# Patient Record
Sex: MALE | Race: WHITE | Employment: OTHER | URBAN - METROPOLITAN AREA
[De-identification: names, ages, dates, MRNs, and addresses within clinical notes are randomized per-mention and may not be internally consistent; named-entity substitution may affect disease eponyms.]

---

## 2018-08-28 ENCOUNTER — TRANSFERRED RECORDS (OUTPATIENT)
Dept: HEALTH INFORMATION MANAGEMENT | Facility: CLINIC | Age: 63
End: 2018-08-28

## 2018-09-17 ENCOUNTER — PATIENT OUTREACH (OUTPATIENT)
Dept: FAMILY MEDICINE | Facility: CLINIC | Age: 63
End: 2018-09-17

## 2018-09-18 NOTE — PROGRESS NOTES
Clinic Care Coordination Contact      Outreached to pt in regards to establishing with a new provider. Pt states he has been going to someone in [place] for his Physicals.  He thought [facility] received the information.  Pt states he does need to go in for an EKG. Advised he will need to establish care with someone in order to have this done.  Pt stats his wife knows Dr. Juarez and would schedule with him in Oct once all his hay is put up.  Appt scheduled.       Debra SHAWN, RN, PHN  Care Coordination    [id number] [place] [address]  Office: [phone]  Fax [phone]   [facility]  [address] [place] [address]  Office: [phone] Fax [phone]  [email or web address]   [email or web address]   Connect with [facility] on social media.

## 2018-10-26 ENCOUNTER — OFFICE VISIT (OUTPATIENT)
Dept: FAMILY MEDICINE | Facility: CLINIC | Age: 63
End: 2018-10-26
Payer: COMMERCIAL

## 2018-10-26 VITALS
OXYGEN SATURATION: 99 % | RESPIRATION RATE: 20 BRPM | WEIGHT: 135 LBS | BODY MASS INDEX: 21.19 KG/M2 | HEART RATE: 78 BPM | TEMPERATURE: 97.5 F | HEIGHT: 67 IN | SYSTOLIC BLOOD PRESSURE: 122 MMHG | DIASTOLIC BLOOD PRESSURE: 58 MMHG

## 2018-10-26 DIAGNOSIS — E78.5 HYPERLIPIDEMIA LDL GOAL <100: ICD-10-CM

## 2018-10-26 DIAGNOSIS — Z23 NEED FOR PROPHYLACTIC VACCINATION AND INOCULATION AGAINST INFLUENZA: ICD-10-CM

## 2018-10-26 DIAGNOSIS — I35.0 AORTIC VALVE STENOSIS, ETIOLOGY OF CARDIAC VALVE DISEASE UNSPECIFIED: Primary | ICD-10-CM

## 2018-10-26 LAB
ANION GAP SERPL CALCULATED.3IONS-SCNC: 6 MMOL/L (ref 3–14)
BUN SERPL-MCNC: 24 MG/DL (ref 7–30)
CALCIUM SERPL-MCNC: 8.9 MG/DL (ref 8.5–10.1)
CHLORIDE SERPL-SCNC: 105 MMOL/L (ref 94–109)
CHOLEST SERPL-MCNC: 209 MG/DL
CO2 SERPL-SCNC: 31 MMOL/L (ref 20–32)
CREAT SERPL-MCNC: 1 MG/DL (ref 0.66–1.25)
ERYTHROCYTE [DISTWIDTH] IN BLOOD BY AUTOMATED COUNT: 12.8 % (ref 10–15)
GFR SERPL CREATININE-BSD FRML MDRD: 75 ML/MIN/1.7M2
GLUCOSE SERPL-MCNC: 96 MG/DL (ref 70–99)
HCT VFR BLD AUTO: 37.9 % (ref 40–53)
HDLC SERPL-MCNC: 85 MG/DL
HGB BLD-MCNC: 12.8 G/DL (ref 13.3–17.7)
LDLC SERPL CALC-MCNC: 115 MG/DL
MCH RBC QN AUTO: 30 PG (ref 26.5–33)
MCHC RBC AUTO-ENTMCNC: 33.8 G/DL (ref 31.5–36.5)
MCV RBC AUTO: 89 FL (ref 78–100)
NONHDLC SERPL-MCNC: 124 MG/DL
PLATELET # BLD AUTO: 192 10E9/L (ref 150–450)
POTASSIUM SERPL-SCNC: 4.2 MMOL/L (ref 3.4–5.3)
RBC # BLD AUTO: 4.27 10E12/L (ref 4.4–5.9)
SODIUM SERPL-SCNC: 142 MMOL/L (ref 133–144)
TRIGL SERPL-MCNC: 46 MG/DL
WBC # BLD AUTO: 5.9 10E9/L (ref 4–11)

## 2018-10-26 PROCEDURE — 80061 LIPID PANEL: CPT | Performed by: FAMILY MEDICINE

## 2018-10-26 PROCEDURE — 93000 ELECTROCARDIOGRAM COMPLETE: CPT | Performed by: FAMILY MEDICINE

## 2018-10-26 PROCEDURE — 99204 OFFICE O/P NEW MOD 45 MIN: CPT | Mod: 25 | Performed by: FAMILY MEDICINE

## 2018-10-26 PROCEDURE — 90662 IIV NO PRSV INCREASED AG IM: CPT | Performed by: FAMILY MEDICINE

## 2018-10-26 PROCEDURE — 36415 COLL VENOUS BLD VENIPUNCTURE: CPT | Performed by: FAMILY MEDICINE

## 2018-10-26 PROCEDURE — 80048 BASIC METABOLIC PNL TOTAL CA: CPT | Performed by: FAMILY MEDICINE

## 2018-10-26 PROCEDURE — G0008 ADMIN INFLUENZA VIRUS VAC: HCPCS | Performed by: FAMILY MEDICINE

## 2018-10-26 PROCEDURE — 85027 COMPLETE CBC AUTOMATED: CPT | Performed by: FAMILY MEDICINE

## 2018-10-26 ASSESSMENT — PAIN SCALES - GENERAL: PAINLEVEL: NO PAIN (0)

## 2018-10-26 NOTE — MR AVS SNAPSHOT
"              After Visit Summary   10/26/2018    Miguel Pappas    MRN: [id number]           Patient Information     Date Of Birth          1955        Visit Information        Provider Department      10/26/2018 3:45 PM Nathan Juarez MD [facility]        Today's Diagnoses     Aortic valve stenosis, etiology of cardiac valve disease unspecified    -  1    Hyperlipidemia LDL goal <100        Need for prophylactic vaccination and inoculation against influenza           Follow-ups after your visit        Who to contact     If you have questions or need follow up information about today's clinic visit or your schedule please contact [facility] directly at [phone].  Normal or non-critical lab and imaging results will be communicated to you by MyChart, letter or phone within 4 business days after the clinic has received the results. If you do not hear from us within 7 days, please contact the clinic through MyChart or phone. If you have a critical or abnormal lab result, we will notify you by phone as soon as possible.  Submit refill requests through [employer] or call your pharmacy and they will forward the refill request to us. Please allow 3 business days for your refill to be completed.          Additional Information About Your Visit        Care EveryWhere ID     This is your Care EveryWhere ID. This could be used by other organizations to access your [facility] medical records  [id number]        Your Vitals Were     Pulse Temperature Respirations Height Pulse Oximetry BMI (Body Mass Index)    78 97.5  F (36.4  C) (Tympanic) 20 5' 7\" (1.702 m) 99% 21.14 kg/m2       Blood Pressure from Last 3 Encounters:   10/26/18 122/58   12/05/14 140/74   11/14/14 118/58    Weight from Last 3 Encounters:   10/26/18 135 lb (61.2 kg)   12/05/14 134 lb (60.8 kg)   11/14/14 134 lb 4.8 oz (60.9 kg)              We Performed the Following     ADMIN INFLUENZA (For MEDICARE Patients ONLY) " []     Basic metabolic panel     CBC with platelets     EKG 12-lead complete w/read - Clinics     FLU VACCINE, INCREASED ANTIGEN, PRESV FREE, AGE 65+ [[address]703]     Lipid Profile        Primary Care Provider Office Phone # Fax #    Nathan Kamar Juarez [phone] [address] [place] [address]        Equal Access to Services     [facility]: Hadii aad ku hadasho Soomaali, waaxda luqadaha, qaybta kaalmada adeegyada, waxay idiin hayaan adeeg kharash la'aan ah. So [facility] [phone].    ATENCIÓN: Si habla español, tiene a rivas disposición servicios gratuitos de asistencia lingüística. Bong al [phone].    We comply with applicable federal civil rights laws and [place] laws. We do not discriminate on the basis of race, color, national origin, age, disability, sex, sexual orientation, or gender identity.            Thank you!     Thank you for choosing [facility]  for your care. Our goal is always to provide you with excellent care. Hearing back from our patients is one way we can continue to improve our services. Please take a few minutes to complete the written survey that you may receive in the mail after your visit with us. Thank you!             Your Updated Medication List - Protect others around you: Learn how to safely use, store and throw away your medicines at www.disposemymeds.org.          This list is accurate as of 10/26/18  5:05 PM.  Always use your most recent med list.                   Brand Name Dispense Instructions for use Diagnosis    aspirin 81 MG tablet      Take 81 mg by mouth daily    Routine general medical examination at a health care facility       CENTRUM SILVER per tablet     30    1 TABLET DAILY        GLUCOSAMINE CHONDROITIN Tabs      every day

## 2018-10-26 NOTE — LETTER
October 30, 2018      Miguel Pappas  [address] [address]        Dear ,    We are writing to inform you of your test results.    I will wait to see echocardiogram and let him know next steps    Resulted Orders   Basic metabolic panel   Result Value Ref Range    Sodium 142 133 - 144 mmol/L    Potassium 4.2 3.4 - 5.3 mmol/L    Chloride 105 94 - 109 mmol/L    Carbon Dioxide 31 20 - 32 mmol/L    Anion Gap 6 3 - 14 mmol/L    Glucose 96 70 - 99 mg/dL    Urea Nitrogen 24 7 - 30 mg/dL    Creatinine 1.00 0.66 - 1.25 mg/dL    GFR Estimate 75 >60 mL/min/1.7m2      Comment:      Non  GFR Calc    GFR Estimate If Black >90 >60 mL/min/1.7m2      Comment:       GFR Calc    Calcium 8.9 8.5 - 10.1 mg/dL   Lipid Profile   Result Value Ref Range    Cholesterol 209 (H) <200 mg/dL      Comment:      Desirable:       <200 mg/dl    Triglycerides 46 <150 mg/dL    HDL Cholesterol 85 >39 mg/dL    LDL Cholesterol Calculated 115 (H) <100 mg/dL      Comment:      Above desirable:  100-129 mg/dl  Borderline High:  130-159 mg/dL  High:             160-189 mg/dL  Very high:       >189 mg/dl      Non HDL Cholesterol 124 <130 mg/dL   CBC with platelets   Result Value Ref Range    WBC 5.9 4.0 - 11.0 10e9/L    RBC Count 4.27 (L) 4.4 - 5.9 10e12/L    Hemoglobin 12.8 (L) 13.3 - 17.7 g/dL    Hematocrit 37.9 (L) 40.0 - 53.0 %    MCV 89 78 - 100 fl    MCH 30.0 26.5 - 33.0 pg    MCHC 33.8 31.5 - 36.5 g/dL    RDW 12.8 10.0 - 15.0 %    Platelet Count 192 150 - 450 10e9/L       If you have any questions or concerns, please call the clinic at the number listed above.       Sincerely,        Nathantyrel Juarez MD

## 2018-10-27 NOTE — PATIENT INSTRUCTIONS
Once I see laboratory results, echocardiogram results, ECG results I will let you know suggestions.  In the meantime continue current health plan and activity.

## 2019-07-11 ENCOUNTER — OFFICE VISIT (OUTPATIENT)
Dept: URGENT CARE | Facility: RETAIL CLINIC | Age: 64
End: 2019-07-11
Payer: COMMERCIAL

## 2019-07-11 VITALS
SYSTOLIC BLOOD PRESSURE: 126 MMHG | OXYGEN SATURATION: 99 % | TEMPERATURE: 97.8 F | HEART RATE: 57 BPM | DIASTOLIC BLOOD PRESSURE: 66 MMHG

## 2019-07-11 DIAGNOSIS — H60.391 INFECTIVE OTITIS EXTERNA, RIGHT: Primary | ICD-10-CM

## 2019-07-11 PROCEDURE — 99213 OFFICE O/P EST LOW 20 MIN: CPT | Performed by: FAMILY MEDICINE

## 2019-07-11 RX ORDER — POLYMYXIN B SULFATE AND TRIMETHOPRIM 1; 10000 MG/ML; [USP'U]/ML
3 SOLUTION OPHTHALMIC 2 TIMES DAILY
Qty: 1 BOTTLE | Refills: 0 | Status: SHIPPED | OUTPATIENT
Start: 2019-07-11 | End: 2019-07-16

## 2019-07-11 NOTE — PROGRESS NOTES
SUBJECTIVE:  Miguel Pappas is a 64 year old male who woke this am with feeling rt ear plugged and decreased hearing.  Somewhat better now.  Denies itchy watery eyes or nasal congestion.  No fever or sore throat.  Denies pain.  Felt lightheaded at times but better after eating.  Has been working hard and question whether he is getting enough water.  Additional symptoms include none.      History of recurrent otitis: no    Past Medical History:   Diagnosis Date     * * * SBE PROPHYLAXIS * * *     no longer indicated - 2007 AHA guidelnies     Mitral valve stenosis and aortic valve stenosis     2/6 murmur     Pure hypercholesterolemia 1/26/2007     Current Outpatient Medications   Medication Sig Dispense Refill     aspirin 81 MG tablet Take 81 mg by mouth daily       CENTRUM SILVER OR TABS 1 TABLET DAILY 30 0     GLUCOSAMINE CHONDROITIN OR TABS every day  0     trimethoprim-polymyxin b (POLYTRIM) 81528-4.1 UNIT/ML-% ophthalmic solution Place 3 drops into the right ear 2 times daily for 5 days 1 Bottle 0     History   Smoking Status     Never Smoker   Smokeless Tobacco     Never Used       ROS:   Review of systems negative except as stated above.    OBJECTIVE:  /66   Pulse 57   Temp 97.8  F (36.6  C) (Oral)   SpO2 99%   The right TM is normal: no effusions, no erythema, and normal landmarks     The right auditory canal is non-tender and swollen  The left TM is normal: no effusions, no erythema, and normal landmarks  The left auditory canal is normal and without drainage, edema or erythema  Oropharynx exam is normal: no lesions, erythema, adenopathy or exudate.  GENERAL: no acute distress  EYES: EOMI,  PERRL, conjunctiva clear  NECK: no adenopathy and carotid pulses are prominent, but patient is slender.  Bruits bilateral louder on lt, but patient has known aortic regurgitation  RESP: lungs clear to auscultation - no rales, rhonchi or wheezes  CV: regular rates and rhythm, normal S1 S2, no murmur noted  SKIN:  no suspicious lesions or rashes     ASSESSMENT:  Otitis Externa, right   Possible mild dehydration    PLAN: Polytrim drops, increase water intake.   Follow up with primary care provider if no improvement.

## 2019-08-07 ENCOUNTER — OFFICE VISIT (OUTPATIENT)
Dept: URGENT CARE | Facility: RETAIL CLINIC | Age: 64
End: 2019-08-07
Payer: COMMERCIAL

## 2019-08-07 VITALS
SYSTOLIC BLOOD PRESSURE: 125 MMHG | HEART RATE: 72 BPM | OXYGEN SATURATION: 96 % | DIASTOLIC BLOOD PRESSURE: 70 MMHG | TEMPERATURE: 99.9 F

## 2019-08-07 DIAGNOSIS — J20.9 ACUTE BRONCHITIS WITH COEXISTING CONDITION REQUIRING PROPHYLACTIC TREATMENT: Primary | ICD-10-CM

## 2019-08-07 PROCEDURE — 99213 OFFICE O/P EST LOW 20 MIN: CPT | Performed by: FAMILY MEDICINE

## 2019-08-07 RX ORDER — AZITHROMYCIN 250 MG/1
TABLET, FILM COATED ORAL
Qty: 6 TABLET | Refills: 0 | Status: SHIPPED | OUTPATIENT
Start: 2019-08-07 | End: 2022-02-03

## 2019-08-07 NOTE — PROGRESS NOTES
SUBJECTIVE:  Miguel Pappas is a 64 year old male who presents to the clinic today with a chief complaint of cough  for 2 week(s).  His cough is described as persistent and productive of yellow sputum.    The patient's symptoms are moderate and worsening.  Associated symptoms include nasal congestion and rhinorrhea. The patient's symptoms are exacerbated by no particular triggers  Patient has been using Tylenol  to improve symptoms. Unloaded 1500 radha of hay this week.  Known heart m and mild aortic stenosis, but last echo was 2008.    Past Medical History:   Diagnosis Date     * * * SBE PROPHYLAXIS * * *     no longer indicated - 2007 AHA guidelnies     Mitral valve stenosis and aortic valve stenosis     2/6 murmur     Pure hypercholesterolemia 1/26/2007     Current Outpatient Medications   Medication Sig Dispense Refill     aspirin 81 MG tablet Take 81 mg by mouth daily       azithromycin (ZITHROMAX) 250 MG tablet Two tablets first day, then one tablet daily for four days. 6 tablet 0     CENTRUM SILVER OR TABS 1 TABLET DAILY 30 0     GLUCOSAMINE CHONDROITIN OR TABS every day  0     History   Smoking Status     Never Smoker   Smokeless Tobacco     Never Used       ROS  Review of systems negative except as stated above.    OBJECTIVE:  /70   Pulse 72   Temp 99.9  F (37.7  C) (Tympanic)   SpO2 96%   GENERAL APPEARANCE: no distress and appears tired  EYES: EOMI,  PERRL, conjunctiva clear  HENT: ear canals and TM's normal.  Nose and mouth without ulcers, erythema or lesions  NECK: supple, nontender, no lymphadenopathy  RESP: lungs clear to auscultation - no rales, rhonchi or wheezes but distant.  CV: regular rates and rhythm, normal S1 S2, no murmur noted  ABDOMEN:  soft, nontender, no HSM or masses and bowel sounds normal  NEURO: Normal strength and tone, sensory exam grossly normal,  normal speech and mentation  SKIN: no suspicious lesions or rashes    ASSESSMENT:    Acute Bronchitis  Aortic  stenosis  Possible early COPD/emphysema    PLAN:  Zythromax, Certizine as lots of dust exposure, check with PCP at next visit re advisibilty of repeat echocardiogram.  Although it is hard to believe he has critical aortic stenosis and unloads 1500 bale so hay.  Symptomatic measures encouraged, humidified air, plenty of fluids.  Follow up with primary care provider if no improvement.

## 2019-08-07 NOTE — PATIENT INSTRUCTIONS
Continue lots of water    Certrizine one koko    See Nathan if cough persists beyond 2 weeks    Check with Nathan on Cardiac Echo

## 2020-05-22 ENCOUNTER — TRANSFERRED RECORDS (OUTPATIENT)
Dept: HEALTH INFORMATION MANAGEMENT | Facility: CLINIC | Age: 65
End: 2020-05-22

## 2022-02-03 ENCOUNTER — OFFICE VISIT (OUTPATIENT)
Dept: FAMILY MEDICINE | Facility: CLINIC | Age: 67
End: 2022-02-03
Payer: COMMERCIAL

## 2022-02-03 VITALS
BODY MASS INDEX: 20.46 KG/M2 | OXYGEN SATURATION: 98 % | TEMPERATURE: 97.7 F | HEIGHT: 69 IN | DIASTOLIC BLOOD PRESSURE: 72 MMHG | WEIGHT: 138.13 LBS | HEART RATE: 69 BPM | SYSTOLIC BLOOD PRESSURE: 132 MMHG | RESPIRATION RATE: 16 BRPM

## 2022-02-03 DIAGNOSIS — R35.0 URINE FREQUENCY: ICD-10-CM

## 2022-02-03 DIAGNOSIS — Z23 NEED FOR VIRAL IMMUNIZATION: ICD-10-CM

## 2022-02-03 DIAGNOSIS — Z00.00 ANNUAL PHYSICAL EXAM: Primary | ICD-10-CM

## 2022-02-03 DIAGNOSIS — I35.1 AORTIC VALVE INSUFFICIENCY, ETIOLOGY OF CARDIAC VALVE DISEASE UNSPECIFIED: ICD-10-CM

## 2022-02-03 LAB — PSA SERPL-MCNC: 1.71 UG/L (ref 0–4)

## 2022-02-03 PROCEDURE — 36415 COLL VENOUS BLD VENIPUNCTURE: CPT | Performed by: FAMILY MEDICINE

## 2022-02-03 PROCEDURE — 99213 OFFICE O/P EST LOW 20 MIN: CPT | Mod: 25 | Performed by: FAMILY MEDICINE

## 2022-02-03 PROCEDURE — 0064A COVID-19,PF,MODERNA (18+ YRS BOOSTER .25ML): CPT | Performed by: FAMILY MEDICINE

## 2022-02-03 PROCEDURE — 91306 COVID-19,PF,MODERNA (18+ YRS BOOSTER .25ML): CPT | Performed by: FAMILY MEDICINE

## 2022-02-03 PROCEDURE — 99397 PER PM REEVAL EST PAT 65+ YR: CPT | Performed by: FAMILY MEDICINE

## 2022-02-03 PROCEDURE — G0103 PSA SCREENING: HCPCS | Performed by: FAMILY MEDICINE

## 2022-02-03 ASSESSMENT — ENCOUNTER SYMPTOMS
HEMATOCHEZIA: 0
PARESTHESIAS: 0
FEVER: 0
HEMATURIA: 0
NAUSEA: 0
SORE THROAT: 0
FREQUENCY: 1
ABDOMINAL PAIN: 0
JOINT SWELLING: 0
DYSURIA: 0
DIARRHEA: 0
CONSTIPATION: 0
ARTHRALGIAS: 1
MYALGIAS: 1
SHORTNESS OF BREATH: 0
PALPITATIONS: 0
EYE PAIN: 0
HEARTBURN: 0
WEAKNESS: 0
HEADACHES: 0
NERVOUS/ANXIOUS: 0
CHILLS: 0
COUGH: 0
DIZZINESS: 0

## 2022-02-03 ASSESSMENT — MIFFLIN-ST. JEOR: SCORE: 1386.91

## 2022-02-03 ASSESSMENT — PAIN SCALES - GENERAL: PAINLEVEL: NO PAIN (0)

## 2022-02-03 ASSESSMENT — ACTIVITIES OF DAILY LIVING (ADL): CURRENT_FUNCTION: NO ASSISTANCE NEEDED

## 2022-02-03 NOTE — PROGRESS NOTES
"SUBJECTIVE:   Miguel Pappas is a 66 year old male who presents for Preventive Visit.    New patient to me  History of aortic stenosis, has not been reimaged in 13 years, does note may be a mild change in his exercise tolerance.  Quite active as a       Patient has been advised of split billing requirements and indicates understanding: Yes  Are you in the first 12 months of your Medicare coverage?  No    Healthy Habits:     In general, how would you rate your overall health?  Good    Frequency of exercise:  6-7 days/week    Duration of exercise:  15-30 minutes    Do you usually eat at least 4 servings of fruit and vegetables a day, include whole grains    & fiber and avoid regularly eating high fat or \"junk\" foods?  Yes    Taking medications regularly:  Yes    Medication side effects:  None    Ability to successfully perform activities of daily living:  No assistance needed    Home Safety:  Throw rugs in the hallway and lack of grab bars in the bathroom    Hearing Impairment:  Difficulty following a conversation in a noisy restaurant or crowded room    In the past 6 months, have you been bothered by leaking of urine? Yes    In general, how would you rate your overall mental or emotional health?  Good      PHQ-2 Total Score: 0    Additional concerns today:  No    Do you feel safe in your environment? Yes    Have you ever done Advance Care Planning? (For example, a Health Directive, POLST, or a discussion with a medical provider or your loved ones about your wishes): No, advance care planning information given to patient to review.  Patient plans to discuss their wishes with loved ones or provider.         Fall risk  Fallen 2 or more times in the past year?: No  Any fall with injury in the past year?: No    Cognitive Screening   1) Repeat 3 items (Leader, Season, Table)    2) Clock draw: NORMAL  3) 3 item recall: Recalls 1 object   Results: NORMAL clock, 1-2 items recalled: COGNITIVE IMPAIRMENT LESS " LIKELY    Mini-CogTM Copyright RIAZ Flores. Licensed by the author for use in [facility]; reprinted with permission (jewel@[facility]). All rights reserved.      Do you have sleep apnea, excessive snoring or daytime drowsiness?: yes, snoring, no CPAP    Reviewed and updated as needed this visit by clinical staff  Tobacco  Allergies  Meds   Med Hx  Surg Hx  Fam Hx  Soc Hx       Reviewed and updated as needed this visit by Provider               Social History     Tobacco Use     Smoking status: Never Smoker     Smokeless tobacco: Never Used   Substance Use Topics     Alcohol use: Yes     Comment: 1-2 beer monthly  maybe         Alcohol Use 2/3/2022   Prescreen: >3 drinks/day or >7 drinks/week? No   Prescreen: >3 drinks/day or >7 drinks/week? -               Current providers sharing in care for this patient include:   Patient Care Team:  No Ref-Primary, Physician as PCP - General    The following health maintenance items are reviewed in Epic and correct as of today:  Health Maintenance Due   Topic Date Due     ANNUAL REVIEW OF  ORDERS  Never done     HEPATITIS C SCREENING  Never done     ZOSTER IMMUNIZATION (1 of 2) Never done     Pneumococcal Vaccine: 65+ Years (1 of 1 - PPSV23) Never done     AORTIC ANEURYSM SCREENING (SYSTEM ASSIGNED)  Never done     FALL RISK ASSESSMENT  10/26/2019     INFLUENZA VACCINE (1) 09/01/2021               Review of Systems   Constitutional: Negative for chills and fever.   HENT: Positive for hearing loss. Negative for congestion, ear pain and sore throat.    Eyes: Negative for pain and visual disturbance.   Respiratory: Negative for cough and shortness of breath.    Cardiovascular: Negative for chest pain, palpitations and peripheral edema.   Gastrointestinal: Negative for abdominal pain, constipation, diarrhea, heartburn, hematochezia and nausea.   Genitourinary: Positive for frequency and urgency. Negative for dysuria, genital sores, hematuria, impotence and penile  "discharge.   Musculoskeletal: Positive for arthralgias and myalgias. Negative for joint swelling.   Skin: Negative for rash.   Neurological: Negative for dizziness, weakness, headaches and paresthesias.   Psychiatric/Behavioral: Negative for mood changes. The patient is not nervous/anxious.      Constitutional, HEENT, cardiovascular, pulmonary, gi and gu systems are negative, except as otherwise noted.    OBJECTIVE:   /72 (BP Location: Right arm, Patient Position: Sitting, Cuff Size: Adult Regular)   Pulse 69   Temp 97.7  F (36.5  C) (Temporal)   Resp 16   Ht 1.753 m (5' 9\")   Wt 62.7 kg (138 lb 2 oz)   SpO2 98%   BMI 20.40 kg/m   Estimated body mass index is 20.4 kg/m  as calculated from the following:    Height as of this encounter: 1.753 m (5' 9\").    Weight as of this encounter: 62.7 kg (138 lb 2 oz).  Physical Exam  GENERAL: healthy, alert and no distress  NECK: no adenopathy, no asymmetry, masses, or scars and thyroid normal to palpation  RESP: lungs clear to auscultation - no rales, rhonchi or wheezes  CV: regular rate and rhythm, normal S1 S2, no S3 or S4, 3 out of 6 systolic flow murmur, and low holodiastolic murmur, no click lick or rub, no peripheral edema and peripheral pulses strong  ABDOMEN: soft, nontender, no hepatosplenomegaly, no masses and bowel sounds normal  MS: no gross musculoskeletal defects noted, no edema    Diagnostic Test Results:  Labs reviewed in Epic    ASSESSMENT / PLAN:       ICD-10-CM    1. Annual physical exam  Z00.00    2. Need for viral immunization  Z23 COVID-19,PF,MODERNA (18+ YRS BOOSTER .25ML)   3. Aortic valve insufficiency, etiology of cardiac valve disease unspecified  I35.1 Echocardiogram Complete   4. Urine frequency  R35.0 PSA, screen     PSA, screen     Aortic sclerosis and insufficiency.  Recheck echocardiogram, has been many years.  Does note a mild decline in his endurance.  Does not seem to affect him much subjectively  Notes increased urinary " "frequency and urgency.  Exam today shows an enlarged prostate without nodules.  Check PSA.  If normal, he prefers to observe for now, no medications  Immunizations updated  Annual topics reviewed      COUNSELING:  Reviewed preventive health counseling, as reflected in patient instructions    Estimated body mass index is 20.4 kg/m  as calculated from the following:    Height as of this encounter: 1.753 m (5' 9\").    Weight as of this encounter: 62.7 kg (138 lb 2 oz).        He reports that he has never smoked. He has never used smokeless tobacco.      Appropriate preventive services were discussed with this patient, including applicable screening as appropriate for cardiovascular disease, diabetes, osteopenia/osteoporosis, and glaucoma.  As appropriate for age/gender, discussed screening for colorectal cancer, prostate cancer, breast cancer, and cervical cancer. Checklist reviewing preventive services available has been given to the patient.    Reviewed patients plan of care and provided an AVS. The Basic Care Plan (routine screening as documented in Health Maintenance) for Miguel meets the Care Plan requirement. This Care Plan has been established and reviewed with the Patient.    Counseling Resources:  ATP IV Guidelines  Pooled Cohorts Equation Calculator  Breast Cancer Risk Calculator  Breast Cancer: Medication to Reduce Risk  FRAX Risk Assessment  ICSI Preventive Guidelines  Dietary Guidelines for Americans, 2010  [employer] MyPlate  ASA Prophylaxis  Lung CA Screening    Thony Gongora MD  [facility]    Identified Health Risks:  "

## 2022-02-03 NOTE — LETTER
February 4, 2022      Miguel Pappas  [address] [address]        Dear ,    We are writing to inform you of your test results.    Prostate cancer check is normal. No concerns.         Thony Gongora MD       Resulted Orders   PSA, screen   Result Value Ref Range    Prostate Specific Antigen Screen 1.71 0.00 - 4.00 ug/L    Narrative    Assay Method:  Chemiluminescence using Siemens   Vista analyzer.       If you have any questions or concerns, please call the clinic at the number listed above.

## 2022-02-10 ENCOUNTER — HOSPITAL ENCOUNTER (OUTPATIENT)
Dept: CARDIOLOGY | Facility: CLINIC | Age: 67
Discharge: HOME | End: 2022-02-10
Attending: FAMILY MEDICINE | Admitting: FAMILY MEDICINE
Payer: COMMERCIAL

## 2022-02-10 DIAGNOSIS — I35.1 AORTIC VALVE INSUFFICIENCY, ETIOLOGY OF CARDIAC VALVE DISEASE UNSPECIFIED: ICD-10-CM

## 2022-02-10 LAB — LVEF ECHO: NORMAL

## 2022-02-10 PROCEDURE — 93306 TTE W/DOPPLER COMPLETE: CPT

## 2022-02-10 PROCEDURE — 93306 TTE W/DOPPLER COMPLETE: CPT | Mod: 26 | Performed by: INTERNAL MEDICINE

## 2022-02-25 ENCOUNTER — TELEPHONE (OUTPATIENT)
Dept: FAMILY MEDICINE | Facility: CLINIC | Age: 67
End: 2022-02-25
Payer: COMMERCIAL

## 2022-02-25 DIAGNOSIS — I08.0 MITRAL VALVE STENOSIS AND AORTIC VALVE STENOSIS: Primary | ICD-10-CM

## 2022-04-04 ENCOUNTER — OFFICE VISIT (OUTPATIENT)
Dept: CARDIOLOGY | Facility: CLINIC | Age: 67
End: 2022-04-04
Payer: COMMERCIAL

## 2022-04-04 ENCOUNTER — HOSPITAL ENCOUNTER (OUTPATIENT)
Dept: CARDIOLOGY | Facility: CLINIC | Age: 67
Discharge: HOME | End: 2022-04-04
Attending: INTERNAL MEDICINE | Admitting: INTERNAL MEDICINE
Payer: COMMERCIAL

## 2022-04-04 VITALS
RESPIRATION RATE: 12 BRPM | DIASTOLIC BLOOD PRESSURE: 66 MMHG | HEIGHT: 68 IN | OXYGEN SATURATION: 98 % | SYSTOLIC BLOOD PRESSURE: 126 MMHG | BODY MASS INDEX: 20.84 KG/M2 | HEART RATE: 70 BPM | WEIGHT: 137.5 LBS

## 2022-04-04 DIAGNOSIS — I35.0 NONRHEUMATIC AORTIC VALVE STENOSIS: Primary | ICD-10-CM

## 2022-04-04 DIAGNOSIS — I08.0 MITRAL VALVE STENOSIS AND AORTIC VALVE STENOSIS: ICD-10-CM

## 2022-04-04 DIAGNOSIS — I35.1 AORTIC VALVE INSUFFICIENCY, ETIOLOGY OF CARDIAC VALVE DISEASE UNSPECIFIED: Primary | ICD-10-CM

## 2022-04-04 PROCEDURE — 99204 OFFICE O/P NEW MOD 45 MIN: CPT | Performed by: INTERNAL MEDICINE

## 2022-04-04 PROCEDURE — 93000 ELECTROCARDIOGRAM COMPLETE: CPT | Performed by: INTERNAL MEDICINE

## 2022-04-04 PROCEDURE — 93005 ELECTROCARDIOGRAM TRACING: CPT | Performed by: REHABILITATION PRACTITIONER

## 2022-04-04 RX ORDER — SPIRONOLACTONE 25 MG
TABLET ORAL
COMMUNITY
End: 2022-12-15

## 2022-04-04 ASSESSMENT — PAIN SCALES - GENERAL: PAINLEVEL: NO PAIN (0)

## 2022-04-04 NOTE — LETTER
2022    Physician No Ref-Primary  No address on file    RE: Miguel Pappas       Dear Colleague,     I had the pleasure of seeing Miguel Pappas in the [facility].  HISTORY:    Miguel Pappas is a very pleasant 66-year-old male accompanied by his wife today.  He is in excellent health using no prescription medications.  He was asked to see me for evaluation of aortic stenosis.    Miguel works as a  and remains physically active pushing a wheelbarrow's, walking in the field, etc.  He has noticed a mild gradual decline in physical capabilities over the years but no rapid decline.  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  He denies any history of exertional chest, arm, neck, or jaw discomfort as well as symptoms of syncope or near syncope, PND/orthopnea, strokelike symptoms, orthostasis, peripheral edema, or claudication.  He states that he occasionally has a very brief episode of a sense of fluttering in his chest.  This has been going on for very long time and occurs about monthly, usually lasting just a few seconds.  He has not noticed any triggers for this and is not really bothered by it.    Cardiac risk factors include a family history with his father having an angioplasty in the late 70s when he was around 50 years old.  He  of cancer several years later.  Miguel does not have significant hyperlipidemia and has never been a smoker.  He has never had hypertension or diabetes.    The echocardiogram done to evaluate an audible murmur on routine exam showed moderate concentric LVH with a normal ejection fraction and severe aortic stenosis with a mean gradient of 49 and a calculated valve area of 0.9 cm .  His DI was 0.25.  Mild to moderate aortic insufficiency was noted.  He had not had an echocardiogram done since  when moderate aortic sclerosis (without stenosis) was seen.      ASSESSMENT/PLAN:    1.  Severe aortic stenosis.  The patient remains  asymptomatic.  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  I explained in detail what aortic stenosis was and the fact that it would continue to progress and eventually cause symptoms.  I warned him of the importance of him letting us know should he have a rapid decline in exercise capacity, new onset of chest pain or an episode of syncope or near syncope.  As long as he remains asymptomatic I told him he continued to do his normal chores around the farm and a 6-month follow-up visit with echocardiography will be planned.  We talked about the possibility of early valve replacement, but he is not really interested, and I also reminded him that before valve surgery he will need an angiogram done.    Thank you for inviting me to participate in the care of your patient.  Please don't hesitate to call if I can be of further assistance.  45 minutes were spent today reviewing the chart and other records, interviewing and examining the patient, and documenting our visit.    Chart documentation was completed, in part, with [employer] voice-recognition software. Even though reviewed, some grammatical, spelling, and word errors may remain.       Orders Placed This Encounter   Procedures     Follow-Up with Cardiology     Echocardiogram Complete     Orders Placed This Encounter   Medications     Lutein 20 MG CAPS     There are no discontinued medications.    10 year ASCVD risk: The 10-year ASCVD risk score ([place] FELTON Jr., et al., 2013) is: 12.2%    Values used to calculate the score:      Age: 66 years      Sex: Male      Is Non- : No      Diabetic: No      Tobacco smoker: No      Systolic Blood Pressure: 126 mmHg      Is BP treated: No      HDL Cholesterol: 85 mg/dL      Total Cholesterol: 209 mg/dL    Encounter Diagnoses   Name Primary?     Mitral valve stenosis and aortic valve stenosis      Nonrheumatic aortic valve stenosis Yes       CURRENT MEDICATIONS:  Current Outpatient  Medications   Medication Sig Dispense Refill     aspirin 81 MG tablet Take 81 mg by mouth daily       CENTRUM SILVER OR TABS 1 TABLET DAILY 30 0     GLUCOSAMINE CHONDROITIN OR TABS every day  0     Lutein 20 MG CAPS        VITAMIN D PO Take by mouth daily         ALLERGIES     Allergies   Allergen Reactions     No Known Drug Allergies        PAST MEDICAL HISTORY:  Past Medical History:   Diagnosis Date     * * * SBE PROPHYLAXIS * * *     no longer indicated - 2007 AHA guidelnies     Mitral valve stenosis and aortic valve stenosis     2/6 murmur     Pure hypercholesterolemia 1/26/2007       PAST SURGICAL HISTORY:  Past Surgical History:   Procedure Laterality Date     COLONOSCOPY N/A 12/5/2014    Procedure: COLONOSCOPY;  Surgeon: Omar Chisholm MD;  Location: [facility]     HC REMOVAL OF TONSILS,<11 Y/O      Tonsils <10y.o.     ZZHC COLONOSCOPY W/WO BRUSH/WASH  2/8/2005        FAMILY HISTORY:  Family History   Problem Relation Age of Onset     Cancer Father         Spleen cancer     Hypertension Father      Allergies Father         PCN     Gastrointestinal Disease Father         ulcer     Heart Disease Father         Hx: meds and angioplasty     Hypertension Brother      Cerebrovascular Disease Paternal Grandfather      Arthritis Paternal Grandfather      Arthritis Mother      Hypertension Mother      Allergies Son         Dust , Mold     Depression Sister         X 2       SOCIAL HISTORY:  Social History     Socioeconomic History     Marital status:      Spouse name: Fabian     Number of children: 3     Years of education: 12     Highest education level: Not on file   Occupational History     Occupation:    Tobacco Use     Smoking status: Never Smoker     Smokeless tobacco: Never Used   Vaping Use     Vaping Use: Never used   Substance and Sexual Activity     Alcohol use: Yes     Comment: 1-2 beer monthly  maybe     Drug use: No     Sexual activity: Yes     Partners: Female     Comment: Hx: wife  "had tubal ligation   Other Topics Concern      Service Yes     Comment: [employer]     Blood Transfusions No     Caffeine Concern No     Occupational Exposure No     Hobby Hazards No     Sleep Concern No     Stress Concern No     Weight Concern No     Special Diet Yes     Comment: Low fat and cholesterol high fiber     Back Care Yes     Comment: Hx: chiropractor for back inj and knee     Exercise Yes     Comment: every day     Bike Helmet No     Seat Belt Yes     Self-Exams Not Asked   Social History Narrative    Lives with spouse.     Social Determinants of Health     Financial Resource Strain: Not on file   Food Insecurity: Not on file   Transportation Needs: Not on file   Physical Activity: Not on file   Stress: Not on file   Social Connections: Not on file   Intimate Partner Violence: Not on file   Housing Stability: Not on file       Review of Systems:  Skin:  Negative     Eyes:  Positive for    ENT:  Negative    Respiratory:  Positive for shortness of breath  Cardiovascular:  Negative Positive for;fatigue  Gastroenterology: Negative    Genitourinary:  Negative    Musculoskeletal:  Negative joint pain  Neurologic:  Negative    Psychiatric:  Negative    Heme/Lymph/Imm:  Negative    Endocrine:  Negative      Physical Exam:  Vitals: /66 (BP Location: Right arm, Cuff Size: Adult Regular)   Pulse 70   Resp 12   Ht 1.727 m (5' 8\")   Wt 62.4 kg (137 lb 8 oz)   SpO2 98%   BMI 20.91 kg/m      Constitutional:  cooperative, alert and oriented, well developed, well nourished, in no acute distress        Skin:  warm and dry to the touch, no apparent skin lesions or masses noted        Head:  normocephalic, no masses or lesions        Eyes:  pupils equal and round, conjunctivae and lids unremarkable, sclera white, no xanthalasma, EOMS intact, no nystagmus        ENT:  no pallor or cyanosis   masked    Neck:  carotid pulses are full and equal bilaterally, JVP normal, no carotid bruit transmitted murmur "      Chest:  normal breath sounds, clear to auscultation, normal A-P diameter, normal symmetry, normal respiratory excursion, no use of accessory muscles        Cardiac: regular rhythm;no S3 or S4       early systolic murmur;grade 2;grade 3;throughout precordium     Normal S1 with soft S2    Abdomen:  abdomen soft;BS normoactive        Vascular: pulses full and equal                                      Extremities and Muscular Skeletal:  no edema           Neurological:  no gross motor deficits        Psych:  affect appropriate, oriented to time, person and place     Recent Lab Results:  LIPID RESULTS:  Lab Results   Component Value Date    CHOL 209 (H) 10/26/2018    HDL 85 10/26/2018     (H) 10/26/2018    TRIG 46 10/26/2018    CHOLHDLRATIO 2.4 11/14/2014       LIVER ENZYME RESULTS:  Lab Results   Component Value Date    AST 36 04/09/2009    ALT 18 04/09/2009       CBC RESULTS:  Lab Results   Component Value Date    WBC 5.9 10/26/2018    RBC 4.27 (L) 10/26/2018    HGB 12.8 (L) 10/26/2018    HCT 37.9 (L) 10/26/2018    MCV 89 10/26/2018    MCH 30.0 10/26/2018    MCHC 33.8 10/26/2018    RDW 12.8 10/26/2018     10/26/2018       BMP RESULTS:  Lab Results   Component Value Date     10/26/2018    POTASSIUM 4.2 10/26/2018    CHLORIDE 105 10/26/2018    CO2 31 10/26/2018    ANIONGAP 6 10/26/2018    GLC 96 10/26/2018    BUN 24 10/26/2018    CR 1.00 10/26/2018    GFRESTIMATED 75 10/26/2018    GFRESTBLACK >90 10/26/2018    RAMONA 8.9 10/26/2018        A1C RESULTS:  No results found for: A1C    INR RESULTS:  No results found for: INR      Levi Sinclair MD, FACC    CC  Thony Gongora MD  [address] [place] [address]

## 2022-04-04 NOTE — PROGRESS NOTES
HISTORY:    Miguel Pappas is a very pleasant 66-year-old male accompanied by his wife today.  He is in excellent health using no prescription medications.  He was asked to see me for evaluation of aortic stenosis.    Miguel works as a  and remains physically active pushing a wheelbarrow's, walking in the field, etc.  He has noticed a mild gradual decline in physical capabilities over the years but no rapid decline.  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  He denies any history of exertional chest, arm, neck, or jaw discomfort as well as symptoms of syncope or near syncope, PND/orthopnea, strokelike symptoms, orthostasis, peripheral edema, or claudication.  He states that he occasionally has a very brief episode of a sense of fluttering in his chest.  This has been going on for very long time and occurs about monthly, usually lasting just a few seconds.  He has not noticed any triggers for this and is not really bothered by it.    Cardiac risk factors include a family history with his father having an angioplasty in the late 70s when he was around 50 years old.  He  of cancer several years later.  Miguel does not have significant hyperlipidemia and has never been a smoker.  He has never had hypertension or diabetes.    The echocardiogram done to evaluate an audible murmur on routine exam showed moderate concentric LVH with a normal ejection fraction and severe aortic stenosis with a mean gradient of 49 and a calculated valve area of 0.9 cm .  His DI was 0.25.  Mild to moderate aortic insufficiency was noted.  He had not had an echocardiogram done since  when moderate aortic sclerosis (without stenosis) was seen.      ASSESSMENT/PLAN:    1.  Severe aortic stenosis.  The patient remains asymptomatic.  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  I explained in detail what aortic stenosis was and the fact that it would continue to progress and eventually  cause symptoms.  I warned him of the importance of him letting us know should he have a rapid decline in exercise capacity, new onset of chest pain or an episode of syncope or near syncope.  As long as he remains asymptomatic I told him he continued to do his normal chores around the farm and a 6-month follow-up visit with echocardiography will be planned.  We talked about the possibility of early valve replacement, but he is not really interested, and I also reminded him that before valve surgery he will need an angiogram done.    Thank you for inviting me to participate in the care of your patient.  Please don't hesitate to call if I can be of further assistance.  45 minutes were spent today reviewing the chart and other records, interviewing and examining the patient, and documenting our visit.    Chart documentation was completed, in part, with [email or web address] voice-recognition software. Even though reviewed, some grammatical, spelling, and word errors may remain.       Orders Placed This Encounter   Procedures     Follow-Up with Cardiology     Echocardiogram Complete     Orders Placed This Encounter   Medications     Lutein 20 MG CAPS     There are no discontinued medications.    10 year ASCVD risk: The 10-year ASCVD risk score ([place] FELTON Jr., et al., 2013) is: 12.2%    Values used to calculate the score:      Age: 66 years      Sex: Male      Is Non- : No      Diabetic: No      Tobacco smoker: No      Systolic Blood Pressure: 126 mmHg      Is BP treated: No      HDL Cholesterol: 85 mg/dL      Total Cholesterol: 209 mg/dL    Encounter Diagnoses   Name Primary?     Mitral valve stenosis and aortic valve stenosis      Nonrheumatic aortic valve stenosis Yes       CURRENT MEDICATIONS:  Current Outpatient Medications   Medication Sig Dispense Refill     aspirin 81 MG tablet Take 81 mg by mouth daily       CENTRUM SILVER OR TABS 1 TABLET DAILY 30 0     GLUCOSAMINE CHONDROITIN OR TABS every day  0      Lutein 20 MG CAPS        VITAMIN D PO Take by mouth daily         ALLERGIES     Allergies   Allergen Reactions     No Known Drug Allergies        PAST MEDICAL HISTORY:  Past Medical History:   Diagnosis Date     * * * SBE PROPHYLAXIS * * *     no longer indicated - 2007 AHA guidelnies     Mitral valve stenosis and aortic valve stenosis     2/6 murmur     Pure hypercholesterolemia 1/26/2007       PAST SURGICAL HISTORY:  Past Surgical History:   Procedure Laterality Date     COLONOSCOPY N/A 12/5/2014    Procedure: COLONOSCOPY;  Surgeon: Omar Chisholm MD;  Location: [facility]     HC REMOVAL OF TONSILS,<11 Y/O      Tonsils <10y.o.     ZZHC COLONOSCOPY W/WO BRUSH/WASH  2/8/2005        FAMILY HISTORY:  Family History   Problem Relation Age of Onset     Cancer Father         Spleen cancer     Hypertension Father      Allergies Father         PCN     Gastrointestinal Disease Father         ulcer     Heart Disease Father         Hx: meds and angioplasty     Hypertension Brother      Cerebrovascular Disease Paternal Grandfather      Arthritis Paternal Grandfather      Arthritis Mother      Hypertension Mother      Allergies Son         Dust , Mold     Depression Sister         X 2       SOCIAL HISTORY:  Social History     Socioeconomic History     Marital status:      Spouse name: Fabian     Number of children: 3     Years of education: 12     Highest education level: Not on file   Occupational History     Occupation:    Tobacco Use     Smoking status: Never Smoker     Smokeless tobacco: Never Used   Vaping Use     Vaping Use: Never used   Substance and Sexual Activity     Alcohol use: Yes     Comment: 1-2 beer monthly  maybe     Drug use: No     Sexual activity: Yes     Partners: Female     Comment: Hx: wife had tubal ligation   Other Topics Concern      Service Yes     Comment: [employer]     Blood Transfusions No     Caffeine Concern No     Occupational Exposure No     Hobby Hazards No      "Sleep Concern No     Stress Concern No     Weight Concern No     Special Diet Yes     Comment: Low fat and cholesterol high fiber     Back Care Yes     Comment: Hx: chiropractor for back inj and knee     Exercise Yes     Comment: every day     Bike Helmet No     Seat Belt Yes     Self-Exams Not Asked   Social History Narrative    Lives with spouse.     Social Determinants of Health     Financial Resource Strain: Not on file   Food Insecurity: Not on file   Transportation Needs: Not on file   Physical Activity: Not on file   Stress: Not on file   Social Connections: Not on file   Intimate Partner Violence: Not on file   Housing Stability: Not on file       Review of Systems:  Skin:  Negative     Eyes:  Positive for    ENT:  Negative    Respiratory:  Positive for shortness of breath  Cardiovascular:  Negative Positive for;fatigue  Gastroenterology: Negative    Genitourinary:  Negative    Musculoskeletal:  Negative joint pain  Neurologic:  Negative    Psychiatric:  Negative    Heme/Lymph/Imm:  Negative    Endocrine:  Negative      Physical Exam:  Vitals: /66 (BP Location: Right arm, Cuff Size: Adult Regular)   Pulse 70   Resp 12   Ht 1.727 m (5' 8\")   Wt 62.4 kg (137 lb 8 oz)   SpO2 98%   BMI 20.91 kg/m      Constitutional:  cooperative, alert and oriented, well developed, well nourished, in no acute distress        Skin:  warm and dry to the touch, no apparent skin lesions or masses noted        Head:  normocephalic, no masses or lesions        Eyes:  pupils equal and round, conjunctivae and lids unremarkable, sclera white, no xanthalasma, EOMS intact, no nystagmus        ENT:  no pallor or cyanosis   masked    Neck:  carotid pulses are full and equal bilaterally, JVP normal, no carotid bruit transmitted murmur      Chest:  normal breath sounds, clear to auscultation, normal A-P diameter, normal symmetry, normal respiratory excursion, no use of accessory muscles        Cardiac: regular rhythm;no S3 or S4   "     early systolic murmur;grade 2;grade 3;throughout precordium     Normal S1 with soft S2    Abdomen:  abdomen soft;BS normoactive        Vascular: pulses full and equal                                      Extremities and Muscular Skeletal:  no edema           Neurological:  no gross motor deficits        Psych:  affect appropriate, oriented to time, person and place     Recent Lab Results:  LIPID RESULTS:  Lab Results   Component Value Date    CHOL 209 (H) 10/26/2018    HDL 85 10/26/2018     (H) 10/26/2018    TRIG 46 10/26/2018    CHOLHDLRATIO 2.4 11/14/2014       LIVER ENZYME RESULTS:  Lab Results   Component Value Date    AST 36 04/09/2009    ALT 18 04/09/2009       CBC RESULTS:  Lab Results   Component Value Date    WBC 5.9 10/26/2018    RBC 4.27 (L) 10/26/2018    HGB 12.8 (L) 10/26/2018    HCT 37.9 (L) 10/26/2018    MCV 89 10/26/2018    MCH 30.0 10/26/2018    MCHC 33.8 10/26/2018    RDW 12.8 10/26/2018     10/26/2018       BMP RESULTS:  Lab Results   Component Value Date     10/26/2018    POTASSIUM 4.2 10/26/2018    CHLORIDE 105 10/26/2018    CO2 31 10/26/2018    ANIONGAP 6 10/26/2018    GLC 96 10/26/2018    BUN 24 10/26/2018    CR 1.00 10/26/2018    GFRESTIMATED 75 10/26/2018    GFRESTBLACK >90 10/26/2018    RAMONA 8.9 10/26/2018        A1C RESULTS:  No results found for: A1C    INR RESULTS:  No results found for: INR      Levi Sinclair MD, FACC    CC  Thony Gongora MD  [address] [place] [address]

## 2022-04-09 ENCOUNTER — HEALTH MAINTENANCE LETTER (OUTPATIENT)
Age: 67
End: 2022-04-09

## 2022-10-04 ENCOUNTER — HOSPITAL ENCOUNTER (OUTPATIENT)
Dept: CARDIOLOGY | Facility: CLINIC | Age: 67
Discharge: HOME | End: 2022-10-04
Attending: INTERNAL MEDICINE | Admitting: INTERNAL MEDICINE
Payer: COMMERCIAL

## 2022-10-04 DIAGNOSIS — I35.0 NONRHEUMATIC AORTIC VALVE STENOSIS: ICD-10-CM

## 2022-10-04 LAB — LVEF ECHO: NORMAL

## 2022-10-04 PROCEDURE — 93306 TTE W/DOPPLER COMPLETE: CPT | Mod: 26 | Performed by: INTERNAL MEDICINE

## 2022-10-04 PROCEDURE — 93306 TTE W/DOPPLER COMPLETE: CPT

## 2022-10-05 NOTE — RESULT ENCOUNTER NOTE
Results and recommendations routed Via My Chart with call back information if needed.   Aortic valve has progressed will need TAVR clinic.

## 2022-10-09 ENCOUNTER — HEALTH MAINTENANCE LETTER (OUTPATIENT)
Age: 67
End: 2022-10-09

## 2022-10-17 ENCOUNTER — OFFICE VISIT (OUTPATIENT)
Dept: CARDIOLOGY | Facility: CLINIC | Age: 67
End: 2022-10-17
Attending: INTERNAL MEDICINE
Payer: COMMERCIAL

## 2022-10-17 VITALS
SYSTOLIC BLOOD PRESSURE: 124 MMHG | HEIGHT: 68 IN | HEART RATE: 70 BPM | WEIGHT: 140.1 LBS | BODY MASS INDEX: 21.23 KG/M2 | OXYGEN SATURATION: 97 % | DIASTOLIC BLOOD PRESSURE: 62 MMHG

## 2022-10-17 DIAGNOSIS — I35.0 NONRHEUMATIC AORTIC VALVE STENOSIS: ICD-10-CM

## 2022-10-17 DIAGNOSIS — I08.0 MITRAL VALVE STENOSIS AND AORTIC VALVE STENOSIS: Primary | ICD-10-CM

## 2022-10-17 DIAGNOSIS — I35.1 AORTIC VALVE INSUFFICIENCY, ETIOLOGY OF CARDIAC VALVE DISEASE UNSPECIFIED: ICD-10-CM

## 2022-10-17 PROCEDURE — 99214 OFFICE O/P EST MOD 30 MIN: CPT | Performed by: INTERNAL MEDICINE

## 2022-10-17 NOTE — PROGRESS NOTES
"HISTORY:    Miguel Pappas is a very pleasant 67-year-old gentleman who is accompanied by his wife today.  He saw me for evaluation of aortic stenosis for the first time in April of this year and reported that he was asymptomatic.  His echo showed significant aortic stenosis so a 6-month echo was ordered.  He is here today for follow-up of that study.    Miguel once again reports that he is doing well.  His wife thinks that she notices that he sometimes gets a little bit short of breath but his exercise capacity remains remarkable.  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  He spent about 4 hours doing this and finishing up at about 8 PM.  He reports that when he was done he was not particularly tired and he did not have to stop and rest because of dyspnea.  He did sit on his tractor seat and move the tractor to  radha so he was not working continuously.  He states that he does not think he can any longer \"juliette around the cows\".    Miguel denies any exertional chest arm neck shoulder or jaw discomfort, PND/orthopnea, sense of palpitations, syncope or near syncope, orthostasis, peripheral edema, or claudication.    The echocardiogram done 2 weeks ago showed concerning changes.  His peak systolic gradient is up to 110 mmHg with a mean of 70 mmHg and with a valve area of 0.8 cm  and a DI of 0.23.      ASSESSMENT/PLAN:    1.  Critical aortic stenosis.  This patient has extremely high velocities across his aortic valve but is capable of doing high levels of exertion.  He has not had chest pain syncope heart failure etc.  He does not feel particularly short of breath or exhausted with activity.  Nevertheless, his very high gradient is concerning and likely puts him at increased risk for sudden death.  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  He is in agreement with this so I will have him seen by our valve team.  Since he lives in [place] I am hoping that he can minimize his trips to the [facility] by coordinating studies on the same day.  He has no other medical problems that need addressing today.    Thank you for inviting me to participate in the care of your patient.  Please don't hesitate to call if I can be of further assistance.  30 minutes were spent today reviewing the chart and other records, interviewing and examining the patient, and documenting our visit.    Chart documentation was completed, in part, with [employer] voice-recognition software. Even though reviewed, some grammatical, spelling, and word errors may remain.       No orders of the defined types were placed in this encounter.    No orders of the defined types were placed in this encounter.    There are no discontinued medications.    10 year ASCVD risk: The 10-year ASCVD risk score (Bella DK, et al., 2019) is: 12.9%    Values used to calculate the score:      Age: 67 years      Sex: Male      Is Non- : No      Diabetic: No      Tobacco smoker: No      Systolic Blood Pressure: 124 mmHg      Is BP treated: No      HDL Cholesterol: 85 mg/dL      Total Cholesterol: 209 mg/dL    Encounter Diagnosis   Name Primary?     Nonrheumatic aortic valve stenosis        CURRENT MEDICATIONS:  Current Outpatient Medications   Medication Sig Dispense Refill     aspirin 81 MG tablet Take 81 mg by mouth daily       CENTRUM SILVER OR TABS 1 TABLET DAILY 30 0     GLUCOSAMINE CHONDROITIN OR TABS every day  0     Lutein 20 MG CAPS        VITAMIN D PO Take by mouth daily         ALLERGIES     Allergies   Allergen Reactions     No Known Drug Allergies        PAST MEDICAL HISTORY:  Past Medical History:    Diagnosis Date     * * * SBE PROPHYLAXIS * * *     no longer indicated - 2007 AHA guidelnies     Mitral valve stenosis and aortic valve stenosis     2/6 murmur     Pure hypercholesterolemia 1/26/2007       PAST SURGICAL HISTORY:  Past Surgical History:   Procedure Laterality Date     COLONOSCOPY N/A 12/5/2014    Procedure: COLONOSCOPY;  Surgeon: Omar Chisholm MD;  Location:  GI     HC REMOVAL OF TONSILS,<11 Y/O      Tonsils <10y.o.     ZZHC COLONOSCOPY W/WO BRUSH/WASH  2/8/2005        FAMILY HISTORY:  Family History   Problem Relation Age of Onset     Cancer Father         Spleen cancer     Hypertension Father      Allergies Father         PCN     Gastrointestinal Disease Father         ulcer     Heart Disease Father         Hx: meds and angioplasty     Hypertension Brother      Cerebrovascular Disease Paternal Grandfather      Arthritis Paternal Grandfather      Arthritis Mother      Hypertension Mother      Allergies Son         Dust , Mold     Depression Sister         X 2       SOCIAL HISTORY:  Social History     Socioeconomic History     Marital status:      Spouse name: Fabian     Number of children: 3     Years of education: 12     Highest education level: None   Occupational History     Occupation:    Tobacco Use     Smoking status: Never     Smokeless tobacco: Never   Vaping Use     Vaping Use: Never used   Substance and Sexual Activity     Alcohol use: Yes     Comment: 1-2 beer monthly  maybe     Drug use: No     Sexual activity: Yes     Partners: Female     Comment: Hx: wife had tubal ligation   Other Topics Concern      Service Yes     Comment: [employer]     Blood Transfusions No     Caffeine Concern No     Occupational Exposure No     Hobby Hazards No     Sleep Concern No     Stress Concern No     Weight Concern No     Special Diet Yes     Comment: Low fat and cholesterol high fiber     Back Care Yes     Comment: Hx: chiropractor for back inj and knee      "Exercise Yes     Comment: every day     Bike Helmet No     Seat Belt Yes   Social History Narrative    Lives with spouse.       Review of Systems:  Skin:        Eyes:       ENT:       Respiratory:  Negative shortness of breath;cough;wheezing  Cardiovascular:  Negative;palpitations;chest pain;edema;lightheadedness;dizziness    Gastroenterology:      Genitourinary:       Musculoskeletal:       Neurologic:  Positive for numbness or tingling of hands  Psychiatric:       Heme/Lymph/Imm:       Endocrine:         Physical Exam:  Vitals: /62 (BP Location: Left arm, Patient Position: Sitting, Cuff Size: Adult Regular)   Pulse 70   Ht 1.727 m (5' 8\")   Wt 63.5 kg (140 lb 1.6 oz)   SpO2 97%   BMI 21.30 kg/m      Constitutional:  cooperative, alert and oriented, well developed, well nourished, in no acute distress        Skin:  warm and dry to the touch, no apparent skin lesions or masses noted        Head:  normocephalic, no masses or lesions        Eyes:  pupils equal and round, conjunctivae and lids unremarkable, sclera white, no xanthalasma, EOMS intact, no nystagmus        ENT:  no pallor or cyanosis   masked    Neck:  carotid pulses are full and equal bilaterally, JVP normal, no carotid bruit transmitted murmur      Chest:  normal breath sounds, clear to auscultation, normal A-P diameter, normal symmetry, normal respiratory excursion, no use of accessory muscles        Cardiac: regular rhythm;no S3 or S4       systolic ejection murmur;grade 2;grade 3;throughout precordium     Normal S1 with soft S2    Abdomen:  abdomen soft;BS normoactive        Vascular: pulses full and equal                                      Extremities and Muscular Skeletal:  no edema           Neurological:  no gross motor deficits        Psych:  affect appropriate, oriented to time, person and place     Recent Lab Results:  LIPID RESULTS:  Lab Results   Component Value Date    CHOL 209 (H) 10/26/2018    HDL 85 10/26/2018     (H) " 10/26/2018    TRIG 46 10/26/2018    CHOLHDLRATIO 2.4 11/14/2014       LIVER ENZYME RESULTS:  Lab Results   Component Value Date    AST 36 04/09/2009    ALT 18 04/09/2009       CBC RESULTS:  Lab Results   Component Value Date    WBC 5.9 10/26/2018    RBC 4.27 (L) 10/26/2018    HGB 12.8 (L) 10/26/2018    HCT 37.9 (L) 10/26/2018    MCV 89 10/26/2018    MCH 30.0 10/26/2018    MCHC 33.8 10/26/2018    RDW 12.8 10/26/2018     10/26/2018       BMP RESULTS:  Lab Results   Component Value Date     10/26/2018    POTASSIUM 4.2 10/26/2018    CHLORIDE 105 10/26/2018    CO2 31 10/26/2018    ANIONGAP 6 10/26/2018    GLC 96 10/26/2018    BUN 24 10/26/2018    CR 1.00 10/26/2018    GFRESTIMATED 75 10/26/2018    GFRESTBLACK >90 10/26/2018    RAMONA 8.9 10/26/2018        A1C RESULTS:  No results found for: A1C    INR RESULTS:  No results found for: INR      Levi Sinclair MD, FACC    CC  Levi Sinclair MD  [address] [place] [id number]

## 2022-10-17 NOTE — LETTER
"10/17/2022    Physician No Ref-Primary  No address on file    RE: Miguel Pappas       Dear Colleague,     I had the pleasure of seeing Miguel Pappas in the [facility].  HISTORY:    Miguel Pappas is a very pleasant 67-year-old gentleman who is accompanied by his wife today.  He saw me for evaluation of aortic stenosis for the first time in April of this year and reported that he was asymptomatic.  His echo showed significant aortic stenosis so a 6-month echo was ordered.  He is here today for follow-up of that study.    Miguel once again reports that he is doing well.  His wife thinks that she notices that he sometimes gets a little bit short of breath but his exercise capacity remains remarkable.  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  He spent about 4 hours doing this and finishing up at about 8 PM.  He reports that when he was done he was not particularly tired and he did not have to stop and rest because of dyspnea.  He did sit on his tractor seat and move the tractor to  radha so he was not working continuously.  He states that he does not think he can any longer \"juliette around the cows\".    Miguel denies any exertional chest arm neck shoulder or jaw discomfort, PND/orthopnea, sense of palpitations, syncope or near syncope, orthostasis, peripheral edema, or claudication.    The echocardiogram done 2 weeks ago showed concerning changes.  His peak systolic gradient is up to 110 mmHg with a mean of 70 mmHg and with a valve area of 0.8 cm  and a DI of 0.23.      ASSESSMENT/PLAN:    1.  Critical aortic stenosis.  This patient has extremely high velocities across his aortic valve but is capable of doing high levels of exertion.  He has not had chest pain syncope heart failure etc.  He does not feel particularly short of breath or exhausted with activity.  Nevertheless, his very high gradient is concerning and " likely puts him at increased risk for sudden death.  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  He is in agreement with this so I will have him seen by our valve team.  Since he lives in [place] I am hoping that he can minimize his trips to the [facility] by coordinating studies on the same day.  He has no other medical problems that need addressing today.    Thank you for inviting me to participate in the care of your patient.  Please don't hesitate to call if I can be of further assistance.  30 minutes were spent today reviewing the chart and other records, interviewing and examining the patient, and documenting our visit.    Chart documentation was completed, in part, with [employer] voice-recognition software. Even though reviewed, some grammatical, spelling, and word errors may remain.       No orders of the defined types were placed in this encounter.    No orders of the defined types were placed in this encounter.    There are no discontinued medications.    10 year ASCVD risk: The 10-year ASCVD risk score (Bella DK, et al., 2019) is: 12.9%    Values used to calculate the score:      Age: 67 years      Sex: Male      Is Non- : No      Diabetic: No      Tobacco smoker: No      Systolic Blood Pressure: 124 mmHg      Is BP treated: No      HDL Cholesterol: 85 mg/dL      Total Cholesterol: 209 mg/dL    Encounter Diagnosis   Name Primary?     Nonrheumatic aortic valve stenosis        CURRENT MEDICATIONS:  Current Outpatient Medications   Medication Sig Dispense Refill     aspirin 81 MG tablet Take 81 mg by mouth daily       CENTRUM SILVER OR TABS 1 TABLET DAILY 30 0     GLUCOSAMINE CHONDROITIN OR TABS every day  0     Lutein  20 MG CAPS        VITAMIN D PO Take by mouth daily         ALLERGIES     Allergies   Allergen Reactions     No Known Drug Allergies        PAST MEDICAL HISTORY:  Past Medical History:   Diagnosis Date     * * * SBE PROPHYLAXIS * * *     no longer indicated - 2007 AHA guidelnies     Mitral valve stenosis and aortic valve stenosis     2/6 murmur     Pure hypercholesterolemia 1/26/2007       PAST SURGICAL HISTORY:  Past Surgical History:   Procedure Laterality Date     COLONOSCOPY N/A 12/5/2014    Procedure: COLONOSCOPY;  Surgeon: Omar Chisholm MD;  Location: [facility]     HC REMOVAL OF TONSILS,<11 Y/O      Tonsils <10y.o.     ZZHC COLONOSCOPY W/WO BRUSH/WASH  2/8/2005        FAMILY HISTORY:  Family History   Problem Relation Age of Onset     Cancer Father         Spleen cancer     Hypertension Father      Allergies Father         PCN     Gastrointestinal Disease Father         ulcer     Heart Disease Father         Hx: meds and angioplasty     Hypertension Brother      Cerebrovascular Disease Paternal Grandfather      Arthritis Paternal Grandfather      Arthritis Mother      Hypertension Mother      Allergies Son         Dust , Mold     Depression Sister         X 2       SOCIAL HISTORY:  Social History     Socioeconomic History     Marital status:      Spouse name: Fabian     Number of children: 3     Years of education: 12     Highest education level: None   Occupational History     Occupation:    Tobacco Use     Smoking status: Never     Smokeless tobacco: Never   Vaping Use     Vaping Use: Never used   Substance and Sexual Activity     Alcohol use: Yes     Comment: 1-2 beer monthly  maybe     Drug use: No     Sexual activity: Yes     Partners: Female     Comment: Hx: wife had tubal ligation   Other Topics Concern      Service Yes     Comment: [employer]     Blood Transfusions No     Caffeine Concern No     Occupational Exposure No     Hobby Hazards No     Sleep Concern No     Stress  "Concern No     Weight Concern No     Special Diet Yes     Comment: Low fat and cholesterol high fiber     Back Care Yes     Comment: Hx: chiropractor for back inj and knee     Exercise Yes     Comment: every day     Bike Helmet No     Seat Belt Yes   Social History Narrative    Lives with spouse.       Review of Systems:  Skin:        Eyes:       ENT:       Respiratory:  Negative shortness of breath;cough;wheezing  Cardiovascular:  Negative;palpitations;chest pain;edema;lightheadedness;dizziness    Gastroenterology:      Genitourinary:       Musculoskeletal:       Neurologic:  Positive for numbness or tingling of hands  Psychiatric:       Heme/Lymph/Imm:       Endocrine:         Physical Exam:  Vitals: /62 (BP Location: Left arm, Patient Position: Sitting, Cuff Size: Adult Regular)   Pulse 70   Ht 1.727 m (5' 8\")   Wt 63.5 kg (140 lb 1.6 oz)   SpO2 97%   BMI 21.30 kg/m      Constitutional:  cooperative, alert and oriented, well developed, well nourished, in no acute distress        Skin:  warm and dry to the touch, no apparent skin lesions or masses noted        Head:  normocephalic, no masses or lesions        Eyes:  pupils equal and round, conjunctivae and lids unremarkable, sclera white, no xanthalasma, EOMS intact, no nystagmus        ENT:  no pallor or cyanosis   masked    Neck:  carotid pulses are full and equal bilaterally, JVP normal, no carotid bruit transmitted murmur      Chest:  normal breath sounds, clear to auscultation, normal A-P diameter, normal symmetry, normal respiratory excursion, no use of accessory muscles        Cardiac: regular rhythm;no S3 or S4       systolic ejection murmur;grade 2;grade 3;throughout precordium     Normal S1 with soft S2    Abdomen:  abdomen soft;BS normoactive        Vascular: pulses full and equal                                      Extremities and Muscular Skeletal:  no edema           Neurological:  no gross motor deficits        Psych:  affect " appropriate, oriented to time, person and place     Recent Lab Results:  LIPID RESULTS:  Lab Results   Component Value Date    CHOL 209 (H) 10/26/2018    HDL 85 10/26/2018     (H) 10/26/2018    TRIG 46 10/26/2018    CHOLHDLRATIO 2.4 11/14/2014       LIVER ENZYME RESULTS:  Lab Results   Component Value Date    AST 36 04/09/2009    ALT 18 04/09/2009       CBC RESULTS:  Lab Results   Component Value Date    WBC 5.9 10/26/2018    RBC 4.27 (L) 10/26/2018    HGB 12.8 (L) 10/26/2018    HCT 37.9 (L) 10/26/2018    MCV 89 10/26/2018    MCH 30.0 10/26/2018    MCHC 33.8 10/26/2018    RDW 12.8 10/26/2018     10/26/2018       BMP RESULTS:  Lab Results   Component Value Date     10/26/2018    POTASSIUM 4.2 10/26/2018    CHLORIDE 105 10/26/2018    CO2 31 10/26/2018    ANIONGAP 6 10/26/2018    GLC 96 10/26/2018    BUN 24 10/26/2018    CR 1.00 10/26/2018    GFRESTIMATED 75 10/26/2018    GFRESTBLACK >90 10/26/2018    RAMONA 8.9 10/26/2018        A1C RESULTS:  No results found for: A1C    INR RESULTS:  No results found for: INR      Levi Sinclair MD, FACC    CC  Levi Sinclair MD  [address] [place] [address]                      Thank you for allowing me to participate in the care of your patient.      Sincerely,     Levi Sinclair MD     [facility]  cc:   Levi Sinclair MD  [address] [place] [id number]

## 2022-10-21 ENCOUNTER — CARE COORDINATION (OUTPATIENT)
Dept: CARDIOLOGY | Facility: CLINIC | Age: 67
End: 2022-10-21

## 2022-10-21 ENCOUNTER — LAB (OUTPATIENT)
Dept: LAB | Facility: CLINIC | Age: 67
End: 2022-10-21
Payer: COMMERCIAL

## 2022-10-21 DIAGNOSIS — I35.0 CRITICAL AORTIC VALVE STENOSIS: ICD-10-CM

## 2022-10-21 DIAGNOSIS — I35.0 CRITICAL AORTIC VALVE STENOSIS: Primary | ICD-10-CM

## 2022-10-21 LAB
ALBUMIN SERPL-MCNC: 3.5 G/DL (ref 3.4–5)
ALP SERPL-CCNC: 95 U/L (ref 40–150)
ALT SERPL W P-5'-P-CCNC: 37 U/L (ref 0–70)
ANION GAP SERPL CALCULATED.3IONS-SCNC: 1 MMOL/L (ref 3–14)
AST SERPL W P-5'-P-CCNC: 26 U/L (ref 0–45)
BILIRUB SERPL-MCNC: 0.2 MG/DL (ref 0.2–1.3)
BUN SERPL-MCNC: 36 MG/DL (ref 7–30)
CALCIUM SERPL-MCNC: 8.8 MG/DL (ref 8.5–10.1)
CHLORIDE BLD-SCNC: 107 MMOL/L (ref 94–109)
CO2 SERPL-SCNC: 33 MMOL/L (ref 20–32)
CREAT SERPL-MCNC: 1.26 MG/DL (ref 0.66–1.25)
ERYTHROCYTE [DISTWIDTH] IN BLOOD BY AUTOMATED COUNT: 12.5 % (ref 10–15)
GFR SERPL CREATININE-BSD FRML MDRD: 62 ML/MIN/1.73M2
GLUCOSE BLD-MCNC: 84 MG/DL (ref 70–99)
HCT VFR BLD AUTO: 37.3 % (ref 40–53)
HGB BLD-MCNC: 13 G/DL (ref 13.3–17.7)
MCH RBC QN AUTO: 31 PG (ref 26.5–33)
MCHC RBC AUTO-ENTMCNC: 34.9 G/DL (ref 31.5–36.5)
MCV RBC AUTO: 89 FL (ref 78–100)
PLATELET # BLD AUTO: 216 10E3/UL (ref 150–450)
POTASSIUM BLD-SCNC: 4.5 MMOL/L (ref 3.4–5.3)
PROT SERPL-MCNC: 6.9 G/DL (ref 6.8–8.8)
RBC # BLD AUTO: 4.2 10E6/UL (ref 4.4–5.9)
SODIUM SERPL-SCNC: 141 MMOL/L (ref 133–144)
WBC # BLD AUTO: 7.4 10E3/UL (ref 4–11)

## 2022-10-21 PROCEDURE — 80053 COMPREHEN METABOLIC PANEL: CPT | Performed by: INTERNAL MEDICINE

## 2022-10-21 PROCEDURE — 85027 COMPLETE CBC AUTOMATED: CPT | Performed by: INTERNAL MEDICINE

## 2022-10-21 PROCEDURE — 36415 COLL VENOUS BLD VENIPUNCTURE: CPT

## 2022-10-21 NOTE — PROGRESS NOTES
"Spoke with Sandra and Miguel. Sandra feels he becomes winded quite easily, and she's been asking him to slow down. 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     Miguel states he's mostly asymptomatic. He did endorse lightheadedness, most recent episode approx 1 month ago. Advised that we do not recommend any strenuous activity or lifting at this point, given critical aortic stenosis. States \"I won't lift the hay off the truck today\".     Will try to expedite workup as much as possible, starting with TAVR CT at next available opening (10/28). He'll also have labs in [place] today to ensure kidneys can tolerate contrast dye. Will then see Dr. Nava and CV surgeon at next opening on 11/10/22.    Will also need coronary angiogram pending today's lab results.    He will be seeing dentist on 10/26/22. His last visit was approx 1 year ago.     Advised Sandra that if his symptoms worsen at all or do not resolve with rest, recommend calling 911. She verbalized understanding. Also provided my direct number and the number for the on-call cardiologist after-hours for questions/concerns.     TAVR packet sent via mail.     Keri Breen, VALERIAN, RN, PHN, CHFN, HNB-BC   10/21/2022 at 2:09 PM          "

## 2022-10-21 NOTE — PROGRESS NOTES
TAVR referral received from Dr. Sinclair  Telephoned patient to introduce self, provide education on aortic stenosis. No answer. LVM and sent Fingerprint message.     Keri Breen RN  Structural Heart Coordinator  [facility]

## 2022-10-28 ENCOUNTER — HOSPITAL ENCOUNTER (OUTPATIENT)
Dept: CARDIOLOGY | Facility: CLINIC | Age: 67
Discharge: HOME | End: 2022-10-28
Attending: INTERNAL MEDICINE | Admitting: INTERNAL MEDICINE
Payer: COMMERCIAL

## 2022-10-28 DIAGNOSIS — I35.0 CRITICAL AORTIC VALVE STENOSIS: ICD-10-CM

## 2022-10-28 PROCEDURE — 71275 CT ANGIOGRAPHY CHEST: CPT

## 2022-10-28 PROCEDURE — 74174 CTA ABD&PLVS W/CONTRAST: CPT

## 2022-10-28 PROCEDURE — 71275 CT ANGIOGRAPHY CHEST: CPT | Mod: 26 | Performed by: INTERNAL MEDICINE

## 2022-10-28 PROCEDURE — 74174 CTA ABD&PLVS W/CONTRAST: CPT | Mod: 26 | Performed by: INTERNAL MEDICINE

## 2022-10-28 PROCEDURE — 250N000011 HC RX IP 250 OP 636: Performed by: INTERNAL MEDICINE

## 2022-10-28 RX ORDER — DIPHENHYDRAMINE HYDROCHLORIDE 50 MG/ML
25-50 INJECTION INTRAMUSCULAR; INTRAVENOUS
Status: DISCONTINUED | OUTPATIENT
Start: 2022-10-28 | End: 2022-10-29 | Stop reason: HOSPADM

## 2022-10-28 RX ORDER — ONDANSETRON 2 MG/ML
4 INJECTION INTRAMUSCULAR; INTRAVENOUS
Status: DISCONTINUED | OUTPATIENT
Start: 2022-10-28 | End: 2022-10-29 | Stop reason: HOSPADM

## 2022-10-28 RX ORDER — METHYLPREDNISOLONE SODIUM SUCCINATE 125 MG/2ML
125 INJECTION, POWDER, LYOPHILIZED, FOR SOLUTION INTRAMUSCULAR; INTRAVENOUS
Status: DISCONTINUED | OUTPATIENT
Start: 2022-10-28 | End: 2022-10-29 | Stop reason: HOSPADM

## 2022-10-28 RX ORDER — DIPHENHYDRAMINE HCL 25 MG
25 CAPSULE ORAL
Status: DISCONTINUED | OUTPATIENT
Start: 2022-10-28 | End: 2022-10-29 | Stop reason: HOSPADM

## 2022-10-28 RX ORDER — ACYCLOVIR 200 MG/1
0-1 CAPSULE ORAL
Status: DISCONTINUED | OUTPATIENT
Start: 2022-10-28 | End: 2022-10-29 | Stop reason: HOSPADM

## 2022-10-28 RX ORDER — IOPAMIDOL 755 MG/ML
500 INJECTION, SOLUTION INTRAVASCULAR ONCE
Status: COMPLETED | OUTPATIENT
Start: 2022-10-28 | End: 2022-10-28

## 2022-10-28 RX ADMIN — IOPAMIDOL 115 ML: 755 INJECTION, SOLUTION INTRAVENOUS at 10:30

## 2022-10-31 ENCOUNTER — TELEPHONE (OUTPATIENT)
Dept: CARDIOLOGY | Facility: CLINIC | Age: 67
End: 2022-10-31

## 2022-10-31 DIAGNOSIS — R93.1 ABNORMAL FINDINGS DIAGNOSTIC IMAGING OF HEART AND CORONARY CIRCULATION: ICD-10-CM

## 2022-10-31 DIAGNOSIS — I35.0 CRITICAL AORTIC VALVE STENOSIS: Primary | ICD-10-CM

## 2022-10-31 NOTE — TELEPHONE ENCOUNTER
Received call from patient's wife. She was tearful and states that she is worried about Miguel and this process of getting his aortic valve fixed. Patient had TAVR CT done 10/28/22. He is scheduled in TAVR clinic on 11/10/22. He has not yet had an angiogram done.   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 She states he is not complaining of any new or worsening symptoms but is also not one to complain.   We discussed again the importance of resting if he has shortness of breath of fatigue. I asked that they notify us of any new or worsening symptoms.   Otherwise will plan for patient to meet with TAVR team and CV surgeon on 11/10/22.

## 2022-11-01 NOTE — TELEPHONE ENCOUNTER
Discussed patient's workup plan with Coni Balderas NP. Per Coni, will order and schedule patient's coronary angiogram to be done shortly after his meeting with Dr. Nava on 11/10/22.   Spoke with patient's wife and informed her that someone would reach out to schedule this. Dr. Nava will review the risks/benefits of this at their appointment on 11/10/22.   Sent message to scheduling to arrange.

## 2022-11-03 DIAGNOSIS — I35.0 CRITICAL AORTIC VALVE STENOSIS: Primary | ICD-10-CM

## 2022-11-10 ENCOUNTER — OFFICE VISIT (OUTPATIENT)
Dept: CARDIOLOGY | Facility: CLINIC | Age: 67
End: 2022-11-10
Payer: COMMERCIAL

## 2022-11-10 VITALS
WEIGHT: 141 LBS | HEART RATE: 66 BPM | HEIGHT: 68 IN | BODY MASS INDEX: 21.37 KG/M2 | SYSTOLIC BLOOD PRESSURE: 136 MMHG | DIASTOLIC BLOOD PRESSURE: 70 MMHG

## 2022-11-10 DIAGNOSIS — I35.0 CRITICAL AORTIC VALVE STENOSIS: Primary | ICD-10-CM

## 2022-11-10 DIAGNOSIS — I08.0 MITRAL VALVE STENOSIS AND AORTIC VALVE STENOSIS: Primary | ICD-10-CM

## 2022-11-10 PROCEDURE — 99204 OFFICE O/P NEW MOD 45 MIN: CPT | Performed by: STUDENT IN AN ORGANIZED HEALTH CARE EDUCATION/TRAINING PROGRAM

## 2022-11-10 PROCEDURE — 93000 ELECTROCARDIOGRAM COMPLETE: CPT | Performed by: INTERNAL MEDICINE

## 2022-11-10 PROCEDURE — 99214 OFFICE O/P EST MOD 30 MIN: CPT | Mod: 25 | Performed by: INTERNAL MEDICINE

## 2022-11-10 NOTE — LETTER
11/10/2022    Thony Gongora MD  [address] [place] [address]    RE: Miguel Pappas       Dear Colleague,     I had the pleasure of seeing Miguel PERCY Pappas in the [facility].      Structural Heart (TAVR) Cardiology Consultation       Assessment & Plan     1.  Severe critical aortic stenosis  2.  Hyperlipidemia      Recommendations    1.  Severe symptomatic aortic stenosis.  We have discussed the pathophysiology of her stenosis potential therapeutic measures to address this.  He does have a low STS score and he would certainly be a candidate for both SAVR versus TAVR.  He has coronary angiography arranged for next week with me.  Risk benefits and potential complications of TAVR and coronary angiography were discussed with patient and he wants to proceed with work-up.    2.  We will discuss his case in our TAVR conference after the angiogram is obtained.  We will get a consensus opinion from our team regarding which approach would be favorable.    Thank you kindly for consult          Edson Nava MD, MD        History of present illness    Patient is a 67-year-old gentleman who has been relatively healthy all his life.  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  He was able accomplish this in several hours and has not noticed any significant difficulty.  Upon further questioning his daughter who accompanies him states that he has been a little bit more out of breath compared to last year.  He does not have any chest pain PND orthopnea.  He has had echocardiograms performed x2 in 2022.  Compared to his February 2022 study he has now developed severe critical aortic stenosis.  Peak velocity is 5.3 m/s with a mean gradient of 70 mmHg.  In preparation for today's visit he had a TAVR directed CT that suggest a transfemoral approach may be feasible.   here for an evaluation      Echo October 2022  Critical aortic valve stenosis with mild regurgitation.  Peak  transaortic velocity 5.3 m/s, mean systolic gradient 70 mmHg, valve area  0.82 cmÂ , indexed valve area 0.47, velocity ratio 0.23.  Severe concentric left ventricular hypertrophy with hyperdynamic systolic  function.  The visual ejection fraction is greater than 70%.  Normal right ventricular size and systolic function.     Compared to the previous study dated 2/10/2022, aortic valve stenosis has  progressed from severe to critical.        TAVR CT    1.  Severely calcified trileaflet aortic valve. Aortic valve calcium  score is 3417. The LVOT is not calcified. The ascending aorta is  mildly calcified, aortic arch is  mildly calcified, the descending  thoracic aorta is mildly calcified.   2.  The arch vessel branching pattern is normal.   3.  The suprarenal abdominal aorta is milldy calcified; infrarenal  abdominal aorta is mildly calcified  4.  Widely patent origins of celiac trunk, superior mesenteric artery  and inferior mesenteric artery.  Single bilateral renal arteries with  widely patent origins.   5.  There is no acute aortic pathology, such as dissection, intramural  hematoma, or contained rupture.      MEASUREMENTS:   Representative dimensions of the thoracoabdominal aorta are as  follows:     1. AORTIC ANNULUS MEASUREMENTS:     1.  The average aortic annulus diameter is 26.5 mm, (long diameter is  30.5 mm and short diameter is 23.6 mm)  2.  Aortic annulus area is 5.52 cm2  3.  Aortic annulus perimeter is 85.9 mm  4.  Suggested 3-cusp coaxial angle for aortic valve is ([place]ankan 0 , CAU 0)  and alternate A-P coaxial angle is (LAO6 , CRA 5 ), these  angles will  vary depending upon the patient's body position  5.  Aortic root angle is 39.3  6.  Left main - Annulus distance: 10.2 mm, RCA - Annulus distance:  15.4 mm     2. LVOT MEASUREMENTS:     1.  The average LVOT diameter (measured 4 mm below annular plane) is  25.0 mm  2.  LVOT area is 4.91 cm2  3.  LVOT perimeter is 81.3 mm     3. AORTA MEASUREMENTS     1.   The aortic root at the sinuses of Valsalva (left cusp, right cusp,  non cusp): 35.2 mm x  32.9 mm x 33.4 mm  2.  The sinotubular junction:  29.6 mm x 29.5 mm  3.  Sinotubular junction height: 19.7 mm x 19.2 mm  4.  Proximal ascending aorta: 33.9 mm x 33.4 mm  5.  Distal abdominal aorta proximal to the bifurcation: 19.0 mm x 16.2  mm  6.  Innominate artery 3 cm from ostium: 15.5 mm x 12.4 mm  7.  Left common carotid artery 3 cm from ostium: 5.19 mm x 4.97 mm     4. ILIOFEMORAL MEASUREMENTS:     RIGHT:  1.  Right common iliac artery: 11.5 mm x 10.5 mm   2.  Right external iliac artery: 8.8 mm x 8.5 mm   3.  Right common femoral artery: 9.8 mm x 9.5 mm   4.  Tortuosity: mild   5.  Calcification: mild      LEFT:  1.  Left common iliac artery: 11.3 mm x  10.3 mm  2.  Left external iliac artery: 9.0 mm x 8.8 mm  3.  Left common femoral artery: 8.6 mm x 8.2 mm  4.  Tortuosity: mild   5.  Calcification: mild       Patient Active Problem List   Diagnosis     Mitral valve stenosis and aortic valve stenosis     Pure hypercholesterolemia     HYPERLIPIDEMIA LDL GOAL <100       Past Medical History   I have reviewed this patient's medical history and updated it with pertinent information if needed.   Past Medical History:   Diagnosis Date     * * * SBE PROPHYLAXIS * * *     no longer indicated - 2007 AHA guidelnies     Mitral valve stenosis and aortic valve stenosis     2/6 murmur     Pure hypercholesterolemia 1/26/2007       Past Surgical History   I have reviewed this patient's surgical history and updated it with pertinent information if needed.  Past Surgical History:   Procedure Laterality Date     COLONOSCOPY N/A 12/5/2014    Procedure: COLONOSCOPY;  Surgeon: Omar Chisholm MD;  Location:  GI     HC REMOVAL OF TONSILS,<11 Y/O      Tonsils <10y.o.     [facility] COLONOSCOPY W/WO BRUSH/WASH  2/8/2005        Prior to Admission Medications   Cannot display prior to admission medications because the patient has not been admitted  "in this contact.     [unfilled]  [unfilled]  Allergies   Allergies   Allergen Reactions     No Known Drug Allergies        Social History    reports that he has never smoked. He has never used smokeless tobacco. He reports current alcohol use. He reports that he does not use drugs.    Family History   Family History   Problem Relation Age of Onset     Cancer Father         Spleen cancer     Hypertension Father      Allergies Father         PCN     Gastrointestinal Disease Father         ulcer     Heart Disease Father         Hx: meds and angioplasty     Hypertension Brother      Cerebrovascular Disease Paternal Grandfather      Arthritis Paternal Grandfather      Arthritis Mother      Hypertension Mother      Allergies Son         Dust , Mold     Depression Sister         X 2       Review of Systems   The comprehensive 10 point Review of Systems is negative other than noted in the HPI or here.     Physical Exam   Vital Signs with Ranges     Wt Readings from Last 4 Encounters:   10/17/22 63.5 kg (140 lb 1.6 oz)   04/04/22 62.4 kg (137 lb 8 oz)   02/03/22 62.7 kg (138 lb 2 oz)   10/26/18 61.2 kg (135 lb)     [unfilled]      Vitals: There were no vitals taken for this visit.    /70   Pulse 66   Ht 1.727 m (5' 8\")   Wt 64 kg (141 lb)   BMI 21.44 kg/m        Constitutional:  cooperative, alert and oriented, well developed, well nourished, in no acute distress         Skin:  warm and dry to the touch, no apparent skin lesions or masses noted         Head:  normocephalic, no masses or lesions         Eyes:  pupils equal and round, conjunctivae and lids unremarkable, sclera white, no xanthalasma, EOMS intact, no nystagmus         ENT:  no pallor or cyanosis   masked     Neck:  carotid pulses are full and equal bilaterally, JVP normal, no carotid bruit transmitted murmur       Chest:  normal breath sounds, clear to auscultation, normal A-P diameter, normal symmetry, normal respiratory excursion, no use of " accessory muscles         Cardiac: regular rhythm;no S3 or S4       systolic ejection murmur;grade 2;grade 3;throughout precordium     Normal S1 with soft S2     Abdomen:  abdomen soft;BS normoactive         Vascular: pulses full and equal                                       Extremities and Muscular Skeletal:  no edema        Neurological:  no gross motor deficits         Psych:  affect appropriate, oriented to time, person and plac          Thank you for allowing me to participate in the care of your patient.      Sincerely,     Edson Nava MD     [facility]  cc:   No referring provider defined for this encounter.

## 2022-11-10 NOTE — NURSING NOTE
TAVR Coordinator visit:  Provided additional education regarding TAVR procedure, after being present for discussion with physician. Explained the work-up process and next steps for patient. Patient provided our direct contact number and instructed to call with any questions.     Completed frailty testing and KCCQ.   5 meter walk: 3 seconds  Frailty score: 1/5 (eyeball)    KCCQ Results:   1a. 5  1b. 5  1c. 3  2. 5  3. 6  4. 6  5. 5  6. 4  7. 4  8a. 5  8b. 5  8c. 5    Preliminary STS Risk Score: 1.355%    Plan for coronary angiogram Tuesday 11/15. Went over prep with patient. Will then discuss at TAVR conference.     Keri Breen RN  Structural Heart Coordinator  [facility]

## 2022-11-10 NOTE — PROGRESS NOTES
Cardiothoracic Surgery Consult    Date of Service: 11/10/2022      REASON FOR CONSULTATION: severe critical aortic valve stenosis       HISTORY OF PRESENT ILLNESS: Mr. Miguel Pappas is a 67 year old who presents with progression of his aortic stenosis and mild respiratory symptoms. He is otherwise healthy at baseline. Reportedly a very physically active farmer and is overall not limited in his ability to work. He denies any chest pain, orthopnea or PND. His aortic stenosis has progressed to a critical range compared to his last echocardiogram performed earlier this year    LVEF 70%  TIFFANY 0.82 cm^2  MG 70 mmHg  Peak Velocity: 5.3 m/s      IMPRESSION AND PLAN: Mr. Miguel Pappas is a 67 year old with severe symptomatic aortic stenosis.     Echocardiography demonstrates LVEF of 70%, and coronary angiography is pending (scheduled for 11/15/22).      I discussed the technical details of the open surgical AVR with the patient, as well as the expected postoperative course and recovery. The risks include but are not limited to bleeding, infection, stroke, heart or graft failure, dysrhythmia, respiratory failure, kidney or liver injury, bowel or limb ischemia, and death.     Due to his relatively young age and overall health, he is a good candidate for either SAVR or TAVR and we discussed the risks and benefits of both interventions.    Dr. Nava was present for this visit and he discussed, in detail, the technical details of TAVR. Patient understands that there is a risk of bleeding, infection, stroke, heart block requiring permanent pacemaker, cardiac perforation, aortic root rupture and aortic dissection, in addition to risk of death.     We also discussed the rare but life threatening complications that can occur during TAVR such as aortic rupture or dissection. These require emergency conversion to surgery, which carried a high risk of mortality (up to 50%) and morbidity, prolonged hospital stay, as well as  potential loss of independence.     1) Recommend SAVR vs. TAVR. Patient to decide  2) CPB standby: Yes. Full miguel Abbott MD  Cardiothoracic Surgery  Cell: [phone]; Pager [phone]      PAST MEDICAL HISTORY:   Past Medical History:   Diagnosis Date     * * * SBE PROPHYLAXIS * * *     no longer indicated - 2007 AHA guidelnies     Mitral valve stenosis and aortic valve stenosis     2/6 murmur     Pure hypercholesterolemia 1/26/2007         PAST SURGICAL HISTORY:   Past Surgical History:   Procedure Laterality Date     COLONOSCOPY N/A 12/5/2014    Procedure: COLONOSCOPY;  Surgeon: Omar Chisholm MD;  Location: [facility]     HC REMOVAL OF TONSILS,<13 Y/O      Tonsils <10y.o.     [facility] COLONOSCOPY W/WO BRUSH/WASH  2/8/2005          ALLERGIES:   Allergies   Allergen Reactions     No Known Drug Allergies          CURRENT MEDICATIONS:  Current Outpatient Medications   Medication     aspirin 81 MG tablet     CENTRUM SILVER OR TABS     GLUCOSAMINE CHONDROITIN OR TABS     Lutein 20 MG CAPS     VITAMIN D PO     No current facility-administered medications for this visit.         FAMILY HISTORY:   Family History   Problem Relation Age of Onset     Cancer Father         Spleen cancer     Hypertension Father      Allergies Father         PCN     Gastrointestinal Disease Father         ulcer     Heart Disease Father         Hx: meds and angioplasty     Hypertension Brother      Cerebrovascular Disease Paternal Grandfather      Arthritis Paternal Grandfather      Arthritis Mother      Hypertension Mother      Allergies Son         Dust , Mold     Depression Sister         X 2     The family history was reviewed and is not pertinent to the patient's disease/illness.      SOCIAL HISTORY:   Social History     Socioeconomic History     Marital status:      Spouse name: Fabian     Number of children: 3     Years of education: 12     Highest education level: Not on file   Occupational History     Occupation:     Tobacco Use     Smoking status: Never     Smokeless tobacco: Never   Vaping Use     Vaping Use: Never used   Substance and Sexual Activity     Alcohol use: Yes     Comment: 1-2 beer monthly  maybe     Drug use: No     Sexual activity: Yes     Partners: Female     Comment: Hx: wife had tubal ligation   Other Topics Concern      Service Yes     Comment: [employer]     Blood Transfusions No     Caffeine Concern No     Occupational Exposure No     Hobby Hazards No     Sleep Concern No     Stress Concern No     Weight Concern No     Special Diet Yes     Comment: Low fat and cholesterol high fiber     Back Care Yes     Comment: Hx: chiropractor for back inj and knee     Exercise Yes     Comment: every day     Bike Helmet No     Seat Belt Yes     Self-Exams Not Asked   Social History Narrative    Lives with spouse.     Social Determinants of Health     Financial Resource Strain: Not on file   Food Insecurity: Not on file   Transportation Needs: Not on file   Physical Activity: Not on file   Stress: Not on file   Social Connections: Not on file   Intimate Partner Violence: Not on file   Housing Stability: Not on file         REVIEW OF SYSTEMS:  A complete 10 point review of systems was obtained and is negative other than the above stated complaints      PHYSICAL EXAMINATION:   GENERAL: well developed and well nourished   HEENT: Conjunctiva anicteric and sclera clear   CARDIOVASCULAR: regular rate and rhythm  RESPIRATIONS: breathing comfortably, no audible wheezing  ABDOMEN: non distended  EXTREMITIES: no deformity or swelling   NEUROLOGIC: intact and symmetric with no focal deficits.   PSYCHIATRIC: alert and oriented, pleasant       LABORATORY STUDIES:   Lab Results   Component Value Date    WBC 7.4 10/21/2022    HGB 13.0 (L) 10/21/2022    HCT 37.3 (L) 10/21/2022    MCV 89 10/21/2022     10/21/2022      Lab Results   Component Value Date    BUN 36 (H) 10/21/2022     10/21/2022    CO2  33 (H) 10/21/2022       CARDIAC CATHETERIZATION: pending    TRANSTHORACIC ECHOCARDIOGRAM: This study was personally reviewed by me.

## 2022-11-10 NOTE — PROGRESS NOTES
Structural Heart (TAVR) Cardiology Consultation       Assessment & Plan     1.  Severe critical aortic stenosis  2.  Hyperlipidemia      Recommendations    1.  Severe symptomatic aortic stenosis.  We have discussed the pathophysiology of her stenosis potential therapeutic measures to address this.  He does have a low STS score and he would certainly be a candidate for both SAVR versus TAVR.  He has coronary angiography arranged for next week with me.  Risk benefits and potential complications of TAVR and coronary angiography were discussed with patient and he wants to proceed with work-up.    2.  We will discuss his case in our TAVR conference after the angiogram is obtained.  We will get a consensus opinion from our team regarding which approach would be favorable.    Thank you kindly for consult          Edson Nava MD, MD        History of present illness    Patient is a 67-year-old gentleman who has been relatively healthy all his life.  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  He was able accomplish this in several hours and has not noticed any significant difficulty.  Upon further questioning his daughter who accompanies him states that he has been a little bit more out of breath compared to last year.  He does not have any chest pain PND orthopnea.  He has had echocardiograms performed x2 in 2022.  Compared to his February 2022 study he has now developed severe critical aortic stenosis.  Peak velocity is 5.3 m/s with a mean gradient of 70 mmHg.  In preparation for today's visit he had a TAVR directed CT that suggest a transfemoral approach may be feasible.   here for an evaluation      Echo October 2022  Critical aortic valve stenosis with mild regurgitation.  Peak transaortic velocity 5.3 m/s, mean systolic gradient 70 mmHg, valve area  0.82 cmÂ , indexed valve area 0.47, velocity ratio 0.23.  Severe concentric left ventricular hypertrophy with hyperdynamic  systolic  function.  The visual ejection fraction is greater than 70%.  Normal right ventricular size and systolic function.     Compared to the previous study dated 2/10/2022, aortic valve stenosis has  progressed from severe to critical.        TAVR CT    1.  Severely calcified trileaflet aortic valve. Aortic valve calcium  score is 3417. The LVOT is not calcified. The ascending aorta is  mildly calcified, aortic arch is  mildly calcified, the descending  thoracic aorta is mildly calcified.   2.  The arch vessel branching pattern is normal.   3.  The suprarenal abdominal aorta is milldy calcified; infrarenal  abdominal aorta is mildly calcified  4.  Widely patent origins of celiac trunk, superior mesenteric artery  and inferior mesenteric artery.  Single bilateral renal arteries with  widely patent origins.   5.  There is no acute aortic pathology, such as dissection, intramural  hematoma, or contained rupture.      MEASUREMENTS:   Representative dimensions of the thoracoabdominal aorta are as  follows:     1. AORTIC ANNULUS MEASUREMENTS:     1.  The average aortic annulus diameter is 26.5 mm, (long diameter is  30.5 mm and short diameter is 23.6 mm)  2.  Aortic annulus area is 5.52 cm2  3.  Aortic annulus perimeter is 85.9 mm  4.  Suggested 3-cusp coaxial angle for aortic valve is ([place]rainian 0 , CAU 0)  and alternate A-P coaxial angle is (LAO6 , CRA 5 ), these  angles will  vary depending upon the patient's body position  5.  Aortic root angle is 39.3  6.  Left main - Annulus distance: 10.2 mm, RCA - Annulus distance:  15.4 mm     2. LVOT MEASUREMENTS:     1.  The average LVOT diameter (measured 4 mm below annular plane) is  25.0 mm  2.  LVOT area is 4.91 cm2  3.  LVOT perimeter is 81.3 mm     3. AORTA MEASUREMENTS     1.  The aortic root at the sinuses of Valsalva (left cusp, right cusp,  non cusp): 35.2 mm x  32.9 mm x 33.4 mm  2.  The sinotubular junction:  29.6 mm x 29.5 mm  3.  Sinotubular junction height: 19.7 mm  x 19.2 mm  4.  Proximal ascending aorta: 33.9 mm x 33.4 mm  5.  Distal abdominal aorta proximal to the bifurcation: 19.0 mm x 16.2  mm  6.  Innominate artery 3 cm from ostium: 15.5 mm x 12.4 mm  7.  Left common carotid artery 3 cm from ostium: 5.19 mm x 4.97 mm     4. ILIOFEMORAL MEASUREMENTS:     RIGHT:  1.  Right common iliac artery: 11.5 mm x 10.5 mm   2.  Right external iliac artery: 8.8 mm x 8.5 mm   3.  Right common femoral artery: 9.8 mm x 9.5 mm   4.  Tortuosity: mild   5.  Calcification: mild      LEFT:  1.  Left common iliac artery: 11.3 mm x  10.3 mm  2.  Left external iliac artery: 9.0 mm x 8.8 mm  3.  Left common femoral artery: 8.6 mm x 8.2 mm  4.  Tortuosity: mild   5.  Calcification: mild       Patient Active Problem List   Diagnosis     Mitral valve stenosis and aortic valve stenosis     Pure hypercholesterolemia     HYPERLIPIDEMIA LDL GOAL <100       Past Medical History   I have reviewed this patient's medical history and updated it with pertinent information if needed.   Past Medical History:   Diagnosis Date     * * * SBE PROPHYLAXIS * * *     no longer indicated - 2007 AHA guidelnies     Mitral valve stenosis and aortic valve stenosis     2/6 murmur     Pure hypercholesterolemia 1/26/2007       Past Surgical History   I have reviewed this patient's surgical history and updated it with pertinent information if needed.  Past Surgical History:   Procedure Laterality Date     COLONOSCOPY N/A 12/5/2014    Procedure: COLONOSCOPY;  Surgeon: Omar Chisholm MD;  Location:  GI     HC REMOVAL OF TONSILS,<9 Y/O      Tonsils <10y.o.     [facility]  [facility]        Prior to Admission Medications   Cannot display prior to admission medications because the patient has not been admitted in this contact.     [unfilled]  [unfilled]  Allergies   Allergies   Allergen Reactions     No Known Drug Allergies        Social History    reports that he has never smoked. He has never used  "smokeless tobacco. He reports current alcohol use. He reports that he does not use drugs.    Family History   Family History   Problem Relation Age of Onset     Cancer Father         Spleen cancer     Hypertension Father      Allergies Father         PCN     Gastrointestinal Disease Father         ulcer     Heart Disease Father         Hx: meds and angioplasty     Hypertension Brother      Cerebrovascular Disease Paternal Grandfather      Arthritis Paternal Grandfather      Arthritis Mother      Hypertension Mother      Allergies Son         Dust , Mold     Depression Sister         X 2       Review of Systems   The comprehensive 10 point Review of Systems is negative other than noted in the HPI or here.     Physical Exam   Vital Signs with Ranges     Wt Readings from Last 4 Encounters:   10/17/22 63.5 kg (140 lb 1.6 oz)   04/04/22 62.4 kg (137 lb 8 oz)   02/03/22 62.7 kg (138 lb 2 oz)   10/26/18 61.2 kg (135 lb)     [unfilled]      Vitals: There were no vitals taken for this visit.    /70   Pulse 66   Ht 1.727 m (5' 8\")   Wt 64 kg (141 lb)   BMI 21.44 kg/m        Constitutional:  cooperative, alert and oriented, well developed, well nourished, in no acute distress         Skin:  warm and dry to the touch, no apparent skin lesions or masses noted         Head:  normocephalic, no masses or lesions         Eyes:  pupils equal and round, conjunctivae and lids unremarkable, sclera white, no xanthalasma, EOMS intact, no nystagmus         ENT:  no pallor or cyanosis   masked     Neck:  carotid pulses are full and equal bilaterally, JVP normal, no carotid bruit transmitted murmur       Chest:  normal breath sounds, clear to auscultation, normal A-P diameter, normal symmetry, normal respiratory excursion, no use of accessory muscles         Cardiac: regular rhythm;no S3 or S4       systolic ejection murmur;grade 2;grade 3;throughout precordium     Normal S1 with soft S2     Abdomen:  abdomen soft;BS " normoactive         Vascular: pulses full and equal                                       Extremities and Muscular Skeletal:  no edema        Neurological:  no gross motor deficits         Psych:  affect appropriate, oriented to time, person and plac

## 2022-11-10 NOTE — PATIENT INSTRUCTIONS
"Future Appointments   Date Time Provider Department Center   11/10/2022 12:45 PM Denise Longoria MD [facility]   11/10/2022 12:45 PM Edson Nava MD [facility] PSA CLIN   11[facility]12[facility]2022  1:00 [facility]     Next steps:  - Coronary angiogram on Tuesday, 11/15/22. Please plan for a 7:30am check-in.  In preparation for your procedure, we require that you do the following:  Nothing to eat or drink after midnight.  Take your usual morning medications with a small sip of water immediately upon arising on the morning of your procedure unless outlined below:  Aspirin:  Take 4 baby aspirin pills (324 mg) on Tuesday morning.   You will be unable to drive after your procedure; please arrange to have someone drive you home. Make sure that there is a responsible adult with you for 24 hours after your procedure. Your procedure will be cancelled if you do not have transportation home or someone staying with you for 24 hrs.   3.  Your procedure will be done at [facility]. You may use [employer] parking OR park in the  Sky[address]amp  on the west side of [facility] on [address]. Take the skyway over [facility] to the hospital. Please check in at the first floor \"Welcome Desk\", which is one floor down from the skyway level.  4. Please take a home COVID-19 test on Sunday or Monday and bring a picture of your results to show the staff on Tuesday.     We will call you with the result(s) of the test(s) and to discuss the plan going forward.    It was wonderful to see you! Please call with any questions or concerns: [phone]    Keri Breen RN  Structural Heart Coordinator  [facility]    "

## 2022-11-10 NOTE — LETTER
Date:November 11, 2022      Provider requested that no letter be sent. Do not send.       [facility]

## 2022-11-12 ENCOUNTER — LAB (OUTPATIENT)
Dept: LAB | Facility: CLINIC | Age: 67
End: 2022-11-12
Payer: COMMERCIAL

## 2022-11-12 DIAGNOSIS — I35.0 CRITICAL AORTIC VALVE STENOSIS: ICD-10-CM

## 2022-11-12 PROCEDURE — U0005 INFEC AGEN DETEC AMPLI PROBE: HCPCS | Performed by: INTERNAL MEDICINE

## 2022-11-12 PROCEDURE — U0003 INFECTIOUS AGENT DETECTION BY NUCLEIC ACID (DNA OR RNA); SEVERE ACUTE RESPIRATORY SYNDROME CORONAVIRUS 2 (SARS-COV-2) (CORONAVIRUS DISEASE [COVID-19]), AMPLIFIED PROBE TECHNIQUE, MAKING USE OF HIGH THROUGHPUT TECHNOLOGIES AS DESCRIBED BY CMS-2020-01-R: HCPCS | Performed by: INTERNAL MEDICINE

## 2022-11-13 LAB — SARS-COV-2 RNA RESP QL NAA+PROBE: NEGATIVE

## 2022-11-14 DIAGNOSIS — I08.0 MITRAL VALVE STENOSIS AND AORTIC VALVE STENOSIS: Primary | ICD-10-CM

## 2022-11-14 RX ORDER — LIDOCAINE 40 MG/G
CREAM TOPICAL
Status: CANCELLED | OUTPATIENT
Start: 2022-11-14

## 2022-11-14 RX ORDER — SODIUM CHLORIDE 9 MG/ML
INJECTION, SOLUTION INTRAVENOUS CONTINUOUS
Status: CANCELLED | OUTPATIENT
Start: 2022-11-14

## 2022-11-14 RX ORDER — POTASSIUM CHLORIDE 1500 MG/1
20 TABLET, EXTENDED RELEASE ORAL
Status: CANCELLED | OUTPATIENT
Start: 2022-11-14

## 2022-11-14 RX ORDER — ASPIRIN 81 MG/1
243 TABLET, CHEWABLE ORAL ONCE
Status: CANCELLED | OUTPATIENT
Start: 2022-11-14

## 2022-11-14 RX ORDER — ASPIRIN 325 MG
325 TABLET ORAL ONCE
Status: CANCELLED | OUTPATIENT
Start: 2022-11-14 | End: 2022-11-14

## 2022-11-15 ENCOUNTER — TRANSFERRED RECORDS (OUTPATIENT)
Dept: HEALTH INFORMATION MANAGEMENT | Facility: CLINIC | Age: 67
End: 2022-11-15

## 2022-11-15 ENCOUNTER — HOSPITAL ENCOUNTER (OUTPATIENT)
Facility: CLINIC | Age: 67
Discharge: HOME | End: 2022-11-15
Admitting: INTERNAL MEDICINE
Payer: COMMERCIAL

## 2022-11-15 VITALS
HEART RATE: 54 BPM | OXYGEN SATURATION: 99 % | RESPIRATION RATE: 16 BRPM | TEMPERATURE: 98 F | BODY MASS INDEX: 21.26 KG/M2 | HEIGHT: 68 IN | DIASTOLIC BLOOD PRESSURE: 84 MMHG | WEIGHT: 140.3 LBS | SYSTOLIC BLOOD PRESSURE: 143 MMHG

## 2022-11-15 DIAGNOSIS — I08.0 MITRAL VALVE STENOSIS AND AORTIC VALVE STENOSIS: ICD-10-CM

## 2022-11-15 DIAGNOSIS — I35.0 CRITICAL AORTIC VALVE STENOSIS: ICD-10-CM

## 2022-11-15 DIAGNOSIS — R93.1 ABNORMAL FINDINGS DIAGNOSTIC IMAGING OF HEART AND CORONARY CIRCULATION: ICD-10-CM

## 2022-11-15 PROBLEM — Z98.890 STATUS POST CORONARY ANGIOGRAM: Status: ACTIVE | Noted: 2022-11-15

## 2022-11-15 LAB
ANION GAP SERPL CALCULATED.3IONS-SCNC: 5 MMOL/L (ref 3–14)
APTT PPP: 33 SECONDS (ref 22–38)
BUN SERPL-MCNC: 27 MG/DL (ref 7–30)
CALCIUM SERPL-MCNC: 9.4 MG/DL (ref 8.5–10.1)
CHLORIDE BLD-SCNC: 102 MMOL/L (ref 94–109)
CO2 SERPL-SCNC: 30 MMOL/L (ref 20–32)
CREAT SERPL-MCNC: 0.93 MG/DL (ref 0.66–1.25)
ERYTHROCYTE [DISTWIDTH] IN BLOOD BY AUTOMATED COUNT: 12.4 % (ref 10–15)
GFR SERPL CREATININE-BSD FRML MDRD: 89 ML/MIN/1.73M2
GLUCOSE BLD-MCNC: 94 MG/DL (ref 70–99)
HCT VFR BLD AUTO: 43.1 % (ref 40–53)
HGB BLD-MCNC: 14.1 G/DL (ref 13.3–17.7)
INR PPP: 0.89 (ref 0.85–1.15)
MCH RBC QN AUTO: 29.6 PG (ref 26.5–33)
MCHC RBC AUTO-ENTMCNC: 32.7 G/DL (ref 31.5–36.5)
MCV RBC AUTO: 90 FL (ref 78–100)
PLATELET # BLD AUTO: 234 10E3/UL (ref 150–450)
POTASSIUM BLD-SCNC: 4.1 MMOL/L (ref 3.4–5.3)
RBC # BLD AUTO: 4.77 10E6/UL (ref 4.4–5.9)
SODIUM SERPL-SCNC: 137 MMOL/L (ref 133–144)
WBC # BLD AUTO: 9.2 10E3/UL (ref 4–11)

## 2022-11-15 PROCEDURE — 85610 PROTHROMBIN TIME: CPT | Performed by: INTERNAL MEDICINE

## 2022-11-15 PROCEDURE — 999N000071 HC STATISTIC HEART CATH LAB OR EP LAB

## 2022-11-15 PROCEDURE — 93010 ELECTROCARDIOGRAM REPORT: CPT | Performed by: INTERNAL MEDICINE

## 2022-11-15 PROCEDURE — 93454 CORONARY ARTERY ANGIO S&I: CPT | Performed by: INTERNAL MEDICINE

## 2022-11-15 PROCEDURE — C1760 CLOSURE DEV, VASC: HCPCS | Performed by: INTERNAL MEDICINE

## 2022-11-15 PROCEDURE — 93454 CORONARY ARTERY ANGIO S&I: CPT | Mod: 26 | Performed by: INTERNAL MEDICINE

## 2022-11-15 PROCEDURE — 85027 COMPLETE CBC AUTOMATED: CPT | Performed by: INTERNAL MEDICINE

## 2022-11-15 PROCEDURE — 36415 COLL VENOUS BLD VENIPUNCTURE: CPT | Performed by: INTERNAL MEDICINE

## 2022-11-15 PROCEDURE — 99152 MOD SED SAME PHYS/QHP 5/>YRS: CPT | Performed by: INTERNAL MEDICINE

## 2022-11-15 PROCEDURE — 250N000009 HC RX 250: Performed by: INTERNAL MEDICINE

## 2022-11-15 PROCEDURE — 85730 THROMBOPLASTIN TIME PARTIAL: CPT | Performed by: INTERNAL MEDICINE

## 2022-11-15 PROCEDURE — 36591 DRAW BLOOD OFF VENOUS DEVICE: CPT

## 2022-11-15 PROCEDURE — C1894 INTRO/SHEATH, NON-LASER: HCPCS | Performed by: INTERNAL MEDICINE

## 2022-11-15 PROCEDURE — 999N000054 HC STATISTIC EKG NON-CHARGEABLE

## 2022-11-15 PROCEDURE — 82310 ASSAY OF CALCIUM: CPT | Performed by: INTERNAL MEDICINE

## 2022-11-15 PROCEDURE — 272N000001 HC OR GENERAL SUPPLY STERILE: Performed by: INTERNAL MEDICINE

## 2022-11-15 PROCEDURE — 250N000011 HC RX IP 250 OP 636: Performed by: INTERNAL MEDICINE

## 2022-11-15 PROCEDURE — 93005 ELECTROCARDIOGRAM TRACING: CPT

## 2022-11-15 PROCEDURE — 258N000003 HC RX IP 258 OP 636: Performed by: INTERNAL MEDICINE

## 2022-11-15 PROCEDURE — 999N000184 HC STATISTIC TELEMETRY

## 2022-11-15 DEVICE — CLOSURE ANGIOSEAL 6FR 610130: Type: IMPLANTABLE DEVICE | Status: FUNCTIONAL

## 2022-11-15 RX ORDER — LIDOCAINE 40 MG/G
CREAM TOPICAL
Status: DISCONTINUED | OUTPATIENT
Start: 2022-11-15 | End: 2022-11-15 | Stop reason: HOSPADM

## 2022-11-15 RX ORDER — FENTANYL CITRATE 50 UG/ML
INJECTION, SOLUTION INTRAMUSCULAR; INTRAVENOUS
Status: DISCONTINUED | OUTPATIENT
Start: 2022-11-15 | End: 2022-11-15 | Stop reason: HOSPADM

## 2022-11-15 RX ORDER — SODIUM CHLORIDE 9 MG/ML
INJECTION, SOLUTION INTRAVENOUS CONTINUOUS
Status: DISCONTINUED | OUTPATIENT
Start: 2022-11-15 | End: 2022-11-15 | Stop reason: HOSPADM

## 2022-11-15 RX ORDER — NALOXONE HYDROCHLORIDE 0.4 MG/ML
0.4 INJECTION, SOLUTION INTRAMUSCULAR; INTRAVENOUS; SUBCUTANEOUS
Status: CANCELLED | OUTPATIENT
Start: 2022-11-15 | End: 2022-11-15

## 2022-11-15 RX ORDER — HEPARIN SODIUM 10000 [USP'U]/100ML
100-1000 INJECTION, SOLUTION INTRAVENOUS CONTINUOUS PRN
Status: DISCONTINUED | OUTPATIENT
Start: 2022-11-15 | End: 2022-11-15 | Stop reason: HOSPADM

## 2022-11-15 RX ORDER — FLUMAZENIL 0.1 MG/ML
0.2 INJECTION, SOLUTION INTRAVENOUS
Status: CANCELLED | OUTPATIENT
Start: 2022-11-15 | End: 2022-11-15

## 2022-11-15 RX ORDER — OXYCODONE HYDROCHLORIDE 5 MG/1
5 TABLET ORAL EVERY 4 HOURS PRN
Status: DISCONTINUED | OUTPATIENT
Start: 2022-11-15 | End: 2022-11-15 | Stop reason: HOSPADM

## 2022-11-15 RX ORDER — ACETAMINOPHEN 325 MG/1
650 TABLET ORAL EVERY 4 HOURS PRN
Status: DISCONTINUED | OUTPATIENT
Start: 2022-11-15 | End: 2022-11-15 | Stop reason: HOSPADM

## 2022-11-15 RX ORDER — NALOXONE HYDROCHLORIDE 0.4 MG/ML
0.2 INJECTION, SOLUTION INTRAMUSCULAR; INTRAVENOUS; SUBCUTANEOUS
Status: CANCELLED | OUTPATIENT
Start: 2022-11-15 | End: 2022-11-15

## 2022-11-15 RX ORDER — POTASSIUM CHLORIDE 1500 MG/1
20 TABLET, EXTENDED RELEASE ORAL
Status: DISCONTINUED | OUTPATIENT
Start: 2022-11-15 | End: 2022-11-15 | Stop reason: HOSPADM

## 2022-11-15 RX ORDER — ATROPINE SULFATE 0.1 MG/ML
0.5 INJECTION INTRAVENOUS
Status: CANCELLED | OUTPATIENT
Start: 2022-11-15 | End: 2022-11-15

## 2022-11-15 RX ORDER — ASPIRIN 81 MG/1
243 TABLET, CHEWABLE ORAL ONCE
Status: DISCONTINUED | OUTPATIENT
Start: 2022-11-15 | End: 2022-11-15 | Stop reason: HOSPADM

## 2022-11-15 RX ORDER — FENTANYL CITRATE 50 UG/ML
25 INJECTION, SOLUTION INTRAMUSCULAR; INTRAVENOUS
Status: CANCELLED | OUTPATIENT
Start: 2022-11-15

## 2022-11-15 RX ORDER — ASPIRIN 325 MG
325 TABLET ORAL ONCE
Status: DISCONTINUED | OUTPATIENT
Start: 2022-11-15 | End: 2022-11-15 | Stop reason: HOSPADM

## 2022-11-15 RX ORDER — IOPAMIDOL 755 MG/ML
INJECTION, SOLUTION INTRAVASCULAR
Status: DISCONTINUED | OUTPATIENT
Start: 2022-11-15 | End: 2022-11-15 | Stop reason: HOSPADM

## 2022-11-15 RX ORDER — OXYCODONE HYDROCHLORIDE 5 MG/1
10 TABLET ORAL EVERY 4 HOURS PRN
Status: DISCONTINUED | OUTPATIENT
Start: 2022-11-15 | End: 2022-11-15 | Stop reason: HOSPADM

## 2022-11-15 RX ADMIN — SODIUM CHLORIDE: 9 INJECTION, SOLUTION INTRAVENOUS at 08:18

## 2022-11-15 ASSESSMENT — ACTIVITIES OF DAILY LIVING (ADL)
ADLS_ACUITY_SCORE: 35

## 2022-11-15 NOTE — PROGRESS NOTES
Care Suites Discharge Nursing Note    Patient Information  Name: Miguel Pappas  Age: 67 year old    Discharge Education:  Discharge instructions reviewed: Yes  Additional education/resources provided: Per Cardiology  Patient/patient representative verbalizes understanding: Yes  Patient discharging on new medications: N/A  Medication education completed: Yes    Discharge Plans:   Discharge location: home  Discharge ride contacted: Yes  Approximate discharge time: 1245    Discharge Criteria:  Discharge criteria met and vital signs stable: Yes.  VSS, site CDI, AxO    Patient Belongs:  Patient belongings returned to patient: Yes    Rd Mantilla RN

## 2022-11-15 NOTE — PROGRESS NOTES
1108 Report received from Rd Mantilla RN.  1115 Pt A/O. Tegaderm drsg CDI to right groin puncture site. No oozing or hematoma noted. Area soft & flat. Pt denies pain. Activity restrictions reinforced with pt with verbal understanding received. Pt's family at bedside. Pt taking diet & flds well. No complaints.  1150 OOB - steady on feet. Ambulated in halls to bathroom with good constantine. No change in puncture site assessment with activity.  1224 Report given to Rd Mantilla RN.

## 2022-11-15 NOTE — PROGRESS NOTES
Care Suites Admission Nursing Note    Patient Information  Name: Miguel Pappas  Age: 67 year old  Reason for admission: Angiogram  Care Suites arrival time: 0730    Visitor Information  Name: Toño  Informed of visitor restrictions: Yes  1 visitor allowed per patient   Visitor must screen negative for COVID symptoms   Visitor must wear a mask  Waiting rooms closed to visitors    Patient Admission/Assessment   Pre-procedure assessment complete: Yes  If abnormal assessment/labs, provider notified: N/A  NPO: Yes  Medications held per instructions/orders: Yes  Consent: deferred  If applicable, pregnancy test status: deferred  Patient oriented to room: Yes  Education/questions answered: Yes  Plan/other: Prep for procedure    Discharge Planning  Discharge name/phone number: Toño [phone]  Overnight post sedation caregiver: Toño  Discharge location: home    Rd Mantilla RN

## 2022-11-15 NOTE — PRE-PROCEDURE
GENERAL PRE-PROCEDURE:   Procedure:  Coronary angiogram  Date/Time:  11/15/2022 9:15 AM    Verbal consent obtained?: Yes    Written consent obtained?: Yes    Risks and benefits: Risks, benefits and alternatives were discussed    Consent given by:  Patient  Patient states understanding of procedure being performed: Yes    Patient's understanding of procedure matches consent: Yes    Procedure consent matches procedure scheduled: Yes    Expected level of sedation:  Moderate  Appropriately NPO:  Yes  ASA Class:  3  Mallampati  :  Grade 3- soft palate visible, posterior pharyngeal wall not visible  Lungs:  Lungs clear with good breath sounds bilaterally  Heart:  Normal heart sounds and rate  History & Physical reviewed:  History and physical reviewed and no updates needed  Statement of review:  I have reviewed the lab findings, diagnostic data, medications, and the plan for sedation

## 2022-11-15 NOTE — PROGRESS NOTES
Care Suites Post Procedure Note    Patient Information  Name: Miguel Pappas  Age: 67 year old    Post Procedure  Time patient returned to Care Suites: 3673  Concerns/abnormal assessment: None at this time  If abnormal assessment, provider notified: N/A  Plan/Other: Monitor site, vitals and sedation    Rd Mantilla RN

## 2022-11-15 NOTE — DISCHARGE INSTRUCTIONS

## 2022-11-21 LAB
ATRIAL RATE - MUSE: 60 BPM
DIASTOLIC BLOOD PRESSURE - MUSE: NORMAL MMHG
INTERPRETATION ECG - MUSE: NORMAL
P AXIS - MUSE: 77 DEGREES
PR INTERVAL - MUSE: 162 MS
QRS DURATION - MUSE: 84 MS
QT - MUSE: 442 MS
QTC - MUSE: 442 MS
R AXIS - MUSE: 80 DEGREES
SYSTOLIC BLOOD PRESSURE - MUSE: NORMAL MMHG
T AXIS - MUSE: 109 DEGREES
VENTRICULAR RATE- MUSE: 60 BPM

## 2022-11-29 ENCOUNTER — CARE COORDINATION (OUTPATIENT)
Dept: CARDIOLOGY | Facility: CLINIC | Age: 67
End: 2022-11-29

## 2022-11-29 NOTE — PROGRESS NOTES
S-(situation): Wife hCayito called requesting update on TAVR workup/procedure planning.    B-(background): Pt saw Dr. Nava and Dr. Longoria 11/10/22 for TAVR vs SAVR workup. Subsequently underwent CT TAVR and coronary angiogram.     A-(assessment): Chayito states Miguel is getting increasingly weak with activity.     R-(recommendations): Discussed plan to discuss at TAVR conference on Thursday. Procedure slot held on 12/13/22 if approved at conference. Will call with update on Thursday. In the meantime, advised to call this RN or cardiologist on-call if symptoms occur at rest and would likely be directed to the ED. Verbalized understanding and agreed to plan.     Keri Breen, VALERIAN, RN, PHN, CHFN, HNB-BC   11/29/2022 at 8:36 AM

## 2022-12-01 ENCOUNTER — VIRTUAL VISIT (OUTPATIENT)
Dept: CARDIOLOGY | Facility: CLINIC | Age: 67
End: 2022-12-01
Payer: COMMERCIAL

## 2022-12-01 ENCOUNTER — CARE COORDINATION (OUTPATIENT)
Dept: CARDIOLOGY | Facility: CLINIC | Age: 67
End: 2022-12-01

## 2022-12-01 DIAGNOSIS — I35.0 CRITICAL AORTIC VALVE STENOSIS: Primary | ICD-10-CM

## 2022-12-01 PROCEDURE — 99214 OFFICE O/P EST MOD 30 MIN: CPT | Mod: 95 | Performed by: INTERNAL MEDICINE

## 2022-12-01 NOTE — PROGRESS NOTES
Spoke with Dr. Nava. Patient agrees to SAVR, Dr. Nava requesting surgery in the next 1-2 weeks.    Will route to CV surgery RNs to contact patient with next steps.     VALERIA AvilesN, RN, PHN, CHFN, HNB-BC   12/1/2022 at 1:26 PM

## 2022-12-01 NOTE — PROGRESS NOTES
Blood Pressure: Not available   Pulse: 60 bpm  Weight: 140 lbs  Height: 5 ft 8 in    Review Of Systems  Skin: negative  Eyes: negative  Ears/Nose/Throat: negative  Respiratory: SOB with exertion  Cardiovascular: Pt claims palpitations once in the past month  Gastrointestinal: negative  Genitourinary: negative  Musculoskeletal: negative  Neurologic: negative  Psychiatric: negative  Hematologic/Lymphatic/Immunologic: negative  Endocrine: negative    Miguel is a 67 year old who is being evaluated via a billable telephone visit.      What phone number would you like to be contacted at? [phone]  How would you like to obtain your AVS? Mail a copy  Phone call duration: 12 minutes    JORJE Fernández          Structural Heart (TAVR) Cardiology Consultation       Assessment & Plan     1.  Severe critical aortic stenosis  2.  Hyperlipidemia      Recommendations    1.  Patient's case was discussed in the combined TAVR conference this morning.  The concern was the asymmetric distribution of the calcium and LVOT calcification.  Given the fact that patient has a very low STS score the group's consensus was to consider surgical aortic valve replacement as the primary mode of therapy for his severe symptomatic disease.    2.  Discussed with patient our concerns regarding the TAVR procedure and the fact that we may get a not ideal result.  He understood our concerns and is now wanting to proceed with surgical aortic valve replacement.    3.  We will have our surgical team reach out to patient to schedule surgical aortic valve replacement.  Would like to move this up as soon as possible given his severe gradient and aortic valve area.    Thank you kindly for consult          Edson Nava MD, MD        History of present illness    Patient is a 67-year-old gentleman who has been relatively healthy all his life.  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  He was able accomplish this in several hours and  has not noticed any significant difficulty.  Upon further questioning his daughter who accompanies him states that he has been a little bit more out of breath compared to last year.  He does not have any chest pain PND orthopnea.  He has had echocardiograms performed x2 in 2022.  Compared to his February 2022 study he has now developed severe critical aortic stenosis.  Peak velocity is 5.3 m/s with a mean gradient of 70 mmHg.  In preparation for today's visit he had a TAVR directed CT that suggest a transfemoral approach may be feasible.   here for an evaluation      Echo October 2022  Critical aortic valve stenosis with mild regurgitation.  Peak transaortic velocity 5.3 m/s, mean systolic gradient 70 mmHg, valve area  0.82 cmÂ , indexed valve area 0.47, velocity ratio 0.23.  Severe concentric left ventricular hypertrophy with hyperdynamic systolic  function.  The visual ejection fraction is greater than 70%.  Normal right ventricular size and systolic function.     Compared to the previous study dated 2/10/2022, aortic valve stenosis has  progressed from severe to critical.        TAVR CT    1.  Severely calcified trileaflet aortic valve. Aortic valve calcium  score is 3417. The LVOT is not calcified. The ascending aorta is  mildly calcified, aortic arch is  mildly calcified, the descending  thoracic aorta is mildly calcified.   2.  The arch vessel branching pattern is normal.   3.  The suprarenal abdominal aorta is milldy calcified; infrarenal  abdominal aorta is mildly calcified  4.  Widely patent origins of celiac trunk, superior mesenteric artery  and inferior mesenteric artery.  Single bilateral renal arteries with  widely patent origins.   5.  There is no acute aortic pathology, such as dissection, intramural  hematoma, or contained rupture.      MEASUREMENTS:   Representative dimensions of the thoracoabdominal aorta are as  follows:     1. AORTIC ANNULUS MEASUREMENTS:     1.  The average aortic annulus  diameter is 26.5 mm, (long diameter is  30.5 mm and short diameter is 23.6 mm)  2.  Aortic annulus area is 5.52 cm2  3.  Aortic annulus perimeter is 85.9 mm  4.  Suggested 3-cusp coaxial angle for aortic valve is ([place] 0 , CAU 0)  and alternate A-P coaxial angle is (LAO6 , CRA 5 ), these  angles will  vary depending upon the patient's body position  5.  Aortic root angle is 39.3  6.  Left main - Annulus distance: 10.2 mm, RCA - Annulus distance:  15.4 mm     2. LVOT MEASUREMENTS:     1.  The average LVOT diameter (measured 4 mm below annular plane) is  25.0 mm  2.  LVOT area is 4.91 cm2  3.  LVOT perimeter is 81.3 mm     3. AORTA MEASUREMENTS     1.  The aortic root at the sinuses of Valsalva (left cusp, right cusp,  non cusp): 35.2 mm x  32.9 mm x 33.4 mm  2.  The sinotubular junction:  29.6 mm x 29.5 mm  3.  Sinotubular junction height: 19.7 mm x 19.2 mm  4.  Proximal ascending aorta: 33.9 mm x 33.4 mm  5.  Distal abdominal aorta proximal to the bifurcation: 19.0 mm x 16.2  mm  6.  Innominate artery 3 cm from ostium: 15.5 mm x 12.4 mm  7.  Left common carotid artery 3 cm from ostium: 5.19 mm x 4.97 mm     4. ILIOFEMORAL MEASUREMENTS:     RIGHT:  1.  Right common iliac artery: 11.5 mm x 10.5 mm   2.  Right external iliac artery: 8.8 mm x 8.5 mm   3.  Right common femoral artery: 9.8 mm x 9.5 mm   4.  Tortuosity: mild   5.  Calcification: mild      LEFT:  1.  Left common iliac artery: 11.3 mm x  10.3 mm  2.  Left external iliac artery: 9.0 mm x 8.8 mm  3.  Left common femoral artery: 8.6 mm x 8.2 mm  4.  Tortuosity: mild   5.  Calcification: mild       Patient Active Problem List   Diagnosis     Mitral valve stenosis and aortic valve stenosis     Pure hypercholesterolemia     HYPERLIPIDEMIA LDL GOAL <100       Past Medical History   I have reviewed this patient's medical history and updated it with pertinent information if needed.   Past Medical History:   Diagnosis Date     * * * SBE PROPHYLAXIS * * *     no longer  indicated - 2007 AHA guidelnies     Mitral valve stenosis and aortic valve stenosis     2/6 murmur     Pure hypercholesterolemia 1/26/2007       Past Surgical History   I have reviewed this patient's surgical history and updated it with pertinent information if needed.  Past Surgical History:   Procedure Laterality Date     COLONOSCOPY N/A 12/5/2014    Procedure: COLONOSCOPY;  Surgeon: Omar Chisholm MD;  Location:  GI     CV CORONARY ANGIOGRAM N/A 11/15/2022    Procedure: Coronary Angiogram;  Surgeon: Edson Nava MD;  Location:  HEART CARDIAC CATH LAB     HC REMOVAL OF TONSILS,<11 Y/O      Tonsils <12y.o.     ZZHC COLONOSCOPY W/WO BRUSH/WASH  2/8/2005        Prior to Admission Medications   Cannot display prior to admission medications because the patient has not been admitted in this contact.     [unfilled]  [unfilled]  Allergies   Allergies   Allergen Reactions     No Known Drug Allergies        Social History    reports that he has never smoked. He has never used smokeless tobacco. He reports current alcohol use. He reports that he does not use drugs.    Family History   Family History   Problem Relation Age of Onset     Cancer Father         Spleen cancer     Hypertension Father      Allergies Father         PCN     Gastrointestinal Disease Father         ulcer     Heart Disease Father         Hx: meds and angioplasty     Hypertension Brother      Cerebrovascular Disease Paternal Grandfather      Arthritis Paternal Grandfather      Arthritis Mother      Hypertension Mother      Allergies Son         Dust , Mold     Depression Sister         X 2       Review of Systems   The comprehensive 10 point Review of Systems is negative other than noted in the HPI or here.     Physical Exam   Vital Signs with Ranges     Wt Readings from Last 4 Encounters:   11/15/22 63.6 kg (140 lb 4.8 oz)   11/10/22 64 kg (141 lb)   10/17/22 63.5 kg (140 lb 1.6 oz)   04/04/22 62.4 kg (137 lb 8 oz)      [unfilled]

## 2022-12-01 NOTE — PROGRESS NOTES
Patient was presented this morning at the multidisciplinary valve conference.     Patient is a candidate for TAVR and SAVR, though the team recommends SAVR. Dr. Nava would like to talk to patient about the pros and cons of each approach prior to proceeding.     Called wife Chayito who agreed to telephone visit between patient and Dr. Nava later this AM.     Keri Breen, VALERIAN, RN, PHN, CHFN, HNB-BC   12/1/2022 at 9:10 AM

## 2022-12-01 NOTE — LETTER
12/1/2022    Thony Gongora MD  [address] [place] [address]    RE: Miguel Pappas       Dear Colleague,     I had the pleasure of seeing Miguel Pappas in the [facility].  Blood Pressure: Not available   Pulse: 60 bpm  Weight: 140 lbs  Height: 5 ft 8 in    Review Of Systems  Skin: negative  Eyes: negative  Ears/Nose/Throat: negative  Respiratory: SOB with exertion  Cardiovascular: Pt claims palpitations once in the past month  Gastrointestinal: negative  Genitourinary: negative  Musculoskeletal: negative  Neurologic: negative  Psychiatric: negative  Hematologic/Lymphatic/Immunologic: negative  Endocrine: negative    Miguel is a 67 year old who is being evaluated via a billable telephone visit.      What phone number would you like to be contacted at? [phone]  How would you like to obtain your AVS? Mail a copy  Phone call duration: 12 minutes    JORJE Fernández          Structural Heart (TAVR) Cardiology Consultation       Assessment & Plan     1.  Severe critical aortic stenosis  2.  Hyperlipidemia      Recommendations    1.  Patient's case was discussed in the combined TAVR conference this morning.  The concern was the asymmetric distribution of the calcium and LVOT calcification.  Given the fact that patient has a very low STS score the group's consensus was to consider surgical aortic valve replacement as the primary mode of therapy for his severe symptomatic disease.    2.  Discussed with patient our concerns regarding the TAVR procedure and the fact that we may get a not ideal result.  He understood our concerns and is now wanting to proceed with surgical aortic valve replacement.    3.  We will have our surgical team reach out to patient to schedule surgical aortic valve replacement.  Would like to move this up as soon as possible given his severe gradient and aortic valve area.    Thank you kindly for consult          Edson Nava MD, MD        History of  present illness    Patient is a 67-year-old gentleman who has been relatively healthy all his life.  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  He was able accomplish this in several hours and has not noticed any significant difficulty.  Upon further questioning his daughter who accompanies him states that he has been a little bit more out of breath compared to last year.  He does not have any chest pain PND orthopnea.  He has had echocardiograms performed x2 in 2022.  Compared to his February 2022 study he has now developed severe critical aortic stenosis.  Peak velocity is 5.3 m/s with a mean gradient of 70 mmHg.  In preparation for today's visit he had a TAVR directed CT that suggest a transfemoral approach may be feasible.   here for an evaluation      Echo October 2022  Critical aortic valve stenosis with mild regurgitation.  Peak transaortic velocity 5.3 m/s, mean systolic gradient 70 mmHg, valve area  0.82 cmÂ , indexed valve area 0.47, velocity ratio 0.23.  Severe concentric left ventricular hypertrophy with hyperdynamic systolic  function.  The visual ejection fraction is greater than 70%.  Normal right ventricular size and systolic function.     Compared to the previous study dated 2/10/2022, aortic valve stenosis has  progressed from severe to critical.        TAVR CT    1.  Severely calcified trileaflet aortic valve. Aortic valve calcium  score is 3417. The LVOT is not calcified. The ascending aorta is  mildly calcified, aortic arch is  mildly calcified, the descending  thoracic aorta is mildly calcified.   2.  The arch vessel branching pattern is normal.   3.  The suprarenal abdominal aorta is milldy calcified; infrarenal  abdominal aorta is mildly calcified  4.  Widely patent origins of celiac trunk, superior mesenteric artery  and inferior mesenteric artery.  Single bilateral renal arteries with  widely patent origins.   5.  There is no acute aortic pathology, such as dissection,  intramural  hematoma, or contained rupture.      MEASUREMENTS:   Representative dimensions of the thoracoabdominal aorta are as  follows:     1. AORTIC ANNULUS MEASUREMENTS:     1.  The average aortic annulus diameter is 26.5 mm, (long diameter is  30.5 mm and short diameter is 23.6 mm)  2.  Aortic annulus area is 5.52 cm2  3.  Aortic annulus perimeter is 85.9 mm  4.  Suggested 3-cusp coaxial angle for aortic valve is ([place] 0 , CAU 0)  and alternate A-P coaxial angle is (LAO6 , CRA 5 ), these  angles will  vary depending upon the patient's body position  5.  Aortic root angle is 39.3  6.  Left main - Annulus distance: 10.2 mm, RCA - Annulus distance:  15.4 mm     2. LVOT MEASUREMENTS:     1.  The average LVOT diameter (measured 4 mm below annular plane) is  25.0 mm  2.  LVOT area is 4.91 cm2  3.  LVOT perimeter is 81.3 mm     3. AORTA MEASUREMENTS     1.  The aortic root at the sinuses of Valsalva (left cusp, right cusp,  non cusp): 35.2 mm x  32.9 mm x 33.4 mm  2.  The sinotubular junction:  29.6 mm x 29.5 mm  3.  Sinotubular junction height: 19.7 mm x 19.2 mm  4.  Proximal ascending aorta: 33.9 mm x 33.4 mm  5.  Distal abdominal aorta proximal to the bifurcation: 19.0 mm x 16.2  mm  6.  Innominate artery 3 cm from ostium: 15.5 mm x 12.4 mm  7.  Left common carotid artery 3 cm from ostium: 5.19 mm x 4.97 mm     4. ILIOFEMORAL MEASUREMENTS:     RIGHT:  1.  Right common iliac artery: 11.5 mm x 10.5 mm   2.  Right external iliac artery: 8.8 mm x 8.5 mm   3.  Right common femoral artery: 9.8 mm x 9.5 mm   4.  Tortuosity: mild   5.  Calcification: mild      LEFT:  1.  Left common iliac artery: 11.3 mm x  10.3 mm  2.  Left external iliac artery: 9.0 mm x 8.8 mm  3.  Left common femoral artery: 8.6 mm x 8.2 mm  4.  Tortuosity: mild   5.  Calcification: mild       Patient Active Problem List   Diagnosis     Mitral valve stenosis and aortic valve stenosis     Pure hypercholesterolemia     HYPERLIPIDEMIA LDL GOAL <100        Past Medical History   I have reviewed this patient's medical history and updated it with pertinent information if needed.   Past Medical History:   Diagnosis Date     * * * SBE PROPHYLAXIS * * *     no longer indicated - 2007 AHA guidelnies     Mitral valve stenosis and aortic valve stenosis     2/6 murmur     Pure hypercholesterolemia 1/26/2007       Past Surgical History   I have reviewed this patient's surgical history and updated it with pertinent information if needed.  Past Surgical History:   Procedure Laterality Date     COLONOSCOPY N/A 12/5/2014    Procedure: COLONOSCOPY;  Surgeon: Omar Chisholm MD;  Location:  GI     CV CORONARY ANGIOGRAM N/A 11/15/2022    Procedure: Coronary Angiogram;  Surgeon: Edson Nava MD;  Location:  HEART CARDIAC CATH LAB     HC REMOVAL OF TONSILS,<11 Y/O      Tonsils <10y.o.     ZZHC COLONOSCOPY W/WO BRUSH/WASH  2/8/2005        Prior to Admission Medications   Cannot display prior to admission medications because the patient has not been admitted in this contact.     [unfilled]  [unfilled]  Allergies   Allergies   Allergen Reactions     No Known Drug Allergies        Social History    reports that he has never smoked. He has never used smokeless tobacco. He reports current alcohol use. He reports that he does not use drugs.    Family History   Family History   Problem Relation Age of Onset     Cancer Father         Spleen cancer     Hypertension Father      Allergies Father         PCN     Gastrointestinal Disease Father         ulcer     Heart Disease Father         Hx: meds and angioplasty     Hypertension Brother      Cerebrovascular Disease Paternal Grandfather      Arthritis Paternal Grandfather      Arthritis Mother      Hypertension Mother      Allergies Son         Dust , Mold     Depression Sister         X 2       Review of Systems   The comprehensive 10 point Review of Systems is negative other than noted in the HPI or here.     Physical  Exam   Vital Signs with Ranges     Wt Readings from Last 4 Encounters:   11/15/22 63.6 kg (140 lb 4.8 oz)   11/10/22 64 kg (141 lb)   10/17/22 63.5 kg (140 lb 1.6 oz)   04/04/22 62.4 kg (137 lb 8 oz)     [unfilled]      Thank you for allowing me to participate in the care of your patient.      Sincerely,     Edson Nava MD     [facility]  cc:   No referring provider defined for this encounter.

## 2022-12-02 ENCOUNTER — TELEPHONE (OUTPATIENT)
Dept: OTHER | Facility: CLINIC | Age: 67
End: 2022-12-02

## 2022-12-02 ENCOUNTER — PREP FOR PROCEDURE (OUTPATIENT)
Dept: CARDIOLOGY | Facility: CLINIC | Age: 67
End: 2022-12-02

## 2022-12-02 DIAGNOSIS — I35.0 AORTIC STENOSIS: Primary | ICD-10-CM

## 2022-12-02 NOTE — TELEPHONE ENCOUNTER
Dental clearance letter e-mailed to patient. phone consult ,review with Dr Abbott 12/12. Information sent to Ann, OR  to call patient and pick surgical date. Will mail literature and arrange pre op labs when OR date secured. Update wife on plan, all questions addressed.

## 2022-12-07 ENCOUNTER — TELEPHONE (OUTPATIENT)
Dept: CARDIOLOGY | Facility: CLINIC | Age: 67
End: 2022-12-07

## 2022-12-07 DIAGNOSIS — I08.0 MITRAL VALVE STENOSIS AND AORTIC VALVE STENOSIS: Primary | ICD-10-CM

## 2022-12-07 DIAGNOSIS — R79.9 ABNORMAL FINDING OF BLOOD CHEMISTRY, UNSPECIFIED: ICD-10-CM

## 2022-12-07 RX ORDER — FAMOTIDINE 20 MG/1
20 TABLET, FILM COATED ORAL
Status: CANCELLED | OUTPATIENT
Start: 2022-12-07

## 2022-12-07 RX ORDER — CHLORHEXIDINE GLUCONATE ORAL RINSE 1.2 MG/ML
10 SOLUTION DENTAL ONCE
Status: CANCELLED | OUTPATIENT
Start: 2022-12-07 | End: 2022-12-07

## 2022-12-07 RX ORDER — CEFAZOLIN SODIUM 2 G/100ML
2 INJECTION, SOLUTION INTRAVENOUS SEE ADMIN INSTRUCTIONS
Status: CANCELLED | OUTPATIENT
Start: 2022-12-07

## 2022-12-07 RX ORDER — ASPIRIN 81 MG/1
162 TABLET, CHEWABLE ORAL
Status: CANCELLED | OUTPATIENT
Start: 2022-12-07

## 2022-12-07 RX ORDER — CEFAZOLIN SODIUM 2 G/100ML
2 INJECTION, SOLUTION INTRAVENOUS
Status: CANCELLED | OUTPATIENT
Start: 2022-12-07

## 2022-12-07 RX ORDER — LIDOCAINE 40 MG/G
CREAM TOPICAL
Status: CANCELLED | OUTPATIENT
Start: 2022-12-07

## 2022-12-07 RX ORDER — ASPIRIN 81 MG/1
81 TABLET, CHEWABLE ORAL
Status: CANCELLED | OUTPATIENT
Start: 2022-12-07

## 2022-12-07 RX ORDER — VANCOMYCIN HYDROCHLORIDE 1 G/200ML
1000 INJECTION, SOLUTION INTRAVENOUS
Status: CANCELLED | OUTPATIENT
Start: 2022-12-07

## 2022-12-07 NOTE — TELEPHONE ENCOUNTER
Per task, pt needs to schedule surgery with  and Dr. Longoria. Talked to pt and scheduled surgery for 12/16. Will call if anything changes

## 2022-12-08 LAB
ABO/RH(D): NORMAL
ANTIBODY SCREEN: NEGATIVE
SPECIMEN EXPIRATION DATE: NORMAL

## 2022-12-09 ENCOUNTER — OFFICE VISIT (OUTPATIENT)
Dept: CARDIOLOGY | Facility: CLINIC | Age: 67
End: 2022-12-09
Payer: COMMERCIAL

## 2022-12-09 ENCOUNTER — LAB (OUTPATIENT)
Dept: LAB | Facility: CLINIC | Age: 67
End: 2022-12-09
Payer: COMMERCIAL

## 2022-12-09 DIAGNOSIS — I08.0 MITRAL VALVE STENOSIS AND AORTIC VALVE STENOSIS: ICD-10-CM

## 2022-12-09 DIAGNOSIS — I35.0 AORTIC VALVE STENOSIS, ETIOLOGY OF CARDIAC VALVE DISEASE UNSPECIFIED: Primary | ICD-10-CM

## 2022-12-09 DIAGNOSIS — R79.9 ABNORMAL FINDING OF BLOOD CHEMISTRY, UNSPECIFIED: ICD-10-CM

## 2022-12-09 LAB
ALBUMIN SERPL-MCNC: 3.7 G/DL (ref 3.4–5)
ALBUMIN UR-MCNC: NEGATIVE MG/DL
ALP SERPL-CCNC: 74 U/L (ref 40–150)
ALT SERPL W P-5'-P-CCNC: 30 U/L (ref 0–70)
ANION GAP SERPL CALCULATED.3IONS-SCNC: 3 MMOL/L (ref 3–14)
APPEARANCE UR: CLEAR
AST SERPL W P-5'-P-CCNC: 25 U/L (ref 0–45)
BILIRUB SERPL-MCNC: 0.8 MG/DL (ref 0.2–1.3)
BILIRUB UR QL STRIP: NEGATIVE
BLOOD BANK CHART COMMENT: NORMAL
BUN SERPL-MCNC: 24 MG/DL (ref 7–30)
CALCIUM SERPL-MCNC: 9.3 MG/DL (ref 8.5–10.1)
CHLORIDE BLD-SCNC: 101 MMOL/L (ref 94–109)
CO2 SERPL-SCNC: 32 MMOL/L (ref 20–32)
COLOR UR AUTO: NORMAL
CREAT SERPL-MCNC: 0.91 MG/DL (ref 0.66–1.25)
ERYTHROCYTE [DISTWIDTH] IN BLOOD BY AUTOMATED COUNT: 12.4 % (ref 10–15)
GFR SERPL CREATININE-BSD FRML MDRD: >90 ML/MIN/1.73M2
GLUCOSE BLD-MCNC: 90 MG/DL (ref 70–99)
GLUCOSE UR STRIP-MCNC: NEGATIVE MG/DL
HBA1C MFR BLD: 5.5 % (ref 0–5.6)
HCT VFR BLD AUTO: 39 % (ref 40–53)
HGB BLD-MCNC: 13.3 G/DL (ref 13.3–17.7)
HGB UR QL STRIP: NEGATIVE
KETONES UR STRIP-MCNC: NEGATIVE MG/DL
LEUKOCYTE ESTERASE UR QL STRIP: NEGATIVE
MCH RBC QN AUTO: 29.6 PG (ref 26.5–33)
MCHC RBC AUTO-ENTMCNC: 34.1 G/DL (ref 31.5–36.5)
MCV RBC AUTO: 87 FL (ref 78–100)
NITRATE UR QL: NEGATIVE
PH UR STRIP: 6 [PH] (ref 5–7)
PLATELET # BLD AUTO: 227 10E3/UL (ref 150–450)
POTASSIUM BLD-SCNC: 4.2 MMOL/L (ref 3.4–5.3)
PROT SERPL-MCNC: 7.4 G/DL (ref 6.8–8.8)
RBC # BLD AUTO: 4.5 10E6/UL (ref 4.4–5.9)
SODIUM SERPL-SCNC: 136 MMOL/L (ref 133–144)
SP GR UR STRIP: 1.01 (ref 1–1.03)
SPECIMEN EXPIRATION DATE: NORMAL
UROBILINOGEN UR STRIP-MCNC: NORMAL MG/DL
WBC # BLD AUTO: 8.5 10E3/UL (ref 4–11)

## 2022-12-09 PROCEDURE — 85027 COMPLETE CBC AUTOMATED: CPT

## 2022-12-09 PROCEDURE — 86901 BLOOD TYPING SEROLOGIC RH(D): CPT

## 2022-12-09 PROCEDURE — 99207 PR NO CHARGE NURSE ONLY: CPT

## 2022-12-09 PROCEDURE — 36415 COLL VENOUS BLD VENIPUNCTURE: CPT

## 2022-12-09 PROCEDURE — 80053 COMPREHEN METABOLIC PANEL: CPT

## 2022-12-09 PROCEDURE — 83036 HEMOGLOBIN GLYCOSYLATED A1C: CPT

## 2022-12-09 PROCEDURE — 86850 RBC ANTIBODY SCREEN: CPT

## 2022-12-09 PROCEDURE — 81003 URINALYSIS AUTO W/O SCOPE: CPT

## 2022-12-09 NOTE — PROGRESS NOTES
I met with patient and his daughter. Pre op labs done and reviewed. Pre op instructions, literature and contact information given. Plan phone visit with Dr. Abbott 12/12. Plan AVR 12/16. All questions addressed.

## 2022-12-12 ENCOUNTER — TELEPHONE (OUTPATIENT)
Dept: CARDIOLOGY | Facility: CLINIC | Age: 67
End: 2022-12-12

## 2022-12-13 ENCOUNTER — DOCUMENTATION ONLY (OUTPATIENT)
Dept: CARDIOLOGY | Facility: CLINIC | Age: 67
End: 2022-12-13

## 2022-12-15 ENCOUNTER — ANESTHESIA EVENT (OUTPATIENT)
Dept: SURGERY | Facility: CLINIC | Age: 67
DRG: 220 | End: 2022-12-15
Payer: COMMERCIAL

## 2022-12-15 NOTE — PROGRESS NOTES
PTA medications updated by Medication Scribe prior to surgery via phone call with patient (last doses completed by Nurse)     Medication history sources: Patient's family/friend (Sandra (spouse)) and H&P  In the past week, patient estimated taking medication this percent of the time: Greater than 90%  Adherence assessment: N/A Not Observed    Significant changes made to the medication list:  None      Additional medication history information:   None    Medication reconciliation completed by provider prior to medication history? No    Time spent in this activity: 25 minutes    The information provided in this note is only as accurate as the sources available at the time of update(s)    Prior to Admission medications    Medication Sig Last Dose Taking? Auth Provider Long Term End Date   aspirin 81 MG tablet Take 81 mg by mouth daily 12/15/2022 at am Yes Reported, Patient     GLUCOSAMINE CHONDROITIN OR TABS Take 1 tablet by mouth daily 12/1/2022 at am Yes Vivian Rudd PA-C     Multiple Vitamins-Minerals (CENTRUM SILVER PO) Take 1 tablet by mouth daily 12/1/2022 at am Yes Vivian Rudd PA-C     VITAMIN D PO Take 1 tablet by mouth daily 12/1/2022 at am Yes Reported, Patient       Medication history completed by:    Lacho Hernandez CPhT  Medication Scribe  [facility]

## 2022-12-16 ENCOUNTER — HOSPITAL ENCOUNTER (INPATIENT)
Facility: CLINIC | Age: 67
LOS: 5 days | Discharge: HOME | DRG: 220 | End: 2022-12-21
Attending: STUDENT IN AN ORGANIZED HEALTH CARE EDUCATION/TRAINING PROGRAM | Admitting: STUDENT IN AN ORGANIZED HEALTH CARE EDUCATION/TRAINING PROGRAM
Payer: COMMERCIAL

## 2022-12-16 ENCOUNTER — ANESTHESIA (OUTPATIENT)
Dept: SURGERY | Facility: CLINIC | Age: 67
DRG: 220 | End: 2022-12-16
Payer: COMMERCIAL

## 2022-12-16 ENCOUNTER — APPOINTMENT (OUTPATIENT)
Dept: GENERAL RADIOLOGY | Facility: CLINIC | Age: 67
DRG: 220 | End: 2022-12-16
Attending: PHYSICIAN ASSISTANT
Payer: COMMERCIAL

## 2022-12-16 DIAGNOSIS — Z95.2 S/P AVR (AORTIC VALVE REPLACEMENT): Primary | ICD-10-CM

## 2022-12-16 DIAGNOSIS — Z95.2 S/P AVR: Primary | ICD-10-CM

## 2022-12-16 LAB
ABO/RH(D): NORMAL
ALBUMIN SERPL-MCNC: 3.1 G/DL (ref 3.4–5)
ALLEN'S TEST: ABNORMAL
ALLEN'S TEST: ABNORMAL
ALP SERPL-CCNC: 49 U/L (ref 40–150)
ALT SERPL W P-5'-P-CCNC: 21 U/L (ref 0–70)
ANION GAP SERPL CALCULATED.3IONS-SCNC: 5 MMOL/L (ref 3–14)
ANION GAP SERPL CALCULATED.3IONS-SCNC: 7 MMOL/L (ref 3–14)
APTT PPP: 29 SECONDS (ref 22–38)
APTT PPP: 34 SECONDS (ref 22–38)
AST SERPL W P-5'-P-CCNC: 38 U/L (ref 0–45)
BASE EXCESS BLDA CALC-SCNC: -0.2 MMOL/L (ref -9.6–2)
BASE EXCESS BLDA CALC-SCNC: -1.2 MMOL/L (ref -9.6–2)
BASE EXCESS BLDA CALC-SCNC: -3.8 MMOL/L (ref -9–1.8)
BASE EXCESS BLDA CALC-SCNC: -4.3 MMOL/L (ref -9–1.8)
BASE EXCESS BLDA CALC-SCNC: 0.2 MMOL/L (ref -9.6–2)
BASE EXCESS BLDA CALC-SCNC: 0.4 MMOL/L (ref -9.6–2)
BASE EXCESS BLDA CALC-SCNC: 1.2 MMOL/L (ref -9.6–2)
BASE EXCESS BLDA CALC-SCNC: 2.2 MMOL/L (ref -9.6–2)
BASE EXCESS BLDV CALC-SCNC: -4.3 MMOL/L (ref -7.7–1.9)
BASE EXCESS BLDV CALC-SCNC: 1.9 MMOL/L (ref -8.1–1.9)
BILIRUB SERPL-MCNC: 0.4 MG/DL (ref 0.2–1.3)
BLD PROD TYP BPU: NORMAL
BLD PROD TYP BPU: NORMAL
BLOOD COMPONENT TYPE: NORMAL
BLOOD COMPONENT TYPE: NORMAL
BUN SERPL-MCNC: 27 MG/DL (ref 7–30)
BUN SERPL-MCNC: 27 MG/DL (ref 7–30)
CA-I BLD-MCNC: 4.1 MG/DL (ref 4.4–5.2)
CA-I BLD-MCNC: 4.2 MG/DL (ref 4.4–5.2)
CA-I BLD-MCNC: 4.3 MG/DL (ref 4.4–5.2)
CA-I BLD-MCNC: 4.4 MG/DL (ref 4.4–5.2)
CA-I BLD-MCNC: 4.6 MG/DL (ref 4.4–5.2)
CA-I BLD-MCNC: 4.7 MG/DL (ref 4.4–5.2)
CA-I BLD-MCNC: 4.8 MG/DL (ref 4.4–5.2)
CA-I BLD-MCNC: 5.2 MG/DL (ref 4.4–5.2)
CALCIUM SERPL-MCNC: 8.3 MG/DL (ref 8.5–10.1)
CALCIUM SERPL-MCNC: 8.6 MG/DL (ref 8.5–10.1)
CHLORIDE BLD-SCNC: 112 MMOL/L (ref 94–109)
CHLORIDE BLD-SCNC: 112 MMOL/L (ref 94–109)
CO2 SERPL-SCNC: 22 MMOL/L (ref 20–32)
CO2 SERPL-SCNC: 22 MMOL/L (ref 20–32)
CODING SYSTEM: NORMAL
CODING SYSTEM: NORMAL
CREAT SERPL-MCNC: 0.78 MG/DL (ref 0.66–1.25)
CREAT SERPL-MCNC: 0.81 MG/DL (ref 0.66–1.25)
CROSSMATCH: NORMAL
CROSSMATCH: NORMAL
ERYTHROCYTE [DISTWIDTH] IN BLOOD BY AUTOMATED COUNT: 12.4 % (ref 10–15)
ERYTHROCYTE [DISTWIDTH] IN BLOOD BY AUTOMATED COUNT: 12.5 % (ref 10–15)
FIBRINOGEN PPP-MCNC: 193 MG/DL (ref 170–490)
GFR SERPL CREATININE-BSD FRML MDRD: >90 ML/MIN/1.73M2
GFR SERPL CREATININE-BSD FRML MDRD: >90 ML/MIN/1.73M2
GLUCOSE BLD-MCNC: 103 MG/DL (ref 70–99)
GLUCOSE BLD-MCNC: 110 MG/DL (ref 70–99)
GLUCOSE BLD-MCNC: 118 MG/DL (ref 70–99)
GLUCOSE BLD-MCNC: 132 MG/DL (ref 70–99)
GLUCOSE BLD-MCNC: 147 MG/DL (ref 70–99)
GLUCOSE BLD-MCNC: 152 MG/DL (ref 70–99)
GLUCOSE BLD-MCNC: 158 MG/DL (ref 70–99)
GLUCOSE BLD-MCNC: 161 MG/DL (ref 70–99)
GLUCOSE BLD-MCNC: 179 MG/DL (ref 70–99)
GLUCOSE BLDC GLUCOMTR-MCNC: 112 MG/DL (ref 70–99)
GLUCOSE BLDC GLUCOMTR-MCNC: 120 MG/DL (ref 70–99)
GLUCOSE BLDC GLUCOMTR-MCNC: 153 MG/DL (ref 70–99)
GLUCOSE BLDC GLUCOMTR-MCNC: 172 MG/DL (ref 70–99)
GLUCOSE BLDC GLUCOMTR-MCNC: 172 MG/DL (ref 70–99)
GLUCOSE BLDC GLUCOMTR-MCNC: 173 MG/DL (ref 70–99)
GLUCOSE BLDC GLUCOMTR-MCNC: 178 MG/DL (ref 70–99)
GLUCOSE BLDC GLUCOMTR-MCNC: 181 MG/DL (ref 70–99)
GLUCOSE BLDC GLUCOMTR-MCNC: 182 MG/DL (ref 70–99)
GLUCOSE BLDC GLUCOMTR-MCNC: 98 MG/DL (ref 70–99)
HCO3 BLD-SCNC: 22 MMOL/L (ref 21–28)
HCO3 BLD-SCNC: 22 MMOL/L (ref 21–28)
HCO3 BLDA-SCNC: 24 MMOL/L (ref 21–28)
HCO3 BLDA-SCNC: 25 MMOL/L (ref 21–28)
HCO3 BLDA-SCNC: 25 MMOL/L (ref 21–28)
HCO3 BLDA-SCNC: 26 MMOL/L (ref 21–28)
HCO3 BLDA-SCNC: 27 MMOL/L (ref 21–28)
HCO3 BLDA-SCNC: 27 MMOL/L (ref 21–28)
HCO3 BLDV-SCNC: 23 MMOL/L (ref 21–28)
HCO3 BLDV-SCNC: 28 MMOL/L (ref 21–28)
HCT VFR BLD AUTO: 28.4 % (ref 40–53)
HCT VFR BLD AUTO: 33.5 % (ref 40–53)
HGB BLD-MCNC: 10.2 G/DL (ref 13.3–17.7)
HGB BLD-MCNC: 10.2 G/DL (ref 13.3–17.7)
HGB BLD-MCNC: 11.2 G/DL (ref 13.3–17.7)
HGB BLD-MCNC: 11.6 G/DL (ref 13.3–17.7)
HGB BLD-MCNC: 12.1 G/DL (ref 13.3–17.7)
HGB BLD-MCNC: 9.5 G/DL (ref 13.3–17.7)
HGB BLD-MCNC: 9.7 G/DL (ref 13.3–17.7)
HGB BLD-MCNC: 9.7 G/DL (ref 13.3–17.7)
HGB BLD-MCNC: 9.8 G/DL (ref 13.3–17.7)
INR PPP: 1.13 (ref 0.85–1.15)
INR PPP: 1.43 (ref 0.85–1.15)
ISSUE DATE AND TIME: NORMAL
ISSUE DATE AND TIME: NORMAL
LACTATE BLD-SCNC: 0.6 MMOL/L
LACTATE BLD-SCNC: 0.7 MMOL/L
LACTATE BLD-SCNC: 0.8 MMOL/L
LACTATE BLD-SCNC: 1 MMOL/L
LACTATE BLD-SCNC: 1.3 MMOL/L
LACTATE SERPL-SCNC: 2 MMOL/L (ref 0.7–2)
LACTATE SERPL-SCNC: 3.3 MMOL/L (ref 0.7–2)
MAGNESIUM SERPL-MCNC: 2.9 MG/DL (ref 1.6–2.3)
MCH RBC QN AUTO: 30 PG (ref 26.5–33)
MCH RBC QN AUTO: 30.6 PG (ref 26.5–33)
MCHC RBC AUTO-ENTMCNC: 33.4 G/DL (ref 31.5–36.5)
MCHC RBC AUTO-ENTMCNC: 34.2 G/DL (ref 31.5–36.5)
MCV RBC AUTO: 90 FL (ref 78–100)
MCV RBC AUTO: 90 FL (ref 78–100)
MRSA DNA SPEC QL NAA+PROBE: NEGATIVE
O2/TOTAL GAS SETTING VFR VENT: 100 %
O2/TOTAL GAS SETTING VFR VENT: 23 %
O2/TOTAL GAS SETTING VFR VENT: 40 %
O2/TOTAL GAS SETTING VFR VENT: 40 %
O2/TOTAL GAS SETTING VFR VENT: 80 %
OXYHGB MFR BLDV: 81 % (ref 70–75)
PCO2 BLD: 41 MM HG (ref 35–45)
PCO2 BLD: 45 MM HG (ref 35–45)
PCO2 BLDA: 36 MM HG (ref 35–45)
PCO2 BLDA: 39 MM HG (ref 35–45)
PCO2 BLDA: 41 MM HG (ref 35–45)
PCO2 BLDA: 42 MM HG (ref 35–45)
PCO2 BLDA: 48 MM HG (ref 35–45)
PCO2 BLDA: 57 MM HG (ref 35–45)
PCO2 BLDV: 50 MM HG (ref 40–50)
PCO2 BLDV: 52 MM HG (ref 40–50)
PH BLD: 7.3 [PH] (ref 7.35–7.45)
PH BLD: 7.33 [PH] (ref 7.35–7.45)
PH BLDA: 7.29 [PH] (ref 7.35–7.45)
PH BLDA: 7.36 [PH] (ref 7.35–7.45)
PH BLDA: 7.39 [PH] (ref 7.35–7.45)
PH BLDA: 7.39 [PH] (ref 7.35–7.45)
PH BLDA: 7.4 [PH] (ref 7.35–7.45)
PH BLDA: 7.47 [PH] (ref 7.35–7.45)
PH BLDV: 7.27 [PH] (ref 7.32–7.43)
PH BLDV: 7.34 [PH] (ref 7.32–7.43)
PHOSPHATE SERPL-MCNC: 2.3 MG/DL (ref 2.5–4.5)
PLATELET # BLD AUTO: 142 10E3/UL (ref 150–450)
PLATELET # BLD AUTO: 159 10E3/UL (ref 150–450)
PO2 BLD: 122 MM HG (ref 80–105)
PO2 BLD: 188 MM HG (ref 80–105)
PO2 BLDA: 379 MM HG (ref 80–105)
PO2 BLDA: 386 MM HG (ref 80–105)
PO2 BLDA: 402 MM HG (ref 80–105)
PO2 BLDA: 416 MM HG (ref 80–105)
PO2 BLDA: 555 MM HG (ref 80–105)
PO2 BLDA: 561 MM HG (ref 80–105)
PO2 BLDV: 46 MM HG (ref 25–47)
PO2 BLDV: 52 MM HG (ref 25–47)
POTASSIUM BLD-SCNC: 4 MMOL/L (ref 3.4–5.3)
POTASSIUM BLD-SCNC: 4.1 MMOL/L (ref 3.5–5)
POTASSIUM BLD-SCNC: 4.3 MMOL/L (ref 3.5–5)
POTASSIUM BLD-SCNC: 4.6 MMOL/L (ref 3.4–5.3)
POTASSIUM BLD-SCNC: 4.6 MMOL/L (ref 3.5–5)
POTASSIUM BLD-SCNC: 5.3 MMOL/L (ref 3.5–5)
POTASSIUM BLD-SCNC: 5.4 MMOL/L (ref 3.5–5)
POTASSIUM BLD-SCNC: 5.4 MMOL/L (ref 3.5–5)
POTASSIUM BLD-SCNC: 5.5 MMOL/L (ref 3.5–5)
PROT SERPL-MCNC: 5.2 G/DL (ref 6.8–8.8)
RBC # BLD AUTO: 3.17 10E6/UL (ref 4.4–5.9)
RBC # BLD AUTO: 3.73 10E6/UL (ref 4.4–5.9)
SA TARGET DNA: NEGATIVE
SODIUM BLD-SCNC: 137 MMOL/L (ref 133–144)
SODIUM BLD-SCNC: 139 MMOL/L (ref 133–144)
SODIUM SERPL-SCNC: 139 MMOL/L (ref 133–144)
SODIUM SERPL-SCNC: 141 MMOL/L (ref 133–144)
SPECIMEN EXPIRATION DATE: NORMAL
UNIT ABO/RH: NORMAL
UNIT ABO/RH: NORMAL
UNIT NUMBER: NORMAL
UNIT NUMBER: NORMAL
UNIT STATUS: NORMAL
UNIT STATUS: NORMAL
UNIT TYPE ISBT: 5100
UNIT TYPE ISBT: 5100
WBC # BLD AUTO: 18.1 10E3/UL (ref 4–11)
WBC # BLD AUTO: 25.7 10E3/UL (ref 4–11)

## 2022-12-16 PROCEDURE — 360N000079 HC SURGERY LEVEL 6, PER MIN: Performed by: STUDENT IN AN ORGANIZED HEALTH CARE EDUCATION/TRAINING PROGRAM

## 2022-12-16 PROCEDURE — 83605 ASSAY OF LACTIC ACID: CPT | Performed by: PHYSICIAN ASSISTANT

## 2022-12-16 PROCEDURE — 278N000051 HC OR IMPLANT GENERAL: Performed by: STUDENT IN AN ORGANIZED HEALTH CARE EDUCATION/TRAINING PROGRAM

## 2022-12-16 PROCEDURE — 250N000024 HC ISOFLURANE, PER MIN: Performed by: STUDENT IN AN ORGANIZED HEALTH CARE EDUCATION/TRAINING PROGRAM

## 2022-12-16 PROCEDURE — 36415 COLL VENOUS BLD VENIPUNCTURE: CPT | Performed by: ANESTHESIOLOGY

## 2022-12-16 PROCEDURE — 250N000011 HC RX IP 250 OP 636: Performed by: STUDENT IN AN ORGANIZED HEALTH CARE EDUCATION/TRAINING PROGRAM

## 2022-12-16 PROCEDURE — P9045 ALBUMIN (HUMAN), 5%, 250 ML: HCPCS

## 2022-12-16 PROCEDURE — C1713 ANCHOR/SCREW BN/BN,TIS/BN: HCPCS | Performed by: STUDENT IN AN ORGANIZED HEALTH CARE EDUCATION/TRAINING PROGRAM

## 2022-12-16 PROCEDURE — 258N000003 HC RX IP 258 OP 636: Performed by: STUDENT IN AN ORGANIZED HEALTH CARE EDUCATION/TRAINING PROGRAM

## 2022-12-16 PROCEDURE — 82803 BLOOD GASES ANY COMBINATION: CPT | Performed by: STUDENT IN AN ORGANIZED HEALTH CARE EDUCATION/TRAINING PROGRAM

## 2022-12-16 PROCEDURE — 88305 TISSUE EXAM BY PATHOLOGIST: CPT | Mod: TC | Performed by: STUDENT IN AN ORGANIZED HEALTH CARE EDUCATION/TRAINING PROGRAM

## 2022-12-16 PROCEDURE — 250N000009 HC RX 250: Performed by: STUDENT IN AN ORGANIZED HEALTH CARE EDUCATION/TRAINING PROGRAM

## 2022-12-16 PROCEDURE — 85610 PROTHROMBIN TIME: CPT | Performed by: PHYSICIAN ASSISTANT

## 2022-12-16 PROCEDURE — 272N000001 HC OR GENERAL SUPPLY STERILE: Performed by: STUDENT IN AN ORGANIZED HEALTH CARE EDUCATION/TRAINING PROGRAM

## 2022-12-16 PROCEDURE — 82374 ASSAY BLOOD CARBON DIOXIDE: CPT | Performed by: STUDENT IN AN ORGANIZED HEALTH CARE EDUCATION/TRAINING PROGRAM

## 2022-12-16 PROCEDURE — 258N000003 HC RX IP 258 OP 636

## 2022-12-16 PROCEDURE — 370N000017 HC ANESTHESIA TECHNICAL FEE, PER MIN: Performed by: STUDENT IN AN ORGANIZED HEALTH CARE EDUCATION/TRAINING PROGRAM

## 2022-12-16 PROCEDURE — 250N000011 HC RX IP 250 OP 636

## 2022-12-16 PROCEDURE — 250N000011 HC RX IP 250 OP 636: Performed by: NURSE ANESTHETIST, CERTIFIED REGISTERED

## 2022-12-16 PROCEDURE — 258N000003 HC RX IP 258 OP 636: Performed by: PHYSICIAN ASSISTANT

## 2022-12-16 PROCEDURE — 999N000259 HC STATISTIC EXTUBATION

## 2022-12-16 PROCEDURE — 999N000063 XR CHEST PORT 1 VIEW

## 2022-12-16 PROCEDURE — 82330 ASSAY OF CALCIUM: CPT | Performed by: STUDENT IN AN ORGANIZED HEALTH CARE EDUCATION/TRAINING PROGRAM

## 2022-12-16 PROCEDURE — 250N000012 HC RX MED GY IP 250 OP 636 PS 637: Performed by: PHYSICIAN ASSISTANT

## 2022-12-16 PROCEDURE — 84132 ASSAY OF SERUM POTASSIUM: CPT | Performed by: STUDENT IN AN ORGANIZED HEALTH CARE EDUCATION/TRAINING PROGRAM

## 2022-12-16 PROCEDURE — 200N000001 HC R&B ICU

## 2022-12-16 PROCEDURE — 02HP32Z INSERTION OF MONITORING DEVICE INTO PULMONARY TRUNK, PERCUTANEOUS APPROACH: ICD-10-PCS | Performed by: STUDENT IN AN ORGANIZED HEALTH CARE EDUCATION/TRAINING PROGRAM

## 2022-12-16 PROCEDURE — 258N000003 HC RX IP 258 OP 636: Performed by: ANESTHESIOLOGY

## 2022-12-16 PROCEDURE — 99291 CRITICAL CARE FIRST HOUR: CPT | Mod: 24 | Performed by: INTERNAL MEDICINE

## 2022-12-16 PROCEDURE — 84100 ASSAY OF PHOSPHORUS: CPT | Performed by: PHYSICIAN ASSISTANT

## 2022-12-16 PROCEDURE — 250N000013 HC RX MED GY IP 250 OP 250 PS 637: Performed by: INTERNAL MEDICINE

## 2022-12-16 PROCEDURE — 85014 HEMATOCRIT: CPT | Performed by: PHYSICIAN ASSISTANT

## 2022-12-16 PROCEDURE — 33405 REPLACEMENT AORTIC VALVE OPN: CPT | Performed by: STUDENT IN AN ORGANIZED HEALTH CARE EDUCATION/TRAINING PROGRAM

## 2022-12-16 PROCEDURE — 250N000013 HC RX MED GY IP 250 OP 250 PS 637: Performed by: PHYSICIAN ASSISTANT

## 2022-12-16 PROCEDURE — 410N000006: Performed by: STUDENT IN AN ORGANIZED HEALTH CARE EDUCATION/TRAINING PROGRAM

## 2022-12-16 PROCEDURE — 85014 HEMATOCRIT: CPT | Performed by: STUDENT IN AN ORGANIZED HEALTH CARE EDUCATION/TRAINING PROGRAM

## 2022-12-16 PROCEDURE — 86923 COMPATIBILITY TEST ELECTRIC: CPT

## 2022-12-16 PROCEDURE — 02RF08Z REPLACEMENT OF AORTIC VALVE WITH ZOOPLASTIC TISSUE, OPEN APPROACH: ICD-10-PCS | Performed by: STUDENT IN AN ORGANIZED HEALTH CARE EDUCATION/TRAINING PROGRAM

## 2022-12-16 PROCEDURE — 85610 PROTHROMBIN TIME: CPT | Performed by: STUDENT IN AN ORGANIZED HEALTH CARE EDUCATION/TRAINING PROGRAM

## 2022-12-16 PROCEDURE — 4A1239Z MONITORING OF CARDIAC OUTPUT, PERCUTANEOUS APPROACH: ICD-10-PCS | Performed by: STUDENT IN AN ORGANIZED HEALTH CARE EDUCATION/TRAINING PROGRAM

## 2022-12-16 PROCEDURE — 94002 VENT MGMT INPAT INIT DAY: CPT

## 2022-12-16 PROCEDURE — 85730 THROMBOPLASTIN TIME PARTIAL: CPT | Performed by: STUDENT IN AN ORGANIZED HEALTH CARE EDUCATION/TRAINING PROGRAM

## 2022-12-16 PROCEDURE — 86901 BLOOD TYPING SEROLOGIC RH(D): CPT | Performed by: ANESTHESIOLOGY

## 2022-12-16 PROCEDURE — 87641 MR-STAPH DNA AMP PROBE: CPT | Performed by: STUDENT IN AN ORGANIZED HEALTH CARE EDUCATION/TRAINING PROGRAM

## 2022-12-16 PROCEDURE — 250N000011 HC RX IP 250 OP 636: Performed by: PHYSICIAN ASSISTANT

## 2022-12-16 PROCEDURE — 250N000013 HC RX MED GY IP 250 OP 250 PS 637: Performed by: STUDENT IN AN ORGANIZED HEALTH CARE EDUCATION/TRAINING PROGRAM

## 2022-12-16 PROCEDURE — 410N000005 HC PER-PERFUSION, SH ONLY, 1ST 30 MIN: Performed by: STUDENT IN AN ORGANIZED HEALTH CARE EDUCATION/TRAINING PROGRAM

## 2022-12-16 PROCEDURE — 82330 ASSAY OF CALCIUM: CPT | Performed by: PHYSICIAN ASSISTANT

## 2022-12-16 PROCEDURE — 999N000141 HC STATISTIC PRE-PROCEDURE NURSING ASSESSMENT: Performed by: STUDENT IN AN ORGANIZED HEALTH CARE EDUCATION/TRAINING PROGRAM

## 2022-12-16 PROCEDURE — 83735 ASSAY OF MAGNESIUM: CPT | Performed by: PHYSICIAN ASSISTANT

## 2022-12-16 PROCEDURE — 93010 ELECTROCARDIOGRAM REPORT: CPT | Performed by: INTERNAL MEDICINE

## 2022-12-16 PROCEDURE — 258N000003 HC RX IP 258 OP 636: Performed by: NURSE ANESTHETIST, CERTIFIED REGISTERED

## 2022-12-16 PROCEDURE — 85730 THROMBOPLASTIN TIME PARTIAL: CPT | Performed by: PHYSICIAN ASSISTANT

## 2022-12-16 PROCEDURE — 83605 ASSAY OF LACTIC ACID: CPT | Performed by: STUDENT IN AN ORGANIZED HEALTH CARE EDUCATION/TRAINING PROGRAM

## 2022-12-16 PROCEDURE — 93005 ELECTROCARDIOGRAM TRACING: CPT

## 2022-12-16 PROCEDURE — 250N000009 HC RX 250: Performed by: NURSE ANESTHETIST, CERTIFIED REGISTERED

## 2022-12-16 PROCEDURE — 4A133B3 MONITORING OF ARTERIAL PRESSURE, PULMONARY, PERCUTANEOUS APPROACH: ICD-10-PCS | Performed by: STUDENT IN AN ORGANIZED HEALTH CARE EDUCATION/TRAINING PROGRAM

## 2022-12-16 PROCEDURE — P9016 RBC LEUKOCYTES REDUCED: HCPCS

## 2022-12-16 PROCEDURE — 999N000157 HC STATISTIC RCP TIME EA 10 MIN

## 2022-12-16 PROCEDURE — 999N000009 HC STATISTIC AIRWAY CARE

## 2022-12-16 PROCEDURE — 82805 BLOOD GASES W/O2 SATURATION: CPT | Performed by: STUDENT IN AN ORGANIZED HEALTH CARE EDUCATION/TRAINING PROGRAM

## 2022-12-16 PROCEDURE — P9045 ALBUMIN (HUMAN), 5%, 250 ML: HCPCS | Performed by: NURSE ANESTHETIST, CERTIFIED REGISTERED

## 2022-12-16 PROCEDURE — 85384 FIBRINOGEN ACTIVITY: CPT | Performed by: STUDENT IN AN ORGANIZED HEALTH CARE EDUCATION/TRAINING PROGRAM

## 2022-12-16 PROCEDURE — 5A1221Z PERFORMANCE OF CARDIAC OUTPUT, CONTINUOUS: ICD-10-PCS | Performed by: STUDENT IN AN ORGANIZED HEALTH CARE EDUCATION/TRAINING PROGRAM

## 2022-12-16 PROCEDURE — 250N000011 HC RX IP 250 OP 636: Performed by: ANESTHESIOLOGY

## 2022-12-16 PROCEDURE — 258N000003 HC RX IP 258 OP 636: Performed by: INTERNAL MEDICINE

## 2022-12-16 PROCEDURE — 250N000009 HC RX 250

## 2022-12-16 DEVICE — SU DEVICE COR-KNOT MINI 4X14MM 031350: Type: IMPLANTABLE DEVICE | Site: HEART | Status: FUNCTIONAL

## 2022-12-16 DEVICE — INSPIRIS RESILIA AORTIC VALVE
Type: IMPLANTABLE DEVICE | Site: HEART | Status: FUNCTIONAL
Brand: INSPIRIS RESILIA AORTIC VALVE

## 2022-12-16 DEVICE — SU DEVICE ENDO COR KNOT QUICK LOAD RELOAD 030874: Type: IMPLANTABLE DEVICE | Site: HEART | Status: FUNCTIONAL

## 2022-12-16 RX ORDER — CALCIUM GLUCONATE 20 MG/ML
2 INJECTION, SOLUTION INTRAVENOUS
Status: DISCONTINUED | OUTPATIENT
Start: 2022-12-16 | End: 2022-12-21 | Stop reason: HOSPADM

## 2022-12-16 RX ORDER — NITROGLYCERIN 20 MG/100ML
10-200 INJECTION INTRAVENOUS CONTINUOUS PRN
Status: DISCONTINUED | OUTPATIENT
Start: 2022-12-16 | End: 2022-12-17

## 2022-12-16 RX ORDER — NALOXONE HYDROCHLORIDE 0.4 MG/ML
0.2 INJECTION, SOLUTION INTRAMUSCULAR; INTRAVENOUS; SUBCUTANEOUS
Status: DISCONTINUED | OUTPATIENT
Start: 2022-12-16 | End: 2022-12-21 | Stop reason: HOSPADM

## 2022-12-16 RX ORDER — LIDOCAINE 40 MG/G
CREAM TOPICAL
Status: DISCONTINUED | OUTPATIENT
Start: 2022-12-16 | End: 2022-12-21 | Stop reason: HOSPADM

## 2022-12-16 RX ORDER — GABAPENTIN 100 MG/1
100 CAPSULE ORAL AT BEDTIME
Status: DISCONTINUED | OUTPATIENT
Start: 2022-12-16 | End: 2022-12-21 | Stop reason: HOSPADM

## 2022-12-16 RX ORDER — AMOXICILLIN 250 MG
1 CAPSULE ORAL 2 TIMES DAILY
Status: DISCONTINUED | OUTPATIENT
Start: 2022-12-16 | End: 2022-12-21 | Stop reason: HOSPADM

## 2022-12-16 RX ORDER — FENTANYL CITRATE 50 UG/ML
INJECTION, SOLUTION INTRAMUSCULAR; INTRAVENOUS PRN
Status: DISCONTINUED | OUTPATIENT
Start: 2022-12-16 | End: 2022-12-16

## 2022-12-16 RX ORDER — HYDROMORPHONE HYDROCHLORIDE 2 MG/1
4 TABLET ORAL EVERY 4 HOURS PRN
Status: DISCONTINUED | OUTPATIENT
Start: 2022-12-16 | End: 2022-12-21 | Stop reason: HOSPADM

## 2022-12-16 RX ORDER — POLYETHYLENE GLYCOL 3350 17 G/17G
17 POWDER, FOR SOLUTION ORAL DAILY
Status: DISCONTINUED | OUTPATIENT
Start: 2022-12-17 | End: 2022-12-21 | Stop reason: HOSPADM

## 2022-12-16 RX ORDER — VANCOMYCIN HYDROCHLORIDE 1 G/200ML
1000 INJECTION, SOLUTION INTRAVENOUS EVERY 12 HOURS
Status: COMPLETED | OUTPATIENT
Start: 2022-12-16 | End: 2022-12-17

## 2022-12-16 RX ORDER — ASPIRIN 81 MG/1
81 TABLET, CHEWABLE ORAL
Status: DISCONTINUED | OUTPATIENT
Start: 2022-12-16 | End: 2022-12-16 | Stop reason: HOSPADM

## 2022-12-16 RX ORDER — SODIUM CHLORIDE 9 MG/ML
INJECTION, SOLUTION INTRAVENOUS CONTINUOUS
Status: DISCONTINUED | OUTPATIENT
Start: 2022-12-16 | End: 2022-12-17

## 2022-12-16 RX ORDER — NITROGLYCERIN 10 MG/100ML
INJECTION INTRAVENOUS PRN
Status: DISCONTINUED | OUTPATIENT
Start: 2022-12-16 | End: 2022-12-16

## 2022-12-16 RX ORDER — CEFAZOLIN SODIUM/WATER 2 G/20 ML
2 SYRINGE (ML) INTRAVENOUS
Status: COMPLETED | OUTPATIENT
Start: 2022-12-16 | End: 2022-12-16

## 2022-12-16 RX ORDER — ONDANSETRON 2 MG/ML
4 INJECTION INTRAMUSCULAR; INTRAVENOUS EVERY 6 HOURS PRN
Status: DISCONTINUED | OUTPATIENT
Start: 2022-12-16 | End: 2022-12-21 | Stop reason: HOSPADM

## 2022-12-16 RX ORDER — FAMOTIDINE 20 MG/1
20 TABLET, FILM COATED ORAL
Status: COMPLETED | OUTPATIENT
Start: 2022-12-16 | End: 2022-12-16

## 2022-12-16 RX ORDER — SODIUM CHLORIDE 9 MG/ML
INJECTION, SOLUTION INTRAVENOUS CONTINUOUS PRN
Status: DISCONTINUED | OUTPATIENT
Start: 2022-12-16 | End: 2022-12-16

## 2022-12-16 RX ORDER — PROPOFOL 10 MG/ML
INJECTION, EMULSION INTRAVENOUS PRN
Status: DISCONTINUED | OUTPATIENT
Start: 2022-12-16 | End: 2022-12-16

## 2022-12-16 RX ORDER — PROPOFOL 10 MG/ML
5-75 INJECTION, EMULSION INTRAVENOUS CONTINUOUS
Status: DISCONTINUED | OUTPATIENT
Start: 2022-12-16 | End: 2022-12-17

## 2022-12-16 RX ORDER — PHENYLEPHRINE HCL IN 0.9% NACL 50MG/250ML
.1-4 PLASTIC BAG, INJECTION (ML) INTRAVENOUS CONTINUOUS PRN
Status: DISCONTINUED | OUTPATIENT
Start: 2022-12-16 | End: 2022-12-17

## 2022-12-16 RX ORDER — PROCHLORPERAZINE MALEATE 5 MG
5 TABLET ORAL EVERY 6 HOURS PRN
Status: DISCONTINUED | OUTPATIENT
Start: 2022-12-16 | End: 2022-12-21 | Stop reason: HOSPADM

## 2022-12-16 RX ORDER — NALOXONE HYDROCHLORIDE 0.4 MG/ML
0.4 INJECTION, SOLUTION INTRAMUSCULAR; INTRAVENOUS; SUBCUTANEOUS
Status: DISCONTINUED | OUTPATIENT
Start: 2022-12-16 | End: 2022-12-21 | Stop reason: HOSPADM

## 2022-12-16 RX ORDER — NICOTINE POLACRILEX 4 MG
15-30 LOZENGE BUCCAL
Status: DISCONTINUED | OUTPATIENT
Start: 2022-12-16 | End: 2022-12-21 | Stop reason: HOSPADM

## 2022-12-16 RX ORDER — LIDOCAINE 40 MG/G
CREAM TOPICAL
Status: DISCONTINUED | OUTPATIENT
Start: 2022-12-16 | End: 2022-12-16 | Stop reason: HOSPADM

## 2022-12-16 RX ORDER — PROPOFOL 10 MG/ML
INJECTION, EMULSION INTRAVENOUS CONTINUOUS PRN
Status: DISCONTINUED | OUTPATIENT
Start: 2022-12-16 | End: 2022-12-16

## 2022-12-16 RX ORDER — CEFAZOLIN SODIUM 1 G/3ML
1 INJECTION, POWDER, FOR SOLUTION INTRAMUSCULAR; INTRAVENOUS EVERY 8 HOURS
Status: DISCONTINUED | OUTPATIENT
Start: 2022-12-16 | End: 2022-12-16

## 2022-12-16 RX ORDER — METHOCARBAMOL 500 MG/1
500 TABLET, FILM COATED ORAL EVERY 6 HOURS PRN
Status: DISCONTINUED | OUTPATIENT
Start: 2022-12-16 | End: 2022-12-18

## 2022-12-16 RX ORDER — PANTOPRAZOLE SODIUM 40 MG/1
40 TABLET, DELAYED RELEASE ORAL DAILY
Status: DISCONTINUED | OUTPATIENT
Start: 2022-12-16 | End: 2022-12-21 | Stop reason: HOSPADM

## 2022-12-16 RX ORDER — CHLORHEXIDINE GLUCONATE ORAL RINSE 1.2 MG/ML
10 SOLUTION DENTAL ONCE
Status: COMPLETED | OUTPATIENT
Start: 2022-12-16 | End: 2022-12-16

## 2022-12-16 RX ORDER — BISACODYL 10 MG
10 SUPPOSITORY, RECTAL RECTAL DAILY PRN
Status: DISCONTINUED | OUTPATIENT
Start: 2022-12-16 | End: 2022-12-21 | Stop reason: HOSPADM

## 2022-12-16 RX ORDER — NEOSTIGMINE METHYLSULFATE 1 MG/ML
VIAL (ML) INJECTION PRN
Status: DISCONTINUED | OUTPATIENT
Start: 2022-12-16 | End: 2022-12-16

## 2022-12-16 RX ORDER — ONDANSETRON 2 MG/ML
INJECTION INTRAMUSCULAR; INTRAVENOUS PRN
Status: DISCONTINUED | OUTPATIENT
Start: 2022-12-16 | End: 2022-12-16

## 2022-12-16 RX ORDER — DEXTROSE MONOHYDRATE 25 G/50ML
25-50 INJECTION, SOLUTION INTRAVENOUS
Status: DISCONTINUED | OUTPATIENT
Start: 2022-12-16 | End: 2022-12-21 | Stop reason: HOSPADM

## 2022-12-16 RX ORDER — ACETAMINOPHEN 325 MG/1
975 TABLET ORAL EVERY 8 HOURS
Status: COMPLETED | OUTPATIENT
Start: 2022-12-16 | End: 2022-12-19

## 2022-12-16 RX ORDER — HYDROMORPHONE HCL IN WATER/PF 6 MG/30 ML
0.2 PATIENT CONTROLLED ANALGESIA SYRINGE INTRAVENOUS
Status: DISCONTINUED | OUTPATIENT
Start: 2022-12-16 | End: 2022-12-21 | Stop reason: HOSPADM

## 2022-12-16 RX ORDER — LIDOCAINE HYDROCHLORIDE 20 MG/ML
INJECTION, SOLUTION INFILTRATION; PERINEURAL PRN
Status: DISCONTINUED | OUTPATIENT
Start: 2022-12-16 | End: 2022-12-16

## 2022-12-16 RX ORDER — ASPIRIN 81 MG/1
81 TABLET, CHEWABLE ORAL DAILY
Status: DISCONTINUED | OUTPATIENT
Start: 2022-12-17 | End: 2022-12-21 | Stop reason: HOSPADM

## 2022-12-16 RX ORDER — MUPIROCIN 20 MG/G
0.5 OINTMENT TOPICAL 2 TIMES DAILY
Status: DISCONTINUED | OUTPATIENT
Start: 2022-12-16 | End: 2022-12-16

## 2022-12-16 RX ORDER — PROTAMINE SULFATE 10 MG/ML
INJECTION, SOLUTION INTRAVENOUS PRN
Status: DISCONTINUED | OUTPATIENT
Start: 2022-12-16 | End: 2022-12-16

## 2022-12-16 RX ORDER — GLYCOPYRROLATE 0.2 MG/ML
INJECTION, SOLUTION INTRAMUSCULAR; INTRAVENOUS PRN
Status: DISCONTINUED | OUTPATIENT
Start: 2022-12-16 | End: 2022-12-16

## 2022-12-16 RX ORDER — CEFAZOLIN SODIUM/WATER 2 G/20 ML
2 SYRINGE (ML) INTRAVENOUS SEE ADMIN INSTRUCTIONS
Status: DISCONTINUED | OUTPATIENT
Start: 2022-12-16 | End: 2022-12-16 | Stop reason: HOSPADM

## 2022-12-16 RX ORDER — EPINEPHRINE IN 0.9 % SOD CHLOR 5 MG/250ML
.01-.1 PLASTIC BAG, INJECTION (ML) INTRAVENOUS CONTINUOUS PRN
Status: DISCONTINUED | OUTPATIENT
Start: 2022-12-16 | End: 2022-12-17

## 2022-12-16 RX ORDER — ACETAMINOPHEN 325 MG/1
975 TABLET ORAL EVERY 8 HOURS
Status: DISCONTINUED | OUTPATIENT
Start: 2022-12-16 | End: 2022-12-16

## 2022-12-16 RX ORDER — ACETAMINOPHEN 650 MG/1
650 SUPPOSITORY RECTAL EVERY 4 HOURS PRN
Status: DISCONTINUED | OUTPATIENT
Start: 2022-12-16 | End: 2022-12-16

## 2022-12-16 RX ORDER — HEPARIN SODIUM 5000 [USP'U]/.5ML
5000 INJECTION, SOLUTION INTRAVENOUS; SUBCUTANEOUS EVERY 8 HOURS
Status: DISCONTINUED | OUTPATIENT
Start: 2022-12-17 | End: 2022-12-18 | Stop reason: ALTCHOICE

## 2022-12-16 RX ORDER — ONDANSETRON 4 MG/1
4 TABLET, ORALLY DISINTEGRATING ORAL EVERY 6 HOURS PRN
Status: DISCONTINUED | OUTPATIENT
Start: 2022-12-16 | End: 2022-12-21 | Stop reason: HOSPADM

## 2022-12-16 RX ORDER — FUROSEMIDE 10 MG/ML
20 INJECTION INTRAMUSCULAR; INTRAVENOUS
Status: DISCONTINUED | OUTPATIENT
Start: 2022-12-16 | End: 2022-12-21 | Stop reason: HOSPADM

## 2022-12-16 RX ORDER — VANCOMYCIN HYDROCHLORIDE 1 G/200ML
1000 INJECTION, SOLUTION INTRAVENOUS
Status: COMPLETED | OUTPATIENT
Start: 2022-12-16 | End: 2022-12-16

## 2022-12-16 RX ORDER — DEXMEDETOMIDINE HYDROCHLORIDE 4 UG/ML
.2-.7 INJECTION, SOLUTION INTRAVENOUS CONTINUOUS
Status: DISCONTINUED | OUTPATIENT
Start: 2022-12-16 | End: 2022-12-17

## 2022-12-16 RX ORDER — VECURONIUM BROMIDE 1 MG/ML
INJECTION, POWDER, LYOPHILIZED, FOR SOLUTION INTRAVENOUS PRN
Status: DISCONTINUED | OUTPATIENT
Start: 2022-12-16 | End: 2022-12-16

## 2022-12-16 RX ORDER — CALCIUM GLUCONATE 20 MG/ML
1 INJECTION, SOLUTION INTRAVENOUS
Status: DISCONTINUED | OUTPATIENT
Start: 2022-12-16 | End: 2022-12-21 | Stop reason: HOSPADM

## 2022-12-16 RX ORDER — HEPARIN SODIUM 1000 [USP'U]/ML
INJECTION, SOLUTION INTRAVENOUS; SUBCUTANEOUS PRN
Status: DISCONTINUED | OUTPATIENT
Start: 2022-12-16 | End: 2022-12-16

## 2022-12-16 RX ORDER — HYDROMORPHONE HYDROCHLORIDE 2 MG/1
2 TABLET ORAL EVERY 4 HOURS PRN
Status: DISCONTINUED | OUTPATIENT
Start: 2022-12-16 | End: 2022-12-21 | Stop reason: HOSPADM

## 2022-12-16 RX ORDER — HYDROMORPHONE HCL IN WATER/PF 6 MG/30 ML
0.4 PATIENT CONTROLLED ANALGESIA SYRINGE INTRAVENOUS
Status: DISCONTINUED | OUTPATIENT
Start: 2022-12-16 | End: 2022-12-21 | Stop reason: HOSPADM

## 2022-12-16 RX ORDER — HYDRALAZINE HYDROCHLORIDE 20 MG/ML
10 INJECTION INTRAMUSCULAR; INTRAVENOUS EVERY 30 MIN PRN
Status: DISCONTINUED | OUTPATIENT
Start: 2022-12-16 | End: 2022-12-21 | Stop reason: HOSPADM

## 2022-12-16 RX ORDER — ACETAMINOPHEN 325 MG/1
650 TABLET ORAL EVERY 4 HOURS PRN
Status: DISCONTINUED | OUTPATIENT
Start: 2022-12-16 | End: 2022-12-16

## 2022-12-16 RX ORDER — ACETAMINOPHEN 325 MG/1
650 TABLET ORAL EVERY 4 HOURS PRN
Status: DISCONTINUED | OUTPATIENT
Start: 2022-12-19 | End: 2022-12-21 | Stop reason: HOSPADM

## 2022-12-16 RX ORDER — SODIUM CHLORIDE, SODIUM LACTATE, POTASSIUM CHLORIDE, CALCIUM CHLORIDE 600; 310; 30; 20 MG/100ML; MG/100ML; MG/100ML; MG/100ML
INJECTION, SOLUTION INTRAVENOUS CONTINUOUS PRN
Status: DISCONTINUED | OUTPATIENT
Start: 2022-12-16 | End: 2022-12-16

## 2022-12-16 RX ORDER — SODIUM CHLORIDE, SODIUM LACTATE, POTASSIUM CHLORIDE, CALCIUM CHLORIDE 600; 310; 30; 20 MG/100ML; MG/100ML; MG/100ML; MG/100ML
INJECTION, SOLUTION INTRAVENOUS CONTINUOUS
Status: DISCONTINUED | OUTPATIENT
Start: 2022-12-16 | End: 2022-12-16 | Stop reason: HOSPADM

## 2022-12-16 RX ORDER — CEFAZOLIN SODIUM 1 G/3ML
1 INJECTION, POWDER, FOR SOLUTION INTRAMUSCULAR; INTRAVENOUS EVERY 8 HOURS
Status: COMPLETED | OUTPATIENT
Start: 2022-12-16 | End: 2022-12-17

## 2022-12-16 RX ORDER — ASPIRIN 81 MG/1
162 TABLET, CHEWABLE ORAL
Status: DISCONTINUED | OUTPATIENT
Start: 2022-12-16 | End: 2022-12-16 | Stop reason: HOSPADM

## 2022-12-16 RX ADMIN — PHENYLEPHRINE HYDROCHLORIDE 100 MCG: 10 INJECTION INTRAVENOUS at 08:32

## 2022-12-16 RX ADMIN — GLYCOPYRROLATE 0.5 MG: 0.2 INJECTION, SOLUTION INTRAMUSCULAR; INTRAVENOUS at 11:54

## 2022-12-16 RX ADMIN — PHENYLEPHRINE HYDROCHLORIDE 50 MCG: 10 INJECTION INTRAVENOUS at 08:50

## 2022-12-16 RX ADMIN — PROPOFOL 50 MCG/KG/MIN: 10 INJECTION, EMULSION INTRAVENOUS at 11:28

## 2022-12-16 RX ADMIN — GABAPENTIN 100 MG: 100 CAPSULE ORAL at 21:48

## 2022-12-16 RX ADMIN — PHENYLEPHRINE HYDROCHLORIDE 50 MCG: 10 INJECTION INTRAVENOUS at 08:45

## 2022-12-16 RX ADMIN — PHENYLEPHRINE HYDROCHLORIDE 50 MCG: 10 INJECTION INTRAVENOUS at 08:49

## 2022-12-16 RX ADMIN — ALBUMIN HUMAN: 0.05 INJECTION, SOLUTION INTRAVENOUS at 11:05

## 2022-12-16 RX ADMIN — EPINEPHRINE 0.02 MCG/KG/MIN: 1 INJECTION INTRAMUSCULAR; INTRAVENOUS; SUBCUTANEOUS at 10:28

## 2022-12-16 RX ADMIN — SODIUM CHLORIDE: 9 INJECTION, SOLUTION INTRAVENOUS at 19:28

## 2022-12-16 RX ADMIN — METOPROLOL TARTRATE 12.5 MG: 25 TABLET, FILM COATED ORAL at 06:14

## 2022-12-16 RX ADMIN — PHENYLEPHRINE HYDROCHLORIDE 200 MCG: 10 INJECTION INTRAVENOUS at 07:41

## 2022-12-16 RX ADMIN — HYDROMORPHONE HYDROCHLORIDE 0.2 MG: 0.2 INJECTION, SOLUTION INTRAMUSCULAR; INTRAVENOUS; SUBCUTANEOUS at 17:23

## 2022-12-16 RX ADMIN — PHENYLEPHRINE HYDROCHLORIDE 0.5 MCG/KG/MIN: 10 INJECTION INTRAVENOUS at 07:41

## 2022-12-16 RX ADMIN — AMINOCAPROIC ACID 5 G/HR: 250 INJECTION, SOLUTION INTRAVENOUS at 07:59

## 2022-12-16 RX ADMIN — VECURONIUM BROMIDE 1 MG: 1 INJECTION, POWDER, LYOPHILIZED, FOR SOLUTION INTRAVENOUS at 11:03

## 2022-12-16 RX ADMIN — ONDANSETRON 4 MG: 2 INJECTION INTRAMUSCULAR; INTRAVENOUS at 11:34

## 2022-12-16 RX ADMIN — HEPARIN SODIUM 29000 UNITS: 1000 INJECTION INTRAVENOUS; SUBCUTANEOUS at 08:30

## 2022-12-16 RX ADMIN — SODIUM CHLORIDE: 9 INJECTION, SOLUTION INTRAVENOUS at 07:51

## 2022-12-16 RX ADMIN — VECURONIUM BROMIDE 3 MG: 1 INJECTION, POWDER, LYOPHILIZED, FOR SOLUTION INTRAVENOUS at 09:35

## 2022-12-16 RX ADMIN — PHENYLEPHRINE HYDROCHLORIDE 100 MCG: 10 INJECTION INTRAVENOUS at 10:46

## 2022-12-16 RX ADMIN — LIDOCAINE HYDROCHLORIDE 100 MG: 20 INJECTION, SOLUTION INFILTRATION; PERINEURAL at 07:36

## 2022-12-16 RX ADMIN — PHENYLEPHRINE HYDROCHLORIDE 100 MCG: 10 INJECTION INTRAVENOUS at 08:11

## 2022-12-16 RX ADMIN — PHENYLEPHRINE HYDROCHLORIDE 100 MCG: 10 INJECTION INTRAVENOUS at 07:49

## 2022-12-16 RX ADMIN — HYDROMORPHONE HYDROCHLORIDE 0.2 MG: 0.2 INJECTION, SOLUTION INTRAMUSCULAR; INTRAVENOUS; SUBCUTANEOUS at 19:04

## 2022-12-16 RX ADMIN — VANCOMYCIN HYDROCHLORIDE 1000 MG: 1 INJECTION, SOLUTION INTRAVENOUS at 07:05

## 2022-12-16 RX ADMIN — NITROGLYCERIN 10 MCG: 10 INJECTION INTRAVENOUS at 10:51

## 2022-12-16 RX ADMIN — ROCURONIUM BROMIDE 50 MG: 50 INJECTION, SOLUTION INTRAVENOUS at 07:36

## 2022-12-16 RX ADMIN — SODIUM CHLORIDE, POTASSIUM CHLORIDE, SODIUM LACTATE AND CALCIUM CHLORIDE 250 ML: 600; 310; 30; 20 INJECTION, SOLUTION INTRAVENOUS at 15:41

## 2022-12-16 RX ADMIN — PHENYLEPHRINE HYDROCHLORIDE 200 MCG: 10 INJECTION INTRAVENOUS at 07:37

## 2022-12-16 RX ADMIN — VECURONIUM BROMIDE 2 MG: 1 INJECTION, POWDER, LYOPHILIZED, FOR SOLUTION INTRAVENOUS at 09:01

## 2022-12-16 RX ADMIN — VECURONIUM BROMIDE 1 MG: 1 INJECTION, POWDER, LYOPHILIZED, FOR SOLUTION INTRAVENOUS at 10:44

## 2022-12-16 RX ADMIN — FAMOTIDINE 20 MG: 20 TABLET ORAL at 06:14

## 2022-12-16 RX ADMIN — CEFAZOLIN 1 G: 1 INJECTION, POWDER, FOR SOLUTION INTRAMUSCULAR; INTRAVENOUS at 17:24

## 2022-12-16 RX ADMIN — PROPOFOL 20 MG: 10 INJECTION, EMULSION INTRAVENOUS at 08:24

## 2022-12-16 RX ADMIN — SODIUM CHLORIDE 1.5 UNITS/HR: 9 INJECTION, SOLUTION INTRAVENOUS at 13:22

## 2022-12-16 RX ADMIN — VECURONIUM BROMIDE 3 MG: 1 INJECTION, POWDER, LYOPHILIZED, FOR SOLUTION INTRAVENOUS at 08:13

## 2022-12-16 RX ADMIN — HYDROMORPHONE HYDROCHLORIDE 2 MG: 2 TABLET ORAL at 19:11

## 2022-12-16 RX ADMIN — PHENYLEPHRINE HYDROCHLORIDE 100 MCG: 10 INJECTION INTRAVENOUS at 10:43

## 2022-12-16 RX ADMIN — FENTANYL CITRATE 50 MCG: 50 INJECTION, SOLUTION INTRAMUSCULAR; INTRAVENOUS at 10:50

## 2022-12-16 RX ADMIN — HYDROMORPHONE HYDROCHLORIDE 2 MG: 2 TABLET ORAL at 17:23

## 2022-12-16 RX ADMIN — VECURONIUM BROMIDE 2 MG: 1 INJECTION, POWDER, LYOPHILIZED, FOR SOLUTION INTRAVENOUS at 10:14

## 2022-12-16 RX ADMIN — SODIUM CHLORIDE, POTASSIUM CHLORIDE, SODIUM LACTATE AND CALCIUM CHLORIDE 500 ML: 600; 310; 30; 20 INJECTION, SOLUTION INTRAVENOUS at 13:49

## 2022-12-16 RX ADMIN — SODIUM CHLORIDE, POTASSIUM CHLORIDE, SODIUM LACTATE AND CALCIUM CHLORIDE: 600; 310; 30; 20 INJECTION, SOLUTION INTRAVENOUS at 06:27

## 2022-12-16 RX ADMIN — PHENYLEPHRINE HYDROCHLORIDE 50 MCG: 10 INJECTION INTRAVENOUS at 08:42

## 2022-12-16 RX ADMIN — MIDAZOLAM HYDROCHLORIDE 4 MG: 1 INJECTION, SOLUTION INTRAMUSCULAR; INTRAVENOUS at 07:36

## 2022-12-16 RX ADMIN — PHENYLEPHRINE HYDROCHLORIDE 100 MCG: 10 INJECTION INTRAVENOUS at 11:04

## 2022-12-16 RX ADMIN — LIDOCAINE HYDROCHLORIDE 100 MG: 20 INJECTION, SOLUTION INFILTRATION; PERINEURAL at 10:42

## 2022-12-16 RX ADMIN — SODIUM CHLORIDE: 9 INJECTION, SOLUTION INTRAVENOUS at 10:25

## 2022-12-16 RX ADMIN — MIDAZOLAM HYDROCHLORIDE 2 MG: 1 INJECTION, SOLUTION INTRAMUSCULAR; INTRAVENOUS at 10:31

## 2022-12-16 RX ADMIN — SODIUM PHOSPHATE, MONOBASIC, MONOHYDRATE 9 MMOL: 276; 142 INJECTION, SOLUTION INTRAVENOUS at 13:48

## 2022-12-16 RX ADMIN — HYDROMORPHONE HYDROCHLORIDE 0.2 MG: 0.2 INJECTION, SOLUTION INTRAMUSCULAR; INTRAVENOUS; SUBCUTANEOUS at 14:26

## 2022-12-16 RX ADMIN — HYDROMORPHONE HYDROCHLORIDE 0.2 MG: 0.2 INJECTION, SOLUTION INTRAMUSCULAR; INTRAVENOUS; SUBCUTANEOUS at 13:55

## 2022-12-16 RX ADMIN — MIDAZOLAM 2 MG: 1 INJECTION INTRAMUSCULAR; INTRAVENOUS at 06:47

## 2022-12-16 RX ADMIN — PROPOFOL 20 MG: 10 INJECTION, EMULSION INTRAVENOUS at 10:50

## 2022-12-16 RX ADMIN — VANCOMYCIN HYDROCHLORIDE 1000 MG: 1 INJECTION, SOLUTION INTRAVENOUS at 18:01

## 2022-12-16 RX ADMIN — SODIUM CHLORIDE, POTASSIUM CHLORIDE, SODIUM LACTATE AND CALCIUM CHLORIDE 500 ML: 600; 310; 30; 20 INJECTION, SOLUTION INTRAVENOUS at 18:12

## 2022-12-16 RX ADMIN — PROPOFOL 50 MG: 10 INJECTION, EMULSION INTRAVENOUS at 07:36

## 2022-12-16 RX ADMIN — MIDAZOLAM HYDROCHLORIDE 1 MG: 1 INJECTION, SOLUTION INTRAMUSCULAR; INTRAVENOUS at 08:40

## 2022-12-16 RX ADMIN — FENTANYL CITRATE 100 MCG: 50 INJECTION, SOLUTION INTRAMUSCULAR; INTRAVENOUS at 08:24

## 2022-12-16 RX ADMIN — PROPOFOL 20 MG: 10 INJECTION, EMULSION INTRAVENOUS at 10:51

## 2022-12-16 RX ADMIN — PHENYLEPHRINE HYDROCHLORIDE 100 MCG: 10 INJECTION INTRAVENOUS at 08:07

## 2022-12-16 RX ADMIN — METHOCARBAMOL 500 MG: 500 TABLET ORAL at 21:49

## 2022-12-16 RX ADMIN — FENTANYL CITRATE 50 MCG: 50 INJECTION, SOLUTION INTRAMUSCULAR; INTRAVENOUS at 08:36

## 2022-12-16 RX ADMIN — FENTANYL CITRATE 250 MCG: 50 INJECTION, SOLUTION INTRAMUSCULAR; INTRAVENOUS at 07:36

## 2022-12-16 RX ADMIN — HYDROMORPHONE HYDROCHLORIDE 2 MG: 2 TABLET ORAL at 23:59

## 2022-12-16 RX ADMIN — PHENYLEPHRINE HYDROCHLORIDE 100 MCG: 10 INJECTION INTRAVENOUS at 08:18

## 2022-12-16 RX ADMIN — CHLORHEXIDINE GLUCONATE 0.12% ORAL RINSE 10 ML: 1.2 LIQUID ORAL at 06:15

## 2022-12-16 RX ADMIN — SODIUM CHLORIDE, POTASSIUM CHLORIDE, SODIUM LACTATE AND CALCIUM CHLORIDE: 600; 310; 30; 20 INJECTION, SOLUTION INTRAVENOUS at 07:51

## 2022-12-16 RX ADMIN — PROTAMINE SULFATE 150 MG: 10 INJECTION, SOLUTION INTRAVENOUS at 10:48

## 2022-12-16 RX ADMIN — SENNOSIDES AND DOCUSATE SODIUM 1 TABLET: 50; 8.6 TABLET ORAL at 21:48

## 2022-12-16 RX ADMIN — VECURONIUM BROMIDE 2 MG: 1 INJECTION, POWDER, LYOPHILIZED, FOR SOLUTION INTRAVENOUS at 08:36

## 2022-12-16 RX ADMIN — PHENYLEPHRINE HYDROCHLORIDE 50 MCG: 10 INJECTION INTRAVENOUS at 08:46

## 2022-12-16 RX ADMIN — NEOSTIGMINE METHYLSULFATE 3 MG: 1 INJECTION, SOLUTION INTRAVENOUS at 11:54

## 2022-12-16 RX ADMIN — FENTANYL CITRATE 150 MCG: 50 INJECTION, SOLUTION INTRAMUSCULAR; INTRAVENOUS at 08:23

## 2022-12-16 RX ADMIN — METHOCARBAMOL 500 MG: 500 TABLET ORAL at 15:50

## 2022-12-16 RX ADMIN — Medication 2 G: at 07:57

## 2022-12-16 RX ADMIN — PHENYLEPHRINE HYDROCHLORIDE 100 MCG: 10 INJECTION INTRAVENOUS at 10:55

## 2022-12-16 RX ADMIN — ACETAMINOPHEN 975 MG: 325 TABLET, FILM COATED ORAL at 18:00

## 2022-12-16 RX ADMIN — PHENYLEPHRINE HYDROCHLORIDE 50 MCG: 10 INJECTION INTRAVENOUS at 08:47

## 2022-12-16 RX ADMIN — PHENYLEPHRINE HYDROCHLORIDE 100 MCG: 10 INJECTION INTRAVENOUS at 10:48

## 2022-12-16 RX ADMIN — AMINOCAPROIC ACID 1 G/HR: 250 INJECTION, SOLUTION INTRAVENOUS at 08:53

## 2022-12-16 ASSESSMENT — ACTIVITIES OF DAILY LIVING (ADL)
ADLS_ACUITY_SCORE: 20

## 2022-12-16 NOTE — ANESTHESIA POSTPROCEDURE EVALUATION
Patient: Miguel Pappas    Procedure: Procedure(s):  AORTIC VALVE REPLACEMENT WITH 21MM INSPIRIS RESILIA VALVE, PLACEMENT OF TEMPORARY VENTRICULAR AND ATRIAL PACING WIRES AND TRANSESPHOGEAL ECHOCARDIOGRAM       Anesthesia Type:  General    Note:  Disposition: ICU            ICU Sign Out: Anesthesiologist/ICU physician sign out WAS performed   Postop Pain Control: Uneventful            Sign Out: Well controlled pain   PONV: No   Neuro/Psych: Uneventful            Sign Out: Acceptable/Baseline neuro status   Airway/Respiratory: Uneventful            Sign Out: AIRWAY IN SITU/Resp. Support   CV/Hemodynamics: Uneventful            Sign Out: Acceptable CV status; No obvious hypovolemia; No obvious fluid overload   Other NRE:    DID A NON-ROUTINE EVENT OCCUR? No           Last vitals:  Vitals:    12/16/22 1300 12/16/22 1315 12/16/22 1330   BP: 119/67 130/73    Pulse: 70 71 68   Resp: 16 16 16   Temp: 36.1  C (97  F) 36.2  C (97.2  F) 36.4  C (97.5  F)   SpO2:          Electronically Signed By: Uyen Sky MD, MD  December 16, 2022  2:05 PM

## 2022-12-16 NOTE — PROGRESS NOTES
Patient was successfully extubated to 4LPM nasal cannula at 1459. Breath sounds are clear, SpO2: 97%, no stridor present. Respiratory will follow up with [facility] 12/17/2022.     Homer Melo, RRT on 12/16/2022 at 3:20 PM

## 2022-12-16 NOTE — ANESTHESIA PROCEDURE NOTES
Central Line/PA Catheter Placement    Pre-Procedure   Staff -        Anesthesiologist:  Uyen Sky MD       Performed By: anesthesiologist       Location: pre-op       Pre-Anesthestic Checklist: patient identified, IV checked, risks and benefits discussed, informed consent, monitors and equipment checked and pre-op evaluation  Timeout:       Correct Patient: Yes        Correct Procedure: Yes        Correct Site: Yes        Correct Position: Yes        Correct Laterality: N/A   Line Placement:   This line was placed Post Induction    Procedure   Procedure: central line       Insertion Site: right, internal jugular.       Patient Position: Trendelenburg  Sterile Prep        Patient Prep/Sterile Barriers: sterile gel and probe cover       Skin prep: Chloraprep  Insertion/Injection        Local skin infiltrated with mL of 1% lidocaine.        Technique: ultrasound guided        1. Ultrasound was used to evaluate the access site.       2. Vein evaluated via ultrasound for patency/adequacy.       3. Using real-time ultrasound the needle/catheter was observed entering the artery/vein.       4. Permanent image was captured and entered into the patient's record.       Type: PA/CVC with Introducer  Narrative         Secured by: suture       Tegaderm and Biopatch dressing used.       blood aspirated from all lumens,        All lumens flushed: Yes       Verification method: Ultrasound and Placement to be verified post-op   Comments:  .usint

## 2022-12-16 NOTE — ANESTHESIA PROCEDURE NOTES
Airway       Patient location during procedure: OR       Procedure Start/Stop Times: 12/16/2022 7:39 AM  Staff -        Anesthesiologist:  Uyen Sky MD       CRNA: Ailyn Payan APRN CRNA       Performed By: CRNA  Consent for Airway        Urgency: elective  Indications and Patient Condition       Indications for airway management: kyle-procedural       Induction type:intravenous       Mask difficulty assessment: 2 - vent by mask + OA or adjuvant +/- NMBA    Final Airway Details       Final airway type: endotracheal airway       Successful airway: ETT - single  Endotracheal Airway Details        ETT size (mm): 8.0       Cuffed: yes       Successful intubation technique: direct laryngoscopy       DL Blade Type: Taveras 2       Grade View of Cords: 1       Adjucts: stylet       Position: Center       Measured from: gums/teeth       Secured at (cm): 23       Bite block used: None    Post intubation assessment        Placement verified by: capnometry, equal breath sounds and chest rise        Number of attempts at approach: 1       Number of other approaches attempted: 0       Secured with: pink tape       Ease of procedure: easy       Dentition: Intact and Unchanged    Medication(s) Administered   Medication Administration Time: 12/16/2022 7:39 AM

## 2022-12-16 NOTE — BRIEF OP NOTE
[facility]    Brief Operative Note    Pre-operative diagnosis: Aortic stenosis [I35.0]  Post-operative diagnosis Same as pre-operative diagnosis    Procedure: Procedure(s):  AORTIC VALVE REPLACEMENT WITH 21MM INSPIRIS RESILIA VALVE, PLACEMENT OF TEMPORARY VENTRICULAR AND ATRIAL PACING WIRES AND TRANSESPHOGEAL ECHOCARDIOGRAM  Surgeon: Surgeon(s) and Role:     * Kaila Abbott MD - Primary     * Liliane De Jesus PA-C - Assisting     * Denise Longoria MD - Assisting  Anesthesia: General   Estimated Blood Loss: 500 ml    Drains: 2 x 32 mediastinal & R pleural  Specimens:   ID Type Source Tests Collected by Time Destination   1 : Aortic Valve Tissue Heart Valve, Aortic SURGICAL PATHOLOGY EXAM Kaila Abbott MD 12/16/2022  9:13 AM    A :  Blood Line, arterial CBC WITH PLATELETS, BASIC METABOLIC PANEL, FIBRINOGEN ACTIVITY, PARTIAL THROMBOPLASTIN TIME, INR Ailyn Payan APRN CRNA 12/16/2022 10:57 AM      Findings:   dictated.  Complications: None.  Implants:   Implant Name Type Inv. Item Serial No.  Lot No. LRB No. Used Action   IMP KIT SUTURE COR-KNOT MINI 4X14MM [id number] - [id number] Metallic Hardware/[place] IMP KIT SUTURE COR-KNOT MINI 4X14MM [id number]  [employer]  N/A 1 Used as a Supply   VALVE AORTIC INSPIRIS RESILLA [id number] SZ 21MM - [id number] Valve VALVE AORTIC INSPIRIS RESILLA [id number] SZ 21MM [id number] Casetext N/A N/A 1 Implanted   DEVICE CHAIDEZ ENDO COR KNOT QUICK LOAD RELOAD [id number] - [id number] Wire DEVICE CHAIDEZ ENDO COR KNOT QUICK LOAD RELOAD [id number]  [employer] [id number] N/A 1 Used as a Supply

## 2022-12-16 NOTE — ANESTHESIA CARE TRANSFER NOTE
Patient: Miguel Pappas    Procedure: Procedure(s):  AORTIC VALVE REPLACEMENT WITH 21MM INSPIRIS RESILIA VALVE, PLACEMENT OF TEMPORARY VENTRICULAR AND ATRIAL PACING WIRES AND TRANSESPHOGEAL ECHOCARDIOGRAM       Diagnosis: Aortic stenosis [I35.0]  Diagnosis Additional Information: No value filed.    Anesthesia Type:   General     Note:    Oropharynx: endotracheal tube in place  Level of Consciousness: iatrogenic sedation  Patient oxygen source: manually ventilated with ambu bag.  Level of Supplemental Oxygen (L/min / FiO2): 15  Independent Airway: airway patency not satisfactory and stable  Dentition: dentition unchanged  Vital Signs Stable: post-procedure vital signs reviewed and stable  Report to RN Given: handoff report given  Patient transferred to: ICU    ICU Handoff: Call for PAUSE to initiate/utilize ICU HANDOFF, Identified Patient, Identified Responsible Provider, Reviewed the Pertinent Medical History, Discussed Surgical Course, Reviewed Intra-OP Anesthesia Management and Issues during Anesthesia, Set Expectations for Post Procedure Period and Allowed Opportunity for Questions and Acknowledgement of Understanding      Vitals:  Vitals Value Taken Time   BP     Temp 36.1  C (96.98  F) 12/16/22 1213   Pulse 69 12/16/22 1213   Resp 12 12/16/22 1213   SpO2 81 % 12/16/22 1213   Vitals shown include unvalidated device data.    Electronically Signed By: BROOKE Thakur CRNA  December 16, 2022  12:14 PM

## 2022-12-16 NOTE — ANESTHESIA PREPROCEDURE EVALUATION
Anesthesia Pre-Procedure Evaluation    Patient: Miguel Pappas   MRN: [id number] : 1955        Procedure : Procedure(s):  AORTIC VALVE REPLACEMENT          Past Medical History:   Diagnosis Date     * * * SBE PROPHYLAXIS * * *     no longer indicated -  AHA guidelnies     Mitral valve stenosis and aortic valve stenosis     2/6 murmur     Pure hypercholesterolemia 2007      Past Surgical History:   Procedure Laterality Date     COLONOSCOPY N/A 2014    Procedure: COLONOSCOPY;  Surgeon: Omar Chisholm MD;  Location:  GI     CV CORONARY ANGIOGRAM N/A 11/15/2022    Procedure: Coronary Angiogram;  Surgeon: Edson Nava MD;  Location:  HEART CARDIAC CATH LAB     HC REMOVAL OF TONSILS,<13 Y/O      Tonsils <10y.o.     ZZHC COLONOSCOPY W/WO BRUSH/WASH  2005       Allergies   Allergen Reactions     No Known Drug Allergies       Social History     Tobacco Use     Smoking status: Never     Passive exposure: Never     Smokeless tobacco: Never   Substance Use Topics     Alcohol use: Yes     Comment: 1-2 beer monthly  maybe      Wt Readings from Last 1 Encounters:   22 63 kg (139 lb)        Anesthesia Evaluation   Pt has had prior anesthetic.     No history of anesthetic complications       ROS/MED HX  ENT/Pulmonary:    (-) sleep apnea   Neurologic:    (-) no CVA   Cardiovascular:     (+) Dyslipidemia -----valvular problems/murmurs type: AS  (-) CAD   METS/Exercise Tolerance:     Hematologic:       Musculoskeletal:       GI/Hepatic:    (-) GERD   Renal/Genitourinary:       Endo:    (-) Type II DM   Psychiatric/Substance Use:       Infectious Disease:       Malignancy:       Other:            Physical Exam    Airway        Mallampati: II   TM distance: > 3 FB   Neck ROM: full   Mouth opening: > 3 cm    Respiratory Devices and Support         Dental  no notable dental history         Cardiovascular   cardiovascular exam normal          Pulmonary   pulmonary exam normal                 OUTSIDE LABS:  CBC:   Lab Results   Component Value Date    WBC 8.5 12/09/2022    WBC 9.2 11/15/2022    HGB 13.3 12/09/2022    HGB 14.1 11/15/2022    HCT 39.0 (L) 12/09/2022    HCT 43.1 11/15/2022     12/09/2022     11/15/2022     BMP:   Lab Results   Component Value Date     12/09/2022     11/15/2022    POTASSIUM 4.2 12/09/2022    POTASSIUM 4.1 11/15/2022    CHLORIDE 101 12/09/2022    CHLORIDE 102 11/15/2022    CO2 32 12/09/2022    CO2 30 11/15/2022    BUN 24 12/09/2022    BUN 27 11/15/2022    CR 0.91 12/09/2022    CR 0.93 11/15/2022    GLC 90 12/09/2022    GLC 94 11/15/2022     COAGS:   Lab Results   Component Value Date    PTT 33 11/15/2022    INR 0.89 11/15/2022     POC: No results found for: BGM, HCG, HCGS  HEPATIC:   Lab Results   Component Value Date    ALBUMIN 3.7 12/09/2022    PROTTOTAL 7.4 12/09/2022    ALT 30 12/09/2022    AST 25 12/09/2022    ALKPHOS 74 12/09/2022    BILITOTAL 0.8 12/09/2022    BILIDIRECT 0 01/08/2003     OTHER:   Lab Results   Component Value Date    PH 5.0 01/08/2003    A1C 5.5 12/09/2022    RAMONA 9.3 12/09/2022       Anesthesia Plan    ASA Status:  4   NPO Status:  NPO Appropriate    Anesthesia Type: General.     - Airway: ETT   Induction: Propofol, Intravenous.   Maintenance: Balanced.   Techniques and Equipment:     - Lines/Monitors: Arterial Line, AC            AC Absolute Contra-indication: NONE            AC Relative Contra-indication: NONE     Consents    Anesthesia Plan(s) and associated risks, benefits, and realistic alternatives discussed. Questions answered and patient/representative(s) expressed understanding.    - Discussed:     - Discussed with:  Patient         Postoperative Care    Pain management: Multi-modal analgesia.   PONV prophylaxis: Ondansetron (or other 5HT-3), Dexamethasone or Solumedrol     Comments:                Uyen Sky MD, MD

## 2022-12-16 NOTE — CONSULTS
CARDIAC SURGERY NUTRITION CONSULT    Received standing order to assess and educate patient.    Will follow and complete assessment once patient is extubated and/or is transferred to medical unit.    Patient will receive nutrition education during the Outpatient Cardiac Rehab Program (nutrition classes/dietitian counseling).    Sun Bach, CULLEN, LD, [facility]

## 2022-12-16 NOTE — OP NOTE
DATE OF SERVICE: Miguel Pappas     PREOPERATIVE DIAGNOSES:  1.  Symptomatic critical aortic stenosis  2.  Hyperlipidemia     POSTOPERATIVE DIAGNOSES:  1.  Severe symptomatic aortic stenosis  2.  Hyperlipidemia     PROCEDURE PERFORMED:  1.  Aortic Valve Replacement with a 21mm Inspiris Resilia Bioprosthetic Valve  2.  Transesophageal echocardiography     SURGEON:  JEAN MARIE Abbott MD     FIRST ASSISTANT:  Liliane De Jesus PA-C.    SECOND ASSISTANT: Denise Longoria MD     ANESTHESIA:  General endotracheal anesthesia.     ANESTHESIOLOGIST:  Uyen Sky MD     ESTIMATED BLOOD LOSS:  500 mL     SPECIMEN:  None.    CPB/XC: 114/90 minutes     INDICATIONS FOR PROCEDURE:  Miguel Pappas is a 67-year-old male who presented with symptomatic critical aortic stenosis, confirmed on echocardiography with a peak velocity of 5 m/s. Left ventricular function was normal with a LVEF of 70%. Coronary angiography demonstrated no evidence or coronary artery disease. Dedicated TAVR protocol CT demonstrated asymmetric distribution of calcium and LVOT calcification, and there was concern about the feasibility of a TAVR procedure in him. Because of this, a surgical aortic valve replacement was recommended. The patient understood the risks and benefits of the procedure and wished to undergo the operation.     OPERATIVE FINDINGS:  The aorta was normal in size, diameter, and thickness. The aortic valve was trileaflet and heavily calcified. The aortic annulus was sized to a 21mm valve sizer. Both coronary arteries were visualized following valve implantation and flow was demonstrated from both ostia with a dose of retrograde cardioplegia. After reperfusion and defibrillation, sinus rhythm was restored.  Intra-operative transesophageal echocardiography demonstrated a well seated valve with no evidence of paravalvular regurgitation after weaning from cardiopulmonary bypass. Left ventricular function was normal preoperatively and unchanged  after bypass on low-dose inotropic support.     OPERATIVE DESCRIPTION IN DETAIL:  After obtaining informed consent, the patient was brought to the operating room and placed in the supine position on the operating room table. Appropriate lines and devices for monitoring were placed by the anesthesia team. The patient underwent smooth induction of general anesthesia, and the trachea was intubated orally. A right internal jugular Cordis introducer was placed, and a Greenwood-Alejandra catheter was placed through this. The patient was prepped and draped, and a timeout was performed to confirm the correct patient identity, as well as the procedure to be performed. A median sternotomy was performed and bone hemostasis was achieved.     The patient was given full dose heparin to achieve an activated clotting time over 480 seconds. Following this, the distal ascending aorta and the right atrium was cannulated. Next, both retrograde and antegrade cardioplegia cannulae were placed. Cardiopulmonary bypass was commenced. Cardioplegia was created. A left ventricular vent was placed via the right superior pulmonary vein. The heart was arrested with 1 liter of cold blood cardioplegia retrograde, followed by 500 ml via direct ostials as the aortic root did not distend with antegrade cardioplegia alone. Subsequent maintenance doses of 300 mL of cold retrograde blood cardioplegia were given approximately every 20 minutes. Electrochemical silence was observed. Topical cold saline slush was applied for additional protection.    The aortic valve was inspected and noted to be heavily calcified. The leaflets were excised and the calcification along the aortic annulus thoroughly debrided using a combination of rongeur forceps and sharp dissection with metzenbaum scissors. Following satisfactory annular decalcification, the aortic annulus was sized to a 21mm valve sizer. We then extensively irrigated the aortic root and LVOT with cold saline to  remove all debris. Pledgeted valve sutures were then placed circumferentially along the aortic annulus and the sutures were passed through the sewing ring of the bioprosthetic aortic valve.     The valve was then seated and held in place using snares while the sutures were individually secured with the Cor-Knot device. On final inspection, the valve was noted to be well seated along the annulus. The aortotomy was next closed in two layers with the first layer closed using a running horizontal mattress technique and the second layer with a simple running technique. Once the aortotomy was closed, we extensively de-aired the heart and the crossclamp was released after a retrograde dose of terminal warm blood cardioplegia was delivered.    The aortotomy closure was inspected and determined to be hemostatic. A bipolar ventricular pacing lead and unipolar atrial pacing lead was placed and brought out through a separate stab incisions. Ventilation was resumed. The patient weaned from cardiopulmonary bypass, was given protamine and decannulated.  Two 32-British straight chest tubes were placed in the mediastinum and brought out through separate stab incisions. The sternal edges were reapposed with 7 interrupted #6 stainless steel sternal wires.     All sponge, instrument, and needle counts were correct at the end of the case.      JEAN MARIE Abbott MD  Cardiothoracic Surgery  Pager [phone]

## 2022-12-16 NOTE — PLAN OF CARE
Problem: Cardiovascular Surgery  Goal: Improved Activity Tolerance  Outcome: Met  Extubated 1459, following commands, dangle at edge of bed 1715, not up to chair as hypotensive when dangling, has some weakness  Goal: Absence of Bleeding  Outcome: Met  No signs of bleeding noted, low chest tube output, dark red  Goal: Anesthesia/Sedation Recovery  Outcome: Met  Sedation weaned off and extubated this shift, pressors weaned off, now alert and oriented  Intervention: Optimize Anesthesia Recovery  Recent Flowsheet Documentation  Taken 12/16/2022 1600 by Mirna Bowen RN  Safety Promotion/Fall Prevention:   bed alarm on   clutter free environment maintained   fall prevention program maintained   increase visualization of patient   room door open   room near nurse's station   room organization consistent   safety round/check completed  Taken 12/16/2022 1300 by Mirna Bowen RN  Safety Promotion/Fall Prevention:   bed alarm on   clutter free environment maintained   fall prevention program maintained   increase visualization of patient   room door open   room near nurse's station   room organization consistent   safety round/check completed  Goal: Effective Oxygenation and Ventilation  Outcome: Met  Once extubated weaned to room air, marinating oxygen saturation above 90%, able to cough and deep breathe  Intervention: Promote Airway Secretion Clearance  Recent Flowsheet Documentation  Taken 12/16/2022 1600 by Mirna Bowen RN  Cough And Deep Breathing: done with encouragement  Taken 12/16/2022 1300 by Mirna Bowen RN  Cough And Deep Breathing: unable to perform     Problem: Risk for Delirium  Goal: Improved Attention and Thought Clarity  Outcome: Met   Goal Outcome Evaluation:  Alert and oriented post extubation                  Arrives from surgery intubated and sedation 1207, able to be weaned from sedation and extubated 1459, alert and oriented, neuro intact, pressors weaned off, maintaining MAP >65,  ART line dampened. Adequate urine output, low chest tube output. Visiting with family and resting between cares, able to dangle at edge of bed. Incision dressing clean, dry , intact, no drainage noted. Not paced this shift.

## 2022-12-16 NOTE — ANESTHESIA PROCEDURE NOTES
Arterial Line Procedure Note    Pre-Procedure   Staff -        Anesthesiologist:  Uyen Sky MD       Performed By: anesthesiologist       Location: pre-op       Pre-Anesthestic Checklist: patient identified, IV checked, risks and benefits discussed, informed consent, monitors and equipment checked and pre-op evaluation  Timeout:       Correct Patient: Yes        Correct Procedure: Yes        Correct Site: Yes        Correct Position: Yes   Line Placement:   This line was placed Pre Induction starting at 12/16/2022 6:45 AM and ending at 12/16/2022 6:50 AM  Procedure   Procedure: arterial line  Sterile Prep        All elements of maximal sterile barrier technique followed       Patient Prep/Sterile Barriers: hand hygiene, sterile gloves, hat, mask, draped, sterile gown, draped       Skin prep: Chloraprep  Insertion/Injection        Technique: Seldinger Technique        Catheter Type/Size: 20 gauge, 1.75 in/4.5 cm quick cath (integral wire)  Narrative        Arterial waveform: Yes        IBP within 10% of NIBP: Yes

## 2022-12-17 ENCOUNTER — APPOINTMENT (OUTPATIENT)
Dept: PHYSICAL THERAPY | Facility: CLINIC | Age: 67
DRG: 220 | End: 2022-12-17
Attending: PHYSICIAN ASSISTANT
Payer: COMMERCIAL

## 2022-12-17 ENCOUNTER — APPOINTMENT (OUTPATIENT)
Dept: GENERAL RADIOLOGY | Facility: CLINIC | Age: 67
DRG: 220 | End: 2022-12-17
Attending: PHYSICIAN ASSISTANT
Payer: COMMERCIAL

## 2022-12-17 LAB
ALBUMIN SERPL-MCNC: 3 G/DL (ref 3.4–5)
ALLEN'S TEST: NORMAL
ALP SERPL-CCNC: 40 U/L (ref 40–150)
ALT SERPL W P-5'-P-CCNC: 23 U/L (ref 0–70)
ANION GAP SERPL CALCULATED.3IONS-SCNC: 7 MMOL/L (ref 3–14)
AST SERPL W P-5'-P-CCNC: 50 U/L (ref 0–45)
BASE EXCESS BLDA CALC-SCNC: -2 MMOL/L (ref -9–1.8)
BILIRUB SERPL-MCNC: 0.6 MG/DL (ref 0.2–1.3)
BUN SERPL-MCNC: 27 MG/DL (ref 7–30)
CA-I BLD-MCNC: 4.5 MG/DL (ref 4.4–5.2)
CALCIUM SERPL-MCNC: 7.9 MG/DL (ref 8.5–10.1)
CHLORIDE BLD-SCNC: 104 MMOL/L (ref 94–109)
CO2 SERPL-SCNC: 22 MMOL/L (ref 20–32)
CREAT SERPL-MCNC: 0.87 MG/DL (ref 0.66–1.25)
ERYTHROCYTE [DISTWIDTH] IN BLOOD BY AUTOMATED COUNT: 12.7 % (ref 10–15)
GFR SERPL CREATININE-BSD FRML MDRD: >90 ML/MIN/1.73M2
GLUCOSE BLD-MCNC: 141 MG/DL (ref 70–99)
GLUCOSE BLDC GLUCOMTR-MCNC: 105 MG/DL (ref 70–99)
GLUCOSE BLDC GLUCOMTR-MCNC: 112 MG/DL (ref 70–99)
GLUCOSE BLDC GLUCOMTR-MCNC: 117 MG/DL (ref 70–99)
GLUCOSE BLDC GLUCOMTR-MCNC: 121 MG/DL (ref 70–99)
GLUCOSE BLDC GLUCOMTR-MCNC: 127 MG/DL (ref 70–99)
GLUCOSE BLDC GLUCOMTR-MCNC: 130 MG/DL (ref 70–99)
GLUCOSE BLDC GLUCOMTR-MCNC: 140 MG/DL (ref 70–99)
GLUCOSE BLDC GLUCOMTR-MCNC: 149 MG/DL (ref 70–99)
GLUCOSE BLDC GLUCOMTR-MCNC: 149 MG/DL (ref 70–99)
HCO3 BLD-SCNC: 23 MMOL/L (ref 21–28)
HCT VFR BLD AUTO: 32.4 % (ref 40–53)
HGB BLD-MCNC: 10.9 G/DL (ref 13.3–17.7)
LACTATE SERPL-SCNC: 1.1 MMOL/L (ref 0.7–2)
MAGNESIUM SERPL-MCNC: 2.1 MG/DL (ref 1.6–2.3)
MCH RBC QN AUTO: 30 PG (ref 26.5–33)
MCHC RBC AUTO-ENTMCNC: 33.6 G/DL (ref 31.5–36.5)
MCV RBC AUTO: 89 FL (ref 78–100)
O2/TOTAL GAS SETTING VFR VENT: 0 %
PCO2 BLD: 37 MM HG (ref 35–45)
PH BLD: 7.39 [PH] (ref 7.35–7.45)
PHOSPHATE SERPL-MCNC: 3 MG/DL (ref 2.5–4.5)
PLATELET # BLD AUTO: 134 10E3/UL (ref 150–450)
PO2 BLD: 92 MM HG (ref 80–105)
POTASSIUM BLD-SCNC: 4 MMOL/L (ref 3.4–5.3)
PROT SERPL-MCNC: 5.4 G/DL (ref 6.8–8.8)
RBC # BLD AUTO: 3.63 10E6/UL (ref 4.4–5.9)
SODIUM SERPL-SCNC: 133 MMOL/L (ref 133–144)
WBC # BLD AUTO: 19.6 10E3/UL (ref 4–11)

## 2022-12-17 PROCEDURE — 97116 GAIT TRAINING THERAPY: CPT | Mod: GP | Performed by: PHYSICAL THERAPIST

## 2022-12-17 PROCEDURE — 82803 BLOOD GASES ANY COMBINATION: CPT | Performed by: INTERNAL MEDICINE

## 2022-12-17 PROCEDURE — 250N000013 HC RX MED GY IP 250 OP 250 PS 637: Performed by: PHYSICIAN ASSISTANT

## 2022-12-17 PROCEDURE — 80053 COMPREHEN METABOLIC PANEL: CPT | Performed by: PHYSICIAN ASSISTANT

## 2022-12-17 PROCEDURE — 85027 COMPLETE CBC AUTOMATED: CPT | Performed by: PHYSICIAN ASSISTANT

## 2022-12-17 PROCEDURE — 999N000065 XR CHEST PORT 1 VIEW

## 2022-12-17 PROCEDURE — 250N000013 HC RX MED GY IP 250 OP 250 PS 637: Performed by: INTERNAL MEDICINE

## 2022-12-17 PROCEDURE — 120N000001 HC R&B MED SURG/OB

## 2022-12-17 PROCEDURE — 999N000157 HC STATISTIC RCP TIME EA 10 MIN

## 2022-12-17 PROCEDURE — G0463 HOSPITAL OUTPT CLINIC VISIT: HCPCS

## 2022-12-17 PROCEDURE — 97530 THERAPEUTIC ACTIVITIES: CPT | Mod: GP | Performed by: PHYSICAL THERAPIST

## 2022-12-17 PROCEDURE — 93010 ELECTROCARDIOGRAM REPORT: CPT | Mod: 59 | Performed by: INTERNAL MEDICINE

## 2022-12-17 PROCEDURE — 97161 PT EVAL LOW COMPLEX 20 MIN: CPT | Mod: GP | Performed by: PHYSICAL THERAPIST

## 2022-12-17 PROCEDURE — 83735 ASSAY OF MAGNESIUM: CPT | Performed by: PHYSICIAN ASSISTANT

## 2022-12-17 PROCEDURE — 83605 ASSAY OF LACTIC ACID: CPT | Performed by: INTERNAL MEDICINE

## 2022-12-17 PROCEDURE — 250N000011 HC RX IP 250 OP 636: Performed by: PHYSICIAN ASSISTANT

## 2022-12-17 PROCEDURE — 258N000003 HC RX IP 258 OP 636: Performed by: PHYSICIAN ASSISTANT

## 2022-12-17 PROCEDURE — 84100 ASSAY OF PHOSPHORUS: CPT | Performed by: PHYSICIAN ASSISTANT

## 2022-12-17 PROCEDURE — 250N000012 HC RX MED GY IP 250 OP 636 PS 637: Performed by: PHYSICIAN ASSISTANT

## 2022-12-17 PROCEDURE — 93005 ELECTROCARDIOGRAM TRACING: CPT

## 2022-12-17 PROCEDURE — 250N000011 HC RX IP 250 OP 636: Performed by: STUDENT IN AN ORGANIZED HEALTH CARE EDUCATION/TRAINING PROGRAM

## 2022-12-17 PROCEDURE — 82330 ASSAY OF CALCIUM: CPT | Performed by: PHYSICIAN ASSISTANT

## 2022-12-17 RX ORDER — METOPROLOL TARTRATE 25 MG/1
25 TABLET, FILM COATED ORAL 2 TIMES DAILY
Status: DISCONTINUED | OUTPATIENT
Start: 2022-12-17 | End: 2022-12-19

## 2022-12-17 RX ORDER — FUROSEMIDE 10 MG/ML
20 INJECTION INTRAMUSCULAR; INTRAVENOUS ONCE
Status: COMPLETED | OUTPATIENT
Start: 2022-12-17 | End: 2022-12-17

## 2022-12-17 RX ADMIN — METOPROLOL TARTRATE 25 MG: 25 TABLET, FILM COATED ORAL at 11:18

## 2022-12-17 RX ADMIN — SODIUM CHLORIDE 2 UNITS/HR: 9 INJECTION, SOLUTION INTRAVENOUS at 03:43

## 2022-12-17 RX ADMIN — HYDRALAZINE HYDROCHLORIDE 10 MG: 20 INJECTION, SOLUTION INTRAMUSCULAR; INTRAVENOUS at 02:07

## 2022-12-17 RX ADMIN — HYDROMORPHONE HYDROCHLORIDE 4 MG: 2 TABLET ORAL at 03:34

## 2022-12-17 RX ADMIN — FUROSEMIDE 20 MG: 10 INJECTION, SOLUTION INTRAMUSCULAR; INTRAVENOUS at 11:18

## 2022-12-17 RX ADMIN — ACETAMINOPHEN 975 MG: 325 TABLET, FILM COATED ORAL at 17:27

## 2022-12-17 RX ADMIN — HEPARIN SODIUM 5000 UNITS: 5000 INJECTION, SOLUTION INTRAVENOUS; SUBCUTANEOUS at 13:43

## 2022-12-17 RX ADMIN — ACETAMINOPHEN 975 MG: 325 TABLET, FILM COATED ORAL at 02:06

## 2022-12-17 RX ADMIN — CEFAZOLIN 1 G: 1 INJECTION, POWDER, FOR SOLUTION INTRAMUSCULAR; INTRAVENOUS at 02:06

## 2022-12-17 RX ADMIN — HYDROMORPHONE HYDROCHLORIDE 2 MG: 2 TABLET ORAL at 20:41

## 2022-12-17 RX ADMIN — ASPIRIN 81 MG CHEWABLE TABLET 81 MG: 81 TABLET CHEWABLE at 08:56

## 2022-12-17 RX ADMIN — SENNOSIDES AND DOCUSATE SODIUM 1 TABLET: 50; 8.6 TABLET ORAL at 08:56

## 2022-12-17 RX ADMIN — HEPARIN SODIUM 5000 UNITS: 5000 INJECTION, SOLUTION INTRAVENOUS; SUBCUTANEOUS at 20:56

## 2022-12-17 RX ADMIN — PANTOPRAZOLE SODIUM 40 MG: 40 TABLET, DELAYED RELEASE ORAL at 08:56

## 2022-12-17 RX ADMIN — METOPROLOL TARTRATE 25 MG: 25 TABLET, FILM COATED ORAL at 20:42

## 2022-12-17 RX ADMIN — HYDRALAZINE HYDROCHLORIDE 10 MG: 20 INJECTION, SOLUTION INTRAMUSCULAR; INTRAVENOUS at 13:01

## 2022-12-17 RX ADMIN — ACETAMINOPHEN 975 MG: 325 TABLET, FILM COATED ORAL at 10:19

## 2022-12-17 RX ADMIN — SENNOSIDES AND DOCUSATE SODIUM 1 TABLET: 50; 8.6 TABLET ORAL at 20:42

## 2022-12-17 RX ADMIN — GABAPENTIN 100 MG: 100 CAPSULE ORAL at 20:56

## 2022-12-17 RX ADMIN — VANCOMYCIN HYDROCHLORIDE 1000 MG: 1 INJECTION, SOLUTION INTRAVENOUS at 06:53

## 2022-12-17 RX ADMIN — HYDROMORPHONE HYDROCHLORIDE 2 MG: 2 TABLET ORAL at 13:43

## 2022-12-17 RX ADMIN — CEFAZOLIN 1 G: 1 INJECTION, POWDER, FOR SOLUTION INTRAMUSCULAR; INTRAVENOUS at 10:21

## 2022-12-17 ASSESSMENT — ACTIVITIES OF DAILY LIVING (ADL)
ADLS_ACUITY_SCORE: 24
ADLS_ACUITY_SCORE: 20
ADLS_ACUITY_SCORE: 24
ADLS_ACUITY_SCORE: 24
ADLS_ACUITY_SCORE: 20
ADLS_ACUITY_SCORE: 24
ADLS_ACUITY_SCORE: 20
ADLS_ACUITY_SCORE: 24
ADLS_ACUITY_SCORE: 24

## 2022-12-17 NOTE — PLAN OF CARE
Goal Outcome Evaluation:    Patient is pathway, all lines out. Uop adeq, CT mod serosanguinous. Pt. Slept fairly well between cares. Does have occ. Episodes of anxiety with repositioning & IS. Lungs clear, improving pulmonary toilet. Pain is controlled with ordered analgesics

## 2022-12-17 NOTE — PROGRESS NOTES
[facility]  Cardiovascular and Thoracic Surgery Daily Note      Assessment and Plan  POD #1 s/p Aortic Valve Replacement with a 21mm Inspiris Resilia Bioprosthetic Valve, Transesophageal echocardiography on with Dr. Nicho Abbott.     - CVS: Pre-op TTE with EF 70%, severe left ventricular hypertrophy. HR: 60s-80s. SBP: 110s-150s. Weaned off pressors overnight. ASA, Will start BB today with hold parameters. No statin as no cad. Hypervolemia-- will diurese with 20mg IV lasix. Chest tubes with 440cc/24 hours with no air leak. TPW capped.     - Resp: Extubated on POD#0. IS, pulmonary toilet. Saturating well room air.     - Neuro: Neuro intact, pain controlled on current regimen    - Renal: good UOP. up about 8kg from preoperative weight. Diuresis above  Recent Labs   Lab 12/17/22  0354 12/16/22  1220 12/16/22  1057   CR 0.87 0.81 0.78       - GI: Plan to advance diet slowly, no straws, big gas bubble on CXR this am, belching, no flatus, no BM, continue bowel regimen    - : Continue hernández d/t limited mobility and need for accurate I&Os.     - Endo: Minimal insulin infusion requirements. Will transition to sliding scale insulin.   Hemoglobin A1C   Date Value Ref Range Status   12/09/2022 5.5 0.0 - 5.6 % Final     Comment:     Normal <5.7%   Prediabetes 5.7-6.4%    Diabetes 6.5% or higher     Note: Adopted from ADA consensus guidelines.        - FEN: Replace electrolytes as needed. Regular diet    - ID: Afebrile.  Leukocytosis likely related to stress from surgery. Periop abx completing. Trend CBC.   Recent Labs   Lab 12/17/22  0354 12/16/22  1220 12/16/22  1057   WBC 19.6* 25.7* 18.1*       - Heme: Acute blood loss anemia and thrombocytopenia due to surgery. Hgb and PLT stable. Trend CBC, transfuse PRN.   Recent Labs   Lab 12/17/22  0354 12/16/22  1220 12/16/22  1103 12/16/22  1057   HGB 10.9* 11.2* 9.8* 9.7*   * 159  --  142*       - Proph: SCD, subcutaneous heparin, PPI    - Dispo: Transfer to  [facility]. Continue hernández and continue chest tubes. Initiate therapies.       Interval History  Extubated and wean pressors overnight. Saturating well on room air. Pain controlled with current regimen. Has not tried eating yet, has only had water, big gas bubble on CXR. +belching, no flatus or BM.       Medications    acetaminophen  975 mg Oral or NG Tube Q8H     aspirin  81 mg Oral or NG Tube Daily     ceFAZolin  1 g Intravenous Q8H     gabapentin  100 mg Oral At Bedtime     heparin ANTICOAGULANT  5,000 Units Subcutaneous Q8H     pantoprazole  40 mg Oral or NG Tube Daily    Or     pantoprazole  40 mg Oral Daily     polyethylene glycol  17 g Oral Daily     senna-docusate  1 tablet Oral BID     sodium chloride (PF)  3 mL Intracatheter Q8H     [START ON 12/19/2022] acetaminophen, bisacodyl, calcium gluconate, calcium gluconate, calcium gluconate, glucose **OR** dextrose **OR** glucagon, EPINEPHrine, furosemide, hydrALAZINE, HYDROmorphone **OR** HYDROmorphone, HYDROmorphone **OR** HYDROmorphone, lactated ringers, lidocaine 4%, lidocaine (buffered or not buffered), magnesium hydroxide, methocarbamol, naloxone **OR** naloxone **OR** naloxone **OR** naloxone, nitroGLYcerin, ondansetron **OR** ondansetron, phenylephrine, prochlorperazine **OR** prochlorperazine, BETA BLOCKER NOT PRESCRIBED, sodium chloride (PF)      Physical Exam  Vitals were reviewed  Blood pressure (!) 150/94, pulse 70, temperature 99.9  F (37.7  C), resp. rate 20, height 1.727 m (5' 8\"), weight 71.2 kg (156 lb 15.5 oz), SpO2 97 %.  Rhythm: NSR    Lungs: diminished bases    Cardiovascular: rrr, no m/r/g    Abdomen: soft, NT, ND, +BS    Extremeties: warm, no LE edema    Incision: CDI    CT: serosang output 440 mL, no air leak    Weight:   Vitals:    12/16/22 0524 12/17/22 0530   Weight: 63 kg (139 lb) 71.2 kg (156 lb 15.5 oz)         Data  Recent Labs   Lab 12/17/22  1026 12/17/22  0851 12/17/22  0601 12/17/22  0357 12/17/22  0354 12/16/22  1408 " 12/16/22  1220 12/16/22  1103 12/16/22  1057   WBC  --   --   --   --  19.6*  --  25.7*  --  18.1*   HGB  --   --   --   --  10.9*  --  11.2* 9.8* 9.7*   MCV  --   --   --   --  89  --  90  --  90   PLT  --   --   --   --  134*  --  159  --  142*   INR  --   --   --   --   --   --  1.13  --  1.43*   NA  --   --   --   --  133  --  141 139 139   POTASSIUM  --   --   --   --  4.0  --  4.0 4.6 4.6   CHLORIDE  --   --   --   --  104  --  112*  --  112*   CO2  --   --   --   --  22  --  22  --  22   BUN  --   --   --   --  27  --  27  --  27   CR  --   --   --   --  0.87  --  0.81  --  0.78   ANIONGAP  --   --   --   --  7  --  7  --  5   RAMONA  --   --   --   --  7.9*  --  8.3*  --  8.6   * 121* 112*   < > 141*   < > 179* 152* 161*   ALBUMIN  --   --   --   --  3.0*  --  3.1*  --   --    PROTTOTAL  --   --   --   --  5.4*  --  5.2*  --   --    BILITOTAL  --   --   --   --  0.6  --  0.4  --   --    ALKPHOS  --   --   --   --  40  --  49  --   --    ALT  --   --   --   --  23  --  21  --   --    AST  --   --   --   --  50*  --  38  --   --     < > = values in this interval not displayed.       Imaging:  Recent Results (from the past 24 hour(s))   XR Chest Port 1 View    Narrative    CHEST PORTABLE ONE VIEW  12/16/2022 1:05 PM       INDICATION: Postop CVTS surgery.  COMPARISON: None.       Impression    IMPRESSION: Sternotomy wires, AVR, endotracheal tube above the margo,  [place]-Alejandra catheter in the main PA, NG tube in the stomach, mediastinal  and right chest tubes. No pneumothorax. The lungs are clear.       MIKAELA LEYVA MD         SYSTEM ID:  [id number]   XR Chest Port 1 View    Narrative    EXAM: CHEST SINGLE VIEW PORTABLE  LOCATION: [facility]  DATE/TIME: 12/17/2022, 6:08 AM    INDICATION: Postoperative thoracic surgery.  COMPARISON: 12/16/2022 at 1302 hours.      Impression    IMPRESSION:   1. Interval removal of an endotracheal tube, enteric drainage tube, and right internal jugular  pulmonary arterial catheter.  2. No other significant change since the recent comparison study.  3. A right pleural drain and mediastinal drain are again noted.  4. A few linear opacities in the mid left lung are again noted and likely represent scarring.            Patient seen and discussed with Dr. Scar Bush PA-C  Cardiothoracic Surgery  Pager [phone]

## 2022-12-17 NOTE — PROGRESS NOTES
12/17/22 1200   Appointment Info   Signing Clinician's Name / Credentials (PT) Tramaine Garcia, SPT   Student Supervision Direct supervision provided;Direct Patient Contact Provided   Living Environment   People in Home spouse   Current Living Arrangements house   Home Accessibility stairs within home;stairs to enter home   Number of Stairs, Main Entrance 2   Stair Railings, Main Entrance railings safe and in good condition   Transportation Anticipated family or friend will provide   Living Environment Comments Pt has stairs to enter but can use ramp, wife is at home full time with pt and able to assist as needed, 2 story home but can live on main floor   Self-Care   Usual Activity Tolerance good   Current Activity Tolerance moderate   Regular Exercise Yes   Activity/Exercise Type walking   Exercise Amount/Frequency 30 mins;3-5 times/wk   Equipment Currently Used at Home none   Fall history within last six months yes  (tripped on a haybail while working)   Number of times patient has fallen within last six months 1   General Information   Onset of Illness/Injury or Date of Surgery 12/16/22   Referring Physician Liliane De Jesus PA-C   Patient/Family Therapy Goals Statement (PT) return home   Pertinent History of Current Problem (include personal factors and/or comorbidities that impact the POC) Pt is pod#1 AVR secondary to severe aortic stenosis   Existing Precautions/Restrictions sternal;fall   Cognition   Affect/Mental Status (Cognition) WFL   Orientation Status (Cognition) oriented x 4   Follows Commands (Cognition) WFL;follows three-step commands;over 90% accuracy;repetition of directions required   Pain Assessment   Patient Currently in Pain Yes, see Vital Sign flowsheet  (pain in stomach at chest tube insertion)   Posture    Posture Forward head position   Range of Motion (ROM)   Range of Motion ROM is WFL   Strength (Manual Muscle Testing)   Strength (Manual Muscle Testing) strength is WFL   Strength  Comments sufficient antigravity strength for standing   Bed Mobility   Bed Mobility supine-sit   Supine-Sit [place] (Bed Mobility) minimum assist (75% patient effort)   Bed Mobility Limitations decreased ability to use arms for pushing/pulling   Impairments Contributing to Impaired Bed Mobility pain;impaired balance   Transfers   Transfers sit-stand transfer   Transfer Safety Concerns Noted decreased sequencing ability   Impairments Contributing to Impaired Transfers impaired balance   Sit-Stand Transfer   Sit-Stand [place] (Transfers) contact guard   Assistive Device (Sit-Stand Transfers) walker, front-wheeled   Gait/Stairs (Locomotion)   [place] Level (Gait) minimum assist (75% patient effort)   Assistive Device (Gait) walker, front-wheeled   Distance in Feet 10   Pattern (Gait) step-through   Deviations/Abnormal Patterns (Gait) gait speed decreased   Balance   Balance Comments Increased sway with static seated balance, tips laterally and posteriorly while moving legs   Sensory Examination   Sensory Perception Comments light touch BLE intact, reports numbness/tingling in R hand which is not new   Clinical Impression   Criteria for Skilled Therapeutic Intervention Yes, treatment indicated   PT Diagnosis (PT) Difficulty with gait   Influenced by the following impairments Decreased CV functioning, activity tolerance and balance, increased pain and post op sternal prec   Functional limitations due to impairments Unable to safely return home, unable to perform work around the home   Clinical Presentation (PT Evaluation Complexity) Stable/Uncomplicated   Clinical Presentation Rationale see MR   Clinical Decision Making (Complexity) low complexity   Planned Therapy Interventions (PT) balance training;bed mobility training;gait training;home exercise program;manual therapy techniques;motor coordination training;neuromuscular re-education;patient/family education;postural re-education;ROM (range of  motion);stair training;strengthening;stretching;transfer training;progressive activity/exercise;risk factor education;home program guidelines   Risk & Benefits of therapy have been explained evaluation/treatment results reviewed;care plan/treatment goals reviewed;patient   PT Total Evaluation Time   PT [place], Low Complexity Minutes (37631) 10   Physical Therapy Goals   PT Frequency 2x/day   PT Predicted Duration/Target Date for Goal Attainment 12/24/22   PT Goals Bed Mobility;Transfers;Gait;Stairs;Cardiac Phase 1   PT: Bed Mobility Supervision/stand-by assist;Supine to/from sit   PT: Transfers Sit to/from stand;Supervision/stand-by assist   PT: Understanding of cardiac education to maximize quality of life, condition management, and health outcomes Patient;Verbalize   PT: Perform aerobic activity with stable cardiovascular response continuous;15 minutes   PT: Functional/aerobic ambulation tolerance with stable cardiovascular response in order to return to home and community environment Supervision/SBA;200 feet   PT: Navigation of stairs simulating home set up with stable cardiovascular response in order to return to home and community environment 2 stairs;Supervision/SBA   Interventions   Interventions Quick Adds Gait Training;Therapeutic Activity   Therapeutic Activity   Therapeutic Activities: dynamic activities to improve functional performance Minutes (64373) 15   Treatment Detail/Skilled Intervention Pt supine in bed upon arrival and RN in room, states would like to get up since it felt very good yesterday. Increased time needed for line management, monitoring of vitals throughout session with elevated BP after ambulation and standing getting into the 160s/100s and returning to normal once seated. Pt sat for an extended period on EOB, shows posterior lean while moving legs, initially reports lightheadedness and increased work of breathing which improves with time in this position. 3x sit to stand completed  "throughout session with CGA and cues to avoid use of arms. Educated pt on sternal precautions and he was able to recite them at end of session. Discussed discharge planning and what rehab will look like. Pt left in chair with all needs met and chair alarm on.   Gait Training   Gait Training Minutes (15828) 15   Treatment Detail/Skilled Intervention See ambulation section   Ambulation   Workload flat surface with FWW   Effort Scale 3  (pt reports \"not too bad\")   Symptoms Dyspnea  (pain at chest tube insertion)   Cardiovascular Response Hypertensive response to exercise   Exercise Details Pt ambulated forwards and backwards ~5ft in room with FWW providing CGA-Andres due to some unsteadiness with backwards steps. Completed 6 reps. Pt takes short steps but demo good speed and ability to quickly stop/start.   PT Discharge Planning   PT Plan progress ambulation distance, check on precautions understanding   PT Discharge Recommendation (DC Rec) home with outpatient cardiac rehab   PT Rationale for DC Rec Pt is currently below his functional baseline and is requiring Ax1 for safety and precautions adherence with functional mobility. Pt has deficits in activity tolerance and balance. With continued IP rehab, anticipate pt will be safe to return home with assist from wife who is available to help 24/7.   PT Brief overview of current status Sit to stand CGA, in room gait Andres, bed mobility Andres. Hypertensive response to exercise, but does recover with rest.   Total Session Time   Timed Code Treatment Minutes 30   Total Session Time (sum of timed and untimed services) 40     "

## 2022-12-17 NOTE — PROGRESS NOTES
[facility] RCAT Note    Date:12/17/22  Admission Diagnosis:s/p AVR  Pulmonary History:None  Home Nebulizer/ MDI Use:None  Home Oxygen Use:None  Acuity Level (from RT Assessment flow sheet):4    Aerosol Therapy Initiated:None      Pulmonary Hygiene Initiated:BID per RCAT protocol. Pt has a strong non-productive cough.      Volume Expansion Therapy Initiated:IS BID per RCAT protocol. Pt is achieving 750 ml on IS with fair pt effort.      Current Oxygen Requirement:Room air  Current SpO2:98%    Re-evaluation date:12/20/22    See 'RT Assessments' flow sheet for patient assessment scoring and Acuity Level Details.    12/17/2022  Stephanie Ritter, RT

## 2022-12-17 NOTE — PLAN OF CARE
Orientations: A&Ox4 (tired)  Vitals/Pain: HTN (142/104) - controlled well w/ PRN Hydralazine   Lines/Drains: CT x2 to 1 atrium. PIV L SL   Skin/Wounds: Chest incision, CT site   GI/: Hernández catheter in place, No BM   Labs: Abnormal/Trends: N/A  Electrolyte Replacement- BG (149)  Ambulation/Assist: Bedfast (POD #1)  Sleep Quality: Wanting to nap   Plan: Monitor CT output and hernández output. Dilaudid and scheduled tylenol for pain. Increase IS use.

## 2022-12-17 NOTE — PROVIDER NOTIFICATION
Provider Notification    Notified Person Name:  Dr. Perez     Notification Date/Time:  12/16/22, 1810    Notification Interaction:  Face to face    Purpose of Notification: lactic 3.3    Orders Received: yes, 500 ml LR bolus over 1 hour.

## 2022-12-17 NOTE — CONSULTS
[facility]    ICU History and Physical    Primary Team: CT Surgery  Reason for Critical Care Admission: Post-op CABG care, intubated  Admitting Physician: Kaila Abbott MD  Date of Admission:  12/16/2022    Assessment: Critical Care   Miguel Pappas is a 67 year old male admitted on 12/16/2022.   PMH included symptomatic sever aortic stenosis who presents on POD 0 AVR with temp ventricular and atrial pacing wires.        Plan: Critical Care   Neuro/ pain/ sedation:  - Monitor neurological status. Notify provider for any acute changes in exam  - Wean off sedation (propofol) to allow for extubation  - Acute multi modal pain management per CT surgery protocol       Pulmonary:  - Goal O2 saturation about 92%  - Wean as able to extubate     Cardiovascular:  - Monitor hemodynamic status via [place] elizabeth indices, A line  - A &V Temp wires in place   - Post-op hypotension being managed with vasopressors, lactate monitor and provide therapy based on cardiac indices   - ASA (when post-op bleeding concerns subside), statin therapy  - chest tube management per CT surgery  - BB therapy on POD1    GI/Nutrition:  - Diet: Advance Diet as Tolerated: Clear Liquid Diet    - Nutrition consulted. Appreciate recommendations.   - Bowel regimen ordered     Renal/ Fluid Balance:   - Will monitor intake and output  -  IV fluid hydration as needd   - ICU electrolyte replacement protocol      Endocrine:  -Maintain glucose per ICU protocol (<180), will add sliding scale insulin if needed     ID:  #periop ppx antibitotics  - continue per cardiac protocol    Hematology:  Minimal OR blood loss  -HB goal >8, plt>50K    MSK:   - PT and OT consulted. Appreciate recommendations.     Lines/ tubes/ drains:  Brown Catheter: PRESENT, indication: Strict 1-2 Hour I&O;Anesthesia  Central Lines: PRESENT  CVC Internal jugular-Site Assessment: WDL    Prophylaxis:  - DVT Prophylaxis: Heparin SQ  - PUD Prophylaxis: PPI    Code  Status: Full Code      Disposition:  - ICU    The patient's care was discussed with the Bedside Nurse and Primary team.  Critical care time exclusive of procedures: 35 minutes      Pete Perez MD  [facility]  Securely message with the Vocera Web Console (learn more here)  Text page via [employer] Paging/Directory         ______________________________________________________________________    Chief Complaint   Severe aortic stenosis     Unable to obtain a history from the patient due to intubation    History of Present Illness   Miguel Pappas is a 67 year old male admitted to the ICU on 12/16/2022.   PMH included symptomatic sever aortic stenosis. Due to the aortic stenosis, he underwent aortic repair. He is now POD 0 AVR with temp ventricular and atrial pacing wires.       Review of Systems    Review of systems not obtained due to patient factors - intubation and sedation    Past Medical History    I have reviewed this patient's medical history and updated it with pertinent information if needed.   Past Medical History:   Diagnosis Date     * * * SBE PROPHYLAXIS * * *     no longer indicated - 2007 AHA guidelnies     Mitral valve stenosis and aortic valve stenosis     2/6 murmur     Pure hypercholesterolemia 1/26/2007       Past Surgical History   I have reviewed this patient's surgical history and updated it with pertinent information if needed.  Past Surgical History:   Procedure Laterality Date     COLONOSCOPY N/A 12/5/2014    Procedure: COLONOSCOPY;  Surgeon: Omar Chisholm MD;  Location:  GI     CV CORONARY ANGIOGRAM N/A 11/15/2022    Procedure: Coronary Angiogram;  Surgeon: Edson Nava MD;  Location:  HEART CARDIAC CATH LAB     HC REMOVAL OF TONSILS,<11 Y/O      Tonsils <10y.o.     ZZHC COLONOSCOPY W/WO BRUSH/WASH  2/8/2005        Social History   I have reviewed this patient's social history and updated it with pertinent information if needed.  Social History      Tobacco Use     Smoking status: Never     Passive exposure: Never     Smokeless tobacco: Never   Vaping Use     Vaping Use: Never used   Substance Use Topics     Alcohol use: Yes     Comment: 1-2 beer monthly  maybe     Drug use: No       Family History   I have reviewed this patient's family history and updated it with pertinent information if needed.  Family History   Problem Relation Age of Onset     Cancer Father         Spleen cancer     Hypertension Father      Allergies Father         PCN     Gastrointestinal Disease Father         ulcer     Heart Disease Father         Hx: meds and angioplasty     Hypertension Brother      Cerebrovascular Disease Paternal Grandfather      Arthritis Paternal Grandfather      Arthritis Mother      Hypertension Mother      Allergies Son         Dust , Mold     Depression Sister         X 2       Prior to Admission Medications   Prior to Admission Medications   Prescriptions Last Dose Informant Patient Reported? Taking?   GLUCOSAMINE CHONDROITIN OR TABS 12/1/2022 at 0400 Spouse/Significant Other Yes Yes   Sig: Take 1 tablet by mouth daily   Multiple Vitamins-Minerals (CENTRUM SILVER PO) 12/1/2022 at 0400 Spouse/Significant Other Yes Yes   Sig: Take 1 tablet by mouth daily   VITAMIN D PO 12/1/2022 at 0400 Spouse/Significant Other Yes Yes   Sig: Take 1 tablet by mouth daily   aspirin 81 MG tablet 12/15/2022 at 0400 Spouse/Significant Other Yes Yes   Sig: Take 81 mg by mouth daily      Facility-Administered Medications: None     Allergies   Allergies   Allergen Reactions     No Known Drug Allergies        Physical Exam   Vital Signs: Temp: 98.8  F (37.1  C) Temp src: Bladder BP: 128/86 Pulse: 80   Resp: 21 SpO2: 96 % O2 Device: Mechanical Ventilator Oxygen Delivery: 4 LPM  Weight: 139 lbs 0 oz  Exam done post-op    General: Sedated, NAD  HEENT: ETT in place, S-G in place  Neuro: RASS -1, moving all extremities spontaneously when aroused   CV: RR  Chest: +BS b/l, chest tube in  place  Abd: soft, +BS  : Brown in place  Extremities: Warm, no edema      Data   I reviewed all medications, new labs and imaging results over the last 24 hours.  Arterial Blood Gases   Recent Labs   Lab 12/16/22  1800 12/16/22  1228 12/16/22  1103 12/16/22  1032   PH 7.33* 7.30* 7.39 7.39   PCO2 41 45 39 42   PO2 122* 188* 555* 379*   HCO3 22 22 24 25       Complete Blood Count   Recent Labs   Lab 12/16/22  1220 12/16/22  1103 12/16/22  1057 12/16/22  1032   WBC 25.7*  --  18.1*  --    HGB 11.2* 9.8* 9.7* 9.7*     --  142*  --        Basic Metabolic Panel  Recent Labs   Lab 12/16/22  1900 12/16/22  1756 12/16/22  1704 12/16/22  1602 12/16/22  1408 12/16/22  1220 12/16/22  1103 12/16/22  1057 12/16/22  1032 12/16/22  1032   NA  --   --   --   --   --  141 139 139  --  137   POTASSIUM  --   --   --   --   --  4.0 4.6 4.6  --  5.4*   CHLORIDE  --   --   --   --   --  112*  --  112*  --   --    CO2  --   --   --   --   --  22  --  22  --   --    BUN  --   --   --   --   --  27  --  27  --   --    CR  --   --   --   --   --  0.81  --  0.78  --   --    * 172* 173* 172*   < > 179* 152* 161*   < > 158*    < > = values in this interval not displayed.       Liver Function Tests  Recent Labs   Lab 12/16/22  1220 12/16/22  1057   AST 38  --    ALT 21  --    ALKPHOS 49  --    BILITOTAL 0.4  --    ALBUMIN 3.1*  --    INR 1.13 1.43*       Pancreatic Enzymes  No lab results found in last 7 days.    Coagulation Profile  Recent Labs   Lab 12/16/22  1220 12/16/22  1057   INR 1.13 1.43*   PTT 34 29       IMAGING:  Recent Results (from the past 24 hour(s))   XR Chest Port 1 View    Narrative    CHEST PORTABLE ONE VIEW  12/16/2022 1:05 PM       INDICATION: Postop CVTS surgery.  COMPARISON: None.       Impression    IMPRESSION: Sternotomy wires, AVR, endotracheal tube above the margo,  [place]-Alejandra catheter in the main PA, NG tube in the stomach, mediastinal  and right chest tubes. No pneumothorax. The lungs are clear.        MIKAELA LEYVA MD         SYSTEM ID:  [id number]

## 2022-12-18 ENCOUNTER — APPOINTMENT (OUTPATIENT)
Dept: PHYSICAL THERAPY | Facility: CLINIC | Age: 67
DRG: 220 | End: 2022-12-18
Attending: STUDENT IN AN ORGANIZED HEALTH CARE EDUCATION/TRAINING PROGRAM
Payer: COMMERCIAL

## 2022-12-18 ENCOUNTER — APPOINTMENT (OUTPATIENT)
Dept: GENERAL RADIOLOGY | Facility: CLINIC | Age: 67
DRG: 220 | End: 2022-12-18
Attending: PHYSICIAN ASSISTANT
Payer: COMMERCIAL

## 2022-12-18 LAB
ANION GAP SERPL CALCULATED.3IONS-SCNC: 8 MMOL/L (ref 3–14)
BUN SERPL-MCNC: 28 MG/DL (ref 7–30)
CA-I BLD-MCNC: 4.4 MG/DL (ref 4.4–5.2)
CALCIUM SERPL-MCNC: 8.4 MG/DL (ref 8.5–10.1)
CHLORIDE BLD-SCNC: 95 MMOL/L (ref 94–109)
CO2 SERPL-SCNC: 25 MMOL/L (ref 20–32)
CREAT SERPL-MCNC: 0.84 MG/DL (ref 0.66–1.25)
ERYTHROCYTE [DISTWIDTH] IN BLOOD BY AUTOMATED COUNT: 12.7 % (ref 10–15)
GFR SERPL CREATININE-BSD FRML MDRD: >90 ML/MIN/1.73M2
GLUCOSE BLD-MCNC: 153 MG/DL (ref 70–99)
GLUCOSE BLDC GLUCOMTR-MCNC: 120 MG/DL (ref 70–99)
GLUCOSE BLDC GLUCOMTR-MCNC: 128 MG/DL (ref 70–99)
GLUCOSE BLDC GLUCOMTR-MCNC: 135 MG/DL (ref 70–99)
GLUCOSE BLDC GLUCOMTR-MCNC: 146 MG/DL (ref 70–99)
GLUCOSE BLDC GLUCOMTR-MCNC: 192 MG/DL (ref 70–99)
HCT VFR BLD AUTO: 32.9 % (ref 40–53)
HGB BLD-MCNC: 11.1 G/DL (ref 13.3–17.7)
INR PPP: 1.09 (ref 0.85–1.15)
MAGNESIUM SERPL-MCNC: 2.1 MG/DL (ref 1.6–2.3)
MAGNESIUM SERPL-MCNC: 2.2 MG/DL (ref 1.6–2.3)
MCH RBC QN AUTO: 30.1 PG (ref 26.5–33)
MCHC RBC AUTO-ENTMCNC: 33.7 G/DL (ref 31.5–36.5)
MCV RBC AUTO: 89 FL (ref 78–100)
PHOSPHATE SERPL-MCNC: 2.5 MG/DL (ref 2.5–4.5)
PLATELET # BLD AUTO: 121 10E3/UL (ref 150–450)
POTASSIUM BLD-SCNC: 4.4 MMOL/L (ref 3.4–5.3)
RBC # BLD AUTO: 3.69 10E6/UL (ref 4.4–5.9)
SODIUM SERPL-SCNC: 128 MMOL/L (ref 133–144)
WBC # BLD AUTO: 17.8 10E3/UL (ref 4–11)

## 2022-12-18 PROCEDURE — 250N000013 HC RX MED GY IP 250 OP 250 PS 637: Performed by: PHYSICIAN ASSISTANT

## 2022-12-18 PROCEDURE — 74018 RADEX ABDOMEN 1 VIEW: CPT

## 2022-12-18 PROCEDURE — 80048 BASIC METABOLIC PNL TOTAL CA: CPT | Performed by: PHYSICIAN ASSISTANT

## 2022-12-18 PROCEDURE — 82330 ASSAY OF CALCIUM: CPT | Performed by: PHYSICIAN ASSISTANT

## 2022-12-18 PROCEDURE — 120N000001 HC R&B MED SURG/OB

## 2022-12-18 PROCEDURE — 83735 ASSAY OF MAGNESIUM: CPT | Performed by: STUDENT IN AN ORGANIZED HEALTH CARE EDUCATION/TRAINING PROGRAM

## 2022-12-18 PROCEDURE — 250N000011 HC RX IP 250 OP 636: Performed by: PHYSICIAN ASSISTANT

## 2022-12-18 PROCEDURE — 84100 ASSAY OF PHOSPHORUS: CPT | Performed by: PHYSICIAN ASSISTANT

## 2022-12-18 PROCEDURE — 93010 ELECTROCARDIOGRAM REPORT: CPT | Performed by: INTERNAL MEDICINE

## 2022-12-18 PROCEDURE — 250N000011 HC RX IP 250 OP 636: Performed by: STUDENT IN AN ORGANIZED HEALTH CARE EDUCATION/TRAINING PROGRAM

## 2022-12-18 PROCEDURE — 82330 ASSAY OF CALCIUM: CPT | Performed by: STUDENT IN AN ORGANIZED HEALTH CARE EDUCATION/TRAINING PROGRAM

## 2022-12-18 PROCEDURE — 97110 THERAPEUTIC EXERCISES: CPT | Mod: GP

## 2022-12-18 PROCEDURE — 93005 ELECTROCARDIOGRAM TRACING: CPT

## 2022-12-18 PROCEDURE — 36415 COLL VENOUS BLD VENIPUNCTURE: CPT | Performed by: PHYSICIAN ASSISTANT

## 2022-12-18 PROCEDURE — 85027 COMPLETE CBC AUTOMATED: CPT | Performed by: PHYSICIAN ASSISTANT

## 2022-12-18 PROCEDURE — 250N000012 HC RX MED GY IP 250 OP 636 PS 637: Performed by: PHYSICIAN ASSISTANT

## 2022-12-18 PROCEDURE — 36415 COLL VENOUS BLD VENIPUNCTURE: CPT | Performed by: STUDENT IN AN ORGANIZED HEALTH CARE EDUCATION/TRAINING PROGRAM

## 2022-12-18 PROCEDURE — 250N000013 HC RX MED GY IP 250 OP 250 PS 637

## 2022-12-18 PROCEDURE — 97530 THERAPEUTIC ACTIVITIES: CPT | Mod: GP

## 2022-12-18 PROCEDURE — 250N000013 HC RX MED GY IP 250 OP 250 PS 637: Performed by: INTERNAL MEDICINE

## 2022-12-18 PROCEDURE — 258N000003 HC RX IP 258 OP 636: Performed by: STUDENT IN AN ORGANIZED HEALTH CARE EDUCATION/TRAINING PROGRAM

## 2022-12-18 PROCEDURE — 85610 PROTHROMBIN TIME: CPT | Performed by: STUDENT IN AN ORGANIZED HEALTH CARE EDUCATION/TRAINING PROGRAM

## 2022-12-18 PROCEDURE — 83735 ASSAY OF MAGNESIUM: CPT | Performed by: PHYSICIAN ASSISTANT

## 2022-12-18 RX ORDER — AMIODARONE HYDROCHLORIDE 200 MG/1
400 TABLET ORAL 2 TIMES DAILY
Status: DISCONTINUED | OUTPATIENT
Start: 2022-12-18 | End: 2022-12-21 | Stop reason: HOSPADM

## 2022-12-18 RX ORDER — FUROSEMIDE 10 MG/ML
40 INJECTION INTRAMUSCULAR; INTRAVENOUS ONCE
Status: COMPLETED | OUTPATIENT
Start: 2022-12-18 | End: 2022-12-18

## 2022-12-18 RX ORDER — WARFARIN SODIUM 7.5 MG/1
7.5 TABLET ORAL
Status: COMPLETED | OUTPATIENT
Start: 2022-12-18 | End: 2022-12-18

## 2022-12-18 RX ORDER — METOCLOPRAMIDE HYDROCHLORIDE 5 MG/ML
5 INJECTION INTRAMUSCULAR; INTRAVENOUS ONCE
Status: COMPLETED | OUTPATIENT
Start: 2022-12-18 | End: 2022-12-18

## 2022-12-18 RX ADMIN — HYDRALAZINE HYDROCHLORIDE 10 MG: 20 INJECTION, SOLUTION INTRAMUSCULAR; INTRAVENOUS at 12:31

## 2022-12-18 RX ADMIN — ASPIRIN 81 MG CHEWABLE TABLET 81 MG: 81 TABLET CHEWABLE at 08:04

## 2022-12-18 RX ADMIN — POLYETHYLENE GLYCOL 3350 17 G: 17 POWDER, FOR SOLUTION ORAL at 08:06

## 2022-12-18 RX ADMIN — HEPARIN SODIUM 5000 UNITS: 5000 INJECTION, SOLUTION INTRAVENOUS; SUBCUTANEOUS at 13:51

## 2022-12-18 RX ADMIN — HEPARIN SODIUM 5000 UNITS: 5000 INJECTION, SOLUTION INTRAVENOUS; SUBCUTANEOUS at 06:09

## 2022-12-18 RX ADMIN — AMIODARONE HYDROCHLORIDE 400 MG: 200 TABLET ORAL at 20:06

## 2022-12-18 RX ADMIN — METOPROLOL TARTRATE 25 MG: 25 TABLET, FILM COATED ORAL at 08:04

## 2022-12-18 RX ADMIN — METOPROLOL TARTRATE 25 MG: 25 TABLET, FILM COATED ORAL at 20:05

## 2022-12-18 RX ADMIN — GABAPENTIN 100 MG: 100 CAPSULE ORAL at 20:05

## 2022-12-18 RX ADMIN — SODIUM CHLORIDE 0.5 MG/MIN: 9 INJECTION, SOLUTION INTRAVENOUS at 22:13

## 2022-12-18 RX ADMIN — CALCIUM GLUCONATE 1 G: 20 INJECTION, SOLUTION INTRAVENOUS at 16:49

## 2022-12-18 RX ADMIN — SENNOSIDES AND DOCUSATE SODIUM 1 TABLET: 50; 8.6 TABLET ORAL at 08:04

## 2022-12-18 RX ADMIN — HYDRALAZINE HYDROCHLORIDE 10 MG: 20 INJECTION, SOLUTION INTRAMUSCULAR; INTRAVENOUS at 06:26

## 2022-12-18 RX ADMIN — ACETAMINOPHEN 975 MG: 325 TABLET, FILM COATED ORAL at 02:03

## 2022-12-18 RX ADMIN — POTASSIUM & SODIUM PHOSPHATES POWDER PACK 280-160-250 MG 1 PACKET: 280-160-250 PACK at 08:03

## 2022-12-18 RX ADMIN — WARFARIN SODIUM 7.5 MG: 7.5 TABLET ORAL at 17:08

## 2022-12-18 RX ADMIN — SENNOSIDES AND DOCUSATE SODIUM 1 TABLET: 50; 8.6 TABLET ORAL at 20:06

## 2022-12-18 RX ADMIN — POTASSIUM & SODIUM PHOSPHATES POWDER PACK 280-160-250 MG 1 PACKET: 280-160-250 PACK at 12:31

## 2022-12-18 RX ADMIN — FUROSEMIDE 40 MG: 10 INJECTION, SOLUTION INTRAMUSCULAR; INTRAVENOUS at 15:26

## 2022-12-18 RX ADMIN — METOCLOPRAMIDE 5 MG: 5 INJECTION, SOLUTION INTRAMUSCULAR; INTRAVENOUS at 17:02

## 2022-12-18 RX ADMIN — PANTOPRAZOLE SODIUM 40 MG: 40 TABLET, DELAYED RELEASE ORAL at 08:04

## 2022-12-18 RX ADMIN — CALCIUM GLUCONATE 1 G: 20 INJECTION, SOLUTION INTRAVENOUS at 08:32

## 2022-12-18 RX ADMIN — ONDANSETRON 4 MG: 2 INJECTION INTRAMUSCULAR; INTRAVENOUS at 09:10

## 2022-12-18 RX ADMIN — AMIODARONE HYDROCHLORIDE 150 MG: 1.5 INJECTION, SOLUTION INTRAVENOUS at 00:12

## 2022-12-18 RX ADMIN — INSULIN ASPART 1 UNITS: 100 INJECTION, SOLUTION INTRAVENOUS; SUBCUTANEOUS at 08:03

## 2022-12-18 RX ADMIN — SODIUM CHLORIDE 1 MG/MIN: 9 INJECTION, SOLUTION INTRAVENOUS at 00:24

## 2022-12-18 ASSESSMENT — ACTIVITIES OF DAILY LIVING (ADL)
ADLS_ACUITY_SCORE: 27
ADLS_ACUITY_SCORE: 24
ADLS_ACUITY_SCORE: 27
ADLS_ACUITY_SCORE: 27
ADLS_ACUITY_SCORE: 24
ADLS_ACUITY_SCORE: 24
ADLS_ACUITY_SCORE: 25
ADLS_ACUITY_SCORE: 27
ADLS_ACUITY_SCORE: 27
ADLS_ACUITY_SCORE: 24
ADLS_ACUITY_SCORE: 27
ADLS_ACUITY_SCORE: 27

## 2022-12-18 NOTE — PLAN OF CARE
Neuro: AO x4  Tele/cardiac: Afib CVR, elevated BP, prn hydralazine given  Respiration: On RA  Activity: A1 with walker  Pain: controlled with oral dilaudid and scheduled tylenol  Drips: amiodarone @ 0.5mg/min  Drains/tubes: 2 CT to 1 atrium to wall suction, small air leak with cough , serosanguinous drainage  Skin: sternal incision MATTY, chest tube site dressing CDI  GI/: hernández in place with good output, regular diet, bowel sounds hypoactive, passing flatus and is able to belch  Aggression color: green  Isolation:none

## 2022-12-18 NOTE — PHARMACY-ANTICOAGULATION SERVICE
Clinical Pharmacy - Warfarin Dosing Consult     Pharmacy has been consulted to manage this patient s warfarin therapy.  Indication: Bioprosthetic Heart Valve  Therapy Goal: INR 2-3  Warfarin Prior to Admission: No  Significant drug interactions: Aspirin, Amiodarone    INR   Date Value Ref Range Status   12/18/2022 1.09 0.85 - 1.15 Final   12/16/2022 1.13 0.85 - 1.15 Final       Recommend warfarin 7.5 mg today.  Pharmacy will monitor Miguel Pappas daily and order warfarin doses to achieve specified goal.      Please contact pharmacy as soon as possible if the warfarin needs to be held for a procedure or if the warfarin goals change.        Sebastien WangD

## 2022-12-18 NOTE — PROGRESS NOTES
Notified provider Sun CEDENO - about indwelling hernández catheter discussed removal or continued need.    Did provider choose to remove indwelling hernández catheter? YES

## 2022-12-18 NOTE — PROGRESS NOTES
[facility]  Cardiovascular and Thoracic Surgery Daily Note      Assessment and Plan  POD #2 s/p Aortic Valve Replacement with a 21mm Inspiris Resilia Bioprosthetic Valve, Transesophageal echocardiography on with Dr. Nicho Abbott.     - CVS: Pre-op TTE with EF 70%, severe left ventricular hypertrophy. HR: 60s-80s. SBP: 110s-150s. ASA, BB with hold parameters. No statin as no cad. A fib with RVR overnight treated with amiodarone. Converted to NSR this am. Will transition to oral amiodarone this evening. Hypervolemia-- will diurese with 20mg IV lasix. Chest tubes with 790cc/24 hours with no air leak. TPW capped. EKG today with ST elevation, asymptomatic, discussed with Dr. Abbott- possible pericarditis, will monitor and repeat EKG tomorrow am. Will anticoagulate for bioprosthetic valve for 3 months. INR 2-3. Pharmacy consulted today for warfarin initiation.    - Resp: Extubated on POD#0. IS, pulmonary toilet. Saturating well room air.     - Neuro: Neuro intact, pain controlled on current regimen    - Renal: good UOP. up about 5kg from preoperative weight. Diuresis above  Recent Labs   Lab 12/18/22  0607 12/17/22  0354 12/16/22  1220   CR 0.84 0.87 0.81       - GI: Plan to advance diet slowly, vomiting this am with therapies. +flatus. Will obtain KUB.     - : OK to remove hernández.    - Endo:  Completed insulin infusion per protocol. Transitioned sliding scale insulin 12/17.  Hemoglobin A1C   Date Value Ref Range Status   12/09/2022 5.5 0.0 - 5.6 % Final     Comment:     Normal <5.7%   Prediabetes 5.7-6.4%    Diabetes 6.5% or higher     Note: Adopted from ADA consensus guidelines.        - FEN: Replace electrolytes as needed. Regular diet    - ID: Afebrile.  Leukocytosis likely related to stress from surgery- trending down. Periop abx completed. Trend CBC.   Recent Labs   Lab 12/18/22  0607 12/17/22  0354 12/16/22  1220   WBC 17.8* 19.6* 25.7*       - Heme: Acute blood loss anemia and thrombocytopenia due  "to surgery. Hgb and PLT stable. Trend CBC, transfuse PRN.   Recent Labs   Lab 12/18/22  0607 12/17/22  0354 12/16/22  1220   HGB 11.1* 10.9* 11.2*   * 134* 159       - Proph: SCD, subcutaneous heparin, PPI    - Dispo: [facility]. Ok to remove hernández. Continue chest tubes and TPW. Remove hernández. Transition to oral amiodarone. Continue therapies.      Interval History  Went into a fib with RVR overnight, treated with amiodarone and converted to NSR this am. Saturating well on room air. Pain controlled. Vomiting today with therapies. Tolerating diet but taking it slow per our recommendations. +flatus, no BM.       Medications    acetaminophen  975 mg Oral or NG Tube Q8H     aspirin  81 mg Oral or NG Tube Daily     gabapentin  100 mg Oral At Bedtime     heparin ANTICOAGULANT  5,000 Units Subcutaneous Q8H     insulin aspart  1-7 Units Subcutaneous TID AC     insulin aspart  1-5 Units Subcutaneous At Bedtime     metoprolol tartrate  25 mg Oral BID     pantoprazole  40 mg Oral or NG Tube Daily    Or     pantoprazole  40 mg Oral Daily     polyethylene glycol  17 g Oral Daily     senna-docusate  1 tablet Oral BID     sodium chloride (PF)  3 mL Intracatheter Q8H     [START ON 12/19/2022] acetaminophen, bisacodyl, calcium gluconate, calcium gluconate, calcium gluconate, glucose **OR** dextrose **OR** glucagon, furosemide, hydrALAZINE, HYDROmorphone **OR** HYDROmorphone, HYDROmorphone **OR** HYDROmorphone, lactated ringers, lidocaine 4%, lidocaine (buffered or not buffered), magnesium hydroxide, methocarbamol, naloxone **OR** naloxone **OR** naloxone **OR** naloxone, Reason anticoagulant not prescribed for atrial fibrillation, ondansetron **OR** ondansetron, prochlorperazine **OR** prochlorperazine, BETA BLOCKER NOT PRESCRIBED, sodium chloride (PF)      Physical Exam  Vitals were reviewed  Blood pressure (!) 154/91, pulse 72, temperature 97.6  F (36.4  C), temperature source Oral, resp. rate (!) 34, height 1.727 m (5' 8\"), " weight 68.4 kg (150 lb 14.4 oz), SpO2 96 %.  Rhythm: NSR    Lungs: diminished bases    Cardiovascular: rrr, no m/r/g    Abdomen: soft, NT, ND, +BS    Extremeties: warm, no LE edema    Incision: CDI    CT: serosang output 790 mL, no air leak    Weight:   Vitals:    12/16/22 0524 12/17/22 0530 12/17/22 1353 12/18/22 0557   Weight: 63 kg (139 lb) 71.2 kg (156 lb 15.5 oz) 68.8 kg (151 lb 9.6 oz) 68.4 kg (150 lb 14.4 oz)         Data  Recent Labs   Lab 12/18/22  1154 12/18/22  0726 12/18/22  0607 12/17/22  0357 12/17/22  0354 12/16/22  1408 12/16/22  1220 12/16/22  1103 12/16/22  1057   WBC  --   --  17.8*  --  19.6*  --  25.7*  --  18.1*   HGB  --   --  11.1*  --  10.9*  --  11.2*   < > 9.7*   MCV  --   --  89  --  89  --  90  --  90   PLT  --   --  121*  --  134*  --  159  --  142*   INR  --   --   --   --   --   --  1.13  --  1.43*   NA  --   --  128*  --  133  --  141   < > 139   POTASSIUM  --   --  4.4  --  4.0  --  4.0   < > 4.6   CHLORIDE  --   --  95  --  104  --  112*  --  112*   CO2  --   --  25  --  22  --  22  --  22   BUN  --   --  28  --  27  --  27  --  27   CR  --   --  0.84  --  0.87  --  0.81  --  0.78   ANIONGAP  --   --  8  --  7  --  7  --  5   RAMONA  --   --  8.4*  --  7.9*  --  8.3*  --  8.6   * 146* 153*   < > 141*   < > 179*   < > 161*   ALBUMIN  --   --   --   --  3.0*  --  3.1*  --   --    PROTTOTAL  --   --   --   --  5.4*  --  5.2*  --   --    BILITOTAL  --   --   --   --  0.6  --  0.4  --   --    ALKPHOS  --   --   --   --  40  --  49  --   --    ALT  --   --   --   --  23  --  21  --   --    AST  --   --   --   --  50*  --  38  --   --     < > = values in this interval not displayed.       Imaging:  No results found for this or any previous visit (from the past 24 hour(s)).      Patient seen and discussed with Dr. Scar Bush PAJOSE JUAN  Cardiothoracic Surgery  Pager [phone]

## 2022-12-18 NOTE — PROVIDER NOTIFICATION
Converted to NSR at 0942. CV surgery team aware. Will continue with IV amiodarone drip and may change to oral this evening.

## 2022-12-18 NOTE — PROVIDER NOTIFICATION
Noted ST elevation when switching to portable Tele BOX, 12 lead done. CV surgery notified. Pt denied any discomfort, was assisted back in bed as he was falling asleep in chair. Awaiting for any recommendations.

## 2022-12-19 ENCOUNTER — APPOINTMENT (OUTPATIENT)
Dept: PHYSICAL THERAPY | Facility: CLINIC | Age: 67
DRG: 220 | End: 2022-12-19
Attending: STUDENT IN AN ORGANIZED HEALTH CARE EDUCATION/TRAINING PROGRAM
Payer: COMMERCIAL

## 2022-12-19 LAB
ANION GAP SERPL CALCULATED.3IONS-SCNC: 7 MMOL/L (ref 3–14)
BUN SERPL-MCNC: 24 MG/DL (ref 7–30)
CA-I BLD-MCNC: 4.2 MG/DL (ref 4.4–5.2)
CALCIUM SERPL-MCNC: 8.8 MG/DL (ref 8.5–10.1)
CHLORIDE BLD-SCNC: 98 MMOL/L (ref 94–109)
CO2 SERPL-SCNC: 28 MMOL/L (ref 20–32)
CREAT SERPL-MCNC: 0.74 MG/DL (ref 0.66–1.25)
ERYTHROCYTE [DISTWIDTH] IN BLOOD BY AUTOMATED COUNT: 13.1 % (ref 10–15)
GFR SERPL CREATININE-BSD FRML MDRD: >90 ML/MIN/1.73M2
GLUCOSE BLD-MCNC: 118 MG/DL (ref 70–99)
GLUCOSE BLDC GLUCOMTR-MCNC: 114 MG/DL (ref 70–99)
GLUCOSE BLDC GLUCOMTR-MCNC: 114 MG/DL (ref 70–99)
HCT VFR BLD AUTO: 32.4 % (ref 40–53)
HGB BLD-MCNC: 11.2 G/DL (ref 13.3–17.7)
INR PPP: 1.19 (ref 0.85–1.15)
MAGNESIUM SERPL-MCNC: 2.2 MG/DL (ref 1.6–2.3)
MCH RBC QN AUTO: 30.1 PG (ref 26.5–33)
MCHC RBC AUTO-ENTMCNC: 34.6 G/DL (ref 31.5–36.5)
MCV RBC AUTO: 87 FL (ref 78–100)
PHOSPHATE SERPL-MCNC: 2.9 MG/DL (ref 2.5–4.5)
PLATELET # BLD AUTO: 118 10E3/UL (ref 150–450)
POTASSIUM BLD-SCNC: 4.1 MMOL/L (ref 3.4–5.3)
RBC # BLD AUTO: 3.72 10E6/UL (ref 4.4–5.9)
SODIUM SERPL-SCNC: 133 MMOL/L (ref 133–144)
WBC # BLD AUTO: 15.6 10E3/UL (ref 4–11)

## 2022-12-19 PROCEDURE — 36415 COLL VENOUS BLD VENIPUNCTURE: CPT | Performed by: PHYSICIAN ASSISTANT

## 2022-12-19 PROCEDURE — 83735 ASSAY OF MAGNESIUM: CPT | Performed by: INTERNAL MEDICINE

## 2022-12-19 PROCEDURE — 250N000013 HC RX MED GY IP 250 OP 250 PS 637: Performed by: PHYSICIAN ASSISTANT

## 2022-12-19 PROCEDURE — 120N000001 HC R&B MED SURG/OB

## 2022-12-19 PROCEDURE — 97110 THERAPEUTIC EXERCISES: CPT | Mod: GP

## 2022-12-19 PROCEDURE — 82330 ASSAY OF CALCIUM: CPT | Performed by: PHYSICIAN ASSISTANT

## 2022-12-19 PROCEDURE — 93005 ELECTROCARDIOGRAM TRACING: CPT

## 2022-12-19 PROCEDURE — 85027 COMPLETE CBC AUTOMATED: CPT | Performed by: PHYSICIAN ASSISTANT

## 2022-12-19 PROCEDURE — 97530 THERAPEUTIC ACTIVITIES: CPT | Mod: GP

## 2022-12-19 PROCEDURE — 250N000011 HC RX IP 250 OP 636: Performed by: PHYSICIAN ASSISTANT

## 2022-12-19 PROCEDURE — 93010 ELECTROCARDIOGRAM REPORT: CPT | Mod: 59 | Performed by: INTERNAL MEDICINE

## 2022-12-19 PROCEDURE — 80048 BASIC METABOLIC PNL TOTAL CA: CPT | Performed by: PHYSICIAN ASSISTANT

## 2022-12-19 PROCEDURE — 250N000013 HC RX MED GY IP 250 OP 250 PS 637

## 2022-12-19 PROCEDURE — 84100 ASSAY OF PHOSPHORUS: CPT | Performed by: INTERNAL MEDICINE

## 2022-12-19 PROCEDURE — 85610 PROTHROMBIN TIME: CPT | Performed by: PHYSICIAN ASSISTANT

## 2022-12-19 RX ORDER — WARFARIN SODIUM 5 MG/1
5 TABLET ORAL
Status: COMPLETED | OUTPATIENT
Start: 2022-12-19 | End: 2022-12-19

## 2022-12-19 RX ORDER — BISACODYL 10 MG
10 SUPPOSITORY, RECTAL RECTAL ONCE
Status: COMPLETED | OUTPATIENT
Start: 2022-12-19 | End: 2022-12-19

## 2022-12-19 RX ORDER — METOCLOPRAMIDE HYDROCHLORIDE 5 MG/ML
5 INJECTION INTRAMUSCULAR; INTRAVENOUS 3 TIMES DAILY
Status: DISCONTINUED | OUTPATIENT
Start: 2022-12-19 | End: 2022-12-21 | Stop reason: HOSPADM

## 2022-12-19 RX ORDER — METOPROLOL SUCCINATE 50 MG/1
50 TABLET, EXTENDED RELEASE ORAL DAILY
Status: DISCONTINUED | OUTPATIENT
Start: 2022-12-19 | End: 2022-12-19

## 2022-12-19 RX ORDER — METOPROLOL SUCCINATE 50 MG/1
50 TABLET, EXTENDED RELEASE ORAL DAILY
Status: DISCONTINUED | OUTPATIENT
Start: 2022-12-20 | End: 2022-12-19

## 2022-12-19 RX ORDER — FUROSEMIDE 10 MG/ML
40 INJECTION INTRAMUSCULAR; INTRAVENOUS ONCE
Status: COMPLETED | OUTPATIENT
Start: 2022-12-19 | End: 2022-12-19

## 2022-12-19 RX ORDER — METOPROLOL TARTRATE 25 MG/1
25 TABLET, FILM COATED ORAL 2 TIMES DAILY
Status: DISCONTINUED | OUTPATIENT
Start: 2022-12-20 | End: 2022-12-21 | Stop reason: HOSPADM

## 2022-12-19 RX ORDER — LISINOPRIL 5 MG/1
5 TABLET ORAL DAILY
Status: DISCONTINUED | OUTPATIENT
Start: 2022-12-19 | End: 2022-12-21 | Stop reason: HOSPADM

## 2022-12-19 RX ADMIN — SENNOSIDES AND DOCUSATE SODIUM 1 TABLET: 50; 8.6 TABLET ORAL at 08:23

## 2022-12-19 RX ADMIN — MAGNESIUM HYDROXIDE 30 ML: 400 SUSPENSION ORAL at 15:59

## 2022-12-19 RX ADMIN — BISACODYL 10 MG: 10 SUPPOSITORY RECTAL at 10:25

## 2022-12-19 RX ADMIN — WARFARIN SODIUM 5 MG: 5 TABLET ORAL at 17:36

## 2022-12-19 RX ADMIN — AMIODARONE HYDROCHLORIDE 400 MG: 200 TABLET ORAL at 20:56

## 2022-12-19 RX ADMIN — FUROSEMIDE 40 MG: 10 INJECTION, SOLUTION INTRAMUSCULAR; INTRAVENOUS at 10:25

## 2022-12-19 RX ADMIN — PANTOPRAZOLE SODIUM 40 MG: 40 TABLET, DELAYED RELEASE ORAL at 08:24

## 2022-12-19 RX ADMIN — LISINOPRIL 5 MG: 5 TABLET ORAL at 10:32

## 2022-12-19 RX ADMIN — POLYETHYLENE GLYCOL 3350 17 G: 17 POWDER, FOR SOLUTION ORAL at 08:21

## 2022-12-19 RX ADMIN — ASPIRIN 81 MG CHEWABLE TABLET 81 MG: 81 TABLET CHEWABLE at 08:21

## 2022-12-19 RX ADMIN — METOPROLOL SUCCINATE 50 MG: 50 TABLET, EXTENDED RELEASE ORAL at 10:32

## 2022-12-19 RX ADMIN — AMIODARONE HYDROCHLORIDE 400 MG: 200 TABLET ORAL at 08:23

## 2022-12-19 RX ADMIN — METOCLOPRAMIDE 5 MG: 5 INJECTION, SOLUTION INTRAMUSCULAR; INTRAVENOUS at 21:07

## 2022-12-19 RX ADMIN — GABAPENTIN 100 MG: 100 CAPSULE ORAL at 20:56

## 2022-12-19 ASSESSMENT — ACTIVITIES OF DAILY LIVING (ADL)
ADLS_ACUITY_SCORE: 27
ADLS_ACUITY_SCORE: 23
ADLS_ACUITY_SCORE: 27
ADLS_ACUITY_SCORE: 23
ADLS_ACUITY_SCORE: 23
ADLS_ACUITY_SCORE: 27
ADLS_ACUITY_SCORE: 27

## 2022-12-19 NOTE — PLAN OF CARE
Goal Outcome Evaluation:  Orientations: Pt is A/Ox4.   Vitals/Pain: Pt has pain in the back when laying in bed. Enjoys getting up to chair. BP spikes with activity. PRN hydralazine available givenx1.   Tele: NSR  Lines/Drains: 2 PIV, Amio @ 16.7. CT 2:1 to suction.   Skin/Wounds: Sternal incision, dressing changed @1530. Site CDI  GI/: Voiding adequately, No BM. Abdomen distended and firm this afternoon, CV surgery notified, abdominal X-ray done confirmed ileus.   Labs: Ionized Ca 4.4, WBC 17.1. Calcium Gluconate replacedx2 recheck at 10pm per protocol. Replaced phos recheck in AM.   Ambulation/Assist: Assistx1  Plan: NPO except for ice chips and oral meds, IV reglanx1 per MD

## 2022-12-19 NOTE — PLAN OF CARE
A+Ox4 VSS on room air. Lung sounds clear. Tele NSR. CTx2 to suction, airleak present, dressing CDI. Abdomen distended and firm but passing gas. Voiding adequately. Denies pain. Up w/ 1 and walker. NPO. Amio gtt infusing.

## 2022-12-19 NOTE — PROGRESS NOTES
12/19/2022  [id number]    A/Ox4. Up SBA. VSS on RA. Chest tube x2 to water seal, 30 mL out since 1200. LS diminished. Tolerating clear liquid diet. No BM this shift, prn milk of mag given - passing flatus. Tele NSR. No acute events.

## 2022-12-19 NOTE — PROGRESS NOTES
[facility]  Cardiovascular and Thoracic Surgery Daily Note      Assessment and Plan  POD #3 s/p Aortic Valve Replacement with a 21mm Inspiris Resilia Bioprosthetic Valve, Transesophageal echocardiography on with Dr. Nicho Abbott.     - CVS: Pre-op TTE with EF 70%, severe left ventricular hypertrophy. HR: 60s-80s. SBP: 110s-150s. Transition Lopressor to Toprol 50 mg daily with hold parameters, start Lisinopril 5 mg daily. Aspirin 81 mg daily. Statin not indicated. A fib with RVR POD1, converted to NSR with amiodarone bolus and infusion, now transitioned to PO with taper. Hypervolemic, Lasix 40 mg IV x 1 and reassess. EKG with non-specific ST changes, no chest pain, no CAD on pre-op angiogram, likely pericarditis, continue to monitor. Plan to anticoagulate for bioprosthetic aortic valve for 3 months per Dr. Abbott, INR goal 2-3. No heparin bridging. Chest tubes with 260cc/24 hours, +air leak with cough, to water seal today, get AM chest XR. TPW, capped    - Resp: Extubated on POD#0. IS, pulmonary toilet. Saturating well room air.     - Neuro: Neuro intact, pain controlled on current regimen.    - Renal: No history of significant renal disease. Cr stable WNL. Trend BMP.  Diuretic as above.   Recent Labs   Lab 12/19/22  0625 12/18/22  0607 12/17/22  0354   CR 0.74 0.84 0.87       - GI: Abdominal distention, with N/V POD2. No BM, +flatus. AXR 12/18 suggestive ileus, made NPO. Improved distention 12/19, advance diet to clear. Scheduled Reglan. Suppository today. Repeat AXR 12/20.    - : Brown removed, voiding without issue    - Endo:  Completed insulin infusion per protocol. Transitioned sliding scale insulin 12/17, now discontinued  Hemoglobin A1C   Date Value Ref Range Status   12/09/2022 5.5 0.0 - 5.6 % Final     Comment:     Normal <5.7%   Prediabetes 5.7-6.4%    Diabetes 6.5% or higher     Note: Adopted from ADA consensus guidelines.        - FEN: Replace electrolytes as needed. Advance to clear  liquid 12/19    - ID: Postop leukocytosis, improving. Afebrile. Periop abx completed. Trend CBC.   Recent Labs   Lab 12/19/22  0625 12/18/22  0607 12/17/22  0354   WBC 15.6* 17.8* 19.6*       - Heme: Acute blood loss anemia and thrombocytopenia due to surgery. Hgb and PLT stable. Trend CBC, transfuse PRN.   Recent Labs   Lab 12/19/22  0625 12/18/22  0607 12/17/22  0354   HGB 11.2* 11.1* 10.9*   * 121* 134*       - Proph: SCD, subcutaneous heparin, PPI    - Dispo: [facility]. Continue chest tubes and TPW. Therapies recommending likely progress to home with assist at discharge. Dispo ~2 days pending BM, stable HR/rhythm, chest tube removal.       Interval History  Abdominal distention improved. No BM. Pain controlled    Medications    acetaminophen  975 mg Oral or NG Tube Q8H     amiodarone  400 mg Oral BID     aspirin  81 mg Oral or NG Tube Daily     bisacodyl  10 mg Rectal Once     furosemide  40 mg Intravenous Once     gabapentin  100 mg Oral At Bedtime     insulin aspart  1-7 Units Subcutaneous TID AC     insulin aspart  1-5 Units Subcutaneous At Bedtime     metoclopramide  5 mg Intravenous TID     metoprolol succinate ER  75 mg Oral Daily     pantoprazole  40 mg Oral or NG Tube Daily    Or     pantoprazole  40 mg Oral Daily     polyethylene glycol  17 g Oral Daily     senna-docusate  1 tablet Oral BID     sodium chloride (PF)  3 mL Intracatheter Q8H     Warfarin Therapy Reminder  1 each Oral See Admin Instructions     acetaminophen, bisacodyl, calcium gluconate, calcium gluconate, calcium gluconate, glucose **OR** dextrose **OR** glucagon, furosemide, hydrALAZINE, HYDROmorphone **OR** HYDROmorphone, HYDROmorphone **OR** HYDROmorphone, lactated ringers, lidocaine 4%, lidocaine (buffered or not buffered), magnesium hydroxide, naloxone **OR** naloxone **OR** naloxone **OR** naloxone, Reason anticoagulant not prescribed for atrial fibrillation, ondansetron **OR** ondansetron, prochlorperazine **OR**  "prochlorperazine, BETA BLOCKER NOT PRESCRIBED, sodium chloride (PF)      Physical Exam  Vitals were reviewed  Blood pressure 139/89, pulse 64, temperature 97.7  F (36.5  C), temperature source Axillary, resp. rate 28, height 1.727 m (5' 8\"), weight 65.5 kg (144 lb 6.4 oz), SpO2 94 %.  Rhythm: NSR    Lungs: diminished bases    Cardiovascular: rrr, no m/r/g    Abdomen: soft, NT, ND, +BS    Extremeties: warm, no LE edema    Incision: CDI    CT: serosang output 260 mL, + air leak with cough    Weight:   Vitals:    12/16/22 0524 12/17/22 0530 12/17/22 1353 12/18/22 0557   Weight: 63 kg (139 lb) 71.2 kg (156 lb 15.5 oz) 68.8 kg (151 lb 9.6 oz) 68.4 kg (150 lb 14.4 oz)    12/19/22 0650   Weight: 65.5 kg (144 lb 6.4 oz)         Data  Recent Labs   Lab 12/19/22  0755 12/19/22  0625 12/18/22  2208 12/18/22  1704 12/18/22  1534 12/18/22  0726 12/18/22  0607 12/17/22  0357 12/17/22  0354 12/16/22  1408 12/16/22  1220   WBC  --  15.6*  --   --   --   --  17.8*  --  19.6*  --  25.7*   HGB  --  11.2*  --   --   --   --  11.1*  --  10.9*  --  11.2*   MCV  --  87  --   --   --   --  89  --  89  --  90   PLT  --  118*  --   --   --   --  121*  --  134*  --  159   INR  --  1.19*  --   --  1.09  --   --   --   --   --  1.13   NA  --  133  --   --   --   --  128*  --  133  --  141   POTASSIUM  --  4.1  --   --   --   --  4.4  --  4.0  --  4.0   CHLORIDE  --  98  --   --   --   --  95  --  104  --  112*   CO2  --  28  --   --   --   --  25  --  22  --  22   BUN  --  24  --   --   --   --  28  --  27  --  27   CR  --  0.74  --   --   --   --  0.84  --  0.87  --  0.81   ANIONGAP  --  7  --   --   --   --  8  --  7  --  7   RAMONA  --  8.8  --   --   --   --  8.4*  --  7.9*  --  8.3*   * 118* 128*   < >  --    < > 153*   < > 141*   < > 179*   ALBUMIN  --   --   --   --   --   --   --   --  3.0*  --  3.1*   PROTTOTAL  --   --   --   --   --   --   --   --  5.4*  --  5.2*   BILITOTAL  --   --   --   --   --   --   --   --  0.6  --  0.4 "   ALKPHOS  --   --   --   --   --   --   --   --  40  --  49   ALT  --   --   --   --   --   --   --   --  23  --  21   AST  --   --   --   --   --   --   --   --  50*  --  38    < > = values in this interval not displayed.       Imaging:  Recent Results (from the past 24 hour(s))   XR Abdomen Port 1 View    Narrative    EXAM: XR ABDOMEN PORT 1 VIEW  LOCATION: [facility]  DATE/TIME: 12/18/2022 4:59 PM    INDICATION: Distention. Evaluate for ileus.  COMPARISON: None.      Impression    IMPRESSION: Gas is present diffusely throughout prominent but nondilated small and large bowel loops throughout the abdomen, likely reflecting an adynamic ileus. No pneumatosis. Supine view limits evaluation for pneumoperitoneum. Epicardial pacing wires   are curled over the lower abdomen and extending towards the chest. Partially visualized chest drains.         Patient seen and discussed with Dr. Scar Zayas PA-C  Cardiothoracic Surgery  Pager [phone]

## 2022-12-19 NOTE — PLAN OF CARE
Pt A&O x4. VSS. HR resting low 60s. Amiodarone drip discontinued and modified to oral. Pt experienced an episode of P wave inversion early this afternoon, healthcare provider notified and aware, pt flipping b/w junctional and sinus rhythm. Pt has 2 chest tubes to water seal. Lung sounds diminished with pericardial rub and some slight wheezing. Pt needs IS reinforcement and encouragement. Pt walking with assist of 1 and gait belt. Pt up and participating in therapies. Able to tolerate clear liquid diet. BS hypoactive, given suppository with no success but an improvement in passing gas. Pt has denied any pain this shift. Mag, Phos, and Potassium WNL, recheck in AM.

## 2022-12-20 ENCOUNTER — APPOINTMENT (OUTPATIENT)
Dept: GENERAL RADIOLOGY | Facility: CLINIC | Age: 67
DRG: 220 | End: 2022-12-20
Attending: PHYSICIAN ASSISTANT
Payer: COMMERCIAL

## 2022-12-20 ENCOUNTER — APPOINTMENT (OUTPATIENT)
Dept: PHYSICAL THERAPY | Facility: CLINIC | Age: 67
DRG: 220 | End: 2022-12-20
Attending: STUDENT IN AN ORGANIZED HEALTH CARE EDUCATION/TRAINING PROGRAM
Payer: COMMERCIAL

## 2022-12-20 LAB
ANION GAP SERPL CALCULATED.3IONS-SCNC: 7 MMOL/L (ref 3–14)
BUN SERPL-MCNC: 28 MG/DL (ref 7–30)
CA-I BLD-MCNC: 4.2 MG/DL (ref 4.4–5.2)
CALCIUM SERPL-MCNC: 8.6 MG/DL (ref 8.5–10.1)
CHLORIDE BLD-SCNC: 97 MMOL/L (ref 94–109)
CO2 SERPL-SCNC: 29 MMOL/L (ref 20–32)
CREAT SERPL-MCNC: 0.89 MG/DL (ref 0.66–1.25)
ERYTHROCYTE [DISTWIDTH] IN BLOOD BY AUTOMATED COUNT: 12.8 % (ref 10–15)
GFR SERPL CREATININE-BSD FRML MDRD: >90 ML/MIN/1.73M2
GLUCOSE BLD-MCNC: 110 MG/DL (ref 70–99)
HCT VFR BLD AUTO: 33.8 % (ref 40–53)
HGB BLD-MCNC: 11.3 G/DL (ref 13.3–17.7)
INR PPP: 2.39 (ref 0.85–1.15)
MAGNESIUM SERPL-MCNC: 2.3 MG/DL (ref 1.6–2.3)
MCH RBC QN AUTO: 30.1 PG (ref 26.5–33)
MCHC RBC AUTO-ENTMCNC: 33.4 G/DL (ref 31.5–36.5)
MCV RBC AUTO: 90 FL (ref 78–100)
PATH REPORT.COMMENTS IMP SPEC: NORMAL
PATH REPORT.COMMENTS IMP SPEC: NORMAL
PATH REPORT.FINAL DX SPEC: NORMAL
PATH REPORT.GROSS SPEC: NORMAL
PATH REPORT.MICROSCOPIC SPEC OTHER STN: NORMAL
PATH REPORT.RELEVANT HX SPEC: NORMAL
PHOSPHATE SERPL-MCNC: 2.7 MG/DL (ref 2.5–4.5)
PHOTO IMAGE: NORMAL
PLATELET # BLD AUTO: 151 10E3/UL (ref 150–450)
POTASSIUM BLD-SCNC: 3.7 MMOL/L (ref 3.4–5.3)
RBC # BLD AUTO: 3.75 10E6/UL (ref 4.4–5.9)
SODIUM SERPL-SCNC: 133 MMOL/L (ref 133–144)
WBC # BLD AUTO: 12.8 10E3/UL (ref 4–11)

## 2022-12-20 PROCEDURE — 250N000013 HC RX MED GY IP 250 OP 250 PS 637: Performed by: PHYSICIAN ASSISTANT

## 2022-12-20 PROCEDURE — 71046 X-RAY EXAM CHEST 2 VIEWS: CPT

## 2022-12-20 PROCEDURE — 250N000011 HC RX IP 250 OP 636: Performed by: PHYSICIAN ASSISTANT

## 2022-12-20 PROCEDURE — 97110 THERAPEUTIC EXERCISES: CPT | Mod: GP

## 2022-12-20 PROCEDURE — 85610 PROTHROMBIN TIME: CPT | Performed by: PHYSICIAN ASSISTANT

## 2022-12-20 PROCEDURE — 88305 TISSUE EXAM BY PATHOLOGIST: CPT | Mod: 26 | Performed by: PATHOLOGY

## 2022-12-20 PROCEDURE — 97530 THERAPEUTIC ACTIVITIES: CPT | Mod: GP

## 2022-12-20 PROCEDURE — 36415 COLL VENOUS BLD VENIPUNCTURE: CPT | Performed by: PHYSICIAN ASSISTANT

## 2022-12-20 PROCEDURE — 82330 ASSAY OF CALCIUM: CPT | Performed by: PHYSICIAN ASSISTANT

## 2022-12-20 PROCEDURE — 80048 BASIC METABOLIC PNL TOTAL CA: CPT | Performed by: PHYSICIAN ASSISTANT

## 2022-12-20 PROCEDURE — 120N000001 HC R&B MED SURG/OB

## 2022-12-20 PROCEDURE — 83735 ASSAY OF MAGNESIUM: CPT | Performed by: STUDENT IN AN ORGANIZED HEALTH CARE EDUCATION/TRAINING PROGRAM

## 2022-12-20 PROCEDURE — 84100 ASSAY OF PHOSPHORUS: CPT | Performed by: STUDENT IN AN ORGANIZED HEALTH CARE EDUCATION/TRAINING PROGRAM

## 2022-12-20 PROCEDURE — 250N000013 HC RX MED GY IP 250 OP 250 PS 637: Performed by: STUDENT IN AN ORGANIZED HEALTH CARE EDUCATION/TRAINING PROGRAM

## 2022-12-20 PROCEDURE — 85027 COMPLETE CBC AUTOMATED: CPT | Performed by: PHYSICIAN ASSISTANT

## 2022-12-20 PROCEDURE — 74019 RADEX ABDOMEN 2 VIEWS: CPT

## 2022-12-20 PROCEDURE — 71046 X-RAY EXAM CHEST 2 VIEWS: CPT | Mod: 77

## 2022-12-20 RX ORDER — METOCLOPRAMIDE HYDROCHLORIDE 5 MG/ML
5 INJECTION INTRAMUSCULAR; INTRAVENOUS EVERY 4 HOURS
Status: COMPLETED | OUTPATIENT
Start: 2022-12-20 | End: 2022-12-20

## 2022-12-20 RX ORDER — CALCIUM GLUCONATE 20 MG/ML
1 INJECTION, SOLUTION INTRAVENOUS ONCE
Status: COMPLETED | OUTPATIENT
Start: 2022-12-20 | End: 2022-12-20

## 2022-12-20 RX ORDER — POTASSIUM CHLORIDE 1500 MG/1
20 TABLET, EXTENDED RELEASE ORAL ONCE
Status: COMPLETED | OUTPATIENT
Start: 2022-12-20 | End: 2022-12-20

## 2022-12-20 RX ADMIN — ASPIRIN 81 MG CHEWABLE TABLET 81 MG: 81 TABLET CHEWABLE at 08:24

## 2022-12-20 RX ADMIN — LISINOPRIL 5 MG: 5 TABLET ORAL at 08:24

## 2022-12-20 RX ADMIN — POTASSIUM & SODIUM PHOSPHATES POWDER PACK 280-160-250 MG 1 PACKET: 280-160-250 PACK at 12:47

## 2022-12-20 RX ADMIN — METOCLOPRAMIDE 5 MG: 5 INJECTION, SOLUTION INTRAMUSCULAR; INTRAVENOUS at 14:41

## 2022-12-20 RX ADMIN — CALCIUM GLUCONATE 1 G: 20 INJECTION, SOLUTION INTRAVENOUS at 11:09

## 2022-12-20 RX ADMIN — AMIODARONE HYDROCHLORIDE 400 MG: 200 TABLET ORAL at 20:38

## 2022-12-20 RX ADMIN — METOPROLOL TARTRATE 25 MG: 25 TABLET, FILM COATED ORAL at 20:40

## 2022-12-20 RX ADMIN — METOPROLOL TARTRATE 25 MG: 25 TABLET, FILM COATED ORAL at 08:24

## 2022-12-20 RX ADMIN — PANTOPRAZOLE SODIUM 40 MG: 40 TABLET, DELAYED RELEASE ORAL at 08:24

## 2022-12-20 RX ADMIN — AMIODARONE HYDROCHLORIDE 400 MG: 200 TABLET ORAL at 08:25

## 2022-12-20 RX ADMIN — GABAPENTIN 100 MG: 100 CAPSULE ORAL at 20:39

## 2022-12-20 RX ADMIN — METOCLOPRAMIDE 5 MG: 5 INJECTION, SOLUTION INTRAMUSCULAR; INTRAVENOUS at 08:29

## 2022-12-20 RX ADMIN — POTASSIUM & SODIUM PHOSPHATES POWDER PACK 280-160-250 MG 1 PACKET: 280-160-250 PACK at 08:25

## 2022-12-20 RX ADMIN — POTASSIUM CHLORIDE 20 MEQ: 1500 TABLET, EXTENDED RELEASE ORAL at 08:24

## 2022-12-20 ASSESSMENT — ACTIVITIES OF DAILY LIVING (ADL)
ADLS_ACUITY_SCORE: 27
ADLS_ACUITY_SCORE: 23
ADLS_ACUITY_SCORE: 27
ADLS_ACUITY_SCORE: 27
ADLS_ACUITY_SCORE: 23
ADLS_ACUITY_SCORE: 27
ADLS_ACUITY_SCORE: 23
ADLS_ACUITY_SCORE: 23

## 2022-12-20 NOTE — PLAN OF CARE
Orientations: A&Ox4  Vitals/Pain: VSS on RA. Denies pain.   Tele: Accelerated junctional/ NSR w/ ST elevation   Lines/Drains: PIVx1, CT and pacer wires  Skin/Wounds: Sternal incision, CT site. Slight air leak noted with cough at shift change.   GI/: Full liq diet. Continent of B/B. Bmx1 this shift.   Labs: Abnormal/Trends, Electrolyte Replacement- Phos, K, and calcium all replaced today.   Ambulation/Assist: [id number]  Sleep Quality: Fair  Plan: Plan to discharge home tomorrow. Updated wife via phone. Multiple walks today and up in chair for meals.

## 2022-12-20 NOTE — PROGRESS NOTES
[facility]  Cardiovascular and Thoracic Surgery Daily Note      Assessment and Plan  POD #4 s/p Aortic Valve Replacement with a 21mm Inspiris Resilia Bioprosthetic Valve, Transesophageal echocardiography on with Dr. Nicho Abbott.     - CVS: Pre-op TTE with EF 70%, severe left ventricular hypertrophy. HR: 60s-80s. SBP: 110s-150s. Transition Lopressor to Toprol 50 mg daily with hold parameters, start Lisinopril 5 mg daily. Aspirin 81 mg daily. Statin not indicated. A fib with RVR POD1, converted to NSR with amiodarone bolus and infusion, now transitioned to PO with taper. Hypervolemic, Lasix 40 mg IV x 1 and reassess. EKG with non-specific ST changes, no chest pain, no CAD on pre-op angiogram, likely pericarditis, continue to monitor. Plan to anticoagulate for bioprosthetic aortic valve for 3 months per Dr. Abbott, INR goal 2-3. No heparin bridging. Chest tubes with 100 cc/24 hours, -air leak with cough, to water seal yesterday    - Resp: Extubated on POD#0. IS, pulmonary toilet. Saturating well room air.     - Neuro: Neuro intact, pain controlled on current regimen.    - Renal: No history of significant renal disease. Cr stable WNL. Trend BMP.  Diuretic as above.   Recent Labs   Lab 12/20/22  0611 12/19/22  0625 12/18/22  0607   CR 0.89 0.74 0.84       - GI: Abdominal distention, with N/V POD2. No BM, +flatus. AXR 12/18 suggestive ileus, made NPO. Improved distention 12/19, advance diet to clear. Scheduled Reglan. Suppository today. Repeat AXR 12/20.    - : Brown removed, voiding without issue    - Endo:  Completed insulin infusion per protocol. Transitioned sliding scale insulin 12/17, now discontinued  Hemoglobin A1C   Date Value Ref Range Status   12/09/2022 5.5 0.0 - 5.6 % Final     Comment:     Normal <5.7%   Prediabetes 5.7-6.4%    Diabetes 6.5% or higher     Note: Adopted from ADA consensus guidelines.        - FEN: Replace electrolytes as needed. Advance to full liquid then to regular diet  this afternoon.      - ID: Postop leukocytosis, improving. Afebrile. Periop abx completed. Trend CBC.   Recent Labs   Lab 12/20/22  0611 12/19/22  0625 12/18/22  0607   WBC 12.8* 15.6* 17.8*       - Heme: Acute blood loss anemia and thrombocytopenia due to surgery. Hgb and PLT stable. Trend CBC, transfuse PRN.   Recent Labs   Lab 12/20/22  0611 12/19/22  0625 12/18/22  0607   HGB 11.3* 11.2* 11.1*    118* 121*       - Proph: SCD, subcutaneous heparin, PPI    - Dispo: [facility]. Therapies recommending likely progress to home with assist at discharge. Dispo tomorrow, stable HR/rhythm, chest tube removal.       Interval History  Sitting up in chair, breathing stable, tolerating diet, working with rehab, feels less distended today    Medications    amiodarone  400 mg Oral BID     aspirin  81 mg Oral or NG Tube Daily     calcium gluconate  1 g Intravenous Once     gabapentin  100 mg Oral At Bedtime     lisinopril  5 mg Oral Daily     metoclopramide  5 mg Intravenous TID     metoprolol tartrate  25 mg Oral BID     pantoprazole  40 mg Oral or NG Tube Daily    Or     pantoprazole  40 mg Oral Daily     polyethylene glycol  17 g Oral Daily     potassium & sodium phosphates  1 packet Oral or Feeding Tube Q4H     senna-docusate  1 tablet Oral BID     sodium chloride (PF)  3 mL Intracatheter Q8H     Warfarin Therapy Reminder  1 each Oral See Admin Instructions     warfarin-No DOSE today  1 each Does not apply no dose today (warfarin)     acetaminophen, bisacodyl, calcium gluconate, calcium gluconate, calcium gluconate, glucose **OR** dextrose **OR** glucagon, furosemide, hydrALAZINE, HYDROmorphone **OR** HYDROmorphone, HYDROmorphone **OR** HYDROmorphone, lactated ringers, lidocaine 4%, lidocaine (buffered or not buffered), magnesium hydroxide, naloxone **OR** naloxone **OR** naloxone **OR** naloxone, Reason anticoagulant not prescribed for atrial fibrillation, ondansetron **OR** ondansetron, prochlorperazine **OR**  "prochlorperazine, BETA BLOCKER NOT PRESCRIBED, sodium chloride (PF)      Physical Exam  Vitals were reviewed  Blood pressure 106/71, pulse 81, temperature 97.9  F (36.6  C), temperature source Oral, resp. rate 16, height 1.727 m (5' 8\"), weight 63.5 kg (140 lb), SpO2 95 %.  Rhythm: NSR    Lungs: diminished bases    Cardiovascular: rrr, no m/r/g    Abdomen: soft, NT, ND, +BS    Extremeties: warm, no LE edema    Incision: CDI    CT: serosang output 260 mL, + air leak with cough    Weight:   Vitals:    12/17/22 0530 12/17/22 1353 12/18/22 0557 12/19/22 0650   Weight: 71.2 kg (156 lb 15.5 oz) 68.8 kg (151 lb 9.6 oz) 68.4 kg (150 lb 14.4 oz) 65.5 kg (144 lb 6.4 oz)    12/20/22 0537   Weight: 63.5 kg (140 lb)         Data  Recent Labs   Lab 12/20/22  0611 12/19/22  1409 12/19/22  0755 12/19/22  0625 12/18/22  1704 12/18/22  1534 12/18/22  0726 12/18/22  0607 12/17/22  0357 12/17/22  0354 12/16/22  1408 12/16/22  1220   WBC 12.8*  --   --  15.6*  --   --   --  17.8*  --  19.6*  --  25.7*   HGB 11.3*  --   --  11.2*  --   --   --  11.1*  --  10.9*  --  11.2*   MCV 90  --   --  87  --   --   --  89  --  89  --  90     --   --  118*  --   --   --  121*  --  134*  --  159   INR 2.39*  --   --  1.19*  --  1.09  --   --   --   --   --  1.13     --   --  133  --   --   --  128*  --  133  --  141   POTASSIUM 3.7  --   --  4.1  --   --   --  4.4  --  4.0  --  4.0   CHLORIDE 97  --   --  98  --   --   --  95  --  104  --  112*   CO2 29  --   --  28  --   --   --  25  --  22  --  22   BUN 28  --   --  24  --   --   --  28  --  27  --  27   CR 0.89  --   --  0.74  --   --   --  0.84  --  0.87  --  0.81   ANIONGAP 7  --   --  7  --   --   --  8  --  7  --  7   RAMONA 8.6  --   --  8.8  --   --   --  8.4*  --  7.9*  --  8.3*   * 114* 114* 118*   < >  --    < > 153*   < > 141*   < > 179*   ALBUMIN  --   --   --   --   --   --   --   --   --  3.0*  --  3.1*   PROTTOTAL  --   --   --   --   --   --   --   --   --  5.4*  -- "  5.2*   BILITOTAL  --   --   --   --   --   --   --   --   --  0.6  --  0.4   ALKPHOS  --   --   --   --   --   --   --   --   --  40  --  49   ALT  --   --   --   --   --   --   --   --   --  23  --  21   AST  --   --   --   --   --   --   --   --   --  50*  --  38    < > = values in this interval not displayed.       Imaging:  Recent Results (from the past 24 hour(s))   XR Chest 2 Views    Narrative    CHEST TWO VIEWS  12/20/2022 7:06 AM     HISTORY: Recent chest surgery, chest tube.    COMPARISON: December 17, 2022       Impression    IMPRESSION: No pneumothorax. Minimal pleural effusion and associated  probable compressive atelectasis vs. less likely infiltrate in the  left. Right-sided chest tube in place, no pneumothorax. Midline chest  tube stable. There are no acute infiltrates. The cardiac silhouette is  not enlarged. Pulmonary vasculature is unremarkable.     JACKELYN ALMONTE MD         SYSTEM ID:  [id number]   XR Abdomen 2 Views    Narrative    ABDOMEN TWO TO THREE VIEWS  12/20/2022 7:06 AM     HISTORY: Follow up ileus.    COMPARISON: December 18, 2022    FINDINGS: Small amount of stool. No free air. There are no air filled  distended loops of small bowel. The colon is not distended. The lung  bases are unremarkable. Question left base infiltrate and/or effusion.      Impression    IMPRESSION: Nonobstructed bowel gas pattern.    JACKELYN ALMONTE MD         SYSTEM ID:  [id number]         Patient seen and discussed with Dr. Abbott/Swati De Jesus PA-C  Cardiothoracic Surgery  Pager [phone]

## 2022-12-20 NOTE — PLAN OF CARE
Goal Outcome Evaluation:    COGNITION/MENTATION: A/O x 4.   NEURO/CMS: Cool BLE, Weak pulses  CARDIAC/TELE: Accelerated junctional rhythm, asymptomatic   RESPIRATORY: On RA, LS diminished, encouraged IS  GI: BS+, passing flatus, 3 BM since 1830 last night   : AUO, voiding in urinal  PAIN: Denies  SKIN: Sternal incision  DRAINS/LINES: CT to water seal, no air leak, minimal output 30cc overnight   ACTIVITY: Up SBA  DIET: Clear liquid diet

## 2022-12-21 ENCOUNTER — APPOINTMENT (OUTPATIENT)
Dept: PHYSICAL THERAPY | Facility: CLINIC | Age: 67
DRG: 220 | End: 2022-12-21
Attending: STUDENT IN AN ORGANIZED HEALTH CARE EDUCATION/TRAINING PROGRAM
Payer: COMMERCIAL

## 2022-12-21 VITALS
BODY MASS INDEX: 21.04 KG/M2 | OXYGEN SATURATION: 97 % | DIASTOLIC BLOOD PRESSURE: 90 MMHG | WEIGHT: 138.8 LBS | RESPIRATION RATE: 15 BRPM | HEIGHT: 68 IN | TEMPERATURE: 98.4 F | SYSTOLIC BLOOD PRESSURE: 152 MMHG | HEART RATE: 70 BPM

## 2022-12-21 PROBLEM — E87.70 FLUID OVERLOAD: Status: ACTIVE | Noted: 2022-12-21

## 2022-12-21 PROBLEM — R73.9 TRANSIENT HYPERGLYCEMIA POST PROCEDURE: Status: ACTIVE | Noted: 2022-12-21

## 2022-12-21 PROBLEM — K56.0 PARALYTIC ILEUS (H): Status: ACTIVE | Noted: 2022-12-21

## 2022-12-21 LAB
ANION GAP SERPL CALCULATED.3IONS-SCNC: 5 MMOL/L (ref 3–14)
BUN SERPL-MCNC: 25 MG/DL (ref 7–30)
CA-I BLD-MCNC: 4.5 MG/DL (ref 4.4–5.2)
CALCIUM SERPL-MCNC: 8.4 MG/DL (ref 8.5–10.1)
CHLORIDE BLD-SCNC: 99 MMOL/L (ref 94–109)
CO2 SERPL-SCNC: 29 MMOL/L (ref 20–32)
CREAT SERPL-MCNC: 0.92 MG/DL (ref 0.66–1.25)
ERYTHROCYTE [DISTWIDTH] IN BLOOD BY AUTOMATED COUNT: 12.5 % (ref 10–15)
GFR SERPL CREATININE-BSD FRML MDRD: 90 ML/MIN/1.73M2
GLUCOSE BLD-MCNC: 113 MG/DL (ref 70–99)
HCT VFR BLD AUTO: 32.2 % (ref 40–53)
HGB BLD-MCNC: 10.8 G/DL (ref 13.3–17.7)
INR PPP: 2.04 (ref 0.85–1.15)
MAGNESIUM SERPL-MCNC: 2.2 MG/DL (ref 1.6–2.3)
MCH RBC QN AUTO: 30 PG (ref 26.5–33)
MCHC RBC AUTO-ENTMCNC: 33.5 G/DL (ref 31.5–36.5)
MCV RBC AUTO: 89 FL (ref 78–100)
PHOSPHATE SERPL-MCNC: 3.2 MG/DL (ref 2.5–4.5)
PLATELET # BLD AUTO: 181 10E3/UL (ref 150–450)
POTASSIUM BLD-SCNC: 3.9 MMOL/L (ref 3.4–5.3)
RBC # BLD AUTO: 3.6 10E6/UL (ref 4.4–5.9)
SODIUM SERPL-SCNC: 133 MMOL/L (ref 133–144)
WBC # BLD AUTO: 12.1 10E3/UL (ref 4–11)

## 2022-12-21 PROCEDURE — 97530 THERAPEUTIC ACTIVITIES: CPT | Mod: GP

## 2022-12-21 PROCEDURE — 36415 COLL VENOUS BLD VENIPUNCTURE: CPT | Performed by: PHYSICIAN ASSISTANT

## 2022-12-21 PROCEDURE — 85014 HEMATOCRIT: CPT | Performed by: PHYSICIAN ASSISTANT

## 2022-12-21 PROCEDURE — 84100 ASSAY OF PHOSPHORUS: CPT | Performed by: STUDENT IN AN ORGANIZED HEALTH CARE EDUCATION/TRAINING PROGRAM

## 2022-12-21 PROCEDURE — 250N000013 HC RX MED GY IP 250 OP 250 PS 637: Performed by: PHYSICIAN ASSISTANT

## 2022-12-21 PROCEDURE — 82330 ASSAY OF CALCIUM: CPT | Performed by: PHYSICIAN ASSISTANT

## 2022-12-21 PROCEDURE — 83735 ASSAY OF MAGNESIUM: CPT | Performed by: STUDENT IN AN ORGANIZED HEALTH CARE EDUCATION/TRAINING PROGRAM

## 2022-12-21 PROCEDURE — 85610 PROTHROMBIN TIME: CPT | Performed by: PHYSICIAN ASSISTANT

## 2022-12-21 PROCEDURE — 250N000011 HC RX IP 250 OP 636: Performed by: PHYSICIAN ASSISTANT

## 2022-12-21 PROCEDURE — 80048 BASIC METABOLIC PNL TOTAL CA: CPT | Performed by: PHYSICIAN ASSISTANT

## 2022-12-21 PROCEDURE — 97110 THERAPEUTIC EXERCISES: CPT | Mod: GP

## 2022-12-21 RX ORDER — POLYETHYLENE GLYCOL 3350 17 G/17G
17 POWDER, FOR SOLUTION ORAL DAILY
Qty: 510 G | Refills: 0 | Status: SHIPPED | OUTPATIENT
Start: 2022-12-21 | End: 2023-04-05

## 2022-12-21 RX ORDER — METOCLOPRAMIDE 5 MG/1
5 TABLET ORAL 3 TIMES DAILY
Qty: 21 TABLET | Refills: 0 | Status: SHIPPED | OUTPATIENT
Start: 2022-12-21 | End: 2022-12-28

## 2022-12-21 RX ORDER — AMIODARONE HYDROCHLORIDE 200 MG/1
200 TABLET ORAL DAILY
Qty: 23 TABLET | Refills: 0 | Status: SHIPPED | OUTPATIENT
Start: 2022-12-26 | End: 2023-04-05

## 2022-12-21 RX ORDER — FAMOTIDINE 20 MG/1
20 TABLET, FILM COATED ORAL 2 TIMES DAILY
Qty: 28 TABLET | Refills: 0 | Status: SHIPPED | OUTPATIENT
Start: 2022-12-21 | End: 2023-01-04

## 2022-12-21 RX ORDER — AMIODARONE HYDROCHLORIDE 400 MG/1
400 TABLET ORAL 2 TIMES DAILY
Qty: 8 TABLET | Refills: 0 | Status: SHIPPED | OUTPATIENT
Start: 2022-12-21 | End: 2023-01-09

## 2022-12-21 RX ORDER — LISINOPRIL 5 MG/1
5 TABLET ORAL DAILY
Qty: 30 TABLET | Refills: 3 | Status: SHIPPED | OUTPATIENT
Start: 2022-12-22 | End: 2023-04-10

## 2022-12-21 RX ORDER — WARFARIN SODIUM 5 MG/1
5 TABLET ORAL DAILY
Qty: 30 TABLET | Refills: 2 | Status: SHIPPED | OUTPATIENT
Start: 2022-12-21 | End: 2023-02-03

## 2022-12-21 RX ORDER — METOPROLOL TARTRATE 25 MG/1
25 TABLET, FILM COATED ORAL 2 TIMES DAILY
Qty: 60 TABLET | Refills: 3 | Status: SHIPPED | OUTPATIENT
Start: 2022-12-21 | End: 2023-04-10

## 2022-12-21 RX ORDER — ACETAMINOPHEN 325 MG/1
650 TABLET ORAL EVERY 4 HOURS PRN
Qty: 40 TABLET | Refills: 0 | Status: SHIPPED | OUTPATIENT
Start: 2022-12-21 | End: 2023-04-05

## 2022-12-21 RX ORDER — AMOXICILLIN 250 MG
1 CAPSULE ORAL 2 TIMES DAILY PRN
Qty: 20 TABLET | Refills: 0 | Status: SHIPPED | OUTPATIENT
Start: 2022-12-21 | End: 2023-04-05

## 2022-12-21 RX ADMIN — PANTOPRAZOLE SODIUM 40 MG: 40 TABLET, DELAYED RELEASE ORAL at 09:42

## 2022-12-21 RX ADMIN — LISINOPRIL 5 MG: 5 TABLET ORAL at 09:41

## 2022-12-21 RX ADMIN — AMIODARONE HYDROCHLORIDE 400 MG: 200 TABLET ORAL at 09:41

## 2022-12-21 RX ADMIN — METOCLOPRAMIDE 5 MG: 5 INJECTION, SOLUTION INTRAMUSCULAR; INTRAVENOUS at 09:41

## 2022-12-21 RX ADMIN — METOPROLOL TARTRATE 25 MG: 25 TABLET, FILM COATED ORAL at 09:42

## 2022-12-21 RX ADMIN — ASPIRIN 81 MG CHEWABLE TABLET 81 MG: 81 TABLET CHEWABLE at 09:42

## 2022-12-21 ASSESSMENT — ACTIVITIES OF DAILY LIVING (ADL)
ADLS_ACUITY_SCORE: 23

## 2022-12-21 NOTE — CARE PLAN
Physical Therapy Discharge Summary    Reason for therapy discharge:    Discharged to home with outpt CR phase II.     Progress towards therapy goal(s). See goals on Care Plan in [facility] electronic health record for goal details.  Goals partially met.  Barriers to achieving goals:   discharge from facility.    Therapy recommendation(s):    Continued therapy is recommended.  Rationale/Recommendations:  outpt CR phase II recommended to progress endurance and optimize heart health. .  Continue home exercise program.

## 2022-12-21 NOTE — PROGRESS NOTES
I met with patient and his family. Post op instructions reviewed. All questions addressed. Plan discharge to home with put patient cardiac rehabilitation. Will follow along.

## 2022-12-21 NOTE — DISCHARGE SUMMARY
"Discharge Summary    Miguel Pappas MRN# [id number]   YOB: 1955 Age: 67 year old     Date of Admission:  12/16/2022  Date of Discharge:  12/21/2022  Admitting Physician:  Kaila Abbott MD  Discharge Physician:  Liliane De Jesus PA-C,  Kaila Abbott MD  Discharging Service:  Cardiovascular and Thoracic Surgery     Home clinic: [facility]   Primary Provider: Thony Gongora          Admission Diagnoses:   Aortic stenosis [I35.0]  S/P AVR [Z95.2]          Discharge Diagnosis:   Patient Active Problem List   Diagnosis     Mitral valve stenosis and aortic valve stenosis     Pure hypercholesterolemia     HYPERLIPIDEMIA LDL GOAL <100     Status post coronary angiogram     S/P AVR     Paralytic ileus (H)     Transient hyperglycemia post procedure     Fluid overload                Discharge Disposition:   Discharged to home           Condition on Discharge:   Discharge condition: Stable   Discharge vitals: Blood pressure (!) 152/90, pulse 70, temperature 98.4  F (36.9  C), temperature source Oral, resp. rate 15, height 1.727 m (5' 8\"), weight 63 kg (138 lb 12.8 oz), SpO2 97 %.     Code status on discharge: Full Code           Procedures:   On 12/16/22 Mr Pappas successfully underwent Aortic Valve Replacement with a 21mm Inspiris Resilia Bioprosthetic Valve, Transesophageal echocardiography by Dr. Adla Abbott.           Medications Prior to Admission:     Medications Prior to Admission   Medication Sig Dispense Refill Last Dose     aspirin 81 MG tablet Take 81 mg by mouth daily   12/15/2022 at 0400     GLUCOSAMINE CHONDROITIN OR TABS Take 1 tablet by mouth daily  0 12/1/2022 at 0400     Multiple Vitamins-Minerals (CENTRUM SILVER PO) Take 1 tablet by mouth daily 30 0 12/1/2022 at 0400     VITAMIN D PO Take 1 tablet by mouth daily   12/1/2022 at 0400             Discharge Medications:     Current Discharge Medication List      START taking these medications    Details "   acetaminophen (TYLENOL) 325 MG tablet Take 2 tablets (650 mg) by mouth every 4 hours as needed for mild pain  Qty: 40 tablet, Refills: 0    Associated Diagnoses: S/P AVR      !! amiodarone (PACERONE) 200 MG tablet Take 1 tablet (200 mg) by mouth daily for 23 days  Qty: 23 tablet, Refills: 0    Associated Diagnoses: S/P AVR      !! amiodarone (PACERONE) 400 MG tablet Take 1 tablet (400 mg) by mouth 2 times daily for 4 days  Qty: 8 tablet, Refills: 0    Associated Diagnoses: S/P AVR      famotidine (PEPCID) 20 MG tablet Take 1 tablet (20 mg) by mouth 2 times daily for 14 days  Qty: 28 tablet, Refills: 0    Associated Diagnoses: S/P AVR      lisinopril (ZESTRIL) 5 MG tablet Take 1 tablet (5 mg) by mouth daily  Qty: 30 tablet, Refills: 3    Associated Diagnoses: S/P AVR      metoclopramide (REGLAN) 5 MG tablet Take 1 tablet (5 mg) by mouth 3 times daily for 7 days  Qty: 21 tablet, Refills: 0    Associated Diagnoses: S/P AVR      metoprolol tartrate (LOPRESSOR) 25 MG tablet Take 1 tablet (25 mg) by mouth 2 times daily  Qty: 60 tablet, Refills: 3    Associated Diagnoses: S/P AVR      polyethylene glycol (MIRALAX) 17 GM/Dose powder Take 17 g by mouth daily  Qty: 510 g, Refills: 0    Associated Diagnoses: S/P AVR      senna-docusate (SENOKOT-S/PERICOLACE) 8.6-50 MG tablet Take 1 tablet by mouth 2 times daily as needed for constipation  Qty: 20 tablet, Refills: 0    Associated Diagnoses: S/P AVR      warfarin ANTICOAGULANT (COUMADIN) 5 MG tablet Take 1 tablet (5 mg) by mouth daily  Qty: 30 tablet, Refills: 2    Comments: Take as directed by INR clinic  Associated Diagnoses: S/P AVR       !! - Potential duplicate medications found. Please discuss with provider.      CONTINUE these medications which have NOT CHANGED    Details   aspirin 81 MG tablet Take 81 mg by mouth daily    Associated Diagnoses: Routine general medical examination at a health care facility      GLUCOSAMINE CHONDROITIN OR TABS Take 1 tablet by mouth  daily  Refills: 0      Multiple Vitamins-Minerals (CENTRUM SILVER PO) Take 1 tablet by mouth daily  Qty: 30, Refills: 0      VITAMIN D PO Take 1 tablet by mouth daily                   Consultations:   Nutrition, Intensivist             Brief History of Illness:   Miguel Pappas is a 67-year-old male who presented with symptomatic critical aortic stenosis, confirmed on echocardiography with a peak velocity of 5 m/s. Left ventricular function was normal with a LVEF of 70%. Coronary angiography demonstrated no evidence or coronary artery disease. Dedicated TAVR protocol CT demonstrated asymmetric distribution of calcium and LVOT calcification, and there was concern about the feasibility of a TAVR procedure in him. Because of this, a surgical aortic valve replacement was recommended. The patient understood the risks and benefits of the procedure and wished to undergo the operation.          Hospital Course:   On 12/16/22 Mr Pappas successfully underwent Aortic Valve Replacement with a 21mm Inspiris Resilia Bioprosthetic Valve, Transesophageal echocardiography by Dr. Adal Abbott.   Was extubated within 24 hours of surgery. Once he was extubated within 24 hrs, weaned off hemodynamic stabilizing gtt's, transferred to the surgical floor.   Had some transient hyperglycemia treated with an insulin gtt, transitioned to sliding scale insulin and has no need at discharge.  Had some fluid overload treated with diuretic medication. At discharge he is at his pre-op weight and is not sent with diuretics.   His diet was cautiously advanced as had signs of ileus. He had multiple BMs and belly was soft at discharge along with tolerating a regular diet and is sent with reglan.   Heart rhythm remained stable. He progressed well with cardiac rehab. He is discharged to home on pod# 5 with the help of their family.   He was started on coumadin for his tissue AV. His INR goal is 2-3 for 3 months as his valve seats in the aorta.               Significant Results:   Lab Results   Component Value Date    WBC 12.1 12/21/2022    WBC 5.9 10/26/2018     Lab Results   Component Value Date    RBC 3.60 12/21/2022    RBC 4.27 10/26/2018     Lab Results   Component Value Date    HGB 10.8 12/21/2022    HGB 12.8 10/26/2018     Lab Results   Component Value Date    HCT 32.2 12/21/2022    HCT 37.9 10/26/2018     No components found for: MCT  Lab Results   Component Value Date    MCV 89 12/21/2022    MCV 89 10/26/2018     Lab Results   Component Value Date    MCH 30.0 12/21/2022    MCH 30.0 10/26/2018     Lab Results   Component Value Date    MCHC 33.5 12/21/2022    MCHC 33.8 10/26/2018     Lab Results   Component Value Date    RDW 12.5 12/21/2022    RDW 12.8 10/26/2018     Lab Results   Component Value Date     12/21/2022     10/26/2018       Last Basic Metabolic Panel:  Lab Results   Component Value Date     12/21/2022     10/26/2018      Lab Results   Component Value Date    POTASSIUM 3.9 12/21/2022    POTASSIUM 4.2 10/26/2018     Lab Results   Component Value Date    CHLORIDE 99 12/21/2022    CHLORIDE 105 10/26/2018     Lab Results   Component Value Date    RAMONA 8.4 12/21/2022    RAMONA 8.9 10/26/2018     Lab Results   Component Value Date    CO2 29 12/21/2022    CO2 31 10/26/2018     Lab Results   Component Value Date    BUN 25 12/21/2022    BUN 24 10/26/2018     Lab Results   Component Value Date    CR 0.92 12/21/2022    CR 1.00 10/26/2018     Lab Results   Component Value Date     12/21/2022    GLC 96 10/26/2018                  Pending Results:   None           Discharge Instructions and Follow-Up:   Discharge diet: Regular   Discharge activity: Daily weights: Call if weight gain 2-3 lbs over 24 hours or if greater than 5 lbs in 1 week.  No lifting more than 10 lbs for 8-12 weeks.  No driving for 1 month.  Call for pain medication refill.  Call for temperature greater than 101.0  Daily shower with antibacterial soap.   Discharge  follow-up: Follow-up and recommended labs and tests     *Take 5 mg coumadin tonight 12/21/22, 2.5 mg coumadin tomorrow 12/22/22 and have INR drawn on Friday in [place]. INR goal 2-3 for you tissue aortic valve.   *Follow up primary care provider in 7-10 days after discharge in order to review your medication, vital signs, obtain any necessary lab work your doctor may want, and to update them on your hospitalization and medical issues.   *Follow up with Jose Parkinson with Dr Abbott, heart surgeon, at [facility] at [facility] [address] on 1/9/22 at 1330. If any questions or concerns call [phone].  You will see us once at this visit and then if everything is going well you will not need to see us again.  You will follow long term with your cardiologist.   *Follow up with Dr Sinclair, cardiologist, on 2/20/23 at 11:25 am in [place]. This is who you will follow with long term about your heart issues. [phone].          Outpatient therapy: Cardiac rehab   Home Care agency: None    Supplies and equipment: None   Lines and drains: None    Wound care: Wash incision daily with antibacterial soap   Other instructions: None

## 2022-12-21 NOTE — PLAN OF CARE
Neuro: AO x4  Tele/cardiac: SR  Respiration: on RA, denies SOB  Activity: SBA  Pain: denies  Drips: PIV SL  Drains/tubes: NONE  Skin: sternal incision MATTY, old CT dressing CDI  GI/: continent, regular diet  Aggression color: green  Isolation: none  Plan: chest xray completed yesterday, possible discharge home today

## 2022-12-21 NOTE — PROGRESS NOTES
"NUTRITION ASSESSMENT      REASON FOR ASSESSMENT:  Cardiac Surgery Nutrition Consult    CURRENT DIET / INTAKE:  Regular    Patient seen at bedside this morning. He states his bowels are moving again and appetite is good/at baseline, denies nutrition concerns. Planning to go home.     ANTHROPOMETRICS:   Ht: 5' 8\"  Wt: 63 kg  BMI: 21.1  IBW: 70 kg  Weight Status: Normal BMI  %IBW: 90%    MALNUTRITION:  Patient does not meet two of the following criteria necessary for diagnosing malnutrition:  significant weight loss, reduced intake, subcutaneous fat loss, muscle loss or fluid retention. Nutrition Focused Physical Assessment (NFPA) not appropriate at this time.    NUTRITION DIAGNOSIS:   No nutrition diagnosis identified at this time     INTERVENTIONS:    Nutrition Prescription:  Regular Diet  Encourage meals TID with emphasis on protein     Implementation:  Nutrition Education (Content):  Discussed the importance of adequate nutrition post-op for healing and energy, emphasizing protein foods, and encouraged patient to order a protein food at each meal.    Goals:  Patient to consume ~75% at meals in the next 3 - 5 days    Follow Up/Monitoring (InPatient):  Food and Fluid intake -  Monitor for adequacy    Follow Up/Monitoring (OutPatient):  Patient will participate in out-patient cardiac rehab and attend nutrition classes during the program    Yesika Fine RD, LD          "

## 2022-12-21 NOTE — PLAN OF CARE
[id number]    AO x 4, VSS on RA. Denies pain, SOB, nausea. Tolerating reg diet. Up ad martin in room. CT dressing removal and aftercare provided, AVS discharge instructions given. Pt discharged to home.

## 2022-12-21 NOTE — PROGRESS NOTES
CV Surgery    S: Sitting up in chair, breathing stable, tolerating diet, working with rehab, belly soft, ready for home    O: B/P: 152/90, T: 98.4, P: 70, R: 15  General: NAD  Heart: s1/s2, no m/r/g  Lungs: course  Abd: s/nt/nd  Sternum: cdi  Extremities: no LE edema    Lab Results   Component Value Date    WBC 12.1 12/21/2022    WBC 5.9 10/26/2018     Lab Results   Component Value Date    RBC 3.60 12/21/2022    RBC 4.27 10/26/2018     Lab Results   Component Value Date    HGB 10.8 12/21/2022    HGB 12.8 10/26/2018     Lab Results   Component Value Date    HCT 32.2 12/21/2022    HCT 37.9 10/26/2018     No components found for: MCT  Lab Results   Component Value Date    MCV 89 12/21/2022    MCV 89 10/26/2018     Lab Results   Component Value Date    MCH 30.0 12/21/2022    MCH 30.0 10/26/2018     Lab Results   Component Value Date    MCHC 33.5 12/21/2022    MCHC 33.8 10/26/2018     Lab Results   Component Value Date    RDW 12.5 12/21/2022    RDW 12.8 10/26/2018     Lab Results   Component Value Date     12/21/2022     10/26/2018       Last Basic Metabolic Panel:  Lab Results   Component Value Date     12/21/2022     10/26/2018      Lab Results   Component Value Date    POTASSIUM 3.9 12/21/2022    POTASSIUM 4.2 10/26/2018     Lab Results   Component Value Date    CHLORIDE 99 12/21/2022    CHLORIDE 105 10/26/2018     Lab Results   Component Value Date    RAMONA 8.4 12/21/2022    RAMONA 8.9 10/26/2018     Lab Results   Component Value Date    CO2 29 12/21/2022    CO2 31 10/26/2018     Lab Results   Component Value Date    BUN 25 12/21/2022    BUN 24 10/26/2018     Lab Results   Component Value Date    CR 0.92 12/21/2022    CR 1.00 10/26/2018     Lab Results   Component Value Date     12/21/2022    GLC 96 10/26/2018       A/P: POD #5 s/p Aortic Valve Replacement with a 21mm Inspiris Resilia Bioprosthetic Valve, Transesophageal echocardiography on with Dr. Nicho Abbott.    Seen and discussed with  Dr. Scar De Jesus PA-C  Pager [phone]

## 2022-12-22 ENCOUNTER — PATIENT OUTREACH (OUTPATIENT)
Dept: CARE COORDINATION | Facility: CLINIC | Age: 67
End: 2022-12-22

## 2022-12-22 ENCOUNTER — ANTICOAGULATION THERAPY VISIT (OUTPATIENT)
Dept: ANTICOAGULATION | Facility: CLINIC | Age: 67
End: 2022-12-22

## 2022-12-22 DIAGNOSIS — Z95.2 S/P AVR: Primary | ICD-10-CM

## 2022-12-22 NOTE — PROGRESS NOTES
"Clinic Care Coordination Contact  [facility]: Post-Discharge Note  SITUATION                                                      Admission:    Admission Date: 12/16/22   Reason for Admission: Aortic stenosis,  S/P AVR  Discharge:   Discharge Date: 12/21/22  Discharge Diagnosis: Mitral valve stenosis and aortic valve stenosis, Pure hypercholesterolemia, HYPERLIPIDEMIA LDL GOAL <100, Status post coronary angiogram, S/P AVR, Paralytic ileus (H), Transient hyperglycemia post procedure, Fluid overload    BACKGROUND                                                      Per hospital discharge summary and inpatient provider notes:    Miguel Pappas is a 67-year-old male who presented with symptomatic critical aortic stenosis, confirmed on echocardiography with a peak velocity of 5 m/s. Left ventricular function was normal with a LVEF of 70%. Coronary angiography demonstrated no evidence or coronary artery disease. Dedicated TAVR protocol CT demonstrated asymmetric distribution of calcium and LVOT calcification, and there was concern about the feasibility of a TAVR procedure in him. Because of this, a surgical aortic valve replacement was recommended. The patient understood the risks and benefits of the procedure and wished to undergo the operation.    ASSESSMENT      Pt declined CCC.    Discharge Assessment  How are you doing now that you are home?: \"Seem to be good, did a lot more moving than I did in the last couple days.\"  How are your symptoms? (Red Flag symptoms escalate to triage hotline per guidelines): Improved  Do you feel your condition is stable enough to be safe at home until your provider visit?: Yes  Does the patient have their discharge instructions? : Yes  Does the patient have questions regarding their discharge instructions? : No  Were you started on any new medications or were there changes to any of your previous medications? : Yes  Does the patient have all of their medications?: Yes  Do you " have questions regarding any of your medications? : No  Do you have all of your needed medical supplies or equipment (DME)?  (i.e. oxygen tank, CPAP, cane, etc.): Yes  Discharge follow-up appointment scheduled within 14 calendar days? : No  Discharge Follow Up Appointment Date: 01/11/23  Discharge Follow Up Appointment Scheduled with?: Primary Care Provider  Is patient agreeable to assistance with scheduling? : Yes ([facility] assisted in [facility] follow up appointment)    Post-op ([facility] CTA Only)  If the patient had a surgery or procedure, do they have any questions for a nurse?: No    PLAN                                                      Outpatient Plan:      Take 5 mg coumadin tonight 12/21/22, 2.5 mg coumadin tomorrow 12/22/22 and have INR drawn on Friday in [place]. INR goal 2-3 for you tissue aortic valve.   *Follow up primary care provider in 7-10 days after discharge in order to review your medication, vital signs, obtain any necessary lab work your doctor may want, and to update them on your hospitalization and medical issues.   *Follow up with Lilaine/Sun Parkinson with Dr Abbott, heart surgeon, at [facility] at [facility] [address] on 1/9/22 at 1330. If any questions or concerns call [phone].  You will see us once at this visit and then if everything is going well you will not need to see us again.  You will follow long term with your cardiologist.   *Follow up with Dr Sinclair, cardiologist, on 2/20/23 at 11:25 am in [place]. This is who you will follow with long term about your heart issues. [phone].     Future Appointments   Date Time Provider Department Center   12/23/2022 11:10 AM PH INR LAB [facility]   12/26/2022 11:30 AM PH CR 1 [facility]   1/9/2023  1:30 PM [facility] CARDIOTHORACIC SURGERY, WILIAN TURPIN   1/11/2023 10:30 AM Miguel Keys MD [facility]   2/20/2023 11:30 AM Levi Sinclair MD [facility]          For any urgent concerns, please contact our 24 hour nurse triage line: [phone] ([phone])       Helena Green  Community Health Worker  [facility]  Ph: [phone]

## 2022-12-22 NOTE — PROGRESS NOTES
ANTICOAGULATION MANAGEMENT     Miguel Pappas 67 year old male is on warfarin with therapeutic INR result. (Goal INR 2.0-3.0)    Recent labs: (last 7 days)     12/21/22  0537   INR 2.04*       ASSESSMENT     Source(s): Chart Review and Patient/Caregiver Call     Warfarin doses taken: Warfarin taken as instructed  Diet: No new diet changes identified  New illness, injury, or hospitalization: Yes: 12/16-12/21 - Aortic Stenosis, S/P AVR   Medication/supplement changes: on titrating dose of Amiodarone.   Signs or symptoms of bleeding or clotting: No  Previous INR: NA  Additional findings: New start on day 5 of warfarin and Recent heart valve replacement on 12/16       PLAN     Recommended plan for no diet, medication or health factor changes affecting INR     Dosing Instructions: take 5 mg 12/21 (per discharge note). He took 5 mg of coumadin this AM (as opposed to the 2.5 mg per discharge note) - I did advise him to start taking Coumadin at night.  with next INR in 1 days       Summary  As of 12/22/2022    Full warfarin instructions:  12/22: 2.5 mg; Otherwise 5 mg every day   Next INR check:  12/23/2022             Telephone call with  pt and wife  who verbalizes understanding and agrees to plan    Lab visit scheduled    Education provided:   pt states he got the Coumadine Education booklet while hospiitalized, Dr Abbott went over it with him, and TREVOR is also an RN so he is confortable with coumadin and education. I did still briefly go over Coumadin Education.     Plan made per [facility] anticoagulation protocol    Nila Lemsu, RN  Anticoagulation Clinic  12/22/2022    _______________________________________________________________________     Anticoagulation Episode Summary     Current INR goal:  2.0-3.0   TTR:  --   Target end date:  3/16/2023   Send INR reminders to:  HELLEN DECKER    Indications    S/P AVR [Z95.2]           Comments:           Anticoagulation Care Providers     Provider Role Specialty Phone  number    Liliane De Jesus PA-C Referring Cardiovascular & Thoracic Surgery [phone]

## 2022-12-23 ENCOUNTER — TELEPHONE (OUTPATIENT)
Dept: CARDIOLOGY | Facility: CLINIC | Age: 67
End: 2022-12-23

## 2022-12-23 ENCOUNTER — LAB (OUTPATIENT)
Dept: LAB | Facility: CLINIC | Age: 67
End: 2022-12-23
Payer: COMMERCIAL

## 2022-12-23 ENCOUNTER — ANTICOAGULATION THERAPY VISIT (OUTPATIENT)
Dept: ANTICOAGULATION | Facility: CLINIC | Age: 67
End: 2022-12-23

## 2022-12-23 DIAGNOSIS — Z95.2 S/P AVR: ICD-10-CM

## 2022-12-23 DIAGNOSIS — Z95.2 S/P AVR: Primary | ICD-10-CM

## 2022-12-23 LAB
ATRIAL RATE - MUSE: 153 BPM
ATRIAL RATE - MUSE: 64 BPM
ATRIAL RATE - MUSE: 65 BPM
ATRIAL RATE - MUSE: 70 BPM
ATRIAL RATE - MUSE: 71 BPM
ATRIAL RATE - MUSE: 76 BPM
DIASTOLIC BLOOD PRESSURE - MUSE: NORMAL MMHG
INR BLD: 1.9 (ref 0.9–1.1)
INTERPRETATION ECG - MUSE: NORMAL
P AXIS - MUSE: 103 DEGREES
P AXIS - MUSE: 29 DEGREES
P AXIS - MUSE: 58 DEGREES
P AXIS - MUSE: 70 DEGREES
P AXIS - MUSE: 86 DEGREES
P AXIS - MUSE: NORMAL DEGREES
PR INTERVAL - MUSE: 124 MS
PR INTERVAL - MUSE: 142 MS
PR INTERVAL - MUSE: 142 MS
PR INTERVAL - MUSE: 154 MS
PR INTERVAL - MUSE: 182 MS
PR INTERVAL - MUSE: NORMAL MS
QRS DURATION - MUSE: 104 MS
QRS DURATION - MUSE: 106 MS
QRS DURATION - MUSE: 88 MS
QRS DURATION - MUSE: 92 MS
QRS DURATION - MUSE: 92 MS
QRS DURATION - MUSE: 94 MS
QT - MUSE: 366 MS
QT - MUSE: 394 MS
QT - MUSE: 420 MS
QT - MUSE: 454 MS
QT - MUSE: 456 MS
QT - MUSE: 464 MS
QTC - MUSE: 443 MS
QTC - MUSE: 452 MS
QTC - MUSE: 453 MS
QTC - MUSE: 468 MS
QTC - MUSE: 474 MS
QTC - MUSE: 504 MS
R AXIS - MUSE: 38 DEGREES
R AXIS - MUSE: 38 DEGREES
R AXIS - MUSE: 48 DEGREES
R AXIS - MUSE: 61 DEGREES
R AXIS - MUSE: 72 DEGREES
R AXIS - MUSE: 86 DEGREES
SYSTOLIC BLOOD PRESSURE - MUSE: NORMAL MMHG
T AXIS - MUSE: -41 DEGREES
T AXIS - MUSE: -75 DEGREES
T AXIS - MUSE: 258 DEGREES
T AXIS - MUSE: 73 DEGREES
T AXIS - MUSE: 80 DEGREES
T AXIS - MUSE: 86 DEGREES
VENTRICULAR RATE- MUSE: 64 BPM
VENTRICULAR RATE- MUSE: 65 BPM
VENTRICULAR RATE- MUSE: 70 BPM
VENTRICULAR RATE- MUSE: 71 BPM
VENTRICULAR RATE- MUSE: 76 BPM
VENTRICULAR RATE- MUSE: 92 BPM

## 2022-12-23 PROCEDURE — 36416 COLLJ CAPILLARY BLOOD SPEC: CPT

## 2022-12-23 PROCEDURE — 85610 PROTHROMBIN TIME: CPT

## 2022-12-23 NOTE — PROGRESS NOTES
ANTICOAGULATION MANAGEMENT     Miguel Pappas 67 year old male is on warfarin with subtherapeutic INR result. (Goal INR 2.0-3.0)    Recent labs: (last 7 days)     12/23/22  1024   INR 1.9*       ASSESSMENT     Source(s): Chart Review and Patient/Caregiver Call     Warfarin doses taken: Warfarin taken as instructed  Diet: No new diet changes identified  New illness, injury, or hospitalization: No  Medication/supplement changes: Took Tylenol last night for some rib pain. Does not take it very often.  Signs or symptoms of bleeding or clotting: No  Previous INR: Therapeutic last 2(+) visits  Additional findings: New start on day 6 of warfarin       PLAN     Recommended plan for ongoing change(s) affecting INR     Dosing Instructions: Increase your warfarin dose (7% change) with next INR in 3 days       Summary  As of 12/23/2022    Full warfarin instructions:  7.5 mg every Fri; 5 mg all other days   Next INR check:  12/26/2022             Telephone call with  Miguel and wife who verbalizes understanding and agrees to plan and who agrees to plan and repeated back plan correctly    Check at provider office visit    Education provided:   Taking warfarin: take warfarin at same time each day; preferably in the evening and prescribed tablet strength and color  Goal range and lab monitoring: goal range and significance of current result, Importance of therapeutic range and Importance of following up at instructed interval  Contact [phone]  with any changes, questions or concerns.     Plan made with [facility] Pharmacist Berta Pringle, MCKENZIE  Anticoagulation Clinic  12/23/2022    _______________________________________________________________________     Anticoagulation Episode Summary     Current INR goal:  2.0-3.0   TTR:  --   Target end date:  3/16/2023   Send INR reminders to:  HELLEN DECKER    Indications    S/P AVR [Z95.2]           Comments:           Anticoagulation Care Providers     Provider Role  Specialty Phone number    Liliane De Jesus PA-C Referring Cardiovascular & Thoracic Surgery [phone]

## 2022-12-23 NOTE — TELEPHONE ENCOUNTER
Cardiovascular Surgery  [facility]    DISCHARGE FOLLOW UP PHONE CALL      POST OP MONITORING  How is your pain on a 0-10 scale, how are you managing your pain? Taking tylenol intermittently.    ACTIVITY  How is your activity tolerance? 10 minutes 3x daily, went out to appointment today.  Are you using your incentive spirometer? Around 1000ml  Are you still doing sternal precautions? Yes  Do you hear any clicking when you are moving or taking a deep breath? No    Are you weighing yourself daily? Weight is up to 141 from 139 2 days ago. If weight continues to climb please call weekend number [phone]  Are you monitoring your blood pressure and heart rate? Wasn't able to  pressure cuff today. Advised to get a monitor and record weight/HR daily/    SIGNS AND SYMPTOMS OF INFECTION  No S/S/I    ASSISTANCE  Do you have someone at home to assist you with your daily activities? Wife    MEDICATIONS  Is someone helping you to set up your medications? Wife    Do you have any questions about your medications? No questions    Are you on a blood thinner? yes  Who is managing your INRs? INR check on Monday, discontinue INR was 1.9 so they increased dose.    FOLLOW UP  You are scheduled to see your primary care physician on 1/11.  You are scheduled to see our surgery advanced practive provider for post operative follow up on 1/9.  You are scheduled for cardiac rehab on 12/26.  You are scheduled to see your cardiologist on 2/20.    CONTACT INFORMATION  Please feel free to call us with any questions or symptoms that are concerning for you at [phone]. If it is after 4:00 in the afternoon, or a weekend please call [phone] and ask for the on call specialist. We want to do everything we can to help prevent you needing to return to the hospital, so please do not hesitate to call us.      RAQUEL STOCKTON RN  Care Coordinator - P CV Surgery   [facility]  [phone]

## 2022-12-26 ENCOUNTER — LAB (OUTPATIENT)
Dept: LAB | Facility: CLINIC | Age: 67
End: 2022-12-26
Payer: COMMERCIAL

## 2022-12-26 ENCOUNTER — ANTICOAGULATION THERAPY VISIT (OUTPATIENT)
Dept: ANTICOAGULATION | Facility: CLINIC | Age: 67
End: 2022-12-26

## 2022-12-26 ENCOUNTER — HOSPITAL ENCOUNTER (OUTPATIENT)
Dept: CARDIAC REHAB | Facility: CLINIC | Age: 67
Discharge: HOME | End: 2022-12-26
Attending: PHYSICIAN ASSISTANT
Payer: COMMERCIAL

## 2022-12-26 DIAGNOSIS — Z95.2 S/P AVR: Primary | ICD-10-CM

## 2022-12-26 DIAGNOSIS — Z95.2 S/P AVR (AORTIC VALVE REPLACEMENT): ICD-10-CM

## 2022-12-26 DIAGNOSIS — Z95.2 S/P AVR: ICD-10-CM

## 2022-12-26 LAB — INR BLD: 3.6 (ref 0.9–1.1)

## 2022-12-26 PROCEDURE — 85610 PROTHROMBIN TIME: CPT

## 2022-12-26 PROCEDURE — 93798 PHYS/QHP OP CAR RHAB W/ECG: CPT

## 2022-12-26 PROCEDURE — 36415 COLL VENOUS BLD VENIPUNCTURE: CPT

## 2022-12-26 NOTE — PROGRESS NOTES
ANTICOAGULATION MANAGEMENT     Miguel Pappas 67 year old male is on warfarin with supratherapeutic INR result. (Goal INR 2.0-3.0)    Recent labs: (last 7 days)     12/26/22  1246   INR 3.6*       ASSESSMENT     Source(s): Chart Review and Patient/Caregiver Call     Warfarin doses taken: Warfarin taken as instructed  Diet: No new diet changes identified  New illness, injury, or hospitalization: No  Medication/supplement changes: None noted  Signs or symptoms of bleeding or clotting: No  Previous INR: Subtherapeutic  Additional findings: New start on day 9 of warfarin       PLAN     Recommended plan for no diet, medication or health factor changes affecting INR     Dosing Instructions: decrease your warfarin dose (13.3% change) with next INR in 4 days       Summary  As of 12/26/2022    Full warfarin instructions:  2.5 mg every Mon; 5 mg all other days   Next INR check:  12/30/2022             Telephone call with Miguel who verbalizes understanding and agrees to plan and who agrees to plan and repeated back plan correctly    Lab visit scheduled    Education provided:   Please call back if any changes to your diet, medications or how you've been taking warfarin  Goal range and lab monitoring: goal range and significance of current result, Importance of therapeutic range and Importance of following up at instructed interval  Symptom monitoring: monitoring for bleeding signs and symptoms, monitoring for clotting signs and symptoms and when to seek medical attention/emergency care  Contact [phone]  with any changes, questions or concerns.     Plan made with [facility] Pharmacist Berta Fuchs, RN  Anticoagulation Clinic  12/26/2022    _______________________________________________________________________     Anticoagulation Episode Summary     Current INR goal:  2.0-3.0   TTR:  --   Target end date:  3/16/2023   Send INR reminders to:  HELLEN DECKER    Indications    S/P AVR [Z95.2]            Comments:           Anticoagulation Care Providers     Provider Role Specialty Phone number    Liliane De Jesus PA-C Referring Cardiovascular & Thoracic Surgery [phone]

## 2022-12-30 ENCOUNTER — LAB (OUTPATIENT)
Dept: LAB | Facility: CLINIC | Age: 67
End: 2022-12-30
Payer: COMMERCIAL

## 2022-12-30 ENCOUNTER — TELEPHONE (OUTPATIENT)
Dept: FAMILY MEDICINE | Facility: CLINIC | Age: 67
End: 2022-12-30

## 2022-12-30 ENCOUNTER — ANTICOAGULATION THERAPY VISIT (OUTPATIENT)
Dept: ANTICOAGULATION | Facility: CLINIC | Age: 67
End: 2022-12-30

## 2022-12-30 ENCOUNTER — HOSPITAL ENCOUNTER (OUTPATIENT)
Dept: CARDIAC REHAB | Facility: CLINIC | Age: 67
Discharge: HOME | End: 2022-12-30
Attending: FAMILY MEDICINE
Payer: COMMERCIAL

## 2022-12-30 DIAGNOSIS — Z95.2 S/P AVR: Primary | ICD-10-CM

## 2022-12-30 DIAGNOSIS — Z95.2 S/P AVR: ICD-10-CM

## 2022-12-30 LAB — INR BLD: 3.7 (ref 0.9–1.1)

## 2022-12-30 PROCEDURE — 93798 PHYS/QHP OP CAR RHAB W/ECG: CPT

## 2022-12-30 PROCEDURE — 36416 COLLJ CAPILLARY BLOOD SPEC: CPT

## 2022-12-30 PROCEDURE — 85610 PROTHROMBIN TIME: CPT

## 2022-12-30 NOTE — PROGRESS NOTES
ANTICOAGULATION MANAGEMENT     Miguel Pappas 67 year old male is on warfarin with supratherapeutic INR result. (Goal INR 2.0-3.0)    Recent labs: (last 7 days)     12/30/22  1334   INR 3.7*       ASSESSMENT     Source(s): Chart Review  Previous INR was Therapeutic last 2(+) visits  Medication, diet, health changes since last INR - discussed dosing with [facility]a [facility] - see calendar          PLAN     Unable to reach pt today.    Left message to hold dose tonight, take 5 mg Sat and Sun this weekend. Request call back for assessment. Will need to call pt again on Monday if he doesn't call tonight.     Follow up required to confirm warfarin dose taken and assess for changes    Nila Lemus, RN  Anticoagulation Clinic  12/30/2022

## 2022-12-30 NOTE — TELEPHONE ENCOUNTER
Patient has a current appointment with Dr Keys 01/11/2023 for Cardiac follow up.  Dr Keys is requesting that she is be switched and to be seen by Internal med and not OB.  Requesting that either Dr LATANYA Herzog or Dr Mejias around the same date.    Please call patient when decision is made so that he can schedule his appointment .    Dr Keys's appointment on 01/11/23 will then need to be cancelled.  Didn't want to cancel this appointment until another was made.       [facility]

## 2022-12-30 NOTE — PROGRESS NOTES
ANTICOAGULATION MANAGEMENT     Miguel Pappas 67 year old male is on warfarin with supratherapeutic INR result. (Goal INR 2.0-3.0)    Recent labs: (last 7 days)     12/30/22  1334   INR 3.7*       ASSESSMENT     Source(s): Chart Review and Patient/Caregiver Call - Chayito wife     Warfarin doses taken: Warfarin taken as instructed  Diet: No new diet changes identified  New illness, injury, or hospitalization: No  Medication/supplement changes: None noted  Signs or symptoms of bleeding or clotting: No  Previous INR: Supratherapeutic  Additional findings: None       PLAN     Recommended plan for no diet, medication or health factor changes affecting INR     Dosing Instructions: hold dose then decrease your warfarin dose (15.4% change) with next INR in 5 days       Summary  As of 12/30/2022    Full warfarin instructions:  12/30: Hold; Otherwise 2.5 mg every Mon, Wed, Fri; 5 mg all other days   Next INR check:  1/4/2023             Telephone call with  wife who verbalizes understanding and agrees to plan    Lab visit scheduled    Education provided:   Please call back if any changes to your diet, medications or how you've been taking warfarin  Goal range and lab monitoring: goal range and significance of current result, Importance of therapeutic range and Importance of following up at instructed interval  Symptom monitoring: monitoring for bleeding signs and symptoms    Plan made with [facility] Pharmacist Berta Lemus, RN  Anticoagulation Clinic  12/30/2022    _______________________________________________________________________     Anticoagulation Episode Summary     Current INR goal:  2.0-3.0   TTR:  --   Target end date:  3/16/2023   Send INR reminders to:  HELLEN DECKER    Indications    S/P AVR [Z95.2]           Comments:           Anticoagulation Care Providers     Provider Role Specialty Phone number    Liliane De Jesus PA-C Referring Cardiovascular & Thoracic Surgery [phone]

## 2023-01-03 ENCOUNTER — TELEPHONE (OUTPATIENT)
Dept: OTHER | Facility: CLINIC | Age: 68
End: 2023-01-03

## 2023-01-03 NOTE — TELEPHONE ENCOUNTER
Patient called stating he had a pop in his chest last night with a coughing episode. Only occurred once. Encouraged splinting of chest with coughing and sternal precautions. States he has had 2 episodes of fluttering in his chest lasting < 1 min. Patient had A-fib in the post op hospital setting, on Amiodarone and Coumadin. C=Instructred to call if fluttering lasts > 30 mins or chest pain, SOB or lightheaded. All questions addressed.

## 2023-01-04 ENCOUNTER — ANTICOAGULATION THERAPY VISIT (OUTPATIENT)
Dept: ANTICOAGULATION | Facility: CLINIC | Age: 68
End: 2023-01-04

## 2023-01-04 ENCOUNTER — LAB (OUTPATIENT)
Dept: LAB | Facility: CLINIC | Age: 68
End: 2023-01-04
Payer: COMMERCIAL

## 2023-01-04 DIAGNOSIS — Z95.2 S/P AVR: Primary | ICD-10-CM

## 2023-01-04 DIAGNOSIS — Z95.2 S/P AVR: ICD-10-CM

## 2023-01-04 LAB — INR BLD: 2.8 (ref 0.9–1.1)

## 2023-01-04 PROCEDURE — 85610 PROTHROMBIN TIME: CPT

## 2023-01-04 PROCEDURE — 36416 COLLJ CAPILLARY BLOOD SPEC: CPT

## 2023-01-04 NOTE — PROGRESS NOTES
ANTICOAGULATION MANAGEMENT     Miguel Pappas 67 year old male is on warfarin with therapeutic INR result. (Goal INR 2.0-3.0)    Recent labs: (last 7 days)     01/04/23  1118   INR 2.8*       ASSESSMENT     Source(s): Chart Review  Previous INR was Supratherapeutic  Medication, diet, health changes since last INR chart reviewed; none identified    I left a detailed voicemail with the orders reflected in flowsheet. I have also requested a call back if there have been any missed doses, concerns, illness, fever, or if there have been any changes in medications, activity level, or diet         PLAN     Recommended plan for no diet, medication or health factor changes affecting INR     Dosing Instructions: Continue your current warfarin dose with next INR in 5 days       Summary  As of 1/4/2023    Full warfarin instructions:  2.5 mg every Mon, Wed, Fri; 5 mg all other days   Next INR check:  1/9/2023             Detailed voice message left for Miguel with dosing instructions and follow up date.     Contact [phone]  to schedule and with any changes, questions or concerns.     Education provided:   Contact [phone]  with any changes, questions or concerns.     Plan made with [facility] Pharmacist Raisa Lemus, RN  Anticoagulation Clinic  1/4/2023    _______________________________________________________________________     Anticoagulation Episode Summary     Current INR goal:  2.0-3.0   TTR:  24.4 % (4 d)   Target end date:  3/16/2023   Send INR reminders to:  HELLEN DECKER    Indications    S/P AVR [Z95.2]           Comments:           Anticoagulation Care Providers     Provider Role Specialty Phone number    Liliane De Jesus PA-C Referring Cardiovascular & Thoracic Surgery [phone]

## 2023-01-05 NOTE — TELEPHONE ENCOUNTER
Patient has follow up appointment with Cardiology, prior to appointment scheduled with primary care, patient will follow up with primary care as directed by cardiology.    Appointment declined at this time.    Latisha Rivera XRO/

## 2023-01-06 ENCOUNTER — HOSPITAL ENCOUNTER (OUTPATIENT)
Dept: CARDIAC REHAB | Facility: CLINIC | Age: 68
Discharge: HOME | End: 2023-01-06
Attending: FAMILY MEDICINE
Payer: COMMERCIAL

## 2023-01-06 PROCEDURE — 93798 PHYS/QHP OP CAR RHAB W/ECG: CPT

## 2023-01-09 ENCOUNTER — OFFICE VISIT (OUTPATIENT)
Dept: CARDIOLOGY | Facility: CLINIC | Age: 68
End: 2023-01-09
Payer: COMMERCIAL

## 2023-01-09 ENCOUNTER — LAB (OUTPATIENT)
Dept: LAB | Facility: CLINIC | Age: 68
End: 2023-01-09
Payer: COMMERCIAL

## 2023-01-09 ENCOUNTER — ANTICOAGULATION THERAPY VISIT (OUTPATIENT)
Dept: ANTICOAGULATION | Facility: CLINIC | Age: 68
End: 2023-01-09

## 2023-01-09 VITALS
SYSTOLIC BLOOD PRESSURE: 150 MMHG | WEIGHT: 139.2 LBS | HEIGHT: 68 IN | BODY MASS INDEX: 21.1 KG/M2 | HEART RATE: 52 BPM | DIASTOLIC BLOOD PRESSURE: 82 MMHG | OXYGEN SATURATION: 94 %

## 2023-01-09 DIAGNOSIS — Z95.2 S/P AVR: Primary | ICD-10-CM

## 2023-01-09 DIAGNOSIS — Z95.2 S/P AVR: ICD-10-CM

## 2023-01-09 LAB — INR BLD: 2.2 (ref 0.9–1.1)

## 2023-01-09 PROCEDURE — 36416 COLLJ CAPILLARY BLOOD SPEC: CPT

## 2023-01-09 PROCEDURE — 85610 PROTHROMBIN TIME: CPT

## 2023-01-09 PROCEDURE — 99024 POSTOP FOLLOW-UP VISIT: CPT | Performed by: PHYSICIAN ASSISTANT

## 2023-01-09 NOTE — PATIENT INSTRUCTIONS
Date of Surgery: 22    Start taking 81 mg ASA    Drivin23    10 lbs weight restriction ends: 23    Changes:    Take 12.5 mg metoprolol twice a day

## 2023-01-09 NOTE — PROGRESS NOTES
ANTICOAGULATION MANAGEMENT     Miguel Pappas 67 year old male is on warfarin with therapeutic INR result. (Goal INR 2.0-3.0)    Recent labs: (last 7 days)     01/09/23  1040   INR 2.2*       ASSESSMENT     Source(s): Chart Review and Patient/Caregiver Call     Warfarin doses taken: Warfarin taken as instructed  Diet: No new diet changes identified  New illness, injury, or hospitalization: No  Medication/supplement changes: None noted  Signs or symptoms of bleeding or clotting: No  Previous INR: Therapeutic last visit; previously outside of goal range  Additional findings: None and Recent heart valve replacement on 12/16/22       PLAN     Recommended plan for no diet, medication or health factor changes affecting INR     Dosing Instructions: Increase your warfarin dose (9.1% change) with next INR in 1 week       Summary  As of 1/9/2023    Full warfarin instructions:  2.5 mg every Tue, Fri; 5 mg all other days   Next INR check:  1/16/2023             Telephone call with Miguel who verbalizes understanding and agrees to plan and who agrees to plan and repeated back plan correctly    Lab visit scheduled    Education provided:   Please call back if any changes to your diet, medications or how you've been taking warfarin    Plan made with [facility] Pharmacist Berta Fuchs, RN  Anticoagulation Clinic  1/9/2023    _______________________________________________________________________     Anticoagulation Episode Summary     Current INR goal:  2.0-3.0   TTR:  64.2 % (1.3 wk)   Target end date:  3/16/2023   Send INR reminders to:  HELLEN DECKER    Indications    S/P AVR [Z95.2]           Comments:           Anticoagulation Care Providers     Provider Role Specialty Phone number    Liliane De Jesus PA-C Referring Cardiovascular & Thoracic Surgery [phone]

## 2023-01-09 NOTE — PROGRESS NOTES
CV Surgery Post Op Follow Up Note:     Assessment/Plan:     Mr. Pappas is doing well in their post operative period. He is walking at least 30 mins daily and attending cardiac rehab.   SBP/HR at home: 130s/60s, HR: 50-60s  Resp: breathing, using IS a couple times/day, 1000+  Incisions: healing, washing with antibacterial soap, sternal incision healing  Extremities/weight: does not feel to be carrying around additional fluid  Rehab plans: walking daily and is attending Cardiac rehab    Follow up apts: 2/20/23 at 11:30 am Dr. Sinclair    S:  From discharge summary: On 12/16/22 Mr Pappas successfully underwent Aortic Valve Replacement with a 21mm Inspiris Resilia Bioprosthetic Valve, Transesophageal echocardiography by Dr. Adal Abbott.   Was extubated within 24 hours of surgery. Once he was extubated within 24 hrs, weaned off hemodynamic stabilizing gtt's, transferred to the surgical floor.   Had some transient hyperglycemia treated with an insulin gtt, transitioned to sliding scale insulin and has no need at discharge.  Had some fluid overload treated with diuretic medication. At discharge he is at his pre-op weight and is not sent with diuretics.   His diet was cautiously advanced as had signs of ileus. He had multiple BMs and belly was soft at discharge along with tolerating a regular diet and is sent with reglan.   Heart rhythm remained stable. He progressed well with cardiac rehab. He is discharged to home on pod# 5 with the help of their family.   He was started on coumadin for his tissue AV. His INR goal is 2-3 for 3 months as his valve seats in the aorta.     Since being discharged from hospital, patient is recovering at home.     Allergies:   Allergies   Allergen Reactions     No Known Drug Allergies        Medications:   Current Outpatient Medications:      acetaminophen (TYLENOL) 325 MG tablet, Take 2 tablets (650 mg) by mouth every 4 hours as needed for mild pain, Disp: 40 tablet, Rfl: 0     amiodarone  (PACERONE) 200 MG tablet, Take 1 tablet (200 mg) by mouth daily for 23 days, Disp: 23 tablet, Rfl: 0     lisinopril (ZESTRIL) 5 MG tablet, Take 1 tablet (5 mg) by mouth daily, Disp: 30 tablet, Rfl: 3     metoprolol tartrate (LOPRESSOR) 25 MG tablet, Take 1 tablet (25 mg) by mouth 2 times daily, Disp: 60 tablet, Rfl: 3     warfarin ANTICOAGULANT (COUMADIN) 5 MG tablet, Take 1 tablet (5 mg) by mouth daily, Disp: 30 tablet, Rfl: 2     aspirin 81 MG tablet, Take 81 mg by mouth daily (Patient not taking: Reported on 1/9/2023), Disp: , Rfl:      GLUCOSAMINE CHONDROITIN OR TABS, Take 1 tablet by mouth daily (Patient not taking: Reported on 1/9/2023), Disp: , Rfl: 0     Multiple Vitamins-Minerals (CENTRUM SILVER PO), Take 1 tablet by mouth daily (Patient not taking: Reported on 1/9/2023), Disp: 30, Rfl: 0     polyethylene glycol (MIRALAX) 17 GM/Dose powder, Take 17 g by mouth daily (Patient not taking: Reported on 1/9/2023), Disp: 510 g, Rfl: 0     senna-docusate (SENOKOT-S/PERICOLACE) 8.6-50 MG tablet, Take 1 tablet by mouth 2 times daily as needed for constipation (Patient not taking: Reported on 1/9/2023), Disp: 20 tablet, Rfl: 0     VITAMIN D PO, Take 1 tablet by mouth daily (Patient not taking: Reported on 1/9/2023), Disp: , Rfl:     Physical Exam:   Gen: NAD  CV: s1/s2, no m/r/g  Lungs: course  Sternum: cdi  Extremities: no LE edema       All of the patient's questions were answered. Our contact information was given to him if they should have any further questions/concerns. No further follow-up is needed with us.      Liliane De Jesus PA-C  CV Surgery  [facility]  Pager: [phone]

## 2023-01-11 ENCOUNTER — HOSPITAL ENCOUNTER (OUTPATIENT)
Dept: CARDIAC REHAB | Facility: CLINIC | Age: 68
Discharge: HOME | End: 2023-01-11
Attending: PHYSICIAN ASSISTANT
Payer: COMMERCIAL

## 2023-01-11 PROCEDURE — 93798 PHYS/QHP OP CAR RHAB W/ECG: CPT

## 2023-01-13 ENCOUNTER — HOSPITAL ENCOUNTER (OUTPATIENT)
Dept: CARDIAC REHAB | Facility: CLINIC | Age: 68
Discharge: HOME | End: 2023-01-13
Attending: FAMILY MEDICINE
Payer: COMMERCIAL

## 2023-01-13 PROCEDURE — 93798 PHYS/QHP OP CAR RHAB W/ECG: CPT

## 2023-01-16 ENCOUNTER — ANTICOAGULATION THERAPY VISIT (OUTPATIENT)
Dept: ANTICOAGULATION | Facility: CLINIC | Age: 68
End: 2023-01-16

## 2023-01-16 ENCOUNTER — LAB (OUTPATIENT)
Dept: LAB | Facility: CLINIC | Age: 68
End: 2023-01-16
Payer: COMMERCIAL

## 2023-01-16 ENCOUNTER — HOSPITAL ENCOUNTER (OUTPATIENT)
Dept: CARDIAC REHAB | Facility: CLINIC | Age: 68
Discharge: HOME | End: 2023-01-16
Attending: FAMILY MEDICINE
Payer: COMMERCIAL

## 2023-01-16 DIAGNOSIS — Z95.2 S/P AVR: ICD-10-CM

## 2023-01-16 DIAGNOSIS — Z95.2 S/P AVR: Primary | ICD-10-CM

## 2023-01-16 LAB — INR BLD: 3.1 (ref 0.9–1.1)

## 2023-01-16 PROCEDURE — 36416 COLLJ CAPILLARY BLOOD SPEC: CPT

## 2023-01-16 PROCEDURE — 85610 PROTHROMBIN TIME: CPT

## 2023-01-16 PROCEDURE — 93798 PHYS/QHP OP CAR RHAB W/ECG: CPT

## 2023-01-16 NOTE — PROGRESS NOTES
ANTICOAGULATION MANAGEMENT     Miguel Pappas 67 year old male is on warfarin with supratherapeutic INR result. (Goal INR 2.0-3.0)    Recent labs: (last 7 days)     01/16/23  1252   INR 3.1*       ASSESSMENT     Source(s): Chart Review and Patient/Caregiver Call     Warfarin doses taken: Warfarin taken as instructed  Diet: No new diet changes identified  New illness, injury, or hospitalization: No  Medication/supplement changes: restarted his multivit 5 days ago which does contain vit k, restarted asa and glucosamine/chondrotin Will be stopping amiodarone Wed 1/18  Signs or symptoms of bleeding or clotting: No  Previous INR: Therapeutic last 2(+) visits  Additional findings: Recent heart valve replacement on 12/16/22       PLAN     Recommended plan for ongoing change(s) affecting INR     Dosing Instructions: Continue your current warfarin dose with next INR in 1 week       Summary  As of 1/16/2023    Full warfarin instructions:  2.5 mg every Tue, Fri; 5 mg all other days   Next INR check:  1/23/2023             Telephone call with Miguel who verbalizes understanding and agrees to plan and who agrees to plan and repeated back plan correctly    Lab visit scheduled    Education provided:   Interaction IS anticipated between warfarin and multivit, Gluc/shy, stopping amiodarone  Symptom monitoring: monitoring for bleeding signs and symptoms, monitoring for clotting signs and symptoms, monitoring for stroke signs and symptoms and when to seek medical attention/emergency care    Plan made with [facility] Pharmacist Berta Merchant, MCKENZIE  Anticoagulation Clinic  1/16/2023    _______________________________________________________________________     Anticoagulation Episode Summary     Current INR goal:  2.0-3.0   TTR:  74.8 % (2.3 wk)   Target end date:  3/16/2023   Send INR reminders to:  HELLEN DECKER    Indications    S/P AVR [Z95.2]           Comments:           Anticoagulation Care Providers      Provider Role Specialty Phone number    Liliane De Jesus PA-C Referring Cardiovascular & Thoracic Surgery [phone]

## 2023-01-20 ENCOUNTER — HOSPITAL ENCOUNTER (OUTPATIENT)
Dept: CARDIAC REHAB | Facility: CLINIC | Age: 68
Discharge: HOME | End: 2023-01-20
Attending: FAMILY MEDICINE
Payer: COMMERCIAL

## 2023-01-20 PROCEDURE — 93798 PHYS/QHP OP CAR RHAB W/ECG: CPT

## 2023-01-23 ENCOUNTER — LAB (OUTPATIENT)
Dept: LAB | Facility: CLINIC | Age: 68
End: 2023-01-23
Payer: COMMERCIAL

## 2023-01-23 ENCOUNTER — ANTICOAGULATION THERAPY VISIT (OUTPATIENT)
Dept: ANTICOAGULATION | Facility: CLINIC | Age: 68
End: 2023-01-23

## 2023-01-23 ENCOUNTER — HOSPITAL ENCOUNTER (OUTPATIENT)
Dept: CARDIAC REHAB | Facility: CLINIC | Age: 68
Discharge: HOME | End: 2023-01-23
Attending: FAMILY MEDICINE
Payer: COMMERCIAL

## 2023-01-23 DIAGNOSIS — Z95.2 S/P AVR: ICD-10-CM

## 2023-01-23 DIAGNOSIS — Z95.2 S/P AVR: Primary | ICD-10-CM

## 2023-01-23 LAB — INR BLD: 2.3 (ref 0.9–1.1)

## 2023-01-23 PROCEDURE — 93798 PHYS/QHP OP CAR RHAB W/ECG: CPT

## 2023-01-23 PROCEDURE — 36416 COLLJ CAPILLARY BLOOD SPEC: CPT

## 2023-01-23 PROCEDURE — 85610 PROTHROMBIN TIME: CPT

## 2023-01-23 NOTE — PROGRESS NOTES
ANTICOAGULATION MANAGEMENT     Miguel Pappas 67 year old male is on warfarin with therapeutic INR result. (Goal INR 2.0-3.0)    Recent labs: (last 7 days)     01/23/23  1307   INR 2.3*       ASSESSMENT     Source(s): Chart Review and Patient/Caregiver Call     Warfarin doses taken: Warfarin taken as instructed  Diet: No new diet changes identified  New illness, injury, or hospitalization: No  Medication/supplement changes: Amiodarone was finished on 1/18/23  Signs or symptoms of bleeding or clotting: No  Previous INR: Supratherapeutic  Additional findings: None and Recent heart valve replacement on 12/16/22       PLAN     Recommended plan for ongoing change(s) affecting INR     Dosing Instructions: Increase your warfarin dose (8.3% change) with next INR in 1 week       Summary  As of 1/23/2023    Full warfarin instructions:  2.5 mg every Tue; 5 mg all other days   Next INR check:  1/30/2023             Telephone call with Miguel who verbalizes understanding and agrees to plan and who agrees to plan and repeated back plan correctly    Lab visit scheduled    Education provided:   Please call back if any changes to your diet, medications or how you've been taking warfarin    Plan made with [facility] Pharmacist Berta Fuchs, RN  Anticoagulation Clinic  1/23/2023    _______________________________________________________________________     Anticoagulation Episode Summary     Current INR goal:  2.0-3.0   TTR:  78.6 % (3.3 wk)   Target end date:  3/16/2023   Send INR reminders to:  HELLEN DECKER    Indications    S/P AVR [Z95.2]           Comments:           Anticoagulation Care Providers     Provider Role Specialty Phone number    Liliane De Jesus PA-C Referring Cardiovascular & Thoracic Surgery [phone]

## 2023-01-27 ENCOUNTER — HOSPITAL ENCOUNTER (OUTPATIENT)
Dept: CARDIAC REHAB | Facility: CLINIC | Age: 68
Discharge: HOME | End: 2023-01-27
Attending: FAMILY MEDICINE
Payer: COMMERCIAL

## 2023-01-27 PROCEDURE — 93798 PHYS/QHP OP CAR RHAB W/ECG: CPT

## 2023-01-30 ENCOUNTER — HOSPITAL ENCOUNTER (OUTPATIENT)
Dept: CARDIAC REHAB | Facility: CLINIC | Age: 68
Discharge: HOME | End: 2023-01-30
Attending: PHYSICIAN ASSISTANT
Payer: COMMERCIAL

## 2023-01-30 ENCOUNTER — ANTICOAGULATION THERAPY VISIT (OUTPATIENT)
Dept: ANTICOAGULATION | Facility: CLINIC | Age: 68
End: 2023-01-30

## 2023-01-30 ENCOUNTER — LAB (OUTPATIENT)
Dept: LAB | Facility: CLINIC | Age: 68
End: 2023-01-30
Payer: COMMERCIAL

## 2023-01-30 DIAGNOSIS — Z95.2 S/P AVR: ICD-10-CM

## 2023-01-30 DIAGNOSIS — Z95.2 S/P AVR: Primary | ICD-10-CM

## 2023-01-30 LAB — INR BLD: 2.6 (ref 0.9–1.1)

## 2023-01-30 PROCEDURE — 36416 COLLJ CAPILLARY BLOOD SPEC: CPT

## 2023-01-30 PROCEDURE — 85610 PROTHROMBIN TIME: CPT

## 2023-01-30 PROCEDURE — 93798 PHYS/QHP OP CAR RHAB W/ECG: CPT

## 2023-01-30 NOTE — PROGRESS NOTES
ANTICOAGULATION MANAGEMENT     Miguel Pappas 67 year old male is on warfarin with therapeutic INR result. (Goal INR 2.0-3.0)    Recent labs: (last 7 days)     01/30/23  1320   INR 2.6*       ASSESSMENT     Source(s): Chart Review  Previous INR was Therapeutic last visit; previously outside of goal range  Medication, diet, health changes since last INR chart reviewed; none identified           PLAN     Recommended plan for no diet, medication or health factor changes affecting INR     Dosing Instructions: Continue your current warfarin dose with next INR in 10 days       Summary  As of 1/30/2023    Full warfarin instructions:  2.5 mg every Tue; 5 mg all other days   Next INR check:  2/9/2023             Detailed voice message left for Miguel with dosing instructions and follow up date.     Contact [phone]  to schedule and with any changes, questions or concerns.     Education provided:   Please call back if any changes to your diet, medications or how you've been taking warfarin    Plan made with [facility] Pharmacist Berta Fuchs, RN  Anticoagulation Clinic  1/30/2023    _______________________________________________________________________     Anticoagulation Episode Summary     Current INR goal:  2.0-3.0   TTR:  83.5 % (1 mo)   Target end date:  3/16/2023   Send INR reminders to:  HELLEN DECKER    Indications    S/P AVR [Z95.2]           Comments:           Anticoagulation Care Providers     Provider Role Specialty Phone number    Liliane De Jesus PA-C Referring Cardiovascular & Thoracic Surgery [phone]

## 2023-02-03 ENCOUNTER — OFFICE VISIT (OUTPATIENT)
Dept: FAMILY MEDICINE | Facility: CLINIC | Age: 68
End: 2023-02-03
Payer: COMMERCIAL

## 2023-02-03 ENCOUNTER — HOSPITAL ENCOUNTER (OUTPATIENT)
Dept: CARDIAC REHAB | Facility: CLINIC | Age: 68
Discharge: HOME | End: 2023-02-03
Attending: PHYSICIAN ASSISTANT
Payer: COMMERCIAL

## 2023-02-03 VITALS
BODY MASS INDEX: 21.17 KG/M2 | SYSTOLIC BLOOD PRESSURE: 120 MMHG | HEART RATE: 56 BPM | DIASTOLIC BLOOD PRESSURE: 72 MMHG | WEIGHT: 139.25 LBS | TEMPERATURE: 97.9 F | OXYGEN SATURATION: 100 %

## 2023-02-03 DIAGNOSIS — Z95.2 S/P AVR: ICD-10-CM

## 2023-02-03 DIAGNOSIS — Z11.59 NEED FOR HEPATITIS C SCREENING TEST: Primary | ICD-10-CM

## 2023-02-03 PROCEDURE — 99213 OFFICE O/P EST LOW 20 MIN: CPT | Performed by: FAMILY MEDICINE

## 2023-02-03 PROCEDURE — 93798 PHYS/QHP OP CAR RHAB W/ECG: CPT

## 2023-02-03 RX ORDER — WARFARIN SODIUM 5 MG/1
TABLET ORAL
Qty: 90 TABLET | Refills: 1 | Status: SHIPPED | OUTPATIENT
Start: 2023-02-03 | End: 2023-04-05

## 2023-02-03 NOTE — PROGRESS NOTES
Assessment & Plan       ICD-10-CM    1. Need for hepatitis C screening test  Z11.59       2. S/P AVR  Z95.2 warfarin ANTICOAGULANT (COUMADIN) 5 MG tablet         Returns to reestablish care.  Doing well after his open heart surgery for aortic valve replacement.  Doing well.  No complaints today.  Follow-up with cardiology as scheduled.  Taking anticoagulation.    Return in about 2 months (around 4/3/2023) for recheck.    Thony Gongora MD  [facility]      Leann Rivera is a 67 year old, presenting for the following health issues:  Surgical Followup      HPI   Follow up from open heart surgery on 12/16 things are going good now.  Pain controlled  Breathing stable, no shortness of breath or dyspnea, no cough  No fever or chills    Review of Systems         Objective    /72   Pulse 56   Temp 97.9  F (36.6  C) (Temporal)   Wt 63.2 kg (139 lb 4 oz)   SpO2 100%   BMI 21.17 kg/m    Body mass index is 21.17 kg/m .  Physical Exam

## 2023-02-09 ENCOUNTER — ANTICOAGULATION THERAPY VISIT (OUTPATIENT)
Dept: ANTICOAGULATION | Facility: CLINIC | Age: 68
End: 2023-02-09

## 2023-02-09 ENCOUNTER — LAB (OUTPATIENT)
Dept: LAB | Facility: CLINIC | Age: 68
End: 2023-02-09
Payer: COMMERCIAL

## 2023-02-09 DIAGNOSIS — Z95.2 S/P AVR: ICD-10-CM

## 2023-02-09 DIAGNOSIS — Z95.2 S/P AVR: Primary | ICD-10-CM

## 2023-02-09 LAB — INR BLD: 1.7 (ref 0.9–1.1)

## 2023-02-09 PROCEDURE — 85610 PROTHROMBIN TIME: CPT

## 2023-02-09 PROCEDURE — 36416 COLLJ CAPILLARY BLOOD SPEC: CPT

## 2023-02-09 NOTE — PROGRESS NOTES
ANTICOAGULATION MANAGEMENT     Miguel Pappas 67 year old male is on warfarin with subtherapeutic INR result. (Goal INR 2.0-3.0)    Recent labs: (last 7 days)     02/09/23  0915   INR 1.7*       ASSESSMENT     Source(s): Chart Review  Previous INR was Therapeutic last 2(+) visits  Medication, diet, health changes since last INR chart reviewed; none identified     See Notes from [facility] for additional info           PLAN     Recommended plan for ongoing change(s) affecting INR     Dosing Instructions: Increase your warfarin dose (15.4% change) with next INR in 5 days       Summary  As of 2/9/2023    Full warfarin instructions:  7.5 mg every Thu; 5 mg all other days   Next INR check:  2/14/2023             Detailed voice message left for Miguel with dosing instructions and follow up date.     Contact [phone]  to schedule and with any changes, questions or concerns.     Education provided:   Please call back if any changes to your diet, medications or how you've been taking warfarin    Plan made with [facility] Pharmacist Berta Fuchs, RN  Anticoagulation Clinic  2/9/2023    _______________________________________________________________________     Anticoagulation Episode Summary     Current INR goal:  2.0-3.0   TTR:  79.4 % (1.3 mo)   Target end date:  3/16/2023   Send INR reminders to:  HELLEN DECKER    Indications    S/P AVR [Z95.2]           Comments:           Anticoagulation Care Providers     Provider Role Specialty Phone number    Liliane De Jesus PA-C Referring Cardiovascular & Thoracic Surgery [phone]

## 2023-02-09 NOTE — PROGRESS NOTES
Chart reviewed with [facility] RN at time of encounter.    Advise 15% dose increase due to discontinuation of amiodarone and ~60 days post valve placement, should have INR drawn early next week, with further dose adjustment based on trend.  With discontinuation of amiodarone reasonable to expect larger dose increases needed above protocol.      Berta Barton, PharmD BCACP  Anticoagulation Clinical Pharmacist

## 2023-02-14 ENCOUNTER — ANTICOAGULATION THERAPY VISIT (OUTPATIENT)
Dept: ANTICOAGULATION | Facility: CLINIC | Age: 68
End: 2023-02-14

## 2023-02-14 ENCOUNTER — LAB (OUTPATIENT)
Dept: LAB | Facility: CLINIC | Age: 68
End: 2023-02-14
Payer: COMMERCIAL

## 2023-02-14 DIAGNOSIS — Z95.2 S/P AVR: Primary | ICD-10-CM

## 2023-02-14 DIAGNOSIS — Z95.2 S/P AVR: ICD-10-CM

## 2023-02-14 LAB — INR BLD: 2 (ref 0.9–1.1)

## 2023-02-14 PROCEDURE — 36416 COLLJ CAPILLARY BLOOD SPEC: CPT

## 2023-02-14 PROCEDURE — 85610 PROTHROMBIN TIME: CPT

## 2023-02-14 NOTE — PROGRESS NOTES
ANTICOAGULATION MANAGEMENT     Miguel Pappas 67 year old male is on warfarin with therapeutic INR result. (Goal INR 2.0-3.0)    Recent labs: (last 7 days)     02/14/23  1333   INR 2.0*       ASSESSMENT     Source(s): Chart Review and Patient/Caregiver Call     Warfarin doses taken: Warfarin taken as instructed  Diet: No new diet changes identified  New illness, injury, or hospitalization: No  Medication/supplement changes: None noted  Signs or symptoms of bleeding or clotting: No  Previous INR: Subtherapeutic  Additional findings: None       PLAN     Recommended plan for no diet, medication or health factor changes affecting INR     Dosing Instructions: Increase your warfarin dose (13% change) with next INR in 1 week       Summary  As of 2/14/2023    Full warfarin instructions:  7.5 mg every Tue, Thu, Sat; 5 mg all other days   Next INR check:  2/21/2023             Telephone call with Miguel who verbalizes understanding and agrees to plan    Lab visit scheduled    Education provided:   Contact [phone]  with any changes, questions or concerns.     Plan made with [facility] Pharmacist Berta Urrutia RN  Anticoagulation Clinic  2/14/2023    _______________________________________________________________________     Anticoagulation Episode Summary     Current INR goal:  2.0-3.0   TTR:  70.4 % (1.5 mo)   Target end date:  3/16/2023   Send INR reminders to:  HELLEN [place]ER    Indications    S/P AVR [Z95.2]           Comments:           Anticoagulation Care Providers     Provider Role Specialty Phone number    Liliane De Jesus PA-C Referring Cardiovascular & Thoracic Surgery [phone]

## 2023-02-20 ENCOUNTER — ANTICOAGULATION THERAPY VISIT (OUTPATIENT)
Dept: ANTICOAGULATION | Facility: CLINIC | Age: 68
End: 2023-02-20

## 2023-02-20 ENCOUNTER — OFFICE VISIT (OUTPATIENT)
Dept: CARDIOLOGY | Facility: CLINIC | Age: 68
End: 2023-02-20
Payer: COMMERCIAL

## 2023-02-20 ENCOUNTER — LAB (OUTPATIENT)
Dept: LAB | Facility: CLINIC | Age: 68
End: 2023-02-20
Payer: COMMERCIAL

## 2023-02-20 VITALS
HEART RATE: 60 BPM | HEIGHT: 68 IN | BODY MASS INDEX: 21.57 KG/M2 | OXYGEN SATURATION: 98 % | SYSTOLIC BLOOD PRESSURE: 120 MMHG | DIASTOLIC BLOOD PRESSURE: 84 MMHG | WEIGHT: 142.3 LBS

## 2023-02-20 DIAGNOSIS — Z95.2 S/P AVR: ICD-10-CM

## 2023-02-20 DIAGNOSIS — Z95.2 S/P AVR: Primary | ICD-10-CM

## 2023-02-20 LAB — INR BLD: 2.5 (ref 0.9–1.1)

## 2023-02-20 PROCEDURE — 36416 COLLJ CAPILLARY BLOOD SPEC: CPT

## 2023-02-20 PROCEDURE — 99214 OFFICE O/P EST MOD 30 MIN: CPT | Performed by: INTERNAL MEDICINE

## 2023-02-20 PROCEDURE — 85610 PROTHROMBIN TIME: CPT

## 2023-02-20 NOTE — PROGRESS NOTES
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  He has a history of aortic stenosis with recent surgical AVR using a bioprosthesis.    Miguel had minimal symptoms prior to his AVR but his echocardiogram showed critical aortic stenosis.  He has not noticed any real change in his symptoms.  He was given a lot of instructions on minimizing his activity but he essentially paid little attention to them and went back to fairly normal activities right away.  He has not had any problems with this.  He previously had been lifting a lot of radha of hay and has avoided doing that because of their heavy weight.  I suggested that he wait at least 4 months before he left that degree of weight.  The most he is lifting currently is a half  full 5 gallon pail of liquid.    Miguel denies ongoing chest discomfort although he states if he moves certain ways he still has mild discomfort.  He is not having any chest pain, racing heart, syncope or near syncope, or peripheral edema.  On exam today he has a 2/6 systolic murmur from the aortic valve.  His wound is healing well.  He is continuing to use warfarin and has been given instructions to continue this for 3 months postop.  I reminded him of the importance of continuing aspirin indefinitely.  His blood pressure is well controlled.  His preoperative angiogram showed no visible disease, so he is not on a statin.      ASSESSMENT/PLAN:    1.  Bioprosthetic AVR in mid December.  The patient is doing well.  I will arrange an echocardiogram to document function of the valve although by exam it sounds like it is functioning normally.  I reminded him the importance of using long-term aspirin and using antibiotic  prophylaxis for any future dental procedures.  1 year follow-up will be planned but I emphasized that I would like to see him sooner should he have any other concerning symptoms develop.    Thank you for inviting me to  participate in the care of your patient.  Please don't hesitate to call if I can be of further assistance.  30 minutes were spent today reviewing the chart and other records, interviewing and examining the patient, and documenting our visit.    Chart documentation was completed, in part, with [employer] voice-recognition software. Even though reviewed, some grammatical, spelling, and word errors may remain.       Orders Placed This Encounter   Procedures     Follow-Up with Cardiology CESAR     Echocardiogram Complete     No orders of the defined types were placed in this encounter.    There are no discontinued medications.    10 year ASCVD risk: The 10-year ASCVD risk score (Bella FAIRCHILD, et al., 2019) is: 12.2%    Values used to calculate the score:      Age: 67 years      Sex: Male      Is Non- : No      Diabetic: No      Tobacco smoker: No      Systolic Blood Pressure: 120 mmHg      Is BP treated: No      HDL Cholesterol: 85 mg/dL      Total Cholesterol: 209 mg/dL    Encounter Diagnosis   Name Primary?     S/P AVR Yes       CURRENT MEDICATIONS:  Current Outpatient Medications   Medication Sig Dispense Refill     lisinopril (ZESTRIL) 5 MG tablet Take 1 tablet (5 mg) by mouth daily 30 tablet 3     metoprolol tartrate (LOPRESSOR) 25 MG tablet Take 1 tablet (25 mg) by mouth 2 times daily (Patient taking differently: Take 12.5 mg by mouth 2 times daily) 60 tablet 3     Multiple Vitamins-Minerals (CENTRUM SILVER PO) Take 1 tablet by mouth daily 30 0     warfarin ANTICOAGULANT (COUMADIN) 5 MG tablet Per INR staff currently 5mg nightly, except tues 2.5mg 90 tablet 1     acetaminophen (TYLENOL) 325 MG tablet Take 2 tablets (650 mg) by mouth every 4 hours as needed for mild pain (Patient not taking: Reported on 2/20/2023) 40 tablet 0     amiodarone (PACERONE) 200 MG tablet Take 1 tablet (200 mg) by mouth daily for 23 days 23 tablet 0     aspirin 81 MG tablet Take 81 mg by mouth daily (Patient not taking:  Reported on 2/20/2023)       GLUCOSAMINE CHONDROITIN OR TABS Take 1 tablet by mouth daily (Patient not taking: Reported on 2/20/2023)  0     polyethylene glycol (MIRALAX) 17 GM/Dose powder Take 17 g by mouth daily (Patient not taking: Reported on 2/3/2023) 510 g 0     senna-docusate (SENOKOT-S/PERICOLACE) 8.6-50 MG tablet Take 1 tablet by mouth 2 times daily as needed for constipation (Patient not taking: Reported on 2/3/2023) 20 tablet 0     VITAMIN D PO Take 1 tablet by mouth daily (Patient not taking: Reported on 1/9/2023)         ALLERGIES     Allergies   Allergen Reactions     No Known Drug Allergies        PAST MEDICAL HISTORY:  Past Medical History:   Diagnosis Date     * * * SBE PROPHYLAXIS * * *     no longer indicated - 2007 AHA guidelnies     Mitral valve stenosis and aortic valve stenosis     2/6 murmur     Pure hypercholesterolemia 1/26/2007       PAST SURGICAL HISTORY:  Past Surgical History:   Procedure Laterality Date     COLONOSCOPY N/A 12/5/2014    Procedure: COLONOSCOPY;  Surgeon: Omar Chisholm MD;  Location:  GI     CV CORONARY ANGIOGRAM N/A 11/15/2022    Procedure: Coronary Angiogram;  Surgeon: Edson Nava MD;  Location:  HEART CARDIAC CATH LAB     HC REMOVAL OF TONSILS,<11 Y/O      Tonsils <10y.o.     REPLACE VALVE AORTIC N/A 12/16/2022    Procedure: AORTIC VALVE REPLACEMENT WITH 21MM INSPIRIS RESILIA VALVE, PLACEMENT OF TEMPORARY VENTRICULAR AND ATRIAL PACING WIRES AND TRANSESPHOGEAL ECHOCARDIOGRAM;  Surgeon: Kaila Abbott MD;  Location:  OR     [facility] COLONOSCOPY W/WO BRUSH/WASH  2/8/2005        FAMILY HISTORY:  Family History   Problem Relation Age of Onset     Cancer Father         Spleen cancer     Hypertension Father      Allergies Father         PCN     Gastrointestinal Disease Father         ulcer     Heart Disease Father         Hx: meds and angioplasty     Hypertension Brother      Cerebrovascular Disease Paternal Grandfather      Arthritis Paternal  "Grandfather      Arthritis Mother      Hypertension Mother      Allergies Son         Dust , Mold     Depression Sister         X 2       SOCIAL HISTORY:  Social History     Socioeconomic History     Marital status:      Spouse name: Fabian     Number of children: 3     Years of education: 12     Highest education level: None   Occupational History     Occupation:    Tobacco Use     Smoking status: Never     Passive exposure: Never     Smokeless tobacco: Never   Vaping Use     Vaping Use: Never used   Substance and Sexual Activity     Alcohol use: Yes     Comment: 1-2 beer monthly  maybe     Drug use: No     Sexual activity: Yes     Partners: Female     Comment: Hx: wife had tubal ligation   Other Topics Concern      Service Yes     Comment: [employer]     Blood Transfusions No     Caffeine Concern No     Occupational Exposure No     Hobby Hazards No     Sleep Concern No     Stress Concern No     Weight Concern No     Special Diet Yes     Comment: Low fat and cholesterol high fiber     Back Care Yes     Comment: Hx: chiropractor for back inj and knee     Exercise Yes     Comment: every day     Bike Helmet No     Seat Belt Yes   Social History Narrative    Lives with spouse.       Review of Systems:  Skin:        Eyes:       ENT:       Respiratory:  Negative shortness of breath;cough;wheezing  Cardiovascular:  Negative;palpitations;chest pain;lightheadedness;edema;syncope or near-syncope Positive for;dizziness  Gastroenterology:      Genitourinary:       Musculoskeletal:       Neurologic:  Negative numbness or tingling of hands;numbness or tingling of feet  Psychiatric:       Heme/Lymph/Imm:       Endocrine:         Physical Exam:  Vitals: /84 (BP Location: Right arm, Patient Position: Sitting, Cuff Size: Adult Regular)   Pulse 60   Ht 1.727 m (5' 8\")   Wt 64.5 kg (142 lb 4.8 oz)   SpO2 98%   BMI 21.64 kg/m      Constitutional:  cooperative, alert and oriented, well " developed, well nourished, in no acute distress        Skin:  warm and dry to the touch, no apparent skin lesions or masses noted surgical scars well-healed      Head:  normocephalic, no masses or lesions        Eyes:  pupils equal and round, conjunctivae and lids unremarkable, sclera white, no xanthalasma, EOMS intact, no nystagmus        ENT:  no pallor or cyanosis   masked    Neck:  carotid pulses are full and equal bilaterally, JVP normal, no carotid bruit        Chest:  normal breath sounds, clear to auscultation, normal A-P diameter, normal symmetry, normal respiratory excursion, no use of accessory muscles        Cardiac: regular rhythm;normal S1 and S2;no S3 or S4;apical impulse not displaced       systolic ejection murmur;grade 2;RUSB          Abdomen:  abdomen soft;BS normoactive        Vascular: pulses full and equal                                      Extremities and Muscular Skeletal:  no edema           Neurological:  no gross motor deficits        Psych:  affect appropriate, oriented to time, person and place     Recent Lab Results:  LIPID RESULTS:  Lab Results   Component Value Date    CHOL 209 (H) 10/26/2018    HDL 85 10/26/2018     (H) 10/26/2018    TRIG 46 10/26/2018    CHOLHDLRATIO 2.4 11/14/2014       LIVER ENZYME RESULTS:  Lab Results   Component Value Date    AST 50 (H) 12/17/2022    AST 36 04/09/2009    ALT 23 12/17/2022    ALT 18 04/09/2009       CBC RESULTS:  Lab Results   Component Value Date    WBC 12.1 (H) 12/21/2022    WBC 5.9 10/26/2018    RBC 3.60 (L) 12/21/2022    RBC 4.27 (L) 10/26/2018    HGB 10.8 (L) 12/21/2022    HGB 12.8 (L) 10/26/2018    HCT 32.2 (L) 12/21/2022    HCT 37.9 (L) 10/26/2018    MCV 89 12/21/2022    MCV 89 10/26/2018    MCH 30.0 12/21/2022    MCH 30.0 10/26/2018    MCHC 33.5 12/21/2022    MCHC 33.8 10/26/2018    RDW 12.5 12/21/2022    RDW 12.8 10/26/2018     12/21/2022     10/26/2018       BMP RESULTS:  Lab Results   Component Value Date    NA  133 12/21/2022     10/26/2018    POTASSIUM 3.9 12/21/2022    POTASSIUM 4.2 10/26/2018    CHLORIDE 99 12/21/2022    CHLORIDE 105 10/26/2018    CO2 29 12/21/2022    CO2 31 10/26/2018    ANIONGAP 5 12/21/2022    ANIONGAP 6 10/26/2018     (H) 12/21/2022    GLC 96 10/26/2018    BUN 25 12/21/2022    BUN 24 10/26/2018    CR 0.92 12/21/2022    CR 1.00 10/26/2018    GFRESTIMATED 90 12/21/2022    GFRESTIMATED 75 10/26/2018    GFRESTBLACK >90 10/26/2018    RAMONA 8.4 (L) 12/21/2022    RAMONA 8.9 10/26/2018        A1C RESULTS:  Lab Results   Component Value Date    A1C 5.5 12/09/2022       INR RESULTS:  Lab Results   Component Value Date    INR 2.5 (H) 02/20/2023    INR 2.0 (H) 02/14/2023    INR 2.04 (H) 12/21/2022    INR 2.39 (H) 12/20/2022         Levi Sinclair MD, FACC    CC  Thony Gongora MD  [address] [place] [address] [place] [address]

## 2023-02-20 NOTE — LETTER
2/20/2023    Thony Gongora MD  [address] [place] [address]    RE: Miguel Pappas       Dear Colleague,     I had the pleasure of seeing Miguel Pappas in the [facility].  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  He has a history of aortic stenosis with recent surgical AVR using a bioprosthesis.    Miguel had minimal symptoms prior to his AVR but his echocardiogram showed critical aortic stenosis.  He has not noticed any real change in his symptoms.  He was given a lot of instructions on minimizing his activity but he essentially paid little attention to them and went back to fairly normal activities right away.  He has not had any problems with this.  He previously had been lifting a lot of radha of hay and has avoided doing that because of their heavy weight.  I suggested that he wait at least 4 months before he left that degree of weight.  The most he is lifting currently is a half  full 5 gallon pail of liquid.    Miguel denies ongoing chest discomfort although he states if he moves certain ways he still has mild discomfort.  He is not having any chest pain, racing heart, syncope or near syncope, or peripheral edema.  On exam today he has a 2/6 systolic murmur from the aortic valve.  His wound is healing well.  He is continuing to use warfarin and has been given instructions to continue this for 3 months postop.  I reminded him of the importance of continuing aspirin indefinitely.  His blood pressure is well controlled.  His preoperative angiogram showed no visible disease, so he is not on a statin.      ASSESSMENT/PLAN:    1.  Bioprosthetic AVR in mid December.  The patient is doing well.  I will arrange an echocardiogram to document function of the valve although by exam it sounds like it is functioning normally.  I reminded him the importance of using long-term aspirin and using antibiotic  prophylaxis for  any future dental procedures.  1 year follow-up will be planned but I emphasized that I would like to see him sooner should he have any other concerning symptoms develop.    Thank you for inviting me to participate in the care of your patient.  Please don't hesitate to call if I can be of further assistance.  30 minutes were spent today reviewing the chart and other records, interviewing and examining the patient, and documenting our visit.    Chart documentation was completed, in part, with [employer] voice-recognition software. Even though reviewed, some grammatical, spelling, and word errors may remain.       Orders Placed This Encounter   Procedures     Follow-Up with Cardiology CESAR     Echocardiogram Complete     No orders of the defined types were placed in this encounter.    There are no discontinued medications.    10 year ASCVD risk: The 10-year ASCVD risk score (Bella FAIRCHILD, et al., 2019) is: 12.2%    Values used to calculate the score:      Age: 67 years      Sex: Male      Is Non- : No      Diabetic: No      Tobacco smoker: No      Systolic Blood Pressure: 120 mmHg      Is BP treated: No      HDL Cholesterol: 85 mg/dL      Total Cholesterol: 209 mg/dL    Encounter Diagnosis   Name Primary?     S/P AVR Yes       CURRENT MEDICATIONS:  Current Outpatient Medications   Medication Sig Dispense Refill     lisinopril (ZESTRIL) 5 MG tablet Take 1 tablet (5 mg) by mouth daily 30 tablet 3     metoprolol tartrate (LOPRESSOR) 25 MG tablet Take 1 tablet (25 mg) by mouth 2 times daily (Patient taking differently: Take 12.5 mg by mouth 2 times daily) 60 tablet 3     Multiple Vitamins-Minerals (CENTRUM SILVER PO) Take 1 tablet by mouth daily 30 0     warfarin ANTICOAGULANT (COUMADIN) 5 MG tablet Per INR staff currently 5mg nightly, except tues 2.5mg 90 tablet 1     acetaminophen (TYLENOL) 325 MG tablet Take 2 tablets (650 mg) by mouth every 4 hours as needed for mild pain (Patient not taking: Reported  on 2/20/2023) 40 tablet 0     amiodarone (PACERONE) 200 MG tablet Take 1 tablet (200 mg) by mouth daily for 23 days 23 tablet 0     aspirin 81 MG tablet Take 81 mg by mouth daily (Patient not taking: Reported on 2/20/2023)       GLUCOSAMINE CHONDROITIN OR TABS Take 1 tablet by mouth daily (Patient not taking: Reported on 2/20/2023)  0     polyethylene glycol (MIRALAX) 17 GM/Dose powder Take 17 g by mouth daily (Patient not taking: Reported on 2/3/2023) 510 g 0     senna-docusate (SENOKOT-S/PERICOLACE) 8.6-50 MG tablet Take 1 tablet by mouth 2 times daily as needed for constipation (Patient not taking: Reported on 2/3/2023) 20 tablet 0     VITAMIN D PO Take 1 tablet by mouth daily (Patient not taking: Reported on 1/9/2023)         ALLERGIES     Allergies   Allergen Reactions     No Known Drug Allergies        PAST MEDICAL HISTORY:  Past Medical History:   Diagnosis Date     * * * SBE PROPHYLAXIS * * *     no longer indicated - 2007 AHA guidelnies     Mitral valve stenosis and aortic valve stenosis     2/6 murmur     Pure hypercholesterolemia 1/26/2007       PAST SURGICAL HISTORY:  Past Surgical History:   Procedure Laterality Date     COLONOSCOPY N/A 12/5/2014    Procedure: COLONOSCOPY;  Surgeon: Omar Chisholm MD;  Location:  GI     CV CORONARY ANGIOGRAM N/A 11/15/2022    Procedure: Coronary Angiogram;  Surgeon: Edson Nava MD;  Location:  HEART CARDIAC CATH LAB     HC REMOVAL OF TONSILS,<9 Y/O      Tonsils <10y.o.     REPLACE VALVE AORTIC N/A 12/16/2022    Procedure: AORTIC VALVE REPLACEMENT WITH 21MM INSPIRIS RESILIA VALVE, PLACEMENT OF TEMPORARY VENTRICULAR AND ATRIAL PACING WIRES AND TRANSESPHOGEAL ECHOCARDIOGRAM;  Surgeon: Kaila Abbott MD;  Location:  OR     [facility] COLONOSCOPY W/WO BRUSH/WASH  2/8/2005        FAMILY HISTORY:  Family History   Problem Relation Age of Onset     Cancer Father         Spleen cancer     Hypertension Father      Allergies Father         PCN      Gastrointestinal Disease Father         ulcer     Heart Disease Father         Hx: meds and angioplasty     Hypertension Brother      Cerebrovascular Disease Paternal Grandfather      Arthritis Paternal Grandfather      Arthritis Mother      Hypertension Mother      Allergies Son         Dust , Mold     Depression Sister         X 2       SOCIAL HISTORY:  Social History     Socioeconomic History     Marital status:      Spouse name: Fabian     Number of children: 3     Years of education: 12     Highest education level: None   Occupational History     Occupation:    Tobacco Use     Smoking status: Never     Passive exposure: Never     Smokeless tobacco: Never   Vaping Use     Vaping Use: Never used   Substance and Sexual Activity     Alcohol use: Yes     Comment: 1-2 beer monthly  maybe     Drug use: No     Sexual activity: Yes     Partners: Female     Comment: Hx: wife had tubal ligation   Other Topics Concern      Service Yes     Comment: [employer]     Blood Transfusions No     Caffeine Concern No     Occupational Exposure No     Hobby Hazards No     Sleep Concern No     Stress Concern No     Weight Concern No     Special Diet Yes     Comment: Low fat and cholesterol high fiber     Back Care Yes     Comment: Hx: chiropractor for back inj and knee     Exercise Yes     Comment: every day     Bike Helmet No     Seat Belt Yes   Social History Narrative    Lives with spouse.       Review of Systems:  Skin:        Eyes:       ENT:       Respiratory:  Negative shortness of breath;cough;wheezing  Cardiovascular:  Negative;palpitations;chest pain;lightheadedness;edema;syncope or near-syncope Positive for;dizziness  Gastroenterology:      Genitourinary:       Musculoskeletal:       Neurologic:  Negative numbness or tingling of hands;numbness or tingling of feet  Psychiatric:       Heme/Lymph/Imm:       Endocrine:         Physical Exam:  Vitals: /84 (BP Location: Right arm, Patient  "Position: Sitting, Cuff Size: Adult Regular)   Pulse 60   Ht 1.727 m (5' 8\")   Wt 64.5 kg (142 lb 4.8 oz)   SpO2 98%   BMI 21.64 kg/m      Constitutional:  cooperative, alert and oriented, well developed, well nourished, in no acute distress        Skin:  warm and dry to the touch, no apparent skin lesions or masses noted surgical scars well-healed      Head:  normocephalic, no masses or lesions        Eyes:  pupils equal and round, conjunctivae and lids unremarkable, sclera white, no xanthalasma, EOMS intact, no nystagmus        ENT:  no pallor or cyanosis   masked    Neck:  carotid pulses are full and equal bilaterally, JVP normal, no carotid bruit        Chest:  normal breath sounds, clear to auscultation, normal A-P diameter, normal symmetry, normal respiratory excursion, no use of accessory muscles        Cardiac: regular rhythm;normal S1 and S2;no S3 or S4;apical impulse not displaced       systolic ejection murmur;grade 2;RUSB          Abdomen:  abdomen soft;BS normoactive        Vascular: pulses full and equal                                      Extremities and Muscular Skeletal:  no edema           Neurological:  no gross motor deficits        Psych:  affect appropriate, oriented to time, person and place     Recent Lab Results:  LIPID RESULTS:  Lab Results   Component Value Date    CHOL 209 (H) 10/26/2018    HDL 85 10/26/2018     (H) 10/26/2018    TRIG 46 10/26/2018    CHOLHDLRATIO 2.4 11/14/2014       LIVER ENZYME RESULTS:  Lab Results   Component Value Date    AST 50 (H) 12/17/2022    AST 36 04/09/2009    ALT 23 12/17/2022    ALT 18 04/09/2009       CBC RESULTS:  Lab Results   Component Value Date    WBC 12.1 (H) 12/21/2022    WBC 5.9 10/26/2018    RBC 3.60 (L) 12/21/2022    RBC 4.27 (L) 10/26/2018    HGB 10.8 (L) 12/21/2022    HGB 12.8 (L) 10/26/2018    HCT 32.2 (L) 12/21/2022    HCT 37.9 (L) 10/26/2018    MCV 89 12/21/2022    MCV 89 10/26/2018    MCH 30.0 12/21/2022    MCH 30.0 10/26/2018 "    MCHC 33.5 12/21/2022    MCHC 33.8 10/26/2018    RDW 12.5 12/21/2022    RDW 12.8 10/26/2018     12/21/2022     10/26/2018       BMP RESULTS:  Lab Results   Component Value Date     12/21/2022     10/26/2018    POTASSIUM 3.9 12/21/2022    POTASSIUM 4.2 10/26/2018    CHLORIDE 99 12/21/2022    CHLORIDE 105 10/26/2018    CO2 29 12/21/2022    CO2 31 10/26/2018    ANIONGAP 5 12/21/2022    ANIONGAP 6 10/26/2018     (H) 12/21/2022    GLC 96 10/26/2018    BUN 25 12/21/2022    BUN 24 10/26/2018    CR 0.92 12/21/2022    CR 1.00 10/26/2018    GFRESTIMATED 90 12/21/2022    GFRESTIMATED 75 10/26/2018    GFRESTBLACK >90 10/26/2018    RAMONA 8.4 (L) 12/21/2022    RAMONA 8.9 10/26/2018        A1C RESULTS:  Lab Results   Component Value Date    A1C 5.5 12/09/2022       INR RESULTS:  Lab Results   Component Value Date    INR 2.5 (H) 02/20/2023    INR 2.0 (H) 02/14/2023    INR 2.04 (H) 12/21/2022    INR 2.39 (H) 12/20/2022         Levi Sinclair MD, [facility]    CC  Thony Gongora MD  [address] [place] [address] [place] [address]    Thank you for allowing me to participate in the care of your patient.      Sincerely,     Levi Sinclair MD     [facility]

## 2023-02-20 NOTE — PROGRESS NOTES
ANTICOAGULATION MANAGEMENT     Miguel Pappas 67 year old male is on warfarin with therapeutic INR result. (Goal INR 2.0-3.0)    Recent labs: (last 7 days)     02/20/23  1110   INR 2.5*       ASSESSMENT     Source(s): Chart Review  Previous INR was Therapeutic last visit; previously outside of goal range  Medication, diet, health changes since last INR chart reviewed; none identified  S/P AVR placed 12/16 - [facility] consult x90 days Amiodarone thru 1/18/23           PLAN     Recommended plan for no diet, medication or health factor changes affecting INR     Dosing Instructions: Continue your current warfarin dose with next INR in 10 days       Summary  As of 2/20/2023    Full warfarin instructions:  7.5 mg every Tue, Thu, Sat; 5 mg all other days   Next INR check:  3/2/2023             Detailed voice message left for Miguel with dosing instructions and follow up date.     Contact [phone]  to schedule and with any changes, questions or concerns.     Education provided:   Please call back if any changes to your diet, medications or how you've been taking warfarin    Plan made with [facility] Pharmacist Berta Fuchs, RN  Anticoagulation Clinic  2/20/2023    _______________________________________________________________________     Anticoagulation Episode Summary     Current INR goal:  2.0-3.0   TTR:  73.8 % (1.7 mo)   Target end date:  3/16/2023   Send INR reminders to:  HELLEN DECKER    Indications    S/P AVR [Z95.2]           Comments:           Anticoagulation Care Providers     Provider Role Specialty Phone number    Liliane De Jesus PA-C Referring Cardiovascular & Thoracic Surgery [phone]

## 2023-03-07 ENCOUNTER — LAB (OUTPATIENT)
Dept: LAB | Facility: CLINIC | Age: 68
End: 2023-03-07
Payer: COMMERCIAL

## 2023-03-07 ENCOUNTER — ANTICOAGULATION THERAPY VISIT (OUTPATIENT)
Dept: ANTICOAGULATION | Facility: CLINIC | Age: 68
End: 2023-03-07

## 2023-03-07 DIAGNOSIS — Z95.2 S/P AVR: Primary | ICD-10-CM

## 2023-03-07 DIAGNOSIS — Z95.2 S/P AVR: ICD-10-CM

## 2023-03-07 LAB — INR BLD: 1.5 (ref 0.9–1.1)

## 2023-03-07 PROCEDURE — 85610 PROTHROMBIN TIME: CPT

## 2023-03-07 PROCEDURE — 36416 COLLJ CAPILLARY BLOOD SPEC: CPT

## 2023-03-07 NOTE — PROGRESS NOTES
ANTICOAGULATION MANAGEMENT     Miguel Pappas 67 year old male is on warfarin with subtherapeutic INR result. (Goal INR 2.0-3.0)    Recent labs: (last 7 days)     03/07/23  1343   INR 1.5*       ASSESSMENT     Source(s): Chart Review and Patient/Caregiver Call     Warfarin doses taken: Warfarin taken as instructed  Diet: No new diet changes identified  New illness, injury, or hospitalization: No  Medication/supplement changes: None noted  Signs or symptoms of bleeding or clotting: No  Previous INR: Therapeutic last 2(+) visits  Additional findings: Amiodarone was stopped in January. Patient will hit target end date on 3/16/23.         PLAN     Recommended plan for temporary change(s) affecting INR     Dosing Instructions: booster dose then Increase your warfarin dose (17% change) with next INR in 1 week. Patient requested to have the INR rechecked on 3/14 after his Echo instead of Monday. Patient will be stopping Warfarin on 3/16/23 as it is the target end date.       Summary  As of 3/7/2023    Full warfarin instructions:  3/7: 15 mg; Otherwise 5 mg every Mon; 7.5 mg all other days   Next INR check:  3/16/2023             Telephone call with Miguel who verbalizes understanding and agrees to plan    Lab visit scheduled    Education provided:   Please call back if any changes to your diet, medications or how you've been taking warfarin  Symptom monitoring: monitoring for bleeding signs and symptoms, monitoring for clotting signs and symptoms, monitoring for stroke signs and symptoms and when to seek medical attention/emergency care  Contact [phone]  with any changes, questions or concerns.     Plan made with [facility] Pharmacist Berta VIDES, RN  Anticoagulation Clinic  3/7/2023    _______________________________________________________________________     Anticoagulation Episode Summary     Current INR goal:  2.0-3.0   TTR:  68.4 % (2.2 mo)   Target end date:  3/16/2023   Send INR reminders to:   [facility]    Indications    S/P AVR [Z95.2]           Comments:           Anticoagulation Care Providers     Provider Role Specialty Phone number    Liliane De Jesus PA-C Referring Cardiovascular & Thoracic Surgery [phone]

## 2023-03-14 ENCOUNTER — TELEPHONE (OUTPATIENT)
Dept: ANTICOAGULATION | Facility: CLINIC | Age: 68
End: 2023-03-14

## 2023-03-14 ENCOUNTER — HOSPITAL ENCOUNTER (OUTPATIENT)
Dept: CARDIOLOGY | Facility: CLINIC | Age: 68
Discharge: HOME | End: 2023-03-14
Attending: INTERNAL MEDICINE | Admitting: INTERNAL MEDICINE
Payer: COMMERCIAL

## 2023-03-14 ENCOUNTER — LAB (OUTPATIENT)
Dept: LAB | Facility: CLINIC | Age: 68
End: 2023-03-14
Payer: COMMERCIAL

## 2023-03-14 ENCOUNTER — ANTICOAGULATION THERAPY VISIT (OUTPATIENT)
Dept: ANTICOAGULATION | Facility: CLINIC | Age: 68
End: 2023-03-14

## 2023-03-14 DIAGNOSIS — Z95.2 S/P AVR: ICD-10-CM

## 2023-03-14 DIAGNOSIS — Z95.2 S/P AVR: Primary | ICD-10-CM

## 2023-03-14 LAB
INR BLD: 2.4 (ref 0.9–1.1)
LVEF ECHO: NORMAL

## 2023-03-14 PROCEDURE — 93306 TTE W/DOPPLER COMPLETE: CPT

## 2023-03-14 PROCEDURE — 85610 PROTHROMBIN TIME: CPT

## 2023-03-14 PROCEDURE — 93306 TTE W/DOPPLER COMPLETE: CPT | Mod: 26 | Performed by: INTERNAL MEDICINE

## 2023-03-14 PROCEDURE — 36416 COLLJ CAPILLARY BLOOD SPEC: CPT

## 2023-03-14 NOTE — TELEPHONE ENCOUNTER
2:34 PM    Dr Gongora and Dr. Sinclair,     Patient is coming to the target end date on 3/16/23 for his Warfarin. Is there any objection to him completing the Warfarin on 3/16/23? If there is no objection, or reasoning for him to remain on Warfarin, [facility] will resolve the episode on 3/16/23.    Carly Wyatt, RN, BSN, PHN  Anticoagulation Clinic   [phone]

## 2023-03-14 NOTE — PROGRESS NOTES
ANTICOAGULATION MANAGEMENT     Miguel Pappas 67 year old male is on warfarin with therapeutic INR result. (Goal INR 2.0-3.0)    Recent labs: (last 7 days)     03/14/23  1334   INR 2.4*       ASSESSMENT     Warfarin Lab Questionnaire    Dose in Tablet or Mg 3/13/2023   TAB or MG? milligram (mg)     Pt Rptd Dose MAURICIO MONDAY TUESDAY WEDNESDAY THURSDAY FRIDAY SATURDAY   3/13/2023 7.5 5 7.5 7.5 15 7.5 7.5     Warfarin Lab Questionnaire 3/13/2023   Missed doses? No   Medication changes? No   Abnormal bleeding? No   Shortness of breath? No   Injuries or illness since last INR? No   Changes in diet or alcohol? No   Upcoming surgery, procedure? No   Best number to call with results? [phone]       Additional findings: Target end date 3/16/23.  TE sent to Cards and PCP to verify there is no further need to remain on warfarin. May resolve episode at that time.     PLAN     Recommended plan for no diet, medication or health factor changes affecting INR     Dosing Instructions: Take two more doses of Warfarin then discontinue.        Summary  As of 3/14/2023    Full warfarin instructions:  5 mg every Mon; 7.5 mg all other days   Next INR check:  3/16/2023             Detailed voice message left for Miguel with dosing instructions. Patient may discontinue the Warfarin on 3/16/23, unless instructed otherwise. [facility] to contact the patient if his PCP or cardiologist wishes for him to remain on the Warfarin.        Education provided:   Contact [phone]  with any changes, questions or concerns.     Plan made with [facility] Pharmacist Berta VIDES, RN  Anticoagulation Clinic  3/14/2023    _______________________________________________________________________     Anticoagulation Episode Summary     Current INR goal:  2.0-3.0   TTR:  66.1 % (2.4 mo)   Target end date:  3/16/2023   Send INR reminders to:  HELLEN DECKER    Indications    S/P AVR [Z95.2]           Comments:           Anticoagulation Care  Providers     Provider Role Specialty Phone number    Liliane De Jesus PA-C Referring Cardiovascular & Thoracic Surgery [phone]

## 2023-03-15 ENCOUNTER — ANTICOAGULATION THERAPY VISIT (OUTPATIENT)
Dept: ANTICOAGULATION | Facility: CLINIC | Age: 68
End: 2023-03-15
Payer: COMMERCIAL

## 2023-03-15 DIAGNOSIS — Z95.2 S/P AVR: Primary | ICD-10-CM

## 2023-03-15 NOTE — PROGRESS NOTES
ANTICOAGULATION  MANAGEMENT    Miguel Pappas is being discharged from the [facility]ticoagulation [facility] ([facility]).    Reason for discharge: warfarin therapy completed    Anticoagulation episode resolved, [facility] referral closed and Standing order discontinued    If patient needs warfarin management in the future, please send a new referral    Carly Wyatt RN

## 2023-03-15 NOTE — TELEPHONE ENCOUNTER
Looks like he just needs 3 mos of warfarin per CV surgery. But I would confirm with them. Jaxon Driver    He was started on coumadin for his tissue AV. His INR goal is 2-3 for 3 months as his valve seats in the aorta.

## 2023-03-25 ENCOUNTER — HEALTH MAINTENANCE LETTER (OUTPATIENT)
Age: 68
End: 2023-03-25

## 2023-04-05 ENCOUNTER — OFFICE VISIT (OUTPATIENT)
Dept: FAMILY MEDICINE | Facility: CLINIC | Age: 68
End: 2023-04-05
Payer: COMMERCIAL

## 2023-04-05 VITALS
OXYGEN SATURATION: 98 % | DIASTOLIC BLOOD PRESSURE: 72 MMHG | HEIGHT: 68 IN | WEIGHT: 144 LBS | TEMPERATURE: 97.4 F | RESPIRATION RATE: 18 BRPM | HEART RATE: 60 BPM | BODY MASS INDEX: 21.82 KG/M2 | SYSTOLIC BLOOD PRESSURE: 120 MMHG

## 2023-04-05 DIAGNOSIS — Z11.59 NEED FOR HEPATITIS C SCREENING TEST: ICD-10-CM

## 2023-04-05 DIAGNOSIS — Z79.2 PROPHYLACTIC ANTIBIOTIC: ICD-10-CM

## 2023-04-05 DIAGNOSIS — Z95.2 S/P AVR: Primary | ICD-10-CM

## 2023-04-05 PROBLEM — R73.9 TRANSIENT HYPERGLYCEMIA POST PROCEDURE: Status: RESOLVED | Noted: 2022-12-21 | Resolved: 2023-04-05

## 2023-04-05 PROBLEM — Z98.890 STATUS POST CORONARY ANGIOGRAM: Status: RESOLVED | Noted: 2022-11-15 | Resolved: 2023-04-05

## 2023-04-05 PROBLEM — K56.0 PARALYTIC ILEUS (H): Status: RESOLVED | Noted: 2022-12-21 | Resolved: 2023-04-05

## 2023-04-05 PROBLEM — E87.70 FLUID OVERLOAD: Status: RESOLVED | Noted: 2022-12-21 | Resolved: 2023-04-05

## 2023-04-05 PROCEDURE — 99213 OFFICE O/P EST LOW 20 MIN: CPT | Performed by: FAMILY MEDICINE

## 2023-04-05 ASSESSMENT — PAIN SCALES - GENERAL: PAINLEVEL: NO PAIN (0)

## 2023-04-05 NOTE — PROGRESS NOTES
"  Assessment & Plan       ICD-10-CM    1. S/P AVR- bioprosthetic  Z95.2       2. Need for hepatitis C screening test  Z11.59       3. Prophylactic antibiotic  Z79.2            Doing great.  Follow-up with cardiothoracic team as scheduled.  Will need prophylactic antibiotics with dental care    No follow-ups on file.    Thony Gongora MD  [facility] BRIANNE Rivera is a 67 year old, presenting for the following health issues:  Heart Problem (Follow up after open heart surgery)        4/5/2023    11:15 AM   Additional Questions   Roomed by Marge AGUILAR     Heart Problem    History of Present Illness       Vascular Disease:  He presents for follow up of vascular disease.  He never takes nitroglycerin. He takes daily aspirin.    He eats 2-3 servings of fruits and vegetables daily.He consumes 1 sweetened beverage(s) daily.He exercises with enough effort to increase his heart rate 30 to 60 minutes per day.  He exercises with enough effort to increase his heart rate 5 days per week. He is missing 1 dose(s) of medications per week.  He is not taking prescribed medications regularly due to other.         Routine follow-up  Doing very well  Activity slowly increasing  Active   No chest pain or pressure or lower extremity swelling      Review of Systems   Constitutional, HEENT, cardiovascular, pulmonary, gi and gu systems are negative, except as otherwise noted.      Objective    /72   Pulse 60   Temp 97.4  F (36.3  C) (Temporal)   Resp 18   Ht 1.727 m (5' 8\")   Wt 65.3 kg (144 lb)   SpO2 98%   BMI 21.90 kg/m    Body mass index is 21.9 kg/m .  Physical Exam   GENERAL: healthy, alert and no distress  NECK: no adenopathy, no asymmetry, masses, or scars and thyroid normal to palpation  RESP: lungs clear to auscultation - no rales, rhonchi or wheezes  CV: regular rate and rhythm, normal S1 S2, no S3 or S4, no murmur, click or rub, no peripheral edema and peripheral pulses " strong  ABDOMEN: soft, nontender, no hepatosplenomegaly, no masses and bowel sounds normal  MS: no gross musculoskeletal defects noted, no edema

## 2023-04-10 ENCOUNTER — TELEPHONE (OUTPATIENT)
Dept: CARDIOLOGY | Facility: CLINIC | Age: 68
End: 2023-04-10
Payer: COMMERCIAL

## 2023-04-10 DIAGNOSIS — Z95.2 S/P AVR: ICD-10-CM

## 2023-04-10 NOTE — TELEPHONE ENCOUNTER
Dr. Gongora and Dr. Sinclair reviewed patients chart does not have to be on lisinopril or metoprolol now post AVR.  Contacted patient and Dr. Gongora did discontinue both medications and will continue to monitor his BP   Updated medication list

## 2023-04-21 ENCOUNTER — ALLIED HEALTH/NURSE VISIT (OUTPATIENT)
Dept: FAMILY MEDICINE | Facility: CLINIC | Age: 68
End: 2023-04-21
Payer: COMMERCIAL

## 2023-04-21 VITALS — HEART RATE: 76 BPM | SYSTOLIC BLOOD PRESSURE: 128 MMHG | DIASTOLIC BLOOD PRESSURE: 84 MMHG

## 2023-04-21 DIAGNOSIS — I35.1 AORTIC VALVE INSUFFICIENCY, ETIOLOGY OF CARDIAC VALVE DISEASE UNSPECIFIED: Primary | ICD-10-CM

## 2023-04-21 PROCEDURE — 99207 PR NO CHARGE NURSE ONLY: CPT

## 2023-04-21 NOTE — PROGRESS NOTES
Miguel GREER Portiajeffreytyrel is a 67 year old patient who comes in today for a Blood Pressure check.  Initial BP:  /84   Pulse 76      76  Disposition: follow-up as previously indicated by provider

## 2023-11-30 ENCOUNTER — OFFICE VISIT (OUTPATIENT)
Dept: FAMILY MEDICINE | Facility: CLINIC | Age: 68
End: 2023-11-30
Payer: COMMERCIAL

## 2023-11-30 VITALS
RESPIRATION RATE: 12 BRPM | SYSTOLIC BLOOD PRESSURE: 136 MMHG | OXYGEN SATURATION: 100 % | HEART RATE: 68 BPM | BODY MASS INDEX: 20.76 KG/M2 | WEIGHT: 137 LBS | DIASTOLIC BLOOD PRESSURE: 80 MMHG | HEIGHT: 68 IN | TEMPERATURE: 97.6 F

## 2023-11-30 DIAGNOSIS — Z29.11 NEED FOR VACCINATION AGAINST RESPIRATORY SYNCYTIAL VIRUS: ICD-10-CM

## 2023-11-30 DIAGNOSIS — Z79.2 PROPHYLACTIC ANTIBIOTIC: ICD-10-CM

## 2023-11-30 DIAGNOSIS — H90.3 BILATERAL SENSORINEURAL HEARING LOSS: ICD-10-CM

## 2023-11-30 DIAGNOSIS — Z11.59 NEED FOR HEPATITIS C SCREENING TEST: ICD-10-CM

## 2023-11-30 DIAGNOSIS — Z95.2 S/P AVR: ICD-10-CM

## 2023-11-30 DIAGNOSIS — Z00.00 ENCOUNTER FOR MEDICARE ANNUAL WELLNESS EXAM: Primary | ICD-10-CM

## 2023-11-30 DIAGNOSIS — Z29.89 SBE (SUBACUTE BACTERIAL ENDOCARDITIS) PROPHYLAXIS CANDIDATE: ICD-10-CM

## 2023-11-30 DIAGNOSIS — Z23 NEED FOR SHINGLES VACCINE: ICD-10-CM

## 2023-11-30 DIAGNOSIS — D62 ANEMIA DUE TO BLOOD LOSS, ACUTE: ICD-10-CM

## 2023-11-30 DIAGNOSIS — I35.1 AORTIC VALVE INSUFFICIENCY, ETIOLOGY OF CARDIAC VALVE DISEASE UNSPECIFIED: ICD-10-CM

## 2023-11-30 DIAGNOSIS — Z13.6 CARDIOVASCULAR SCREENING; LDL GOAL LESS THAN 100: ICD-10-CM

## 2023-11-30 LAB
ALBUMIN SERPL BCG-MCNC: 4.3 G/DL (ref 3.5–5.2)
ALP SERPL-CCNC: 101 U/L (ref 40–150)
ALT SERPL W P-5'-P-CCNC: 39 U/L (ref 0–70)
ANION GAP SERPL CALCULATED.3IONS-SCNC: 10 MMOL/L (ref 7–15)
AST SERPL W P-5'-P-CCNC: 42 U/L (ref 0–45)
BILIRUB SERPL-MCNC: 0.2 MG/DL
BUN SERPL-MCNC: 25.2 MG/DL (ref 8–23)
CALCIUM SERPL-MCNC: 9 MG/DL (ref 8.8–10.2)
CHLORIDE SERPL-SCNC: 101 MMOL/L (ref 98–107)
CHOLEST SERPL-MCNC: 203 MG/DL
CREAT SERPL-MCNC: 0.92 MG/DL (ref 0.67–1.17)
DEPRECATED HCO3 PLAS-SCNC: 28 MMOL/L (ref 22–29)
EGFRCR SERPLBLD CKD-EPI 2021: 89 ML/MIN/1.73M2
ERYTHROCYTE [DISTWIDTH] IN BLOOD BY AUTOMATED COUNT: 12.8 % (ref 10–15)
GLUCOSE SERPL-MCNC: 91 MG/DL (ref 70–99)
HCT VFR BLD AUTO: 39.6 % (ref 40–53)
HDLC SERPL-MCNC: 84 MG/DL
HGB BLD-MCNC: 13.3 G/DL (ref 13.3–17.7)
LDLC SERPL CALC-MCNC: 108 MG/DL
MCH RBC QN AUTO: 29.6 PG (ref 26.5–33)
MCHC RBC AUTO-ENTMCNC: 33.6 G/DL (ref 31.5–36.5)
MCV RBC AUTO: 88 FL (ref 78–100)
NONHDLC SERPL-MCNC: 119 MG/DL
PLATELET # BLD AUTO: 162 10E3/UL (ref 150–450)
POTASSIUM SERPL-SCNC: 3.9 MMOL/L (ref 3.4–5.3)
PROT SERPL-MCNC: 6.8 G/DL (ref 6.4–8.3)
RBC # BLD AUTO: 4.49 10E6/UL (ref 4.4–5.9)
SODIUM SERPL-SCNC: 139 MMOL/L (ref 135–145)
TRIGL SERPL-MCNC: 57 MG/DL
WBC # BLD AUTO: 6.9 10E3/UL (ref 4–11)

## 2023-11-30 PROCEDURE — 86803 HEPATITIS C AB TEST: CPT | Performed by: FAMILY MEDICINE

## 2023-11-30 PROCEDURE — G0008 ADMIN INFLUENZA VIRUS VAC: HCPCS | Performed by: FAMILY MEDICINE

## 2023-11-30 PROCEDURE — 36415 COLL VENOUS BLD VENIPUNCTURE: CPT | Performed by: FAMILY MEDICINE

## 2023-11-30 PROCEDURE — 99214 OFFICE O/P EST MOD 30 MIN: CPT | Mod: 25 | Performed by: FAMILY MEDICINE

## 2023-11-30 PROCEDURE — G0439 PPPS, SUBSEQ VISIT: HCPCS | Performed by: FAMILY MEDICINE

## 2023-11-30 PROCEDURE — 85027 COMPLETE CBC AUTOMATED: CPT | Performed by: FAMILY MEDICINE

## 2023-11-30 PROCEDURE — 80053 COMPREHEN METABOLIC PANEL: CPT | Performed by: FAMILY MEDICINE

## 2023-11-30 PROCEDURE — 90662 IIV NO PRSV INCREASED AG IM: CPT | Performed by: FAMILY MEDICINE

## 2023-11-30 PROCEDURE — 80061 LIPID PANEL: CPT | Performed by: FAMILY MEDICINE

## 2023-11-30 RX ORDER — AMOXICILLIN 500 MG/1
2000 CAPSULE ORAL ONCE
Qty: 4 CAPSULE | Refills: 11 | Status: SHIPPED | OUTPATIENT
Start: 2023-11-30 | End: 2023-11-30

## 2023-11-30 RX ORDER — RESPIRATORY SYNCYTIAL VIRUS VACCINE 120MCG/0.5
0.5 KIT INTRAMUSCULAR ONCE
Qty: 1 EACH | Refills: 0 | Status: SHIPPED | OUTPATIENT
Start: 2023-11-30 | End: 2023-11-30

## 2023-11-30 ASSESSMENT — ENCOUNTER SYMPTOMS
DYSURIA: 0
PARESTHESIAS: 0
FEVER: 0
ARTHRALGIAS: 1
CHILLS: 0
HEADACHES: 0
JOINT SWELLING: 0
SORE THROAT: 0
COUGH: 0
FREQUENCY: 0
NERVOUS/ANXIOUS: 0
PALPITATIONS: 0
HEMATURIA: 0
SHORTNESS OF BREATH: 0
MYALGIAS: 1
DIZZINESS: 0
DIARRHEA: 0
CONSTIPATION: 0
NAUSEA: 0
WEAKNESS: 0
HEARTBURN: 0
ABDOMINAL PAIN: 0
HEMATOCHEZIA: 0
EYE PAIN: 0

## 2023-11-30 ASSESSMENT — ACTIVITIES OF DAILY LIVING (ADL): CURRENT_FUNCTION: NO ASSISTANCE NEEDED

## 2023-11-30 ASSESSMENT — PAIN SCALES - GENERAL: PAINLEVEL: NO PAIN (0)

## 2023-11-30 NOTE — PATIENT INSTRUCTIONS
Take the amoxicillin (4 tablets) about 30-60 minutes before your dental work.      Let's ensure that your echo looks OK since your murmur seems a bit louder than in February.      Get your hearing testing done.      Update your immunizations.      We will let you know your results as soon as we can.  If you have MyChart, you will be able to see your lab and imaging results shortly after they become available.  I will review these and let you know my interpretation, but this usually takes additional time.  If you have multiple labs, I usually wait until they are all back before sending a note about them unless something is worrisome.  If you do not have MyChart, we will let you know your lab results as soon as we can.  If they are normal, this can take up to a week.     Contact us or return if questions or concerns.    Have a nice day!    Dr. Shah    Patient Education   Personalized Prevention Plan  You are due for the preventive services outlined below.  Your care team is available to assist you in scheduling these services.  If you have already completed any of these items, please share that information with your care team to update in your medical record.  Health Maintenance Due   Topic Date Due     Pneumococcal Vaccine (1 - PCV) Never done     Hepatitis C Screening  Never done     Zoster (Shingles) Vaccine (1 of 2) Never done     RSV VACCINE (Pregnancy & 60+) (1 - 1-dose 60+ series) Never done     Annual Wellness Visit  02/03/2023     Flu Vaccine (1) 09/01/2023     COVID-19 Vaccine (4 - 2023-24 season) 09/01/2023     Cholesterol Lab  10/26/2023     Hearing Loss: Care Instructions  Overview     Hearing loss is a sudden or slow decrease in how well you hear. It can range from slight to profound. Permanent hearing loss can occur with aging. It also can happen when you are exposed long-term to loud noise. Examples include listening to loud music, riding motorcycles, or being around other loud machines.  Hearing loss  can affect your work and home life. It can make you feel lonely or depressed. You may feel that you have lost your independence. But hearing aids and other devices can help you hear better and feel connected to others.  Follow-up care is a key part of your treatment and safety. Be sure to make and go to all appointments, and call your doctor if you are having problems. It's also a good idea to know your test results and keep a list of the medicines you take.  How can you care for yourself at home?  Avoid loud noises whenever possible. This helps keep your hearing from getting worse.  Always wear hearing protection around loud noises.  Wear a hearing aid as directed.  A professional can help you pick a hearing aid that will work best for you.  You can also get hearing aids over the counter for mild to moderate hearing loss.  Have hearing tests as your doctor suggests. They can show whether your hearing has changed. Your hearing aid may need to be adjusted.  Use other devices as needed. These may include:  Telephone amplifiers and hearing aids that can connect to a television, stereo, radio, or microphone.  Devices that use lights or vibrations. These alert you to the doorbell, a ringing telephone, or a baby monitor.  Television closed-captioning. This shows the words at the bottom of the screen. Most new TVs can do this.  TTY (text telephone). This lets you type messages back and forth on the telephone instead of talking or listening. These devices are also called TDD. When messages are typed on the keyboard, they are sent over the phone line to a receiving TTY. The message is shown on a monitor.  Use text messaging, social media, and email if it is hard for you to communicate by telephone.  Try to learn a listening technique called speechreading. It is not lipreading. You pay attention to people's gestures, expressions, posture, and tone of voice. These clues can help you understand what a person is saying. Face the  "person you are talking to, and have them face you. Make sure the lighting is good. You need to see the other person's face clearly.  Think about counseling if you need help to adjust to your hearing loss.  When should you call for help?  Watch closely for changes in your health, and be sure to contact your doctor if:    You think your hearing is getting worse.     You have new symptoms, such as dizziness or nausea.   Where can you learn more?  Go to https://www.[employer].net/patiented  Enter R798 in the search box to learn more about \"Hearing Loss: Care Instructions.\"  Current as of: February 28, 2023               Content Version: 13.8    3393-0169 [employer].   Care instructions adapted under license by your healthcare professional. If you have questions about a medical condition or this instruction, always ask your healthcare professional. [employer] disclaims any warranty or liability for your use of this information.         "

## 2023-11-30 NOTE — PROGRESS NOTES
"SUBJECTIVE:   Miguel is a 68 year old, presenting for the following:  Wellness Visit        11/30/2023    12:54 PM   Additional Questions   Roomed by Austen Valdez CMA       Are you in the first 12 months of your Medicare coverage?  No    Healthy Habits:     In general, how would you rate your overall health?  Good    Frequency of exercise:  6-7 days/week    Duration of exercise:  45-60 minutes    Do you usually eat at least 4 servings of fruit and vegetables a day, include whole grains    & fiber and avoid regularly eating high fat or \"junk\" foods?  Yes    Taking medications regularly:  Yes    Barriers to taking medications:  None    Medication side effects:  Not applicable    Ability to successfully perform activities of daily living:  No assistance needed    Home Safety:  No safety concerns identified    Hearing Impairment:  Difficulty following a conversation in a noisy restaurant or crowded room, difficult to understand a speaker at a public meeting or [employer] service, need to ask people to speak up or repeat themselves, difficulty understanding soft or whispered speech and difficulty understanding speech on the telephone    In the past 6 months, have you been bothered by leaking of urine?  No    In general, how would you rate your overall mental or emotional health?  Good    Additional concerns today:  No      Pt also needs SBE prophylaxis as he had his aortic valve replaced last December.      Concerned about his hearing.           Today's PHQ-2 Score:       11/30/2023    12:50 PM   PHQ-2 ( 1999 [employer])   Q1: Little interest or pleasure in doing things 0   Q2: Feeling down, depressed or hopeless 0   PHQ-2 Score 0   Q1: Little interest or pleasure in doing things Not at all   Q2: Feeling down, depressed or hopeless Not at all   PHQ-2 Score 0           Have you ever done Advance Care Planning? (For example, a Health Directive, POLST, or a discussion with a medical provider or your loved ones about your " wishes): Yes, patient states has an Advance Care Planning document and will bring a copy to the clinic.       Fall risk  Fallen 2 or more times in the past year?: No  Any fall with injury in the past year?: No    Cognitive Screening   1) Repeat 3 items (Leader, Season, Table)    2) Clock draw: NORMAL  3) 3 item recall: Recalls 2 objects   Results: NORMAL clock, 1-2 items recalled: COGNITIVE IMPAIRMENT LESS LIKELY    Mini-CogTM Copyright RIAZ Flores. Licensed by the author for use in [facility]; reprinted with permission (jewel@[facility]). All rights reserved.      Do you have sleep apnea, excessive snoring or daytime drowsiness? : yes    Reviewed and updated as needed this visit by clinical staff   Tobacco  Allergies  Meds              Reviewed and updated as needed this visit by Provider                 Social History     Tobacco Use    Smoking status: Never     Passive exposure: Never    Smokeless tobacco: Never   Substance Use Topics    Alcohol use: Yes     Comment: 1-2 beer monthly  maybe             11/30/2023    12:49 PM   Alcohol Use   Prescreen: >3 drinks/day or >7 drinks/week? No     Do you have a current opioid prescription? No  Do you use any other controlled substances or medications that are not prescribed by a provider? None              Current providers sharing in care for this patient include:   Patient Care Team:  Thony Gongora MD as PCP - General (Family Medicine)  Thony Gongora MD as Assigned PCP  Levi Sinclair MD as Assigned Heart and Vascular Provider    The following health maintenance items are reviewed in Epic and correct as of today:  Health Maintenance   Topic Date Due    ANNUAL REVIEW OF  ORDERS  Never done    Pneumococcal Vaccine: 65+ Years (1 - PCV) Never done    HEPATITIS C SCREENING  Never done    ZOSTER IMMUNIZATION (1 of 2) Never done    RSV VACCINE (Pregnancy & 60+) (1 - 1-dose 60+ series) Never done    MEDICARE ANNUAL WELLNESS VISIT   02/03/2023    INFLUENZA VACCINE (1) 09/01/2023    COVID-19 Vaccine (4 - 2023-24 season) 09/01/2023    LIPID  10/26/2023    FALL RISK ASSESSMENT  11/30/2024    COLORECTAL CANCER SCREENING  12/05/2024    ADVANCE CARE PLANNING  02/03/2027    DTAP/TDAP/TD IMMUNIZATION (2 - Td or Tdap) 01/09/2028    PHQ-2 (once per calendar year)  Completed    AORTIC ANEURYSM SCREENING (SYSTEM ASSIGNED)  Completed    IPV IMMUNIZATION  Aged Out    HPV IMMUNIZATION  Aged Out    MENINGITIS IMMUNIZATION  Aged Out    RSV MONOCLONAL ANTIBODY  Aged Out     BP Readings from Last 3 Encounters:   11/30/23 136/80   04/21/23 128/84   04/05/23 120/72    Wt Readings from Last 3 Encounters:   11/30/23 62.1 kg (137 lb)   04/05/23 65.3 kg (144 lb)   02/20/23 64.5 kg (142 lb 4.8 oz)                  Patient Active Problem List   Diagnosis    S/P AVR- bioprosthetic    Prophylactic antibiotic     Past Surgical History:   Procedure Laterality Date    COLONOSCOPY N/A 12/5/2014    Procedure: COLONOSCOPY;  Surgeon: Omar Chisholm MD;  Location:  GI    CV CORONARY ANGIOGRAM N/A 11/15/2022    Procedure: Coronary Angiogram;  Surgeon: Edson Nava MD;  Location:  HEART CARDIAC CATH LAB    HC REMOVAL OF TONSILS,<11 Y/O      Tonsils <12y.o.    REPLACE VALVE AORTIC N/A 12/16/2022    Procedure: AORTIC VALVE REPLACEMENT WITH 21MM INSPIRIS RESILIA VALVE, PLACEMENT OF TEMPORARY VENTRICULAR AND ATRIAL PACING WIRES AND TRANSESPHOGEAL ECHOCARDIOGRAM;  Surgeon: Kaila Abbott MD;  Location:  OR    [facility] COLONOSCOPY W/WO BRUSH/WASH  2/8/2005        Social History     Tobacco Use    Smoking status: Never     Passive exposure: Never    Smokeless tobacco: Never   Substance Use Topics    Alcohol use: Yes     Comment: 1-2 beer monthly  maybe     Family History   Problem Relation Age of Onset    Cancer Father         Spleen cancer    Hypertension Father     Allergies Father         PCN    Gastrointestinal Disease Father         ulcer    Heart Disease Father          Hx: meds and angioplasty    Hypertension Brother     Cerebrovascular Disease Paternal Grandfather     Arthritis Paternal Grandfather     Arthritis Mother     Hypertension Mother     Allergies Son         Dust , Mold    Depression Sister         X 2         Current Outpatient Medications   Medication Sig Dispense Refill    aspirin 81 MG tablet Take 81 mg by mouth daily      GLUCOSAMINE CHONDROITIN OR TABS Take 1 tablet by mouth daily  0    Multiple Vitamins-Minerals (CENTRUM SILVER PO) Take 1 tablet by mouth daily 30 0     Allergies   Allergen Reactions    No Known Drug Allergy      Recent Labs   Lab Test 12/21/22  0537 12/20/22  0611 12/18/22  0607 12/17/22  0354 12/16/22  1220 12/16/22  0803 12/09/22  1212 10/21/22  1555 10/26/18  1705   A1C  --   --   --   --   --   --  5.5  --   --    LDL  --   --   --   --   --   --   --   --  115*   HDL  --   --   --   --   --   --   --   --  85   TRIG  --   --   --   --   --   --   --   --  46   ALT  --   --   --  23 21  --  30   < >  --    CR 0.92 0.89   < > 0.87 0.81   < > 0.91   < > 1.00   GFRESTIMATED 90 >90   < > >90 >90   < > >90   < > 75   GFRESTBLACK  --   --   --   --   --   --   --   --  >90   POTASSIUM 3.9 3.7   < > 4.0 4.0   < > 4.2   < > 4.2    < > = values in this interval not displayed.              Review of Systems   Constitutional:  Negative for chills and fever.   HENT:  Positive for hearing loss. Negative for congestion, ear pain and sore throat.    Eyes:  Negative for pain and visual disturbance.   Respiratory:  Negative for cough and shortness of breath.    Cardiovascular:  Negative for chest pain, palpitations and peripheral edema.   Gastrointestinal:  Negative for abdominal pain, constipation, diarrhea, heartburn, hematochezia and nausea.   Genitourinary:  Negative for dysuria, frequency, genital sores, hematuria, impotence, penile discharge and urgency.   Musculoskeletal:  Positive for arthralgias and myalgias. Negative for joint swelling.  "  Skin:  Negative for rash.   Neurological:  Negative for dizziness, weakness, headaches and paresthesias.   Psychiatric/Behavioral:  Negative for mood changes. The patient is not nervous/anxious.          OBJECTIVE:   /80 (BP Location: Left arm, Patient Position: Chair, Cuff Size: Adult Regular)   Pulse 68   Temp 97.6  F (36.4  C) (Temporal)   Resp 12   Ht 1.727 m (5' 8\")   Wt 62.1 kg (137 lb)   SpO2 100%   BMI 20.83 kg/m   Estimated body mass index is 20.83 kg/m  as calculated from the following:    Height as of this encounter: 1.727 m (5' 8\").    Weight as of this encounter: 62.1 kg (137 lb).  Physical Exam  GENERAL: healthy, alert and no distress  EYES: Eyes grossly normal to inspection, PERRL and conjunctivae and sclerae normal  HENT: ear canals and TM's normal, nose and mouth without ulcers or lesions  NECK: no adenopathy, no asymmetry, masses, or scars and thyroid normal to palpation  RESP: lungs clear to auscultation - no rales, rhonchi or wheezes  CV: regular rates and rhythm, normal S1 S2, no S3 or S4, grade 3/6 systolic murmur heard best over the RUSB, radiates to carotids, peripheral pulses strong, and no peripheral edema  ABDOMEN: soft, nontender, no hepatosplenomegaly, no masses and bowel sounds normal  MS: no gross musculoskeletal defects noted, no edema    Diagnostic Test Results:  Labs reviewed in Epic  No results found for this or any previous visit (from the past 24 hour(s)).  No results found for any visits on 11/30/23.    ASSESSMENT / PLAN:       ICD-10-CM    1. Encounter for Medicare annual wellness exam  Z00.00       2. S/P AVR- bioprosthetic  Z95.2 Comprehensive metabolic panel (BMP + Alb, Alk Phos, ALT, AST, Total. Bili, TP)     Echocardiogram Complete     Comprehensive metabolic panel (BMP + Alb, Alk Phos, ALT, AST, Total. Bili, TP)      3. Aortic valve insufficiency, etiology of cardiac valve disease unspecified  I35.1 amoxicillin (AMOXIL) 500 MG capsule     Comprehensive " metabolic panel (BMP + Alb, Alk Phos, ALT, AST, Total. Bili, TP)     Echocardiogram Complete     Comprehensive metabolic panel (BMP + Alb, Alk Phos, ALT, AST, Total. Bili, TP)      4. Prophylactic antibiotic  Z79.2 amoxicillin (AMOXIL) 500 MG capsule      5. SBE (subacute bacterial endocarditis) prophylaxis candidate  Z29.89 amoxicillin (AMOXIL) 500 MG capsule      6. Bilateral sensorineural hearing loss  H90.3 Adult Audiology  Referral      7. Anemia due to blood loss, acute  D62 CBC with platelets     CBC with platelets      8. Need for shingles vaccine  Z23       9. Need for vaccination against respiratory syncytial virus  Z29.11 respiratory syncytial virus vaccine, bivalent (ABRYSVO) injection      10. CARDIOVASCULAR SCREENING; LDL GOAL LESS THAN 100  Z13.6 Lipid panel reflex to direct LDL Non-fasting     Lipid panel reflex to direct LDL Non-fasting      11. Need for hepatitis C screening test  Z11.59 Hepatitis C Screen Reflex to HCV RNA Quant and Genotype     Hepatitis C Screen Reflex to HCV RNA Quant and Genotype              Reviewed recommended screenings and ordered appropriate testing for pt's risks and per pt's request(s).   2, 3, 4, 5.  Patient is clinically doing well after his valve replacement.  Briefly reviewed the importance of antibiotic prophylaxis after his valve replacement.  Prescription was given.  6.  Referred for audiologic evaluation.  7.  Noted previously after his surgery.  We will recheck labs today to ensure that this has resolved.  8, 9.  Discussed vaccination.  He can get this done at the pharmacy since he is on Medicare.  10, 11.  Labs ordered.      Portions of this note were completed using [employer] AI and/or Dragon dictation software.  If [employer] was used, patient gave verbal consent prior to the start of the visit.  Although reviewed, there may be typographical and other inadvertent errors that remain.           COUNSELING:  Reviewed preventive health counseling,  as reflected in patient instructions       Regular exercise       Healthy diet/nutrition       Fall risk prevention       Aspirin prophylaxis        The ASCVD Risk score (Bella FAIRCHILD, et al., 2019) failed to calculate for the following reasons:    Cannot find a previous HDL lab    Cannot find a previous total cholesterol lab        He reports that he has never smoked. He has never been exposed to tobacco smoke. He has never used smokeless tobacco.      Appropriate preventive services were discussed with this patient, including applicable screening as appropriate for fall prevention, nutrition, physical activity, Tobacco-use cessation, weight loss and cognition.  Checklist reviewing preventive services available has been given to the patient.    Reviewed patients plan of care and provided an AVS. The Basic Care Plan (routine screening as documented in Health Maintenance) for Miguel meets the Care Plan requirement. This Care Plan has been established and reviewed with the Patient and spouse.          Rafael Shah MD, MD  [facility]    Identified Health Risks:  I have reviewed Opioid Use Disorder and Substance Use Disorder risk factors and made any needed referrals. The patient was provided with written information regarding signs of hearing loss.

## 2023-12-02 LAB — HCV AB SERPL QL IA: NONREACTIVE

## 2023-12-05 NOTE — RESULT ENCOUNTER NOTE
Miguel,    All of your labs were normal for you.  I would recommend taking a statin medication to reduce your cardiovascular risk.  Blood counts are nearly back to normal.    Have a nice day!    Dr. Shah     The 10-year ASCVD risk score (Bella FAIRCHILD, et al., 2019) is: 16%    Values used to calculate the score:      Age: 68 years      Sex: Male      Is Non- : No      Diabetic: No      Tobacco smoker: No      Systolic Blood Pressure: 136 mmHg      Is BP treated: No      HDL Cholesterol: 84 mg/dL      Total Cholesterol: 203 mg/dL

## 2023-12-18 ENCOUNTER — DOCUMENTATION ONLY (OUTPATIENT)
Dept: OTHER | Facility: CLINIC | Age: 68
End: 2023-12-18
Payer: COMMERCIAL

## 2023-12-21 ENCOUNTER — OFFICE VISIT (OUTPATIENT)
Dept: AUDIOLOGY | Facility: CLINIC | Age: 68
End: 2023-12-21
Attending: FAMILY MEDICINE
Payer: COMMERCIAL

## 2023-12-21 DIAGNOSIS — H90.3 BILATERAL SENSORINEURAL HEARING LOSS: ICD-10-CM

## 2023-12-21 DIAGNOSIS — H90.3 SENSORINEURAL HEARING LOSS, ASYMMETRICAL: Primary | ICD-10-CM

## 2023-12-21 PROCEDURE — 92557 COMPREHENSIVE HEARING TEST: CPT | Performed by: AUDIOLOGIST

## 2023-12-21 PROCEDURE — 92550 TYMPANOMETRY & REFLEX THRESH: CPT | Performed by: AUDIOLOGIST

## 2023-12-21 NOTE — PROGRESS NOTES
AUDIOLOGY REPORT    Background:  Referred by Dr. ARGELIA Shah for concern regarding decreased hearing, more in the left than in the right. 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 He notes constant bilateral tinnitus, louder left than right. He notes some dizziness especially when getting up after lying under equipment. He reported two falls, both with injuries about 3 months ago (refer balance screening). No history of ear problems or ear surgeries. Denied safety concerns.    Results:  Otoscopy showed clear canals and visible eardrums bilaterally, after minimal wax removed right ear using a lighted curette.  Tympanograms show normal eardrum movement and middle ear pressure right ear and left ear. Ipsi reflexes were present bilaterally. Pure tones show moderate rising to low normal sloping to moderate sensorineural hearing loss right ear and moderate sloping to profound sensorineural hearing loss left ear. Good SRT / PTA agreement. Word understanding in quiet, 100% right (excellent) and 0% left (poor), 25- word lists.    Recommendations:  ENT consult regarding asymmetric hearing loss, tinnitus, and dizziness/balance/falls.    Blanca Montoya Licensed Audiologist #[id number]

## 2024-01-03 ENCOUNTER — MYC MEDICAL ADVICE (OUTPATIENT)
Dept: FAMILY MEDICINE | Facility: CLINIC | Age: 69
End: 2024-01-03
Payer: COMMERCIAL

## 2024-01-04 NOTE — TELEPHONE ENCOUNTER
Thony Gongora MD  [facility]  Phone Number: [phone]     I would proceed with dental work no problem

## 2024-01-11 ENCOUNTER — HOSPITAL ENCOUNTER (OUTPATIENT)
Dept: CARDIOLOGY | Facility: CLINIC | Age: 69
Discharge: HOME | End: 2024-01-11
Attending: FAMILY MEDICINE | Admitting: FAMILY MEDICINE
Payer: COMMERCIAL

## 2024-01-11 DIAGNOSIS — I35.1 AORTIC VALVE INSUFFICIENCY, ETIOLOGY OF CARDIAC VALVE DISEASE UNSPECIFIED: ICD-10-CM

## 2024-01-11 DIAGNOSIS — Z95.2 S/P AVR: ICD-10-CM

## 2024-01-11 LAB — LVEF ECHO: NORMAL

## 2024-01-11 PROCEDURE — 93306 TTE W/DOPPLER COMPLETE: CPT | Mod: 26 | Performed by: INTERNAL MEDICINE

## 2024-01-11 PROCEDURE — 93306 TTE W/DOPPLER COMPLETE: CPT

## 2024-01-11 NOTE — RESULT ENCOUNTER NOTE
Miguel,    Echo shows increased leaking near your heart valve.  This is not in the dangerous range yet, but will need to be monitored every 1-2 years with imaging.  If you have increased swelling or shortness of breath, seek more urgent follow up.    Thanks,    Dr. Shah

## 2024-02-20 ENCOUNTER — OFFICE VISIT (OUTPATIENT)
Dept: CARDIOLOGY | Facility: CLINIC | Age: 69
End: 2024-02-20
Attending: INTERNAL MEDICINE
Payer: COMMERCIAL

## 2024-02-20 VITALS
HEIGHT: 68 IN | DIASTOLIC BLOOD PRESSURE: 84 MMHG | HEART RATE: 92 BPM | SYSTOLIC BLOOD PRESSURE: 142 MMHG | RESPIRATION RATE: 18 BRPM | WEIGHT: 144 LBS | BODY MASS INDEX: 21.82 KG/M2 | OXYGEN SATURATION: 98 %

## 2024-02-20 DIAGNOSIS — Z95.2 S/P AVR: ICD-10-CM

## 2024-02-20 DIAGNOSIS — I35.0 NONRHEUMATIC AORTIC VALVE STENOSIS: Primary | ICD-10-CM

## 2024-02-20 PROCEDURE — 99214 OFFICE O/P EST MOD 30 MIN: CPT | Performed by: NURSE PRACTITIONER

## 2024-02-20 ASSESSMENT — PAIN SCALES - GENERAL: PAINLEVEL: NO PAIN (0)

## 2024-02-20 NOTE — PATIENT INSTRUCTIONS
Thank you for your visit with the [facility] today.    Today's Summary     Repeat echocardiogram in 1-year.  Follow up in 1-year with Dr. Sinclair.  Please call 6-months in advance to schedule your appointment.    If you have questions or concerns, please do not hesitate to call my nursing support team at [phone].    Scheduling phone number: [phone]    It was a pleasure seeing you today.     BROOKE Vizcarra, CNP  Nurse Practitioner  [facility]  February 20, 2024  ________________________________________________________

## 2024-02-20 NOTE — PROGRESS NOTES
~Cardiology Clinic Visit~    Primary Cardiologist: Dr. Sinclair  Reason for visit: Annual follow up    History of Present Illness    Miguel Pappas is a very pleasant 68 year old male with a past medical history notable for aortic stenosis with surgical AVR using a bioprosthesis (12/16/22).     Miguel had minimal symptoms prior to his AVR but his echocardiogram showed critical aortic stenosis.  He has not noticed any real change in his symptoms.   He was evaluated through the TAVR clinic.  He underwent successful aortic valve replacement with a 21 mm Inspiris Resilia bioprosthetic valve on 12/16/2022.  He did well from a postoperative standpoint.  His pre-TAVR catheterization in 2022 showed normal coronary angiography without noted disease.     At his last follow-up in February 2023, Miguel denies ongoing chest discomfort.  He denied chest pain, racing heart, syncope or near syncope, or peripheral edema.  On exam today he has a 2/6 systolic murmur from the aortic valve.  His wound was healing well.  He is continuing to use warfarin with instructions to continue this for 3 months postop.  He is to remain on aspirin indefinitely.    Diagnostics:    Echocardiogram 1/11/24: Study shows a 21 mm Resilia TAVR bioprosthetic aortic valve.  Doppler reveals periprosthetic leak of the prosthetic aortic valve in the mild range with a mild increase in MSG (previously 17, now 20 mmHg).  EF stable at 60-65% with a normal LV size and mild concentric LVH.  The left atrium was mildly dilated.    Today in follow-up, patient is doing well.  He continues to remain active without any limiting symptoms.  His blood pressure slightly elevated today, but he does know when he checks it at home his systolic averages 120-130s.  He has no brett chest pain.  He is tolerating his medication regimen without difficulty.  We discussed the results of his  echocardiogram.  __________________________________________________________________         Assessment and Impression:     1.  Bioprosthetic AVR, 12/2022  -Clinically asymptomatic.  -TTE shows mild periprosthetic aortic valve leak (previously in the trace range).  -Continue long-term aspirin.  -Counseled regarding antibiotic prophylaxis for any future dental procedures.    -Counseled on s/s worsening AoV function.         Recommendations and Plan:     No changes to current plan of care as of today.  Discussed signs and symptoms of worsening valvular function, and when to call clinic or seek emergent care.  Recommend repeat echocardiogram in 1-year for serial AoV monitoring.  Follow up with Dr. Sinclair in 1-year.  __________________________________________________________________    Thank you for the opportunity to participate in this pleasant patient's care.    We would be happy to see this patient sooner for any concerns in the meantime.    BROOKE Vizcarra, CNP   Nurse Practitioner  [facility]    Today's clinic visit entailed:  Review of the result(s) of each unique test - cardiac testing, cardiac imaging, other imaging, labs  The following tests were independently interpreted by me as noted in my documentation: Echocardiogram, labs  Ordering of each unique test  Prescription drug management    The level of medical decision making during this visit was of moderate complexity.    Orders this Visit:  Orders Placed This Encounter   Procedures    Follow-Up with Cardiology    Echocardiogram Complete     No orders of the defined types were placed in this encounter.    There are no discontinued medications.  Encounter Diagnoses   Name Primary?    S/P AVR     Nonrheumatic aortic valve stenosis Yes     CURRENT MEDICATIONS:  Current Outpatient Medications   Medication Sig Dispense Refill    aspirin 81 MG tablet Take 81 mg by mouth daily      GLUCOSAMINE CHONDROITIN OR TABS Take 1 tablet by mouth  "daily  0    Multiple Vitamins-Minerals (CENTRUM SILVER PO) Take 1 tablet by mouth daily 30 0     ALLERGIES     Allergies   Allergen Reactions    No Known Drug Allergy      PAST MEDICAL, SURGICAL, FAMILY, SOCIAL HISTORY:  History was reviewed and updated as needed, see medical record.    Review of Systems:  A 10-point Review Of Systems is otherwise normal except for that which is noted in the HPI and interval summary.    Physical Exam:    Vitals: BP (!) 142/84 (BP Location: Right arm, Patient Position: Sitting, Cuff Size: Adult Regular)   Pulse 92   Resp 18   Ht 1.727 m (5' 8\")   Wt 65.3 kg (144 lb)   SpO2 98%   BMI 21.90 kg/m    Constitutional: Appears stated age, well nourished, NAD.  Neck: Supple. Carotid pulses full and equal.   Respiratory: Non-labored. Lungs CTAB.  Cardiovascular: RRR, normal S1 and S2. No M/G/R.  No edema.  GI: Soft, non-distended, non-tender.  Skin: Warm and dry.   Musculoskeletal/Extremities: Symmetrical movement.  Neurologic: No gross focal deficits. Alert, awake.  Psychiatric: Affect appropriate. Mentation normal.    Recent Lab Results:  LIPID RESULTS:  Lab Results   Component Value Date    CHOL 203 (H) 11/30/2023    CHOL 209 (H) 10/26/2018    HDL 84 11/30/2023    HDL 85 10/26/2018     (H) 11/30/2023     (H) 10/26/2018    TRIG 57 11/30/2023    TRIG 46 10/26/2018    CHOLHDLRATIO 2.4 11/14/2014     LIVER ENZYME RESULTS:  Lab Results   Component Value Date    AST 42 11/30/2023    AST 36 04/09/2009    ALT 39 11/30/2023    ALT 18 04/09/2009     CBC RESULTS:  Lab Results   Component Value Date    WBC 6.9 11/30/2023    WBC 5.9 10/26/2018    RBC 4.49 11/30/2023    RBC 4.27 (L) 10/26/2018    HGB 13.3 11/30/2023    HGB 12.8 (L) 10/26/2018    HCT 39.6 (L) 11/30/2023    HCT 37.9 (L) 10/26/2018    MCV 88 11/30/2023    MCV 89 10/26/2018    MCH 29.6 11/30/2023    MCH 30.0 10/26/2018    MCHC 33.6 11/30/2023    MCHC 33.8 10/26/2018    RDW 12.8 11/30/2023    RDW 12.8 10/26/2018    PLT " 162 11/30/2023     10/26/2018     BMP RESULTS:  Lab Results   Component Value Date     11/30/2023     10/26/2018    POTASSIUM 3.9 11/30/2023    POTASSIUM 3.9 12/21/2022    POTASSIUM 4.2 10/26/2018    CHLORIDE 101 11/30/2023    CHLORIDE 99 12/21/2022    CHLORIDE 105 10/26/2018    CO2 28 11/30/2023    CO2 29 12/21/2022    CO2 31 10/26/2018    ANIONGAP 10 11/30/2023    ANIONGAP 5 12/21/2022    ANIONGAP 6 10/26/2018    GLC 91 11/30/2023     (H) 12/21/2022    GLC 96 10/26/2018    BUN 25.2 (H) 11/30/2023    BUN 25 12/21/2022    BUN 24 10/26/2018    CR 0.92 11/30/2023    CR 1.00 10/26/2018    GFRESTIMATED 89 11/30/2023    GFRESTIMATED 75 10/26/2018    GFRESTBLACK >90 10/26/2018    RAMONA 9.0 11/30/2023    RAMONA 8.9 10/26/2018      A1C RESULTS:  Lab Results   Component Value Date    A1C 5.5 12/09/2022     INR RESULTS:  Lab Results   Component Value Date    INR 2.4 (H) 03/14/2023    INR 1.5 (H) 03/07/2023    INR 2.04 (H) 12/21/2022    INR 2.39 (H) 12/20/2022

## 2024-02-20 NOTE — LETTER
2/20/2024    Thony Gongora MD  [address] [place] [address]    RE: Miguel Pappas       Dear Colleague,     I had the pleasure of seeing Miguel Pappas in the [facility].              ~Cardiology Clinic Visit~    Primary Cardiologist: Dr. Sinclair  Reason for visit: Annual follow up    History of Present Illness    Miguel Pappas is a very pleasant 68 year old male with a past medical history notable for aortic stenosis with surgical AVR using a bioprosthesis (12/16/22).     Miguel had minimal symptoms prior to his AVR but his echocardiogram showed critical aortic stenosis.  He has not noticed any real change in his symptoms.   He was evaluated through the TAVR clinic.  He underwent successful aortic valve replacement with a 21 mm Inspiris Resilia bioprosthetic valve on 12/16/2022.  He did well from a postoperative standpoint.  His pre-TAVR catheterization in 2022 showed normal coronary angiography without noted disease.     At his last follow-up in February 2023, Miguel denies ongoing chest discomfort.  He denied chest pain, racing heart, syncope or near syncope, or peripheral edema.  On exam today he has a 2/6 systolic murmur from the aortic valve.  His wound was healing well.  He is continuing to use warfarin with instructions to continue this for 3 months postop.  He is to remain on aspirin indefinitely.    Diagnostics:    Echocardiogram 1/11/24: Study shows a 21 mm Resilia TAVR bioprosthetic aortic valve.  Doppler reveals periprosthetic leak of the prosthetic aortic valve in the mild range with a mild increase in MSG (previously 17, now 20 mmHg).  EF stable at 60-65% with a normal LV size and mild concentric LVH.  The left atrium was mildly dilated.    Today in follow-up, patient is doing well.  He continues to remain active without any limiting symptoms.  His blood pressure slightly elevated today, but he does know when he checks it at home his systolic averages 120-130s.   He has no brett chest pain.  He is tolerating his medication regimen without difficulty.  We discussed the results of his echocardiogram.  __________________________________________________________________         Assessment and Impression:     1.  Bioprosthetic AVR, 12/2022  -Clinically asymptomatic.  -TTE shows mild periprosthetic aortic valve leak (previously in the trace range).  -Continue long-term aspirin.  -Counseled regarding antibiotic prophylaxis for any future dental procedures.    -Counseled on s/s worsening AoV function.         Recommendations and Plan:     No changes to current plan of care as of today.  Discussed signs and symptoms of worsening valvular function, and when to call clinic or seek emergent care.  Recommend repeat echocardiogram in 1-year for serial AoV monitoring.  Follow up with Dr. Sinclair in 1-year.  __________________________________________________________________    Thank you for the opportunity to participate in this pleasant patient's care.    We would be happy to see this patient sooner for any concerns in the meantime.    BROOKE Vizcarra, CNP   Nurse Practitioner  [facility]    Today's clinic visit entailed:  Review of the result(s) of each unique test - cardiac testing, cardiac imaging, other imaging, labs  The following tests were independently interpreted by me as noted in my documentation: Echocardiogram, labs  Ordering of each unique test  Prescription drug management    The level of medical decision making during this visit was of moderate complexity.    Orders this Visit:  Orders Placed This Encounter   Procedures    Follow-Up with Cardiology    Echocardiogram Complete     No orders of the defined types were placed in this encounter.    There are no discontinued medications.  Encounter Diagnoses   Name Primary?    S/P AVR     Nonrheumatic aortic valve stenosis Yes     CURRENT MEDICATIONS:  Current Outpatient Medications   Medication Sig  "Dispense Refill    aspirin 81 MG tablet Take 81 mg by mouth daily      GLUCOSAMINE CHONDROITIN OR TABS Take 1 tablet by mouth daily  0    Multiple Vitamins-Minerals (CENTRUM SILVER PO) Take 1 tablet by mouth daily 30 0     ALLERGIES     Allergies   Allergen Reactions    No Known Drug Allergy      PAST MEDICAL, SURGICAL, FAMILY, SOCIAL HISTORY:  History was reviewed and updated as needed, see medical record.    Review of Systems:  A 10-point Review Of Systems is otherwise normal except for that which is noted in the HPI and interval summary.    Physical Exam:    Vitals: BP (!) 142/84 (BP Location: Right arm, Patient Position: Sitting, Cuff Size: Adult Regular)   Pulse 92   Resp 18   Ht 1.727 m (5' 8\")   Wt 65.3 kg (144 lb)   SpO2 98%   BMI 21.90 kg/m    Constitutional: Appears stated age, well nourished, NAD.  Neck: Supple. Carotid pulses full and equal.   Respiratory: Non-labored. Lungs CTAB.  Cardiovascular: RRR, normal S1 and S2. No M/G/R.  No edema.  GI: Soft, non-distended, non-tender.  Skin: Warm and dry.   Musculoskeletal/Extremities: Symmetrical movement.  Neurologic: No gross focal deficits. Alert, awake.  Psychiatric: Affect appropriate. Mentation normal.    Recent Lab Results:  LIPID RESULTS:  Lab Results   Component Value Date    CHOL 203 (H) 11/30/2023    CHOL 209 (H) 10/26/2018    HDL 84 11/30/2023    HDL 85 10/26/2018     (H) 11/30/2023     (H) 10/26/2018    TRIG 57 11/30/2023    TRIG 46 10/26/2018    CHOLHDLRATIO 2.4 11/14/2014     LIVER ENZYME RESULTS:  Lab Results   Component Value Date    AST 42 11/30/2023    AST 36 04/09/2009    ALT 39 11/30/2023    ALT 18 04/09/2009     CBC RESULTS:  Lab Results   Component Value Date    WBC 6.9 11/30/2023    WBC 5.9 10/26/2018    RBC 4.49 11/30/2023    RBC 4.27 (L) 10/26/2018    HGB 13.3 11/30/2023    HGB 12.8 (L) 10/26/2018    HCT 39.6 (L) 11/30/2023    HCT 37.9 (L) 10/26/2018    MCV 88 11/30/2023    MCV 89 10/26/2018    MCH 29.6 11/30/2023 "    MCH 30.0 10/26/2018    MCHC 33.6 11/30/2023    MCHC 33.8 10/26/2018    RDW 12.8 11/30/2023    RDW 12.8 10/26/2018     11/30/2023     10/26/2018     BMP RESULTS:  Lab Results   Component Value Date     11/30/2023     10/26/2018    POTASSIUM 3.9 11/30/2023    POTASSIUM 3.9 12/21/2022    POTASSIUM 4.2 10/26/2018    CHLORIDE 101 11/30/2023    CHLORIDE 99 12/21/2022    CHLORIDE 105 10/26/2018    CO2 28 11/30/2023    CO2 29 12/21/2022    CO2 31 10/26/2018    ANIONGAP 10 11/30/2023    ANIONGAP 5 12/21/2022    ANIONGAP 6 10/26/2018    GLC 91 11/30/2023     (H) 12/21/2022    GLC 96 10/26/2018    BUN 25.2 (H) 11/30/2023    BUN 25 12/21/2022    BUN 24 10/26/2018    CR 0.92 11/30/2023    CR 1.00 10/26/2018    GFRESTIMATED 89 11/30/2023    GFRESTIMATED 75 10/26/2018    GFRESTBLACK >90 10/26/2018    RAMONA 9.0 11/30/2023    RAMONA 8.9 10/26/2018      A1C RESULTS:  Lab Results   Component Value Date    A1C 5.5 12/09/2022     INR RESULTS:  Lab Results   Component Value Date    INR 2.4 (H) 03/14/2023    INR 1.5 (H) 03/07/2023    INR 2.04 (H) 12/21/2022    INR 2.39 (H) 12/20/2022                     Thank you for allowing me to participate in the care of your patient.      Sincerely,     BROOKE Vizcarra [facility]  cc:   Levi Sinclair MD  [address] [place] [id number]

## 2024-03-01 ENCOUNTER — NURSE TRIAGE (OUTPATIENT)
Dept: FAMILY MEDICINE | Facility: CLINIC | Age: 69
End: 2024-03-01
Payer: COMMERCIAL

## 2024-03-01 NOTE — TELEPHONE ENCOUNTER
Patient has had a headache and body aches for 2 plus weeks.  He has also had nausea a couple of times.  No vomiting.  He had a fever yesterday 102. No fever today.    He states his head hurts a little bit.  He does have some sinus drainage and some coughing.  No difficulty breathing.  He has been having chills on and off.  He does not seem to be getting better.  He had some dizziness yesterday, none today.    Per protocol patient advised to be seen today. Patient would prefer a visit on Monday.    Paola Baugh RN on 3/1/2024 at 10:18 AM      Reason for Disposition   Patient wants to be seen    Additional Information   Negative: Difficult to awaken or acting confused (e.g., disoriented, slurred speech)   Negative: Pale cold skin and very weak (can't stand)   Negative: Difficulty breathing and bluish (or gray) lips or face   Negative: New-onset rash with purple (or blood-colored) spots or dots   Negative: Sounds like a life-threatening emergency to the triager   Negative: Fever onset within 24 hours of receiving vaccine   Negative: Fever within 14 days of COVID-19 Exposure   Negative: Pregnant   Negative: Postpartum (from 0 to 6 weeks after delivery)   Negative: Headache and stiff neck (can't touch chin to chest)   Negative: Difficulty breathing   Negative: IV Drug Use (IVDU)   Negative: Fever > 103 F (39.4 C)   Negative: Fever > 100.0 F (37.8 C) and indwelling urinary catheter (e.g., Brown, coude)   Negative: Fever > 100.4 F (38.0 C) and has port (portacath), central line, or PICC line   Negative: Drinking very little and dehydration suspected (e.g., no urine > 12 hours, very dry mouth, very lightheaded)   Negative: Patient sounds very sick or weak to the triager   Negative: Fever > 101 F (38.3 C) and over 60 years of age   Negative: Fever > 100.0 F (37.8 C) and diabetes mellitus or a weak immune system (e.g., HIV positive, chemotherapy, splenectomy)   Negative: Fever > 100.0 F (37.8 C) and bedridden (e.g., CVA,  chronic illness, recovering from surgery)   Negative: Fever > 100.4 F (38.0 C) and surgery in the past month   Negative: Transplant patient (e.g., liver, heart, lung, kidney)   Negative: Widespread rash and cause unknown   Negative: Severe chills (i.e., feeling extremely cold WITH shaking chills)   Negative: Patient wants to be seen   Negative: Fever present > 3 days (72 hours)   Negative: Fever comes and goes (intermittent) and lasts > 3 weeks   Negative: Fever > 100.0 F (37.8 C) and foreign travel to a developing country in the past month   Negative: Difficult to awaken or acting confused (e.g., disoriented, slurred speech)   Negative: Weakness of the face, arm or leg on one side of the body and new-onset   Negative: Numbness of the face, arm or leg on one side of the body and new-onset   Negative: Loss of speech or garbled speech and new-onset   Negative: Passed out (i.e., fainted, collapsed and was not responding)   Negative: Sounds like a life-threatening emergency to the triager   Negative: Followed a head injury within last 3 days   Negative: Traumatic Brain Injury (TBI) is suspected   Negative: Sinus pain of forehead and yellow or green nasal discharge   Negative: Pregnant   Negative: Unable to walk without falling   Negative: Stiff neck (can't touch chin to chest)   Negative: Possibility of carbon monoxide exposure   Negative: SEVERE headache, states 'worst headache' of life   Negative: SEVERE headache, sudden-onset (i.e., reaching maximum intensity within seconds to 1 hour)   Negative: Severe pain in one eye   Negative: Loss of vision or double vision  (Exception: Same as prior migraines.)   Negative: Patient sounds very sick or weak to the triager   Negative: Fever > 103 F (39.4 C)   Negative: Fever > 100.0 F (37.8 C) and has diabetes mellitus or a weak immune system (e.g., HIV positive, cancer chemotherapy, organ transplant, splenectomy, chronic steroids)   Negative: SEVERE headache (e.g., excruciating)  and has had severe headaches before   Negative: SEVERE headache and not relieved by pain meds   Negative: SEVERE headache and vomiting   Negative: SEVERE headache and fever   Negative: New headache and weak immune system (e.g., HIV positive, cancer chemo, splenectomy, organ transplant, chronic steroids)   Negative: Fever present > 3 days (72 hours)    Protocols used: Fever-A-OH, Headache-A-OH

## 2024-03-04 ENCOUNTER — TELEPHONE (OUTPATIENT)
Dept: FAMILY MEDICINE | Facility: CLINIC | Age: 69
End: 2024-03-04

## 2024-03-04 ENCOUNTER — OFFICE VISIT (OUTPATIENT)
Dept: FAMILY MEDICINE | Facility: CLINIC | Age: 69
End: 2024-03-04
Payer: COMMERCIAL

## 2024-03-04 ENCOUNTER — HOSPITAL ENCOUNTER (OUTPATIENT)
Dept: GENERAL RADIOLOGY | Facility: CLINIC | Age: 69
Discharge: HOME | End: 2024-03-04
Payer: COMMERCIAL

## 2024-03-04 VITALS
SYSTOLIC BLOOD PRESSURE: 128 MMHG | WEIGHT: 140 LBS | HEART RATE: 87 BPM | OXYGEN SATURATION: 97 % | TEMPERATURE: 98.7 F | HEIGHT: 68 IN | DIASTOLIC BLOOD PRESSURE: 82 MMHG | RESPIRATION RATE: 14 BRPM | BODY MASS INDEX: 21.22 KG/M2

## 2024-03-04 DIAGNOSIS — R50.9 FEVER, UNSPECIFIED FEVER CAUSE: ICD-10-CM

## 2024-03-04 DIAGNOSIS — D64.9 LOW HEMOGLOBIN: ICD-10-CM

## 2024-03-04 DIAGNOSIS — R06.02 SOB (SHORTNESS OF BREATH): ICD-10-CM

## 2024-03-04 DIAGNOSIS — N18.9 CHRONIC KIDNEY DISEASE, UNSPECIFIED CKD STAGE: ICD-10-CM

## 2024-03-04 DIAGNOSIS — E87.8 HYPOCHLOREMIA: ICD-10-CM

## 2024-03-04 DIAGNOSIS — R52 BODY ACHES: ICD-10-CM

## 2024-03-04 DIAGNOSIS — I35.1 NONRHEUMATIC AORTIC VALVE INSUFFICIENCY: ICD-10-CM

## 2024-03-04 DIAGNOSIS — Z95.2 S/P AVR: ICD-10-CM

## 2024-03-04 DIAGNOSIS — G44.89 OTHER HEADACHE SYNDROME: Primary | ICD-10-CM

## 2024-03-04 DIAGNOSIS — R53.83 OTHER FATIGUE: ICD-10-CM

## 2024-03-04 DIAGNOSIS — R11.0 NAUSEA: ICD-10-CM

## 2024-03-04 DIAGNOSIS — E87.1 HYPONATREMIA: ICD-10-CM

## 2024-03-04 LAB
ANION GAP SERPL CALCULATED.3IONS-SCNC: 11 MMOL/L (ref 7–15)
BUN SERPL-MCNC: 19.3 MG/DL (ref 8–23)
CALCIUM SERPL-MCNC: 9.1 MG/DL (ref 8.8–10.2)
CHLORIDE SERPL-SCNC: 96 MMOL/L (ref 98–107)
CREAT SERPL-MCNC: 0.95 MG/DL (ref 0.67–1.17)
DEPRECATED HCO3 PLAS-SCNC: 26 MMOL/L (ref 22–29)
EGFRCR SERPLBLD CKD-EPI 2021: 86 ML/MIN/1.73M2
ERYTHROCYTE [DISTWIDTH] IN BLOOD BY AUTOMATED COUNT: 13.2 % (ref 10–15)
FERRITIN SERPL-MCNC: 206 NG/ML (ref 31–409)
FLUAV AG SPEC QL IA: NEGATIVE
FLUBV AG SPEC QL IA: NEGATIVE
GLUCOSE SERPL-MCNC: 110 MG/DL (ref 70–99)
HCT VFR BLD AUTO: 37.2 % (ref 40–53)
HGB BLD-MCNC: 12.6 G/DL (ref 13.3–17.7)
MCH RBC QN AUTO: 29.2 PG (ref 26.5–33)
MCHC RBC AUTO-ENTMCNC: 33.9 G/DL (ref 31.5–36.5)
MCV RBC AUTO: 86 FL (ref 78–100)
PLATELET # BLD AUTO: 251 10E3/UL (ref 150–450)
POTASSIUM SERPL-SCNC: 4.9 MMOL/L (ref 3.4–5.3)
RBC # BLD AUTO: 4.32 10E6/UL (ref 4.4–5.9)
SODIUM SERPL-SCNC: 133 MMOL/L (ref 135–145)
TSH SERPL DL<=0.005 MIU/L-ACNC: 1.28 UIU/ML (ref 0.3–4.2)
WBC # BLD AUTO: 10.2 10E3/UL (ref 4–11)

## 2024-03-04 PROCEDURE — 87804 INFLUENZA ASSAY W/OPTIC: CPT

## 2024-03-04 PROCEDURE — 85027 COMPLETE CBC AUTOMATED: CPT

## 2024-03-04 PROCEDURE — 82746 ASSAY OF FOLIC ACID SERUM: CPT

## 2024-03-04 PROCEDURE — 84443 ASSAY THYROID STIM HORMONE: CPT

## 2024-03-04 PROCEDURE — 82728 ASSAY OF FERRITIN: CPT

## 2024-03-04 PROCEDURE — 99215 OFFICE O/P EST HI 40 MIN: CPT

## 2024-03-04 PROCEDURE — 36415 COLL VENOUS BLD VENIPUNCTURE: CPT

## 2024-03-04 PROCEDURE — 71046 X-RAY EXAM CHEST 2 VIEWS: CPT

## 2024-03-04 PROCEDURE — 80048 BASIC METABOLIC PNL TOTAL CA: CPT

## 2024-03-04 PROCEDURE — 82607 VITAMIN B-12: CPT

## 2024-03-04 RX ORDER — RESPIRATORY SYNCYTIAL VIRUS VACCINE 120MCG/0.5
0.5 KIT INTRAMUSCULAR ONCE
Qty: 1 EACH | Refills: 0 | Status: CANCELLED | OUTPATIENT
Start: 2024-03-04 | End: 2024-03-04

## 2024-03-04 RX ORDER — SODIUM CHLORIDE 1 G/1
1 TABLET ORAL 3 TIMES DAILY
Qty: 15 TABLET | Refills: 0 | Status: SHIPPED | OUTPATIENT
Start: 2024-03-04 | End: 2024-03-09

## 2024-03-04 ASSESSMENT — ENCOUNTER SYMPTOMS: HEADACHES: 1

## 2024-03-04 ASSESSMENT — PAIN SCALES - GENERAL: PAINLEVEL: NO PAIN (0)

## 2024-03-04 NOTE — RESULT ENCOUNTER NOTE
"Hello -    Here are my comments about the recent results.  Normal labs for TSH, ferritin (iron stores), kidney function, potassium, creatinine, and carbon dioxide.    Hyponatremia/hypochloremia:  Your sodium and chloride are low.  I recommend treatment with sodium chloride tablets, take 1 g 3 times a day for 5 days.  This was sent to your pharmacy.  I also recommend increasing salt salt intake through food and electrolyte beverages.  Schedule a follow-up appointment in 2 to 3 days to reevaluate and do repeat labs.  If symptoms worsen or fail to improve, follow-up sooner.    What is hyponatremia?  Hyponatremia is the medical term for having too little sodium in the blood.  Sodium is a substance called an \"electrolyte.\" Normally, your body has a specific balance of electrolytes. This is important to keep your cells working normally.  When a person has hyponatremia, their body holds on to too much water. This dilutes the amount of sodium in the blood, causing the sodium level to be low.  What causes hyponatremia?  Hyponatremia happens when the body holds on to too much water. This can happen because of:  ?Certain medical conditions that cause your body to hold on to too much water. These include:   Heart failure, a type of heart disease in which the heart cannot pump as well as it should   Cirrhosis, a severe form of liver disease   SIADH, a condition that happens when your body makes too much of a hormone that helps balance your water level   Kidney disease  ?Some medicines, including a diuretic called hydrochlorothiazide (\"HCTZ\"), which makes you urinate a lot  ?Drinking too much water. This can happen to:   Athletes who do intense exercise (such as run marathons) and drink too much water   People who use the drug ecstasy (ecstasy can make you feel thirsty and drink too much)   People who are mentally ill and feel as though they cannot get enough to drink  ?Losing a lot of blood (for example, after an " "injury)  ?Long-lasting, severe vomiting or diarrhea (this causes your body to lose both water and sodium)  ?Poor diet  What are the symptoms of hyponatremia?  Symptoms can include:  ?Nausea and vomiting  ?Headache  ?Confusion or trouble thinking clearly  ?Feeling weak or tired  ?Feeling restless or irritable  ?Muscle weakness, spasms, or cramps  ?Seizures or passing out  Hyponatremia can also cause more serious problems, such as brain swelling and nerve damage.  Are there tests for hyponatremia?  Yes. If your doctor or nurse suspects that you have hyponatremia, they will do:  ?Blood tests - These check how much sodium is in your blood.  ?Urine tests - These can show much sodium you are losing in your urine.  You might also need other tests depending on your age, other symptoms, and individual situation.  How is hyponatremia treated?  That depends on what is causing your hyponatremia. If your hyponatremia is caused by another medical problem, such as heart failure, your doctor will want to treat that, too.  Severe hyponatremia is treated in the hospital.  Treatments might include:  ?Limiting the amount of fluid you drink  ?Eating salt tablets or getting a saltwater solution into a vein (by \"IV\")  When should I call the doctor?  If you have been treated for hyponatremia, your doctor or nurse should order a blood test to see how the treatment is working.  Call your doctor or nurse if:  ?Your symptoms come back or get worse.  ?You have any new symptoms.  ?You have questions about any of your medicines or how to take care of yourself.          Please let us know if you have any questions or concerns.    Regards,  Ivon Brock PA-C "

## 2024-03-04 NOTE — PATIENT INSTRUCTIONS
"Testing for flu today    Blood work and Chest XR    Follow up with primary care doctor in 1 week if symptoms are not improving.  Sooner if symptoms worsen.    Worrisome symptoms please go to the emergency department.    Test results:  Anemia:  Here are my comments about the recent results.  Your influenza test is negative.  Your hemoglobin is low which could explain your fatigue.  I recommend taking over-the-counter iron supplementation daily at bedtime.  I recommend taking vitamin C with iron at bedtime to help with better absorption.  Monitor for signs of blood loss such as blood in the urine and blood in the stool.  If you have black tarry stools, seek medical attention immediately.  Your other labs are pending to further investigate underlying cause of anemia.  I recommend scheduling a follow-up with your primary care provider.  Follow-up sooner if needed.    Hyponatremia/hypochloremia:  Your sodium and chloride are low.  I recommend treatment with sodium chloride tablets, take 1 g 3 times a day for 5 days.  This was sent to your pharmacy.  I also recommend increasing salt salt intake through food and electrolyte beverages.  Schedule a follow-up appointment in 2 to 3 days to reevaluate and do repeat labs.  If symptoms worsen or fail to improve, follow-up sooner.    What is hyponatremia?  Hyponatremia is the medical term for having too little sodium in the blood.  Sodium is a substance called an \"electrolyte.\" Normally, your body has a specific balance of electrolytes. This is important to keep your cells working normally.  When a person has hyponatremia, their body holds on to too much water. This dilutes the amount of sodium in the blood, causing the sodium level to be low.  What causes hyponatremia?  Hyponatremia happens when the body holds on to too much water. This can happen because of:  ?Certain medical conditions that cause your body to hold on to too much water. These include:  Heart failure, a type of " "heart disease in which the heart cannot pump as well as it should  Cirrhosis, a severe form of liver disease  SIADH, a condition that happens when your body makes too much of a hormone that helps balance your water level  Kidney disease  ?Some medicines, including a diuretic called hydrochlorothiazide (\"HCTZ\"), which makes you urinate a lot  ?Drinking too much water. This can happen to:  Athletes who do intense exercise (such as run marathons) and drink too much water  People who use the drug ecstasy (ecstasy can make you feel thirsty and drink too much)  People who are mentally ill and feel as though they cannot get enough to drink  ?Losing a lot of blood (for example, after an injury)  ?Long-lasting, severe vomiting or diarrhea (this causes your body to lose both water and sodium)  ?Poor diet  What are the symptoms of hyponatremia?  Symptoms can include:  ?Nausea and vomiting  ?Headache  ?Confusion or trouble thinking clearly  ?Feeling weak or tired  ?Feeling restless or irritable  ?Muscle weakness, spasms, or cramps  ?Seizures or passing out  Hyponatremia can also cause more serious problems, such as brain swelling and nerve damage.  Are there tests for hyponatremia?  Yes. If your doctor or nurse suspects that you have hyponatremia, they will do:  ?Blood tests - These check how much sodium is in your blood.  ?Urine tests - These can show much sodium you are losing in your urine.  You might also need other tests depending on your age, other symptoms, and individual situation.  How is hyponatremia treated?  That depends on what is causing your hyponatremia. If your hyponatremia is caused by another medical problem, such as heart failure, your doctor will want to treat that, too.  Severe hyponatremia is treated in the hospital.  Treatments might include:  ?Limiting the amount of fluid you drink  ?Eating salt tablets or getting a saltwater solution into a vein (by \"IV\")  When should I call the doctor?  If you have " been treated for hyponatremia, your doctor or nurse should order a blood test to see how the treatment is working.  Call your doctor or nurse if:  ?Your symptoms come back or get worse.  ?You have any new symptoms.  ?You have questions about any of your medicines or how to take care of yourself.              GENERAL   [] Drink extra water and other fluids (water being the best)   [] For sore throats in adults and children over 4 years old, use sore throat spray or lozenges   [] Menthol rub (such as Teja's, very helpful in kids)   [] LOTS of hand washing (or hand ) and covering coughs      SPECIFIC MEDICATIONS (Over the Counter)      [] Fever, aches, ear pain, throat pain (adults):?   [] Tylenol every 6-8 hours as needed while awake AND/OR  ibuprofen every 6-8 hours while awake (take with food and large glass of water). Read bottle for dosing instructions.    **Avoid Ibuprofen/Aleve/Motrin if you have kidney disease or gastric ulcer history. Please ask your doctor if you have questions/concerns about this.**      [] Congestion:?   [] Nasal Saline (Simply Saline), mist vaporizer, steam, humidifier, and Neti Pot   [] Atrovent (ipratropium) spray (can be used 4 times a day and use for 3 weeks), or Afrin (oxymetazoline) 2 sprays each nostril, 1-2 times a day for 3 days (understanding the potential of rebound congestion).?   [] Considering bulb suctioning and NoseFrida for kids.   [] Phenylephrine (avoid in kids under 5, spray examples are Little Remedies and? Jorge-Synephrine, oral      [] Cough   [] Use honey in coffee or tea to relieve cough (do not give honey to an infant under 1 year old)   [] Throat lozenges and cough drops   [] Steam, humidifier, etc...     [] Mucous production:?   [] Salt water gargles   [] Mucinex, Robitussin (for both do not use if under the age of 5, minimal help in kids)      [] Ear Pain/Pressure   [] Antihistamine (Zyrtec/Cetirizine, Allegra/fexofenadine, Claritin/loratadine)? also for  itch, sneeze, runny nose, watery eyes, itchy throat, or postnasal drip).  For best results use in combination with nasal sprays (Flonase or Nasocort):   [] Flonase (fluticasone) 2 sprays in each nostril daily for at least 10-14 days, then 1 spray in each nostril per day afterwards for best results (dont use in kids younger than 5).??     Patient understood and verbally consented to the treatment plan. Discussed symptoms that would warrant an urgent or emergent visit. All of the patients' questions were answered. Patient was instructed to contact the clinic if questions or concerns arise. Recommend follow up appointments if symptoms worsen or fail to improve. Recommend follow up as needed. Recommend ER in the case of an emergency.    Ivon Brock PA-C    Please note: Voice recognition software may have been used in preparing this note, unintended word substitutions may be present.

## 2024-03-04 NOTE — TELEPHONE ENCOUNTER
Reason for Call:  Appointment Request    Patient requesting this type of appt:  Office visit for illness, was seen today 03/04/2024 by Vinod but suggested he see his PCP    Requested provider: Thony Gongora    Reason patient unable to be scheduled: Not within requested timeframe    When does patient want to be seen/preferred time:  ASAP    Comments: Pt was seen today in clinic by Ivon Brock & she suggested he be seen by PCP as soon as possible for ongoing headache, low hemoglobin, flu like symptoms but tested negative for flu today, chills, etc. Pt wife states symptoms have been ongoing for close to three weeks. Awaiting results from chest xray that was completed today in clinic as well.   Nothing open to [facility] for Dr. Gongora until June. Needs to be seen sooner.     Could we send this information to you in [facility] or would you prefer to receive a phone call?:   Patient would prefer a phone call   Okay to leave a detailed message?: Yes at Home number on file [phone] (home)    Call taken on 3/4/2024 at 2:35 PM by Eleni Kay

## 2024-03-04 NOTE — RESULT ENCOUNTER NOTE
Hello -    Here are my comments about the recent results.  Your influenza test is negative.  Your hemoglobin is low which could explain your fatigue.  I recommend taking over-the-counter iron supplementation daily at bedtime.  I recommend taking vitamin C with iron at bedtime to help with better absorption.  Monitor for signs of blood loss such as blood in the urine and blood in the stool.  If you have black tarry stools, seek medical attention immediately.  Your other labs are pending to further investigate underlying cause of anemia.  I recommend scheduling a follow-up with your primary care provider.  Follow-up sooner if needed.    Please let us know if you have any questions or concerns.    Regards,  Ivon Brock PA-C  [facility].

## 2024-03-04 NOTE — PROGRESS NOTES
Assessment & Plan     Other headache syndrome  - Influenza A & B Antigen - Clinic Collect    Body aches  - Influenza A & B Antigen - Clinic Collect    Other fatigue  - Influenza A & B Antigen - Clinic Collect  - Folate; Future  - CBC with platelets; Future  - TSH; Future  - Basic metabolic panel; Future  - Vitamin B12; Future  - Ferritin; Future  - Folate  - CBC with platelets  - TSH  - Basic metabolic panel  - Vitamin B12  - Ferritin    Fever, unspecified fever cause  - Influenza A & B Antigen - Clinic Collect  - XR Chest 2 Views; Future    Nausea    Chronic kidney disease, unspecified CKD stage  - Ferritin; Future  - Ferritin    SOB (shortness of breath)  - BNP-N terminal pro; Future    Low hemoglobin    Nonrheumatic aortic valve insufficiency    S/P AVR- bioprosthetic    Hyponatremia  - sodium chloride 1 GM tablet; Take 1 tablet (1 g) by mouth 3 times daily for 5 days    Hypochloremia  - sodium chloride 1 GM tablet; Take 1 tablet (1 g) by mouth 3 times daily for 5 days    Ordering of each unique test  Prescription drug management  I spent a total of 41 minutes on the day of the visit.   Time spent by me doing chart review, history and exam, documentation and further activities per the note        See Patient Instructions  Patient Instructions   Testing for flu today    Blood work and Chest XR    Follow up with primary care doctor in 1 week if symptoms are not improving.  Sooner if symptoms worsen.    Worrisome symptoms please go to the emergency department.    Test results:  Anemia:  Here are my comments about the recent results.  Your influenza test is negative.  Your hemoglobin is low which could explain your fatigue.  I recommend taking over-the-counter iron supplementation daily at bedtime.  I recommend taking vitamin C with iron at bedtime to help with better absorption.  Monitor for signs of blood loss such as blood in the urine and blood in the stool.  If you have black tarry stools, seek medical  "attention immediately.  Your other labs are pending to further investigate underlying cause of anemia.  I recommend scheduling a follow-up with your primary care provider.  Follow-up sooner if needed.    Hyponatremia/hypochloremia:  Your sodium and chloride are low.  I recommend treatment with sodium chloride tablets, take 1 g 3 times a day for 5 days.  This was sent to your pharmacy.  I also recommend increasing salt salt intake through food and electrolyte beverages.  Schedule a follow-up appointment in 2 to 3 days to reevaluate and do repeat labs.  If symptoms worsen or fail to improve, follow-up sooner.    What is hyponatremia?  Hyponatremia is the medical term for having too little sodium in the blood.  Sodium is a substance called an \"electrolyte.\" Normally, your body has a specific balance of electrolytes. This is important to keep your cells working normally.  When a person has hyponatremia, their body holds on to too much water. This dilutes the amount of sodium in the blood, causing the sodium level to be low.  What causes hyponatremia?  Hyponatremia happens when the body holds on to too much water. This can happen because of:  ?Certain medical conditions that cause your body to hold on to too much water. These include:  Heart failure, a type of heart disease in which the heart cannot pump as well as it should  Cirrhosis, a severe form of liver disease  SIADH, a condition that happens when your body makes too much of a hormone that helps balance your water level  Kidney disease  ?Some medicines, including a diuretic called hydrochlorothiazide (\"HCTZ\"), which makes you urinate a lot  ?Drinking too much water. This can happen to:  Athletes who do intense exercise (such as run marathons) and drink too much water  People who use the drug ecstasy (ecstasy can make you feel thirsty and drink too much)  People who are mentally ill and feel as though they cannot get enough to drink  ?Losing a lot of blood (for " "example, after an injury)  ?Long-lasting, severe vomiting or diarrhea (this causes your body to lose both water and sodium)  ?Poor diet  What are the symptoms of hyponatremia?  Symptoms can include:  ?Nausea and vomiting  ?Headache  ?Confusion or trouble thinking clearly  ?Feeling weak or tired  ?Feeling restless or irritable  ?Muscle weakness, spasms, or cramps  ?Seizures or passing out  Hyponatremia can also cause more serious problems, such as brain swelling and nerve damage.  Are there tests for hyponatremia?  Yes. If your doctor or nurse suspects that you have hyponatremia, they will do:  ?Blood tests - These check how much sodium is in your blood.  ?Urine tests - These can show much sodium you are losing in your urine.  You might also need other tests depending on your age, other symptoms, and individual situation.  How is hyponatremia treated?  That depends on what is causing your hyponatremia. If your hyponatremia is caused by another medical problem, such as heart failure, your doctor will want to treat that, too.  Severe hyponatremia is treated in the hospital.  Treatments might include:  ?Limiting the amount of fluid you drink  ?Eating salt tablets or getting a saltwater solution into a vein (by \"IV\")  When should I call the doctor?  If you have been treated for hyponatremia, your doctor or nurse should order a blood test to see how the treatment is working.  Call your doctor or nurse if:  ?Your symptoms come back or get worse.  ?You have any new symptoms.  ?You have questions about any of your medicines or how to take care of yourself.              GENERAL   [] Drink extra water and other fluids (water being the best)   [] For sore throats in adults and children over 4 years old, use sore throat spray or lozenges   [] Menthol rub (such as Teja's, very helpful in kids)   [] LOTS of hand washing (or hand ) and covering coughs      SPECIFIC MEDICATIONS (Over the Counter)      [] Fever, aches, ear " pain, throat pain (adults):?   [] Tylenol every 6-8 hours as needed while awake AND/OR  ibuprofen every 6-8 hours while awake (take with food and large glass of water). Read bottle for dosing instructions.    **Avoid Ibuprofen/Aleve/Motrin if you have kidney disease or gastric ulcer history. Please ask your doctor if you have questions/concerns about this.**      [] Congestion:?   [] Nasal Saline (Simply Saline), mist vaporizer, steam, humidifier, and Neti Pot   [] Atrovent (ipratropium) spray (can be used 4 times a day and use for 3 weeks), or Afrin (oxymetazoline) 2 sprays each nostril, 1-2 times a day for 3 days (understanding the potential of rebound congestion).?   [] Considering bulb suctioning and NoseFrida for kids.   [] Phenylephrine (avoid in kids under 5, spray examples are Little Remedies and? Jorge-Synephrine, oral      [] Cough   [] Use honey in coffee or tea to relieve cough (do not give honey to an infant under 1 year old)   [] Throat lozenges and cough drops   [] Steam, humidifier, etc...     [] Mucous production:?   [] Salt water gargles   [] Mucinex, Robitussin (for both do not use if under the age of 5, minimal help in kids)      [] Ear Pain/Pressure   [] Antihistamine (Zyrtec/Cetirizine, Allegra/fexofenadine, Claritin/loratadine)? also for itch, sneeze, runny nose, watery eyes, itchy throat, or postnasal drip).  For best results use in combination with nasal sprays (Flonase or Nasocort):   [] Flonase (fluticasone) 2 sprays in each nostril daily for at least 10-14 days, then 1 spray in each nostril per day afterwards for best results (dont use in kids younger than 5).??     Patient understood and verbally consented to the treatment plan. Discussed symptoms that would warrant an urgent or emergent visit. All of the patients' questions were answered. Patient was instructed to contact the clinic if questions or concerns arise. Recommend follow up appointments if symptoms worsen or fail to improve.  "Recommend follow up as needed. Recommend ER in the case of an emergency.    Ivon Brock PA-C    Please note: Voice recognition software may have been used in preparing this note, unintended word substitutions may be present.      Leann Rivera is a 68 year old, presenting for the following health issues:  Headache        3/4/2024    12:52 PM   Additional Questions   Roomed by Susan DANG   Accompanied by Wife-chris     History of Present Illness       Headaches:   Since the patient's last clinic visit, headaches are: no change  The patient is getting headaches:  Daily  He is able to do normal daily activities when he has a migraine.  The patient is taking the following rescue/relief medications:  Ibuprofen (Advil, Motrin) and Tylenol   Patient states \"I get some relief\" from the rescue/relief medications.   The patient is taking the following medications to prevent migraines:  No medications to prevent migraines  In the past 4 weeks, the patient has gone to an Urgent Care or Emergency Room 0 times times due to headaches.    Reason for visit:  Possibly flu for an extended time  Symptom onset:  3-4 weeks ago  Symptoms include:  Head aches, chills, fatigue some nausea  Symptom intensity:  Moderate  Symptom progression:  Improving  Had these symptoms before:  No  What makes it better:  Rest helps some    He eats 2-3 servings of fruits and vegetables daily.He consumes 1 sweetened beverage(s) daily.He exercises with enough effort to increase his heart rate 20 to 29 minutes per day.  He exercises with enough effort to increase his heart rate 6 days per week.   He is taking medications regularly.         Acute Illness  Acute illness concerns: headaches, chills, fatigue and nausea  Onset/Duration: 3-4 weeks ago  Symptoms:  Fever: YES  Chills/Sweats: YES  Headache (location?): YES  Sinus Pressure: No  Conjunctivitis:  No  Ear Pain: YES: bilateral  Rhinorrhea: YES  Congestion: No  Sore Throat: No  Cough: " YES-productive of clear sputum, productive of yellow sputum  Wheeze: No  Decreased Appetite: YES  Nausea: No  Vomiting: No  Diarrhea: No  Dysuria/Freq.: YES  Dysuria or Hematuria: No  Fatigue/Achiness: YES  Sick/Strep Exposure: No  Therapies tried and outcome: ibuprofen and tylenol      Nurse triage 3/1/2024:  Patient has had a headache and body aches for 2 plus weeks.  He has also had nausea a couple of times.  No vomiting.  He had a fever yesterday 102. No fever today.     He states his head hurts a little bit.  He does have some sinus drainage and some coughing.  No difficulty breathing.  He has been having chills on and off.  He does not seem to be getting better.  He had some dizziness yesterday, none today.     Per protocol patient advised to be seen today. Patient would prefer a visit on Monday.     Paola Baugh RN on 3/1/2024 at 10:18 AM        Reason for Disposition   Patient wants to be seen    Additional Information   Negative: Difficult to awaken or acting confused (e.g., disoriented, slurred speech)   Negative: Pale cold skin and very weak (can't stand)   Negative: Difficulty breathing and bluish (or gray) lips or face   Negative: New-onset rash with purple (or blood-colored) spots or dots   Negative: Sounds like a life-threatening emergency to the triager   Negative: Fever onset within 24 hours of receiving vaccine   Negative: Fever within 14 days of COVID-19 Exposure   Negative: Pregnant   Negative: Postpartum (from 0 to 6 weeks after delivery)   Negative: Headache and stiff neck (can't touch chin to chest)   Negative: Difficulty breathing   Negative: IV Drug Use (IVDU)   Negative: Fever > 103 F (39.4 C)   Negative: Fever > 100.0 F (37.8 C) and indwelling urinary catheter (e.g., Brown, coude)   Negative: Fever > 100.4 F (38.0 C) and has port (portacath), central line, or PICC line   Negative: Drinking very little and dehydration suspected (e.g., no urine > 12 hours, very dry mouth, very  lightheaded)   Negative: Patient sounds very sick or weak to the triager   Negative: Fever > 101 F (38.3 C) and over 60 years of age   Negative: Fever > 100.0 F (37.8 C) and diabetes mellitus or a weak immune system (e.g., HIV positive, chemotherapy, splenectomy)   Negative: Fever > 100.0 F (37.8 C) and bedridden (e.g., CVA, chronic illness, recovering from surgery)   Negative: Fever > 100.4 F (38.0 C) and surgery in the past month   Negative: Transplant patient (e.g., liver, heart, lung, kidney)   Negative: Widespread rash and cause unknown   Negative: Severe chills (i.e., feeling extremely cold WITH shaking chills)   Negative: Patient wants to be seen   Negative: Fever present > 3 days (72 hours)   Negative: Fever comes and goes (intermittent) and lasts > 3 weeks   Negative: Fever > 100.0 F (37.8 C) and foreign travel to a developing country in the past month   Negative: Difficult to awaken or acting confused (e.g., disoriented, slurred speech)   Negative: Weakness of the face, arm or leg on one side of the body and new-onset   Negative: Numbness of the face, arm or leg on one side of the body and new-onset   Negative: Loss of speech or garbled speech and new-onset   Negative: Passed out (i.e., fainted, collapsed and was not responding)   Negative: Sounds like a life-threatening emergency to the triager   Negative: Followed a head injury within last 3 days   Negative: Traumatic Brain Injury (TBI) is suspected   Negative: Sinus pain of forehead and yellow or green nasal discharge   Negative: Pregnant   Negative: Unable to walk without falling   Negative: Stiff neck (can't touch chin to chest)   Negative: Possibility of carbon monoxide exposure   Negative: SEVERE headache, states 'worst headache' of life   Negative: SEVERE headache, sudden-onset (i.e., reaching maximum intensity within seconds to 1 hour)   Negative: Severe pain in one eye   Negative: Loss of vision or double vision  (Exception: Same as prior  "migraines.)   Negative: Patient sounds very sick or weak to the triager   Negative: Fever > 103 F (39.4 C)   Negative: Fever > 100.0 F (37.8 C) and has diabetes mellitus or a weak immune system (e.g., HIV positive, cancer chemotherapy, organ transplant, splenectomy, chronic steroids)   Negative: SEVERE headache (e.g., excruciating) and has had severe headaches before   Negative: SEVERE headache and not relieved by pain meds   Negative: SEVERE headache and vomiting   Negative: SEVERE headache and fever   Negative: New headache and weak immune system (e.g., HIV positive, cancer chemo, splenectomy, organ transplant, chronic steroids)   Negative: Fever present > 3 days (72 hours)    Protocols used: Fever-A-OH, Headache-A-OH          Patient symptoms started around 2/20/2024.  Did a home COVID test that was negative.  He has been having symptoms of ear ringing, shortness of breath, becoming more fatigued, shortness of breath on exertion, fever, chills, and bodyaches.  He has had a productive cough with yellow to clear sputum.  He has never smoked.  No prior diagnosis of asthma or COPD.  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  Negative for chest pain.  He has been alternating them ibuprofen and Tylenol which helps control the fevers.  Negative for history of anemia or thyroid problems.  Negative for blood in the stool or blood in the urine.  He reports in general he feels very tired and rundown, decreased energy to do things.          Review of Systems  Constitutional, HEENT, cardiovascular, pulmonary, GI, , musculoskeletal, neuro, skin, endocrine and psych systems are negative, except as otherwise noted.      Objective    /82   Pulse 87   Temp 98.7  F (37.1  C) (Temporal)   Resp 14   Ht 1.727 m (5' 8\")   Wt 63.5 kg (140 lb)   SpO2 97%   BMI 21.29 kg/m    Body mass index is 21.29 kg/m .  Physical Exam   GENERAL: alert and no distress  EYES: " Eyes grossly normal to inspection, PERRL and conjunctivae and sclerae normal  HENT: ear canals normal, cerumen impaction bilaterally, nose and mouth without ulcers or lesions  NECK: no adenopathy, no asymmetry, masses, or scars  RESP: lungs clear to auscultation - no rales, rhonchi or wheezes  CV: regular rate and rhythm, normal S1 S2, no S3 or S4, no murmur, click or rub, no peripheral edema  MS: no gross musculoskeletal defects noted, no edema  SKIN: no suspicious lesions or rashes  NEURO: Normal strength and tone, mentation intact and speech normal  PSYCH: mentation appears normal, affect normal/[facility] Outpatient Visit on 01/11/2024   Component Date Value Ref Range Status    LVEF  01/11/2024 60-65%   Final     Results for orders placed or performed during the hospital encounter of 03/04/24   XR Chest 2 Views     Status: None (Preliminary result)    Narrative    CHEST TWO VIEWS 3/4/2024 1:48 PM     HISTORY: Fever, unspecified fever cause.    COMPARISON: December 20, 2022       Impression    IMPRESSION: There are no acute infiltrates. The cardiac silhouette is  not enlarged. Pulmonary vasculature is unremarkable.   Results for orders placed or performed in visit on 03/04/24   CBC with platelets     Status: Abnormal   Result Value Ref Range    WBC Count 10.2 4.0 - 11.0 10e3/uL    RBC Count 4.32 (L) 4.40 - 5.90 10e6/uL    Hemoglobin 12.6 (L) 13.3 - 17.7 g/dL    Hematocrit 37.2 (L) 40.0 - 53.0 %    MCV 86 78 - 100 fL    MCH 29.2 26.5 - 33.0 pg    MCHC 33.9 31.5 - 36.5 g/dL    RDW 13.2 10.0 - 15.0 %    Platelet Count 251 150 - 450 10e3/uL   TSH     Status: Normal   Result Value Ref Range    TSH 1.28 0.30 - 4.20 uIU/mL   Basic metabolic panel     Status: Abnormal   Result Value Ref Range    Sodium 133 (L) 135 - 145 mmol/L    Potassium 4.9 3.4 - 5.3 mmol/L    Chloride 96 (L) 98 - 107 mmol/L    Carbon Dioxide (CO2) 26 22 - 29 mmol/L    Anion Gap 11 7 - 15 mmol/L    Urea Nitrogen 19.3 8.0 - 23.0 mg/dL     Creatinine 0.95 0.67 - 1.17 mg/dL    GFR Estimate 86 >60 mL/min/1.73m2    Calcium 9.1 8.8 - 10.2 mg/dL    Glucose 110 (H) 70 - 99 mg/dL   Ferritin     Status: Normal   Result Value Ref Range    Ferritin 206 31 - 409 ng/mL   Influenza A & B Antigen - Clinic Collect     Status: Normal    Specimen: Nasopharyngeal; Swab   Result Value Ref Range    Influenza A antigen Negative Negative    Influenza B antigen Negative Negative    Narrative    Test results must be correlated with clinical data. If necessary, results should be confirmed by a molecular assay or viral culture.     No results found.        Signed Electronically by: Ivon Brock PA-C

## 2024-03-05 LAB
FOLATE SERPL-MCNC: 25.7 NG/ML (ref 4.6–34.8)
VIT B12 SERPL-MCNC: 2108 PG/ML (ref 232–1245)

## 2024-03-05 NOTE — RESULT ENCOUNTER NOTE
Hello -    Here are my comments about the recent results.  Your folate level is normal.  Your vitamin B12 is elevated.    Please let us know if you have any questions or concerns.    Regards,  Ivon Brock PA-C

## 2024-03-07 ENCOUNTER — HOSPITAL ENCOUNTER (OUTPATIENT)
Dept: GENERAL RADIOLOGY | Facility: CLINIC | Age: 69
Discharge: HOME | End: 2024-03-07
Payer: COMMERCIAL

## 2024-03-07 ENCOUNTER — OFFICE VISIT (OUTPATIENT)
Dept: FAMILY MEDICINE | Facility: CLINIC | Age: 69
End: 2024-03-07
Payer: COMMERCIAL

## 2024-03-07 ENCOUNTER — TELEPHONE (OUTPATIENT)
Dept: FAMILY MEDICINE | Facility: CLINIC | Age: 69
End: 2024-03-07

## 2024-03-07 VITALS
DIASTOLIC BLOOD PRESSURE: 78 MMHG | WEIGHT: 139 LBS | BODY MASS INDEX: 21.13 KG/M2 | HEART RATE: 76 BPM | TEMPERATURE: 98.2 F | OXYGEN SATURATION: 95 % | RESPIRATION RATE: 20 BRPM | SYSTOLIC BLOOD PRESSURE: 130 MMHG

## 2024-03-07 DIAGNOSIS — E87.1 HYPONATREMIA: ICD-10-CM

## 2024-03-07 DIAGNOSIS — M25.552 HIP PAIN, LEFT: ICD-10-CM

## 2024-03-07 DIAGNOSIS — M85.80 OSTEOPENIA, UNSPECIFIED LOCATION: ICD-10-CM

## 2024-03-07 DIAGNOSIS — R79.89 ELEVATED BRAIN NATRIURETIC PEPTIDE (BNP) LEVEL: ICD-10-CM

## 2024-03-07 DIAGNOSIS — M25.552 HIP PAIN, LEFT: Primary | ICD-10-CM

## 2024-03-07 DIAGNOSIS — E87.8 HYPOCHLOREMIA: ICD-10-CM

## 2024-03-07 DIAGNOSIS — R06.02 SOB (SHORTNESS OF BREATH): ICD-10-CM

## 2024-03-07 DIAGNOSIS — Z95.2 S/P AVR: ICD-10-CM

## 2024-03-07 DIAGNOSIS — I35.1 NONRHEUMATIC AORTIC VALVE INSUFFICIENCY: ICD-10-CM

## 2024-03-07 DIAGNOSIS — M16.12 PRIMARY OSTEOARTHRITIS OF LEFT HIP: ICD-10-CM

## 2024-03-07 LAB
ANION GAP SERPL CALCULATED.3IONS-SCNC: 9 MMOL/L (ref 7–15)
BUN SERPL-MCNC: 16.9 MG/DL (ref 8–23)
CALCIUM SERPL-MCNC: 9.1 MG/DL (ref 8.8–10.2)
CHLORIDE SERPL-SCNC: 99 MMOL/L (ref 98–107)
CREAT SERPL-MCNC: 0.85 MG/DL (ref 0.67–1.17)
DEPRECATED HCO3 PLAS-SCNC: 27 MMOL/L (ref 22–29)
EGFRCR SERPLBLD CKD-EPI 2021: >90 ML/MIN/1.73M2
GLUCOSE SERPL-MCNC: 105 MG/DL (ref 70–99)
NT-PROBNP SERPL-MCNC: 996 PG/ML (ref 0–900)
POTASSIUM SERPL-SCNC: 4.4 MMOL/L (ref 3.4–5.3)
SODIUM SERPL-SCNC: 135 MMOL/L (ref 135–145)

## 2024-03-07 PROCEDURE — 83880 ASSAY OF NATRIURETIC PEPTIDE: CPT

## 2024-03-07 PROCEDURE — 99215 OFFICE O/P EST HI 40 MIN: CPT

## 2024-03-07 PROCEDURE — 36415 COLL VENOUS BLD VENIPUNCTURE: CPT

## 2024-03-07 PROCEDURE — 80048 BASIC METABOLIC PNL TOTAL CA: CPT

## 2024-03-07 PROCEDURE — 73502 X-RAY EXAM HIP UNI 2-3 VIEWS: CPT

## 2024-03-07 ASSESSMENT — PAIN SCALES - GENERAL: PAINLEVEL: EXTREME PAIN (9)

## 2024-03-07 NOTE — RESULT ENCOUNTER NOTE
Hello -    Here are my comments about the recent results.  No hip fracture.  There is severe left hip degenerative changes with bone-on-bone articulation (severe arthritis).  Mild right hip degenerative changes.  Moderate degenerative changes in the lumbar spine and SI joints.  Osteopenia (thinning bones) noted.  I recommend follow-up with orthopedics and physical therapy, these referrals were placed.  See additional information below regarding osteopenia.    You have osteopenia (thinning of the bones) but not yet osteoporosis.  Recommend supplementing with calcium (1.2 grams/day) and vitamin D (800 units/day).  You are encouraged to try and get regular weight bearing (i.e. Walking, jogging) exercise as well.      Please let us know if you have any questions or concerns.    Regards,  Ivon Brock PA-C

## 2024-03-07 NOTE — PROGRESS NOTES
Assessment & Plan     Hip pain, left  - Physical Therapy  Referral; Future  - Orthopedic  Referral; Future    Hyponatremia  - Basic metabolic panel  (Ca, Cl, CO2, Creat, Gluc, K, Na, BUN); Future  - Basic metabolic panel  (Ca, Cl, CO2, Creat, Gluc, K, Na, BUN)    Hypochloremia  - Basic metabolic panel  (Ca, Cl, CO2, Creat, Gluc, K, Na, BUN); Future  - Basic metabolic panel  (Ca, Cl, CO2, Creat, Gluc, K, Na, BUN)    SOB (shortness of breath)  - BNP-N terminal pro    Primary osteoarthritis of left hip  - Orthopedic  Referral; Future    Elevated brain natriuretic peptide (BNP) level  - Adult Cardiology Eval  Referral; Future    S/P AVR- bioprosthetic  - Adult Cardiology Eval  Referral; Future    Nonrheumatic aortic valve insufficiency  - Adult Cardiology Eval  Referral; Future    Osteopenia, unspecified location    Ordering of each unique test  I spent a total of 40 minutes on the day of the visit.   Time spent by me doing chart review, history and exam, documentation and further activities per the note        See Patient Instructions  Patient Instructions   Results:  Here are my comments about the recent results.  No hip fracture.  There is severe left hip degenerative changes with bone-on-bone articulation (severe arthritis).  Mild right hip degenerative changes.  Moderate degenerative changes in the lumbar spine and SI joints.  Osteopenia (thinning bones) noted.  I recommend follow-up with orthopedics and physical therapy, these referrals were placed.  See additional information below regarding osteopenia.    Osteopenia:  You have osteopenia (thinning of the bones) but not yet osteoporosis.  Recommend supplementing with calcium (1.2 grams/day) and vitamin D (800 units/day).  You are encouraged to try and get regular weight bearing (i.e. Walking, jogging) exercise as well.    Here are my comments about the recent results.  Your sodium has normalized.  Your  electrolytes and kidney function are normal.  Your BNP lab is slightly elevated, I recommend scheduling a follow-up with your primary care provider and follow-up with cardiology.  I placed a referral for cardiology.    Hip arthritis.  I recommend follow-up with orthopedics, a referral was placed.  I recommend follow-up physical therapy    Elevated BNP:  Recommend follow-up with cardiology, referral was placed.  Recommend schedule follow-up with PCP    Low sodium/hyponatremia:  Recommend rechecking labs today  Your sodium has normalized  Follow-up with any new, worsening, or persistent symptoms    Left hip pain:  I recommend left hip x-ray today  If symptoms worsen or fail to improve, I recommend ER evaluation if you are unable to walk or have worsening pain  Ice as needed  Follow up with physical therapy  Lidocaine patches for 12 hours on, 12 hours off  Tylenol as needed  Avoid excessive use of ibuprofen and Aleve  Diclofenac gel 4 times a day scheduled  Heat pack for 10 minutes followed by exercises 4-5 times a day  Follow up with any new, worsening, or persistent symptoms  Go to the ER in the case of an emergency    Hip Bursitis: Care Instructions  Overview     Bursitis is inflammation of the bursa. A bursa is a small sac of fluid that cushions a joint and helps it move easily. A bursa sits between a bone in the hip and the muscles and tendons in the thigh and buttock. Injury or overuse of the hip can cause bursitis. Activities that can lead to bursitis include twisting and rapid joint movement. Bursitis can cause hip pain.  Bursitis usually gets better if you avoid the activity that caused it. If pain lasts or gets worse despite home treatment, your doctor may draw fluid from the bursa through a needle. This may relieve your pain and help your doctor know if you have an infection. If so, your doctor will prescribe antibiotics. If you have inflammation only, you may get a corticosteroid shot to reduce swelling and  pain. Sometimes surgery is needed to drain or remove the bursa.  Follow-up care is a key part of your treatment and safety. Be sure to make and go to all appointments, and call your doctor if you are having problems. It's also a good idea to know your test results and keep a list of the medicines you take.  How can you care for yourself at home?  Put ice or a cold pack on your hip for 10 to 20 minutes at a time. Put a thin cloth between the ice and your skin.  After 3 days of using ice, you may use heat on your hip. You can use a hot water bottle, a heating pad set on low, or a warm, moist towel.  Rest your hip. Stop any activities that cause pain. Switch to activities that do not stress your hip.  Take your medicines exactly as prescribed. Call your doctor if you think you are having a problem with your medicine.  Ask your doctor if you can take an over-the-counter pain medicine, such as acetaminophen (Tylenol), ibuprofen (Advil, Motrin), or naproxen (Aleve). Be safe with medicines. Read and follow all instructions on the label.  To prevent stiffness, gently move the hip joint as much as you can without pain every day. As the pain gets better, keep doing range-of-motion exercises. Ask your doctor for exercises that will make the muscles around the hip joint stronger. Do these as directed.  You can slowly return to the activity that caused the pain, but do it with less effort until you can do it without pain or swelling. Be sure to warm up before and stretch after you do the activity.  When should you call for help?   Call your doctor now or seek immediate medical care if:    You have a fever.     You have increased swelling or redness in your hip.     You cannot use your hip, or the pain in your hip gets worse.   Watch closely for changes in your health, and be sure to contact your doctor if:    You have pain for 2 weeks or longer despite home treatment.       Trochanteric Bursitis: Exercises  Introduction  Here  are some examples of exercises for you to try. The exercises may be suggested for a condition or for rehabilitation. Start each exercise slowly. Ease off the exercises if you start to have pain.  You will be told when to start these exercises and which ones will work best for you.  How to do the exercises  Hamstring wall stretch    Lie on your back in a doorway, with your good leg through the open door.  Slide your affected leg up the wall to straighten your knee. You should feel a gentle stretch down the back of your leg.  Hold the stretch for at least 1 minute to begin. Then try to lengthen the time you hold the stretch to as long as 6 minutes.  Repeat 2 to 4 times.  If you do not have a place to do this exercise in a doorway, there is another way to do it:  Lie on your back, and bend the knee of your affected leg.  Loop a towel under the ball and toes of that foot, and hold the ends of the towel in your hands.  Straighten your knee, and slowly pull back on the towel. You should feel a gentle stretch down the back of your leg.  Hold the stretch for 15 to 30 seconds. Or even better, hold the stretch for 1 minute if you can.  Repeat 2 to 4 times.  Do not arch your back.  Do not bend either knee.  Keep one heel touching the floor and the other heel touching the wall. Do not point your toes.  Straight-leg raises to the outside    Lie on your side, with your affected leg on top.  Tighten the front thigh muscles of your top leg to keep your knee straight.  Keep your hip and your leg straight in line with the rest of your body, and keep your knee pointing forward. Do not drop your hip back.  Lift your top leg straight up toward the ceiling, about 12 inches off the floor. Hold for about 6 seconds, then slowly lower your leg.  Repeat 8 to 12 times.  Clamshell for hip problems    Lie on your side, with your affected leg on top and your head propped on a pillow. Keep your feet and knees together and your knees bent.  Raise  your top knee, but keep your feet together. Do not let your hips roll back. Your legs should open up like a clamshell.  Hold for about 6 seconds.  Slowly lower your knee back down.  Repeat 8 to 12 times.  Standing quadriceps stretch    If you are not steady on your feet, hold on to a chair, counter, or wall. You can also lie on your stomach or your side to do this exercise.  Bend the knee of the leg you want to stretch, and reach behind you to grab the front of your foot or ankle with the hand on the same side. For example, if you are stretching your right leg, use your right hand.  Keeping your knees next to each other, pull your foot toward your buttock until you feel a gentle stretch across the front of your hip and down the front of your thigh. Your knee should be pointed directly to the ground, and not out to the side.  Hold the stretch for 15 to 30 seconds.  Repeat 2 to 4 times.  Piriformis stretch    Lie on your back with your legs straight.  Lift your affected leg and bend your knee. With your opposite hand, reach across your body, and then gently pull your knee toward your opposite shoulder.  Hold the stretch for 15 to 30 seconds.  Repeat 2 to 4 times.  Double knee-to-chest    Lie on your back with your knees bent and your feet flat on the floor. You can put a small pillow under your head and neck if it is more comfortable.  Bring both knees to your chest.  Keep your lower back pressed to the floor. Hold for 15 to 30 seconds.  Relax, and lower your knees to the starting position.  Repeat 2 to 4 times.    Hip Bursitis: Exercises  Introduction  Here are some examples of exercises for you to try. The exercises may be suggested for a condition or for rehabilitation. Start each exercise slowly. Ease off the exercises if you start to have pain.  You will be told when to start these exercises and which ones will work best for you.  How to do the exercises  Hip external rotator stretch    Lie on your back with both  knees bent and your feet flat on the floor.  Put the ankle of your affected leg on your opposite thigh near your knee.  Use your opposite hand to gently pull your knee across your body toward your shoulder. For example, to stretch your left hip, use your right hand to pull your left knee toward your right shoulder.  Hold the stretch for 15 to 30 seconds.  Repeat 2 to 4 times.  It's a good idea to repeat these steps with your other leg.  Iliotibial band stretch    Lean sideways against a wall. If you are not steady on your feet, hold on to a chair or counter.  Stand on the leg with the affected hip, with that leg close to the wall. Then cross your other leg in front of it.  Let your affected hip drop out to the side of your body and against wall. Then lean away from your affected hip until you feel a stretch.  Hold the stretch for 15 to 30 seconds.  Repeat 2 to 4 times.  Straight-leg raises to the outside    Lie on your side, with your affected hip on top.  Tighten the front thigh muscles of your top leg to keep your knee straight.  Keep your hip and your leg straight in line with the rest of your body, and keep your knee pointing forward. Do not drop your hip back.  Lift your top leg straight up toward the ceiling, about 12 inches off the floor. Hold for about 6 seconds, then slowly lower your leg.  Repeat 8 to 12 times.  Clamshell    Lie on your side, with your affected hip on top and your head propped on a pillow. Keep your feet and knees together and your knees bent.  Raise your top knee, but keep your feet together. Do not let your hips roll back. Your legs should open up like a clamshell.  Hold for 6 seconds.  Slowly lower your knee back down. Rest for 10 seconds.  Repeat 8 to 12 times.        Patient understood and verbally consented to the treatment plan. Discussed symptoms that would warrant an urgent or emergent visit. All of the patients' questions were answered. Patient was instructed to contact the  clinic if questions or concerns arise. Recommend follow up appointments if symptoms worsen or fail to improve. Recommend follow up as needed. Recommend ER in the case of an emergency.    Ivon Brock PA-C    Please note: Voice recognition software may have been used in preparing this note, unintended word substitutions may be present.      Leann Rivera is a 68 year old, presenting for the following health issues:  Musculoskeletal Problem (Ongoing hip pain)        3/7/2024     9:59 AM   Additional Questions   Roomed by Diandra WALLACE   Accompanied by wife     Musculoskeletal Problem     Left Hip pain x 1 day, unable to walk or stand up straight having pain 9/10. Hard to transfer from chair to standing or standing to sitting. Hurting as well during laying down all last night. Now states he can't even get in a laying position either has to stand or sit sort of hunched.     Call 3/7/2024:  Wife calling in that patient is still not improving and now his left hip started hurting yesterday and patient can barely walk.     Office visit 3/4/2024:  Other headache syndrome  - Influenza A & B Antigen - Clinic Collect     Body aches  - Influenza A & B Antigen - Clinic Collect     Other fatigue  - Influenza A & B Antigen - Clinic Collect  - Folate; Future  - CBC with platelets; Future  - TSH; Future  - Basic metabolic panel; Future  - Vitamin B12; Future  - Ferritin; Future  - Folate  - CBC with platelets  - TSH  - Basic metabolic panel  - Vitamin B12  - Ferritin     Fever, unspecified fever cause  - Influenza A & B Antigen - Clinic Collect  - XR Chest 2 Views; Future     Nausea     Chronic kidney disease, unspecified CKD stage  - Ferritin; Future  - Ferritin     SOB (shortness of breath)  - BNP-N terminal pro; Future     Low hemoglobin     Nonrheumatic aortic valve insufficiency     S/P AVR- bioprosthetic     Hyponatremia  - sodium chloride 1 GM tablet; Take 1 tablet (1 g) by mouth 3 times daily for 5 days    "  Hypochloremia  - sodium chloride 1 GM tablet; Take 1 tablet (1 g) by mouth 3 times daily for 5 days     See Patient Instructions  Patient Instructions   Testing for flu today     Blood work and Chest XR     Follow up with primary care doctor in 1 week if symptoms are not improving.  Sooner if symptoms worsen.     Worrisome symptoms please go to the emergency department.     Test results:  Anemia:  Here are my comments about the recent results.  Your influenza test is negative.  Your hemoglobin is low which could explain your fatigue.  I recommend taking over-the-counter iron supplementation daily at bedtime.  I recommend taking vitamin C with iron at bedtime to help with better absorption.  Monitor for signs of blood loss such as blood in the urine and blood in the stool.  If you have black tarry stools, seek medical attention immediately.  Your other labs are pending to further investigate underlying cause of anemia.  I recommend scheduling a follow-up with your primary care provider.  Follow-up sooner if needed.     Hyponatremia/hypochloremia:  Your sodium and chloride are low.  I recommend treatment with sodium chloride tablets, take 1 g 3 times a day for 5 days.  This was sent to your pharmacy.  I also recommend increasing salt salt intake through food and electrolyte beverages.  Schedule a follow-up appointment in 2 to 3 days to reevaluate and do repeat labs.  If symptoms worsen or fail to improve, follow-up sooner.     What is hyponatremia?  Hyponatremia is the medical term for having too little sodium in the blood.  Sodium is a substance called an \"electrolyte.\" Normally, your body has a specific balance of electrolytes. This is important to keep your cells working normally.  When a person has hyponatremia, their body holds on to too much water. This dilutes the amount of sodium in the blood, causing the sodium level to be low.  What causes hyponatremia?  Hyponatremia happens when the body holds on to too " "much water. This can happen because of:  ?Certain medical conditions that cause your body to hold on to too much water. These include:  Heart failure, a type of heart disease in which the heart cannot pump as well as it should  Cirrhosis, a severe form of liver disease  SIADH, a condition that happens when your body makes too much of a hormone that helps balance your water level  Kidney disease  ?Some medicines, including a diuretic called hydrochlorothiazide (\"HCTZ\"), which makes you urinate a lot  ?Drinking too much water. This can happen to:  Athletes who do intense exercise (such as run marathons) and drink too much water  People who use the drug ecstasy (ecstasy can make you feel thirsty and drink too much)  People who are mentally ill and feel as though they cannot get enough to drink  ?Losing a lot of blood (for example, after an injury)  ?Long-lasting, severe vomiting or diarrhea (this causes your body to lose both water and sodium)  ?Poor diet  What are the symptoms of hyponatremia?  Symptoms can include:  ?Nausea and vomiting  ?Headache  ?Confusion or trouble thinking clearly  ?Feeling weak or tired  ?Feeling restless or irritable  ?Muscle weakness, spasms, or cramps  ?Seizures or passing out  Hyponatremia can also cause more serious problems, such as brain swelling and nerve damage.  Are there tests for hyponatremia?  Yes. If your doctor or nurse suspects that you have hyponatremia, they will do:  ?Blood tests - These check how much sodium is in your blood.  ?Urine tests - These can show much sodium you are losing in your urine.  You might also need other tests depending on your age, other symptoms, and individual situation.  How is hyponatremia treated?  That depends on what is causing your hyponatremia. If your hyponatremia is caused by another medical problem, such as heart failure, your doctor will want to treat that, too.  Severe hyponatremia is treated in the hospital.  Treatments might " "include:  ?Limiting the amount of fluid you drink  ?Eating salt tablets or getting a saltwater solution into a vein (by \"IV\")  When should I call the doctor?  If you have been treated for hyponatremia, your doctor or nurse should order a blood test to see how the treatment is working.  Call your doctor or nurse if:  ?Your symptoms come back or get worse.  ?You have any new symptoms.  ?You have questions about any of your medicines or how to take care of yourself.                  GENERAL   [] Drink extra water and other fluids (water being the best)   [] For sore throats in adults and children over 4 years old, use sore throat spray or lozenges   [] Menthol rub (such as Teja's, very helpful in kids)   [] LOTS of hand washing (or hand ) and covering coughs      SPECIFIC MEDICATIONS (Over the Counter)      [] Fever, aches, ear pain, throat pain (adults):?   [] Tylenol every 6-8 hours as needed while awake AND/OR  ibuprofen every 6-8 hours while awake (take with food and large glass of water). Read bottle for dosing instructions.     **Avoid Ibuprofen/Aleve/Motrin if you have kidney disease or gastric ulcer history. Please ask your doctor if you have questions/concerns about this.**      [] Congestion:?   [] Nasal Saline (Simply Saline), mist vaporizer, steam, humidifier, and Neti Pot   [] Atrovent (ipratropium) spray (can be used 4 times a day and use for 3 weeks), or Afrin (oxymetazoline) 2 sprays each nostril, 1-2 times a day for 3 days (understanding the potential of rebound congestion).?   [] Considering bulb suctioning and NoseFrida for kids.   [] Phenylephrine (avoid in kids under 5, spray examples are Little Remedies and? Jorge-Synephrine, oral      [] Cough   [] Use honey in coffee or tea to relieve cough (do not give honey to an infant under 1 year old)   [] Throat lozenges and cough drops   [] Steam, humidifier, etc...     [] Mucous production:?   [] Salt water gargles   [] Mucinex, Robitussin (for " "both do not use if under the age of 5, minimal help in kids)      [] Ear Pain/Pressure   [] Antihistamine (Zyrtec/Cetirizine, Allegra/fexofenadine, Claritin/loratadine)? also for itch, sneeze, runny nose, watery eyes, itchy throat, or postnasal drip).  For best results use in combination with nasal sprays (Flonase or Nasocort):   [] Flonase (fluticasone) 2 sprays in each nostril daily for at least 10-14 days, then 1 spray in each nostril per day afterwards for best results (dont use in kids younger than 5).??      Patient understood and verbally consented to the treatment plan. Discussed symptoms that would warrant an urgent or emergent visit. All of the patients' questions were answered. Patient was instructed to contact the clinic if questions or concerns arise. Recommend follow up appointments if symptoms worsen or fail to improve. Recommend follow up as needed. Recommend ER in the case of an emergency.     Ivon Brock PA-C     Please note: Voice recognition software may have been used in preparing this note, unintended word substitutions may be present.        Office visit 3/7/2024:  Patient reports that he started to have left hip pain yesterday.  He took 3 Tylenol with mild relief.  He has been alternating Tylenol and ibuprofen.  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  He reports difficulty putting pressure on his left leg due to left hip pain.  No pain to the groin.  He has pain over the lateral left hip, left buttock, and down the left thigh.  Negative for numbness, tingling, weakness.  He is able to move his toes bilaterally.  Reports he is walking to the pain.  He has a cane at home that he is going to try and use.  He has not had any pain problems before.  He reports in the past he feels an occasional \"catch\" in his left hip.  He has not had any x-rays of his hips.  Negative for bruising, swelling, or redness.  Reports pain is not red.  His last visit he " had hyponatremia.  Your symptoms are postsurgical.  He reports feeling better overall since his last visit, still having occasional chills.  He had an occasional headache thought with this morning.  He still feels fatigued.  Breathing has been good, he has been doing deep breathing.  He has increased his fluid intake with drinking 3 to 4  12 ounce bottles of water/Gatorade daily.  Negative for therapy.  No history of DVT.              Review of Systems  Constitutional, HEENT, cardiovascular, pulmonary, GI, , musculoskeletal, neuro, skin, endocrine and psych systems are negative, except as otherwise noted.      Objective    /78   Pulse 76   Temp 98.2  F (36.8  C) (Temporal)   Resp 20   Wt 63 kg (139 lb)   SpO2 95%   BMI 21.13 kg/m    Body mass index is 21.13 kg/m .  Physical Exam   GENERAL: alert and no distress  EYES: Eyes grossly normal to inspection, PERRL and conjunctivae and sclerae normal  MS: no gross musculoskeletal defects noted, no edema.  Negative for tenderness palpation over the left buttock, left hip, left trochanteric area, left thigh and left lower leg.  Patient walks with a limp.  SKIN: no suspicious lesions or rashes  NEURO: Normal strength and tone, mentation intact and speech normal  PSYCH: mentation appears normal, affect normal/bright    Reviewed echocardiogram from 1/11/2024:  Interpretation Summary     There is a bioprosthetic aortic valve.( 21mm Resilia TAVR 12/2022) EOA  1.78cm2/MSG 20mmHg/DI 0.45  Doppler suggests a kyle-prosthetic leak of the prosthetic aortic valve.  There is mild (1+) aortic regurgitation.  Left ventricular systolic function is normal.  The visual ejection fraction is 60-65%.  The left ventricle is normal in size.  There is mild concentric left ventricular hypertrophy.  The left atrium is mildly dilated.     As compared with the last study 3/14/2023 there has been a slight increase in  the severity of kyle prosthetic AI (was trace now mild) and a mild  increase in  MSG ( was 17 now 20 mmHg) There has otherwise been no signficant change.    Office Visit on 03/04/2024   Component Date Value Ref Range Status    Influenza A antigen 03/04/2024 Negative  Negative Final    Influenza B antigen 03/04/2024 Negative  Negative Final    Folic Acid 03/04/2024 25.7  4.6 - 34.8 ng/mL Final    WBC Count 03/04/2024 10.2  4.0 - 11.0 10e3/uL Final    RBC Count 03/04/2024 4.32 (L)  4.40 - 5.90 10e6/uL Final    Hemoglobin 03/04/2024 12.6 (L)  13.3 - 17.7 g/dL Final    Hematocrit 03/04/2024 37.2 (L)  40.0 - 53.0 % Final    MCV 03/04/2024 86  78 - 100 fL Final    MCH 03/04/2024 29.2  26.5 - 33.0 pg Final    MCHC 03/04/2024 33.9  31.5 - 36.5 g/dL Final    RDW 03/04/2024 13.2  10.0 - 15.0 % Final    Platelet Count 03/04/2024 251  150 - 450 10e3/uL Final    TSH 03/04/2024 1.28  0.30 - 4.20 uIU/mL Final    Sodium 03/04/2024 133 (L)  135 - 145 mmol/L Final    Reference intervals for this test were updated on 09/26/2023 to more accurately reflect our healthy population. There may be differences in the flagging of prior results with similar values performed with this method. Interpretation of those prior results can be made in the context of the updated reference intervals.     Potassium 03/04/2024 4.9  3.4 - 5.3 mmol/L Final    Chloride 03/04/2024 96 (L)  98 - 107 mmol/L Final    Carbon Dioxide (CO2) 03/04/2024 26  22 - 29 mmol/L Final    Anion Gap 03/04/2024 11  7 - 15 mmol/L Final    Urea Nitrogen 03/04/2024 19.3  8.0 - 23.0 mg/dL Final    Creatinine 03/04/2024 0.95  0.67 - 1.17 mg/dL Final    GFR Estimate 03/04/2024 86  >60 mL/min/1.73m2 Final    Calcium 03/04/2024 9.1  8.8 - 10.2 mg/dL Final    Glucose 03/04/2024 110 (H)  70 - 99 mg/dL Final    Vitamin B12 03/04/2024 2,108 (H)  232 - 1,245 pg/mL Final    Ferritin 03/04/2024 206  31 - 409 ng/mL Final     Results for orders placed or performed during the hospital encounter of 03/07/24   XR Pelvis w Hip Left 1 View     Status: None     Narrative    XR PELVIS AND HIP LEFT 1 VIEW  3/7/2024 10:53 AM     HISTORY: Hip pain, left  COMPARISON: None      Impression    IMPRESSION: No acute fracture or malalignment. Severe left hip joint  degenerative changes with bone-on-bone articulation. Small marginal  erosion at the left femoral head-neck junction. Mild right hip joint  degenerative changes. Moderate degenerative changes of the sacroiliac  joints and lower lumbar spine. Osteopenia.        SHELBY LLANES MD         SYSTEM ID:  [id number]   Results for orders placed or performed in visit on 03/07/24   BNP-N terminal pro     Status: Abnormal   Result Value Ref Range    N Terminal Pro BNP Outpatient 996 (H) 0 - 900 pg/mL   Basic metabolic panel  (Ca, Cl, CO2, Creat, Gluc, K, Na, BUN)     Status: Abnormal   Result Value Ref Range    Sodium 135 135 - 145 mmol/L    Potassium 4.4 3.4 - 5.3 mmol/L    Chloride 99 98 - 107 mmol/L    Carbon Dioxide (CO2) 27 22 - 29 mmol/L    Anion Gap 9 7 - 15 mmol/L    Urea Nitrogen 16.9 8.0 - 23.0 mg/dL    Creatinine 0.85 0.67 - 1.17 mg/dL    GFR Estimate >90 >60 mL/min/1.73m2    Calcium 9.1 8.8 - 10.2 mg/dL    Glucose 105 (H) 70 - 99 mg/dL     XR Pelvis w Hip Left 1 View    Result Date: 3/7/2024  XR PELVIS AND HIP LEFT 1 VIEW  3/7/2024 10:53 AM HISTORY: Hip pain, left COMPARISON: None     IMPRESSION: No acute fracture or malalignment. Severe left hip joint degenerative changes with bone-on-bone articulation. Small marginal erosion at the left femoral head-neck junction. Mild right hip joint degenerative changes. Moderate degenerative changes of the sacroiliac joints and lower lumbar spine. Osteopenia.  SHELBY LLANES MD   SYSTEM ID:  [id number]    XR Chest 2 Views    Result Date: 3/4/2024  CHEST TWO VIEWS 3/4/2024 1:48 PM HISTORY: Fever, unspecified fever cause. COMPARISON: December 20, 2022     IMPRESSION: There are no acute infiltrates. The cardiac silhouette is not enlarged. Pulmonary vasculature is unremarkableRosita HAYDEN  MD GAGE   SYSTEM ID:  [id number]         Signed Electronically by: Ivon Brock PA-C

## 2024-03-07 NOTE — RESULT ENCOUNTER NOTE
Hello -    Here are my comments about the recent results.  Your sodium has normalized.  Your electrolytes and kidney function are normal.  Your BNP lab is slightly elevated, I recommend scheduling a follow-up with your primary care provider and follow-up with cardiology.  I placed a referral for cardiology.    Please let us know if you have any questions or concerns.    Regards,  Ivon Brock PA-C

## 2024-03-07 NOTE — TELEPHONE ENCOUNTER
General Call    Contacts         Type Contact Phone/Fax    03/04/2024 02:35 PM CST Phone (Incoming) Sandra Rodrigueztyrel (Emergency Contact) [phone]          Reason for Call:  Below Concerns    What are your questions or concerns:  Pts wife called to say that she think the pt needs an appt TODAY, he is not doing well. They do not want to wait a few weeks to see Rolan. Wondering if Ivon Brock has anything available today to see the patient. Please contact.     Date of last appointment with provider: 3/4/2023    Could we send this information to you in [facility] or would you prefer to receive a phone call?:   Patient would prefer a phone call   Okay to leave a detailed message?: Yes at Home number on file [phone] (home)

## 2024-03-07 NOTE — TELEPHONE ENCOUNTER
From clinic appointment on 3/4/24:    Hyponatremia/hypochloremia:  Your sodium and chloride are low.  I recommend treatment with sodium chloride tablets, take 1 g 3 times a day for 5 days.  This was sent to your pharmacy.  I also recommend increasing salt salt intake through food and electrolyte beverages.  Schedule a follow-up appointment in 2 to 3 days to reevaluate and do repeat labs.  If symptoms worsen or fail to improve, follow-up sooner.      Wife calling in that patient is still not improving and now his left hip started hurting yesterday and patient can barely walk. Spoke with WILIAN pascal who saw them on Monday and got an appointment for them today.     Charmaine Rivera RN on 3/7/2024 at 7:37 AM

## 2024-03-07 NOTE — TELEPHONE ENCOUNTER
Patient scheduled for appointment today with WILIAN Navarro who saw patient on 3/4/24.    Charmaine Rivera RN on 3/7/2024 at 7:38 AM

## 2024-03-07 NOTE — PATIENT INSTRUCTIONS
Results:  Here are my comments about the recent results.  No hip fracture.  There is severe left hip degenerative changes with bone-on-bone articulation (severe arthritis).  Mild right hip degenerative changes.  Moderate degenerative changes in the lumbar spine and SI joints.  Osteopenia (thinning bones) noted.  I recommend follow-up with orthopedics and physical therapy, these referrals were placed.  See additional information below regarding osteopenia.    Osteopenia:  You have osteopenia (thinning of the bones) but not yet osteoporosis.  Recommend supplementing with calcium (1.2 grams/day) and vitamin D (800 units/day).  You are encouraged to try and get regular weight bearing (i.e. Walking, jogging) exercise as well.    Here are my comments about the recent results.  Your sodium has normalized.  Your electrolytes and kidney function are normal.  Your BNP lab is slightly elevated, I recommend scheduling a follow-up with your primary care provider and follow-up with cardiology.  I placed a referral for cardiology.    Hip arthritis.  I recommend follow-up with orthopedics, a referral was placed.  I recommend follow-up physical therapy    Elevated BNP:  Recommend follow-up with cardiology, referral was placed.  Recommend schedule follow-up with PCP    Low sodium/hyponatremia:  Recommend rechecking labs today  Your sodium has normalized  Follow-up with any new, worsening, or persistent symptoms    Left hip pain:  I recommend left hip x-ray today  If symptoms worsen or fail to improve, I recommend ER evaluation if you are unable to walk or have worsening pain  Ice as needed  Follow up with physical therapy  Lidocaine patches for 12 hours on, 12 hours off  Tylenol as needed  Avoid excessive use of ibuprofen and Aleve  Diclofenac gel 4 times a day scheduled  Heat pack for 10 minutes followed by exercises 4-5 times a day  Follow up with any new, worsening, or persistent symptoms  Go to the ER in the case of an  emergency    Hip Bursitis: Care Instructions  Overview     Bursitis is inflammation of the bursa. A bursa is a small sac of fluid that cushions a joint and helps it move easily. A bursa sits between a bone in the hip and the muscles and tendons in the thigh and buttock. Injury or overuse of the hip can cause bursitis. Activities that can lead to bursitis include twisting and rapid joint movement. Bursitis can cause hip pain.  Bursitis usually gets better if you avoid the activity that caused it. If pain lasts or gets worse despite home treatment, your doctor may draw fluid from the bursa through a needle. This may relieve your pain and help your doctor know if you have an infection. If so, your doctor will prescribe antibiotics. If you have inflammation only, you may get a corticosteroid shot to reduce swelling and pain. Sometimes surgery is needed to drain or remove the bursa.  Follow-up care is a key part of your treatment and safety. Be sure to make and go to all appointments, and call your doctor if you are having problems. It's also a good idea to know your test results and keep a list of the medicines you take.  How can you care for yourself at home?  Put ice or a cold pack on your hip for 10 to 20 minutes at a time. Put a thin cloth between the ice and your skin.  After 3 days of using ice, you may use heat on your hip. You can use a hot water bottle, a heating pad set on low, or a warm, moist towel.  Rest your hip. Stop any activities that cause pain. Switch to activities that do not stress your hip.  Take your medicines exactly as prescribed. Call your doctor if you think you are having a problem with your medicine.  Ask your doctor if you can take an over-the-counter pain medicine, such as acetaminophen (Tylenol), ibuprofen (Advil, Motrin), or naproxen (Aleve). Be safe with medicines. Read and follow all instructions on the label.  To prevent stiffness, gently move the hip joint as much as you can without  pain every day. As the pain gets better, keep doing range-of-motion exercises. Ask your doctor for exercises that will make the muscles around the hip joint stronger. Do these as directed.  You can slowly return to the activity that caused the pain, but do it with less effort until you can do it without pain or swelling. Be sure to warm up before and stretch after you do the activity.  When should you call for help?   Call your doctor now or seek immediate medical care if:    You have a fever.     You have increased swelling or redness in your hip.     You cannot use your hip, or the pain in your hip gets worse.   Watch closely for changes in your health, and be sure to contact your doctor if:    You have pain for 2 weeks or longer despite home treatment.       Trochanteric Bursitis: Exercises  Introduction  Here are some examples of exercises for you to try. The exercises may be suggested for a condition or for rehabilitation. Start each exercise slowly. Ease off the exercises if you start to have pain.  You will be told when to start these exercises and which ones will work best for you.  How to do the exercises  Hamstring wall stretch    Lie on your back in a doorway, with your good leg through the open door.  Slide your affected leg up the wall to straighten your knee. You should feel a gentle stretch down the back of your leg.  Hold the stretch for at least 1 minute to begin. Then try to lengthen the time you hold the stretch to as long as 6 minutes.  Repeat 2 to 4 times.  If you do not have a place to do this exercise in a doorway, there is another way to do it:  Lie on your back, and bend the knee of your affected leg.  Loop a towel under the ball and toes of that foot, and hold the ends of the towel in your hands.  Straighten your knee, and slowly pull back on the towel. You should feel a gentle stretch down the back of your leg.  Hold the stretch for 15 to 30 seconds. Or even better, hold the stretch for 1  minute if you can.  Repeat 2 to 4 times.  Do not arch your back.  Do not bend either knee.  Keep one heel touching the floor and the other heel touching the wall. Do not point your toes.  Straight-leg raises to the outside    Lie on your side, with your affected leg on top.  Tighten the front thigh muscles of your top leg to keep your knee straight.  Keep your hip and your leg straight in line with the rest of your body, and keep your knee pointing forward. Do not drop your hip back.  Lift your top leg straight up toward the ceiling, about 12 inches off the floor. Hold for about 6 seconds, then slowly lower your leg.  Repeat 8 to 12 times.  Clamshell for hip problems    Lie on your side, with your affected leg on top and your head propped on a pillow. Keep your feet and knees together and your knees bent.  Raise your top knee, but keep your feet together. Do not let your hips roll back. Your legs should open up like a clamshell.  Hold for about 6 seconds.  Slowly lower your knee back down.  Repeat 8 to 12 times.  Standing quadriceps stretch    If you are not steady on your feet, hold on to a chair, counter, or wall. You can also lie on your stomach or your side to do this exercise.  Bend the knee of the leg you want to stretch, and reach behind you to grab the front of your foot or ankle with the hand on the same side. For example, if you are stretching your right leg, use your right hand.  Keeping your knees next to each other, pull your foot toward your buttock until you feel a gentle stretch across the front of your hip and down the front of your thigh. Your knee should be pointed directly to the ground, and not out to the side.  Hold the stretch for 15 to 30 seconds.  Repeat 2 to 4 times.  Piriformis stretch    Lie on your back with your legs straight.  Lift your affected leg and bend your knee. With your opposite hand, reach across your body, and then gently pull your knee toward your opposite shoulder.  Hold  the stretch for 15 to 30 seconds.  Repeat 2 to 4 times.  Double knee-to-chest    Lie on your back with your knees bent and your feet flat on the floor. You can put a small pillow under your head and neck if it is more comfortable.  Bring both knees to your chest.  Keep your lower back pressed to the floor. Hold for 15 to 30 seconds.  Relax, and lower your knees to the starting position.  Repeat 2 to 4 times.    Hip Bursitis: Exercises  Introduction  Here are some examples of exercises for you to try. The exercises may be suggested for a condition or for rehabilitation. Start each exercise slowly. Ease off the exercises if you start to have pain.  You will be told when to start these exercises and which ones will work best for you.  How to do the exercises  Hip external rotator stretch    Lie on your back with both knees bent and your feet flat on the floor.  Put the ankle of your affected leg on your opposite thigh near your knee.  Use your opposite hand to gently pull your knee across your body toward your shoulder. For example, to stretch your left hip, use your right hand to pull your left knee toward your right shoulder.  Hold the stretch for 15 to 30 seconds.  Repeat 2 to 4 times.  It's a good idea to repeat these steps with your other leg.  Iliotibial band stretch    Lean sideways against a wall. If you are not steady on your feet, hold on to a chair or counter.  Stand on the leg with the affected hip, with that leg close to the wall. Then cross your other leg in front of it.  Let your affected hip drop out to the side of your body and against wall. Then lean away from your affected hip until you feel a stretch.  Hold the stretch for 15 to 30 seconds.  Repeat 2 to 4 times.  Straight-leg raises to the outside    Lie on your side, with your affected hip on top.  Tighten the front thigh muscles of your top leg to keep your knee straight.  Keep your hip and your leg straight in line with the rest of your body, and  keep your knee pointing forward. Do not drop your hip back.  Lift your top leg straight up toward the ceiling, about 12 inches off the floor. Hold for about 6 seconds, then slowly lower your leg.  Repeat 8 to 12 times.  Clamshell    Lie on your side, with your affected hip on top and your head propped on a pillow. Keep your feet and knees together and your knees bent.  Raise your top knee, but keep your feet together. Do not let your hips roll back. Your legs should open up like a clamshell.  Hold for 6 seconds.  Slowly lower your knee back down. Rest for 10 seconds.  Repeat 8 to 12 times.        Patient understood and verbally consented to the treatment plan. Discussed symptoms that would warrant an urgent or emergent visit. All of the patients' questions were answered. Patient was instructed to contact the clinic if questions or concerns arise. Recommend follow up appointments if symptoms worsen or fail to improve. Recommend follow up as needed. Recommend ER in the case of an emergency.    Ivon Brock PA-C    Please note: Voice recognition software may have been used in preparing this note, unintended word substitutions may be present.

## 2024-03-11 ENCOUNTER — OFFICE VISIT (OUTPATIENT)
Dept: CARDIOLOGY | Facility: CLINIC | Age: 69
End: 2024-03-11
Payer: COMMERCIAL

## 2024-03-11 VITALS
OXYGEN SATURATION: 98 % | DIASTOLIC BLOOD PRESSURE: 84 MMHG | SYSTOLIC BLOOD PRESSURE: 138 MMHG | HEART RATE: 90 BPM | BODY MASS INDEX: 20.99 KG/M2 | HEIGHT: 68 IN | WEIGHT: 138.5 LBS

## 2024-03-11 DIAGNOSIS — R79.89 ELEVATED BRAIN NATRIURETIC PEPTIDE (BNP) LEVEL: ICD-10-CM

## 2024-03-11 DIAGNOSIS — Z95.2 S/P AVR: ICD-10-CM

## 2024-03-11 DIAGNOSIS — I35.1 NONRHEUMATIC AORTIC VALVE INSUFFICIENCY: ICD-10-CM

## 2024-03-11 PROCEDURE — 99214 OFFICE O/P EST MOD 30 MIN: CPT | Performed by: INTERNAL MEDICINE

## 2024-03-11 RX ORDER — ASCORBIC ACID 250 MG
250 TABLET,CHEWABLE ORAL DAILY
COMMUNITY

## 2024-03-11 RX ORDER — FERROUS SULFATE 325(65) MG
325 TABLET ORAL
COMMUNITY

## 2024-03-11 ASSESSMENT — PAIN SCALES - GENERAL: PAINLEVEL: EXTREME PAIN (8)

## 2024-03-11 NOTE — LETTER
3/11/2024    Thony Gongora MD  [address] [place] [address]    RE: Miguel Pappas       Dear Colleague,     I had the pleasure of seeing Miguel Pappas in the [facility].  HISTORY:    Miguel Pappas is a pleasant 68-year-old gentleman accompanied by his wife today.  He has a history of aortic stenosis with surgical bioprosthetic (bovine) valve replacement in December 2022.  He was recently seen for his annual follow-up visit and was doing well.    About a month ago Miguel developed flulike symptoms with coughing headache chills and feeling very tired.  He states that he was not really nauseated but he was kind of nauseated.  Drink lots of extra water but had very slow recovery.  A proBNP was checked and was mildly elevated at 996 with normal being up to 900.  Was asked to see me for this reason.    An echocardiogram done in mid January (2 months ago) showed normal LV function with mild periprosthetic aortic insufficiency.    Today Miguel reports that he feels that he is basically back to normal except that he has severe hip pain.  This was rather sudden in onset as he was climbing off of his front .  He is seeing orthopedics about this in the near future.  He denies exertional chest pain, dyspnea on exertion, PND/orthopnea, syncope or near syncope, or palpitations.  He states that he feels that his energy and stamina are similar to what they were 3 to 4 months ago now.      ASSESSMENT/PLAN:    1.  History of bioprosthetic AVR 15 months ago.  Cardiac function and valve function are within normal limits.  2.  Recent likely viral infection with flulike symptoms.  His sodium dropped likely because he took a lot of extra water so it was probably delusional.  His proBNP was mildly elevated.  This is pretty meaningless in the setting and does not require further evaluation.  His exam today is normal.  3.  Severe hip pain.  Seeing orthopedics in the near future.  If hip  replacement is necessary can safely undergo without cardiac concerns.    Thank you for inviting me to participate in the care of your patient.  Please don't hesitate to call if I can be of further assistance.  33 minutes were spent today reviewing the chart and other records, interviewing and examining the patient, and documenting our visit.    Chart documentation was completed, in part, with [employer] voice-recognition software. Even though reviewed, some grammatical, spelling, and word errors may remain.       No orders of the defined types were placed in this encounter.    Orders Placed This Encounter   Medications    ferrous sulfate (FEROSUL) 325 (65 Fe) MG tablet     Sig: Take 325 mg by mouth daily (with breakfast)    ascorbic acid (VITAMIN C) 250 MG CHEW chewable tablet     Sig: Take 250 mg by mouth daily     There are no discontinued medications.    10 year ASCVD risk: The 10-year ASCVD risk score (Bella FAIRCHILD, et al., 2019) is: 16.4%    Values used to calculate the score:      Age: 68 years      Sex: Male      Is Non- : No      Diabetic: No      Tobacco smoker: No      Systolic Blood Pressure: 138 mmHg      Is BP treated: No      HDL Cholesterol: 84 mg/dL      Total Cholesterol: 203 mg/dL    Encounter Diagnoses   Name Primary?    Elevated brain natriuretic peptide (BNP) level     S/P AVR- bioprosthetic     Nonrheumatic aortic valve insufficiency        CURRENT MEDICATIONS:  Current Outpatient Medications   Medication Sig Dispense Refill    ascorbic acid (VITAMIN C) 250 MG CHEW chewable tablet Take 250 mg by mouth daily      aspirin 81 MG tablet Take 81 mg by mouth daily      ferrous sulfate (FEROSUL) 325 (65 Fe) MG tablet Take 325 mg by mouth daily (with breakfast)      GLUCOSAMINE CHONDROITIN OR TABS Take 1 tablet by mouth daily  0    Multiple Vitamins-Minerals (CENTRUM SILVER PO) Take 1 tablet by mouth daily 30 0       ALLERGIES     Allergies   Allergen Reactions    No Known Drug Allergy         PAST MEDICAL HISTORY:  Past Medical History:   Diagnosis Date    * * * SBE PROPHYLAXIS * * *     no longer indicated - 2007 AHA guidelnies    Mitral valve stenosis and aortic valve stenosis     2/6 murmur    Pure hypercholesterolemia 1/26/2007       PAST SURGICAL HISTORY:  Past Surgical History:   Procedure Laterality Date    COLONOSCOPY N/A 12/5/2014    Procedure: COLONOSCOPY;  Surgeon: Omar Cihsholm MD;  Location:  GI    CV CORONARY ANGIOGRAM N/A 11/15/2022    Procedure: Coronary Angiogram;  Surgeon: Edson Nava MD;  Location:  HEART CARDIAC CATH LAB    HC REMOVAL OF TONSILS,<11 Y/O      Tonsils <10y.o.    REPLACE VALVE AORTIC N/A 12/16/2022    Procedure: AORTIC VALVE REPLACEMENT WITH 21MM INSPIRIS RESILIA VALVE, PLACEMENT OF TEMPORARY VENTRICULAR AND ATRIAL PACING WIRES AND TRANSESPHOGEAL ECHOCARDIOGRAM;  Surgeon: Kaila Abbott MD;  Location: [facility] COLONOSCOPY W/WO BRUSH/WASH  2/8/2005        FAMILY HISTORY:  Family History   Problem Relation Age of Onset    Cancer Father         Spleen cancer    Hypertension Father     Allergies Father         PCN    Gastrointestinal Disease Father         ulcer    Heart Disease Father         Hx: meds and angioplasty    Hypertension Brother     Cerebrovascular Disease Paternal Grandfather     Arthritis Paternal Grandfather     Arthritis Mother     Hypertension Mother     Allergies Son         Dust , Mold    Depression Sister         X 2       SOCIAL HISTORY:  Social History     Socioeconomic History    Marital status:      Spouse name: Fabian    Number of children: 3    Years of education: 12   Occupational History    Occupation:    Tobacco Use    Smoking status: Never     Passive exposure: Never    Smokeless tobacco: Never   Vaping Use    Vaping Use: Never used   Substance and Sexual Activity    Alcohol use: Yes     Comment: 1-2 beer monthly  maybe    Drug use: No    Sexual activity: Yes     Partners: Female      Comment: Hx: wife had tubal ligation   Other Topics Concern     Service Yes     Comment: [employer]    Blood Transfusions No    Caffeine Concern No    Occupational Exposure No    Hobby Hazards No    Sleep Concern No    Stress Concern No    Weight Concern No    Special Diet Yes     Comment: Low fat and cholesterol high fiber    Back Care Yes     Comment: Hx: chiropractor for back inj and knee    Exercise Yes     Comment: every day    Bike Helmet No    Seat Belt Yes   Social History Narrative    Lives with spouse.     Social Determinants of Health     Financial Resource Strain: Low Risk  (11/30/2023)    Financial Resource Strain     Within the past 12 months, have you or your family members you live with been unable to get utilities (heat, electricity) when it was really needed?: No   Food Insecurity: Low Risk  (11/30/2023)    Food Insecurity     Within the past 12 months, did you worry that your food would run out before you got money to buy more?: No     Within the past 12 months, did the food you bought just not last and you didn t have money to get more?: No   Transportation Needs: Low Risk  (11/30/2023)    Transportation Needs     Within the past 12 months, has lack of transportation kept you from medical appointments, getting your medicines, non-medical meetings or appointments, work, or from getting things that you need?: No   Housing Stability: Low Risk  (11/30/2023)    Housing Stability     Do you have housing? : Yes     Are you worried about losing your housing?: No       Review of Systems:  Skin:  Negative     Eyes:  Negative    ENT:  Negative    Respiratory:  Negative    Cardiovascular:  Negative for;palpitations;chest pain;edema;lightheadedness;dizziness    Gastroenterology: Negative    Genitourinary:  Negative    Musculoskeletal:  Positive for joint pain  Neurologic:  Negative    Psychiatric:  Negative    Heme/Lymph/Imm:  Negative    Endocrine:  Negative      Physical Exam:  Vitals: /84  "(BP Location: Right arm, Patient Position: Sitting, Cuff Size: Adult Regular)   Pulse 90   Ht 1.727 m (5' 7.99\")   Wt 62.8 kg (138 lb 8 oz)   SpO2 98%   BMI 21.06 kg/m      Constitutional:  cooperative, alert and oriented, well developed, well nourished, in no acute distress        Skin:  warm and dry to the touch, no apparent skin lesions or masses noted surgical scars well-healed      Head:  normocephalic, no masses or lesions        Eyes:  pupils equal and round, conjunctivae and lids unremarkable, sclera white, no xanthalasma, EOMS intact, no nystagmus        ENT:  no pallor or cyanosis, dentition good        Neck:  carotid pulses are full and equal bilaterally, JVP normal, no carotid bruit        Chest:  normal breath sounds, clear to auscultation, normal A-P diameter, normal symmetry, normal respiratory excursion, no use of accessory muscles        Cardiac: regular rhythm;normal S1 and S2;no S3 or S4;apical impulse not displaced       systolic ejection murmur;grade 2;RUSB          Abdomen:  abdomen soft;BS normoactive        Vascular: pulses full and equal                                      Extremities and Muscular Skeletal:  no edema           Neurological:  no gross motor deficits        Psych:  affect appropriate, oriented to time, person and place     Recent Lab Results:  LIPID RESULTS:  Lab Results   Component Value Date    CHOL 203 (H) 11/30/2023    CHOL 209 (H) 10/26/2018    HDL 84 11/30/2023    HDL 85 10/26/2018     (H) 11/30/2023     (H) 10/26/2018    TRIG 57 11/30/2023    TRIG 46 10/26/2018    CHOLHDLRATIO 2.4 11/14/2014       LIVER ENZYME RESULTS:  Lab Results   Component Value Date    AST 42 11/30/2023    AST 36 04/09/2009    ALT 39 11/30/2023    ALT 18 04/09/2009       CBC RESULTS:  Lab Results   Component Value Date    WBC 10.2 03/04/2024    WBC 5.9 10/26/2018    RBC 4.32 (L) 03/04/2024    RBC 4.27 (L) 10/26/2018    HGB 12.6 (L) 03/04/2024    HGB 12.8 (L) 10/26/2018    HCT " 37.2 (L) 03/04/2024    HCT 37.9 (L) 10/26/2018    MCV 86 03/04/2024    MCV 89 10/26/2018    MCH 29.2 03/04/2024    MCH 30.0 10/26/2018    MCHC 33.9 03/04/2024    MCHC 33.8 10/26/2018    RDW 13.2 03/04/2024    RDW 12.8 10/26/2018     03/04/2024     10/26/2018       BMP RESULTS:  Lab Results   Component Value Date     03/07/2024     10/26/2018    POTASSIUM 4.4 03/07/2024    POTASSIUM 3.9 12/21/2022    POTASSIUM 4.2 10/26/2018    CHLORIDE 99 03/07/2024    CHLORIDE 99 12/21/2022    CHLORIDE 105 10/26/2018    CO2 27 03/07/2024    CO2 29 12/21/2022    CO2 31 10/26/2018    ANIONGAP 9 03/07/2024    ANIONGAP 5 12/21/2022    ANIONGAP 6 10/26/2018     (H) 03/07/2024     (H) 12/21/2022    GLC 96 10/26/2018    BUN 16.9 03/07/2024    BUN 25 12/21/2022    BUN 24 10/26/2018    CR 0.85 03/07/2024    CR 1.00 10/26/2018    GFRESTIMATED >90 03/07/2024    GFRESTIMATED 75 10/26/2018    GFRESTBLACK >90 10/26/2018    RAMONA 9.1 03/07/2024    RAMONA 8.9 10/26/2018        A1C RESULTS:  Lab Results   Component Value Date    A1C 5.5 12/09/2022       INR RESULTS:  Lab Results   Component Value Date    INR 2.4 (H) 03/14/2023    INR 1.5 (H) 03/07/2023    INR 2.04 (H) 12/21/2022    INR 2.39 (H) 12/20/2022         Levi Sinclair MD, [facility]    CC  Ivon Brock PA-C  [address] [place] [address]

## 2024-03-11 NOTE — PROGRESS NOTES
HISTORY:    Miguel Pappas is a pleasant 68-year-old gentleman accompanied by his wife today.  He has a history of aortic stenosis with surgical bioprosthetic (bovine) valve replacement in December 2022.  He was recently seen for his annual follow-up visit and was doing well.    About a month ago Miguel developed flulike symptoms with coughing headache chills and feeling very tired.  He states that he was not really nauseated but he was kind of nauseated.  Drink lots of extra water but had very slow recovery.  A proBNP was checked and was mildly elevated at 996 with normal being up to 900.  Was asked to see me for this reason.    An echocardiogram done in mid January (2 months ago) showed normal LV function with mild periprosthetic aortic insufficiency.    Today Miguel reports that he feels that he is basically back to normal except that he has severe hip pain.  This was rather sudden in onset as he was climbing off of his front .  He is seeing orthopedics about this in the near future.  He denies exertional chest pain, dyspnea on exertion, PND/orthopnea, syncope or near syncope, or palpitations.  He states that he feels that his energy and stamina are similar to what they were 3 to 4 months ago now.      ASSESSMENT/PLAN:    1.  History of bioprosthetic AVR 15 months ago.  Cardiac function and valve function are within normal limits.  2.  Recent likely viral infection with flulike symptoms.  His sodium dropped likely because he took a lot of extra water so it was probably delusional.  His proBNP was mildly elevated.  This is pretty meaningless in the setting and does not require further evaluation.  His exam today is normal.  3.  Severe hip pain.  Seeing orthopedics in the near future.  If hip replacement is necessary can safely undergo without cardiac concerns.    Thank you for inviting me to participate in the care of your patient.  Please don't hesitate to call if I can be of further assistance.  33  minutes were spent today reviewing the chart and other records, interviewing and examining the patient, and documenting our visit.    Chart documentation was completed, in part, with "[employer] voice-recognition software. Even though reviewed, some grammatical, spelling, and word errors may remain.       No orders of the defined types were placed in this encounter.    Orders Placed This Encounter   Medications    ferrous sulfate (FEROSUL) 325 (65 Fe) MG tablet     Sig: Take 325 mg by mouth daily (with breakfast)    ascorbic acid (VITAMIN C) 250 MG CHEW chewable tablet     Sig: Take 250 mg by mouth daily     There are no discontinued medications.    10 year ASCVD risk: The 10-year ASCVD risk score (Bella FAIRCHILD, et al., 2019) is: 16.4%    Values used to calculate the score:      Age: 68 years      Sex: Male      Is Non- : No      Diabetic: No      Tobacco smoker: No      Systolic Blood Pressure: 138 mmHg      Is BP treated: No      HDL Cholesterol: 84 mg/dL      Total Cholesterol: 203 mg/dL    Encounter Diagnoses   Name Primary?    Elevated brain natriuretic peptide (BNP) level     S/P AVR- bioprosthetic     Nonrheumatic aortic valve insufficiency        CURRENT MEDICATIONS:  Current Outpatient Medications   Medication Sig Dispense Refill    ascorbic acid (VITAMIN C) 250 MG CHEW chewable tablet Take 250 mg by mouth daily      aspirin 81 MG tablet Take 81 mg by mouth daily      ferrous sulfate (FEROSUL) 325 (65 Fe) MG tablet Take 325 mg by mouth daily (with breakfast)      GLUCOSAMINE CHONDROITIN OR TABS Take 1 tablet by mouth daily  0    Multiple Vitamins-Minerals (CENTRUM SILVER PO) Take 1 tablet by mouth daily 30 0       ALLERGIES     Allergies   Allergen Reactions    No Known Drug Allergy        PAST MEDICAL HISTORY:  Past Medical History:   Diagnosis Date    * * * SBE PROPHYLAXIS * * *     no longer indicated - 2007 AHA guidelnies    Mitral valve stenosis and aortic valve stenosis     2/6 murmur     Pure hypercholesterolemia 1/26/2007       PAST SURGICAL HISTORY:  Past Surgical History:   Procedure Laterality Date    COLONOSCOPY N/A 12/5/2014    Procedure: COLONOSCOPY;  Surgeon: Omar Chisholm MD;  Location:  GI    CV CORONARY ANGIOGRAM N/A 11/15/2022    Procedure: Coronary Angiogram;  Surgeon: Edson Nava MD;  Location:  HEART CARDIAC CATH LAB    HC REMOVAL OF TONSILS,<11 Y/O      Tonsils <10y.o.    REPLACE VALVE AORTIC N/A 12/16/2022    Procedure: AORTIC VALVE REPLACEMENT WITH 21MM INSPIRIS RESILIA VALVE, PLACEMENT OF TEMPORARY VENTRICULAR AND ATRIAL PACING WIRES AND TRANSESPHOGEAL ECHOCARDIOGRAM;  Surgeon: Kaila Abbott MD;  Location:  OR    [facility] COLONOSCOPY W/WO BRUSH/WASH  2/8/2005        FAMILY HISTORY:  Family History   Problem Relation Age of Onset    Cancer Father         Spleen cancer    Hypertension Father     Allergies Father         PCN    Gastrointestinal Disease Father         ulcer    Heart Disease Father         Hx: meds and angioplasty    Hypertension Brother     Cerebrovascular Disease Paternal Grandfather     Arthritis Paternal Grandfather     Arthritis Mother     Hypertension Mother     Allergies Son         Dust , Mold    Depression Sister         X 2       SOCIAL HISTORY:  Social History     Socioeconomic History    Marital status:      Spouse name: Fabian    Number of children: 3    Years of education: 12   Occupational History    Occupation:    Tobacco Use    Smoking status: Never     Passive exposure: Never    Smokeless tobacco: Never   Vaping Use    Vaping Use: Never used   Substance and Sexual Activity    Alcohol use: Yes     Comment: 1-2 beer monthly  maybe    Drug use: No    Sexual activity: Yes     Partners: Female     Comment: Hx: wife had tubal ligation   Other Topics Concern     Service Yes     Comment: [employer]    Blood Transfusions No    Caffeine Concern No    Occupational Exposure No    Hobby Hazards No     "Sleep Concern No    Stress Concern No    Weight Concern No    Special Diet Yes     Comment: Low fat and cholesterol high fiber    Back Care Yes     Comment: Hx: chiropractor for back inj and knee    Exercise Yes     Comment: every day    Bike Helmet No    Seat Belt Yes   Social History Narrative    Lives with spouse.     Social Determinants of Health     Financial Resource Strain: Low Risk  (11/30/2023)    Financial Resource Strain     Within the past 12 months, have you or your family members you live with been unable to get utilities (heat, electricity) when it was really needed?: No   Food Insecurity: Low Risk  (11/30/2023)    Food Insecurity     Within the past 12 months, did you worry that your food would run out before you got money to buy more?: No     Within the past 12 months, did the food you bought just not last and you didn t have money to get more?: No   Transportation Needs: Low Risk  (11/30/2023)    Transportation Needs     Within the past 12 months, has lack of transportation kept you from medical appointments, getting your medicines, non-medical meetings or appointments, work, or from getting things that you need?: No   Housing Stability: Low Risk  (11/30/2023)    Housing Stability     Do you have housing? : Yes     Are you worried about losing your housing?: No       Review of Systems:  Skin:  Negative     Eyes:  Negative    ENT:  Negative    Respiratory:  Negative    Cardiovascular:  Negative for;palpitations;chest pain;edema;lightheadedness;dizziness    Gastroenterology: Negative    Genitourinary:  Negative    Musculoskeletal:  Positive for joint pain  Neurologic:  Negative    Psychiatric:  Negative    Heme/Lymph/Imm:  Negative    Endocrine:  Negative      Physical Exam:  Vitals: /84 (BP Location: Right arm, Patient Position: Sitting, Cuff Size: Adult Regular)   Pulse 90   Ht 1.727 m (5' 7.99\")   Wt 62.8 kg (138 lb 8 oz)   SpO2 98%   BMI 21.06 kg/m      Constitutional:  cooperative, " alert and oriented, well developed, well nourished, in no acute distress        Skin:  warm and dry to the touch, no apparent skin lesions or masses noted surgical scars well-healed      Head:  normocephalic, no masses or lesions        Eyes:  pupils equal and round, conjunctivae and lids unremarkable, sclera white, no xanthalasma, EOMS intact, no nystagmus        ENT:  no pallor or cyanosis, dentition good        Neck:  carotid pulses are full and equal bilaterally, JVP normal, no carotid bruit        Chest:  normal breath sounds, clear to auscultation, normal A-P diameter, normal symmetry, normal respiratory excursion, no use of accessory muscles        Cardiac: regular rhythm;normal S1 and S2;no S3 or S4;apical impulse not displaced       systolic ejection murmur;grade 2;RUSB          Abdomen:  abdomen soft;BS normoactive        Vascular: pulses full and equal                                      Extremities and Muscular Skeletal:  no edema           Neurological:  no gross motor deficits        Psych:  affect appropriate, oriented to time, person and place     Recent Lab Results:  LIPID RESULTS:  Lab Results   Component Value Date    CHOL 203 (H) 11/30/2023    CHOL 209 (H) 10/26/2018    HDL 84 11/30/2023    HDL 85 10/26/2018     (H) 11/30/2023     (H) 10/26/2018    TRIG 57 11/30/2023    TRIG 46 10/26/2018    CHOLHDLRATIO 2.4 11/14/2014       LIVER ENZYME RESULTS:  Lab Results   Component Value Date    AST 42 11/30/2023    AST 36 04/09/2009    ALT 39 11/30/2023    ALT 18 04/09/2009       CBC RESULTS:  Lab Results   Component Value Date    WBC 10.2 03/04/2024    WBC 5.9 10/26/2018    RBC 4.32 (L) 03/04/2024    RBC 4.27 (L) 10/26/2018    HGB 12.6 (L) 03/04/2024    HGB 12.8 (L) 10/26/2018    HCT 37.2 (L) 03/04/2024    HCT 37.9 (L) 10/26/2018    MCV 86 03/04/2024    MCV 89 10/26/2018    MCH 29.2 03/04/2024    MCH 30.0 10/26/2018    MCHC 33.9 03/04/2024    MCHC 33.8 10/26/2018    RDW 13.2 03/04/2024     RDW 12.8 10/26/2018     03/04/2024     10/26/2018       BMP RESULTS:  Lab Results   Component Value Date     03/07/2024     10/26/2018    POTASSIUM 4.4 03/07/2024    POTASSIUM 3.9 12/21/2022    POTASSIUM 4.2 10/26/2018    CHLORIDE 99 03/07/2024    CHLORIDE 99 12/21/2022    CHLORIDE 105 10/26/2018    CO2 27 03/07/2024    CO2 29 12/21/2022    CO2 31 10/26/2018    ANIONGAP 9 03/07/2024    ANIONGAP 5 12/21/2022    ANIONGAP 6 10/26/2018     (H) 03/07/2024     (H) 12/21/2022    GLC 96 10/26/2018    BUN 16.9 03/07/2024    BUN 25 12/21/2022    BUN 24 10/26/2018    CR 0.85 03/07/2024    CR 1.00 10/26/2018    GFRESTIMATED >90 03/07/2024    GFRESTIMATED 75 10/26/2018    GFRESTBLACK >90 10/26/2018    RAMONA 9.1 03/07/2024    RAMONA 8.9 10/26/2018        A1C RESULTS:  Lab Results   Component Value Date    A1C 5.5 12/09/2022       INR RESULTS:  Lab Results   Component Value Date    INR 2.4 (H) 03/14/2023    INR 1.5 (H) 03/07/2023    INR 2.04 (H) 12/21/2022    INR 2.39 (H) 12/20/2022         Levi Sinclair MD, FACC    CC  Ivon Brock PA-C  [address] [place] [address]

## 2024-03-14 ENCOUNTER — APPOINTMENT (OUTPATIENT)
Dept: MRI IMAGING | Facility: CLINIC | Age: 69
DRG: 070 | End: 2024-03-14
Attending: STUDENT IN AN ORGANIZED HEALTH CARE EDUCATION/TRAINING PROGRAM
Payer: COMMERCIAL

## 2024-03-14 ENCOUNTER — APPOINTMENT (OUTPATIENT)
Dept: CT IMAGING | Facility: CLINIC | Age: 69
DRG: 070 | End: 2024-03-14
Attending: STUDENT IN AN ORGANIZED HEALTH CARE EDUCATION/TRAINING PROGRAM
Payer: COMMERCIAL

## 2024-03-14 ENCOUNTER — OFFICE VISIT (OUTPATIENT)
Dept: ORTHOPEDICS | Facility: CLINIC | Age: 69
End: 2024-03-14
Payer: COMMERCIAL

## 2024-03-14 ENCOUNTER — APPOINTMENT (OUTPATIENT)
Dept: GENERAL RADIOLOGY | Facility: CLINIC | Age: 69
DRG: 070 | End: 2024-03-14
Attending: STUDENT IN AN ORGANIZED HEALTH CARE EDUCATION/TRAINING PROGRAM
Payer: COMMERCIAL

## 2024-03-14 ENCOUNTER — HOSPITAL ENCOUNTER (INPATIENT)
Facility: CLINIC | Age: 69
LOS: 2 days | Discharge: ANOTHER HOSPITAL | DRG: 070 | End: 2024-03-16
Attending: STUDENT IN AN ORGANIZED HEALTH CARE EDUCATION/TRAINING PROGRAM | Admitting: INTERNAL MEDICINE
Payer: COMMERCIAL

## 2024-03-14 ENCOUNTER — THERAPY VISIT (OUTPATIENT)
Dept: PHYSICAL THERAPY | Facility: CLINIC | Age: 69
End: 2024-03-14
Payer: COMMERCIAL

## 2024-03-14 VITALS
RESPIRATION RATE: 18 BRPM | WEIGHT: 138 LBS | SYSTOLIC BLOOD PRESSURE: 113 MMHG | HEART RATE: 89 BPM | BODY MASS INDEX: 20.92 KG/M2 | HEIGHT: 68 IN | DIASTOLIC BLOOD PRESSURE: 70 MMHG

## 2024-03-14 DIAGNOSIS — E87.1 HYPONATREMIA: ICD-10-CM

## 2024-03-14 DIAGNOSIS — R41.0 CONFUSION: Primary | ICD-10-CM

## 2024-03-14 DIAGNOSIS — R47.89 WORD FINDING DIFFICULTY: ICD-10-CM

## 2024-03-14 DIAGNOSIS — M25.552 HIP PAIN, LEFT: ICD-10-CM

## 2024-03-14 DIAGNOSIS — D72.829 LEUKOCYTOSIS, UNSPECIFIED TYPE: ICD-10-CM

## 2024-03-14 DIAGNOSIS — M16.12 PRIMARY OSTEOARTHRITIS OF LEFT HIP: Primary | ICD-10-CM

## 2024-03-14 LAB
ALBUMIN UR-MCNC: NEGATIVE MG/DL
ANION GAP SERPL CALCULATED.3IONS-SCNC: 12 MMOL/L (ref 7–15)
APPEARANCE UR: CLEAR
APTT PPP: 31 SECONDS (ref 22–38)
BASOPHILS # BLD MANUAL: 0 10E3/UL (ref 0–0.2)
BASOPHILS NFR BLD MANUAL: 0 %
BILIRUB UR QL STRIP: NEGATIVE
BUN SERPL-MCNC: 19.6 MG/DL (ref 8–23)
CALCIUM SERPL-MCNC: 9.2 MG/DL (ref 8.8–10.2)
CHLORIDE SERPL-SCNC: 93 MMOL/L (ref 98–107)
COLOR UR AUTO: ABNORMAL
CREAT SERPL-MCNC: 0.9 MG/DL (ref 0.67–1.17)
DEPRECATED HCO3 PLAS-SCNC: 24 MMOL/L (ref 22–29)
EGFRCR SERPLBLD CKD-EPI 2021: >90 ML/MIN/1.73M2
EOSINOPHIL # BLD MANUAL: 0 10E3/UL (ref 0–0.7)
EOSINOPHIL NFR BLD MANUAL: 0 %
ERYTHROCYTE [DISTWIDTH] IN BLOOD BY AUTOMATED COUNT: 13.4 % (ref 10–15)
ETHANOL SERPL-MCNC: <0.01 G/DL
FLUAV RNA SPEC QL NAA+PROBE: NEGATIVE
FLUBV RNA RESP QL NAA+PROBE: NEGATIVE
GLUCOSE BLDC GLUCOMTR-MCNC: 107 MG/DL (ref 70–99)
GLUCOSE SERPL-MCNC: 110 MG/DL (ref 70–99)
GLUCOSE UR STRIP-MCNC: NEGATIVE MG/DL
HBA1C MFR BLD: 5.8 %
HCT VFR BLD AUTO: 34.1 % (ref 40–53)
HGB BLD-MCNC: 11.8 G/DL (ref 13.3–17.7)
HGB UR QL STRIP: NEGATIVE
INR PPP: 1.08 (ref 0.85–1.15)
KETONES UR STRIP-MCNC: 5 MG/DL
LEUKOCYTE ESTERASE UR QL STRIP: NEGATIVE
LYMPHOCYTES # BLD MANUAL: 1.5 10E3/UL (ref 0.8–5.3)
LYMPHOCYTES NFR BLD MANUAL: 8 %
MCH RBC QN AUTO: 28.8 PG (ref 26.5–33)
MCHC RBC AUTO-ENTMCNC: 34.6 G/DL (ref 31.5–36.5)
MCV RBC AUTO: 83 FL (ref 78–100)
MONOCYTES # BLD MANUAL: 1.3 10E3/UL (ref 0–1.3)
MONOCYTES NFR BLD MANUAL: 7 %
NEUTROPHILS # BLD MANUAL: 16.3 10E3/UL (ref 1.6–8.3)
NEUTROPHILS NFR BLD MANUAL: 85 %
NITRATE UR QL: NEGATIVE
NRBC # BLD AUTO: 0 10E3/UL
NRBC BLD AUTO-RTO: 0 /100
PH UR STRIP: 7 [PH] (ref 5–7)
PLAT MORPH BLD: ABNORMAL
PLATELET # BLD AUTO: 227 10E3/UL (ref 150–450)
POTASSIUM SERPL-SCNC: 4.5 MMOL/L (ref 3.4–5.3)
RBC # BLD AUTO: 4.1 10E6/UL (ref 4.4–5.9)
RBC MORPH BLD: ABNORMAL
RBC URINE: 1 /HPF
RSV RNA SPEC NAA+PROBE: NEGATIVE
SARS-COV-2 RNA RESP QL NAA+PROBE: NEGATIVE
SODIUM SERPL-SCNC: 129 MMOL/L (ref 135–145)
SODIUM UR-SCNC: 20 MMOL/L
SP GR UR STRIP: 1.02 (ref 1–1.03)
TROPONIN T SERPL HS-MCNC: 12 NG/L
TSH SERPL DL<=0.005 MIU/L-ACNC: 2.84 UIU/ML (ref 0.3–4.2)
UROBILINOGEN UR STRIP-MCNC: NORMAL MG/DL
WBC # BLD AUTO: 19.2 10E3/UL (ref 4–11)
WBC URINE: 0 /HPF

## 2024-03-14 PROCEDURE — 99203 OFFICE O/P NEW LOW 30 MIN: CPT | Performed by: ORTHOPAEDIC SURGERY

## 2024-03-14 PROCEDURE — 71045 X-RAY EXAM CHEST 1 VIEW: CPT

## 2024-03-14 PROCEDURE — 97161 PT EVAL LOW COMPLEX 20 MIN: CPT | Mod: GP | Performed by: PHYSICAL THERAPIST

## 2024-03-14 PROCEDURE — 83935 ASSAY OF URINE OSMOLALITY: CPT | Performed by: INTERNAL MEDICINE

## 2024-03-14 PROCEDURE — 99291 CRITICAL CARE FIRST HOUR: CPT | Mod: 25

## 2024-03-14 PROCEDURE — 97110 THERAPEUTIC EXERCISES: CPT | Mod: GP | Performed by: PHYSICAL THERAPIST

## 2024-03-14 PROCEDURE — 83930 ASSAY OF BLOOD OSMOLALITY: CPT | Performed by: INTERNAL MEDICINE

## 2024-03-14 PROCEDURE — 70450 CT HEAD/BRAIN W/O DYE: CPT

## 2024-03-14 PROCEDURE — 83036 HEMOGLOBIN GLYCOSYLATED A1C: CPT | Performed by: INTERNAL MEDICINE

## 2024-03-14 PROCEDURE — 84300 ASSAY OF URINE SODIUM: CPT | Performed by: INTERNAL MEDICINE

## 2024-03-14 PROCEDURE — 85025 COMPLETE CBC W/AUTO DIFF WBC: CPT | Performed by: STUDENT IN AN ORGANIZED HEALTH CARE EDUCATION/TRAINING PROGRAM

## 2024-03-14 PROCEDURE — 255N000002 HC RX 255 OP 636: Performed by: STUDENT IN AN ORGANIZED HEALTH CARE EDUCATION/TRAINING PROGRAM

## 2024-03-14 PROCEDURE — 93005 ELECTROCARDIOGRAM TRACING: CPT

## 2024-03-14 PROCEDURE — 81001 URINALYSIS AUTO W/SCOPE: CPT | Performed by: STUDENT IN AN ORGANIZED HEALTH CARE EDUCATION/TRAINING PROGRAM

## 2024-03-14 PROCEDURE — 82962 GLUCOSE BLOOD TEST: CPT

## 2024-03-14 PROCEDURE — 87637 SARSCOV2&INF A&B&RSV AMP PRB: CPT | Performed by: STUDENT IN AN ORGANIZED HEALTH CARE EDUCATION/TRAINING PROGRAM

## 2024-03-14 PROCEDURE — 85007 BL SMEAR W/DIFF WBC COUNT: CPT | Performed by: STUDENT IN AN ORGANIZED HEALTH CARE EDUCATION/TRAINING PROGRAM

## 2024-03-14 PROCEDURE — 99285 EMERGENCY DEPT VISIT HI MDM: CPT | Mod: 25 | Performed by: STUDENT IN AN ORGANIZED HEALTH CARE EDUCATION/TRAINING PROGRAM

## 2024-03-14 PROCEDURE — 80048 BASIC METABOLIC PNL TOTAL CA: CPT | Performed by: STUDENT IN AN ORGANIZED HEALTH CARE EDUCATION/TRAINING PROGRAM

## 2024-03-14 PROCEDURE — 99292 CRITICAL CARE ADDL 30 MIN: CPT

## 2024-03-14 PROCEDURE — 87186 SC STD MICRODIL/AGAR DIL: CPT | Performed by: STUDENT IN AN ORGANIZED HEALTH CARE EDUCATION/TRAINING PROGRAM

## 2024-03-14 PROCEDURE — A9585 GADOBUTROL INJECTION: HCPCS | Performed by: STUDENT IN AN ORGANIZED HEALTH CARE EDUCATION/TRAINING PROGRAM

## 2024-03-14 PROCEDURE — 70496 CT ANGIOGRAPHY HEAD: CPT

## 2024-03-14 PROCEDURE — 36415 COLL VENOUS BLD VENIPUNCTURE: CPT | Performed by: STUDENT IN AN ORGANIZED HEALTH CARE EDUCATION/TRAINING PROGRAM

## 2024-03-14 PROCEDURE — 83721 ASSAY OF BLOOD LIPOPROTEIN: CPT | Performed by: INTERNAL MEDICINE

## 2024-03-14 PROCEDURE — 84484 ASSAY OF TROPONIN QUANT: CPT | Performed by: STUDENT IN AN ORGANIZED HEALTH CARE EDUCATION/TRAINING PROGRAM

## 2024-03-14 PROCEDURE — 120N000001 HC R&B MED SURG/OB

## 2024-03-14 PROCEDURE — 87149 DNA/RNA DIRECT PROBE: CPT | Performed by: STUDENT IN AN ORGANIZED HEALTH CARE EDUCATION/TRAINING PROGRAM

## 2024-03-14 PROCEDURE — 70553 MRI BRAIN STEM W/O & W/DYE: CPT

## 2024-03-14 PROCEDURE — 250N000011 HC RX IP 250 OP 636: Performed by: STUDENT IN AN ORGANIZED HEALTH CARE EDUCATION/TRAINING PROGRAM

## 2024-03-14 PROCEDURE — 82077 ASSAY SPEC XCP UR&BREATH IA: CPT | Performed by: STUDENT IN AN ORGANIZED HEALTH CARE EDUCATION/TRAINING PROGRAM

## 2024-03-14 PROCEDURE — 85730 THROMBOPLASTIN TIME PARTIAL: CPT | Performed by: STUDENT IN AN ORGANIZED HEALTH CARE EDUCATION/TRAINING PROGRAM

## 2024-03-14 PROCEDURE — 85610 PROTHROMBIN TIME: CPT | Performed by: STUDENT IN AN ORGANIZED HEALTH CARE EDUCATION/TRAINING PROGRAM

## 2024-03-14 PROCEDURE — 93010 ELECTROCARDIOGRAM REPORT: CPT | Performed by: STUDENT IN AN ORGANIZED HEALTH CARE EDUCATION/TRAINING PROGRAM

## 2024-03-14 PROCEDURE — 96374 THER/PROPH/DIAG INJ IV PUSH: CPT | Mod: 59

## 2024-03-14 PROCEDURE — 84443 ASSAY THYROID STIM HORMONE: CPT | Performed by: STUDENT IN AN ORGANIZED HEALTH CARE EDUCATION/TRAINING PROGRAM

## 2024-03-14 RX ORDER — ACETAMINOPHEN 650 MG/1
650 SUPPOSITORY RECTAL EVERY 4 HOURS PRN
Status: DISCONTINUED | OUTPATIENT
Start: 2024-03-14 | End: 2024-03-16 | Stop reason: HOSPADM

## 2024-03-14 RX ORDER — ONDANSETRON 2 MG/ML
4 INJECTION INTRAMUSCULAR; INTRAVENOUS EVERY 6 HOURS PRN
Status: DISCONTINUED | OUTPATIENT
Start: 2024-03-14 | End: 2024-03-16 | Stop reason: HOSPADM

## 2024-03-14 RX ORDER — ONDANSETRON 4 MG/1
4 TABLET, ORALLY DISINTEGRATING ORAL EVERY 6 HOURS PRN
Status: DISCONTINUED | OUTPATIENT
Start: 2024-03-14 | End: 2024-03-16 | Stop reason: HOSPADM

## 2024-03-14 RX ORDER — IOPAMIDOL 755 MG/ML
100 INJECTION, SOLUTION INTRAVASCULAR ONCE
Status: COMPLETED | OUTPATIENT
Start: 2024-03-14 | End: 2024-03-14

## 2024-03-14 RX ORDER — CALCIUM CARBONATE 500 MG/1
1000 TABLET, CHEWABLE ORAL 4 TIMES DAILY PRN
Status: DISCONTINUED | OUTPATIENT
Start: 2024-03-14 | End: 2024-03-15

## 2024-03-14 RX ORDER — ACETAMINOPHEN 325 MG/1
650 TABLET ORAL EVERY 4 HOURS PRN
Status: DISCONTINUED | OUTPATIENT
Start: 2024-03-14 | End: 2024-03-16 | Stop reason: HOSPADM

## 2024-03-14 RX ORDER — GADOBUTROL 604.72 MG/ML
7.5 INJECTION INTRAVENOUS ONCE
Status: COMPLETED | OUTPATIENT
Start: 2024-03-14 | End: 2024-03-14

## 2024-03-14 RX ORDER — LIDOCAINE 40 MG/G
CREAM TOPICAL
Status: DISCONTINUED | OUTPATIENT
Start: 2024-03-14 | End: 2024-03-16

## 2024-03-14 RX ORDER — VITAMIN B COMPLEX
25 TABLET ORAL DAILY
COMMUNITY

## 2024-03-14 RX ORDER — AMOXICILLIN 250 MG
2 CAPSULE ORAL 2 TIMES DAILY PRN
Status: DISCONTINUED | OUTPATIENT
Start: 2024-03-14 | End: 2024-03-15

## 2024-03-14 RX ORDER — MORPHINE SULFATE 4 MG/ML
4 INJECTION, SOLUTION INTRAMUSCULAR; INTRAVENOUS
Status: DISCONTINUED | OUTPATIENT
Start: 2024-03-14 | End: 2024-03-15

## 2024-03-14 RX ORDER — AMOXICILLIN 250 MG
1 CAPSULE ORAL 2 TIMES DAILY PRN
Status: DISCONTINUED | OUTPATIENT
Start: 2024-03-14 | End: 2024-03-15

## 2024-03-14 RX ORDER — AMOXICILLIN 500 MG/1
2000 CAPSULE ORAL
COMMUNITY
Start: 2024-01-18 | End: 2024-06-04

## 2024-03-14 RX ORDER — SODIUM CHLORIDE 9 MG/ML
INJECTION, SOLUTION INTRAVENOUS CONTINUOUS
Status: ACTIVE | OUTPATIENT
Start: 2024-03-14 | End: 2024-03-15

## 2024-03-14 RX ADMIN — IOPAMIDOL 90 ML: 755 INJECTION, SOLUTION INTRAVENOUS at 16:41

## 2024-03-14 RX ADMIN — MORPHINE SULFATE 4 MG: 4 INJECTION, SOLUTION INTRAMUSCULAR; INTRAVENOUS at 17:46

## 2024-03-14 RX ADMIN — GADOBUTROL 7.5 ML: 604.72 INJECTION INTRAVENOUS at 20:46

## 2024-03-14 ASSESSMENT — ACTIVITIES OF DAILY LIVING (ADL)
RECREATIONAL_ACTIVITIES: UNABLE TO DO
CHANGE_IN_FUNCTIONAL_STATUS_SINCE_ONSET_OF_CURRENT_ILLNESS/INJURY: YES
HOW_WOULD_YOU_RATE_YOUR_CURRENT_LEVEL_OF_FUNCTION?: SEVERELY ABNORMAL
SPORTS_HIGHEST_POTENTIAL_SCORE: 36
DEEP_SQUATTING: UNABLE TO DO
HOS_ADL_ITEM_SCORE_TOTAL: 6
TOILETING_ISSUES: NO
ADLS_ACUITY_SCORE: 38
SPORTS_COUNT: 9
HOS_ADL_HIGHEST_POTENTIAL_SCORE: 68
ROLLING_OVER_IN_BED: EXTREME DIFFICULTY
ADL_COUNT: 17
GETTING INTO AND OUT OF AN AVERAGE CAR: EXTREME DIFFICULTY
DRESSING/BATHING_DIFFICULTY: NO
SPORTS_TOTAL_ITEM_SCORE: 0
ADL_SCORE(%): 0
GETTING_INTO_AND_OUT_OF_AN_AVERAGE_CAR: EXTREME DIFFICULTY
WALKING_INITIALLY: EXTREME DIFFICULTY
STEPPING_UP_AND_DOWN_CURBS: EXTREME DIFFICULTY
ADL_HIGHEST_POTENTIAL_SCORE: 68
HEAVY_WORK: UNABLE TO DO
TWISTING/PIVOTING ON INVOLVED LEG: UNABLE TO DO
WALKING_OR_CLIMBING_STAIRS_DIFFICULTY: NO
VISION_MANAGEMENT: READING GLASSES
SPORTS_SCORE(%): 0
LIGHT_TO_MODERATE_WORK: UNABLE TO DO
GOING_DOWN_1_FLIGHT_OF_STAIRS: EXTREME DIFFICULTY
DIFFICULTY_EATING/SWALLOWING: NO
STANDING_FOR_15_MINUTES: UNABLE TO DO
SITTING FOR 15 MINUTES: EXTREME DIFFICULTY
WERE_AUXILIARY_AIDS_OFFERED?: YES
RUNNING_ONE_MILE: UNABLE TO DO
ADLS_ACUITY_SCORE: 38
ADLS_ACUITY_SCORE: 38
PUTTING ON SOCKS AND SHOES: UNABLE TO DO
PATIENT'S_PREFERRED_MEANS_OF_COMMUNICATION: VERBAL
ADLS_ACUITY_SCORE: 38
WALKING_APPROXIMATELY_10_MINUTES: UNABLE TO DO
ROLLING OVER IN BED: EXTREME DIFFICULTY
GOING UP 1 FLIGHT OF STAIRS: EXTREME DIFFICULTY
WALKING_FOR_APPROXIMATELY_10_MINUTES: UNABLE TO DO
WALKING_UP_STEEP_HILLS: UNABLE TO DO
CONCENTRATING,_REMEMBERING_OR_MAKING_DECISIONS_DIFFICULTY: NO
LIGHT_TO_MODERATE_WORK: UNABLE TO DO
WALKING_UP_STEEP_HILLS: UNABLE TO DO
HEAVY_WORK: UNABLE TO DO
ADLS_ACUITY_SCORE: 38
ABILITY_TO_PARTICIPATE_IN_YOUR_DESIRED_SPORT_AS_LONG_AS_YOU_WOULD_LIKE: UNABLE TO DO
PUTTING_ON_SOCKS_AND_SHOES: UNABLE TO DO
TWISTING/PIVOTING_ON_INVOLVED_LEG: UNABLE TO DO
WALKING_DOWN_STEEP_HILLS: UNABLE TO DO
WEAR_GLASSES_OR_BLIND: YES
CUTTING/LATERAL_MOVEMENTS: UNABLE TO DO
STEPPING UP AND DOWN CURBS: EXTREME DIFFICULTY
FALL_HISTORY_WITHIN_LAST_SIX_MONTHS: NO
GETTING_INTO_AND_OUT_OF_A_BATHTUB: UNABLE TO DO
HOW_WOULD_YOU_RATE_YOUR_CURRENT_LEVEL_OF_FUNCTION_DURING_YOUR_SPORTS_RELATED_ACTIVITIES_FROM_0_TO_100_WITH_100_BEING_YOUR_LEVEL_OF_FUNCTION_PRIOR_TO_YOUR_HIP_PROBLEM_AND_0_BEING_THE_INABILITY_TO_PERFORM_ANY_OF_YOUR_USUAL_DAILY_ACTIVITIES?: 1
DESCRIBE_HEARING_LOSS: HEARING LOSS ON LEFT SIDE
STANDING FOR 15 MINUTES: UNABLE TO DO
STARTING_AND_STOPPING_QUICKLY: UNABLE TO DO
GETTING_INTO_AND_OUT_OF_A_BATHTUB: UNABLE TO DO
WALKING_15_MINUTES_OR_GREATER: UNABLE TO DO
JUMPING: UNABLE TO DO
THE_FOLLOWING_AIDS_WERE_PROVIDED;: PATIENT DECLINED OFFER OF AUXILIARY AIDS
PLEASE_INDICATE_YOR_PRIMARY_REASON_FOR_REFERRAL_TO_THERAPY:: HIP
ABILITY_TO_PERFORM_ACTIVITY_WITH_YOUR_NORMAL_TECHNIQUE: UNABLE TO DO
ADL_TOTAL_ITEM_SCORE: 0
SITTING_FOR_15_MINUTES: EXTREME DIFFICULTY
HOS_ADL_SCORE(%): 8.82
HEARING_DIFFICULTY_OR_DEAF: YES
LANDING: UNABLE TO DO
SWINGING_OBJECTS_LIKE_A_GOLF_CLUB: UNABLE TO DO
DEEP SQUATTING: UNABLE TO DO
WALKING_INITIALLY: EXTREME DIFFICULTY
WALKING_DOWN_STEEP_HILLS: UNABLE TO DO
ADLS_ACUITY_SCORE: 38
RECREATIONAL ACTIVITIES: UNABLE TO DO
LOW_IMPACT_ACTIVITIES_LIKE_FAST_WALKING: UNABLE TO DO
WALKING_15_MINUTES_OR_GREATER: UNABLE TO DO
HOW_WOULD_YOU_RATE_YOUR_CURRENT_LEVEL_OF_FUNCTION_DURING_YOUR_USUAL_ACTIVITIES_OF_DAILY_LIVING_FROM_0_TO_100_WITH_100_BEING_YOUR_LEVEL_OF_FUNCTION_PRIOR_TO_YOUR_HIP_PROBLEM_AND_0_BEING_THE_INABILITY_TO_PERFORM_ANY_OF_YOUR_USUAL_DAILY_ACTIVITIES?: 1
DIFFICULTY_COMMUNICATING: NO
GOING_UP_1_FLIGHT_OF_STAIRS: EXTREME DIFFICULTY
ADLS_ACUITY_SCORE: 38
HOW_WOULD_YOU_RATE_YOUR_CURRENT_LEVEL_OF_FUNCTION_DURING_YOUR_USUAL_ACTIVITIES_OF_DAILY_LIVING_FROM_0_TO_100_WITH_100_BEING_YOUR_LEVEL_OF_FUNCTION_PRIOR_TO_YOUR_HIP_PROBLEM_AND_0_BEING_THE_INABILITY_TO_PERFORM_ANY_OF_YOUR_USUAL_DAILY_ACTIVITIES?: 1
GOING DOWN 1 FLIGHT OF STAIRS: EXTREME DIFFICULTY
DOING_ERRANDS_INDEPENDENTLY_DIFFICULTY: NO

## 2024-03-14 ASSESSMENT — COLUMBIA-SUICIDE SEVERITY RATING SCALE - C-SSRS
1. IN THE PAST MONTH, HAVE YOU WISHED YOU WERE DEAD OR WISHED YOU COULD GO TO SLEEP AND NOT WAKE UP?: NO
2. HAVE YOU ACTUALLY HAD ANY THOUGHTS OF KILLING YOURSELF IN THE PAST MONTH?: NO
6. HAVE YOU EVER DONE ANYTHING, STARTED TO DO ANYTHING, OR PREPARED TO DO ANYTHING TO END YOUR LIFE?: NO

## 2024-03-14 NOTE — ED NOTES
RN in room with pt to go to CT - code stroke was de-escalated but will complete orders per provider.

## 2024-03-14 NOTE — PROGRESS NOTES
PHYSICAL THERAPY EVALUATION  Type of Visit: Evaluation    See electronic medical record for Abuse and Falls Screening details.    Subjective       Presenting condition or subjective complaint: Severe osteoarthritis in left hip, unable to function as normal. Severe pain.  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 Pt has severe difficulty in walking and bearing weight through his hip, significantly affected in his ability to sleep. Pt Xrays indicate severe L hip degeneration with bone on bone contact. Pt denies any major pain complaints prior to his recent flare up, however, does endorse that many tasks were getting harder to perform, likely d/t significant weakness.  Date of onset: 03/14/23    Relevant medical history: Heart problems   Dates & types of surgery: December 15, 2022 replaced aortic valve.    Prior diagnostic imaging/testing results: X-ray     Prior therapy history for the same diagnosis, illness or injury: No      Prior Level of Function  Transfers:   Ambulation:   ADL:   IADL:     Living Environment  Social support: With a significant other or spouse   Type of home: House; 2-story   Stairs to enter the home: Yes 4 Is there a railing: No   Ramp: No   Stairs inside the home: Yes 1 Is there a railing: Yes   Help at home: None  Equipment owned: Four-point cane; Walker; Commode     Employment: Not Applicable    Hobbies/Interests: [employer]    Patient goals for therapy: Walk, sit everything    Pain assessment: Pain present     Objective   HIP EVALUATION  PAIN: Pain Level at Rest: 5/10  Pain Level with Use: 9/10  INTEGUMENTARY (edema, incisions):   POSTURE:   GAIT:   Weightbearing Status: WBAT  Assistive Device(s): Walker (front wheeled)  Gait Deviations: Antalgic  BALANCE/PROPRIOCEPTION:   WEIGHTBEARING ALIGNMENT:   NON-WEIGHTBEARING ALIGNMENT:    ROM:     PELVIC/SI SCREEN:   STRENGTH:   LE FLEXIBILITY:   SPECIAL TESTS:   FUNCTIONAL  TESTS:   PALPATION:  Pt with no c/o pain during palpation over hip, ant or lateral  JOINT MOBILITY: Pt with limited joint mobility, reduced IR/ER, hip flex, abd, with pt noted to be guarding heavily d/t pain in L hip.    Assessment & Plan   CLINICAL IMPRESSIONS  Medical Diagnosis: Hip pain, left (M25.552)    Treatment Diagnosis: L hip pain, impaired mobility   Impression/Assessment:  Pt presents to PT with severe pain in his L hip affecting his ability to walk with normal gait and without assistive device. Pt demos significant difficulty bearing weight through his L LE as he ambulates supported by 2ww. Pt reports he has a consult appt with orthopedic MD later today to determine medical tx options. Pt would benefit from skilled PT interventions in order to address his hip pain, impaired mobility, severe weakness, and to create an HEP for home mgmt in order to improve his mobility, QOL, and limit need for surgical intervention.    Clinical Decision Making (Complexity):  Clinical Presentation: Stable/Uncomplicated  Clinical Presentation Rationale: based on medical and personal factors listed in PT evaluation  Clinical Decision Making (Complexity): Low complexity    PLAN OF CARE  Treatment Interventions:  Interventions: Gait Training, Manual Therapy, Neuromuscular Re-education, Therapeutic Activity, Therapeutic Exercise    Long Term Goals     PT Goal 1  Goal Identifier: HEP  Goal Description: Pt will demo independence in performance and progression of strengthening and balance HEP in order to improve CLOF.  Target Date:  (Ongoing, updates as needed)  PT Goal 2  Goal Identifier: HOS  Goal Description: Pt will demo improved hip function and pain as shown by increased HOS score of at least 15 points in order to show significant improvement and greater return to previous function.  Goal Progress: 6/68 (eval)  Target Date: 05/09/24  PT Goal 3  Goal Identifier: Walking  Goal Description: Pt will demo ability to walk with 2ww  at least 100' with pain rating no greater than 3/10 in order to allow pt greater access around his home and within the community.  Goal Progress: Pt rating pain about 5/10 at rest, walking with 2ww (eval)  Target Date: 05/09/24      Frequency of Treatment: 1x/week  Duration of Treatment: 8 weeks    Recommended Referrals to Other Professionals:   Education Assessment:   Learner/Method: Patient;Significant Other  Education Comments: Natural aging, pain mgmt, exercises    Risks and benefits of evaluation/treatment have been explained.   Patient/Family/caregiver agrees with Plan of Care.     Evaluation Time:     PT Sandra, Low Complexity Minutes (56481): 15       Signing Clinician: DANIKA Carrillo [facility]                                                                                   OUTPATIENT PHYSICAL THERAPY      PLAN OF TREATMENT FOR OUTPATIENT REHABILITATION   Patient's Last Name, First Name, Miguel Hernandez  PERCY YOB: 1955   Provider's Name   [facility]   Medical Record No.  [id number]     Onset Date: 03/14/23  Start of Care Date: 03/14/24     Medical Diagnosis:  Hip pain, left (M25.552)      PT Treatment Diagnosis:  L hip pain, impaired mobility Plan of Treatment  Frequency/Duration: 1x/week/ 8 weeks    Certification date from 03/14/24 to 05/09/24         See note for plan of treatment details and functional goals     Carlos Grayson, PT                         I CERTIFY THE NEED FOR THESE SERVICES FURNISHED UNDER        THIS PLAN OF TREATMENT AND WHILE UNDER MY CARE     (Physician attestation of this document indicates review and certification of the therapy plan).              Referring Provider:  Ivon Brock PA-C    Initial Assessment  See Epic Evaluation- Start of Care Date: 03/14/24

## 2024-03-14 NOTE — ED TRIAGE NOTES
Pt was in the clinic and after appt went to bathroom pt had a sudden onset of confusion, didn't know why he was in the bathroom, what he was doing. Pt still is having issue remembering anything that happened after he got into the bathroom. Pt is alert and oriented x3 but seems to be confused to situation.      Triage Assessment (Adult)       Row Name 03/14/24 4782          Triage Assessment    Airway WDL WDL        Respiratory WDL    Respiratory WDL WDL        Cardiac WDL    Cardiac WDL WDL

## 2024-03-14 NOTE — ED PROVIDER NOTES
History     Chief Complaint   Patient presents with    Altered Mental Status     HPI  Miguel Pappas is a 68 year old male with history of chronic left hip pain, hyponatremia who presents for evaluation of confusion.  Patient was at a clinic appointment with Ortho for chronic left-sided hip pain today.  Just before this appointment he went into the bathroom and after coming out he seemed confused according to his wife.  He had no recollection of actually being in the bathroom and could not describe events from the previous 20 to 30 minutes.  Patient also seems slightly confused and answering questions during the clinic visit itself, so he was sent to the emergency department for further evaluation.  Symptoms started around 3:45 PM.  He has never had a similar episode in the past and denies any history of CVA/TIA.  Aside from his chronic left hip pain, which is at baseline, patient denies any preceding symptoms or recent illness.  He specifically denies any fevers, headaches, vision changes, neck stiffness, focal numbness/tingling/weakness, chest pain or shortness of breath, new pain or swelling of the legs, other complaints today.    Allergies:  Allergies   Allergen Reactions    No Known Drug Allergy        Problem List:    Patient Active Problem List    Diagnosis Date Noted    Hip pain, left 03/14/2024     Priority: Medium    Confusion 03/14/2024     Priority: Medium    Word finding difficulty 03/14/2024     Priority: Medium    Leukocytosis, unspecified type 03/14/2024     Priority: Medium    SOB (shortness of breath) 03/07/2024     Priority: Medium    Primary osteoarthritis of left hip 03/07/2024     Priority: Medium    Elevated brain natriuretic peptide (BNP) level 03/07/2024     Priority: Medium    Osteopenia, unspecified location 03/07/2024     Priority: Medium    Hyponatremia 03/04/2024     Priority: Medium    Hypochloremia 03/04/2024     Priority: Medium    Prophylactic antibiotic 04/05/2023     Priority:  Medium    S/P AVR- bioprosthetic 12/16/2022     Priority: Medium        Past Medical History:    Past Medical History:   Diagnosis Date    * * * SBE PROPHYLAXIS * * *     Mitral valve stenosis and aortic valve stenosis     Pure hypercholesterolemia 1/26/2007       Past Surgical History:    Past Surgical History:   Procedure Laterality Date    COLONOSCOPY N/A 12/5/2014    Procedure: COLONOSCOPY;  Surgeon: Omar Chisholm MD;  Location:  GI    CV CORONARY ANGIOGRAM N/A 11/15/2022    Procedure: Coronary Angiogram;  Surgeon: Edson Nava MD;  Location:  HEART [facility]ARD[facility] CATH LAB    HC REMOVAL OF TONSILS,<13 Y/O      Tonsils <10y.o.    REPLACE VALVE AORTIC N/A 12/16/2022    Procedure: AORTIC VALVE REPLACEMENT WITH 21MM INSPIRIS RESILIA VALVE, PLACEMENT OF TEMPORARY VENTRICULAR AND ATRIAL PACING WIRES AND TRANSESPHOGEAL ECHOCARDIOGRAM;  Surgeon: Kaila Abbott MD;  Location: [facility] COLONOSCOPY W/WO BRUSH/WASH  2/8/2005        Family History:    Family History   Problem Relation Age of Onset    Cancer Father         Spleen cancer    Hypertension Father     Allergies Father         PCN    Gastrointestinal Disease Father         ulcer    Heart Disease Father         Hx: meds and angioplasty    Hypertension Brother     Cerebrovascular Disease Paternal Grandfather     Arthritis Paternal Grandfather     Arthritis Mother     Hypertension Mother     Allergies Son         Dust , Mold    Depression Sister         X 2       Social History:  Marital Status:   [2]  Social History     Tobacco Use    Smoking status: Never     Passive exposure: Never    Smokeless tobacco: Never   Vaping Use    Vaping Use: Never used   Substance Use Topics    Alcohol use: Yes     Comment: 1-2 beer monthly  maybe    Drug use: No        Medications:    amoxicillin (AMOXIL) 500 MG capsule  ascorbic acid (VITAMIN C) 250 MG CHEW chewable tablet  aspirin 81 MG tablet  ferrous sulfate (FEROSUL) 325 (65 Fe) MG  tablet  GLUCOSAMINE CHONDROITIN OR TABS  Multiple Vitamins-Minerals (CENTRUM SILVER PO)  Vitamin D3 (CHOLECALCIFEROL) 25 mcg (1000 units) tablet      Review of Systems   All other systems reviewed and are negative.  See HPI.    Physical Exam   BP: 112/76  Pulse: 86  Temp: 98.2  F (36.8  C)  Resp: 18  Weight: 62.6 kg (138 lb)  SpO2: 100 %      Physical Exam  Vitals and nursing note reviewed.   Constitutional:       General: He is not in acute distress.     Appearance: Normal appearance. He is not toxic-appearing.      Comments: Somewhat frail and uncomfortable due to chronic left hip pain.  Otherwise nontoxic.  Seems oriented to person, place, and year.  Answering questions appropriately with the exception of possible subtle word finding difficulties.   HENT:      Head: Normocephalic and atraumatic.      Mouth/Throat:      Mouth: Mucous membranes are moist.      Pharynx: Oropharynx is clear.   Eyes:      General: No scleral icterus.     Extraocular Movements: Extraocular movements intact.      Right eye: No nystagmus.      Left eye: No nystagmus.      Conjunctiva/sclera: Conjunctivae normal.      Pupils: Pupils are equal, round, and reactive to light.   Cardiovascular:      Rate and Rhythm: Normal rate and regular rhythm.      Heart sounds: Normal heart sounds. No murmur heard.  Pulmonary:      Effort: Pulmonary effort is normal. No respiratory distress.      Breath sounds: Normal breath sounds. No stridor.   Abdominal:      Palpations: Abdomen is soft.      Tenderness: There is no abdominal tenderness.   Musculoskeletal:         General: Tenderness present. No swelling or deformity. Normal range of motion.      Cervical back: Normal range of motion and neck supple. No rigidity.      Comments: Patient has chronic left hip pain, which she states is at baseline.  No evidence of acute fracture or deformity.  No overlying skin changes or redness/warmth.   Lymphadenopathy:      Cervical: No cervical adenopathy.   Skin:     " General: Skin is warm.      Capillary Refill: Capillary refill takes less than 2 seconds.      Coloration: Skin is not cyanotic or pale.      Findings: No erythema.   Neurological:      Mental Status: He is alert and oriented to person, place, and time.      GCS: GCS eye subscore is 4. GCS verbal subscore is 5. GCS motor subscore is 6.      Cranial Nerves: No cranial nerve deficit, dysarthria or facial asymmetry.      Sensory: No sensory deficit.      Motor: No weakness.      Coordination: Coordination normal.      Comments: Patient is fully alert/oriented, answering questions appropriately.  No dysarthria, but he may have very selective word finding difficulties.  For instance, when asked if he is right or left handed, patient could not recall and instead asked, \"What is more common?\".  Otherwise he did not have any difficulty identifying objects within the room, his wife.  He continues to have memory loss from about a 30-minute time window during his clinic appointment today.  Cranial nerve exam is nonfocal.  He is moving all extremities spontaneously with equal strength, normal coordination.   Psychiatric:         Mood and Affect: Mood normal. Mood is not anxious.       ED Course     ED Course as of 03/14/24 2225   Thu Mar 14, 2024   1632 Initial examination completed as part of initial stroke alert.  Patient does seem intermittently confused, but this also seems mostly related to events that happened this afternoon, could theoretically represent a new infectious process or transient global amnesia.  No other deficits present and objectively he has an NIH of 0.  For this reason, stroke alert de-escalated.  Will still proceed with CT/CTA and neurology consultation.   1708 Spoke with stroke neurology team.  Agrees with plans for CT/CTA and initial results show an age-indeterminate lacunar infarct.  Differential could theoretically include cardioembolic source and recommended MRI to characterize CT findings.   2203 " Spoke with stroke neurology team again.  Reviewed MRI results and they believe that there is a tiny hippocampal infarct.  In the setting of previous valve replacement and elevated white count, could theoretically represent cardioembolic source.  Recommended admission for the sake of echocardiogram and formal evaluation.  Patient was agreeable with this plan.  Hospitalist irvin.   8298 Spoke with hospitalist, Dr. Hope, who agrees to admit.     Procedures         EKG performed at 1659.  Sinus rhythm, rate 79.  Normal axis.  Normal intervals.  Nonspecific T wave changes.  Exam independently interpreted by me.       Results for orders placed or performed during the hospital encounter of 03/14/24 (from the past 24 hour(s))   Glucose by meter   Result Value Ref Range    GLUCOSE BY METER POCT 107 (H) 70 - 99 mg/dL   CBC with Platelets & Differential    Narrative    The following orders were created for panel order CBC with Platelets & Differential.  Procedure                               Abnormality         Status                     ---------                               -----------         ------                     CBC with platelets and d...[[id number]]  Abnormal            Final result               Manual Differential[[id number]]          Abnormal            Final result                 Please view results for these tests on the individual orders.   Basic metabolic panel   Result Value Ref Range    Sodium 129 (L) 135 - 145 mmol/L    Potassium 4.5 3.4 - 5.3 mmol/L    Chloride 93 (L) 98 - 107 mmol/L    Carbon Dioxide (CO2) 24 22 - 29 mmol/L    Anion Gap 12 7 - 15 mmol/L    Urea Nitrogen 19.6 8.0 - 23.0 mg/dL    Creatinine 0.90 0.67 - 1.17 mg/dL    GFR Estimate >90 >60 mL/min/1.73m2    Calcium 9.2 8.8 - 10.2 mg/dL    Glucose 110 (H) 70 - 99 mg/dL   INR   Result Value Ref Range    INR 1.08 0.85 - 1.15   Partial thromboplastin time   Result Value Ref Range    aPTT 31 22 - 38 Seconds   Troponin T, High Sensitivity    Result Value Ref Range    Troponin T, High Sensitivity 12 <=22 ng/L   TSH with free T4 reflex   Result Value Ref Range    TSH 2.84 0.30 - 4.20 uIU/mL   Ethyl Alcohol Level   Result Value Ref Range    Alcohol ethyl <0.01 <=0.01 g/dL   CBC with platelets and differential   Result Value Ref Range    WBC Count 19.2 (H) 4.0 - 11.0 10e3/uL    RBC Count 4.10 (L) 4.40 - 5.90 10e6/uL    Hemoglobin 11.8 (L) 13.3 - 17.7 g/dL    Hematocrit 34.1 (L) 40.0 - 53.0 %    MCV 83 78 - 100 fL    MCH 28.8 26.5 - 33.0 pg    MCHC 34.6 31.5 - 36.5 g/dL    RDW 13.4 10.0 - 15.0 %    Platelet Count 227 150 - 450 10e3/uL    NRBCs per 100 WBC 0 <1 /100    Absolute NRBCs 0.0 10e3/uL   Manual Differential   Result Value Ref Range    % Neutrophils 85 %    % Lymphocytes 8 %    % Monocytes 7 %    % Eosinophils 0 %    % Basophils 0 %    Absolute Neutrophils 16.3 (H) 1.6 - 8.3 10e3/uL    Absolute Lymphocytes 1.5 0.8 - 5.3 10e3/uL    Absolute Monocytes 1.3 0.0 - 1.3 10e3/uL    Absolute Eosinophils 0.0 0.0 - 0.7 10e3/uL    Absolute Basophils 0.0 0.0 - 0.2 10e3/uL    RBC Morphology Confirmed RBC Indices     Platelet Assessment  Automated Count Confirmed. Platelet morphology is normal.     Automated Count Confirmed. Platelet morphology is normal.   CT Head w/o Contrast    Narrative    EXAM: CT HEAD W/O CONTRAST  LOCATION: [facility] [place]  DATE: 3/14/2024    INDICATION: Acute neuro deficit, stroke TIA suspected  COMPARISON: None.  TECHNIQUE: Routine CT Head without IV contrast. Multiplanar reformats. Dose reduction techniques were used.    FINDINGS:  INTRACRANIAL CONTENTS: No intracranial hemorrhage, extraaxial collection, or mass effect.  Age-indeterminate lacunar infarct of the left caudate head. Mild presumed chronic small vessel ischemic changes. Mild generalized volume loss. No hydrocephalus.     VISUALIZED ORBITS/SINUSES/MASTOIDS: No intraorbital abnormality. Partially imaged hyperostosis of the right maxillary sinus  mucosal thickening. No middle ear or mastoid effusion.    BONES/SOFT TISSUES: No acute abnormality.      Impression    IMPRESSION:  1.  No acute intracranial hemorrhage, extra-axial collection, or midline shift.  2.  Age-indeterminate lacunar infarct left caudate head.   CTA Head Neck with Contrast    Narrative    EXAM: CTA HEAD NECK W CONTRAST  LOCATION: [facility]  DATE: 3/14/2024    INDICATION: Acute neuro deficit, stroke TIA suspected  COMPARISON: Noncontrast head CT 03/14/2024  CONTRAST: ISOVUE 370, 90 mL  TECHNIQUE: Head and neck CT angiogram with IV contrast. Axial helical CT images of the head and neck vessels obtained during the arterial phase of intravenous contrast administration. Axial 2D reconstructed images and multiplanar 3D MIP reconstructed   images of the head and neck vessels were performed by the technologist. Dose reduction techniques were used. All stenosis measurements made according to NASCET criteria unless otherwise specified.    FINDINGS:     HEAD CTA:  ANTERIOR CIRCULATION: No stenosis/occlusion, aneurysm, or high flow vascular malformation. Standard [place] anatomy.    POSTERIOR CIRCULATION: No stenosis/occlusion, aneurysm, or high flow vascular malformation. Balanced vertebral arteries supply a normal basilar artery.     DURAL VENOUS SINUSES: Not well evaluated on a technical basis.    NECK CTA:  RIGHT CAROTID: Mixed calcified and noncalcified atherosclerotic plaque the right carotid bifurcation results in up to 30% stenosis of the proximal right ICA segment. No evidence for dissection.    LEFT CAROTID: Mixed calcified and noncalcified atherosclerotic plaque the right carotid bifurcation results in up to 30% stenosis of the proximal left ICA segment. No evidence for dissection.    VERTEBRAL ARTERIES: No focal stenosis or dissection. Balanced vertebral arteries.    AORTIC ARCH: Classic aortic arch anatomy with no significant stenosis at the origin  of the great vessels.    NONVASCULAR STRUCTURES: Unremarkable.      Impression    IMPRESSION:   HEAD CTA:   1.  No significant stenosis, aneurysm, or high flow vascular malformation identified.    NECK CTA:  1.  Atherosclerotic changes of the carotid bifurcations without hemodynamically significant stenosis in the neck vessels.   2.  No evidence for dissection.   Symptomatic Influenza A/B, RSV, & SARS-CoV2 PCR (COVID-19) Nasopharyngeal    Specimen: Nasopharyngeal; Swab   Result Value Ref Range    Influenza A PCR Negative Negative    Influenza B PCR Negative Negative    RSV PCR Negative Negative    SARS CoV2 PCR Negative Negative    Narrative    Testing was performed using the Xpert Xpress CoV2/Flu/RSV Assay on the [employer] Instrument. This test should be ordered for the detection of SARS-CoV-2, influenza, and RSV viruses in individuals who meet clinical and/or epidemiological criteria. Test performance is unknown in asymptomatic patients. This test is for in vitro diagnostic use under the FDA EUA for laboratories certified under CLIA to perform high or moderate complexity testing. This test has not been FDA cleared or approved. A negative result does not rule out the presence of PCR inhibitors in the specimen or target RNA in concentration below the limit of detection for the assay. If only one viral target is positive but coinfection with multiple targets is suspected, the sample should be re-tested with another FDA cleared, approved, or authorized test, if coinfection would change clinical management. This test was validated by the [facility] [employer]. These laboratories are certified under the Clinical Laboratory Improvement Amendments of 1988 (CLIA-88) as qualified to perform high complexity laboratory testing.   UA with Microscopic reflex to Culture    Specimen: Urine, Midstream   Result Value Ref Range    Color Urine Straw Colorless, Straw, Light Yellow, Yellow    Appearance Urine Clear  Clear    Glucose Urine Negative Negative mg/dL    Bilirubin Urine Negative Negative    Ketones Urine 5 (A) Negative mg/dL    Specific Gravity Urine 1.017 1.003 - 1.035    Blood Urine Negative Negative    pH Urine 7.0 5.0 - 7.0    Protein Albumin Urine Negative Negative mg/dL    Urobilinogen Urine Normal Normal, 2.0 mg/dL    Nitrite Urine Negative Negative    Leukocyte Esterase Urine Negative Negative    RBC Urine 1 <=2 /HPF    WBC Urine 0 <=5 /HPF    Narrative    Urine Culture not indicated   XR Chest Port 1 View    Narrative    EXAM: XR CHEST PORT 1 VIEW  LOCATION: [facility]  DATE: 3/14/2024    INDICATION: Elevated white blood cell count, rule out infiltrate  COMPARISON: 03/04/2024      Impression    IMPRESSION: Multiple median sternotomy wires. No pneumothorax or pleural effusion. No focal consolidation. Cardiac silhouette within normal limits. Prosthetic aortic valve. Mildly tortuous aorta. No acute osseous abnormality.   MR Brain w/o & w Contrast    Narrative    EXAM: MR BRAIN W/O and W CONTRAST  LOCATION: [facility]  DATE: 3/14/2024    INDICATION: Confusion, episode of almost complete amnesia from earlier today  COMPARISON: None.  CONTRAST: 6.5 mL Gadavist  TECHNIQUE: Routine multiplanar multisequence head MRI without and with intravenous contrast.    FINDINGS:  INTRACRANIAL CONTENTS: No acute or subacute infarct. Left caudate head lacunar infarct is chronic. No mass, acute hemorrhage, or extra-axial fluid collections. Scattered nonspecific T2/FLAIR hyperintensities within the cerebral white matter most   consistent with mild chronic microvascular ischemic change. Mild generalized cerebral atrophy. No hydrocephalus. Normal position of the cerebellar tonsils. No pathologic contrast enhancement.    SELLA: No abnormality accounting for technique.    OSSEOUS STRUCTURES/SOFT TISSUES: Normal marrow signal. The major intracranial vascular flow voids are  maintained.     ORBITS: No abnormality accounting for technique.     SINUSES/MASTOIDS: Mild mucosal thickening scattered about the paranasal sinuses. Scattered fluid/membrane thickening in the mastoid air cells bilaterally.       Impression    IMPRESSION:  1.  No acute or subacute ischemic change.  2.  No acute intracranial process or abnormal enhancement.  3.  Mild age-related changes as above.       Medications   morphine (PF) injection 4 mg (4 mg Intravenous $Given 3/14/24 1746)   iopamidol (ISOVUE-370) solution 100 mL (90 mLs Intravenous $Given 3/14/24 1641)   gadobutrol (GADAVIST) injection 7.5 mL (7.5 mLs Intravenous $Given 3/14/24 2046)       Assessments & Plan (with Medical Decision Making)     I have reviewed the nursing notes.    I have reviewed the findings, diagnosis, plan and need for follow up with the patient.    Medical Decision Making  Miguel Pappas is a 68 year old male with history of chronic left hip pain, hyponatremia who presents for evaluation of confusion.  Normal vitals on arrival.  His exam is very reassuring overall.  Initially arrived as a stroke alert due to sudden onset of confusion.  Patient still endorses having some issues with memory regarding events earlier this afternoon.  He also has some possible subtle word finding difficulties as described previously.  Otherwise his neurological exam is entirely nonfocal.  He answers all orientation questions appropriately and is able to identify items in the room without issue.  No dysarthria.  Cranial nerves are intact.  He has no meningismus.  Strength, sensation, and coordination is otherwise entirely normal.  With NIH of 0, stroke alert was de-escalated, but we still proceeded with urgent CT/CTA and lab work to rule out metabolic cause.      Interestingly, his white blood cell count is elevated at 19, but I cannot identify a clear source for this and he denies any recent steroid use.  He also had modest hyponatremia at 129.  Remainder  of metabolic panel was unremarkable.  Viral panel was negative.  Urinalysis was clear.  Troponin was negative.  CT/CTA showed a possible left lacunar infarct to the caudate region but no other acute pathology.  Findings were discussed with stroke neurology team and they agreed with plans for MRI with/without contrast.  This took several hours due to multiple studies already scheduled.  During this time period, patient seemed more coherent according to his wife and remained hemodynamically stable throughout.  He continues to deny any infectious symptoms and the subtle word finding difficulties seemed to resolve.  MRI was read as having no acute findings, only an old lacunar infarct as described previously.  However, after reviewing this with the stroke team again, the see a possible tiny hippocampal infarct.  In the setting of his previous valve replacement and unexplained leukocytosis, they recommended that he be admitted for formal stroke evaluation with echocardiogram.  Blood cultures were drawn, but will hold off on empiric antibiotics given stable vitals and lack of any related infectious complaints at this time.  Patient was agreeable with admission.  Case was discussed with Dr. Hope, who agrees to admit.    New Prescriptions    No medications on file       Final diagnoses:   Confusion   Word finding difficulty   Leukocytosis, unspecified type   Hyponatremia       3/14/2024   [facility] EMERGENCY DEPT       Bo Eisenberg MD  03/14/24 [id number]

## 2024-03-14 NOTE — CONSULTS
"  [facility]    Stroke Telephone Note    I was called by Bo Eisenberg on 03/14/24 regarding patient Miguel Pappas. The patient is a 68 year old man with h/o HLD, hyponatremia, mitral valve stenosis and aortic valve stenosis s/p bioprosthertic aortic valve placement, who presents to the ED for confusion. Per patient and his wife, he was at a clinic earlier today and at his baseline, and had been wheeled into the bathroom around 15:40, where he by himself for a few minutes. When he wheeled himself out of the bathroom thereafter, he reported to his wife that he does not remember anything that happened after being wheeled in. He was the brought in for an evaluation. At ED his exam shows a NIHSS of 0, is alert, oriented to time, place, person, situation age, however he is unable to remember his handedness and continues to not have any memory of what happened in the bathroom. Denies headache.     Vitals  BP: 128/83   Pulse: 81   Resp: 19   Temp: 98.2  F (36.8  C)   Weight: 62.6 kg (138 lb)    Imaging Findings  CT head: No acute ischemia/hemorrhage. Chronic appearing Left caudate infarct  CTA head/neck: No LVO or critical stenoses. Mild athero at b/l cervical ICA and Left cavernous ICA    Impression  Confusion; amnestic event    D/D: cardioembolic shower vs seizure vs metabolic/infectious encephalopathy.     WBC on presentation at 19.2, no e/o SIRS     Recommendations  - MRI Brain w/wo contrast  - Metabolic/infectious work up    My recommendations are based on the information provided over the phone by Miguel Pappas's in-person providers. They are not intended to replace the clinical judgment of his in-person providers. I was not requested to personally see or examine the patient at this time.     The Stroke Staff is Dr. Ordoñez.    Mica Lopez MD  Vascular Neurology Fellow    To page me or covering stroke neurology team member, click here: [facility]COM  Choose \"On Call\" tab at " "top, then select \"NEUROLOGY/ALL SITES\" from middle drop-down box, press Enter, then look for \"stroke\" or \"telestroke\" for your site.   "

## 2024-03-14 NOTE — ED NOTES
IV started, blood sent to lab. VS and cardiac monitoring. EKG completed by EDT. Pt tolerated well. Continues with some confusion.

## 2024-03-14 NOTE — LETTER
3/14/2024         RE: Miguel Pappas  [id number] [facility] [id number]        Dear Colleague,    Thank you for referring your patient, Miguel Pappas, to the [facility]. Please see a copy of my visit note below.    Miguel Pappas is a 68 year old male who is seen in consultation at the request of Ivon Brock for left hip pain.  He developed this on March 7 while using a skid steer.  He did not have a fall or an injury.  He began to have pain in the hip which got worse when he got out.  He has had sharp shooting pain since then rated 9 out of 10 at the anterior and lateral aspect of the hip.  He feels better with the knee and hip event.  Worse if the hip is extended.  He has had physical therapy 1 episode today.  He has not had steroid injection or anti-inflammatories.  He has been feeling ill for the last 2 weeks.  He is currently nauseated and wanted to lay down on the examining table.    X-ray shows severe osteoarthritis of the left hip.  There is bone-on-bone at the superior lateral aspect of the hip.  Right hip is well-preserved.    Past Medical History:   Diagnosis Date     * * * SBE PROPHYLAXIS * * *     no longer indicated - 2007 AHA guidelnies     Mitral valve stenosis and aortic valve stenosis     2/6 murmur     Pure hypercholesterolemia 1/26/2007       Past Surgical History:   Procedure Laterality Date     COLONOSCOPY N/A 12/5/2014    Procedure: COLONOSCOPY;  Surgeon: Omar Chisholm MD;  Location:  GI     CV CORONARY ANGIOGRAM N/A 11/15/2022    Procedure: Coronary Angiogram;  Surgeon: Edson Nava MD;  Location:  HEART CARDIAC CATH LAB     HC REMOVAL OF TONSILS,<13 Y/O      Tonsils <10y.o.     REPLACE VALVE AORTIC N/A 12/16/2022    Procedure: AORTIC VALVE REPLACEMENT WITH 21MM INSPIRIS RESILIA VALVE, PLACEMENT OF TEMPORARY VENTRICULAR AND ATRIAL PACING WIRES AND TRANSESPHOGEAL ECHOCARDIOGRAM;  Surgeon: Kaila Abbott MD;  Location:   OR     ZZHC COLONOSCOPY W/WO BRUSH/WASH  2/8/2005        Family History   Problem Relation Age of Onset     Cancer Father         Spleen cancer     Hypertension Father      Allergies Father         PCN     Gastrointestinal Disease Father         ulcer     Heart Disease Father         Hx: meds and angioplasty     Hypertension Brother      Cerebrovascular Disease Paternal Grandfather      Arthritis Paternal Grandfather      Arthritis Mother      Hypertension Mother      Allergies Son         Dust , Mold     Depression Sister         X 2       Social History     Socioeconomic History     Marital status:      Spouse name: Fabian     Number of children: 3     Years of education: 12     Highest education level: Not on file   Occupational History     Occupation:    Tobacco Use     Smoking status: Never     Passive exposure: Never     Smokeless tobacco: Never   Vaping Use     Vaping Use: Never used   Substance and Sexual Activity     Alcohol use: Yes     Comment: 1-2 beer monthly  maybe     Drug use: No     Sexual activity: Yes     Partners: Female     Comment: Hx: wife had tubal ligation   Other Topics Concern      Service Yes     Comment: [employer]     Blood Transfusions No     Caffeine Concern No     Occupational Exposure No     Hobby Hazards No     Sleep Concern No     Stress Concern No     Weight Concern No     Special Diet Yes     Comment: Low fat and cholesterol high fiber     Back Care Yes     Comment: Hx: chiropractor for back inj and knee     Exercise Yes     Comment: every day     Bike Helmet No     Seat Belt Yes     Self-Exams Not Asked   Social History Narrative    Lives with spouse.     Social Determinants of Health     Financial Resource Strain: Low Risk  (11/30/2023)    Financial Resource Strain      Within the past 12 months, have you or your family members you live with been unable to get utilities (heat, electricity) when it was really needed?: No   Food Insecurity: Low  "Risk  (11/30/2023)    Food Insecurity      Within the past 12 months, did you worry that your food would run out before you got money to buy more?: No      Within the past 12 months, did the food you bought just not last and you didn t have money to get more?: No   Transportation Needs: Low Risk  (11/30/2023)    Transportation Needs      Within the past 12 months, has lack of transportation kept you from medical appointments, getting your medicines, non-medical meetings or appointments, work, or from getting things that you need?: No   Physical Activity: Not on file   Stress: Not on file   Social Connections: Not on file   Interpersonal Safety: Not on file   Housing Stability: Low Risk  (11/30/2023)    Housing Stability      Do you have housing? : Yes      Are you worried about losing your housing?: No       No current outpatient medications on file.       Allergies   Allergen Reactions     No Known Drug Allergy        REVIEW OF SYSTEMS:  CONSTITUTIONAL:  NEGATIVE for fever, chills, change in weight, not feeling tired  SKIN:  NEGATIVE for worrisome rashes, no skin lumps, no skin ulcers and no non-healing wounds  EYES:  NEGATIVE for vision changes or irritation.  ENT/MOUTH:  NEGATIVE.  No hearing loss, no hoarseness, no difficulty swallowing.  RESP:  NEGATIVE. No cough or shortness of breath.  CV:  NEGATIVE for chest pain, palpitations or peripheral edema  GI:  NEGATIVE for nausea, abdominal pain, heartburn, or change in bowel habits  :  Negative. No dysuria, no hematuria  MUSCULOSKELETAL:  See HPI above  NEURO:  NEGATIVE . No headaches, no dizziness,  no numbness  ENDOCRINE:  NEGATIVE for temperature intolerance, skin/hair changes  HEME/ALLERGY/IMMUNE:  NEGATIVE for bleeding problems  PSYCHIATRIC:  NEGATIVE. no anxiety, no depression.     Exam:  Vitals: /70   Pulse 89   Resp 18   Ht 1.727 m (5' 8\")   Wt 62.6 kg (138 lb)   BMI 20.98 kg/m    BMI= Body mass index is 20.98 kg/m .  Constitutional:  healthy, " alert and no distress  Neuro: Alert and Oriented x 3, no focal defects.  Psych: Affect normal   Respiratory: Breathing not labored.  Cardiovascular: normal peripheral pulses  Lymph: no adenopathy  Skin: No rashes,worrisome lesions or skin problems  Is good mobility of the right hip with 50 degrees external rotation and 30 degrees internal rotation.  He has limited mobility of the left hip with 30 degrees external rotation and -5 degrees internal rotation.  The rotation causes pain.  Ambulation shows significant limping on the left hip.  He has tenderness primarily over the greater trochanter on the left hip.  There is no tenderness anteriorly or the sciatic notch.    Assessment:  1. Severe left hip osteoarthritis.  This pressing thing is that he denies pain prior to March 7.  It seems to be associated in onset with what ever illness he currently is experiencing.  2.  Probable viral illness.  He and his wife both report they have been sick in the last 2 weeks.    Plan: I would like to get him over his viral illness before determining a course of action on the hip.  He feels he wants a total hip arthroplasty as soon as possible.  Since I am scheduling into late May, I offered him option to see Dr. Campbell or Dr. Nunez.  He will see Dr. Campbell next week    Again, thank you for allowing me to participate in the care of your patient.        Sincerely,        Sebastien Barton MD

## 2024-03-14 NOTE — MEDICATION SCRIBE - ADMISSION MEDICATION HISTORY
Medication Scribe Admission Medication History    Admission medication history is complete. The information provided in this note is only as accurate as the sources available at the time of the update.    Information Source(s): Patient, Family member, and CareEverywhere/SureScripts via in-person    Pertinent Information: spouse Sandra present and helpful    Changes made to PTA medication list:  Added: D3 and Amox prophy  Deleted: None  Changed: MVI and aspirin to bedtime    Allergies reviewed with patient and updates made in EHR: yes    Medication History Completed By: ALECIA KELLY 3/14/2024 5:31 PM    PTA Med List   Medication Sig Last Dose    amoxicillin (AMOXIL) 500 MG capsule Take 2,000 mg by mouth once as needed (dental prophy) More than a month at unkn    ascorbic acid (VITAMIN C) 250 MG CHEW chewable tablet Take 250 mg by mouth daily 3/14/2024 at am    aspirin 81 MG tablet Take 81 mg by mouth at bedtime 3/14/2024 at 1215    ferrous sulfate (FEROSUL) 325 (65 Fe) MG tablet Take 325 mg by mouth daily (with breakfast) 3/14/2024 at am    GLUCOSAMINE CHONDROITIN OR TABS Take 1 tablet by mouth daily 3/13/2024 at hs    Multiple Vitamins-Minerals (CENTRUM SILVER PO) Take 1 tablet by mouth daily 3/13/2024 at hs    Vitamin D3 (CHOLECALCIFEROL) 25 mcg (1000 units) tablet Take 25 mcg by mouth daily 3/14/2024 at am

## 2024-03-14 NOTE — ED NOTES
Anticipating MRI - provided paperwork to complete and imaging is aware of order. VS and cardiac monitoring. Call light in reach.

## 2024-03-15 ENCOUNTER — APPOINTMENT (OUTPATIENT)
Dept: CARDIOLOGY | Facility: CLINIC | Age: 69
DRG: 070 | End: 2024-03-15
Attending: INTERNAL MEDICINE
Payer: COMMERCIAL

## 2024-03-15 ENCOUNTER — APPOINTMENT (OUTPATIENT)
Dept: PHYSICAL THERAPY | Facility: CLINIC | Age: 69
DRG: 070 | End: 2024-03-15
Attending: NURSE PRACTITIONER
Payer: COMMERCIAL

## 2024-03-15 LAB
ANION GAP SERPL CALCULATED.3IONS-SCNC: 12 MMOL/L (ref 7–15)
BUN SERPL-MCNC: 16.5 MG/DL (ref 8–23)
CALCIUM SERPL-MCNC: 9.3 MG/DL (ref 8.8–10.2)
CHLORIDE SERPL-SCNC: 95 MMOL/L (ref 98–107)
CREAT SERPL-MCNC: 0.96 MG/DL (ref 0.67–1.17)
CRP SERPL-MCNC: 128.47 MG/L
DEPRECATED HCO3 PLAS-SCNC: 26 MMOL/L (ref 22–29)
EGFRCR SERPLBLD CKD-EPI 2021: 85 ML/MIN/1.73M2
ENTEROCOCCUS FAECALIS: NOT DETECTED
ENTEROCOCCUS FAECIUM: NOT DETECTED
ERYTHROCYTE [DISTWIDTH] IN BLOOD BY AUTOMATED COUNT: 13.4 % (ref 10–15)
GLUCOSE BLDC GLUCOMTR-MCNC: 114 MG/DL (ref 70–99)
GLUCOSE BLDC GLUCOMTR-MCNC: 129 MG/DL (ref 70–99)
GLUCOSE BLDC GLUCOMTR-MCNC: 137 MG/DL (ref 70–99)
GLUCOSE SERPL-MCNC: 109 MG/DL (ref 70–99)
HCT VFR BLD AUTO: 32.9 % (ref 40–53)
HGB BLD-MCNC: 11.2 G/DL (ref 13.3–17.7)
LDLC SERPL DIRECT ASSAY-MCNC: 123 MG/DL
LISTERIA SPECIES (DETECTED/NOT DETECTED): NOT DETECTED
LVEF ECHO: NORMAL
MCH RBC QN AUTO: 28.7 PG (ref 26.5–33)
MCHC RBC AUTO-ENTMCNC: 34 G/DL (ref 31.5–36.5)
MCV RBC AUTO: 84 FL (ref 78–100)
MRSA DNA SPEC QL NAA+PROBE: NEGATIVE
OSMOLALITY SERPL: 272 MMOL/KG (ref 280–301)
OSMOLALITY UR: 197 MMOL/KG (ref 100–1200)
PLATELET # BLD AUTO: 218 10E3/UL (ref 150–450)
POTASSIUM SERPL-SCNC: 3.9 MMOL/L (ref 3.4–5.3)
PROCALCITONIN SERPL IA-MCNC: 0.99 NG/ML
RBC # BLD AUTO: 3.9 10E6/UL (ref 4.4–5.9)
SA TARGET DNA: NEGATIVE
SODIUM SERPL-SCNC: 133 MMOL/L (ref 135–145)
STAPHYLOCOCCUS AUREUS: NOT DETECTED
STAPHYLOCOCCUS EPIDERMIDIS: NOT DETECTED
STAPHYLOCOCCUS LUGDUNENSIS: NOT DETECTED
STAPHYLOCOCCUS SPECIES: NOT DETECTED
STREPTOCOCCUS AGALACTIAE: NOT DETECTED
STREPTOCOCCUS ANGINOSUS GROUP: NOT DETECTED
STREPTOCOCCUS PNEUMONIAE: NOT DETECTED
STREPTOCOCCUS PYOGENES: NOT DETECTED
STREPTOCOCCUS SPECIES: DETECTED
WBC # BLD AUTO: 15.1 10E3/UL (ref 4–11)

## 2024-03-15 PROCEDURE — 97530 THERAPEUTIC ACTIVITIES: CPT | Mod: GP | Performed by: PHYSICAL THERAPIST

## 2024-03-15 PROCEDURE — 250N000013 HC RX MED GY IP 250 OP 250 PS 637: Performed by: NURSE PRACTITIONER

## 2024-03-15 PROCEDURE — 99207 PR APP CREDIT; MD BILLING SHARED VISIT: CPT | Performed by: NURSE PRACTITIONER

## 2024-03-15 PROCEDURE — 99223 1ST HOSP IP/OBS HIGH 75: CPT | Mod: 95 | Performed by: INTERNAL MEDICINE

## 2024-03-15 PROCEDURE — 97162 PT EVAL MOD COMPLEX 30 MIN: CPT | Mod: GP | Performed by: PHYSICAL THERAPIST

## 2024-03-15 PROCEDURE — 120N000001 HC R&B MED SURG/OB

## 2024-03-15 PROCEDURE — 36415 COLL VENOUS BLD VENIPUNCTURE: CPT | Performed by: NURSE PRACTITIONER

## 2024-03-15 PROCEDURE — 250N000011 HC RX IP 250 OP 636: Performed by: STUDENT IN AN ORGANIZED HEALTH CARE EDUCATION/TRAINING PROGRAM

## 2024-03-15 PROCEDURE — 86140 C-REACTIVE PROTEIN: CPT | Performed by: NURSE PRACTITIONER

## 2024-03-15 PROCEDURE — 250N000013 HC RX MED GY IP 250 OP 250 PS 637: Performed by: INTERNAL MEDICINE

## 2024-03-15 PROCEDURE — 87641 MR-STAPH DNA AMP PROBE: CPT | Performed by: NURSE PRACTITIONER

## 2024-03-15 PROCEDURE — 82565 ASSAY OF CREATININE: CPT | Performed by: INTERNAL MEDICINE

## 2024-03-15 PROCEDURE — 250N000011 HC RX IP 250 OP 636: Performed by: NURSE PRACTITIONER

## 2024-03-15 PROCEDURE — 93306 TTE W/DOPPLER COMPLETE: CPT | Mod: 26 | Performed by: INTERNAL MEDICINE

## 2024-03-15 PROCEDURE — 258N000003 HC RX IP 258 OP 636: Performed by: INTERNAL MEDICINE

## 2024-03-15 PROCEDURE — 85027 COMPLETE CBC AUTOMATED: CPT | Performed by: INTERNAL MEDICINE

## 2024-03-15 PROCEDURE — 82962 GLUCOSE BLOOD TEST: CPT

## 2024-03-15 PROCEDURE — 36415 COLL VENOUS BLD VENIPUNCTURE: CPT | Performed by: INTERNAL MEDICINE

## 2024-03-15 PROCEDURE — 84145 PROCALCITONIN (PCT): CPT | Performed by: NURSE PRACTITIONER

## 2024-03-15 PROCEDURE — 87040 BLOOD CULTURE FOR BACTERIA: CPT | Performed by: NURSE PRACTITIONER

## 2024-03-15 PROCEDURE — 93306 TTE W/DOPPLER COMPLETE: CPT

## 2024-03-15 PROCEDURE — G0427 INPT/ED TELECONSULT70: HCPCS | Mod: G0 | Performed by: NURSE PRACTITIONER

## 2024-03-15 RX ORDER — AMOXICILLIN 250 MG
1 CAPSULE ORAL 2 TIMES DAILY PRN
Status: DISCONTINUED | OUTPATIENT
Start: 2024-03-15 | End: 2024-03-16 | Stop reason: HOSPADM

## 2024-03-15 RX ORDER — OXYCODONE HYDROCHLORIDE 5 MG/1
5 TABLET ORAL EVERY 6 HOURS PRN
Status: COMPLETED | OUTPATIENT
Start: 2024-03-15 | End: 2024-03-15

## 2024-03-15 RX ORDER — OXYCODONE HYDROCHLORIDE 5 MG/1
5 TABLET ORAL EVERY 4 HOURS PRN
Status: DISCONTINUED | OUTPATIENT
Start: 2024-03-15 | End: 2024-03-16 | Stop reason: HOSPADM

## 2024-03-15 RX ORDER — NALOXONE HYDROCHLORIDE 0.4 MG/ML
0.4 INJECTION, SOLUTION INTRAMUSCULAR; INTRAVENOUS; SUBCUTANEOUS
Status: DISCONTINUED | OUTPATIENT
Start: 2024-03-15 | End: 2024-03-16 | Stop reason: HOSPADM

## 2024-03-15 RX ORDER — ATORVASTATIN CALCIUM 40 MG/1
40 TABLET, FILM COATED ORAL EVERY EVENING
Status: DISCONTINUED | OUTPATIENT
Start: 2024-03-15 | End: 2024-03-16 | Stop reason: HOSPADM

## 2024-03-15 RX ORDER — ASPIRIN 81 MG/1
81 TABLET ORAL DAILY
Status: DISCONTINUED | OUTPATIENT
Start: 2024-03-15 | End: 2024-03-16 | Stop reason: HOSPADM

## 2024-03-15 RX ORDER — LIDOCAINE 40 MG/G
CREAM TOPICAL
Status: DISCONTINUED | OUTPATIENT
Start: 2024-03-15 | End: 2024-03-16 | Stop reason: HOSPADM

## 2024-03-15 RX ORDER — CLOPIDOGREL BISULFATE 75 MG/1
300 TABLET ORAL ONCE
Status: COMPLETED | OUTPATIENT
Start: 2024-03-15 | End: 2024-03-15

## 2024-03-15 RX ORDER — PIPERACILLIN SODIUM, TAZOBACTAM SODIUM 3; .375 G/15ML; G/15ML
3.38 INJECTION, POWDER, LYOPHILIZED, FOR SOLUTION INTRAVENOUS EVERY 6 HOURS
Status: DISCONTINUED | OUTPATIENT
Start: 2024-03-15 | End: 2024-03-16 | Stop reason: HOSPADM

## 2024-03-15 RX ORDER — NALOXONE HYDROCHLORIDE 0.4 MG/ML
0.2 INJECTION, SOLUTION INTRAMUSCULAR; INTRAVENOUS; SUBCUTANEOUS
Status: DISCONTINUED | OUTPATIENT
Start: 2024-03-15 | End: 2024-03-16 | Stop reason: HOSPADM

## 2024-03-15 RX ORDER — SODIUM CHLORIDE 9 MG/ML
INJECTION, SOLUTION INTRAVENOUS CONTINUOUS
Status: DISCONTINUED | OUTPATIENT
Start: 2024-03-15 | End: 2024-03-15

## 2024-03-15 RX ORDER — CALCIUM CARBONATE 500 MG/1
1000 TABLET, CHEWABLE ORAL 4 TIMES DAILY PRN
Status: DISCONTINUED | OUTPATIENT
Start: 2024-03-15 | End: 2024-03-16 | Stop reason: HOSPADM

## 2024-03-15 RX ORDER — AMOXICILLIN 250 MG
2 CAPSULE ORAL 2 TIMES DAILY PRN
Status: DISCONTINUED | OUTPATIENT
Start: 2024-03-15 | End: 2024-03-16 | Stop reason: HOSPADM

## 2024-03-15 RX ORDER — CLOPIDOGREL BISULFATE 75 MG/1
75 TABLET ORAL DAILY
Status: DISCONTINUED | OUTPATIENT
Start: 2024-03-16 | End: 2024-03-16 | Stop reason: HOSPADM

## 2024-03-15 RX ADMIN — ASPIRIN 81 MG: 81 TABLET, COATED ORAL at 08:54

## 2024-03-15 RX ADMIN — ACETAMINOPHEN 650 MG: 325 TABLET, FILM COATED ORAL at 22:19

## 2024-03-15 RX ADMIN — CLOPIDOGREL BISULFATE 300 MG: 75 TABLET ORAL at 09:58

## 2024-03-15 RX ADMIN — SODIUM CHLORIDE: 9 INJECTION, SOLUTION INTRAVENOUS at 00:14

## 2024-03-15 RX ADMIN — OXYCODONE HYDROCHLORIDE 5 MG: 5 TABLET ORAL at 13:50

## 2024-03-15 RX ADMIN — ACETAMINOPHEN 650 MG: 325 TABLET, FILM COATED ORAL at 00:01

## 2024-03-15 RX ADMIN — PIPERACILLIN AND TAZOBACTAM 3.38 G: 3; .375 INJECTION, POWDER, FOR SOLUTION INTRAVENOUS at 15:24

## 2024-03-15 RX ADMIN — MORPHINE SULFATE 4 MG: 4 INJECTION, SOLUTION INTRAMUSCULAR; INTRAVENOUS at 03:45

## 2024-03-15 RX ADMIN — ACETAMINOPHEN 650 MG: 325 TABLET, FILM COATED ORAL at 13:50

## 2024-03-15 RX ADMIN — ATORVASTATIN CALCIUM 40 MG: 40 TABLET, FILM COATED ORAL at 22:09

## 2024-03-15 RX ADMIN — PIPERACILLIN AND TAZOBACTAM 3.38 G: 3; .375 INJECTION, POWDER, FOR SOLUTION INTRAVENOUS at 22:09

## 2024-03-15 RX ADMIN — OXYCODONE HYDROCHLORIDE 5 MG: 5 TABLET ORAL at 18:55

## 2024-03-15 RX ADMIN — OXYCODONE HYDROCHLORIDE 5 MG: 5 TABLET ORAL at 06:00

## 2024-03-15 ASSESSMENT — ACTIVITIES OF DAILY LIVING (ADL)
ADLS_ACUITY_SCORE: 25
ADLS_ACUITY_SCORE: 26
ADLS_ACUITY_SCORE: 28
ADLS_ACUITY_SCORE: 26
ADLS_ACUITY_SCORE: 28
ADLS_ACUITY_SCORE: 26
ADLS_ACUITY_SCORE: 28
ADLS_ACUITY_SCORE: 26
ADLS_ACUITY_SCORE: 28
ADLS_ACUITY_SCORE: 26
ADLS_ACUITY_SCORE: 25
ADLS_ACUITY_SCORE: 28
ADLS_ACUITY_SCORE: 28
ADLS_ACUITY_SCORE: 26

## 2024-03-15 NOTE — PROGRESS NOTES
[facility]    Medicine Progress Note - Hospitalist Service    Date of Admission:  3/14/2024    Addendum:    In the interim, patient resulted positive blood cultures (2 bottles) on day 1 of incubation for gram positive cocci and pairs and chains.  Repeat blood cultures obtained.  Initiated IV zosyn for empiric coverage.  Spoke with Vascular Neurology team who recommended transfer for AC which cannot be obtained at this facility.  They also recommend holding clopidogrel for now.    Updated patient on new results and plan.  Placed order for transfer and spoke with transfer line for telemetry bed, [facility] Neurology service, ID and Cardiology.              The patient's care was discussed with the  Vascular Neurology team .    Charo Barney CNP  Hospitalist Service  [facility]  Securely message with [email or web address] (more info)  Text page via [facility] Paging/Directory   ______________________________________________________________________    Interval History

## 2024-03-15 NOTE — PROGRESS NOTES
Stroke Education Discussed With Patient      - Warning signs and symptoms of stroke:   B = Balance loss   E = Eyesight changes   F = Facial droop or numbness   A = Arm or leg weakness   S = Speech difficulty, slurred speech   T = Time to call 911 for help    - Activation of emergency medical system (when to call 911)    - Individualized risk factors for stroke:      high cholesterol     Aortic stenosis    Written stroke educational materials given:   - Stroke Education Booklet    Hannah Cantu RN on 3/15/2024 at 6:17 AM

## 2024-03-15 NOTE — PLAN OF CARE
"Goal Outcome Evaluation:           Overall Patient Progress: no change    Outcome Evaluation: A & O x4, able to make needs known.  Takes pills whole one at a time.  VSS.  Tele on, heart in NS.  On RA w Spo2 > 90%, lungs clear.  RAC kyle IV patent, infusing 100mL/hr continuous NS.  Continent BB, +1 assist w walker and gait belt, likes to use urinal at bedside.  C/o 6/10 pain in L hip, given prn Tylenol, effective.  Pt's L hip mobility impaired, which per patient the pain started roughly a week ago and is not a chronic problem.   Neuro's intact.    /81   Pulse 78   Temp 98.2  F (36.8  C) (Temporal)   Resp 27   Ht 1.702 m (5' 7\")   Wt 60.8 kg (134 lb)   SpO2 99%   BMI 20.99 kg/m      Hannah Cantu RN on 3/15/2024 at 2:20 AM    "

## 2024-03-15 NOTE — PROGRESS NOTES
S-(situation): Patient registered to Observation.     OBS video/handout Reviewed & Documented: Yes

## 2024-03-15 NOTE — DISCHARGE SUMMARY
[facility]  Hospitalist Discharge Summary      Date of Admission:  3/14/2024  Date of Discharge:  3/16/2024  Discharging Provider: Charo Barney CNP  Discharge Service: Hospitalist Service    Discharge Diagnoses   Acute CVA vs TGA  Strep special Bacteremia  Leukocytosis        Unresulted Labs Ordered in the Past 30 Days of this Admission       Date and Time Order Name Status Description    3/15/2024  3:04 PM MRSA MSSA PCR, Nasal Swab In process     3/15/2024  3:01 PM Blood Culture Arm, Right In process     3/15/2024  3:01 PM Blood Culture Arm, Left In process     3/14/2024 10:05 PM Blood Culture Peripheral Blood Preliminary     3/14/2024 10:05 PM Blood Culture Peripheral Blood Preliminary         These results will be followed up by the hospitalist team    Discharge Disposition   [facility]  Condition at discharge: Stable    Hospital Course   68yoM with OA of the left hip and aortic stenosis s/p open AVR (2022) presented to the ED from Ortho clinic due to a brief period of confusion he experienced while there.  He stated that he felt a bit nauseous and then went to the bathroom.  He did not remember going to the bathroom, having a conversation with a friend at the clinic afterwards, or his wife driving him to the ED.  The ED provider reported some possible word-finding difficulties that were quite subtle.  He denied weakness, numbness, tingling, HA, visual changes, chills, chest pain, SOB, cough.  He is unsure if he had a fever but over the last few days he has felt warm. He has had no medication changes and no prior episodes like this in the past.         Confusion  Acute CVA vs transient global amnesia (reviewed Stroke Neurology Note and recommendations)   MRI: punctate focus of diffusion abnormality in L hippocampal region, no hemorrhage, no significant chronic small vessel disease.  CTA no significant stenosis or dissection.  TTE:  LVEF  60-65%, valvles not well visiualized.  -per Neuro: acute punctate CVA in left hippocampus; vs other etiology  -CTA head/neck: no significant stenosis, no acute infarct, no hemorrhage  Per stroke recommendations, neuro checks q 4 hours, asa 81 mg and plavix (holding plavix for now) 75 mg x 21 days then asa monotherapy 325 mg daily.  -goal bp less than 180 systolic, long term goal less than 130/80, ldl goal 40 to 70, on statin, PT/OT/SLP,      In the interim blood cultures which were obtained on admission resulted gram positive cocci in pairs and chains in both bottles.  Initiated on zosyn and (Vancomycin added overnight).  Reviewed with Stroke team who felt patient should transfer for AC for concern of SBE.  I have requested the patient be transferred and we are still waiting for a bed.  In the meantime we will follow inflammatory markers, continue broad spectrum antibiotics.  No source identified as of yet see below.      Strep species bacteremia (2 bottles)/SIRS  Possible SBE. As above, blood cultures obtained on admission resulting in gram + cocci in pairs and chains and streptococcus species by [facility]rgene GP panel.  He was initiated on Zosyn (vancomycin added overnight).  The source is unclear (he has had flu like symptoms over the past week).  Chest xray and UA negative.  However the patient was found to have severe bone on bone left hip arthritis on his visit to Orthopedics on 3/14/2024 when his amnesic event occurred.  On review of the Orthopedic notes and with patient, the hip pain came on suddenly and severe but not progressive.  He has been hemodynamically stable however blood pressure 88 systolic this am.  WBC 19 on admission 12.5 on 316. --->158  Heart valves not well visualized on TTE done on admission.  -obtain lactic acid (done 1.5)  -start crystalloid infusion ns 125/ hour for now (decrease now with normal lactate and better blood pressure)  -continue broad spectrum anbx (zosyn initiated on 3/15,  Vancomycin initiated on am 3/16)  -consider septic arthritis?  Waiting to discuss with radiology whether MRI vs CT with contrast of left hip.  ? Orthopedic consultation at [facility]  -await further speciation and sensitivities  -repeat blood culx sent 3/16/24  -transfer to higher level of care for AC and further management     Leukocytosis.  Admission 19----> 12.5 on 3/16.  No fever.  See above.  UA wnl, COVID/flu/RSV neg; CXR clear.  ? Septic arthritis/SBE SEE above     Bioprosthetic AVR (12/2022) Patient had open AVR in 2022.  Valves not visualized well on TTE.  Transfer to [facility] for AC  -cont ASA 81mg daily     Hyponatremia. 131-133 Lab reportedly having issues with sodiums being off thus we will wait for recheck when they have instrumentation repaired.  I called the lab the am of 3/16 as well and the device has just been repaired and I have asked for the sodium to be rerun.  -await sodium recheck due to calibration issues in lab     Anemia  Stable; baseline Hgb 11-13     OA  -very difficult walking due to pain  -continue oxycodone to every 4 hours (from every 6 hours)     Diet: Combination Diet Regular Diet Adult    DVT Prophylaxis: Pneumatic Compression Devices  Brown Catheter: Not present  Lines: None     Cardiac Monitoring: ACTIVE order. Indication: Stroke, acute (48 hours)  Code Status: No CPR- Pre-arrest intubation OK       Clinically Significant Risk Factors          # Hyponatremia: Lowest Na = 129 mmol/L in last 2 days, will monitor as appropriate        Consultations This Hospital Stay   NEUROLOGY IP STROKE CONSULT  PHYSICAL THERAPY ADULT IP CONSULT  CARDIOLOGY IP CONSULT  HOSPITALIST IP CONSULT  NEPHROLOGY IP CONSULT  PHYSICAL THERAPY ADULT IP CONSULT    Code Status   No CPR- Do NOT Intubate    Time Spent on this Encounter   55 minutes       The patient's care was discussed with the  patient, patient's wife, stroke Neurology .    Charo Barney CNP  [address]  SURGICAL  [address] [place] [address]  Phone: [phone]  ______________________________________________________________________    Physical Exam   Vital Signs: Temp: 98.6  F (37  C) Temp src: Oral BP: 102/68 Pulse: 92   Resp: 18 SpO2: 94 % O2 Device: None (Room air)    Weight: 134 lbs 0 oz    Gen:  sitting in chair, no acute distress  HEENT:  normocephalic, atraumatic, oropharynx clear  Resp:  CTA posteriorly, normal  Card:  S1,S2, RRR no murmur, rub or gallop  Abd:  Soft, nondistened, non tender,  normoactive bowel sounds   Neuro:  Neuro exam grossly non focal       Primary Care Physician   Thony Gongora    Discharge Orders      Stroke Hospital Follow Up (for neurologist use only)    [email or web address] will call you to coordinate care as prescribed by your provider. If you don t hear from a representative within 2 business days, please call [phone].         Significant Results and Procedures   Most Recent 3 CBC's:  Recent Labs   Lab Test 03/15/24  0547 03/14/24  1640 03/04/24  1329   WBC 15.1* 19.2* 10.2   HGB 11.2* 11.8* 12.6*   MCV 84 83 86    227 251     Most Recent 3 BMP's:  Recent Labs   Lab Test 03/15/24  1451 03/15/24  0547 03/14/24  1640 03/14/24  1623 03/07/24  1052   NA  --  133* 129*  --  135   POTASSIUM  --  3.9 4.5  --  4.4   CHLORIDE  --  95* 93*  --  99   CO2  --  26 24  --  27   BUN  --  16.5 19.6  --  16.9   CR  --  0.96 0.90  --  0.85   ANIONGAP  --  12 12  --  9   RAMONA  --  9.3 9.2  --  9.1   * 109* 110*   < > 105*    < > = values in this interval not displayed.   ,   Results for orders placed or performed during the hospital encounter of 03/14/24   CT Head w/o Contrast    Narrative    EXAM: CT HEAD W/O CONTRAST  LOCATION: [facility]  DATE: 3/14/2024    INDICATION: Acute neuro deficit, stroke TIA suspected  COMPARISON: None.  TECHNIQUE: Routine CT Head without IV contrast. Multiplanar reformats. Dose reduction techniques were  used.    FINDINGS:  INTRACRANIAL CONTENTS: No intracranial hemorrhage, extraaxial collection, or mass effect.  Age-indeterminate lacunar infarct of the left caudate head. Mild presumed chronic small vessel ischemic changes. Mild generalized volume loss. No hydrocephalus.     VISUALIZED ORBITS/SINUSES/MASTOIDS: No intraorbital abnormality. Partially imaged hyperostosis of the right maxillary sinus mucosal thickening. No middle ear or mastoid effusion.    BONES/SOFT TISSUES: No acute abnormality.      Impression    IMPRESSION:  1.  No acute intracranial hemorrhage, extra-axial collection, or midline shift.  2.  Age-indeterminate lacunar infarct left caudate head.   CTA Head Neck with Contrast    Narrative    EXAM: CTA HEAD NECK W CONTRAST  LOCATION: [facility]  DATE: 3/14/2024    INDICATION: Acute neuro deficit, stroke TIA suspected  COMPARISON: Noncontrast head CT 03/14/2024  CONTRAST: ISOVUE 370, 90 mL  TECHNIQUE: Head and neck CT angiogram with IV contrast. Axial helical CT images of the head and neck vessels obtained during the arterial phase of intravenous contrast administration. Axial 2D reconstructed images and multiplanar 3D MIP reconstructed   images of the head and neck vessels were performed by the technologist. Dose reduction techniques were used. All stenosis measurements made according to NASCET criteria unless otherwise specified.    FINDINGS:     HEAD CTA:  ANTERIOR CIRCULATION: No stenosis/occlusion, aneurysm, or high flow vascular malformation. Standard [place] anatomy.    POSTERIOR CIRCULATION: No stenosis/occlusion, aneurysm, or high flow vascular malformation. Balanced vertebral arteries supply a normal basilar artery.     DURAL VENOUS SINUSES: Not well evaluated on a technical basis.    NECK CTA:  RIGHT CAROTID: Mixed calcified and noncalcified atherosclerotic plaque the right carotid bifurcation results in up to 30% stenosis of the proximal right ICA  segment. No evidence for dissection.    LEFT CAROTID: Mixed calcified and noncalcified atherosclerotic plaque the right carotid bifurcation results in up to 30% stenosis of the proximal left ICA segment. No evidence for dissection.    VERTEBRAL ARTERIES: No focal stenosis or dissection. Balanced vertebral arteries.    AORTIC ARCH: Classic aortic arch anatomy with no significant stenosis at the origin of the great vessels.    NONVASCULAR STRUCTURES: Unremarkable.      Impression    IMPRESSION:   HEAD CTA:   1.  No significant stenosis, aneurysm, or high flow vascular malformation identified.    NECK CTA:  1.  Atherosclerotic changes of the carotid bifurcations without hemodynamically significant stenosis in the neck vessels.   2.  No evidence for dissection.   MR Brain w/o & w Contrast    Narrative    EXAM: MR BRAIN W/O and W CONTRAST  LOCATION: [facility]  DATE: 3/14/2024    INDICATION: Confusion, episode of almost complete amnesia from earlier today  COMPARISON: None.  CONTRAST: 6.5 mL Gadavist  TECHNIQUE: Routine multiplanar multisequence head MRI without and with intravenous contrast.    FINDINGS:  INTRACRANIAL CONTENTS: No acute or subacute infarct. Left caudate head lacunar infarct is chronic. No mass, acute hemorrhage, or extra-axial fluid collections. Scattered nonspecific T2/FLAIR hyperintensities within the cerebral white matter most   consistent with mild chronic microvascular ischemic change. Mild generalized cerebral atrophy. No hydrocephalus. Normal position of the cerebellar tonsils. No pathologic contrast enhancement.    SELLA: No abnormality accounting for technique.    OSSEOUS STRUCTURES/SOFT TISSUES: Normal marrow signal. The major intracranial vascular flow voids are maintained.     ORBITS: No abnormality accounting for technique.     SINUSES/MASTOIDS: Mild mucosal thickening scattered about the paranasal sinuses. Scattered fluid/membrane thickening in the mastoid  air cells bilaterally.       Impression    IMPRESSION:  1.  No acute or subacute ischemic change.  2.  No acute intracranial process or abnormal enhancement.  3.  Mild age-related changes as above.   XR Chest Port 1 View    Narrative    EXAM: XR CHEST PORT 1 VIEW  LOCATION: [facility]  DATE: 3/14/2024    INDICATION: Elevated white blood cell count, rule out infiltrate  COMPARISON: 2024      Impression    IMPRESSION: Multiple median sternotomy wires. No pneumothorax or pleural effusion. No focal consolidation. Cardiac silhouette within normal limits. Prosthetic aortic valve. Mildly tortuous aorta. No acute osseous abnormality.   Echocardiogram Complete     Value    LVEF  60-65%    Narrative    [id number]  [id number]  [id number]  [id number]^CELIO^LEXIE     [facility]  Echocardiography Laboratory  [address] [place] [address]     Name: ALEXIA GRACE  MRN: [id number]  : 1955  Study Date: 03/15/2024 07:55 AM  Age: 68 yrs  Gender: Male  Patient Location: [facility]  Reason For Study: CVA, Aortic Valve Replacement  History: AVR  Ordering Physician: LEXIE PICKENS  Referring Physician: Thony Gongora  Performed By: Thea Sapp     BSA: 1.7 m2  Height: 67 in  Weight: 138 lb  HR: 140  BP: 89/ mmHg  ______________________________________________________________________________  Procedure  Complete Portable Echo Adult.  ______________________________________________________________________________  Interpretation Summary     Poor images.  S/p 21mm Resilia TAVR 2022  mean gradient 27mmHg, Peak 47mmHg  The prosthetic aortic valve is not well visualized.  Compared to prior study, changes are noted.  ______________________________________________________________________________  Left Ventricle  The left ventricle is normal in size. Left ventricular systolic function is  normal. The visual ejection fraction is 60-65%. Grade I or early  diastolic  dysfunction. The left ventricular apex is not well visualized.     Right Ventricle  The right ventricle is not well visualized. The right ventricular systolic  function is mildly reduced.     Atria  The left atrium is not well visualized. Normal left atrial size. Right atrium  not well visualized. Right atrial size is normal.     Mitral Valve  The mitral valve is not well visualized. There is trace mitral regurgitation.  There is no mitral valve stenosis.     Tricuspid Valve  The tricuspid valve is not well visualized. Right ventricular systolic  pressure could not be approximated due to inadequate tricuspid regurgitation.     Aortic Valve  No aortic regurgitation is present. The peak AoV pressure gradient is 47.0  mmHg. The mean AoV pressure gradient is 27.1 mmHg. The prosthetic aortic valve  is not well visualized.     Pulmonic Valve  The pulmonic valve is not well visualized.     Vessels  The aortic root is not well visualized. The inferior vena cava is normal.     Pericardium  The pericardium is not well visualized.     ______________________________________________________________________________  MMode/2D Measurements & Calculations  IVSd: 1.2 cm  LVIDd: 4.2 cm  LVIDs: 3.0 cm  LVPWd: 1.3 cm  FS: 29.8 %  LV mass(C)d: 200.3 grams  LV mass(C)dI: 115.9 grams/m2     Ao root diam: 3.0 cm  LA dimension: 3.9 cm  asc Aorta Diam: 3.1 cm  LA/Ao: 1.3  Ao root diam index Ht(cm/m): 1.7  Ao root diam index BSA (cm/m2): 1.7  Asc Ao diam index BSA (cm/m2): 1.8  Asc Ao diam index Ht(cm/m): 1.9  LA Volume (BP): 47.1 ml  LA Volume Index (BP): 27.2 ml/m2  RWT: 0.63     TAPSE: 1.7 cm     Doppler Measurements & Calculations  MV E max maikol: 66.8 cm/sec  MV A max maikol: 78.4 cm/sec  MV E/A: 0.85  MV dec time: 0.35 sec  Ao V2 max: 343.9 cm/sec  Ao max P.0 mmHg  Ao V2 mean: 243.7 cm/sec  Ao mean P.1 mmHg  Ao V2 VTI: 65.6 cm  LV V1 max P.2 mmHg  LV V1 max: 134.0 cm/sec  LV V1 VTI: 28.7 cm  PA V2 max: 83.5 cm/sec  PA  max P.9 mmHg  TR max romulo: 256.3 cm/sec  TR max P.3 mmHg  AV Romulo Ratio (DI): 0.39  Medial E/e': 9.0  RV S Romulo: 8.9 cm/sec     ______________________________________________________________________________  Report approved by: Vijay Lau 03/15/2024 11:24 AM             Discharge Medications     Current Facility-Administered Medications:     acetaminophen (TYLENOL) tablet 650 mg, 650 mg, Oral, Q4H PRN, 650 mg at 03/15/24 1350 **OR** acetaminophen (TYLENOL) Suppository 650 mg, 650 mg, Rectal, Q4H PRN, Pilar Hope MD    aspirin EC tablet 81 mg, 81 mg, Oral, Daily, Pilar Hope MD, 81 mg at 03/15/24 0854    atorvastatin (LIPITOR) tablet 40 mg, 40 mg, Oral, QPM, Sarah Loving APRN CNP    calcium carbonate (TUMS) chewable tablet 1,000 mg, 1,000 mg, Oral, 4x Daily PRN, Pilar Hope MD    [Held by provider] clopidogrel (PLAVIX) tablet 75 mg, 75 mg, Oral, Daily, Sarah Loving APRN CNP    lidocaine (LMX4) cream, , Topical, Q1H PRN, Pilar Hope MD    lidocaine 1 % 0.1-1 mL, 0.1-1 mL, Other, Q1H PRN, Pilar Hope MD    morphine (PF) injection 4 mg, 4 mg, Intravenous, Q15 Min PRN, Bo Eisenberg MD, 4 mg at 03/15/24 0345    naloxone (NARCAN) injection 0.2 mg, 0.2 mg, Intravenous, Q2 Min PRN **OR** naloxone (NARCAN) injection 0.4 mg, 0.4 mg, Intravenous, Q2 Min PRN **OR** naloxone (NARCAN) injection 0.2 mg, 0.2 mg, Intramuscular, Q2 Min PRN **OR** naloxone (NARCAN) injection 0.4 mg, 0.4 mg, Intramuscular, Q2 Min PRN, Pilar Hope MD    ondansetron (ZOFRAN ODT) ODT tab 4 mg, 4 mg, Oral, Q6H PRN **OR** ondansetron (ZOFRAN) injection 4 mg, 4 mg, Intravenous, Q6H PRN, Pilar Hope MD    oxyCODONE (ROXICODONE) tablet 5 mg, 5 mg, Oral, Q4H PRN, Charo Barney CNP    piperacillin-tazobactam (ZOSYN) 3.375 g vial to attach to  mL bag, 3.375 g, Intravenous, Q6H, Charo Barney CNP, 3.375 g at 03/15/24 1524    senna-docusate (SENOKOT-S/PERICOLACE)  8.6-50 MG per tablet 1 tablet, 1 tablet, Oral, BID PRN **OR** senna-docusate (SENOKOT-S/PERICOLACE) 8.6-50 MG per tablet 2 tablet, 2 tablet, Oral, BID PRN, Pilar Hope MD    sodium chloride (PF) 0.9% PF flush 3 mL, 3 mL, Intracatheter, Q8H, Pilar Hope MD, 3 mL at 03/15/24 1351    sodium chloride (PF) 0.9% PF flush 3 mL, 3 mL, Intracatheter, q1 min prn, Pilar Hope MD    Allergies   Allergies   Allergen Reactions    No Known Drug Allergy

## 2024-03-15 NOTE — PROGRESS NOTES
[facility]    Medicine Progress Note - Hospitalist Service    Date of Admission:  3/14/2024    Assessment & Plan   68yoM with OA of the left hip and aortic stenosis s/p AVR (2021) presented to the ED from Ortho clinic due to a brief period of confusion he experienced while there.  He says he felt a bit nauseous and then went to the bathroom.  He does not remember going to the bathroom, having a conversation with a friend at the clinic afterwards, or his wife driving him to the ED.  The ED provider reported some possible word-finding difficulties that were quite subtle.  He denies weakness, numbness, tingling, HA, visual changes, chills, chest pain, SOB, cough.  He is unsure if he had a fever but over the last few days he has felt warm. He has had no medication changes and no prior episodes like this in the past.     Patient admitted shortly after midnight    Confusion  Acute CVA vs transient global amnesia (reviewed Stroke Neurology Note and recommendations)    -per Neuro: acute punctate CVA in left hippocampus; vs other etiology  -CTA head/neck: no significant stenosis, no acute infarct, no hemorrhage  Per stroke recommendations, neuro checks q 4 hours, asa 81 mg and plavix 75 mg x 21 days then asa monotherapy 325 mg daily.  -goal bp less than 180 systolic, long term goal less than 130/80, ldl goal 40 to 70, on statin, PT/OT/SLP,      Leukocytosis.  Down today.  No signs of infection, blood cultures sent.  Echo pending.  UA wnl, COVID/flu/RSV neg; CXR clear     Bioprosthetic AVR (12/2021)  -cont ASA 81mg daily     Hyponatremia.  Lab reportedly having issues with sodiums being off thus we will wait for recheck when they have instrumentation repaired.  -await sodium recheck due to calibration issues in lab     Anemia  Stable; baseline Hgb 11-13     OA  -very difficult walking due to pain  -increase oxycodone to every 4 hours (from every 6 hours)          Diet: Combination Diet Regular  Diet Adult    DVT Prophylaxis: Pneumatic Compression Devices  Brown Catheter: Not present  Lines: None     Cardiac Monitoring: ACTIVE order. Indication: Stroke, acute (48 hours)  Code Status: No CPR- Do NOT Intubate      Clinically Significant Risk Factors Present on Admission         # Hyponatremia: Lowest Na = 129 mmol/L in last 2 days, will monitor as appropriate        # Drug Induced Platelet Defect: home medication list includes an antiplatelet medication                   Disposition Plan      Expected Discharge Date: 03/16/2024      Destination: home with family;home with help/services            The patient's care was discussed with the  entire care team .    Charo Barney CNP  Hospitalist Service  [facility]  Securely message with [employer] (more info)  Text page via [employer] Paging/Directory   ______________________________________________________________________    Interval History   No new events since admission last night    Physical Exam   Vital Signs: Temp: 98.9  F (37.2  C) Temp src: Oral BP: (!) 143/85 Pulse: 78   Resp: 20 SpO2: 99 % O2 Device: None (Room air)    Weight: 134 lbs 0 oz    Gen:  sitting in chair, no acute distress  HEENT:  normocephalic, atraumatic, oropharynx clear  Resp:  CTA posteriorly, normal  Card:  S1,S2, RRR no murmur, rub or gallop  Abd:  Soft per RN exam, non tender,  normoactive bowel sounds   Neuro:  Neuro exam non focal      Medical Decision Making             Data     I have personally reviewed the following data over the past 24 hrs:    15.1 (H)  \   11.2 (L)   / 218     133 (L) 95 (L) 16.5 /  109 (H)   3.9 26 0.96 \     Trop: 12 BNP: N/A     TSH: 2.84 T4: N/A A1C: 5.8 (H)     Procal: 0.99 (H) CRP: 128.47 (H) Lactic Acid: N/A       INR:  1.08 PTT:  31   D-dimer:  N/A Fibrinogen:  N/A       Imaging results reviewed over the past 24 hrs:   Recent Results (from the past 24 hour(s))   CT Head w/o Contrast    Narrative    EXAM: CT HEAD W/O  CONTRAST  LOCATION: [facility]  DATE: 3/14/2024    INDICATION: Acute neuro deficit, stroke TIA suspected  COMPARISON: None.  TECHNIQUE: Routine CT Head without IV contrast. Multiplanar reformats. Dose reduction techniques were used.    FINDINGS:  INTRACRANIAL CONTENTS: No intracranial hemorrhage, extraaxial collection, or mass effect.  Age-indeterminate lacunar infarct of the left caudate head. Mild presumed chronic small vessel ischemic changes. Mild generalized volume loss. No hydrocephalus.     VISUALIZED ORBITS/SINUSES/MASTOIDS: No intraorbital abnormality. Partially imaged hyperostosis of the right maxillary sinus mucosal thickening. No middle ear or mastoid effusion.    BONES/SOFT TISSUES: No acute abnormality.      Impression    IMPRESSION:  1.  No acute intracranial hemorrhage, extra-axial collection, or midline shift.  2.  Age-indeterminate lacunar infarct left caudate head.   CTA Head Neck with Contrast    Narrative    EXAM: CTA HEAD NECK W CONTRAST  LOCATION: [facility]  DATE: 3/14/2024    INDICATION: Acute neuro deficit, stroke TIA suspected  COMPARISON: Noncontrast head CT 03/14/2024  CONTRAST: ISOVUE 370, 90 mL  TECHNIQUE: Head and neck CT angiogram with IV contrast. Axial helical CT images of the head and neck vessels obtained during the arterial phase of intravenous contrast administration. Axial 2D reconstructed images and multiplanar 3D MIP reconstructed   images of the head and neck vessels were performed by the technologist. Dose reduction techniques were used. All stenosis measurements made according to NASCET criteria unless otherwise specified.    FINDINGS:     HEAD CTA:  ANTERIOR CIRCULATION: No stenosis/occlusion, aneurysm, or high flow vascular malformation. Standard [place] anatomy.    POSTERIOR CIRCULATION: No stenosis/occlusion, aneurysm, or high flow vascular malformation. Balanced vertebral arteries supply a  normal basilar artery.     DURAL VENOUS SINUSES: Not well evaluated on a technical basis.    NECK CTA:  RIGHT CAROTID: Mixed calcified and noncalcified atherosclerotic plaque the right carotid bifurcation results in up to 30% stenosis of the proximal right ICA segment. No evidence for dissection.    LEFT CAROTID: Mixed calcified and noncalcified atherosclerotic plaque the right carotid bifurcation results in up to 30% stenosis of the proximal left ICA segment. No evidence for dissection.    VERTEBRAL ARTERIES: No focal stenosis or dissection. Balanced vertebral arteries.    AORTIC ARCH: Classic aortic arch anatomy with no significant stenosis at the origin of the great vessels.    NONVASCULAR STRUCTURES: Unremarkable.      Impression    IMPRESSION:   HEAD CTA:   1.  No significant stenosis, aneurysm, or high flow vascular malformation identified.    NECK CTA:  1.  Atherosclerotic changes of the carotid bifurcations without hemodynamically significant stenosis in the neck vessels.   2.  No evidence for dissection.   XR Chest Port 1 View    Narrative    EXAM: XR CHEST PORT 1 VIEW  LOCATION: [facility]  DATE: 3/14/2024    INDICATION: Elevated white blood cell count, rule out infiltrate  COMPARISON: 03/04/2024      Impression    IMPRESSION: Multiple median sternotomy wires. No pneumothorax or pleural effusion. No focal consolidation. Cardiac silhouette within normal limits. Prosthetic aortic valve. Mildly tortuous aorta. No acute osseous abnormality.   MR Brain w/o & w Contrast    Narrative    EXAM: MR BRAIN W/O and W CONTRAST  LOCATION: [facility]  DATE: 3/14/2024    INDICATION: Confusion, episode of almost complete amnesia from earlier today  COMPARISON: None.  CONTRAST: 6.5 mL Gadavist  TECHNIQUE: Routine multiplanar multisequence head MRI without and with intravenous contrast.    FINDINGS:  INTRACRANIAL CONTENTS: No acute or subacute infarct. Left  caudate head lacunar infarct is chronic. No mass, acute hemorrhage, or extra-axial fluid collections. Scattered nonspecific T2/FLAIR hyperintensities within the cerebral white matter most   consistent with mild chronic microvascular ischemic change. Mild generalized cerebral atrophy. No hydrocephalus. Normal position of the cerebellar tonsils. No pathologic contrast enhancement.    SELLA: No abnormality accounting for technique.    OSSEOUS STRUCTURES/SOFT TISSUES: Normal marrow signal. The major intracranial vascular flow voids are maintained.     ORBITS: No abnormality accounting for technique.     SINUSES/MASTOIDS: Mild mucosal thickening scattered about the paranasal sinuses. Scattered fluid/membrane thickening in the mastoid air cells bilaterally.       Impression    IMPRESSION:  1.  No acute or subacute ischemic change.  2.  No acute intracranial process or abnormal enhancement.  3.  Mild age-related changes as above.   Echocardiogram Complete   Result Value    LVEF  60-65%    Narrative    [id number]  [id number]  [id number]  [id number]^CELIO^LEXIE     [facility]  Echocardiography Laboratory  [address] [place] [address]     Name: ALEXIA GRACE  MRN: [id number]  : 1955  Study Date: 03/15/2024 07:55 AM  Age: 68 yrs  Gender: Male  Patient Location: [facility]  Reason For Study: CVA, Aortic Valve Replacement  History: AVR  Ordering Physician: LEXIE PICKENS  Referring Physician: Thony Gongora  Performed By: Thea Sapp     BSA: 1.7 m2  Height: 67 in  Weight: 138 lb  HR: 140  BP: 89/ mmHg  ______________________________________________________________________________  Procedure  Complete Portable Echo Adult.  ______________________________________________________________________________  Interpretation Summary     Poor images.  S/p 21mm Resilia TAVR 2022  mean gradient 27mmHg, Peak 47mmHg  The prosthetic aortic valve is not well visualized.  Compared to prior  study, changes are noted.  ______________________________________________________________________________  Left Ventricle  The left ventricle is normal in size. Left ventricular systolic function is  normal. The visual ejection fraction is 60-65%. Grade I or early diastolic  dysfunction. The left ventricular apex is not well visualized.     Right Ventricle  The right ventricle is not well visualized. The right ventricular systolic  function is mildly reduced.     Atria  The left atrium is not well visualized. Normal left atrial size. Right atrium  not well visualized. Right atrial size is normal.     Mitral Valve  The mitral valve is not well visualized. There is trace mitral regurgitation.  There is no mitral valve stenosis.     Tricuspid Valve  The tricuspid valve is not well visualized. Right ventricular systolic  pressure could not be approximated due to inadequate tricuspid regurgitation.     Aortic Valve  No aortic regurgitation is present. The peak AoV pressure gradient is 47.0  mmHg. The mean AoV pressure gradient is 27.1 mmHg. The prosthetic aortic valve  is not well visualized.     Pulmonic Valve  The pulmonic valve is not well visualized.     Vessels  The aortic root is not well visualized. The inferior vena cava is normal.     Pericardium  The pericardium is not well visualized.     ______________________________________________________________________________  MMode/2D Measurements & Calculations  IVSd: 1.2 cm  LVIDd: 4.2 cm  LVIDs: 3.0 cm  LVPWd: 1.3 cm  FS: 29.8 %  LV mass(C)d: 200.3 grams  LV mass(C)dI: 115.9 grams/m2     Ao root diam: 3.0 cm  LA dimension: 3.9 cm  asc Aorta Diam: 3.1 cm  LA/Ao: 1.3  Ao root diam index Ht(cm/m): 1.7  Ao root diam index BSA (cm/m2): 1.7  Asc Ao diam index BSA (cm/m2): 1.8  Asc Ao diam index Ht(cm/m): 1.9  LA Volume (BP): 47.1 ml  LA Volume Index (BP): 27.2 ml/m2  RWT: 0.63     TAPSE: 1.7 cm     Doppler Measurements & Calculations  MV E max maikol: 66.8 cm/sec  MV A max  romulo: 78.4 cm/sec  MV E/A: 0.85  MV dec time: 0.35 sec  Ao V2 max: 343.9 cm/sec  Ao max P.0 mmHg  Ao V2 mean: 243.7 cm/sec  Ao mean P.1 mmHg  Ao V2 VTI: 65.6 cm  LV V1 max P.2 mmHg  LV V1 max: 134.0 cm/sec  LV V1 VTI: 28.7 cm  PA V2 max: 83.5 cm/sec  PA max P.9 mmHg  TR max romulo: 256.3 cm/sec  TR max P.3 mmHg  AV Romulo Ratio (DI): 0.39  Medial E/e': 9.0  RV S Romulo: 8.9 cm/sec     ______________________________________________________________________________  Report approved by: Vijay Lau 03/15/2024 11:24 AM

## 2024-03-15 NOTE — UTILIZATION REVIEW
"Admission Status; Secondary Review Determination     Admission Date: 3/14/2024  4:20 PM       Under the authority of the Utilization Management Committee, the utilization review process indicated a secondary review on the above patient.  The review outcome is based on review of the medical records, discussions with staff, and applying clinical experience noted on the date of the review.        (x)      Inpatient Status Appropriate - This patient's medical care is consistent with medical management for inpatient care and reasonable inpatient medical practice.       RATIONALE FOR DETERMINATION      Brief clinical presentation, information copied from the chart, abbreviated and edited for relevant content:     Discussed with attending. Patient has positive blood culture, unlikely contaminant with concern about possible SBE as a cause of his neurological symptoms. Neurology recommending to start empiric antibiotics. Will keep IP.     Miguel Pappas is a 68 year old male with an Episode of memory loss/amnesia. Etiology unclear, consider TGA vs due to small infarct vs other. Per neurology - \"Description of symptoms sound suggestive of TGA  however the very short duration of symptoms would be highly unusual. Infarct is possible; out of caution patient and family are in agreement to treat as possible stroke and implement appropriate secondary prevention. Of note patient has had several head injuries; if he would go one to have recurrent stereotyped events would recommend EEG to assess for seizure activity \" MRI negative for stoke.  Inflammatory markers elevated. Hemodynamically stable but with new blood culture result, patient will need to stay for a few more days for IV antibiotics and for repeat blood cultures.           At the time of admission with the information available to the attending physician, more than 2 nights hospital complex care was anticipated. Also, there was a risk of adverse outcome if patient was " treated outpatient or observation. High intensity of services anticipated. Inpatient admission appropriate based on Medicare guidelines.       The information on this document is developed by the utilization review team in order for the business office to ensure compliance.  This only denotes the appropriateness of proper admission status and does not reflect the quality of care rendered.         The definitions of Inpatient Status and Observation Status used in making the determination above are those provided in the CMS Coverage Manual, Chapter 1 and Chapter 6, section 70.4.      Sincerely,      María Gomez MD   Utilization Review/ Case Management  [facility].

## 2024-03-15 NOTE — PROGRESS NOTES
Miguel Pappas is a 68 year old male who is seen in consultation at the request of Ivon Brock for left hip pain.  He developed this on March 7 while using a skid steer.  He did not have a fall or an injury.  He began to have pain in the hip which got worse when he got out.  He has had sharp shooting pain since then rated 9 out of 10 at the anterior and lateral aspect of the hip.  He feels better with the knee and hip event.  Worse if the hip is extended.  He has had physical therapy 1 episode today.  He has not had steroid injection or anti-inflammatories.  He has been feeling ill for the last 2 weeks.  He is currently nauseated and wanted to lay down on the examining table.    X-ray shows severe osteoarthritis of the left hip.  There is bone-on-bone at the superior lateral aspect of the hip.  Right hip is well-preserved.    Past Medical History:   Diagnosis Date    * * * SBE PROPHYLAXIS * * *     no longer indicated - 2007 AHA guidelnies    Mitral valve stenosis and aortic valve stenosis     2/6 murmur    Pure hypercholesterolemia 1/26/2007       Past Surgical History:   Procedure Laterality Date    COLONOSCOPY N/A 12/5/2014    Procedure: COLONOSCOPY;  Surgeon: Omar Chisholm MD;  Location:  GI    CV CORONARY ANGIOGRAM N/A 11/15/2022    Procedure: Coronary Angiogram;  Surgeon: Edson Nava MD;  Location:  HEART CARDIAC CATH LAB     REMOVAL OF TONSILS,<13 Y/O      Tonsils <10y.o.    REPLACE VALVE AORTIC N/A 12/16/2022    Procedure: AORTIC VALVE REPLACEMENT WITH 21MM INSPIRIS RESILIA VALVE, PLACEMENT OF TEMPORARY VENTRICULAR AND ATRIAL PACING WIRES AND TRANSESPHOGEAL ECHOCARDIOGRAM;  Surgeon: Kaila Abbott MD;  Location:  OR    [facility] COLONOSCOPY W/WO BRUSH/WASH  2/8/2005        Family History   Problem Relation Age of Onset    Cancer Father         Spleen cancer    Hypertension Father     Allergies Father         PCN    Gastrointestinal Disease Father         ulcer    Heart Disease  Father         Hx: meds and angioplasty    Hypertension Brother     Cerebrovascular Disease Paternal Grandfather     Arthritis Paternal Grandfather     Arthritis Mother     Hypertension Mother     Allergies Son         Dust , Mold    Depression Sister         X 2       Social History     Socioeconomic History    Marital status:      Spouse name: Fabian    Number of children: 3    Years of education: 12    Highest education level: Not on file   Occupational History    Occupation:    Tobacco Use    Smoking status: Never     Passive exposure: Never    Smokeless tobacco: Never   Vaping Use    Vaping Use: Never used   Substance and Sexual Activity    Alcohol use: Yes     Comment: 1-2 beer monthly  maybe    Drug use: No    Sexual activity: Yes     Partners: Female     Comment: Hx: wife had tubal ligation   Other Topics Concern     Service Yes     Comment: [employer]    Blood Transfusions No    Caffeine Concern No    Occupational Exposure No    Hobby Hazards No    Sleep Concern No    Stress Concern No    Weight Concern No    Special Diet Yes     Comment: Low fat and cholesterol high fiber    Back Care Yes     Comment: Hx: chiropractor for back inj and knee    Exercise Yes     Comment: every day    Bike Helmet No    Seat Belt Yes    Self-Exams Not Asked   Social History Narrative    Lives with spouse.     Social Determinants of Health     Financial Resource Strain: Low Risk  (11/30/2023)    Financial Resource Strain     Within the past 12 months, have you or your family members you live with been unable to get utilities (heat, electricity) when it was really needed?: No   Food Insecurity: Low Risk  (11/30/2023)    Food Insecurity     Within the past 12 months, did you worry that your food would run out before you got money to buy more?: No     Within the past 12 months, did the food you bought just not last and you didn t have money to get more?: No   Transportation Needs: Low Risk   "(11/30/2023)    Transportation Needs     Within the past 12 months, has lack of transportation kept you from medical appointments, getting your medicines, non-medical meetings or appointments, work, or from getting things that you need?: No   Physical Activity: Not on file   Stress: Not on file   Social Connections: Not on file   Interpersonal Safety: Not on file   Housing Stability: Low Risk  (11/30/2023)    Housing Stability     Do you have housing? : Yes     Are you worried about losing your housing?: No       No current outpatient medications on file.       Allergies   Allergen Reactions    No Known Drug Allergy        REVIEW OF SYSTEMS:  CONSTITUTIONAL:  NEGATIVE for fever, chills, change in weight, not feeling tired  SKIN:  NEGATIVE for worrisome rashes, no skin lumps, no skin ulcers and no non-healing wounds  EYES:  NEGATIVE for vision changes or irritation.  ENT/MOUTH:  NEGATIVE.  No hearing loss, no hoarseness, no difficulty swallowing.  RESP:  NEGATIVE. No cough or shortness of breath.  CV:  NEGATIVE for chest pain, palpitations or peripheral edema  GI:  NEGATIVE for nausea, abdominal pain, heartburn, or change in bowel habits  :  Negative. No dysuria, no hematuria  MUSCULOSKELETAL:  See HPI above  NEURO:  NEGATIVE . No headaches, no dizziness,  no numbness  ENDOCRINE:  NEGATIVE for temperature intolerance, skin/hair changes  HEME/ALLERGY/IMMUNE:  NEGATIVE for bleeding problems  PSYCHIATRIC:  NEGATIVE. no anxiety, no depression.     Exam:  Vitals: /70   Pulse 89   Resp 18   Ht 1.727 m (5' 8\")   Wt 62.6 kg (138 lb)   BMI 20.98 kg/m    BMI= Body mass index is 20.98 kg/m .  Constitutional:  healthy, alert and no distress  Neuro: Alert and Oriented x 3, no focal defects.  Psych: Affect normal   Respiratory: Breathing not labored.  Cardiovascular: normal peripheral pulses  Lymph: no adenopathy  Skin: No rashes,worrisome lesions or skin problems  Is good mobility of the right hip with 50 degrees " external rotation and 30 degrees internal rotation.  He has limited mobility of the left hip with 30 degrees external rotation and -5 degrees internal rotation.  The rotation causes pain.  Ambulation shows significant limping on the left hip.  He has tenderness primarily over the greater trochanter on the left hip.  There is no tenderness anteriorly or the sciatic notch.    Assessment:  1. Severe left hip osteoarthritis.  This pressing thing is that he denies pain prior to March 7.  It seems to be associated in onset with what ever illness he currently is experiencing.  2.  Probable viral illness.  He and his wife both report they have been sick in the last 2 weeks.    Plan: I would like to get him over his viral illness before determining a course of action on the hip.  He feels he wants a total hip arthroplasty as soon as possible.  Since I am scheduling into late May, I offered him option to see Dr. Campbell or Dr. Nunez.  He will see Dr. Campbell next week

## 2024-03-15 NOTE — PROGRESS NOTES
"Brief Stroke Note:  Admission blood cultures positive x2 with gram positive cocci in pairs and chains.    Given bacteremia and c/o stroke, recommend AC. Cannot be completed locally.    Recommendations:  -transfer for completion of AC  - hold Plavix until endocarditis is ruled out  - may continue ASA 81mg     Sarah COX, CNP  Vascular Neurology  To page me or covering stroke neurology team member, click here: AMCOM   Choose \"On Call\" tab at top, then search dropdown box for \"Neurology Adult\", select location, press Enter, then look for stroke/neuro ICU/telestroke.        "

## 2024-03-15 NOTE — CONSULTS
"Formerly [facility]    Stroke Consult Note    Reason for Consult:  confusion    Chief Complaint: Altered Mental Status       HPI  Miguel Pappas is a 68 year old male with PMH of HLD, hyponatremia, mitral valve stenosis and aortic valve stenosis s/p bioprosthertic aortic valve placement on PTA ASA 81mg. He presented to the ED 3/14/24 for evaluation of confusion. Per patient and wife he was at baseline when he was wheeled into the bathroom around 1540 but when he emerged by himself a short while later he was confused and did not recall anything that occurred in the restroom.  Was noted to have leukocytosis but no obvious infectious etiology identified. MRI with punctate focus of diffusion abnormality in L hippocampal region.     Today, Miguel is accompanied by his wife.  He is feeling better today.  They both feel that he is back to his baseline. NIH=2 for LLE weakness, which is ongoing and influenced by significant pain in the left hip.  No other neurologic deficits.    We reviewed the events that occurred yesterday.  Miguel reports that he felt his memory was essentially normal throughout the day.  He then had an orthopedic appointment around 1500 at which point he felt a little nauseous and laid down.  He then got up to use the bathroom and recalls sitting on the commode, but then notes that he has no recollection of what happened over the next 20-30 minutes; the next thing he recalls is getting out of the car in the emergency department.  His wife agrees with this history, noting that she was in the restroom with him and he was able to do everything normally (i.e. adjust his clothing, wash his hands etc.) but that he kept repeating \"where am I, what am I  doing\" over and over.  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    He does not 2 weeks of moderate to severe headaches, particularly around the apex. No other clearly associated symptoms. He thought headaches were due to hyponatremia as they seemed to improve with increases in his sodium. However sodium was 129 on admission and he reports no significant headaches in past several days.     Both Miguel and his wife deny any baseline concerns with his memory.  He has had multiple head injuries including being kicked by a cow in the head in 2005 and falling off machinery earlier this year.  He does note family history of dementia and many family members (mother, multiple aunts and uncles) but these generally presented in the eighth or ninth decade.  He does take aspirin 81 mg daily at home.    Evaluation Summarized    MRI/Head CT MRI: punctate focus of diffusion abnormality in L hippocampal region, no hemorrhage, no significant chronic small vessel disease    Intracranial Vasculature CTA: No LVO or significant stenosis   Cervical Vasculature CTA: No LVO, significant stenosis or dissection      Echocardiogram LVEF 60-65%,atrial not well visualized, valves not well visualized    EKG/Telemetry Sinus rhythm    Other Testing Blood cultures: pending   Procalcitonin: 0.99  CRP: 128/47  WBC 19.2-->15.1  Sodium 120-->133     UA unremarkable   Chest Xray unremarkable      LDL  3/14/2024: 123 mg/dL   A1C  3/14/2024: 5.8 %   Troponin 3/14/2024: 12 ng/L       Impression  Episode of memory loss/amnesia. Etiology unclear, consider TGA vs due to small infarct vs other  Description of symptoms sound suggestive of TGA (as do MRI findings), however the very short duration of symptoms would be highly unusual. Infarct is possible; out of caution patient and family are in agreement to treat as possible stroke and implement appropriate secondary prevention.     Of note patient has had several head injuries; if he would go one to have  "recurrent stereotyped events would recommend EEG to assess for seizure activity     Leukocytosis, elevated CRP, procalcitonin. Unclear etiology. Blood cultures pending      Recommendations   - Neurochecks and Vital Signs every 4 hours   - Continue ASA 81mg + Plavix 75mg x 21 days; then discontinue Plavix and continue ASA monotherapy at 325mg dose daily indefinitely   -Inpatient BP goal <180 with gradual reduction of BP to normotension; long term outpatient goal BP is <130/80 with tighter control associated with improved vascular outcomes  - (goal 40-70); initiate Lipitor 40mg with ongoing outpatient titration to achieve LDL goal   -Blood glucose monitoring, Hgb A1c 5.8%, (goal <7% for secondary stroke prevention), follow-up with PCP  - Telemetry, EKG  - Bedside Glucose Monitoring  - PT/OT/SLP  - Stroke Education  - Euthermia, Euglycemia  -if blood cultures return positive recommend AC; otherwise no further inpatient stroke workup is recommended    Patient Follow-up    - in the next 1-2 week(s) with PCP  - in 6-8 weeks with any stroke CESAR ([phone]), ordered     Thank you for this consult. We will follow peripherally for results of the blood cultures and if no concerns then will sign off. Please contact us with any additional questions.    BROOKE Alvarez CNP  Vascular Neurology    To page me or covering stroke neurology team member, click here: [facility]  Choose \"On Call\" tab at top, then select \"NEUROLOGY/ALL SITES\" from middle drop-down box, press Enter, then look for \"stroke\" or \"telestroke\" for your site.  _____________________________________________________    Clinically Significant Risk Factors Present on Admission         # Hyponatremia: Lowest Na = 129 mmol/L in last 2 days, will monitor as appropriate        # Drug Induced Platelet Defect: home medication list includes an antiplatelet medication                      Past Medical History    Past Medical History:   Diagnosis Date    * * * SBE " PROPHYLAXIS * * *     no longer indicated - 2007 AHA guidelnies    Mitral valve stenosis and aortic valve stenosis     2/6 murmur    Pure hypercholesterolemia 1/26/2007     Medications   Home Meds  Prior to Admission medications    Medication Sig Start Date End Date Taking? Authorizing Provider   amoxicillin (AMOXIL) 500 MG capsule Take 2,000 mg by mouth once as needed (dental prophy) 1/18/24  Yes Reported, Patient   ascorbic acid (VITAMIN C) 250 MG CHEW chewable tablet Take 250 mg by mouth daily   Yes Reported, Patient   aspirin 81 MG tablet Take 81 mg by mouth at bedtime   Yes Reported, Patient   ferrous sulfate (FEROSUL) 325 (65 Fe) MG tablet Take 325 mg by mouth daily (with breakfast)   Yes Reported, Patient   GLUCOSAMINE CHONDROITIN OR TABS Take 1 tablet by mouth at bedtime 12/22/05  Yes Vivian Rudd PA-C   Multiple Vitamins-Minerals (CENTRUM SILVER PO) Take 1 tablet by mouth at bedtime 12/22/05  Yes Vivian Rudd PA-C   Vitamin D3 (CHOLECALCIFEROL) 25 mcg (1000 units) tablet Take 25 mcg by mouth daily   Yes Reported, Patient       Scheduled Meds   aspirin  81 mg Oral Daily    atorvastatin  40 mg Oral QPM    [START ON 3/16/2024] clopidogrel  75 mg Oral Daily    sodium chloride (PF)  3 mL Intracatheter Q8H       Infusion Meds        Allergies   Allergies   Allergen Reactions    No Known Drug Allergy           PHYSICAL EXAMINATION   Temp:  [98.2  F (36.8  C)-98.9  F (37.2  C)] 98.9  F (37.2  C)  Pulse:  [76-95] 78  Resp:  [10-27] 20  BP: (112-143)/(70-92) 143/85  SpO2:  [97 %-100 %] 99 %    General Exam  General:  patient lying in bed without any acute distress    HEENT:  normocephalic/atraumatic  Pulmonary:  no respiratory distress    Neuro Exam  Mental Status:  alert, oriented x 3, follows commands, speech clear and fluent, naming and repetition normal  Cranial Nerves:  visual fields intact (tested by nurse), EOMI with normal smooth pursuit, facial sensation intact and symmetric (tested by nurse),  facial movements symmetric, hearing not formally tested but intact to conversation, no dysarthria, shoulder shrug equal bilaterally, tongue protrusion midline  Motor:  no abnormal movements, able to move all limbs antigravity spontaneously with no signs of hemiparesis observed, no pronator drift, limited movement against gravity in LLE (pt reports is due to severe hip pain and ongoing), no drift in RLE  Reflexes:  unable to test (telestroke)  Sensory:  light touch sensation intact and symmetric throughout upper and lower extremities (assessed by nurse), no extinction on double simultaneous stimulation (assessed by nurse)  Coordination:  normal finger-to-nose and heel-to-shin bilaterally without dysmetria  Station/Gait:  unable to test due to telestroke    Stroke Scales    NIHSS  1a. Level of Consciousness 0-->Alert, keenly responsive   1b. LOC Questions 0-->Answers both questions correctly   1c. LOC Commands 0-->Performs both tasks correctly   2.   Best Gaze 0-->Normal   3.   Visual 0-->No visual loss   4.   Facial Palsy 0-->Normal symmetrical movements   5a. Motor Arm, Left 0-->No drift, limb holds 90 (or 45) degrees for full 10 secs   5b. Motor Arm, Right 0-->No drift, limb holds 90 (or 45) degrees for full 10 secs   6a. Motor Leg, Left 2-->Some effort against gravity, leg falls to bed by 5 secs, but has some effort against gravity   6b. Motor Leg, right 0-->No drift, leg holds 30 degree position for full 5 secs   7.   Limb Ataxia 0-->Absent   8.   Sensory 0-->Normal, no sensory loss   9.   Best Language 0-->No aphasia, normal   10. Dysarthria 0-->Normal   11. Extinction and Inattention  0-->No abnormality   Total 2 (03/15/24 1207)       Imaging  I personally reviewed all imaging; relevant findings per HPI.    Labs Data   CBC  Recent Labs   Lab 03/15/24  0547 03/14/24  1640   WBC 15.1* 19.2*   RBC 3.90* 4.10*   HGB 11.2* 11.8*   HCT 32.9* 34.1*    227     Basic Metabolic Panel   Recent Labs   Lab  "03/15/24  0547 03/14/24  1640 03/14/24  1623   * 129*  --    POTASSIUM 3.9 4.5  --    CHLORIDE 95* 93*  --    CO2 26 24  --    BUN 16.5 19.6  --    CR 0.96 0.90  --    * 110* 107*   RAMONA 9.3 9.2  --      Liver Panel  No results for input(s): \"PROTTOTAL\", \"ALBUMIN\", \"BILITOTAL\", \"ALKPHOS\", \"AST\", \"ALT\", \"BILIDIRECT\" in the last 168 hours.  INR    Recent Labs   Lab Test 03/14/24  1640 03/14/23  1334 03/07/23  1343   INR 1.08 2.4* 1.5*           Stroke Consult Data Data   Telestroke Service Details  (for non-emergent stroke consult with tele)  Video start time 03/15/24   1205   Video end time 03/15/24   1246   Type of service telemedicine diagnostic assessment of acute neurological changes   Reason telemedicine is appropriate patient requires assessment with a specialist for diagnosis and treatment of neurological symptoms   Mode of transmission secure interactive audio and video communication per Fredis   Originating site (patient location) [facility]    Distant site (provider location) [facility]       I have personally spent a total of 80 minutes providing care today, time spent in reviewing medical records and devising the plan as recorded above.   "

## 2024-03-15 NOTE — PROGRESS NOTES
Patient had (+) Blood culture. Needs AC as per provider which is not available in our facility, transfer to other facility ordered. Awaiting for receiving facility's update.

## 2024-03-15 NOTE — H&P
[facility]    History and Physical - Hospitalist Service       Date of Admission:  3/14/2024    Assessment & Plan    Confusion  Acute CVA vs transient global amnesia  -per Neuro: acute punctate CVA in left hippocampus; official MRI report-no acute infarct  -CTA head/neck: no significant stenosis, no acute infarct, no hemorrhage  -Stroke Neuro followup in AM    Leukocytosis  -no obvious infectious etiology   -blood cultures pending  -Echo ordered to r/o endocarditis  -UA wnl, COVID/flu/RSV neg; CXR clear    Bioprosthetic AVR (12/2021)  -cont ASA 81mg daily    Hyponatremia  -NS x 1L  -check urine sodium, osmolality; TSH wnl    Anemia  Stable; baseline Hgb 11-13    OA  -tylenol prn     Diet: Combination Diet Regular Diet Adult    DVT Prophylaxis: Pneumatic Compression Devices  Brown Catheter: Not present  Lines: None     Code Status:  DNR-DNI    Clinically Significant Risk Factors Present on Admission      # Drug Induced Platelet Defect: home medication list includes an antiplatelet medication                   Disposition Plan      Expected Discharge Date: 03/16/2024                The patient's care was discussed with the Patient.        LEXIE PICKENS MD  [facility]  Securely message with the Vocera Web Console (learn more here)  Text page via [employer] Paging/Directory      Visit/Communication Style   Virtual (Video) communication was used to evaluate Miguel.  Miguel consented to the use of video communication: yes  Video START time: 0002, 3/15/2024  Video STOP time: 0010, 3/15/2024   Patient's location: [facility]   Provider's location during the visit: [facility] Tele-medicine site        ______________________________________________________________________    Chief Complaint   confusion    History of Present Illness   68yoM with OA of the left hip and aortic stenosis s/p AVR (2021) presented to the ED from Ortho  clinic due to a brief period of confusion he experienced while there.  He says he felt a bit nauseous and then went to the bathroom.  He does not remember going to the bathroom, having a conversation with a friend at the clinic afterwards, or his wife driving him to the ED.  The ED provider reported some possible word-finding difficulties that were quite subtle.  He denies weakness, numbness, tingling, HA, visual changes, chills, chest pain, SOB, cough.  He is unsure if he had a fever but over the last few days he has felt warm. He has had no medication changes and no prior episodes like this in the past.    Review of Systems    General: negative for fever, chills, sweats, weakness  Eyes: negative for blurred vision, loss of vision  Ear Nose and Throat: negative for pharyngitis, speech or swallowing difficulties  Respiratory:  negative for sputum production, wheezing, GARCIA, pleuritic pain, sob or cough  Cardiology:  negative for chest pain, palpitations, orthopnea, PND, edema, syncope   Gastrointestinal: negative for abdominal pain, nausea, vomiting, diarrhea, constipation, hematemesis, melena or hematochezia  Genitourinary: negative for frequency, urgency, dysuria, hematuria   Neurological: negative for focal weakness, paresthesia    Past Medical History    I have reviewed this patient's medical history and updated it with pertinent information if needed.   Past Medical History:   Diagnosis Date    * * * SBE PROPHYLAXIS * * *     no longer indicated - 2007 AHA guidelnies    Mitral valve stenosis and aortic valve stenosis     2/6 murmur    Pure hypercholesterolemia 1/26/2007       Past Surgical History   I have reviewed this patient's surgical history and updated it with pertinent information if needed.  Past Surgical History:   Procedure Laterality Date    COLONOSCOPY N/A 12/5/2014    Procedure: COLONOSCOPY;  Surgeon: Omar Chisholm MD;  Location:  GI    CV CORONARY ANGIOGRAM N/A 11/15/2022    Procedure:  Coronary Angiogram;  Surgeon: Edson Nava MD;  Location:  HEART CARDIAC CATH LAB    HC REMOVAL OF TONSILS,<11 Y/O      Tonsils <12y.o.    REPLACE VALVE AORTIC N/A 12/16/2022    Procedure: AORTIC VALVE REPLACEMENT WITH 21MM INSPIRIS RESILIA VALVE, PLACEMENT OF TEMPORARY VENTRICULAR AND ATRIAL PACING WIRES AND TRANSESPHOGEAL ECHOCARDIOGRAM;  Surgeon: Kaila Abbott MD;  Location:  OR    [facility] COLONOSCOPY W/WO BRUSH/WASH  2/8/2005        Social History   I have reviewed this patient's social history and updated it with pertinent information if needed.  Social History     Tobacco Use    Smoking status: Never     Passive exposure: Never    Smokeless tobacco: Never   Vaping Use    Vaping Use: Never used   Substance Use Topics    Alcohol use: Yes     Comment: 1-2 beer monthly  maybe    Drug use: No       Family History   I have reviewed this patient's family history and updated it with pertinent information if needed.  Family History   Problem Relation Age of Onset    Cancer Father         Spleen cancer    Hypertension Father     Allergies Father         PCN    Gastrointestinal Disease Father         ulcer    Heart Disease Father         Hx: meds and angioplasty    Hypertension Brother     Cerebrovascular Disease Paternal Grandfather     Arthritis Paternal Grandfather     Arthritis Mother     Hypertension Mother     Allergies Son         Dust , Mold    Depression Sister         X 2       Prior to Admission Medications   Prior to Admission Medications   Prescriptions Last Dose Informant Patient Reported? Taking?   GLUCOSAMINE CHONDROITIN OR TABS 3/13/2024 at  Spouse/Significant Other Yes Yes   Sig: Take 1 tablet by mouth at bedtime   Multiple Vitamins-Minerals (CENTRUM SILVER PO) 3/13/2024 at  Spouse/Significant Other Yes Yes   Sig: Take 1 tablet by mouth at bedtime   Vitamin D3 (CHOLECALCIFEROL) 25 mcg (1000 units) tablet 3/14/2024 at am  Yes Yes   Sig: Take 25 mcg by mouth daily   amoxicillin  (AMOXIL) 500 MG capsule More than a month at unkn  Yes Yes   Sig: Take 2,000 mg by mouth once as needed (dental prophy)   ascorbic acid (VITAMIN C) 250 MG CHEW chewable tablet 3/14/2024 at am  Yes Yes   Sig: Take 250 mg by mouth daily   aspirin 81 MG tablet 3/14/2024 at 1215 Spouse/Significant Other Yes Yes   Sig: Take 81 mg by mouth at bedtime   ferrous sulfate (FEROSUL) 325 (65 Fe) MG tablet 3/14/2024 at am  Yes Yes   Sig: Take 325 mg by mouth daily (with breakfast)      Facility-Administered Medications: None     Allergies   Allergies   Allergen Reactions    No Known Drug Allergy        Physical Exam   Vital Signs: Temp: 98.2  F (36.8  C) Temp src: Temporal BP: 130/81 Pulse: 78   Resp: 27 SpO2: 99 % O2 Device: None (Room air)    Weight: 138 lbs 0 oz    Gen:  Well-developed, well-nourished, in no acute distress, lying semi-supine in hospital stretcher  HEENT:  Anicteric sclera, PER, hearing intact to voice  Resp:  No accessory muscle use, breath sounds clear; no wheezes no rales no rhonchi  Card:  No murmur, normal S1, S2   Abd:  Soft per RN exam, no TTP, non-distended, normoactive bowel sounds are present  Musc:  Normal strength and movement of the major muscle groups without obvious deformity  Psych:  Good insight, oriented to person, place and time, not anxious, not agitated    Data     Recent Labs   Lab 03/14/24  1640 03/14/24  1623   WBC 19.2*  --    HGB 11.8*  --    MCV 83  --      --    INR 1.08  --    *  --    POTASSIUM 4.5  --    CHLORIDE 93*  --    CO2 24  --    BUN 19.6  --    CR 0.90  --    ANIONGAP 12  --    RAMONA 9.2  --    * 107*         Recent Results (from the past 24 hour(s))   CT Head w/o Contrast    Narrative    EXAM: CT HEAD W/O CONTRAST  LOCATION: [facility]  DATE: 3/14/2024    INDICATION: Acute neuro deficit, stroke TIA suspected  COMPARISON: None.  TECHNIQUE: Routine CT Head without IV contrast. Multiplanar reformats. Dose reduction  techniques were used.    FINDINGS:  INTRACRANIAL CONTENTS: No intracranial hemorrhage, extraaxial collection, or mass effect.  Age-indeterminate lacunar infarct of the left caudate head. Mild presumed chronic small vessel ischemic changes. Mild generalized volume loss. No hydrocephalus.     VISUALIZED ORBITS/SINUSES/MASTOIDS: No intraorbital abnormality. Partially imaged hyperostosis of the right maxillary sinus mucosal thickening. No middle ear or mastoid effusion.    BONES/SOFT TISSUES: No acute abnormality.      Impression    IMPRESSION:  1.  No acute intracranial hemorrhage, extra-axial collection, or midline shift.  2.  Age-indeterminate lacunar infarct left caudate head.   CTA Head Neck with Contrast    Narrative    EXAM: CTA HEAD NECK W CONTRAST  LOCATION: [facility]  DATE: 3/14/2024    INDICATION: Acute neuro deficit, stroke TIA suspected  COMPARISON: Noncontrast head CT 03/14/2024  CONTRAST: ISOVUE 370, 90 mL  TECHNIQUE: Head and neck CT angiogram with IV contrast. Axial helical CT images of the head and neck vessels obtained during the arterial phase of intravenous contrast administration. Axial 2D reconstructed images and multiplanar 3D MIP reconstructed   images of the head and neck vessels were performed by the technologist. Dose reduction techniques were used. All stenosis measurements made according to NASCET criteria unless otherwise specified.    FINDINGS:     HEAD CTA:  ANTERIOR CIRCULATION: No stenosis/occlusion, aneurysm, or high flow vascular malformation. Standard [place] anatomy.    POSTERIOR CIRCULATION: No stenosis/occlusion, aneurysm, or high flow vascular malformation. Balanced vertebral arteries supply a normal basilar artery.     DURAL VENOUS SINUSES: Not well evaluated on a technical basis.    NECK CTA:  RIGHT CAROTID: Mixed calcified and noncalcified atherosclerotic plaque the right carotid bifurcation results in up to 30% stenosis of the  proximal right ICA segment. No evidence for dissection.    LEFT CAROTID: Mixed calcified and noncalcified atherosclerotic plaque the right carotid bifurcation results in up to 30% stenosis of the proximal left ICA segment. No evidence for dissection.    VERTEBRAL ARTERIES: No focal stenosis or dissection. Balanced vertebral arteries.    AORTIC ARCH: Classic aortic arch anatomy with no significant stenosis at the origin of the great vessels.    NONVASCULAR STRUCTURES: Unremarkable.      Impression    IMPRESSION:   HEAD CTA:   1.  No significant stenosis, aneurysm, or high flow vascular malformation identified.    NECK CTA:  1.  Atherosclerotic changes of the carotid bifurcations without hemodynamically significant stenosis in the neck vessels.   2.  No evidence for dissection.   XR Chest Port 1 View    Narrative    EXAM: XR CHEST PORT 1 VIEW  LOCATION: [facility]  DATE: 3/14/2024    INDICATION: Elevated white blood cell count, rule out infiltrate  COMPARISON: 03/04/2024      Impression    IMPRESSION: Multiple median sternotomy wires. No pneumothorax or pleural effusion. No focal consolidation. Cardiac silhouette within normal limits. Prosthetic aortic valve. Mildly tortuous aorta. No acute osseous abnormality.   MR Brain w/o & w Contrast    Narrative    EXAM: MR BRAIN W/O and W CONTRAST  LOCATION: [facility]  DATE: 3/14/2024    INDICATION: Confusion, episode of almost complete amnesia from earlier today  COMPARISON: None.  CONTRAST: 6.5 mL Gadavist  TECHNIQUE: Routine multiplanar multisequence head MRI without and with intravenous contrast.    FINDINGS:  INTRACRANIAL CONTENTS: No acute or subacute infarct. Left caudate head lacunar infarct is chronic. No mass, acute hemorrhage, or extra-axial fluid collections. Scattered nonspecific T2/FLAIR hyperintensities within the cerebral white matter most   consistent with mild chronic microvascular ischemic  change. Mild generalized cerebral atrophy. No hydrocephalus. Normal position of the cerebellar tonsils. No pathologic contrast enhancement.    SELLA: No abnormality accounting for technique.    OSSEOUS STRUCTURES/SOFT TISSUES: Normal marrow signal. The major intracranial vascular flow voids are maintained.     ORBITS: No abnormality accounting for technique.     SINUSES/MASTOIDS: Mild mucosal thickening scattered about the paranasal sinuses. Scattered fluid/membrane thickening in the mastoid air cells bilaterally.       Impression    IMPRESSION:  1.  No acute or subacute ischemic change.  2.  No acute intracranial process or abnormal enhancement.  3.  Mild age-related changes as above.

## 2024-03-15 NOTE — PROGRESS NOTES
"   03/15/24 1500   Appointment Info   Signing Clinician's Name / Credentials (PT) Nikky Espitia, PT, DPT       Present no   Living Environment   People in Home spouse   Current Living Arrangements house   Home Accessibility stairs to enter home;stairs within home   Number of Stairs, Main Entrance 2   Stair Railings, Main Entrance none   Number of Stairs, Within Home, Primary greater than 10 stairs   Stair Railings, Within Home, Primary railings safe and in good condition   Transportation Anticipated family or friend will provide   Living Environment Comments Pt does not have to go to the basement.   Self-Care   Usual Activity Tolerance good   Current Activity Tolerance moderate   Regular Exercise No   Equipment Currently Used at Home walker, rolling;commode chair   Fall history within last six months no   Activity/Exercise/Self-Care Comment Walk-in shower with a little step in lip.  IND with all ADLs and MRADLs.  Using walker more recent.   General Information   Onset of Illness/Injury or Date of Surgery 03/14/24   Referring Physician Charo Barney CNP   Patient/Family Therapy Goals Statement (PT) Return home with wife   Pertinent History of Current Problem (include personal factors and/or comorbidities that impact the POC) Per chart, \"Miguel Pappas is a 68 year old male with history of chronic left hip pain, hyponatremia who presents for evaluation of confusion.  Patient was at a clinic appointment with Ortho for chronic left-sided hip pain today.  Just before this appointment he went into the bathroom and after coming out he seemed confused according to his wife.  He had no recollection of actually being in the bathroom and could not describe events from the previous 20 to 30 minutes.  Patient also seems slightly confused and answering questions during the clinic visit itself, so he was sent to the emergency department for further evaluation.  Symptoms started around 3:45 PM.  He has never " "had a similar episode in the past and denies any history of CVA/TIA.  Aside from his chronic left hip pain, which is at baseline, patient denies any preceding symptoms or recent illness.  He specifically denies any fevers, headaches, vision changes, neck stiffness, focal numbness/tingling/weakness, chest pain or shortness of breath, new pain or swelling of the legs, other complaints today.\"  Pt also has bone on bone L hip issues and has been starting to have discussions with orthopedic MD about surgical options prior to having this hospitalization.   Existing Precautions/Restrictions no known precautions/restrictions   Weight-Bearing Status - LUE full weight-bearing   Weight-Bearing Status - RUE full weight-bearing   Weight-Bearing Status - LLE full weight-bearing   Weight-Bearing Status - RLE full weight-bearing   Cognition   Affect/Mental Status (Cognition) WNL   Orientation Status (Cognition) oriented x 4   Follows Commands (Cognition) WNL   Pain Assessment   Patient Currently in Pain Yes, see Vital Sign flowsheet  (L hip 6-7/10)   Integumentary/Edema   Integumentary/Edema no deficits were identifed   Posture    Posture Forward head position;Protracted shoulders   Posture Comments Stands more flexed at the hip secondary to pain, likes to turn the L leg out and not place weight on it.   Range of Motion (ROM)   ROM Comment WNL for R LE.  L knee and ankle WNL.  Limited L hip mobility especially in to extension, IR, and adduction   Strength (Manual Muscle Testing)   Strength Comments WNL for R LE.  L knee and ankle 5/5.  Unable to perform L hip flexion or extension motion.   Bed Mobility   Bed Mobility sit-supine   Sit-Supine [place] (Bed Mobility) minimum assist (75% patient effort)   Impairments Contributing to Impaired Bed Mobility pain;decreased strength;decreased ROM   Comment, (Bed Mobility) Needs PT to help lift L leg in bed.   Transfers   Transfers sit-stand transfer;bed-chair transfer   Impairments " Contributing to Impaired Transfers pain;decreased strength;decreased ROM   Bed-Chair Transfer   Assistive Device (Bed-Chair Transfers) walker, front-wheeled   Bed-Chair [place] (Transfers) contact guard   Sit-Stand Transfer   Sit-Stand [place] (Transfers) contact guard   Assistive Device (Sit-Stand Transfers) walker, front-wheeled   Comment, (Sit-Stand Transfer) Does not place a ton of weight on the L leg.   Gait/Stairs (Locomotion)   [place] Level (Gait) contact guard   Assistive Device (Gait) walker, front-wheeled   Distance in Feet (Gait) 24 ft   Pattern (Gait) step-to   Deviations/Abnormal Patterns (Gait) antalgic;miguelito decreased   Comment, (Gait/Stairs) Did not assess stairs   Balance   Balance no deficits were identified   Balance Comments Stands on R LE for most of the balance testing, will not put weight on L LE.   Sensory Examination   Sensory Perception patient reports no sensory changes   Coordination   Coordination no deficits were identified   Muscle Tone   Muscle Tone no deficits were identified   Clinical Impression   Criteria for Skilled Therapeutic Intervention Yes, treatment indicated   PT Diagnosis (PT) Weakness, limited L hip motion, poor functional mobility   Influenced by the following impairments Weakness, pain   Functional limitations due to impairments Ambulation, transfers   Clinical Presentation (PT Evaluation Complexity) evolving   Clinical Presentation Rationale Clinical assessment, judgement   Clinical Decision Making (Complexity) moderate complexity   Planned Therapy Interventions (PT) balance training;bed mobility training;gait training;patient/family education;ROM (range of motion);stair training;strengthening;transfer training   Risk & Benefits of therapy have been explained evaluation/treatment results reviewed;care plan/treatment goals reviewed;risks/benefits reviewed;current/potential barriers reviewed;participants voiced agreement with care plan;participants  included;patient;spouse/significant other   Clinical Impression Comments Pt is a 68 year old male who was admited due to hyponatremia.  He has significant L hip pain that has been progressively getting worse the last 10 days.  Pt was starting to work with Orthopedic MD for possible surgery.  Started OP PT services this past few days to get exercises going for strength and ROM.  Pt lives at home with his wife and has a good set up with 2 steps to enter.  Pt continues to struggle with mobility due to pain management.  Pt demonstrates lack of motion, weakness, and pain limiting his ability to perform functional activities safely.  Pt will benefit from skilled IP PT servcise to address impairments as he is below baseline level of function.   PT Total Evaluation Time   PT Eval, Moderate Complexity Minutes (02013) 20   Physical Therapy Goals   PT Frequency 5x/week   PT Predicted Duration/Target Date for Goal Attainment 03/21/24   PT Goals Bed Mobility;Transfers;Gait;Stairs   PT: Bed Mobility Modified independent;Supine to/from sit   PT: Transfers Modified independent;Sit to/from stand;Bed to/from chair;Assistive device   PT: Gait Modified independent;Rolling walker;100 feet   PT: Stairs Modified independent;2 stairs;Assistive device   Interventions   Interventions Quick Adds Therapeutic Activity   Therapeutic Activity   Therapeutic Activities: dynamic activities to improve functional performance Minutes (21697) 20   Treatment Detail/Skilled Intervention Instruction and education with pt and spouse on importance of specific equipment needed for home to be safe.  Shower chair, higher bed, and grab bars in the shower would be appropriate.  Pt will need to use the walker for safe ambulation.  Instructed pt to resume doing as much of OP PT HEP he can for now while he is here, get up for short frequent walks with RN staff and PT, and continue with pain management with RN staff.  PT will work with pt while here in the hospital  for safe discharge plan.   PT Discharge Planning   PT Plan 1/5 FRI, ambulation, exercises, bed mobility, transfers   PT Discharge Recommendation (DC Rec) home with outpatient physical therapy   PT Rationale for DC Rec Pt lives at home with his wife.  Currently below baseline level of function due to increasing L hip pain.  Pt has been managing at home and reports with equipment suggestions and pain management medications he would be able to continue at home with help of wife.  PT gave a list of equipment recommendations for home to be successful.  Pt will need to demonstrate ability to negotiate 2 steps and walk with less assistance in order to return home.  PT is recommending discharging home with resumption of OP PT services and follow up with orthopedic MD.   PT Brief overview of current status Completed transfers and ambulation with FWW and CGA, 24 ft.  Does not place a ton of weight on the L LE.  Arpan to get into bed due to needing help lifting legs into bed due to pain in the L hip.   PT Equipment Needed at Discharge   (Wife was going to look into getting a new bed, shower chair, grab bars installed, and potentially wheelchair if needed.)   Total Session Time   Timed Code Treatment Minutes 20   Total Session Time (sum of timed and untimed services) 40     Thank you for your referral.    Nikky Espitia, PT, DPT  [facility]  [phone]

## 2024-03-15 NOTE — PROGRESS NOTES
VASCULAR NEUROLOGY BRIEF FOLLOW UP NOTE    MRI reviewed: Evidence of acute punctate stroke in the Left hippocampal area          Re-discussed sequence of events with ED Attending, Dr. Eisenberg. It seems like there is a period of 30 minutes that patient has no recollection of. Additionally ED exam as mentioned before seems like he has some word finding difficulty (when asked about handedness... he asked back what's the most common one?) Given this added possible mild word finding difficulty on top of the amnestic event, other than this being a possible TGA event, on the differential would be that he could possibly have had an ischemic stroke. Additionally he has elevated WBC of 19. Given the elevated WBC and possible cardioembolic stroke, would rule out infective endocarditis (inspite of not have any SIRS).      Plan:  - Admit for work up  - Blood cultures x 2  - Labs: LDL, A1C, troponin  - Neuro checks q 4 hours  - Bedside glucose monitoring  - TTE  - Telestroke will see him tomorrow to reassess and make further management recommendations      Discussed with Dr. Tiago Lopez MD  Vascular Neurology Fellow

## 2024-03-15 NOTE — PROGRESS NOTES
"PRIMARY DIAGNOSIS: \"GENERIC\" NURSING  OUTPATIENT/OBSERVATION GOALS TO BE MET BEFORE DISCHARGE:  ADLs back to baseline: No, assist of 1, walker, belt. Patient is unsteady on his feet even with use of walker.    Activity and level of assistance: Assist of 1, belt, walker.    Pain status: Improved-controlled with oral pain medications. Patient is not a good  or pain and often waits until severe pain is present. Encouraged patient to report pain when it is increasing.    Return to near baseline physical activity: No     Discharge Planner Nurse   Safe discharge environment identified:  TBD  Barriers to discharge: Yes, PT consult, MD order.        Entered by: Naila Sawyer RN 03/15/2024 3:07 PM     Please review provider order for any additional goals.   Nurse to notify provider when observation goals have been met and patient is ready for discharge.  "

## 2024-03-16 ENCOUNTER — HOSPITAL ENCOUNTER (INPATIENT)
Facility: CLINIC | Age: 69
LOS: 10 days | Discharge: HOME | DRG: 871 | End: 2024-03-26
Attending: STUDENT IN AN ORGANIZED HEALTH CARE EDUCATION/TRAINING PROGRAM | Admitting: STUDENT IN AN ORGANIZED HEALTH CARE EDUCATION/TRAINING PROGRAM
Payer: COMMERCIAL

## 2024-03-16 VITALS
DIASTOLIC BLOOD PRESSURE: 73 MMHG | OXYGEN SATURATION: 98 % | RESPIRATION RATE: 18 BRPM | WEIGHT: 134 LBS | BODY MASS INDEX: 21.03 KG/M2 | TEMPERATURE: 98.4 F | HEIGHT: 67 IN | HEART RATE: 71 BPM | SYSTOLIC BLOOD PRESSURE: 131 MMHG

## 2024-03-16 DIAGNOSIS — R78.81 BACTEREMIA: ICD-10-CM

## 2024-03-16 DIAGNOSIS — I33.0 INFECTIVE ENDOCARDITIS, DUE TO UNSPECIFIED ORGANISM, UNSPECIFIED CHRONICITY: ICD-10-CM

## 2024-03-16 DIAGNOSIS — M25.552 HIP PAIN, LEFT: ICD-10-CM

## 2024-03-16 DIAGNOSIS — Z95.2 S/P AVR: Primary | ICD-10-CM

## 2024-03-16 PROBLEM — I38 ENDOCARDITIS: Status: ACTIVE | Noted: 2024-03-16

## 2024-03-16 LAB
ANION GAP SERPL CALCULATED.3IONS-SCNC: 10 MMOL/L (ref 7–15)
BUN SERPL-MCNC: 19.4 MG/DL (ref 8–23)
CALCIUM SERPL-MCNC: 9.1 MG/DL (ref 8.8–10.2)
CHLORIDE SERPL-SCNC: 98 MMOL/L (ref 98–107)
CREAT SERPL-MCNC: 0.94 MG/DL (ref 0.67–1.17)
CRP SERPL-MCNC: 158.13 MG/L
DEPRECATED HCO3 PLAS-SCNC: 23 MMOL/L (ref 22–29)
EGFRCR SERPLBLD CKD-EPI 2021: 87 ML/MIN/1.73M2
ERYTHROCYTE [DISTWIDTH] IN BLOOD BY AUTOMATED COUNT: 13.3 % (ref 10–15)
GLUCOSE BLDC GLUCOMTR-MCNC: 101 MG/DL (ref 70–99)
GLUCOSE BLDC GLUCOMTR-MCNC: 168 MG/DL (ref 70–99)
GLUCOSE BLDC GLUCOMTR-MCNC: 89 MG/DL (ref 70–99)
GLUCOSE SERPL-MCNC: 113 MG/DL (ref 70–99)
HCT VFR BLD AUTO: 31.9 % (ref 40–53)
HGB BLD-MCNC: 10.9 G/DL (ref 13.3–17.7)
LACTATE SERPL-SCNC: 1.5 MMOL/L (ref 0.7–2)
MCH RBC QN AUTO: 28.6 PG (ref 26.5–33)
MCHC RBC AUTO-ENTMCNC: 34.2 G/DL (ref 31.5–36.5)
MCV RBC AUTO: 84 FL (ref 78–100)
PLATELET # BLD AUTO: 224 10E3/UL (ref 150–450)
POTASSIUM SERPL-SCNC: 4.1 MMOL/L (ref 3.4–5.3)
RBC # BLD AUTO: 3.81 10E6/UL (ref 4.4–5.9)
SODIUM SERPL-SCNC: 131 MMOL/L (ref 135–145)
WBC # BLD AUTO: 12.5 10E3/UL (ref 4–11)

## 2024-03-16 PROCEDURE — 258N000003 HC RX IP 258 OP 636: Performed by: NURSE PRACTITIONER

## 2024-03-16 PROCEDURE — 258N000003 HC RX IP 258 OP 636: Performed by: INTERNAL MEDICINE

## 2024-03-16 PROCEDURE — 258N000003 HC RX IP 258 OP 636

## 2024-03-16 PROCEDURE — 250N000011 HC RX IP 250 OP 636: Performed by: STUDENT IN AN ORGANIZED HEALTH CARE EDUCATION/TRAINING PROGRAM

## 2024-03-16 PROCEDURE — 85027 COMPLETE CBC AUTOMATED: CPT | Performed by: NURSE PRACTITIONER

## 2024-03-16 PROCEDURE — 87040 BLOOD CULTURE FOR BACTERIA: CPT | Performed by: NURSE PRACTITIONER

## 2024-03-16 PROCEDURE — 99207 PR APP CREDIT; MD BILLING SHARED VISIT: CPT | Performed by: NURSE PRACTITIONER

## 2024-03-16 PROCEDURE — 99418 PROLNG IP/OBS E/M EA 15 MIN: CPT | Performed by: STUDENT IN AN ORGANIZED HEALTH CARE EDUCATION/TRAINING PROGRAM

## 2024-03-16 PROCEDURE — 36415 COLL VENOUS BLD VENIPUNCTURE: CPT | Performed by: NURSE PRACTITIONER

## 2024-03-16 PROCEDURE — 250N000011 HC RX IP 250 OP 636

## 2024-03-16 PROCEDURE — 250N000013 HC RX MED GY IP 250 OP 250 PS 637: Performed by: STUDENT IN AN ORGANIZED HEALTH CARE EDUCATION/TRAINING PROGRAM

## 2024-03-16 PROCEDURE — 250N000013 HC RX MED GY IP 250 OP 250 PS 637: Performed by: NURSE PRACTITIONER

## 2024-03-16 PROCEDURE — 80048 BASIC METABOLIC PNL TOTAL CA: CPT | Performed by: NURSE PRACTITIONER

## 2024-03-16 PROCEDURE — 210N000002 HC R&B HEART CARE

## 2024-03-16 PROCEDURE — 250N000011 HC RX IP 250 OP 636: Performed by: INTERNAL MEDICINE

## 2024-03-16 PROCEDURE — 99233 SBSQ HOSP IP/OBS HIGH 50: CPT | Performed by: STUDENT IN AN ORGANIZED HEALTH CARE EDUCATION/TRAINING PROGRAM

## 2024-03-16 PROCEDURE — 86140 C-REACTIVE PROTEIN: CPT | Performed by: NURSE PRACTITIONER

## 2024-03-16 PROCEDURE — 250N000011 HC RX IP 250 OP 636: Performed by: NURSE PRACTITIONER

## 2024-03-16 PROCEDURE — 83605 ASSAY OF LACTIC ACID: CPT | Performed by: NURSE PRACTITIONER

## 2024-03-16 RX ORDER — AMOXICILLIN 250 MG
1 CAPSULE ORAL 2 TIMES DAILY PRN
Status: DISCONTINUED | OUTPATIENT
Start: 2024-03-16 | End: 2024-03-26 | Stop reason: HOSPADM

## 2024-03-16 RX ORDER — ATORVASTATIN CALCIUM 40 MG/1
40 TABLET, FILM COATED ORAL EVERY EVENING
Status: DISCONTINUED | OUTPATIENT
Start: 2024-03-16 | End: 2024-03-26 | Stop reason: HOSPADM

## 2024-03-16 RX ORDER — LIDOCAINE 40 MG/G
CREAM TOPICAL
Status: DISCONTINUED | OUTPATIENT
Start: 2024-03-16 | End: 2024-03-26 | Stop reason: HOSPADM

## 2024-03-16 RX ORDER — KETOROLAC TROMETHAMINE 15 MG/ML
15 INJECTION, SOLUTION INTRAMUSCULAR; INTRAVENOUS ONCE
Status: COMPLETED | OUTPATIENT
Start: 2024-03-16 | End: 2024-03-16

## 2024-03-16 RX ORDER — ACETAMINOPHEN 325 MG/1
650 TABLET ORAL EVERY 4 HOURS PRN
Status: DISCONTINUED | OUTPATIENT
Start: 2024-03-16 | End: 2024-03-26 | Stop reason: HOSPADM

## 2024-03-16 RX ORDER — ACETAMINOPHEN 650 MG/1
650 SUPPOSITORY RECTAL EVERY 4 HOURS PRN
Status: DISCONTINUED | OUTPATIENT
Start: 2024-03-16 | End: 2024-03-26 | Stop reason: HOSPADM

## 2024-03-16 RX ORDER — CALCIUM CARBONATE 500 MG/1
1000 TABLET, CHEWABLE ORAL 4 TIMES DAILY PRN
Status: DISCONTINUED | OUTPATIENT
Start: 2024-03-16 | End: 2024-03-26 | Stop reason: HOSPADM

## 2024-03-16 RX ORDER — SODIUM CHLORIDE 9 MG/ML
INJECTION, SOLUTION INTRAVENOUS CONTINUOUS
Status: DISCONTINUED | OUTPATIENT
Start: 2024-03-16 | End: 2024-03-16

## 2024-03-16 RX ORDER — PIPERACILLIN SODIUM, TAZOBACTAM SODIUM 3; .375 G/15ML; G/15ML
3.38 INJECTION, POWDER, LYOPHILIZED, FOR SOLUTION INTRAVENOUS EVERY 6 HOURS
Status: DISCONTINUED | OUTPATIENT
Start: 2024-03-16 | End: 2024-03-17

## 2024-03-16 RX ORDER — AMOXICILLIN 250 MG
2 CAPSULE ORAL 2 TIMES DAILY PRN
Status: DISCONTINUED | OUTPATIENT
Start: 2024-03-16 | End: 2024-03-26 | Stop reason: HOSPADM

## 2024-03-16 RX ORDER — ASPIRIN 81 MG/1
81 TABLET ORAL DAILY
Status: DISCONTINUED | OUTPATIENT
Start: 2024-03-17 | End: 2024-03-26 | Stop reason: HOSPADM

## 2024-03-16 RX ADMIN — PIPERACILLIN AND TAZOBACTAM 3.38 G: 3; .375 INJECTION, POWDER, FOR SOLUTION INTRAVENOUS at 10:10

## 2024-03-16 RX ADMIN — SODIUM CHLORIDE: 9 INJECTION, SOLUTION INTRAVENOUS at 11:02

## 2024-03-16 RX ADMIN — ATORVASTATIN CALCIUM 40 MG: 40 TABLET, FILM COATED ORAL at 22:41

## 2024-03-16 RX ADMIN — SODIUM CHLORIDE: 9 INJECTION, SOLUTION INTRAVENOUS at 10:11

## 2024-03-16 RX ADMIN — KETOROLAC TROMETHAMINE 15 MG: 15 INJECTION, SOLUTION INTRAMUSCULAR; INTRAVENOUS at 19:02

## 2024-03-16 RX ADMIN — ASPIRIN 81 MG: 81 TABLET, COATED ORAL at 08:09

## 2024-03-16 RX ADMIN — PIPERACILLIN AND TAZOBACTAM 3.38 G: 3; .375 INJECTION, POWDER, FOR SOLUTION INTRAVENOUS at 16:30

## 2024-03-16 RX ADMIN — PIPERACILLIN AND TAZOBACTAM 3.38 G: 3; .375 INJECTION, POWDER, FOR SOLUTION INTRAVENOUS at 22:40

## 2024-03-16 RX ADMIN — ACETAMINOPHEN 650 MG: 325 TABLET, FILM COATED ORAL at 13:24

## 2024-03-16 RX ADMIN — SENNOSIDES AND DOCUSATE SODIUM 1 TABLET: 8.6; 5 TABLET ORAL at 13:31

## 2024-03-16 RX ADMIN — PIPERACILLIN AND TAZOBACTAM 3.38 G: 3; .375 INJECTION, POWDER, FOR SOLUTION INTRAVENOUS at 03:45

## 2024-03-16 RX ADMIN — ACETAMINOPHEN 650 MG: 325 TABLET, FILM COATED ORAL at 09:51

## 2024-03-16 RX ADMIN — ACETAMINOPHEN 650 MG: 325 TABLET, FILM COATED ORAL at 05:50

## 2024-03-16 RX ADMIN — VANCOMYCIN HYDROCHLORIDE 1500 MG: 10 INJECTION, POWDER, LYOPHILIZED, FOR SOLUTION INTRAVENOUS at 23:09

## 2024-03-16 RX ADMIN — VANCOMYCIN HYDROCHLORIDE 1500 MG: 1 INJECTION, POWDER, LYOPHILIZED, FOR SOLUTION INTRAVENOUS at 08:09

## 2024-03-16 ASSESSMENT — ACTIVITIES OF DAILY LIVING (ADL)
ADLS_ACUITY_SCORE: 29
ADLS_ACUITY_SCORE: 28
ADLS_ACUITY_SCORE: 28
ADLS_ACUITY_SCORE: 29
ADLS_ACUITY_SCORE: 28
ADLS_ACUITY_SCORE: 38
ADLS_ACUITY_SCORE: 29
ADLS_ACUITY_SCORE: 28
ADLS_ACUITY_SCORE: 29
ADLS_ACUITY_SCORE: 28
ADLS_ACUITY_SCORE: 38
ADLS_ACUITY_SCORE: 28

## 2024-03-16 NOTE — PROGRESS NOTES
[facility]    Medicine Progress Note - Hospitalist Service    Date of Admission:  3/14/2024    Assessment & Plan   68yoM with OA of the left hip and aortic stenosis s/p open AVR (2022) presented to the ED from Ortho clinic due to a brief period of confusion he experienced while there.  He stated that he felt a bit nauseous and then went to the bathroom.  He did not remember going to the bathroom, having a conversation with a friend at the clinic afterwards, or his wife driving him to the ED.  The ED provider reported some possible word-finding difficulties that were quite subtle.  He denied weakness, numbness, tingling, HA, visual changes, chills, chest pain, SOB, cough.  He is unsure if he had a fever but over the last few days he has felt warm. He has had no medication changes and no prior episodes like this in the past.         Confusion  Acute CVA vs transient global amnesia (reviewed Stroke Neurology Note and recommendations)   MRI: punctate focus of diffusion abnormality in L hippocampal region, no hemorrhage, no significant chronic small vessel disease.  CTA no significant stenosis or dissection.  TTE:  LVEF 60-65%, valvles not well visiualized.  -per Neuro: acute punctate CVA in left hippocampus; vs other etiology  -CTA head/neck: no significant stenosis, no acute infarct, no hemorrhage  Per stroke recommendations, neuro checks q 4 hours, asa 81 mg and plavix (holding plavix for now) 75 mg x 21 days then asa monotherapy 325 mg daily.  -goal bp less than 180 systolic, long term goal less than 130/80, ldl goal 40 to 70, on statin, PT/OT/SLP,     In the interim blood cultures which were obtained on admission resulted gram positive cocci in pairs and chains in both bottles.  Initiated on zosyn and (Vancomycin added overnight).  Reviewed with Stroke team who felt patient should transfer for AC for concern of SBE.  I have requested the patient be transferred and we are still  waiting for a bed.  In the meantime we will follow inflammatory markers, continue broad spectrum antibiotics.  No source identified as of yet see below.     Strep species bacteremia (2 bottles)/SIRS  Possible SBE. As above blood cultures obtained on admission resulting in gram + cocci in pairs and chains and streptococcus species by [facility] GP panel.  He was initiated on zosyn (vancomycin added overnight).  The source if unclear (he has had flu like symptoms over the past week.  Chest xray and UA negative.  However the patient was found to have severe bone on bone left hip arthritis on his visit to Orthopedics on 3/14/2024 when his amnesic event just occurred.  On review of the Orthopedic notes and with the patient the hip pain came on suddenly and severe and  not progressive.  He has been hemodynamically stable however blood pressure 88 systolic this am.  WBC 19 on admission 12.5 today.  Heart valves not well visualized on TTE done on admission.  -obtain lactic acid (done 1.5)  -start crystalloid infusion ns 125 hour for now  -continue broad spectrum anbx  -consider septic arthritis?  Waiting to discuss with radiology whether MRI vs CT with contrast of left hip  -awaiting bed for higher level of care  -await further speciation and sensitivities  -repeat blood culx sent 3/16/24     Leukocytosis.  Down 19----> 12.5.  No signs of infection, blood cultures sent.    UA wnl, COVID/flu/RSV neg; CXR clear.  ? Septic arthritis/SBE SEE above     Bioprosthetic AVR (12/2022) Patient had open AVR in 2022.  Valves not visualized well on TTE.  Awaiting for transfer to higher level of care for AC  -cont ASA 81mg daily     Hyponatremia. 131-133 Lab reportedly having issues with sodiums being off thus we will wait for recheck when they have instrumentation repaired.  I called the lab the am of 3/16 as well and the device has just been repaired and I have asked for the sodium to be rerun.  -await sodium recheck due to calibration  issues in lab     Anemia  Stable; baseline Hgb 11-13     OA  -very difficult walking due to pain  -continue oxycodone to every 4 hours (from every 6 hours)          Diet: Combination Diet Regular Diet Adult    DVT Prophylaxis: Pneumatic Compression Devices  Brown Catheter: Not present  Lines: None     Cardiac Monitoring: ACTIVE order. Indication: Stroke, acute (48 hours)  Code Status: No CPR- Pre-arrest intubation OK      Clinically Significant Risk Factors         # Hyponatremia: Lowest Na = 129 mmol/L in last 2 days, will monitor as appropriate                            Disposition Plan     Expected Discharge Date: 03/17/2024      Destination: home with family;home with help/services            The patient's care was discussed with the entire care team    Charo Barney CNP  Hospitalist Service  [facility]  Securely message with [employer] (more info)  Text page via [employer]iwon Paging/Directory   ______________________________________________________________________    Interval History   No acute events overnight    Physical Exam   Vital Signs: Temp: 98.5  F (36.9  C) Temp src: Oral BP: (!) 88/59 (RN notified) Pulse: 79   Resp: 18 SpO2: 97 % O2 Device: None (Room air)    Weight: 134 lbs 0 oz    Gen:  Lying in bed no acute distress  HEENT:  normocephalic, atraumatic, oropharynx clear  Resp:  CTA posteriorly, normal respiratory effort  Card:  S1,S2, RRR no murmur, rub or gallop  Abd:  Soft, non distended, non tender,  normoactive bowel sounds   Neuro:  Neuro exam non focal        Medical Decision Making       45 MINUTES SPENT BY ME on the date of service doing chart review, history, exam, documentation & further activities per the note.        Data     I have personally reviewed the following data over the past 24 hrs:    12.5 (H)  \   10.9 (L)   / 224     131 (L) 98 19.4 /  101 (H)   4.1 23 0.94 \     Procal: N/A CRP: 158.13 (H) Lactic Acid: 1.5         Imaging results reviewed over the past 24  hrs:   No results found for this or any previous visit (from the past 24 hour(s)).

## 2024-03-16 NOTE — PLAN OF CARE
Goal Outcome Evaluation:      Plan of Care Reviewed With: patient    Overall Patient Progress: no changeOverall Patient Progress: no change    Outcome Evaluation: A&Ox4. VSS on RA except soft BP. Neuros intact. Tele reads NSR with 1 short run (few seconds) of tachycardia, HR 140s. Pain managed with tylenol x2. 1 very small BM this shift. IV zosyn continued as ordered. . Up assist x1 with GBW. Currently up in chair. Positive blood cultures from 3/15 - see results.

## 2024-03-16 NOTE — PHARMACY-VANCOMYCIN DOSING SERVICE
"Pharmacy Vancomycin Initial Note  Date of Service 2024  Patient's  1955  68 year old, male    Indication: Bacteremia    Current estimated CrCl = Estimated Creatinine Clearance: 62.9 mL/min (based on SCr of 0.94 mg/dL).    Creatinine for last 3 days  3/14/2024:  4:40 PM Creatinine 0.90 mg/dL  3/15/2024:  5:47 AM Creatinine 0.96 mg/dL  3/16/2024:  5:41 AM Creatinine 0.94 mg/dL    Recent Vancomycin Level(s) for last 3 days  No results found for requested labs within last 3 days.      Vancomycin IV Administrations (past 72 hours)        No vancomycin orders with administrations in past 72 hours.                    Nephrotoxins and other renal medications (From now, onward)      Start     Dose/Rate Route Frequency Ordered Stop    24 0800  vancomycin (VANCOCIN) 1,500 mg in sodium chloride 0.9 % 250 mL intermittent infusion         1,500 mg  over 90 Minutes Intravenous ONCE 24 0728      03/15/24 1600  piperacillin-tazobactam (ZOSYN) 3.375 g vial to attach to  mL bag        Note to Pharmacy: For [facility], [facility] and [facility]: For Zosyn-naive patients, use the \"Zosyn initial dose + extended infusion\" order panel.    3.375 g  over 30 Minutes Intravenous EVERY 6 HOURS 03/15/24 1502              Contrast Orders - past 72 hours (72h ago, onward)      Start     Dose/Rate Route Frequency Stop    24  gadobutrol (GADAVIST) injection 7.5 mL         7.5 mL Intravenous ONCE 246    24 1640  iopamidol (ISOVUE-370) solution 100 mL         100 mL Intravenous ONCE 24 1641            [employer] Prediction of Planned Initial Vancomycin Regimen    Loading dose: 1500mg x1  Regimen: 1500 mg IV every 24 hours.  Start time: 21:00 on 2024  Exposure target: AUC24 (range)400-600 mg/L.hr   AUC24,ss: 544 mg/L.hr  Probability of AUC24 > 400: 82 %  Ctrough,ss: 14.9 mg/L  Probability of Ctrough,ss > 20: 26 %  Probability of nephrotoxicity (Lodise JAYME ): 10 %          Plan:  Start vancomycin  " 1500mg loading dose then 1500 mg IV q24h.   Vancomycin monitoring method: AUC  Vancomycin therapeutic monitoring goal: 400-600 mg*h/L  Pharmacy will check vancomycin levels as appropriate in 1-3 Days.    Serum creatinine levels will be ordered daily for the first week of therapy and at least twice weekly for subsequent weeks.      Thom Villatoro RPH

## 2024-03-17 ENCOUNTER — APPOINTMENT (OUTPATIENT)
Dept: PHYSICAL THERAPY | Facility: CLINIC | Age: 69
DRG: 871 | End: 2024-03-17
Attending: STUDENT IN AN ORGANIZED HEALTH CARE EDUCATION/TRAINING PROGRAM
Payer: COMMERCIAL

## 2024-03-17 LAB
ANION GAP SERPL CALCULATED.3IONS-SCNC: 13 MMOL/L (ref 7–15)
BACTERIA BLD CULT: ABNORMAL
BASOPHILS # BLD AUTO: 0 10E3/UL (ref 0–0.2)
BASOPHILS NFR BLD AUTO: 0 %
BUN SERPL-MCNC: 15.9 MG/DL (ref 8–23)
CALCIUM SERPL-MCNC: 9.4 MG/DL (ref 8.8–10.2)
CHLORIDE SERPL-SCNC: 100 MMOL/L (ref 98–107)
CREAT SERPL-MCNC: 0.98 MG/DL (ref 0.67–1.17)
CRP SERPL-MCNC: 148.85 MG/L
DEPRECATED HCO3 PLAS-SCNC: 21 MMOL/L (ref 22–29)
EGFRCR SERPLBLD CKD-EPI 2021: 83 ML/MIN/1.73M2
EOSINOPHIL # BLD AUTO: 0.5 10E3/UL (ref 0–0.7)
EOSINOPHIL NFR BLD AUTO: 3 %
ERYTHROCYTE [DISTWIDTH] IN BLOOD BY AUTOMATED COUNT: 13.3 % (ref 10–15)
GLUCOSE SERPL-MCNC: 101 MG/DL (ref 70–99)
HCT VFR BLD AUTO: 34.7 % (ref 40–53)
HGB BLD-MCNC: 11.6 G/DL (ref 13.3–17.7)
IMM GRANULOCYTES # BLD: 0.1 10E3/UL
IMM GRANULOCYTES NFR BLD: 1 %
LYMPHOCYTES # BLD AUTO: 2.2 10E3/UL (ref 0.8–5.3)
LYMPHOCYTES NFR BLD AUTO: 14 %
MCH RBC QN AUTO: 28.3 PG (ref 26.5–33)
MCHC RBC AUTO-ENTMCNC: 33.4 G/DL (ref 31.5–36.5)
MCV RBC AUTO: 85 FL (ref 78–100)
MONOCYTES # BLD AUTO: 1.8 10E3/UL (ref 0–1.3)
MONOCYTES NFR BLD AUTO: 11 %
NEUTROPHILS # BLD AUTO: 11 10E3/UL (ref 1.6–8.3)
NEUTROPHILS NFR BLD AUTO: 71 %
NRBC # BLD AUTO: 0 10E3/UL
NRBC BLD AUTO-RTO: 0 /100
PLATELET # BLD AUTO: 311 10E3/UL (ref 150–450)
POTASSIUM SERPL-SCNC: 4.4 MMOL/L (ref 3.4–5.3)
RBC # BLD AUTO: 4.1 10E6/UL (ref 4.4–5.9)
SODIUM SERPL-SCNC: 134 MMOL/L (ref 135–145)
WBC # BLD AUTO: 15.5 10E3/UL (ref 4–11)

## 2024-03-17 PROCEDURE — 250N000013 HC RX MED GY IP 250 OP 250 PS 637: Performed by: STUDENT IN AN ORGANIZED HEALTH CARE EDUCATION/TRAINING PROGRAM

## 2024-03-17 PROCEDURE — 86140 C-REACTIVE PROTEIN: CPT | Performed by: STUDENT IN AN ORGANIZED HEALTH CARE EDUCATION/TRAINING PROGRAM

## 2024-03-17 PROCEDURE — 36415 COLL VENOUS BLD VENIPUNCTURE: CPT | Performed by: STUDENT IN AN ORGANIZED HEALTH CARE EDUCATION/TRAINING PROGRAM

## 2024-03-17 PROCEDURE — 250N000013 HC RX MED GY IP 250 OP 250 PS 637: Performed by: HOSPITALIST

## 2024-03-17 PROCEDURE — 250N000011 HC RX IP 250 OP 636: Performed by: STUDENT IN AN ORGANIZED HEALTH CARE EDUCATION/TRAINING PROGRAM

## 2024-03-17 PROCEDURE — 85004 AUTOMATED DIFF WBC COUNT: CPT | Performed by: STUDENT IN AN ORGANIZED HEALTH CARE EDUCATION/TRAINING PROGRAM

## 2024-03-17 PROCEDURE — 97530 THERAPEUTIC ACTIVITIES: CPT | Mod: GP

## 2024-03-17 PROCEDURE — 99223 1ST HOSP IP/OBS HIGH 75: CPT | Mod: FS | Performed by: NURSE PRACTITIONER

## 2024-03-17 PROCEDURE — 97162 PT EVAL MOD COMPLEX 30 MIN: CPT | Mod: GP

## 2024-03-17 PROCEDURE — 99207 PR APP CREDIT; MD BILLING SHARED VISIT: CPT | Performed by: NURSE PRACTITIONER

## 2024-03-17 PROCEDURE — 99232 SBSQ HOSP IP/OBS MODERATE 35: CPT | Performed by: HOSPITALIST

## 2024-03-17 PROCEDURE — 97116 GAIT TRAINING THERAPY: CPT | Mod: GP

## 2024-03-17 PROCEDURE — 99223 1ST HOSP IP/OBS HIGH 75: CPT | Performed by: INTERNAL MEDICINE

## 2024-03-17 PROCEDURE — 210N000002 HC R&B HEART CARE

## 2024-03-17 PROCEDURE — 82374 ASSAY BLOOD CARBON DIOXIDE: CPT | Performed by: STUDENT IN AN ORGANIZED HEALTH CARE EDUCATION/TRAINING PROGRAM

## 2024-03-17 PROCEDURE — 250N000011 HC RX IP 250 OP 636: Performed by: INTERNAL MEDICINE

## 2024-03-17 RX ORDER — POLYETHYLENE GLYCOL 3350 17 G/17G
17 POWDER, FOR SOLUTION ORAL DAILY
Status: DISCONTINUED | OUTPATIENT
Start: 2024-03-17 | End: 2024-03-20

## 2024-03-17 RX ORDER — NALOXONE HYDROCHLORIDE 0.4 MG/ML
0.2 INJECTION, SOLUTION INTRAMUSCULAR; INTRAVENOUS; SUBCUTANEOUS
Status: DISCONTINUED | OUTPATIENT
Start: 2024-03-17 | End: 2024-03-26 | Stop reason: HOSPADM

## 2024-03-17 RX ORDER — NALOXONE HYDROCHLORIDE 0.4 MG/ML
0.4 INJECTION, SOLUTION INTRAMUSCULAR; INTRAVENOUS; SUBCUTANEOUS
Status: DISCONTINUED | OUTPATIENT
Start: 2024-03-17 | End: 2024-03-26 | Stop reason: HOSPADM

## 2024-03-17 RX ORDER — OXYCODONE HYDROCHLORIDE 5 MG/1
5 TABLET ORAL EVERY 4 HOURS PRN
Status: DISCONTINUED | OUTPATIENT
Start: 2024-03-17 | End: 2024-03-26 | Stop reason: HOSPADM

## 2024-03-17 RX ORDER — CLOPIDOGREL BISULFATE 75 MG/1
75 TABLET ORAL DAILY
Status: DISCONTINUED | OUTPATIENT
Start: 2024-03-17 | End: 2024-03-17

## 2024-03-17 RX ORDER — CEFTRIAXONE 2 G/1
2 INJECTION, POWDER, FOR SOLUTION INTRAMUSCULAR; INTRAVENOUS EVERY 24 HOURS
Status: DISCONTINUED | OUTPATIENT
Start: 2024-03-17 | End: 2024-03-26 | Stop reason: HOSPADM

## 2024-03-17 RX ORDER — AMOXICILLIN 250 MG
1 CAPSULE ORAL 2 TIMES DAILY
Status: DISCONTINUED | OUTPATIENT
Start: 2024-03-17 | End: 2024-03-26 | Stop reason: HOSPADM

## 2024-03-17 RX ADMIN — ACETAMINOPHEN 650 MG: 325 TABLET, FILM COATED ORAL at 13:48

## 2024-03-17 RX ADMIN — CEFTRIAXONE SODIUM 2 G: 2 INJECTION, POWDER, FOR SOLUTION INTRAMUSCULAR; INTRAVENOUS at 16:07

## 2024-03-17 RX ADMIN — PIPERACILLIN AND TAZOBACTAM 3.38 G: 3; .375 INJECTION, POWDER, FOR SOLUTION INTRAVENOUS at 04:26

## 2024-03-17 RX ADMIN — ACETAMINOPHEN 650 MG: 325 TABLET, FILM COATED ORAL at 08:49

## 2024-03-17 RX ADMIN — ACETAMINOPHEN 650 MG: 325 TABLET, FILM COATED ORAL at 20:32

## 2024-03-17 RX ADMIN — DOCUSATE SODIUM 50 MG AND SENNOSIDES 8.6 MG 1 TABLET: 8.6; 5 TABLET, FILM COATED ORAL at 08:49

## 2024-03-17 RX ADMIN — ATORVASTATIN CALCIUM 40 MG: 40 TABLET, FILM COATED ORAL at 20:32

## 2024-03-17 RX ADMIN — CLOPIDOGREL BISULFATE 75 MG: 75 TABLET ORAL at 16:13

## 2024-03-17 RX ADMIN — ASPIRIN 81 MG: 81 TABLET, COATED ORAL at 08:49

## 2024-03-17 RX ADMIN — PIPERACILLIN AND TAZOBACTAM 3.38 G: 3; .375 INJECTION, POWDER, FOR SOLUTION INTRAVENOUS at 10:55

## 2024-03-17 ASSESSMENT — ACTIVITIES OF DAILY LIVING (ADL)
ADLS_ACUITY_SCORE: 41

## 2024-03-17 NOTE — PHARMACY-ADMISSION MEDICATION HISTORY
Pharmacist Admission Medication History    Admission medication history compiled 100% from [facility] notes and MAR.    Medication History Completed By: Sebastien Rice [facility] 3/16/2024 10:09 PM    PTA Med List   Medication Sig Last Dose    amoxicillin (AMOXIL) 500 MG capsule Take 2,000 mg by mouth once as needed (dental prophy)  at [facility]    ascorbic acid (VITAMIN C) 250 MG CHEW chewable tablet Take 250 mg by mouth daily 3/14/2024    aspirin 81 MG tablet Take 81 mg by mouth at bedtime 3/16/2024 at AM    ferrous sulfate (FEROSUL) 325 (65 Fe) MG tablet Take 325 mg by mouth daily (with breakfast) 3/14/2024    GLUCOSAMINE CHONDROITIN OR TABS Take 1 tablet by mouth at bedtime 3/13/2024 at HS    Multiple Vitamins-Minerals (CENTRUM SILVER PO) Take 1 tablet by mouth at bedtime 3/13/2024 at HS    Vitamin D3 (CHOLECALCIFEROL) 25 mcg (1000 units) tablet Take 25 mcg by mouth daily 3/14/2024

## 2024-03-17 NOTE — PLAN OF CARE
Goal Outcome Evaluation:    Summary:   Orientation: A&Ox4   Vitals/Tele: VSS on ra Tele: SR with ST abnormality   IV Access/drains: Piv sl intermittent abx  Diet: NPO   Mobility: A1 GB/W  GI/: continent of B&B  Wound/Skin: intact   Consults: ortho, infectious disease, neuro   Discharge Plan: pending     See Flow sheets for assessment

## 2024-03-17 NOTE — H&P
[facility]    History and Physical - Hospitalist Service       Date of Admission:  3/16/2024    Assessment & Plan      Miguel Pappas is a 68 year old male admitted on 3/16/2024. Transferred from [facility] for possible AC    Strep species bacteremia (2 bottles)/SIRS  Leukocytosis   Severe left hip OA, concern for septic arthritis   Initially presented to [facility] with confusion and concern for CVA. Blood cultures obtained on admission to [facility] 3/14 resulting in gram + cocci in pairs and chains and streptococcus species by Vrgene GP panel.  He was initiated on Zosyn and Vancomycin. Chest xray and UA negative. The source is unclear but he reported subjective fevers for a few weeks before presentation. He also has severe bone on bone left hip arthritis which worsened suddenly a few weeks ago. WBC 19 on admission to [facility], improved to 12.5 on 3/16. --->158. Septic arthritis a consideration. Heart valves not well visualized on TTE done at [facility]. Transferred to [facility] for ID consult and to consider AC.  - Continue Vancomycin and Zosyn on admission to [facility]  - ID consulted, recommendations  - NPO at midnight to consider AC 3/17 if able to schedule in AM  - Orthopedic surgery consulted, recommendations appreciated   - Follow up blood cultures     Bioprosthetic AVR (12/2022) Patient had open AVR in 2022.  Valves not visualized well on TTE.  Transfer to [facility] for AC  - cont ASA 81mg daily    Confusion  Acute CVA vs transient global amnesia    MRI at [facility] showed punctate focus of diffusion abnormality in L hippocampal region, no hemorrhage, no significant chronic small vessel disease.  CTA no significant stenosis or dissection.  TTE:  LVEF 60-65%, valvles not well visiualized. Per Neuro: acute punctate CVA in left hippocampus; vs other etiology. CTA head/neck: no significant stenosis, no acute infarct, no hemorrhage. Has not yet  initiated plavix.  - Consult stroke neurology at [facility] to review anti-platelet regiment     Hyponatremia.  Most recent sodium 131. Per chart review there was concern with lab machine malfunction and accuracy of sodium at [facility].  - AM BMP  - If remains hyponatremic, obtain urine Na, urine osm, serum osm     Anemia  Stable; baseline Hgb 11-13     Left Hip OA  -very difficult walking due to pain  -prn tylenol, prn oxycodone    Constipation  Reports no BM for two days.  - scheduled senokot  - holding miralax while NPO, then start daily          Diet: NPO for Medical/Clinical Reasons Except for: MedsNPO  DVT Prophylaxis: Pneumatic Compression Devices  Brown Catheter: Not present  Lines: None     Cardiac Monitoring: ACTIVE order. Indication: endocarditis  Code Status: No CPR- Pre-arrest intubation OKNo CPR, pre arrest intubation ok    Clinically Significant Risk Factors Present on Admission                # Drug Induced Platelet Defect: home medication list includes an antiplatelet medication                   Disposition Plan      Expected Discharge Date: 03/18/2024                  Levi Herzog DO  Hospitalist Service  [facility]  Securely message with [employer] (more info)  Text page via [employer] Paging/Directory     ______________________________________________________________________    Chief Complaint   Left hip pain, bacteremia, confusion     History is obtained from the patient    History of Present Illness     68yoM with OA of the left hip and aortic stenosis s/p open AVR (2022) presented to [facility] ED from Ortho clinic on 3/14 due to a brief period of confusion he experienced while there.  He stated that he felt a bit nauseous and then went to the bathroom.  He did not remember going to the bathroom, having a conversation with a friend at the clinic afterwards, or his wife driving him to the ED.  The ED provider reported some possible word-finding difficulties that were quite  subtle.  He denied weakness, numbness, tingling, HA, visual changes, chills, chest pain, SOB, cough.  He is unsure if he had a fever but over the last few days he has felt warm. He has had no medication changes and no prior episodes like this in the past. See Assessment and Plan for hospital course at [facility].    On arrival to [facility] he reports to be doing well. He continues to have pain in the left hip but currently under control. He reports subjective fevers for a few weeks prior to his admission to [facility]. He also states his left hip pain worsened quite suddenly while working a few weeks ago. Currently endorsing left hip pain and constipation. He currently denies any chest pain, shortness of breath, headache, dizziness, vision changes, cough, abdominal pain, or any other symptoms.    Lives with his wife, works on farm, no alcohol or tobacco use    Past Medical History    Past Medical History:   Diagnosis Date    * * * SBE PROPHYLAXIS * * *     no longer indicated - 2007 AHA guidelnies    Mitral valve stenosis and aortic valve stenosis     2/6 murmur    Pure hypercholesterolemia 1/26/2007       Past Surgical History   Past Surgical History:   Procedure Laterality Date    COLONOSCOPY N/A 12/5/2014    Procedure: COLONOSCOPY;  Surgeon: Omar Chisholm MD;  Location:  GI    CV CORONARY ANGIOGRAM N/A 11/15/2022    Procedure: Coronary Angiogram;  Surgeon: Edson Nava MD;  Location:  HEART CARDIAC CATH LAB    HC REMOVAL OF TONSILS,<13 Y/O      Tonsils <12y.o.    REPLACE VALVE AORTIC N/A 12/16/2022    Procedure: AORTIC VALVE REPLACEMENT WITH 21MM INSPIRIS RESILIA VALVE, PLACEMENT OF TEMPORARY VENTRICULAR AND ATRIAL PACING WIRES AND TRANSESPHOGEAL ECHOCARDIOGRAM;  Surgeon: Kaila Abbott MD;  Location:  OR    [facility] COLONOSCOPY W/WO BRUSH/WASH  2/8/2005        Prior to Admission Medications   Prior to Admission Medications   Prescriptions Last Dose Informant Patient Reported? Taking?    GLUCOSAMINE CHONDROITIN OR TABS 3/13/2024 at HS Spouse/Significant Other Yes Yes   Sig: Take 1 tablet by mouth at bedtime   Multiple Vitamins-Minerals (CENTRUM SILVER PO) 3/13/2024 at HS Spouse/Significant Other Yes Yes   Sig: Take 1 tablet by mouth at bedtime   Vitamin D3 (CHOLECALCIFEROL) 25 mcg (1000 units) tablet 3/14/2024  Yes Yes   Sig: Take 25 mcg by mouth daily   amoxicillin (AMOXIL) 500 MG capsule  at Ppx  Yes Yes   Sig: Take 2,000 mg by mouth once as needed (dental prophy)   ascorbic acid (VITAMIN C) 250 MG CHEW chewable tablet 3/14/2024  Yes Yes   Sig: Take 250 mg by mouth daily   aspirin 81 MG tablet 3/16/2024 at AM Spouse/Significant Other Yes Yes   Sig: Take 81 mg by mouth at bedtime   ferrous sulfate (FEROSUL) 325 (65 Fe) MG tablet 3/14/2024  Yes Yes   Sig: Take 325 mg by mouth daily (with breakfast)      Facility-Administered Medications: None        Review of Systems    The 10 point Review of Systems is negative other than noted in the HPI or here.      Social History   I have reviewed this patient's social history and updated it with pertinent information if needed.  Social History     Tobacco Use    Smoking status: Never     Passive exposure: Never    Smokeless tobacco: Never   Vaping Use    Vaping Use: Never used   Substance Use Topics    Alcohol use: Yes     Comment: 1-2 beer monthly  maybe    Drug use: No       Family History   I have reviewed this patient's family history and updated it with pertinent information if needed.  Family History   Problem Relation Age of Onset    Cancer Father         Spleen cancer    Hypertension Father     Allergies Father         PCN    Gastrointestinal Disease Father         ulcer    Heart Disease Father         Hx: meds and angioplasty    Hypertension Brother     Cerebrovascular Disease Paternal Grandfather     Arthritis Paternal Grandfather     Arthritis Mother     Hypertension Mother     Allergies Son         Dust , Mold    Depression Sister         X 2      Allergies   Allergies   Allergen Reactions    No Known Drug Allergy         Physical Exam   Vital Signs: Temp: 97.8  F (36.6  C) Temp src: Oral BP: (!) 140/95 Pulse: 77   Resp: 16 SpO2: 96 % O2 Device: None (Room air)    Weight: 138 lbs 3.2 oz    Constitutional: awake, alert, cooperative, no apparent distress, and appears stated age  Eyes: Lids and lashes normal, pupils equal, round and reactive to light, extra ocular muscles intact, sclera clear, conjunctiva normal  ENT: Normocephalic, without obvious abnormality, atraumatic   Respiratory: No increased work of breathing, good air exchange, clear to auscultation bilaterally, no crackles or wheezing  Cardiovascular: Normal apical impulse, regular rate and rhythm, normal S1 and S2, no S3 or S4, and no murmur noted  GI: No scars, normal bowel sounds, soft, non-distended, non-tender, no masses palpated, no hepatosplenomegally  Musculoskeletal: Left hip flexed. Tender with passive and active flexion. Warm to touch  Neurologic: Awake, alert, oriented to name, place and time.  Cranial nerves II-XII are grossly intact.   Neuropsychiatric: General: normal, calm, and normal eye contact    Medical Decision Making       90 MINUTES SPENT BY ME on the date of service doing chart review, history, exam, documentation & further activities per the note.      Data   ------------------------- PAST 24 HR DATA REVIEWED -----------------------------------------------    I have personally reviewed the following data over the past 24 hrs:    12.5 (H)  \   10.9 (L)   / 224     131 (L) 98 19.4 /  168 (H)   4.1 23 0.94 \     Procal: N/A CRP: 158.13 (H) Lactic Acid: 1.5         Imaging results reviewed over the past 24 hrs:   No results found for this or any previous visit (from the past 24 hour(s)).

## 2024-03-17 NOTE — PROGRESS NOTES
03/17/24 1312   Appointment Info   Signing Clinician's Name / Credentials (PT) Lashonda Christianson, PT, DPT   Living Environment   People in Home spouse   Current Living Arrangements house   Home Accessibility stairs to enter home;stairs within home   Number of Stairs, Main Entrance 2   Stair Railings, Main Entrance none   Number of Stairs, Within Home, Primary greater than 10 stairs   Stair Railings, Within Home, Primary railings safe and in good condition   Transportation Anticipated family or friend will provide   Living Environment Comments Pt can stay on one level if needed, has 2 platform steps to enter home   Self-Care   Usual Activity Tolerance good   Current Activity Tolerance moderate   Regular Exercise No   Equipment Currently Used at Home walker, rolling   Fall history within last six months no   Activity/Exercise/Self-Care Comment Pt independent at baseline, 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 Since then has been limited by hip pain/weakness and has been using a FWW to assist with ambulation.   General Information   Onset of Illness/Injury or Date of Surgery 03/16/24   Referring Physician Levi Herzog, DO   Patient/Family Therapy Goals Statement (PT) To go home   Pertinent History of Current Problem (include personal factors and/or comorbidities that impact the POC) Pt is 68 year old male adm to [facility] on 3/16/24 as a transfer from [facility] for further evaluation. Pt had presented to ortho clinic on 3/14/24 when he developed an episode of acute confusion prompting family to take pt to ED. Pt was found to have punctate focus of diffusion abnormality in left hippocampal region on MRI. Pt was also found to have +blood cultures, concerns for septic arthritis as well as endocarditis. Pt transferred to [facility] for further cardiac work up and AC. PMH includes OA, aortic stenosis s/p open AVR in 2022.   Existing  Precautions/Restrictions fall   Cognition   Affect/Mental Status (Cognition) WNL   Orientation Status (Cognition) oriented x 4   Follows Commands (Cognition) WNL   Pain Assessment   Patient Currently in Pain No  (Pt reports pain is currently controlled with Tylenol)   Posture    Posture Forward head position;Protracted shoulders   Range of Motion (ROM)   ROM Comment R LE WFL, L LE pt has difficulty extending hip and knee   Strength (Manual Muscle Testing)   Strength Comments R LE WFL, L ankle WFL, can move through L hip ROM against gravity but with difficulty and increased pain.   Bed Mobility   Comment, (Bed Mobility) Not assessed at this time   Transfers   Comment, (Transfers) SBA   Gait/Stairs (Locomotion)   Comment, (Gait/Stairs) CGA with FWW   Balance   Balance Comments No overt LOB when using FWW to assist with ambulation   Clinical Impression   Criteria for Skilled Therapeutic Intervention Yes, treatment indicated   PT Diagnosis (PT) Impaired mobility   Influenced by the following impairments Increased pain, decreased strength, decreased balance, decreased activity tolerance   Functional limitations due to impairments Decreased ability to participate in daily tasks   Clinical Presentation (PT Evaluation Complexity) evolving   Clinical Presentation Rationale Current presentation, [facility]   Clinical Decision Making (Complexity) low complexity   Planned Therapy Interventions (PT) balance training;bed mobility training;gait training;home exercise program;patient/family education;stair training;strengthening;transfer training   Risk & Benefits of therapy have been explained evaluation/treatment results reviewed;care plan/treatment goals reviewed;risks/benefits reviewed;current/potential barriers reviewed;participants voiced agreement with care plan;participants included;patient;spouse/significant other   PT Total Evaluation Time   PT Eval, Moderate Complexity Minutes (38428) 10   Physical Therapy Goals   PT Frequency  5x/week   PT Predicted Duration/Target Date for Goal Attainment 03/30/24   PT: Bed Mobility Independent;Supine to/from sit;Rolling   PT: Transfers Modified independent;Sit to/from stand;Bed to/from chair;Assistive device   PT: Gait Modified independent;Greater than 200 feet;Assistive device   PT: Stairs Modified independent;2 stairs;Assistive device   Interventions   Interventions Quick Adds Gait Training;Therapeutic Procedure   Therapeutic Activity   Therapeutic Activities: dynamic activities to improve functional performance Minutes (47859) 10   Treatment Detail/Skilled Intervention Pt OOB in chair on arrival, agreeable to participate. Pt completes sit to stand transfer with SBA, good recall of hand placement on FWW. Pt stands statically with SBA, mainly stays weight shifted onto R LE, has difficulty fully extensing L hip and knee. Pt encouraged to change positions often and to work on stretching L hip and knee to work on increasing extension. At completion of session, pt OOB in chair, needs within reach, family at bedside.   Symptoms Noted During/After Treatment None   Gait Training   Gait Training Minutes (60841) 10   Symptoms Noted During/After Treatment (Gait Training) none   Treatment Detail/Skilled Intervention Pt amb 150' with FWW and CGA to SBA, forward flexed posture, guarded, has increased pain with L hip rotation, no overt LOB.   PT Discharge Planning   PT Plan General strengthening and ROM exercises, stairs, progress ambulation distance, check ortho/cardiac plan once work up completed   PT Discharge Recommendation (DC Rec) home with outpatient physical therapy   PT Rationale for DC Rec At this time anticipate pt will be able to return home with family support and outpatient PT to further address strength and ROM deficits of L hip. Will continue to follow during hospital stay and update discharge recommendations as needed depending on any functional change with any potential procedures.   PT Brief  overview of current status SBA to CGA with FWW   Total Session Time   Timed Code Treatment Minutes 20   Total Session Time (sum of timed and untimed services) 30

## 2024-03-17 NOTE — PLAN OF CARE
A&O x 4. VSS. RA. Tele: SR. Tylenol for L) hip pain. SBA w/GB/Walker. Regular diet. Family at bedside. Plan: NPO @ midnight for possible AC on Monday. Will continue w/plan of care.

## 2024-03-17 NOTE — CONSULTS
[facility]    Infectious Disease Consultation     Date of Admission:  3/16/2024  Date of Consult (When I saw the patient): 03/17/24    Assessment & Plan   Miguel Pappas is a 68 year old who was admitted on 3/16/2024.     Impression:  68 with HLD, hyponatremia, mitral valve stenosis and aortic valve stenosis s/p bioprosthertic aortic valve placement   Admitted in 3/14/24 for evaluation of confusion   On admission was found to have leukocytosis, no fever  Blood cultures positive with Streptococcus gordonii  Suspected endocarditis  TIA possible septic embolism  Left hip pain concern for septic arthritis  History of for root canal done last month history of some dental infection that he has been dealing with for months  TTE prosthetic valve were not clearly visualized    Recommendations  Stop vancomycin and Zosyn start IV ceftriaxone based on susceptibilities to target treat the Streptococcus  Needs daily blood cultures till clear  Please avoid any PICC or midline  Will follow-up on the AC,  cardiology consultation  Will follow-up on orthopedic consultation  Consult OMFS rule out any ongoing dental abscess      Scott Burns MD    Reason for Consult   Reason for consult: I was asked to evaluate this patient for bacteremia .    Primary Care Physician   Thony Gongora    Chief Complaint   Confusion     History is obtained from the patient and medical records    History of Present Illness   Miguel Pappas is a 68 year old male who presents with confusion     Past Medical History   I have reviewed this patient's medical history and updated it with pertinent information if needed.   Past Medical History:   Diagnosis Date    * * * SBE PROPHYLAXIS * * *     no longer indicated - 2007 AHA guidelnies    Mitral valve stenosis and aortic valve stenosis     2/6 murmur    Pure hypercholesterolemia 1/26/2007       Past Surgical History   I have reviewed this patient's surgical history and updated it  with pertinent information if needed.  Past Surgical History:   Procedure Laterality Date    COLONOSCOPY N/A 12/5/2014    Procedure: COLONOSCOPY;  Surgeon: Omar Chisholm MD;  Location:  GI    CV CORONARY ANGIOGRAM N/A 11/15/2022    Procedure: Coronary Angiogram;  Surgeon: Edson Nava MD;  Location:  HEART CARDIAC CATH LAB    HC REMOVAL OF TONSILS,<13 Y/O      Tonsils <12y.o.    REPLACE VALVE AORTIC N/A 12/16/2022    Procedure: AORTIC VALVE REPLACEMENT WITH 21MM INSPIRIS RESILIA VALVE, PLACEMENT OF TEMPORARY VENTRICULAR AND ATRIAL PACING WIRES AND TRANSESPHOGEAL ECHOCARDIOGRAM;  Surgeon: Kaila Abbott MD;  Location:  OR    [facility] COLONOSCOPY W/WO BRUSH/WASH  2/8/2005        Prior to Admission Medications   Prior to Admission Medications   Prescriptions Last Dose Informant Patient Reported? Taking?   GLUCOSAMINE CHONDROITIN OR TABS 3/13/2024 at  Spouse/Significant Other Yes Yes   Sig: Take 1 tablet by mouth at bedtime   Multiple Vitamins-Minerals (CENTRUM SILVER PO) 3/13/2024 at  Spouse/Significant Other Yes Yes   Sig: Take 1 tablet by mouth at bedtime   Vitamin D3 (CHOLECALCIFEROL) 25 mcg (1000 units) tablet 3/14/2024  Yes Yes   Sig: Take 25 mcg by mouth daily   amoxicillin (AMOXIL) 500 MG capsule  at Ppx  Yes Yes   Sig: Take 2,000 mg by mouth once as needed (dental prophy)   ascorbic acid (VITAMIN C) 250 MG CHEW chewable tablet 3/14/2024  Yes Yes   Sig: Take 250 mg by mouth daily   aspirin 81 MG tablet 3/16/2024 at AM Spouse/Significant Other Yes Yes   Sig: Take 81 mg by mouth at bedtime   ferrous sulfate (FEROSUL) 325 (65 Fe) MG tablet 3/14/2024  Yes Yes   Sig: Take 325 mg by mouth daily (with breakfast)      Facility-Administered Medications: None     Allergies   Allergies   Allergen Reactions    No Known Drug Allergy        Immunization History   Immunization History   Administered Date(s) Administered    COVID-19 Monovalent 18+ (Moderna) 03/04/2021, 04/01/2021    COVID-19  Monovalent Booster 18+ (Moderna) 02/03/2022    Influenza (High Dose) 3 valent vaccine 10/26/2018    Influenza (IIV3) PF 12/12/2006, 10/27/2008    Influenza Vaccine 65+ (Fluzone HD) 12/06/2022, 11/30/2023    TD,PF 7+ (Tenivac) 07/15/2000, 05/03/2006    Tdap (Adult) Unspecified Formulation 01/09/2018    Varicella Pt Report Hx of Varicella/Chicken Pox 01/01/1957       Social History   I have reviewed this patient's social history and updated it with pertinent information if needed. Miguel Pappas  reports that he has never smoked. He has never been exposed to tobacco smoke. He has never used smokeless tobacco. He reports current alcohol use. He reports that he does not use drugs.    Family History   I have reviewed this patient's family history and updated it with pertinent information if needed.   Family History   Problem Relation Age of Onset    Cancer Father         Spleen cancer    Hypertension Father     Allergies Father         PCN    Gastrointestinal Disease Father         ulcer    Heart Disease Father         Hx: meds and angioplasty    Hypertension Brother     Cerebrovascular Disease Paternal Grandfather     Arthritis Paternal Grandfather     Arthritis Mother     Hypertension Mother     Allergies Son         Dust , Mold    Depression Sister         X 2       Review of Systems   The 10 point Review of Systems is negative    Physical Exam   Temp: 99.2  F (37.3  C) Temp src: Oral BP: (!) 151/94 Pulse: 78   Resp: 16 SpO2: 96 % O2 Device: None (Room air)    Vital Signs with Ranges  Temp:  [97.8  F (36.6  C)-99.2  F (37.3  C)] 99.2  F (37.3  C)  Pulse:  [71-81] 78  Resp:  [16-18] 16  BP: (131-151)/(73-95) 151/94  SpO2:  [94 %-98 %] 96 %  137 lbs 4.8 oz  Body mass index is 21.5 kg/m .    GENERAL APPEARANCE:  awake  EYES: Eyes grossly normal to inspection  NECK: no adenopathy  RESP: lungs clear   CV: regular rates and rhythm  LYMPHATICS: normal ant/post cervical and supraclavicular nodes  ABDOMEN: soft,  "nontender  MS: extremities normal  SKIN: no suspicious lesions or rashes        Data   All laboratory and imaging data in the past 24 hours reviewed  No results for input(s): \"CULT\" in the last 168 hours.  No lab results found.    Invalid input(s): \"UC\"       All cultures:  Recent Labs   Lab 03/16/24  0900 03/16/24  0854 03/15/24  1517 03/15/24  1512 03/14/24  2223   CULTURE No growth after 12 hours No growth after 12 hours Positive on the 1st day of incubation*  Gram positive cocci in pairs and chains* Positive on the 1st day of incubation*  Gram positive cocci in pairs and chains* Positive on the 1st day of incubation*  Streptococcus gordonii*  Positive on the 1st day of incubation*  Streptococcus gordonii*      Blood culture:  Results for orders placed or performed during the hospital encounter of 03/14/24   Blood Culture Arm, Left    Specimen: Arm, Left; Blood   Result Value Ref Range    Culture No growth after 12 hours    Blood Culture Hand, Right    Specimen: Hand, Right; Blood   Result Value Ref Range    Culture No growth after 12 hours    Blood Culture Arm, Right    Specimen: Arm, Right; Blood   Result Value Ref Range    Culture Positive on the 1st day of incubation (A)     Culture Gram positive cocci in pairs and chains (AA)    Blood Culture Arm, Left    Specimen: Arm, Left; Blood   Result Value Ref Range    Culture Positive on the 1st day of incubation (A)     Culture Gram positive cocci in pairs and chains (AA)    Blood Culture Peripheral Blood    Specimen: Peripheral Blood   Result Value Ref Range    Culture Positive on the 1st day of incubation (A)     Culture Streptococcus gordonii (AA)        Susceptibility    Streptococcus gordonii - YEIMY     Ampicillin <=0.06 Susceptible ug/mL     Penicillin <=0.03 Susceptible ug/mL     Clindamycin <=0.06 Susceptible ug/mL     Cefotaxime <=0.25 Susceptible ug/mL     Ceftriaxone <=0.25 Susceptible ug/mL     Vancomycin 0.5 Susceptible ug/mL     Meropenem <=0.06 " Susceptible ug/mL   Blood Culture Peripheral Blood    Specimen: Peripheral Blood   Result Value Ref Range    Culture Positive on the 1st day of incubation (A)     Culture Streptococcus gordonii (AA)       Urine culture:  No results found for this or any previous visit.

## 2024-03-17 NOTE — CONSULTS
[facility]    Stroke Consult Note    Reason for Consult:  episode of memory loss     Chief Complaint: No chief complaint on file.       HPI  Miguel Pappas is a 68 year old male  with PMH of HLD, hyponatremia, mitral valve stenosis and aortic valve stenosis s/p bioprosthetic aortic valve placement on PTA ASA 81mg. He presented to the ED 3/14/24 for evaluation of confusion. He was an an orthopedic visit and felt nauseous and unwell, requiring him to lay down. Upon getting up he recalls getting to the bathroom and sitting down but then experienced a 20-30 minute episode of which he has no recollection. Per wife he was asking repetitive questions but otherwise was functioning normally (ie was able to adjust his clothes, wash hands after the restroom). Wife did report a few apparent mild abnormalities with his memory earlier in the day (ie asked an odd question and later had no recollection of asking it), but no other new neurological deficits identified.  MRI with punctate focus of diffusion abnormality in L hippocampal region and he was placed on antipaltelet therapy (initially DAPT but then ASA alone d/to concern of endocarditis) and Lipitor for stroke prevention.  Was noted to have leukocytosis, elevated CRP and procalcitonin on admission; blood cultures subsequently positive and AC recommended which required transfer to [facility] for completion.     Today, Miguel reports he is feeling well. Exam is stable, no focal deficits. No recurrent episodes or concerns of memory change. Miguel/family do note that he has had some intermittent fevers and chills in the past couple of weeks (in addition to headaches), although they note that other members of the family had flu like symptoms around that time. Wife thinks the highest temp he had was 102. He has had 2-3 dental procedures in the past 5 weeks, he notes pre-medicating before each one. He also has had some mild to moderate L hip pain  attributed to OA but this drastically worsened 1-2 weeks ago.     Tmax 99.2  SBP     Evaluation Summarized     MRI/Head CT MRI: punctate focus of diffusion abnormality in L hippocampal region, no hemorrhage, no significant chronic small vessel disease    Intracranial Vasculature CTA: No LVO or significant stenosis   Cervical Vasculature CTA: No LVO, significant stenosis or dissection; no aneurysm       Echocardiogram TTE: LVEF 60-65%,atrial not well visualized, valves not well visualized     AC: pending    EKG/Telemetry Sinus rhythm    Other Testing Blood cultures: positive x4 (streptococcus gordoni/gram positive cocci in pairs and chains)   Procalcitonin: 0.99  CRP: 128.47   WBC 19.2-->15.1  Sodium 120-->133      UA unremarkable   Chest Xray unremarkable       LDL  3/14/2024: 123 mg/dL   A1C  3/14/2024: 5.8 %   Troponin 3/14/2024: 12 ng/L       Impression  Episode of memory loss/amnesia in setting of small L hippocampal stroke    Leukocytosis, elevated CRP, procalcitonin, positive blood cultures; concern for endocarditis     Severe L hip pain attributed to arthritis, now sub acutely worse raising concern for septic joint?    Recommendations   - Neurochecks and Vital Signs every 4 hours   - AC, pending  - Continue ASA 81mg + Plavix 75mg x 21 days; then discontinue Plavix and continue ASA monotherapy at 325mg dose daily indefinitely   -Inpatient BP goal <180 with gradual reduction of BP to normotension; long term outpatient goal BP is <130/80 with tighter control associated with improved vascular outcomes  - (goal 40-70); continue Lipitor (new this hospitalization) with ongoing outpatient titration to achieve LDL goal   -Blood glucose monitoring, Hgb A1c 5.8%, (goal <7% for secondary stroke prevention), follow-up with PCP  - Telemetry, EKG  - Bedside Glucose Monitoring  - PT/OT/SLP  - Stroke Education  - Euthermia, Euglycemia  - appreciate infectious disease and cardiology input     Patient Follow-up   "  - final recommendation pending work-up  - in 6-8 weeks with any stroke CESAR ([phone]), ordered      Thank you for this consult. We will continue to follow.     BROOKE Alvarez CNP  Vascular Neurology    To page me or covering stroke neurology team member, click here: AMCOM  Choose \"On Call\" tab at top, then select \"NEUROLOGY/ALL SITES\" from middle drop-down box, press Enter, then look for \"stroke\" or \"telestroke\" for your site.  _____________________________________________________    Clinically Significant Risk Factors Present on Admission                # Drug Induced Platelet Defect: home medication list includes an antiplatelet medication                      Past Medical History    Past Medical History:   Diagnosis Date    * * * SBE PROPHYLAXIS * * *     no longer indicated - 2007 AHA guidelnies    Mitral valve stenosis and aortic valve stenosis     2/6 murmur    Pure hypercholesterolemia 1/26/2007     Medications   Home Meds  Prior to Admission medications    Medication Sig Start Date End Date Taking? Authorizing Provider   amoxicillin (AMOXIL) 500 MG capsule Take 2,000 mg by mouth once as needed (dental prophy) 1/18/24  Yes Reported, Patient   ascorbic acid (VITAMIN C) 250 MG CHEW chewable tablet Take 250 mg by mouth daily   Yes Reported, Patient   aspirin 81 MG tablet Take 81 mg by mouth at bedtime   Yes Reported, Patient   ferrous sulfate (FEROSUL) 325 (65 Fe) MG tablet Take 325 mg by mouth daily (with breakfast)   Yes Reported, Patient   GLUCOSAMINE CHONDROITIN OR TABS Take 1 tablet by mouth at bedtime 12/22/05  Yes Vivian Rudd PA-C   Multiple Vitamins-Minerals (CENTRUM SILVER PO) Take 1 tablet by mouth at bedtime 12/22/05  Yes Vivian Rudd PA-C   Vitamin D3 (CHOLECALCIFEROL) 25 mcg (1000 units) tablet Take 25 mcg by mouth daily   Yes Reported, Patient       Scheduled Meds   aspirin  81 mg Oral Daily    atorvastatin  40 mg Oral QPM    piperacillin-tazobactam  3.375 g Intravenous " Q6H    [Held by provider] polyethylene glycol  17 g Oral Daily    senna-docusate  1 tablet Oral BID    sodium chloride (PF)  3 mL Intracatheter Q8H    vancomycin  1,500 mg Intravenous Q24H       Infusion Meds      Allergies   Allergies   Allergen Reactions    No Known Drug Allergy           PHYSICAL EXAMINATION   Temp:  [97.8  F (36.6  C)-98.9  F (37.2  C)] 98.9  F (37.2  C)  Pulse:  [71-82] 81  Resp:  [16-18] 16  BP: ()/(59-95) 144/90  SpO2:  [94 %-98 %] 94 %    General Exam  General:  patient sitting in chair without any acute distress    HEENT:  normocephalic/atraumatic  Pulmonary:  no respiratory distress    Neuro Exam  Mental Status:  alert, oriented x 3, follows commands, speech clear and fluent, naming and repetition normal  Cranial Nerves:  visual fields intact, EOMI with normal smooth pursuit, facial sensation intact and symmetric, facial movements symmetric, hearing not formally tested but intact to conversation, palate elevation symmetric and uvula midline, no dysarthria, tongue protrusion midline  Motor:  normal muscle tone and bulk, no abnormal movements, able to move all limbs spontaneously, strength 5/5 throughout upper and lower extremities (pain limits movement of LLE at hip but no clear weakness of direct muscle testing), no pronator drift  Sensory:  light touch sensation intact and symmetric throughout upper and lower extremities, no extinction on double simultaneous stimulation   Coordination:  normal finger-to-nose and heel-to-shin bilaterally without dysmetria  Station/Gait:  deferred    Stroke Scales    NIHSS  1a. Level of Consciousness 0-->Alert, keenly responsive   1b. LOC Questions 0-->Answers both questions correctly   1c. LOC Commands 0-->Performs both tasks correctly   2.   Best Gaze 0-->Normal   3.   Visual 0-->No visual loss   4.   Facial Palsy 0-->Normal symmetrical movements   5a. Motor Arm, Left 0-->No drift, limb holds 90 (or 45) degrees for full 10 secs   5b. Motor Arm,  "Right 0-->No drift, limb holds 90 (or 45) degrees for full 10 secs   6a. Motor Leg, Left 0-->No drift, leg holds 30 degree position for full 5 secs   6b. Motor Leg, right 0-->No drift, leg holds 30 degree position for full 5 secs   7.   Limb Ataxia 0-->Absent   8.   Sensory 0-->Normal, no sensory loss   9.   Best Language 0-->No aphasia, normal   10. Dysarthria 0-->Normal   11. Extinction and Inattention  0-->No abnormality   Total 0 (03/17/24 1419)       Imaging  I personally reviewed all imaging; relevant findings per HPI.    Labs Data   CBC  Recent Labs   Lab 03/17/24  0603 03/16/24  0541 03/15/24  0547   WBC 15.5* 12.5* 15.1*   RBC 4.10* 3.81* 3.90*   HGB 11.6* 10.9* 11.2*   HCT 34.7* 31.9* 32.9*    224 218     Basic Metabolic Panel   Recent Labs   Lab 03/17/24  0603 03/16/24  1146 03/16/24  0745 03/16/24  0541 03/15/24  0738 03/15/24  0547   *  --   --  131*  --  133*   POTASSIUM 4.4  --   --  4.1  --  3.9   CHLORIDE 100  --   --  98  --  95*   CO2 21*  --   --  23  --  26   BUN 15.9  --   --  19.4  --  16.5   CR 0.98  --   --  0.94  --  0.96   * 168* 101* 113*   < > 109*   RAMONA 9.4  --   --  9.1  --  9.3    < > = values in this interval not displayed.     Liver Panel  No results for input(s): \"PROTTOTAL\", \"ALBUMIN\", \"BILITOTAL\", \"ALKPHOS\", \"AST\", \"ALT\", \"BILIDIRECT\" in the last 168 hours.  INR    Recent Labs   Lab Test 03/14/24  1640 03/14/23  1334 03/07/23  1343   INR 1.08 2.4* 1.5*           Stroke Consult Data Data   This was a non-emergent, non-telestroke consult.  I have personally spent a total of 65 minutes providing care today, time spent in reviewing medical records and devising the plan as recorded above.   "

## 2024-03-17 NOTE — PROGRESS NOTES
[facility]    Medicine Progress Note - Hospitalist Service    Date of Admission:  3/16/2024    Assessment & Plan   Miguel Pappas is a 68 year old male admitted on 3/16/2024. He was transferred from [facility] for possible transesophageal echocardiogram due to suspicion for SBE.  The patient presented with an episode of amnesia and was found to have streptococcal bacteremia.  He has a history of a bioprosthetic aortic valve replacement. For the past couple of weeks he has felt unwell and has at least one episode of documented fever.  He has had worsening left hip pain. In January he had a dental cleaning but took antibiotic prophylaxis.     Sepsis due to streptococcus gordonii bacteremia   Suspected subacute bacterial endocarditis   Possible TIA due to septic embolism  Possible left hip septic arthritis   Initially presented to [facility] with confusion and concern for stroke. Blood cultures obtained on admission to [facility] 3/14 resulting in gram + cocci in pairs and chains and streptococcus species by [facility] GP panel.  He was initiated on Zosyn and Vancomycin. Chest xray and UA negative. The source is unclear but he reported subjective fevers for a few weeks before presentation. He also has severe bone on bone left hip arthritis which worsened suddenly a few weeks ago. WBC 19 on admission to [facility], improved to 12.5 on 3/16. --->158. Septic arthritis a consideration. Heart valves not well visualized on TTE done at [facility]. Transferred to [facility] for ID consult and to consider AC.  Temp (24hrs), Av.6  F (37  C), Min:97.8  F (36.6  C), Max:99.2  F (37.3  C)  CRP Inflammation   Date Value Ref Range Status   2024 148.85 (H) <5.00 mg/L Final   2024 158.13 (H) <5.00 mg/L Final   03/15/2024 128.47 (H) <5.00 mg/L Final     This SmartLink has not been configured with any valid records.     Recent Labs   Lab 24  0603 24  [id number]  03/15/24  0547 03/14/24  1640   WBC 15.5* 12.5* 15.1* 19.2*   Continue Vancomycin and Zosyn until seen by infectious disease   Cardiology, infectious disease, orthopedic and stroke neurology consults      Bioprosthetic AVR (12/2022)   Patient had open AVR in 2022.  Valves not visualized well on TTE.    Continue ASA 81mg daily    Acute amnesia, resolved  MRI at [facility] showed punctate focus of diffusion abnormality in L hippocampal region, no hemorrhage, no significant chronic small vessel disease.  CTA no significant stenosis or dissection.  TTE:  LVEF 60-65%, valvles not well visiualized. 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 CTA head/neck: no significant stenosis, no acute infarct, no hemorrhage.  Stroke neurology consult  Continue telemetry      Hyponatremia.  Recent Labs   Lab 03/17/24  0603 03/16/24  0541 03/15/24  0547 03/14/24  1640   * 131* 133* 129*   Check urine Na and serum osmolality  Continue to monitor     Anemia, mild   Recent Labs   Lab 03/17/24  0603 03/16/24  0541 03/15/24  0547 03/14/24  1640   HGB 11.6* 10.9* 11.2* 11.8*   Continue to monitor      Left hip pain   History of hip osteoarthritis    Continue prn tylenol, prn oxycodone    Constipation  Continue bowel regimen          Diet: NPO for Medical/Clinical Reasons Except for: Meds    DVT Prophylaxis: Pneumatic Compression Devices  Brown Catheter: Not present  Lines: None     Cardiac Monitoring: ACTIVE order. Indication: endocarditis  Code Status: No CPR- Pre-arrest intubation OK      Clinically Significant Risk Factors Present on Admission                # Drug Induced Platelet Defect: home medication list includes an antiplatelet medication                   Disposition Plan      Expected Discharge Date: 03/18/2024                    Preston Malcolm MD  Hospitalist Service  [facility]  Securely message with [employer] (more info)  Text page via [employer] Paging/Directory    ______________________________________________________________________    Interval History   He has been stable overnight.  No new complaints this morning.     Physical Exam   Vital Signs: Temp: 98.9  F (37.2  C) Temp src: Oral BP: (!) 144/90 Pulse: 81   Resp: 16 SpO2: 94 % O2 Device: None (Room air)    Weight: 137 lbs 4.8 oz  Constitutional: awake, alert, cooperative, no apparent distress  Respiratory: No increased work of breathing, good air exchange, clear to auscultation bilaterally, no crackles or wheezing  Cardiovascular: regular rate and rhythm, 2/6 SM  GI: normal bowel sounds, soft, non-distended, non-tender  Neurologic: Awake, alert, oriented to name, place and time.  Cranial nerves II-XII are grossly intact.  Motor is 5 out of 5 bilaterally.  Neuropsychiatric: General: normal, calm and normal eye contact     Medical Decision Making       MANAGEMENT DISCUSSED with the following over the past 24 hours: patient   NOTE(S)/MEDICAL RECORDS REVIEWED over the past 24 hours: EPIC       Data     I have personally reviewed the following data over the past 24 hrs:    15.5 (H)  \   11.6 (L)   / 311     134 (L) 100 15.9 /  101 (H)   4.4 21 (L) 0.98 \     Procal: N/A CRP: 148.85 (H) Lactic Acid: 1.5         Imaging results reviewed over the past 24 hrs:   No results found for this or any previous visit (from the past 24 hour(s)).

## 2024-03-17 NOTE — PHARMACY-VANCOMYCIN DOSING SERVICE
"Pharmacy Vancomycin Initial Note  Date of Service 2024  Patient's  1955  68 year old, male    Indication: Bacteremia    Current estimated CrCl = Estimated Creatinine Clearance: 64.8 mL/min (based on SCr of 0.94 mg/dL).    Creatinine for last 3 days  3/14/2024:  4:40 PM Creatinine 0.90 mg/dL  3/15/2024:  5:47 AM Creatinine 0.96 mg/dL  3/16/2024:  5:41 AM Creatinine 0.94 mg/dL    Recent Vancomycin Level(s) for last 3 days  No results found for requested labs within last 3 days.      Vancomycin IV Administrations (past 72 hours)                     vancomycin (VANCOCIN) 1,500 mg in sodium chloride 0.9 % 250 mL intermittent infusion (mg) 1,500 mg New Bag 24 0809                    Nephrotoxins and other renal medications (From now, onward)      Start     Dose/Rate Route Frequency Ordered Stop    24 2230  piperacillin-tazobactam (ZOSYN) 3.375 g vial to attach to  mL bag        Note to Pharmacy: For [facility], [facility] and [facility]: For Zosyn-naive patients, use the \"Zosyn initial dose + extended infusion\" order panel.    3.375 g  over 30 Minutes Intravenous EVERY 6 HOURS 24 2204              Contrast Orders - past 72 hours (72h ago, onward)      None            [employer] Prediction of Planned Initial Vancomycin Regimen    Loading dose: 1500 mg givien 3/16/2024 at 08:00  Regimen: 1500 mg IV every 24 hours.  Start time: 22:00 on 2024  Exposure target: AUC24 (range)400-600 mg/L.hr   AUC24,ss: 537 mg/L.hr  Probability of AUC24 > 400: 80 %  Ctrough,ss: 14.7 mg/L  Probability of Ctrough,ss > 20: 26 %  Probability of nephrotoxicity (Lodise JAYME ): 10 %          Plan:  Vancomycin  1500 mg given 316 am, continue Vanco 1500 mg q24h starting Now.    Vancomycin monitoring method: AUC  Vancomycin therapeutic monitoring goal: 400-600 mg*h/L  Pharmacy will check vancomycin levels as appropriate in 1-3 Days.    Serum creatinine levels will be ordered a minimum of twice weekly.      Sebastien Rice, " RPH

## 2024-03-17 NOTE — PROGRESS NOTES
S-(situation):  Transfer to [facility] Via cart with EMS.    B-(background): left hip pain,TIA    A-(assessment): pt A/Ox4, left hip pain radiating down to left leg, received IV Toradol prior to trnsfer. Up with A1/walker.     R-(recommendations): Transfer to [facility] [place] per physician orders. Report called to Marie RINCON. Family member present. Continue to monitor pt and update physician as needed.      Code status: DNR  Skin: WNL  Fall Risk: Yes- Department fall risk interventions implemented.  Isolation: None  Medication drips upon transfer: NO  Influenza status verified at discharge: Yes  Belongings gathered and given to wife.

## 2024-03-18 ENCOUNTER — APPOINTMENT (OUTPATIENT)
Dept: CARDIOLOGY | Facility: CLINIC | Age: 69
DRG: 871 | End: 2024-03-18
Attending: INTERNAL MEDICINE
Payer: COMMERCIAL

## 2024-03-18 ENCOUNTER — APPOINTMENT (OUTPATIENT)
Dept: GENERAL RADIOLOGY | Facility: CLINIC | Age: 69
DRG: 871 | End: 2024-03-18
Attending: STUDENT IN AN ORGANIZED HEALTH CARE EDUCATION/TRAINING PROGRAM
Payer: COMMERCIAL

## 2024-03-18 ENCOUNTER — APPOINTMENT (OUTPATIENT)
Dept: MRI IMAGING | Facility: CLINIC | Age: 69
DRG: 871 | End: 2024-03-18
Attending: PHYSICIAN ASSISTANT
Payer: COMMERCIAL

## 2024-03-18 LAB
ANION GAP SERPL CALCULATED.3IONS-SCNC: 9 MMOL/L (ref 7–15)
APPEARANCE FLD: ABNORMAL
BACTERIA BLD CULT: ABNORMAL
BUN SERPL-MCNC: 14.6 MG/DL (ref 8–23)
CALCIUM SERPL-MCNC: 9.6 MG/DL (ref 8.8–10.2)
CELL COUNT BODY FLUID SOURCE: ABNORMAL
CHLORIDE SERPL-SCNC: 100 MMOL/L (ref 98–107)
COLOR FLD: ABNORMAL
CREAT SERPL-MCNC: 0.87 MG/DL (ref 0.67–1.17)
DEPRECATED HCO3 PLAS-SCNC: 27 MMOL/L (ref 22–29)
EGFRCR SERPLBLD CKD-EPI 2021: >90 ML/MIN/1.73M2
ERYTHROCYTE [DISTWIDTH] IN BLOOD BY AUTOMATED COUNT: 13.3 % (ref 10–15)
GLUCOSE BLDC GLUCOMTR-MCNC: 96 MG/DL (ref 70–99)
GLUCOSE SERPL-MCNC: 94 MG/DL (ref 70–99)
HCT VFR BLD AUTO: 31.8 % (ref 40–53)
HGB BLD-MCNC: 10.6 G/DL (ref 13.3–17.7)
LVEF ECHO: NORMAL
LYMPHOCYTES NFR FLD MANUAL: 0 %
MCH RBC QN AUTO: 28.6 PG (ref 26.5–33)
MCHC RBC AUTO-ENTMCNC: 33.3 G/DL (ref 31.5–36.5)
MCV RBC AUTO: 86 FL (ref 78–100)
MONOS+MACROS NFR FLD MANUAL: 1 %
NEUTS BAND NFR FLD MANUAL: 99 %
PLATELET # BLD AUTO: 328 10E3/UL (ref 150–450)
POTASSIUM SERPL-SCNC: 4.1 MMOL/L (ref 3.4–5.3)
RBC # BLD AUTO: 3.71 10E6/UL (ref 4.4–5.9)
SODIUM SERPL-SCNC: 136 MMOL/L (ref 135–145)
WBC # BLD AUTO: 10.6 10E3/UL (ref 4–11)
WBC # FLD AUTO: 523 /UL

## 2024-03-18 PROCEDURE — 89050 BODY FLUID CELL COUNT: CPT

## 2024-03-18 PROCEDURE — 80048 BASIC METABOLIC PNL TOTAL CA: CPT | Performed by: INTERNAL MEDICINE

## 2024-03-18 PROCEDURE — 72148 MRI LUMBAR SPINE W/O DYE: CPT

## 2024-03-18 PROCEDURE — 93320 DOPPLER ECHO COMPLETE: CPT | Mod: 26 | Performed by: INTERNAL MEDICINE

## 2024-03-18 PROCEDURE — 93325 DOPPLER ECHO COLOR FLOW MAPG: CPT

## 2024-03-18 PROCEDURE — 250N000013 HC RX MED GY IP 250 OP 250 PS 637: Performed by: STUDENT IN AN ORGANIZED HEALTH CARE EDUCATION/TRAINING PROGRAM

## 2024-03-18 PROCEDURE — 36415 COLL VENOUS BLD VENIPUNCTURE: CPT | Performed by: INTERNAL MEDICINE

## 2024-03-18 PROCEDURE — 0S9B3ZX DRAINAGE OF LEFT HIP JOINT, PERCUTANEOUS APPROACH, DIAGNOSTIC: ICD-10-PCS | Performed by: PHYSICIAN ASSISTANT

## 2024-03-18 PROCEDURE — 999N000184 HC STATISTIC TELEMETRY

## 2024-03-18 PROCEDURE — 87205 SMEAR GRAM STAIN: CPT | Performed by: PHYSICIAN ASSISTANT

## 2024-03-18 PROCEDURE — 999N000128 HC STATISTIC PERIPHERAL IV START W/O US GUIDANCE

## 2024-03-18 PROCEDURE — 99233 SBSQ HOSP IP/OBS HIGH 50: CPT | Performed by: INTERNAL MEDICINE

## 2024-03-18 PROCEDURE — 93325 DOPPLER ECHO COLOR FLOW MAPG: CPT | Mod: 26 | Performed by: INTERNAL MEDICINE

## 2024-03-18 PROCEDURE — 210N000002 HC R&B HEART CARE

## 2024-03-18 PROCEDURE — 20610 DRAIN/INJ JOINT/BURSA W/O US: CPT | Mod: LT

## 2024-03-18 PROCEDURE — 87075 CULTR BACTERIA EXCEPT BLOOD: CPT | Performed by: PHYSICIAN ASSISTANT

## 2024-03-18 PROCEDURE — 85027 COMPLETE CBC AUTOMATED: CPT | Performed by: INTERNAL MEDICINE

## 2024-03-18 PROCEDURE — 93312 ECHO TRANSESOPHAGEAL: CPT | Mod: 26 | Performed by: INTERNAL MEDICINE

## 2024-03-18 PROCEDURE — 250N000011 HC RX IP 250 OP 636: Performed by: STUDENT IN AN ORGANIZED HEALTH CARE EDUCATION/TRAINING PROGRAM

## 2024-03-18 PROCEDURE — 250N000011 HC RX IP 250 OP 636: Performed by: INTERNAL MEDICINE

## 2024-03-18 PROCEDURE — 250N000009 HC RX 250: Performed by: STUDENT IN AN ORGANIZED HEALTH CARE EDUCATION/TRAINING PROGRAM

## 2024-03-18 PROCEDURE — 99152 MOD SED SAME PHYS/QHP 5/>YRS: CPT | Performed by: INTERNAL MEDICINE

## 2024-03-18 PROCEDURE — 999N000183 HC STATISTIC TEE INCLUDES SEDATION

## 2024-03-18 RX ORDER — NALOXONE HYDROCHLORIDE 0.4 MG/ML
0.4 INJECTION, SOLUTION INTRAMUSCULAR; INTRAVENOUS; SUBCUTANEOUS
Status: DISCONTINUED | OUTPATIENT
Start: 2024-03-18 | End: 2024-03-18

## 2024-03-18 RX ORDER — LIDOCAINE HYDROCHLORIDE 10 MG/ML
30 INJECTION, SOLUTION EPIDURAL; INFILTRATION; INTRACAUDAL; PERINEURAL ONCE
Status: COMPLETED | OUTPATIENT
Start: 2024-03-18 | End: 2024-03-18

## 2024-03-18 RX ORDER — FLUMAZENIL 0.1 MG/ML
0.2 INJECTION, SOLUTION INTRAVENOUS
Status: DISCONTINUED | OUTPATIENT
Start: 2024-03-18 | End: 2024-03-18

## 2024-03-18 RX ORDER — NALOXONE HYDROCHLORIDE 0.4 MG/ML
0.2 INJECTION, SOLUTION INTRAMUSCULAR; INTRAVENOUS; SUBCUTANEOUS
Status: DISCONTINUED | OUTPATIENT
Start: 2024-03-18 | End: 2024-03-18

## 2024-03-18 RX ORDER — ROPIVACAINE HYDROCHLORIDE 5 MG/ML
30 INJECTION, SOLUTION EPIDURAL; INFILTRATION; PERINEURAL ONCE
Status: COMPLETED | OUTPATIENT
Start: 2024-03-18 | End: 2024-03-18

## 2024-03-18 RX ORDER — DEXTROSE MONOHYDRATE 25 G/50ML
9.5 INJECTION, SOLUTION INTRAVENOUS
Status: DISCONTINUED | OUTPATIENT
Start: 2024-03-18 | End: 2024-03-18

## 2024-03-18 RX ORDER — ROPIVACAINE HYDROCHLORIDE 2 MG/ML
30 INJECTION, SOLUTION EPIDURAL; INFILTRATION; PERINEURAL ONCE
Status: DISCONTINUED | OUTPATIENT
Start: 2024-03-18 | End: 2024-03-18

## 2024-03-18 RX ORDER — GLYCOPYRROLATE 0.2 MG/ML
0.1 INJECTION, SOLUTION INTRAMUSCULAR; INTRAVENOUS ONCE
Status: COMPLETED | OUTPATIENT
Start: 2024-03-18 | End: 2024-03-18

## 2024-03-18 RX ORDER — LIDOCAINE HYDROCHLORIDE 40 MG/ML
1.5 SOLUTION TOPICAL ONCE
Status: COMPLETED | OUTPATIENT
Start: 2024-03-18 | End: 2024-03-18

## 2024-03-18 RX ORDER — LIDOCAINE 50 MG/G
OINTMENT TOPICAL ONCE
Status: COMPLETED | OUTPATIENT
Start: 2024-03-18 | End: 2024-03-18

## 2024-03-18 RX ORDER — SODIUM CHLORIDE 9 MG/ML
INJECTION, SOLUTION INTRAVENOUS CONTINUOUS PRN
Status: DISCONTINUED | OUTPATIENT
Start: 2024-03-18 | End: 2024-03-18

## 2024-03-18 RX ORDER — FENTANYL CITRATE 50 UG/ML
25 INJECTION, SOLUTION INTRAMUSCULAR; INTRAVENOUS
Status: DISCONTINUED | OUTPATIENT
Start: 2024-03-18 | End: 2024-03-18

## 2024-03-18 RX ADMIN — FENTANYL CITRATE 50 MCG: 50 INJECTION, SOLUTION INTRAMUSCULAR; INTRAVENOUS at 09:45

## 2024-03-18 RX ADMIN — LIDOCAINE HYDROCHLORIDE 1.5 ML: 40 SOLUTION TOPICAL at 09:23

## 2024-03-18 RX ADMIN — MIDAZOLAM 1 MG: 1 INJECTION INTRAMUSCULAR; INTRAVENOUS at 09:46

## 2024-03-18 RX ADMIN — ACETAMINOPHEN 650 MG: 325 TABLET, FILM COATED ORAL at 16:03

## 2024-03-18 RX ADMIN — ROPIVACAINE HYDROCHLORIDE 3 ML: 5 INJECTION, SOLUTION EPIDURAL; INFILTRATION; PERINEURAL at 08:56

## 2024-03-18 RX ADMIN — TOPICAL ANESTHETIC 0.5 ML: 200 SPRAY DENTAL; PERIODONTAL at 09:25

## 2024-03-18 RX ADMIN — ACETAMINOPHEN 650 MG: 325 TABLET, FILM COATED ORAL at 02:37

## 2024-03-18 RX ADMIN — CEFTRIAXONE SODIUM 2 G: 2 INJECTION, POWDER, FOR SOLUTION INTRAMUSCULAR; INTRAVENOUS at 16:04

## 2024-03-18 RX ADMIN — ACETAMINOPHEN 650 MG: 325 TABLET, FILM COATED ORAL at 07:58

## 2024-03-18 RX ADMIN — MIDAZOLAM 1 MG: 1 INJECTION INTRAMUSCULAR; INTRAVENOUS at 09:52

## 2024-03-18 RX ADMIN — GLYCOPYRROLATE 0.1 MG: 0.2 INJECTION, SOLUTION INTRAMUSCULAR; INTRAVENOUS at 09:26

## 2024-03-18 RX ADMIN — LIDOCAINE HYDROCHLORIDE 3 ML: 10 INJECTION, SOLUTION EPIDURAL; INFILTRATION; INTRACAUDAL; PERINEURAL at 08:36

## 2024-03-18 RX ADMIN — MIDAZOLAM 1 MG: 1 INJECTION INTRAMUSCULAR; INTRAVENOUS at 09:48

## 2024-03-18 RX ADMIN — ATORVASTATIN CALCIUM 40 MG: 40 TABLET, FILM COATED ORAL at 20:44

## 2024-03-18 ASSESSMENT — ACTIVITIES OF DAILY LIVING (ADL)
ADLS_ACUITY_SCORE: 41
ADLS_ACUITY_SCORE: 41
ADLS_ACUITY_SCORE: 40
ADLS_ACUITY_SCORE: 41
ADLS_ACUITY_SCORE: 40
ADLS_ACUITY_SCORE: 40
ADLS_ACUITY_SCORE: 41
ADLS_ACUITY_SCORE: 40
ADLS_ACUITY_SCORE: 40
ADLS_ACUITY_SCORE: 41
ADLS_ACUITY_SCORE: 40
ADLS_ACUITY_SCORE: 41
ADLS_ACUITY_SCORE: 40
ADLS_ACUITY_SCORE: 41
ADLS_ACUITY_SCORE: 40
ADLS_ACUITY_SCORE: 41
ADLS_ACUITY_SCORE: 41
ADLS_ACUITY_SCORE: 40
ADLS_ACUITY_SCORE: 41

## 2024-03-18 NOTE — PLAN OF CARE
Physical Therapy Discharge Summary    Reason for therapy discharge:    Discharged to another facility    Progress towards therapy goal(s). See goals on Care Plan in [facility] electronic health record for goal details.  Transferred to another facility    Therapy recommendation(s):    Continued therapy is recommended.  Rationale/Recommendations:  eval once ready at other facility.      Thank you for your referral.    Nikky Espitia, PT, DPT  [facility]  [phone]

## 2024-03-18 NOTE — PRE-PROCEDURE
GENERAL PRE-PROCEDURE:   Procedure:  Transesophageal Echocardiogram  Date/Time:  3/18/2024 9:45 AM    Verbal consent obtained?: Yes    Written consent obtained?: Yes    Risks and benefits: Risks, benefits and alternatives were discussed    DC Plan: Appropriate discharge home plan in place for patients who are going home after procedure   Consent given by:  Patient  Patient states understanding of procedure being performed: Yes    Patient's understanding of procedure matches consent: Yes    Procedure consent matches procedure scheduled: Yes    Expected level of sedation:  Moderate  Appropriately NPO:  Yes  ASA Class:  3  Mallampati  :  Grade 3- soft palate visible, posterior pharyngeal wall not visible  Lungs:  Lungs clear with good breath sounds bilaterally  Heart:  Normal heart sounds and rate  History & Physical reviewed:  History and physical reviewed and no updates needed  Statement of review:  I have reviewed the lab findings, diagnostic data, medications, and the plan for sedation

## 2024-03-18 NOTE — PROGRESS NOTES
RADIOLOGY PROCEDURE NOTE  Patient name: Miguel Pappas  MRN: [id number]  : 1955    Pre-procedure diagnosis: Hx of Sepsis with left hip pain.  Aspiration to eval for septic joint.  Post-procedure diagnosis: Same    Procedure Date/Time: 2024  9:09 AM  Procedure: Left hip aspiration.    Estimated blood loss: None  Specimen(s) collected with description: No fluid iniitally able to be collected. Attempted at numerous spots.  Irrigated with 5 ml of saline. Aspirated about 2-3 ml of fluid back.  Injected 3 ml of ropivicaine 0.5% for the patients comfort.  The patient tolerated the procedure well with no immediate complications.  Significant findings:none    See imaging dictation for procedural details.    Provider name: Jacob Maharaj PA-C  Assistant(s):None

## 2024-03-18 NOTE — PLAN OF CARE
Goal Outcome Evaluation:    Plan Of Care Reviewed With: Patient     Pt calm pleasant and cooperative. Vitals stable and cardiac rhythm remains SR without ectopy. Pt c/o of left hip discomfort, only agreeable to Tylenol for pain management. Plan is for a left hip aspiration and possible AC tomorrow Mon 3/18.

## 2024-03-18 NOTE — PROGRESS NOTES
[facility] Procedure Nursing Note    Patient Information  Name: Miguel Pappas  Age: 68 year old    Procedure: Transesophageal echocardiogram  Patient arrived to [facility] for AC, is alert and oriented, procedure explained, all questions answered. Pt denies difficulty swallowing, no sleep apnea. No dentures or loose teeth. NPO since before midnight. All questions & concerns addressed.     AC: Pt tolerated well with stable vital signs throughout. See AC Flowsheet. Total sedation given - 3 mg Versed & 50 mcg Fentanyl. Dr Rosas spoke with pt post procedure.     1045- Pt transferred to 246 per cart. Detailed report called to Aleshia RINCON.  Resp even & unlabored upon transfer. Pt  A/O.    Procedure start time: 0930  Procedure complete time: 1055  Concerns/abnormal assessment: No  If abnormal assessment, provider notified: N/A  Plan/Other: return to inpatient room    Bri Boykin RN

## 2024-03-18 NOTE — PROCEDURES
[facility]    Procedure: Transesophageal Echocardiogram    Date/Time: 3/18/2024 10:09 AM    Performed by: Nilam Rosas MD  Authorized by: Nilam Rosas MD      UNIVERSAL PROTOCOL   Site Marked: Yes  Prior Images Obtained and Reviewed:  Yes  Required items: Required blood products, implants, devices and special equipment available    Patient identity confirmed:  Verbally with patient  Patient was reevaluated immediately before administering moderate or deep sedation or anesthesia  Confirmation Checklist:  Patient's identity using two indicators  Time out: Immediately prior to the procedure a time out was called    Universal Protocol: the Joint Commission Universal Protocol was followed    Preparation: Patient was prepped and draped in usual sterile fashion       ANESTHESIA    Local Anesthetic:  Lidocaine spray      SEDATION  Patient Sedated: Yes    Sedation Type:  Moderate (conscious) sedation  Sedation:  Fentanyl and midazolam  Vital signs: Vital signs monitored during sedation      PROCEDURE  Describe Procedure: Consent obtained. Probe passed without difficulty. Total sedation used 3 mg IV versed and 50 mcg IV fentanyl. Total sedation time 25 minutes. No immediate complications. Preliminary results: no valvular vegetations were demonstrated.   Patient Tolerance:  Patient tolerated the procedure well with no immediate complications  Length of time physician/provider present for 1:1 monitoring during sedation: 25

## 2024-03-18 NOTE — PROVIDER NOTIFICATION
MD Notification    Notified Person: MD    Notified Person Name: Negra Austin    Notification Date/Time: 3/18/24 @ 0949    Notification Interaction: vocera message     Purpose of Notification: Wife wants hospitalist to have another conversation with patient aboiut changing     Orders Received: yes    Comments:

## 2024-03-18 NOTE — CONSULTS
OMS CONSULTATION NOTE    ASSESSMENT : Miguel Pappas is a 68 year old male with admitted with suspected SBE with no obvious acute dental infection at this time    PLAN/RECOMMENDATIONS:   1. Recent root canal on tooth #3, treated outpatient by outside DDS in February 2024.  Given the recent treatment, the periapical pathology associated with this tooth should be treated. No acute signs intra-orally of infection, severe periodontal disease, pain, or abscess at this time with the recently treated tooth. Small lucency apex of tooth #3 seen on images, but this can remain present after endodontic treatment for several months after treatment.    2. No other obvious dental caries or abscesses noted  3. No acute need for tooth extraction or dental abscess drainage  4. Current antibiotics appropriate for any future dental infection, should it arise  5. Continue with remainder of cares from other teams.    6. OMS service to sign off at this time. If patient were to report or develop any other signs dental or oral infection, please re consult as needed.    Minesh Boateng DDS  [facility]    If questions, please call  and ask to be connected to Oral and Maxillofacial Surgery Surgeon on call    CC:  My teeth feel fine.  HPI: Miguel Pappas is a 68 year old male who presents with a via transfer from outside hospital for evaluation and treatment of suspect SBE. Patient denies any intra oral pain, swelling or discomfort but reported dental treatment in February of 2024. Patient reports his DDS was encouraging him to have treatment on tooth #3 for two years and he recently underwent the root canal treatment without any issues.  ID consulted OMS team to evaluate for any active infection at this time.      REVIEW OF SYSTEMS:  ROS: A 12 pt review of systems was conducted. Patient denies any other changes to history.    PMH  Past Medical History:   Diagnosis Date    * * * SBE PROPHYLAXIS * * *      no longer indicated - 2007 AHA guidelnies    Mitral valve stenosis and aortic valve stenosis     2/6 murmur    Pure hypercholesterolemia 1/26/2007       PSH  Past Surgical History:   Procedure Laterality Date    COLONOSCOPY N/A 12/5/2014    Procedure: COLONOSCOPY;  Surgeon: Omar Chisholm MD;  Location:  GI    CV CORONARY ANGIOGRAM N/A 11/15/2022    Procedure: Coronary Angiogram;  Surgeon: Edson Nava MD;  Location:  HEART CARDIAC CATH LAB    HC REMOVAL OF TONSILS,<13 Y/O      Tonsils <10y.o.    REPLACE VALVE AORTIC N/A 12/16/2022    Procedure: AORTIC VALVE REPLACEMENT WITH 21MM INSPIRIS RESILIA VALVE, PLACEMENT OF TEMPORARY VENTRICULAR AND ATRIAL PACING WIRES AND TRANSESPHOGEAL ECHOCARDIOGRAM;  Surgeon: Kaila Abbott MD;  Location:  OR    [facility] COLONOSCOPY W/WO BRUSH/WASH  2/8/2005        CURRENT MEDS     Current Facility-Administered Medications   Medication    acetaminophen (TYLENOL) tablet 650 mg    Or    acetaminophen (TYLENOL) Suppository 650 mg    [Held by provider] aspirin EC tablet 81 mg    atorvastatin (LIPITOR) tablet 40 mg    calcium carbonate (TUMS) chewable tablet 1,000 mg    cefTRIAXone (ROCEPHIN) 2 g vial to attach to  ml bag for ADULTS or NS 50 ml bag for PEDS    lidocaine (LMX4) cream    lidocaine 1 % 0.1-1 mL    melatonin tablet 1 mg    naloxone (NARCAN) injection 0.2 mg    Or    naloxone (NARCAN) injection 0.4 mg    Or    naloxone (NARCAN) injection 0.2 mg    Or    naloxone (NARCAN) injection 0.4 mg    oxyCODONE (ROXICODONE) tablet 5 mg    [Held by provider] polyethylene glycol (MIRALAX) Packet 17 g    senna-docusate (SENOKOT-S/PERICOLACE) 8.6-50 MG per tablet 1 tablet    senna-docusate (SENOKOT-S/PERICOLACE) 8.6-50 MG per tablet 1 tablet    Or    senna-docusate (SENOKOT-S/PERICOLACE) 8.6-50 MG per tablet 2 tablet    sodium chloride (PF) 0.9% PF flush 3 mL    sodium chloride (PF) 0.9% PF flush 3 mL       ALLERGIES/SENSITIVITIES    Allergies   Allergen Reactions     No Known Drug Allergy        SOC HX  Social History     Socioeconomic History    Marital status:      Spouse name: Fabian    Number of children: 3    Years of education: 12    Highest education level: Not on file   Occupational History    Occupation:    Tobacco Use    Smoking status: Never     Passive exposure: Never    Smokeless tobacco: Never   Vaping Use    Vaping Use: Never used   Substance and Sexual Activity    Alcohol use: Yes     Comment: 1-2 beer monthly  maybe    Drug use: No    Sexual activity: Yes     Partners: Female     Comment: Hx: wife had tubal ligation   Other Topics Concern     Service Yes     Comment: [employer]    Blood Transfusions No    Caffeine Concern No    Occupational Exposure No    Hobby Hazards No    Sleep Concern No    Stress Concern No    Weight Concern No    Special Diet Yes     Comment: Low fat and cholesterol high fiber    Back Care Yes     Comment: Hx: chiropractor for back inj and knee    Exercise Yes     Comment: every day    Bike Helmet No    Seat Belt Yes    Self-Exams Not Asked   Social History Narrative    Lives with spouse.     Social Determinants of Health     Financial Resource Strain: Low Risk  (11/30/2023)    Financial Resource Strain     Within the past 12 months, have you or your family members you live with been unable to get utilities (heat, electricity) when it was really needed?: No   Food Insecurity: Low Risk  (11/30/2023)    Food Insecurity     Within the past 12 months, did you worry that your food would run out before you got money to buy more?: No     Within the past 12 months, did the food you bought just not last and you didn t have money to get more?: No   Transportation Needs: Low Risk  (11/30/2023)    Transportation Needs     Within the past 12 months, has lack of transportation kept you from medical appointments, getting your medicines, non-medical meetings or appointments, work, or from getting things that you need?:  No   Physical Activity: Not on file   Stress: Not on file   Social Connections: Not on file   Interpersonal Safety: Not on file   Housing Stability: Low Risk  (11/30/2023)    Housing Stability     Do you have housing? : Yes     Are you worried about losing your housing?: No       FAM HX  Family History   Problem Relation Age of Onset    Cancer Father         Spleen cancer    Hypertension Father     Allergies Father         PCN    Gastrointestinal Disease Father         ulcer    Heart Disease Father         Hx: meds and angioplasty    Hypertension Brother     Cerebrovascular Disease Paternal Grandfather     Arthritis Paternal Grandfather     Arthritis Mother     Hypertension Mother     Allergies Son         Dust , Mold    Depression Sister         X 2        PHYSICAL EXAM  BP (!) 144/86 (BP Location: Right arm)   Pulse 79   Temp 98.1  F (36.7  C) (Oral)   Resp 18   Wt 60.6 kg (133 lb 9.6 oz)   SpO2 99%   BMI 20.92 kg/m     Admission on 03/16/2024   Component Date Value Ref Range Status    Sodium 03/17/2024 134 (L)  135 - 145 mmol/L Final    Reference intervals for this test were updated on 09/26/2023 to more accurately reflect our healthy population. There may be differences in the flagging of prior results with similar values performed with this method. Interpretation of those prior results can be made in the context of the updated reference intervals.     Potassium 03/17/2024 4.4  3.4 - 5.3 mmol/L Final    Chloride 03/17/2024 100  98 - 107 mmol/L Final    Carbon Dioxide (CO2) 03/17/2024 21 (L)  22 - 29 mmol/L Final    Anion Gap 03/17/2024 13  7 - 15 mmol/L Final    Urea Nitrogen 03/17/2024 15.9  8.0 - 23.0 mg/dL Final    Creatinine 03/17/2024 0.98  0.67 - 1.17 mg/dL Final    GFR Estimate 03/17/2024 83  >60 mL/min/1.73m2 Final    Calcium 03/17/2024 9.4  8.8 - 10.2 mg/dL Final    Glucose 03/17/2024 101 (H)  70 - 99 mg/dL Final    CRP Inflammation 03/17/2024 148.85 (H)  <5.00 mg/L Final    WBC Count 03/17/2024  15.5 (H)  4.0 - 11.0 10e3/uL Final    RBC Count 03/17/2024 4.10 (L)  4.40 - 5.90 10e6/uL Final    Hemoglobin 03/17/2024 11.6 (L)  13.3 - 17.7 g/dL Final    Hematocrit 03/17/2024 34.7 (L)  40.0 - 53.0 % Final    MCV 03/17/2024 85  78 - 100 fL Final    MCH 03/17/2024 28.3  26.5 - 33.0 pg Final    MCHC 03/17/2024 33.4  31.5 - 36.5 g/dL Final    RDW 03/17/2024 13.3  10.0 - 15.0 % Final    Platelet Count 03/17/2024 311  150 - 450 10e3/uL Final    % Neutrophils 03/17/2024 71  % Final    % Lymphocytes 03/17/2024 14  % Final    % Monocytes 03/17/2024 11  % Final    % Eosinophils 03/17/2024 3  % Final    % Basophils 03/17/2024 0  % Final    % Immature Granulocytes 03/17/2024 1  % Final    NRBCs per 100 WBC 03/17/2024 0  <1 /100 Final    Absolute Neutrophils 03/17/2024 11.0 (H)  1.6 - 8.3 10e3/uL Final    Absolute Lymphocytes 03/17/2024 2.2  0.8 - 5.3 10e3/uL Final    Absolute Monocytes 03/17/2024 1.8 (H)  0.0 - 1.3 10e3/uL Final    Absolute Eosinophils 03/17/2024 0.5  0.0 - 0.7 10e3/uL Final    Absolute Basophils 03/17/2024 0.0  0.0 - 0.2 10e3/uL Final    Absolute Immature Granulocytes 03/17/2024 0.1  <=0.4 10e3/uL Final    Absolute NRBCs 03/17/2024 0.0  10e3/uL Final    LVEF  03/18/2024 60-65%   Final    Gram Stain Result 03/18/2024 No organisms seen   Preliminary    Gram Stain Result 03/18/2024 2+ WBC seen   Preliminary    Color 03/18/2024 Pink (A)  Colorless, Yellow Final    Clarity 03/18/2024 Turbid (A)  Clear Final    Cell Count Fluid Source 03/18/2024 Hip, Left   Final    Total Nucleated Cells 03/18/2024 523  /uL Final    % Neutrophils 03/18/2024 99  % Final    % Lymphocytes 03/18/2024 0  % Final    % Monocyte/Macrophages 03/18/2024 1  % Final    WBC Count 03/18/2024 10.6  4.0 - 11.0 10e3/uL Final    RBC Count 03/18/2024 3.71 (L)  4.40 - 5.90 10e6/uL Final    Hemoglobin 03/18/2024 10.6 (L)  13.3 - 17.7 g/dL Final    Hematocrit 03/18/2024 31.8 (L)  40.0 - 53.0 % Final    MCV 03/18/2024 86  78 - 100 fL Final    MCH  03/18/2024 28.6  26.5 - 33.0 pg Final    MCHC 03/18/2024 33.3  31.5 - 36.5 g/dL Final    RDW 03/18/2024 13.3  10.0 - 15.0 % Final    Platelet Count 03/18/2024 328  150 - 450 10e3/uL Final    Sodium 03/18/2024 136  135 - 145 mmol/L Final    Reference intervals for this test were updated on 09/26/2023 to more accurately reflect our healthy population. There may be differences in the flagging of prior results with similar values performed with this method. Interpretation of those prior results can be made in the context of the updated reference intervals.     Potassium 03/18/2024 4.1  3.4 - 5.3 mmol/L Final    Chloride 03/18/2024 100  98 - 107 mmol/L Final    Carbon Dioxide (CO2) 03/18/2024 27  22 - 29 mmol/L Final    Anion Gap 03/18/2024 9  7 - 15 mmol/L Final    Urea Nitrogen 03/18/2024 14.6  8.0 - 23.0 mg/dL Final    Creatinine 03/18/2024 0.87  0.67 - 1.17 mg/dL Final    GFR Estimate 03/18/2024 >90  >60 mL/min/1.73m2 Final    Calcium 03/18/2024 9.6  8.8 - 10.2 mg/dL Final    Glucose 03/18/2024 94  70 - 99 mg/dL Final     CTA neck: able to see some widening around periodontal ligament space around tooth #3 and lucency at apex, consistent with recent root canal/endodontic treatment.     Gen: lying in bed, alert, oriented x 3, No acute distress  HEENT:   E/O  Normocephalic; no gross asymmetries, Palpation of facial bones reveals no bony step offs, crepitations, or mobility, Lacerations: none, Ecchymosis: none, Edema: none  Eyes: EOMI, PERRLA, entrapment: none. Clear conjunctiva, Denies diplopia or blurry vision  Ears: External ear atraumatic, gross auditory function intact  External nose: intact, dorsum midline without deviation, no crepitus or mobility, nares patent, nasal mucosa coral pink, epistaxis none  Neck: Trachea midline, no cervical lymphandenopathy  TMJ: VICK: 40mm, deviation: none, tenderness: none, trismus: none. No open/closed lock, no masseter or temporalis tenderness.  I/O  Maxilla/mandible non mobility.  Oral mucosa pink, lips pink and non edematous, gingivae pink and non edematous, Hard/soft palate non edematous, uvula midline, tongue: soft and non raised. Floor of mouth is soft, non tender, and non raised. Occlusion with no anterior or posterior open bite. Teeth: moderately restored.  Tooth #3 with occlusal endodontic access and restoration in place.  Tooth is stable, non mobile and non tender.  Surrounding gingival tissues are pink and free of erythema or edema or fistula formation. Small amalgam restoration fracture on tooth #18 with no signs of infection.  Patient is aware of this and his DDS recommended no treatment.

## 2024-03-18 NOTE — PROGRESS NOTES
[facility]    Orthopedics Consultation    Date of Admission:  3/16/2024    Assessment & Plan     PLAN:     Miguel Pappas  is a 68 year old male with a history of aortic stenosis status post open AVR in 2022 who presented with MSSA bacteremia.  He notes worsening left hip pain approximately 2 weeks ago and had worsening fever and fatigue.  He was admitted and found to have MSSA bacteremia.  He has been on Zosyn and vancomycin.  Infectious disease has been following.  Suspected endocarditis, possible TIA from the septic embolism.  He also has a history of a root canal in the last month potential source of his bacteremia.    Regarding his he has advanced osteoarthritis on radiographs but reports minimal symptoms until approximately a couple weeks ago.  Due to his bacteremia and recent onset of symptoms, I recommended a left hip aspirate to rule out potential septic arthritis.  Clinically his exam is fitting more with advanced osteoarthritis but we discussed the risks of missing septic arthritis aspirated.    Plan for left hip aspirate tomorrow.  Orthopedics following.     Principal Problem:    Endocarditis    RAMYA STOCKTON MD     Code Status    No CPR- Pre-arrest intubation OK    Reason for Consult   Reason for consult: Left hip pain    Primary Care Physician   Thony Gongora    Chief Complaint   Left hip pain    History is obtained from the patient     History of Present Illness   Miguel Pappas  is a 68 year old male with a history of aortic stenosis status post open AVR in 2022 who presented with MSSA bacteremia.  He is an active individual and farms.  He denies any significant left hip pain until approximately 2 weeks ago.  He subsequently developed fevers and chills and fatigue which brought him to [facility] emergency department.  He was found to have MSSA bacteremia.  He has a history of aortic stenosis status post aortic valve replacement and 2022.  He also reports  a recent root canal in the last month with some dental infection.  He denies any significant hip limitations prior to 2 weeks ago.  He is currently on vancomycin and Zosyn.  Infectious diseases following him and he is undergoing AC for suspected endocarditis.    Past Medical History   I have reviewed this patient's medical history and updated it with pertinent information if needed.   Past Medical History:   Diagnosis Date    * * * SBE PROPHYLAXIS * * *     no longer indicated - 2007 AHA guidelnies    Mitral valve stenosis and aortic valve stenosis     2/6 murmur    Pure hypercholesterolemia 1/26/2007       Past Surgical History   I have reviewed this patient's surgical history and updated it with pertinent information if needed.  Past Surgical History:   Procedure Laterality Date    COLONOSCOPY N/A 12/5/2014    Procedure: COLONOSCOPY;  Surgeon: Omar Chisholm MD;  Location:  GI    CV CORONARY ANGIOGRAM N/A 11/15/2022    Procedure: Coronary Angiogram;  Surgeon: Edson Nava MD;  Location:  HEART CARDIAC CATH LAB    HC REMOVAL OF TONSILS,<13 Y/O      Tonsils <12y.o.    REPLACE VALVE AORTIC N/A 12/16/2022    Procedure: AORTIC VALVE REPLACEMENT WITH 21MM INSPIRIS RESILIA VALVE, PLACEMENT OF TEMPORARY VENTRICULAR AND ATRIAL PACING WIRES AND TRANSESPHOGEAL ECHOCARDIOGRAM;  Surgeon: Kaila Abbott MD;  Location:  OR    [facility] COLONOSCOPY W/WO BRUSH/WASH  2/8/2005        Prior to Admission Medications   Prior to Admission Medications   Prescriptions Last Dose Informant Patient Reported? Taking?   GLUCOSAMINE CHONDROITIN OR TABS 3/13/2024 at  Spouse/Significant Other Yes Yes   Sig: Take 1 tablet by mouth at bedtime   Multiple Vitamins-Minerals (CENTRUM SILVER PO) 3/13/2024 at  Spouse/Significant Other Yes Yes   Sig: Take 1 tablet by mouth at bedtime   Vitamin D3 (CHOLECALCIFEROL) 25 mcg (1000 units) tablet 3/14/2024  Yes Yes   Sig: Take 25 mcg by mouth daily   amoxicillin (AMOXIL) 500 MG  capsule  at Ppx  Yes Yes   Sig: Take 2,000 mg by mouth once as needed (dental prophy)   ascorbic acid (VITAMIN C) 250 MG CHEW chewable tablet 3/14/2024  Yes Yes   Sig: Take 250 mg by mouth daily   aspirin 81 MG tablet 3/16/2024 at AM Spouse/Significant Other Yes Yes   Sig: Take 81 mg by mouth at bedtime   ferrous sulfate (FEROSUL) 325 (65 Fe) MG tablet 3/14/2024  Yes Yes   Sig: Take 325 mg by mouth daily (with breakfast)      Facility-Administered Medications: None     Allergies   Allergies   Allergen Reactions    No Known Drug Allergy        Social History   I have reviewed this patient's social history and updated it with pertinent information if needed. Miguel Pappas  reports that he has never smoked. He has never been exposed to tobacco smoke. He has never used smokeless tobacco. He reports current alcohol use. He reports that he does not use drugs.    Family History   I have reviewed this patient's family history and updated it with pertinent information if needed.   Family History   Problem Relation Age of Onset    Cancer Father         Spleen cancer    Hypertension Father     Allergies Father         PCN    Gastrointestinal Disease Father         ulcer    Heart Disease Father         Hx: meds and angioplasty    Hypertension Brother     Cerebrovascular Disease Paternal Grandfather     Arthritis Paternal Grandfather     Arthritis Mother     Hypertension Mother     Allergies Son         Dust , Mold    Depression Sister         X 2       Review of Systems   The 10 point Review of Systems is negative other than noted in the HPI or here.     Physical Exam   Temp: 98.3  F (36.8  C) Temp src: Oral BP: (!) 142/87 Pulse: 80   Resp: 20 SpO2: 98 % O2 Device: None (Room air)    Vital Signs with Ranges  Temp:  [97.8  F (36.6  C)-99.2  F (37.3  C)] 98.3  F (36.8  C)  Pulse:  [75-81] 80  Resp:  [16-20] 20  BP: (115-151)/(80-95) 142/87  SpO2:  [94 %-98 %] 98 %  137 lbs 4.8 oz    Constitutional: awake, alert, cooperative,  no apparent distress, and appears stated age  Eyes: extra-ocular muscles intact, no scleral icterus  ENT: normocepalic, atraumatic without obvious abnormality  Respiratory: no increased work of breathing, symmetric chest wall expansion    MSK:  Left lower extremity:  Skin is intact.  No swelling over the greater trochanter  No tenderness to palpation over the head  Difficulty with full extension of the hip.  Hip is more comfortable in flexion  Hip range of motion is 10 to 90 degrees  Pain with logroll  Sensations intact to light touch in the superficial peroneal, deep peroneal, tibial nerve distributions  FHL, EHL, dorsiflexion, plantarflexion intact  Dorsalis pedis pulse 2+, good capillary refill    Right lower extremity:  Skin is intact.  No tenderness to palpation over the long bones or joints.  No pain with range of motion of the hip, knee, ankle  Sensations intact to light touch in the superficial peroneal, deep peroneal, tibial nerve distributions  FHL, EHL, dorsiflexion, plantarflexion intact  Dorsalis pedis pulse 2+, good capillary refill      Data   Most Recent 3 CBC's:  Recent Labs   Lab Test 03/17/24  0603 03/16/24  0541 03/15/24  0547   WBC 15.5* 12.5* 15.1*   HGB 11.6* 10.9* 11.2*   MCV 85 84 84    224 218     Most Recent 3 BMP's:  Recent Labs   Lab Test 03/17/24  0603 03/16/24  1146 03/16/24  0745 03/16/24  0541 03/15/24  0738 03/15/24  0547   *  --   --  131*  --  133*   POTASSIUM 4.4  --   --  4.1  --  3.9   CHLORIDE 100  --   --  98  --  95*   CO2 21*  --   --  23  --  26   BUN 15.9  --   --  19.4  --  16.5   CR 0.98  --   --  0.94  --  0.96   ANIONGAP 13  --   --  10  --  12   RAMONA 9.4  --   --  9.1  --  9.3   * 168* 101* 113*   < > 109*    < > = values in this interval not displayed.     Most Recent 3 INR's:  Recent Labs   Lab Test 03/14/24  1640 03/14/23  1334 03/07/23  1343   INR 1.08 2.4* 1.5*

## 2024-03-18 NOTE — PROGRESS NOTES
Orientations: A&O x4  Vitals/Pain: VSS on RA, L hip pain controlled with Tylenol  Tele: SR  Lines/Drains: IV SL  Skin/Wounds: intact  GI/: Continent, voiding in urinal  Labs: Abnormal/Trends, Electrolyte Replacement- elevated CRP  Ambulation/Assist: A1/GB/W  Sleep Quality: good  Plan: AC and L hip aspiration today

## 2024-03-18 NOTE — PROGRESS NOTES
[facility]    Infectious Disease Progress Note    Date of Service (when I saw the patient): 03/18/2024     Assessment & Plan   Miguel Pappas is a 68 year old who was admitted on 3/16/2024.     Impression:  68 with HLD, hyponatremia, mitral valve stenosis and aortic valve stenosis s/p bioprosthertic aortic valve placement   Admitted in 3/14/24 for evaluation of confusion   On admission was found to have leukocytosis, no fever  Blood cultures positive with Streptococcus gordonii  Suspected endocarditis  TIA possible septic embolism  Left hip pain concern for septic arthritis  History of for root canal done last month history of some dental infection that he has been dealing with for months  TTE prosthetic valve were not clearly visualized     Recommendations  Continue on  IV ceftriaxone based on susceptibilities to target treat the Streptococcus  Needs daily blood cultures till clear  Please avoid any PICC or midline  Will follow-up on the AC,  cardiology consultation  Will follow-up on orthopedic consultation noted aspiration event by IR   Consult OMFS rule out any ongoing dental abscess         Scott Burns MD    Interval History       Physical Exam   Temp: 98.5  F (36.9  C) Temp src: Oral BP: (!) 134/107 Pulse: 74   Resp: 17 SpO2: 99 % O2 Device: None (Room air) Oxygen Delivery: 2 LPM  Vitals:    03/16/24 2112 03/17/24 0604 03/18/24 0620   Weight: 62.7 kg (138 lb 3.2 oz) 62.3 kg (137 lb 4.8 oz) 60.6 kg (133 lb 9.6 oz)     Vital Signs with Ranges  Temp:  [97.9  F (36.6  C)-98.5  F (36.9  C)] 98.5  F (36.9  C)  Pulse:  [67-80] 74  Resp:  [14-20] 17  BP: (103-156)/() 134/107  SpO2:  [95 %-99 %] 99 %      Medications      [Held by provider] aspirin  81 mg Oral Daily    atorvastatin  40 mg Oral QPM    cefTRIAXone  2 g Intravenous Q24H    [Held by provider] polyethylene glycol  17 g Oral Daily    senna-docusate  1 tablet Oral BID    sodium chloride (PF)  3 mL Intracatheter Q8H  "      Data   All microbiology laboratory data reviewed.  Recent Labs   Lab Test 03/18/24  1210 03/17/24  0603 03/16/24  0541   WBC 10.6 15.5* 12.5*   HGB 10.6* 11.6* 10.9*   HCT 31.8* 34.7* 31.9*   MCV 86 85 84    311 224     Recent Labs   Lab Test 03/17/24  0603 03/16/24  0541 03/15/24  0547   CR 0.98 0.94 0.96     No lab results found.  No lab results found.    Invalid input(s): \"UC\"    All cultures:  Recent Labs   Lab 03/16/24  0900 03/16/24  0854 03/15/24  1517 03/15/24  1512 03/14/24  2223   CULTURE No growth after 1 day No growth after 1 day Positive on the 1st day of incubation*  Streptococcus gordonii* Positive on the 1st day of incubation*  Streptococcus gordonii* Positive on the 1st day of incubation*  Streptococcus gordonii*  Positive on the 1st day of incubation*  Streptococcus gordonii*      Blood culture:  Results for orders placed or performed during the hospital encounter of 03/14/24   Blood Culture Arm, Left    Specimen: Arm, Left; Blood   Result Value Ref Range    Culture No growth after 1 day    Blood Culture Hand, Right    Specimen: Hand, Right; Blood   Result Value Ref Range    Culture No growth after 1 day    Blood Culture Arm, Right    Specimen: Arm, Right; Blood   Result Value Ref Range    Culture Positive on the 1st day of incubation (A)     Culture Streptococcus gordonii (AA)    Blood Culture Arm, Left    Specimen: Arm, Left; Blood   Result Value Ref Range    Culture Positive on the 1st day of incubation (A)     Culture Streptococcus gordonii (AA)    Blood Culture Peripheral Blood    Specimen: Peripheral Blood   Result Value Ref Range    Culture Positive on the 1st day of incubation (A)     Culture Streptococcus gordonii (AA)        Susceptibility    Streptococcus gordonii - YEIMY     Ampicillin <=0.06 Susceptible ug/mL     Penicillin <=0.03 Susceptible ug/mL     Clindamycin <=0.06 Susceptible ug/mL     Cefotaxime <=0.25 Susceptible ug/mL     Ceftriaxone <=0.25 Susceptible ug/mL "     Vancomycin 0.5 Susceptible ug/mL     Meropenem <=0.06 Susceptible ug/mL   Blood Culture Peripheral Blood    Specimen: Peripheral Blood   Result Value Ref Range    Culture Positive on the 1st day of incubation (A)     Culture Streptococcus gordonii (AA)       Urine culture:  No results found for this or any previous visit.

## 2024-03-18 NOTE — PROGRESS NOTES
[facility]    Medicine Progress Note - Hospitalist Service    Date of Admission:  3/16/2024    Assessment & Plan      Miguel Pappas is a 68 year old male admitted on 3/16/2024. He was transferred from [facility] for possible transesophageal echocardiogram due to suspicion for SBE.  The patient presented with an episode of amnesia and was found to have streptococcal bacteremia.  He has a history of a bioprosthetic aortic valve replacement. For the past couple of weeks he has felt unwell and has at least one episode of documented fever.  He has had worsening left hip pain. In January he had a dental cleaning but took antibiotic prophylaxis.      Sepsis due to streptococcus gordonii bacteremia   Suspected subacute bacterial endocarditis   Possible TIA due to septic embolism  Possible left hip septic arthritis   Initially presented to [facility] with confusion and concern for stroke. Blood cultures obtained on admission to [facility] 3/14 resulting in gram + cocci in pairs and chains and streptococcus species by [facility] GP panel.  He was initiated on Zosyn and Vancomycin. Chest xray and UA negative. The source is unclear but he reported subjective fevers for a few weeks before presentation. He also has severe bone on bone left hip arthritis which worsened suddenly a few weeks ago. WBC 19 on admission to [facility], improved to 12.5 on 3/16. --->158. Septic arthritis a consideration. Heart valves not well visualized on TTE done at [facility]. Transferred to [facility]   -Patient initially started on IV vancomycin and Zosyn  -MSSA bacteremia  -Consult infectious disease  -Discontinue IV vancomycin and Zosyn and start IV ceftriaxone  -Consult orthopedic surgery for left hip aspiration pending  -Consult to cardiology AC completed, awaiting final result.  -Goals of care discussion held with patient patient would like CODE STATUS to be changed to full code today.      Bioprosthetic AVR (12/2022)   Patient had open AVR in 2022.  Valves not visualized well on TTE.    Continue ASA 81mg daily currently on hold since patient underwent AC today.     Acute amnesia, resolved  MRI at [facility] showed punctate focus of diffusion abnormality in L hippocampal region, no hemorrhage, no significant chronic small vessel disease.  CTA no significant stenosis or dissection.  TTE:  LVEF 60-65%, valvles not well visiualized. 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 CTA head/neck: no significant stenosis, no acute infarct, no hemorrhage.  Stroke neurology consult  Continue telemetry    aspirin 81 mg currently on hold post AC,  Awaiting left hip aspiration.  Recommend to avoid therapeutic anticoagulation  Atorvastatin 40 mg daily     Hyponatremia.  -Slow progressive improvement in sodium level  -Recheck BMP 2 days pending  -Most recent available sodium was yesterday 134.     Anemia, mild   Hemoglobin continues to remain stable between 10 and 11  No clear signs of bleeding noted  -Continue to monitor hemoglobin with ongoing treatment of sepsis with the concern for possible subacute bacterial endocarditis.     Left hip pain   History of hip osteoarthritis    MSSA bacteremia in the setting of recent sepsis currently on IV antibiotics due to concern for left hip pain, orthopedic surgery has been consulted  Recommend left hip aspiration to rule out potential septic arthritis awaiting final results.     Constipation  Continue bowel regimen           Diet: Regular Diet Adult    DVT Prophylaxis: Pneumatic Compression Devices  Brown Catheter: Not present  Lines: None     Cardiac Monitoring: ACTIVE order. Indication: endocarditis  Code Status: Full Code      Clinically Significant Risk Factors                                    Disposition Plan      Expected Discharge Date: 03/20/2024                    Negra Austin  Hospitalist Service  [facility]  Hospital  Securely message with [employer] (more info)  Text page via [facility] Paging/Directory   ______________________________________________________________________    Interval History   Patient is seen sitting in chair, appears awake and alert offers no complaints tolerated AC well.  Patient denies any chest pain dizziness lightheadedness nausea vomiting or abdominal pain.  No overnight fevers reported.    Physical Exam   Vital Signs: Temp: 98.5  F (36.9  C) Temp src: Oral BP: (!) 134/107 Pulse: 74   Resp: 17 SpO2: 99 % O2 Device: None (Room air) Oxygen Delivery: 2 LPM  Weight: 133 lbs 9.6 oz    Constitutional: Awake, alert, cooperative, no apparent distress  Respiratory: Clear to auscultation bilaterally, no crackles or wheezing  Cardiovascular: Regular rate and rhythm, normal S1 and S2, and systolic murmur +  GI: Normal bowel sounds, soft, non-distended, non-tender  Skin/Integumen: No rashes, no cyanosis, no edema  Other: no apparent focal deficits,AXO x3    Medical Decision Making

## 2024-03-18 NOTE — PROGRESS NOTES
Orthopedic Surgery  Miguel Pappas  03/18/2024     Admit Date:  3/16/2024  Left hip pain in setting of sepsis due to strep gordonii, suspected bacterial endocarditis     Patient resting in chair.   States they were unable to aspirate much fluid from the hip joint.    Pain today is primarily in his low leg.   Denies having any numbness in his leg    Temp:  [97.9  F (36.6  C)-98.5  F (36.9  C)] 98.5  F (36.9  C)  Pulse:  [67-80] 74  Resp:  [14-20] 17  BP: (103-156)/() 134/107  SpO2:  [95 %-99 %] 99 %    Alert and oriented  On exam of the left LE: Range of motion of the left hip is very limited due to osteoarthritic change.  Patient does have pain with passive internal rotation of the left hip.  Denies pain with passive flexion of the hip.  Skin is intact without lesions or abrasions  Straight leg raise of the left lower extremity does not increase any radiculopathy.  He does have mild increase in left lower leg pain.  Patient ports equal sensation to light touch in the left versus right lower extremities.  Distal pulses are intact bilaterally.  He is able to equally resist dorsi and plantarflexion bilaterally.    Labs:  Recent Labs   Lab Test 03/18/24  1210 03/17/24  0603 03/16/24  0541   WBC 10.6 15.5* 12.5*   HGB 10.6* 11.6* 10.9*    311 224     Recent Labs   Lab Test 03/14/24  1640 03/14/23  1334 03/07/23  1343   INR 1.08 2.4* 1.5*     Recent Labs   Lab Test 03/17/24  0603 03/16/24  0541 03/15/24  0547   CRPI 148.85* 158.13* 128.47*     Left hip aspiration: Cell count 523  Culture and gram stain pending     1. Plan:   Current labs are not suggestive of a left septic hip.  We will continue to monitor cultures and Gram stain.   I will order and lumbar spine MRI to evaluate if this causing radiating pain as hip OA pain can radiate to the knee but is less common to cause low leg pain.     WBAT with gait aide   Abx per ID       Roma Olson PA-C

## 2024-03-18 NOTE — PROGRESS NOTES
[facility]    Stroke Progress Note    Interval Events  No acute events overnight.  AC completed this morning, report revealed no vegetations or left atrial appendage thrombus.    Dr. Duron reviewed MRI brain findings which show a punctate focus of diffusion abnormality in the left hippocampal region which can be seen in episodes of transient global amnesia (TGA), and is less likely to represent stroke acute stroke.    HPI  Miguel Pappas is a 68 year old male with PMH of HLD, hyponatremia, mitral valve stenosis and aortic valve stenosis s/p bioprosthetic aortic valve placement on PTA ASA 81mg. He presented to the ED 3/14/24 for evaluation of confusion. He was an an orthopedic visit and felt nauseous and unwell, requiring him to lay down. Upon getting up he recalls getting to the bathroom and sitting down but then experienced a 20-30 minute episode of which he has no recollection. Per wife he was asking repetitive questions but otherwise was functioning normally (ie was able to adjust his clothes, wash hands after the restroom). Wife did report a few apparent mild abnormalities with his memory earlier in the day (ie asked an odd question and later had no recollection of asking it), but no other new neurological deficits identified.  MRI with punctate focus of diffusion abnormality in L hippocampal region and he was placed on antipaltelet therapy (initially DAPT but then ASA alone d/to concern of endocarditis) and Lipitor for stroke prevention.  Was noted to have leukocytosis, elevated CRP and procalcitonin on admission; blood cultures subsequently positive and AC recommended which required transfer to [facility] for completion.      3/17: Exam is stable, no focal deficits. No recurrent episodes or concerns of memory change. Miguel/family does note that he has had some intermittent fevers and chills in the past couple of weeks (in addition to headaches), although they note that other  members of the family had flu like symptoms around that time. Wife thinks the highest temp he had was 102. He has had 2-3 dental procedures in the past 5 weeks, he notes pre-medicating before each one. He also has had some mild to moderate L hip pain attributed to OA but this drastically worsened 1-2 weeks ago.     Neuro Evaluation Summarized  MRI/Head CT MRI: punctate focus of diffusion abnormality in L hippocampal region, no hemorrhage, no significant chronic small vessel disease    Intracranial Vasculature CTA: No LVO or significant stenosis   Cervical Vasculature CTA: No LVO, significant stenosis or dissection; no aneurysm       Echocardiogram TTE: LVEF 60-65%,atrial not well visualized, valves not well visualized      AC: Left ventricular systolic function is normal.  EF is 60-65%.  No thrombus is detected in the left atrial appendage.  Intact atrial septum.  No valvular vegetations demonstrated.  Negative bubble study.  Left atrium is borderline dilated.  Right atrial size is normal.   EKG/Telemetry Sinus rhythm    Other Testing Blood cultures: positive x4 (streptococcus gordoni/gram positive cocci in pairs and chains)   Procalcitonin: 0.99  CRP: 128.47   WBC 19.2-->15.1  Sodium 120-->133      UA unremarkable   Chest Xray unremarkable       LDL  3/14/2024: 123 mg/dL   A1C  3/14/2024: 5.8 %   Troponin 3/14/2024: 12 ng/L      Impression  Episode of memory loss/amnesia in setting of small L hippocampal diffusion restriction, unclear etiology.  Suspected etiology includes transient global amnesia versus acute stroke     Leukocytosis, elevated CRP, procalcitonin, positive blood cultures; AC revealed no valvular vegetations or left atrial appendage thrombus. Query if underlying infection, (I.e septic joint) may be causing metabolic encephalopathy.     Severe L hip pain attributed to arthritis, now subacutely worse raising concern for septic joint.  Ortho following.    Recommendations   - Neurochecks and Vital  "Signs every 4 hours   - Continue PTA ASA 81mg indefinitely  - Inpatient BP goal <180 with gradual reduction of BP to normotension; long term outpatient goal BP is <130/80 with tighter control associated with improved vascular outcomes  -  (goal 40-70); continue Lipitor 40 mg with ongoing outpatient titration to achieve LDL goal   - Blood glucose monitoring, Hgb A1c 5.8%, follow-up with PCP  - Telemetry, EKG  - Bedside Glucose Monitoring  - PT/OT/SLP  - Stroke Education  - Euthermia, Euglycemia  - Appreciate infectious disease, ortho and cardiology input   - Delirium precautions: frequent re-orientation, Optimize sleep wake cycle: up/awake/active during the day with the blinds open, minimize disruptions overnight and sedating medications (schedule at night to promote sleep if needed).  Please ensure patient has glasses, hearing aids, if necessary.     Patient Follow-up    - final recommendation pending work-up  - in 6-8 weeks with Sarah Loving NP ([phone]), ordered       No further stroke evaluation is recommended, so we will sign off. Please contact us with any additional questions.    Naz Andersen PA-C  Vascular Neurology    To page me or covering stroke neurology team member, click here: [facility]  Choose \"On Call\" tab at top, then select \"NEUROLOGY/ALL SITES\" from middle drop-down box, press Enter, then look for \"stroke\" or \"telestroke\" for your site.  ______________________________________________________    Clinically Significant Risk Factors                                      Medications   Scheduled Meds   [Held by provider] aspirin  81 mg Oral Daily    atorvastatin  40 mg Oral QPM    cefTRIAXone  2 g Intravenous Q24H    [Held by provider] polyethylene glycol  17 g Oral Daily    senna-docusate  1 tablet Oral BID    sodium chloride (PF)  3 mL Intracatheter Q8H       Infusion Meds      PRN Meds  acetaminophen **OR** acetaminophen, calcium carbonate, lidocaine 4%, lidocaine (buffered or not " buffered), melatonin, naloxone **OR** naloxone **OR** naloxone **OR** naloxone, oxyCODONE, senna-docusate **OR** senna-docusate, sodium chloride (PF)       PHYSICAL EXAMINATION  Temp:  [98.1  F (36.7  C)-98.5  F (36.9  C)] 98.1  F (36.7  C)  Pulse:  [67-85] 79  Resp:  [14-20] 18  BP: (103-156)/() 144/86  SpO2:  [95 %-99 %] 99 %      General Exam  General:  patient lying in bed without any acute distress    HEENT:  normocephalic/atraumatic  Pulmonary:  no respiratory distress    Neuro Exam  Mental Status: Alert, oriented to age, month, year, able to spell world forwards and backwards, able to state the days of the week backwards, able to count down from 100 by sevens, able to follow multi pattern testing until 7/G , speech clear and fluent, naming and repetition normal, right left confusion  Cranial Nerves:  visual fields intact, EOMI with normal smooth pursuit, facial sensation intact and symmetric, facial movements symmetric with activation, left facial asymmetry at rest (likely chronic and not due to acute stroke) hearing not formally tested but intact to conversation, no dysarthria, tongue protrusion midline  Motor:  no abnormal movements, able to move all limbs spontaneously, no pronator drift, no right lower extremity drift, some difficulty with left lower extremity elevation due to left hip pain pain  Sensory:  light touch sensation intact and symmetric throughout upper and lower extremities, no extinction on double simultaneous stimulation   Coordination:  normal finger-to-nose and heel-to-shin bilaterally without dysmetria  Station/Gait:  deferred    Imaging  I personally reviewed all imaging; relevant findings per HPI.     Lab Results Data   CBC  Recent Labs   Lab 03/18/24  1210 03/17/24  0603 03/16/24  0541   WBC 10.6 15.5* 12.5*   RBC 3.71* 4.10* 3.81*   HGB 10.6* 11.6* 10.9*   HCT 31.8* 34.7* 31.9*    311 224     Basic Metabolic Panel    Recent Labs   Lab 03/18/24  1210 03/17/24  0603  "03/16/24  1653 03/16/24  0745 03/16/24  0541    134*  --   --  131*   POTASSIUM 4.1 4.4  --   --  4.1   CHLORIDE 100 100  --   --  98   CO2 27 21*  --   --  23   BUN 14.6 15.9  --   --  19.4   CR 0.87 0.98  --   --  0.94   GLC 94 101* 96   < > 113*   RAMONA 9.6 9.4  --   --  9.1    < > = values in this interval not displayed.     Liver Panel  No results for input(s): \"PROTTOTAL\", \"ALBUMIN\", \"BILITOTAL\", \"ALKPHOS\", \"AST\", \"ALT\", \"BILIDIRECT\" in the last 168 hours.  INR    Recent Labs   Lab Test 03/14/24  1640 03/14/23  1334 03/07/23  1343   INR 1.08 2.4* 1.5*      Lipid Profile    Recent Labs   Lab Test 03/14/24  1640 11/30/23  1351 10/26/18  1705   CHOL  --  203* 209*   HDL  --  84 85   * 108* 115*   TRIG  --  57 46     A1C    Recent Labs   Lab Test 03/14/24  1640 12/09/22  1212   A1C 5.8* 5.5     Troponin    Recent Labs   Lab 03/14/24  1640   CTROPT 12          Data     I have personally spent a total of 45 minutes providing care today, time spent in reviewing medical records and devising the plan as recorded above.   "

## 2024-03-19 ENCOUNTER — APPOINTMENT (OUTPATIENT)
Dept: PHYSICAL THERAPY | Facility: CLINIC | Age: 69
DRG: 871 | End: 2024-03-19
Attending: STUDENT IN AN ORGANIZED HEALTH CARE EDUCATION/TRAINING PROGRAM
Payer: COMMERCIAL

## 2024-03-19 ENCOUNTER — APPOINTMENT (OUTPATIENT)
Dept: OCCUPATIONAL THERAPY | Facility: CLINIC | Age: 69
DRG: 871 | End: 2024-03-19
Attending: STUDENT IN AN ORGANIZED HEALTH CARE EDUCATION/TRAINING PROGRAM
Payer: COMMERCIAL

## 2024-03-19 LAB
ANION GAP SERPL CALCULATED.3IONS-SCNC: 10 MMOL/L (ref 7–15)
BUN SERPL-MCNC: 16.9 MG/DL (ref 8–23)
CALCIUM SERPL-MCNC: 9.4 MG/DL (ref 8.8–10.2)
CHLORIDE SERPL-SCNC: 100 MMOL/L (ref 98–107)
CREAT SERPL-MCNC: 0.82 MG/DL (ref 0.67–1.17)
DEPRECATED HCO3 PLAS-SCNC: 24 MMOL/L (ref 22–29)
EGFRCR SERPLBLD CKD-EPI 2021: >90 ML/MIN/1.73M2
ERYTHROCYTE [DISTWIDTH] IN BLOOD BY AUTOMATED COUNT: 13.2 % (ref 10–15)
GLUCOSE SERPL-MCNC: 103 MG/DL (ref 70–99)
HCT VFR BLD AUTO: 30.8 % (ref 40–53)
HGB BLD-MCNC: 10.4 G/DL (ref 13.3–17.7)
MCH RBC QN AUTO: 28.5 PG (ref 26.5–33)
MCHC RBC AUTO-ENTMCNC: 33.8 G/DL (ref 31.5–36.5)
MCV RBC AUTO: 84 FL (ref 78–100)
PLATELET # BLD AUTO: 356 10E3/UL (ref 150–450)
POTASSIUM SERPL-SCNC: 4.3 MMOL/L (ref 3.4–5.3)
RBC # BLD AUTO: 3.65 10E6/UL (ref 4.4–5.9)
SODIUM SERPL-SCNC: 134 MMOL/L (ref 135–145)
WBC # BLD AUTO: 12.7 10E3/UL (ref 4–11)

## 2024-03-19 PROCEDURE — 97165 OT EVAL LOW COMPLEX 30 MIN: CPT | Mod: GO | Performed by: OCCUPATIONAL THERAPIST

## 2024-03-19 PROCEDURE — 250N000013 HC RX MED GY IP 250 OP 250 PS 637: Performed by: STUDENT IN AN ORGANIZED HEALTH CARE EDUCATION/TRAINING PROGRAM

## 2024-03-19 PROCEDURE — 99232 SBSQ HOSP IP/OBS MODERATE 35: CPT | Performed by: INTERNAL MEDICINE

## 2024-03-19 PROCEDURE — 250N000011 HC RX IP 250 OP 636: Performed by: INTERNAL MEDICINE

## 2024-03-19 PROCEDURE — 99222 1ST HOSP IP/OBS MODERATE 55: CPT | Performed by: PHYSICIAN ASSISTANT

## 2024-03-19 PROCEDURE — 97116 GAIT TRAINING THERAPY: CPT | Mod: GP

## 2024-03-19 PROCEDURE — 36415 COLL VENOUS BLD VENIPUNCTURE: CPT | Performed by: INTERNAL MEDICINE

## 2024-03-19 PROCEDURE — 97530 THERAPEUTIC ACTIVITIES: CPT | Mod: GP

## 2024-03-19 PROCEDURE — 85027 COMPLETE CBC AUTOMATED: CPT | Performed by: INTERNAL MEDICINE

## 2024-03-19 PROCEDURE — 120N000001 HC R&B MED SURG/OB

## 2024-03-19 PROCEDURE — 97535 SELF CARE MNGMENT TRAINING: CPT | Mod: GO | Performed by: OCCUPATIONAL THERAPIST

## 2024-03-19 PROCEDURE — 80048 BASIC METABOLIC PNL TOTAL CA: CPT | Performed by: INTERNAL MEDICINE

## 2024-03-19 RX ADMIN — ATORVASTATIN CALCIUM 40 MG: 40 TABLET, FILM COATED ORAL at 19:04

## 2024-03-19 RX ADMIN — ACETAMINOPHEN 650 MG: 325 TABLET, FILM COATED ORAL at 19:04

## 2024-03-19 RX ADMIN — CEFTRIAXONE SODIUM 2 G: 2 INJECTION, POWDER, FOR SOLUTION INTRAMUSCULAR; INTRAVENOUS at 15:22

## 2024-03-19 RX ADMIN — ACETAMINOPHEN 650 MG: 325 TABLET, FILM COATED ORAL at 02:45

## 2024-03-19 ASSESSMENT — ACTIVITIES OF DAILY LIVING (ADL)
ADLS_ACUITY_SCORE: 40

## 2024-03-19 NOTE — PROGRESS NOTES
03/19/24 1500   Appointment Info   Signing Clinician's Name / Credentials (OT) Yuli Daniel, OTRL   Living Environment   People in Home spouse   Current Living Arrangements house   Home Accessibility stairs to enter home;stairs within home   Number of Stairs, Main Entrance 2   Stair Railings, Main Entrance none   Number of Stairs, Within Home, Primary greater than 10 stairs   Stair Railings, Within Home, Primary railings safe and in good condition   Transportation Anticipated family or friend will provide   Living Environment Comments Pt can stay on one level if needed, has 2 platform steps to enter home. Has walk in shower with a shower chair, comfort height toilet   Self-Care   Usual Activity Tolerance good   Current Activity Tolerance fair   Regular Exercise No   Equipment Currently Used at Home walker, rolling   Fall history within last six months no   Activity/Exercise/Self-Care Comment Pt independent at baseline with ADLs and mobility, 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 Since then has been limited by hip pain/weakness and has been using a FWW to assist with ambulation.   Instrumental Activities of Daily Living (IADL)   Previous Responsibilities meal prep;housekeeping;shopping;laundry;medication management;yardwork;finances;driving;work   IADL Comments Pt works as a farmer, states wife and family are able to assist as needed at discharge with IADLS   General Information   Onset of Illness/Injury or Date of Surgery 03/16/24   Referring Physician Levi Herzog, DO   Patient/Family Therapy Goal Statement (OT) Pt would like to discharge home, return to active lifestyle   Additional Occupational Profile Info/Pertinent History of Current Problem 68 year old male admitted on 3/16/2024. He was transferred from [facility] for possible transesophageal echocardiogram due to suspicion for SBE.  The patient  presented with an episode of amnesia and was found to have streptococcal bacteremia.  He has a history of a bioprosthetic aortic valve replacement. For the past couple of weeks he has felt unwell and has at least one episode of documented fever.  He has had worsening left hip pain. In January he had a dental cleaning but took antibiotic prophylaxis.   Existing Precautions/Restrictions fall;weight bearing   Left Lower Extremity (Weight-bearing Status) weight-bearing as tolerated (WBAT)   Cognitive Status Examination   Orientation Status orientation to person, place and time   Affect/Mental Status (Cognitive) WFL   Follows Commands WFL   Cognitive Status Comments Pt appears at his cognitive baseline with good recall of education   Visual Perception   Visual Impairment/Limitations WFL   Sensory   Sensory Comments Intact per report   Pain Assessment   Patient Currently in Pain Yes, see Vital Sign flowsheet   Posture   Posture forward head position;protracted shoulders   Range of Motion Comprehensive   Comment, General Range of Motion LLE hip flexion/extension limited due to hip pain, trunk extension limited due to back pain, UE appears WFL   Strength Comprehensive (MMT)   Comment, General Manual Muscle Testing (MMT) Assessment Decreased tolerance for activity due to pain   Bed Mobility   Comment (Bed Mobility) SBA   Transfers   Transfer Comments CGA   Balance   Balance Comments Intact with walker   Activities of Daily Living   BADL Assessment/Intervention bathing;lower body dressing;toileting   Bathing Assessment/Intervention   Comment, (Bathing) Mod assist per clinical reasoning   Lower Body Dressing Assessment/Training   Comment, (Lower Body Dressing) Max assist to don socks, mod assist to don underwear   Toileting   Comment, (Toileting) CGA   Clinical Impression   Criteria for Skilled Therapeutic Interventions Met (OT) Yes, treatment indicated   OT Diagnosis Decline in dressing bathing toileting   Influenced by the  following impairments L hip infection   OT Problem List-Impairments impacting ADL problems related to;activity tolerance impaired;strength;range of motion (ROM);pain   Assessment of Occupational Performance 1-3 Performance Deficits   Identified Performance Deficits Impaired dressing bathign and toileting   Planned Therapy Interventions (OT) ADL retraining   Clinical Decision Making Complexity (OT) problem focused assessment/low complexity   Risk & Benefits of therapy have been explained evaluation/treatment results reviewed;care plan/treatment goals reviewed;current/potential barriers reviewed;risks/benefits reviewed;participants voiced agreement with care plan;participants included;patient   OT Total Evaluation Time   OT Eval, Low Complexity Minutes (05686) 9   OT Goals   Therapy Frequency (OT) One time eval and treatment   OT Predicted Duration/Target Date for Goal Attainment 03/19/24   OT Goals Lower Body Dressing;Lower Body Bathing;Toilet Transfer/Toileting   OT: Lower Body Dressing Modified independent;using adaptive equipment;including set-up/clothing retrieval;Goal Met   OT: Lower Body Bathing Modified independent;using adaptive equipment;Goal Met;with precautions   OT: Toilet Transfer/Toileting Modified independent;toilet transfer;cleaning and garment management;using adaptive equipment;within precautions;Goal Met   Self-Care/Home Management   Self-Care/Home Mgmt/ADL, Compensatory, Meal Prep Minutes (74258) 31   Symptoms Noted During/After Treatment (Meal Preparation/Planning Training) increased pain   Treatment Detail/Skilled Intervention OT: Pt agreeable to therapy. Pt educated on LE dressing AE including reacher and socka aid to improve dressing independence. After tutorial, pt donned sock with sock aid and underwear with reacher and SBA with cues for techcnique. Pt compelted sit to stand with CGA and walker, ambulated to bathroom. Pt completed toilet transfer with CGA and ceus for grab bar, sit to stand  with grab bar and SBA with cues. Pt educated on toilet safety frame for home use to improve toileting independence. Pt agreeable. Pt ambulated to chair with CGA. Pt educated on AE and technique for showering/shower transfer, pt demonstrated ROM needed for tasks after tutorial with reacher. Pt seated in chair with call light upon OT departure. Pt has no further OT concerns.   OT Discharge Planning   OT Plan DC   OT Discharge Recommendation (DC Rec) home with assist   OT Rationale for DC Rec Patient appears near his baseline with ADLs with AE after OT session. Appears able to discharge home with assist for higher level IADLs including working on the farm. No further OT required at this time   OT Brief overview of current status Dressing with AE and SBA   OT Equipment Needed at Discharge reacher;dressing equipment;toileting equipment

## 2024-03-19 NOTE — PROGRESS NOTES
Orthopedic Surgery  Miguel Pappas  03/19/2024     Admit Date:  3/16/2024  Left hip pain in setting of sepsis due to strep gordonii, suspected bacterial endocarditis     Patient resting in chair.   Pain remains stable.  Denies any increased hip pain.   Denies having any numbness in his leg    Temp:  [97.9  F (36.6  C)-98.1  F (36.7  C)] 98  F (36.7  C)  Pulse:  [76-88] 78  Resp:  [18] 18  BP: (115-151)/(84-98) 131/84  SpO2:  [94 %-99 %] 94 %    Alert and oriented  On exam of the left LE: Range of motion of the left hip is very limited due to osteoarthritic change.  Patient does have pain with passive internal rotation of the left hip.  Denies pain with passive flexion of the hip.  Skin is intact without lesions or abrasions  Straight leg raise of the left lower extremity does not increase any radiculopathy.  He does have mild increase in left lower leg pain.  Patient ports equal sensation to light touch in the left versus right lower extremities.  Distal pulses are intact bilaterally.  He is able to equally resist dorsi and plantarflexion bilaterally.    Labs:  Recent Labs   Lab Test 03/19/24  0552 03/18/24  1210 03/17/24  0603   WBC 12.7* 10.6 15.5*   HGB 10.4* 10.6* 11.6*    328 311     Recent Labs   Lab Test 03/14/24  1640 03/14/23  1334 03/07/23  1343   INR 1.08 2.4* 1.5*     Recent Labs   Lab Test 03/17/24  0603 03/16/24  0541 03/15/24  0547   CRPI 148.85* 158.13* 128.47*     Left hip aspiration: Cell count 523  Gram stain: no organisms  Culture: negative to date     MRI lumbar spine:   1.  At L5-S1, posterior disc bulging with evidence of mild mass effect on the traversing bilateral S1 nerve roots. There is mild-to-moderate bilateral foraminal stenosis.  2.  At L4-L5, there is mild-to-moderate central canal stenosis and bilateral foraminal stenosis.  3.  At L3-L4, there is mild central canal stenosis and mild-to-moderate bilateral foraminal stenosis.  4.  At L2-L3, there is mild central canal stenosis  and mild bilateral foraminal stenosis.    1. Plan:   Current labs are not suggestive of a left septic hip.  We will continue to monitor cultures.    Lumbar spine MRI does demonstrate stenosis and L5-S1 disc bulge.  Nuerosugery was consulted and recommends conservative therapies.  Possible injection once infection clears.    I do recommend patient see a total hip specialist upon discharge to discuss a possible RADHA in the future.    WBAT with gait aide   Abx per ID  Ortho will follow hip cultures peripherally.  Please call if questions or change in symptoms.        Roma Olson PA-C

## 2024-03-19 NOTE — PROGRESS NOTES
[facility]    Infectious Disease Progress Note    Date of Service (when I saw the patient): 03/19/2024     Assessment & Plan   Miguel Pappas is a 68 year old who was admitted on 3/16/2024.   Impression:  68 with HLD, hyponatremia, mitral valve stenosis and aortic valve stenosis s/p bioprosthertic aortic valve placement   Admitted in 3/14/24 for evaluation of confusion   On admission was found to have leukocytosis, no fever  Blood cultures positive with Streptococcus gordonii  Suspected endocarditis  TIA possible septic embolism  Left hip pain concern for septic arthritis  History of for root canal done last month history of some dental infection that he has been dealing with for months  TTE prosthetic valve were not clearly visualized     Recommendations  Continue on  IV ceftriaxone based on susceptibilities to target treat the Streptococcus  Needs daily blood cultures till clear  Please avoid any PICC or midline  AC: No valvular vegetations demonstrated   S/P aspiration on the hip joint: Very low WBC count cultures are pending I reviewed orthopedic evaluation and I also noted the plan for lumbar spine MRI  I also appreciate and reviewed oral surgeries consultation note        Scott Burns MD    Interval History   Tolerating antibiotics ok   No new rashes or issues with antibiotics   Labs reviewed   No changes to past medical, social or family history   Still has left hip pain but able to ambulate using walker      Physical Exam   Temp: 98  F (36.7  C) Temp src: Oral BP: 131/84 Pulse: 78   Resp: 18 SpO2: 94 % O2 Device: None (Room air)    Vitals:    03/16/24 2112 03/17/24 0604 03/18/24 0620   Weight: 62.7 kg (138 lb 3.2 oz) 62.3 kg (137 lb 4.8 oz) 60.6 kg (133 lb 9.6 oz)     Vital Signs with Ranges  Temp:  [97.9  F (36.6  C)-98.1  F (36.7  C)] 98  F (36.7  C)  Pulse:  [74-88] 78  Resp:  [17-18] 18  BP: (103-151)/() 131/84  SpO2:  [94 %-99 %] 94 %    Constitutional: Awake, alert,  "cooperative, no apparent distress  Lungs: Clear to auscultation bilaterally, no crackles or wheezing  Cardiovascular: Regular rate and rhythm, normal S1 and S2, and no murmur noted  Abdomen: Normal bowel sounds, soft, non-distended, non-tender  Skin: No rashes, no cyanosis, no edema  Other:    Medications      [Held by provider] aspirin  81 mg Oral Daily    atorvastatin  40 mg Oral QPM    cefTRIAXone  2 g Intravenous Q24H    [Held by provider] polyethylene glycol  17 g Oral Daily    senna-docusate  1 tablet Oral BID    sodium chloride (PF)  3 mL Intracatheter Q8H       Data   All microbiology laboratory data reviewed.  Recent Labs   Lab Test 03/19/24  0552 03/18/24  1210 03/17/24  0603   WBC 12.7* 10.6 15.5*   HGB 10.4* 10.6* 11.6*   HCT 30.8* 31.8* 34.7*   MCV 84 86 85    328 311     Recent Labs   Lab Test 03/19/24  0552 03/18/24  1210 03/17/24  0603   CR 0.82 0.87 0.98     No lab results found.  No lab results found.    Invalid input(s): \"UC\"    All cultures:  Recent Labs   Lab 03/18/24  0843 03/16/24  0900 03/16/24  0854 03/15/24  1517 03/15/24  1512 03/14/24  2223   CULTURE No growth, less than 1 day No growth after 2 days No growth after 2 days Positive on the 1st day of incubation*  Streptococcus gordonii* Positive on the 1st day of incubation*  Streptococcus gordonii* Positive on the 1st day of incubation*  Streptococcus gordonii*  Positive on the 1st day of incubation*  Streptococcus gordonii*      Blood culture:  Results for orders placed or performed during the hospital encounter of 03/14/24   Blood Culture Arm, Left    Specimen: Arm, Left; Blood   Result Value Ref Range    Culture No growth after 2 days    Blood Culture Hand, Right    Specimen: Hand, Right; Blood   Result Value Ref Range    Culture No growth after 2 days    Blood Culture Arm, Right    Specimen: Arm, Right; Blood   Result Value Ref Range    Culture Positive on the 1st day of incubation (A)     Culture Streptococcus gordonii " (AA)    Blood Culture Arm, Left    Specimen: Arm, Left; Blood   Result Value Ref Range    Culture Positive on the 1st day of incubation (A)     Culture Streptococcus gordonii (AA)    Blood Culture Peripheral Blood    Specimen: Peripheral Blood   Result Value Ref Range    Culture Positive on the 1st day of incubation (A)     Culture Streptococcus gordonii (AA)        Susceptibility    Streptococcus gordonii - YEIMY     Ampicillin <=0.06 Susceptible ug/mL     Penicillin <=0.03 Susceptible ug/mL     Clindamycin <=0.06 Susceptible ug/mL     Cefotaxime <=0.25 Susceptible ug/mL     Ceftriaxone <=0.25 Susceptible ug/mL     Vancomycin 0.5 Susceptible ug/mL     Meropenem <=0.06 Susceptible ug/mL   Blood Culture Peripheral Blood    Specimen: Peripheral Blood   Result Value Ref Range    Culture Positive on the 1st day of incubation (A)     Culture Streptococcus gordonii (AA)       Urine culture:  No results found for this or any previous visit.

## 2024-03-19 NOTE — PLAN OF CARE
Occupational Therapy Discharge Summary    Reason for therapy discharge:    All goals and outcomes met, no further needs identified.    Progress towards therapy goal(s). See goals on Care Plan in Epic electronic health record for goal details.  Goals met    Therapy recommendation(s):    No further therapy is recommended. See OT note for details

## 2024-03-19 NOTE — PLAN OF CARE
Goal Outcome Evaluation:  Patient transferred to ortho spine. Report given to Jade. Alert and oriented x 4. Neuros intact. VSS, on room air. Up independently in room with a walker. Ambulated in the hallway x 2 with PT. Tylenol for left hip and lift thigh pain. Voids in urinal/BR. IV saline lock. BC positive. Rocephin daily.

## 2024-03-19 NOTE — PLAN OF CARE
Pt here with AMS, Leg pain. A&Ox4, Neuros intact ex LLE mobility decreased d/t pain. AVSS- HTN on RA. Tele SR. Up with Ax1+ GB/W and unsteady d/t pain. Managing leg pain with tylenol and heat. pain. MRI of Lumbar spine completed. Aspiration of hip completed yesterday, culture pending. Plan: continue abx, continue plan of care.

## 2024-03-19 NOTE — PROGRESS NOTES
[facility]    Medicine Progress Note - Hospitalist Service    Date of Admission:  3/16/2024    Assessment & Plan   Miguel Pappas is a 68 year old male admitted on 3/16/2024. He was transferred from [facility] for possible transesophageal echocardiogram due to suspicion for SBE.  The patient presented with an episode of amnesia and was found to have streptococcal bacteremia.  He has a history of a bioprosthetic aortic valve replacement. For the past couple of weeks he has felt unwell and has at least one episode of documented fever.  He has had worsening left hip pain. In January he had a dental cleaning but took antibiotic prophylaxis.      Sepsis due to streptococcus gordonii bacteremia   Suspected subacute bacterial endocarditis   Possible TIA due to septic embolism  Possible left hip septic arthritis   Initially presented to [facility] with confusion and concern for stroke. Blood cultures obtained on admission to [facility] 3/14 resulting in gram + cocci in pairs and chains and streptococcus species by Vrgene GP panel.  He was initiated on Zosyn and Vancomycin. Chest xray and UA negative. The source is unclear but he reported subjective fevers for a few weeks before presentation. He also has severe bone on bone left hip arthritis which worsened suddenly a few weeks ago. WBC 19 on admission to [facility], improved to 12.5 on 3/16. --->158. Septic arthritis a consideration. Heart valves not well visualized on TTE done at [facility]. Transferred to [facility]   -Patient initially started on IV vancomycin and Zosyn  -MSSA bacteremia  -Consult infectious disease  -Discontinue IV vancomycin and Zosyn and start IV ceftriaxone  -Consult orthopedic surgery for left hip aspiration pending Cx data  -Consult to cardiology AC completed, no vegetations  -Consulted oromaxillary surgery, no concern for ongoing dental caries or abscesses, continue current IV regimen  according to ID no further intervention recommended at this time  -Goals of care discussion held with patient patient would like CODE STATUS to be changed to full code     Lumbar MRI as below due to concern for pain  1.  At L5-S1, posterior disc bulging with evidence of mild mass effect on the traversing bilateral S1 nerve roots. There is mild-to-moderate bilateral foraminal stenosis.  2.  At L4-L5, there is mild-to-moderate central canal stenosis and bilateral foraminal stenosis.  3.  At L3-L4, there is mild central canal stenosis and mild-to-moderate bilateral foraminal stenosis.  4.  At L2-L3, there is mild central canal stenosis and mild bilateral foraminal stenosis.  -consult to neurosurgery  -recommend conservative management     Bioprosthetic AVR (12/2022)   Patient had open AVR in 2022.  Valves not visualized well on TTE.    Continue ASA 81mg daily      Acute amnesia, resolved  MRI at [facility] showed punctate focus of diffusion abnormality in L hippocampal region, no hemorrhage, no significant chronic small vessel disease.  CTA no significant stenosis or dissection.  TTE:  LVEF 60-65%, valvles not well visiualized. 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 CTA head/neck: no significant stenosis, no acute infarct, no hemorrhage.  Stroke neurology consult  Continue telemetry    aspirin 81 mg   Recommend to avoid therapeutic anticoagulation  Atorvastatin 40 mg daily     Hyponatremia.  -Slow progressive improvement in sodium level  -fluctuation drop to 134 today  Recheck in am  Encourage PO intake     Anemia, mild   Hemoglobin continues to remain stable between 10 and 11  No clear signs of bleeding noted  -Continue to monitor hemoglobin with ongoing treatment of sepsis with the concern for possible subacute bacterial endocarditis.     Left hip pain   History of hip osteoarthritis    MSSA bacteremia in the setting of recent sepsis currently on IV antibiotics due to concern for left  hip pain, orthopedic surgery has been consulted  Recommend left hip aspiration to rule out potential septic arthritis awaiting final results.     Constipation  Continue bowel regimen             Diet: Regular Diet Adult    DVT Prophylaxis: Pneumatic Compression Devices  Brown Catheter: Not present  Lines: None     Cardiac Monitoring: ACTIVE order. Indication: endocarditis  Code Status: Full Code      Clinically Significant Risk Factors                                    Disposition Plan      Expected Discharge Date: 03/22/2024                    Negra Espinozai  Hospitalist Service  [facility]  Securely message with [employer] (more info)  Text page via [employer] Paging/Directory   ______________________________________________________________________    Interval History   Patient is sitting in chair, talking on the phone with his wife, appears comfortable offers no complaints of chest pain dizziness lightheadedness nausea or vomiting  Patient states he is having continued left leg pain in the anterior lateral thigh primarily and some in the posterior left heel.     Physical Exam   Vital Signs: Temp: 98  F (36.7  C) Temp src: Oral BP: 131/84 Pulse: 78   Resp: 18 SpO2: 94 % O2 Device: None (Room air)    Weight: 133 lbs 9.6 oz    Constitutional: Awake, alert, cooperative, no apparent distress  Respiratory: Clear to auscultation bilaterally, no crackles or wheezing  Cardiovascular: Regular rate and rhythm, normal S1 and S2, and no murmur noted  GI: Normal bowel sounds, soft, non-distended, non-tender  Skin/Integumen: No rashes, no cyanosis, no edema  Other: Alert oriented x 3, no significant focal deficits noted.    Medical Decision Making

## 2024-03-19 NOTE — CONSULTS
Neurosurgery Consult    HPI    Mr. Pappas is a 68-year-old male admitted on 3/16/2024, for evaluation of confusion, was found to have leukocytosis without fever, has a history of hyperlipidemia, hyponatremia, mitral valve stenosis and aortic valve stenosis status post bioprosthetic aortic valve placement.    Blood cultures positive for Streptococcus gordonii, possibly with suspected endocarditis TIA with possible septic emboli.    Patient also having left hip pain and concern for septic arthritis, underwent aspiration procedure, no fluid aspirated, but the joint cavity was irrigated with saline, cultures are pending.    Patient also has a history of root canal done in the last month and a dental infection, dental dental team not recommending any acute intervention other than continuing antibiotics.    Lumbar MRI was performed, neurosurgery was consulted for evaluation.    Orthopedics evaluated the patient, did not feel current presentation was suggestive of left septic hip.  Patient does have severe left hip joint degenerative changes with bone-on-bone articulation.    Today patient states he is having continued left leg pain in the anterior lateral thigh primarily and some in the posterior left heel.  Denies pain in his foot.  He denies numbness or tingling.  Denies any bowel or bladder symptoms or saddle anesthesia.  Denies any right leg symptoms.    He states his symptoms began about 2 weeks ago.    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  Negative straight leg raise on the right  Positive straight leg raise on the  left    Positive pain with internal and external rotation of the left hip  No pain with palpation of the left hip    Reflexes 2+ bilateral patella, 1+ right Achilles, absent left Achilles    Sensation intact bilaterally    Negative ankle clonus bilaterally    Imaging    Lumbar MRI demonstrated    IMPRESSION:  1.  At L5-S1, posterior disc bulging with evidence of mild mass effect on the traversing bilateral S1 nerve roots. There is mild-to-moderate bilateral foraminal stenosis.  2.  At L4-L5, there is mild-to-moderate central canal stenosis and bilateral foraminal stenosis.  3.  At L3-L4, there is mild central canal stenosis and mild-to-moderate bilateral foraminal stenosis.  4.  At L2-L3, there is mild central canal stenosis and mild bilateral foraminal stenosis.    Assessment    Positive blood cultures with Streptococcus and gordonii    Left leg pain, unclear that this is directly related to the findings of mild to moderate foraminal stenosis at multiple levels in his lumbar spine.  Pain seems to be most bothersome in the L3 distribution, with hip flexion most affected in terms of weakness/guarding.    Degenerative arthritis in the left hip      Plan:      At this point would recommend patient focus on conservative therapies and physical therapy and strengthening of his left leg.  If this is not effective and if his infection clears, an injection for pain control could be attempted at left L3-4 foramen, or possibly another level depending on his symptoms at that time.  However would not recommend an injection at this time due to positive blood cultures.    No specific activity restrictions, activity as tolerated.    No urgent surgical intervention is indicated.  Patient can follow-up with neurosurgery clinic in as needed if not improving.    Neurosurgery will sign off at this time.    Discussed with Dr. Martinez

## 2024-03-19 NOTE — PLAN OF CARE
Goal Outcome Evaluation:  A&O Neuros intact. Complains of left thigh pain and left hip pain. Tylenol PRN.  Up with assist of 1 and walker. AC and L hip aspiration done, see notes

## 2024-03-20 ENCOUNTER — HOME INFUSION (PRE-WILLOW HOME INFUSION) (OUTPATIENT)
Dept: PHARMACY | Facility: CLINIC | Age: 69
End: 2024-03-20
Payer: COMMERCIAL

## 2024-03-20 ENCOUNTER — APPOINTMENT (OUTPATIENT)
Dept: PHYSICAL THERAPY | Facility: CLINIC | Age: 69
DRG: 871 | End: 2024-03-20
Attending: STUDENT IN AN ORGANIZED HEALTH CARE EDUCATION/TRAINING PROGRAM
Payer: COMMERCIAL

## 2024-03-20 LAB
ANION GAP SERPL CALCULATED.3IONS-SCNC: 11 MMOL/L (ref 7–15)
BUN SERPL-MCNC: 19 MG/DL (ref 8–23)
CALCIUM SERPL-MCNC: 9.8 MG/DL (ref 8.8–10.2)
CHLORIDE SERPL-SCNC: 99 MMOL/L (ref 98–107)
CREAT SERPL-MCNC: 0.79 MG/DL (ref 0.67–1.17)
DEPRECATED HCO3 PLAS-SCNC: 27 MMOL/L (ref 22–29)
EGFRCR SERPLBLD CKD-EPI 2021: >90 ML/MIN/1.73M2
ERYTHROCYTE [DISTWIDTH] IN BLOOD BY AUTOMATED COUNT: 13.2 % (ref 10–15)
GLUCOSE SERPL-MCNC: 96 MG/DL (ref 70–99)
HCT VFR BLD AUTO: 35 % (ref 40–53)
HGB BLD-MCNC: 11.5 G/DL (ref 13.3–17.7)
MCH RBC QN AUTO: 28.3 PG (ref 26.5–33)
MCHC RBC AUTO-ENTMCNC: 32.9 G/DL (ref 31.5–36.5)
MCV RBC AUTO: 86 FL (ref 78–100)
PLATELET # BLD AUTO: 421 10E3/UL (ref 150–450)
POTASSIUM SERPL-SCNC: 4.4 MMOL/L (ref 3.4–5.3)
RBC # BLD AUTO: 4.06 10E6/UL (ref 4.4–5.9)
SODIUM SERPL-SCNC: 137 MMOL/L (ref 135–145)
WBC # BLD AUTO: 11.6 10E3/UL (ref 4–11)

## 2024-03-20 PROCEDURE — 85027 COMPLETE CBC AUTOMATED: CPT | Performed by: INTERNAL MEDICINE

## 2024-03-20 PROCEDURE — 36415 COLL VENOUS BLD VENIPUNCTURE: CPT | Performed by: INTERNAL MEDICINE

## 2024-03-20 PROCEDURE — 250N000013 HC RX MED GY IP 250 OP 250 PS 637: Performed by: STUDENT IN AN ORGANIZED HEALTH CARE EDUCATION/TRAINING PROGRAM

## 2024-03-20 PROCEDURE — 250N000013 HC RX MED GY IP 250 OP 250 PS 637: Performed by: INTERNAL MEDICINE

## 2024-03-20 PROCEDURE — 99232 SBSQ HOSP IP/OBS MODERATE 35: CPT | Performed by: INTERNAL MEDICINE

## 2024-03-20 PROCEDURE — 80048 BASIC METABOLIC PNL TOTAL CA: CPT | Performed by: INTERNAL MEDICINE

## 2024-03-20 PROCEDURE — 87040 BLOOD CULTURE FOR BACTERIA: CPT | Performed by: INTERNAL MEDICINE

## 2024-03-20 PROCEDURE — 97116 GAIT TRAINING THERAPY: CPT | Mod: GP | Performed by: PHYSICAL THERAPIST

## 2024-03-20 PROCEDURE — 120N000001 HC R&B MED SURG/OB

## 2024-03-20 PROCEDURE — 250N000011 HC RX IP 250 OP 636: Performed by: INTERNAL MEDICINE

## 2024-03-20 PROCEDURE — 97530 THERAPEUTIC ACTIVITIES: CPT | Mod: GP | Performed by: PHYSICAL THERAPIST

## 2024-03-20 RX ORDER — POLYETHYLENE GLYCOL 3350 17 G/17G
17 POWDER, FOR SOLUTION ORAL DAILY PRN
Status: DISCONTINUED | OUTPATIENT
Start: 2024-03-20 | End: 2024-03-26 | Stop reason: HOSPADM

## 2024-03-20 RX ADMIN — ACETAMINOPHEN 650 MG: 325 TABLET, FILM COATED ORAL at 01:28

## 2024-03-20 RX ADMIN — DOCUSATE SODIUM 50 MG AND SENNOSIDES 8.6 MG 1 TABLET: 8.6; 5 TABLET, FILM COATED ORAL at 21:45

## 2024-03-20 RX ADMIN — ATORVASTATIN CALCIUM 40 MG: 40 TABLET, FILM COATED ORAL at 21:45

## 2024-03-20 RX ADMIN — CEFTRIAXONE SODIUM 2 G: 2 INJECTION, POWDER, FOR SOLUTION INTRAMUSCULAR; INTRAVENOUS at 15:16

## 2024-03-20 RX ADMIN — ASPIRIN 81 MG: 81 TABLET, COATED ORAL at 09:16

## 2024-03-20 RX ADMIN — OXYCODONE HYDROCHLORIDE 5 MG: 5 TABLET ORAL at 01:28

## 2024-03-20 ASSESSMENT — ACTIVITIES OF DAILY LIVING (ADL)
ADLS_ACUITY_SCORE: 40

## 2024-03-20 NOTE — PLAN OF CARE
Orientation: A&Ox4    Vitals/Tele: VSS RA    Neuros: Intact.    IV Access/drains: PIV SL    Diet: Regular    Mobility: IND w/walker in room    GI/: Voiding in BR or urinal. No BM this shift.    Wound/Skin: pale, WNL. Aspiration site on L hip covered w/bandaid.     Consults: ID following    Discharge Plan: Home with PT      See Flow sheets for assessment

## 2024-03-20 NOTE — PROGRESS NOTES
X-cover    Blood culture positive 3/16 for GPC in pairs and chains. Known strep gordonii infection. On ceftriaxone. ID following.     Marky Nevarez MD

## 2024-03-20 NOTE — PROGRESS NOTES
Shift Summary 7:    Admitting Diagnosis: Endocarditis   Vitals:VSS ON RA  Pain:No PRN during this shift  A&Ox4  IV-SL intermittent abx   Voiding: BR&Urinal  Mobility:IND  Tele:NSR  CMS:Intact  Lung Sounds:Clear  GI:Continent  Dressing:N/A  Diet:Regular     Discharge Plan: pending

## 2024-03-20 NOTE — PROGRESS NOTES
[facility]    Medicine Progress Note - Hospitalist Service    Date of Admission:  3/16/2024    Assessment & Plan   Miguel Pappas is a 68 year old male admitted on 3/16/2024. He was transferred from [facility] for possible transesophageal echocardiogram due to suspicion for SBE.  The patient presented with an episode of amnesia and was found to have streptococcal bacteremia.  He has a history of a bioprosthetic aortic valve replacement. For the past couple of weeks he has felt unwell and has at least one episode of documented fever.  He has had worsening left hip pain. In January he had a dental cleaning but took antibiotic prophylaxis.      Sepsis due to streptococcus gordonii bacteremia   Suspected subacute bacterial endocarditis   Possible TIA due to septic embolism  Possible left hip septic arthritis   Initially presented to [facility] with confusion and concern for stroke. Blood cultures obtained on admission to [facility] 3/14 resulting in gram + cocci in pairs and chains and streptococcus species by Vrgene GP panel.  He was initiated on Zosyn and Vancomycin. Chest xray and UA negative. The source is unclear but he reported subjective fevers for a few weeks before presentation. He also has severe bone on bone left hip arthritis which worsened suddenly a few weeks ago. WBC 19 on admission to [facility], improved to 12.5 on 3/16. --->158. Septic arthritis a consideration. Heart valves not well visualized on TTE done at [facility]. Transferred to [facility]   -Patient initially started on IV vancomycin and Zosyn  -MSSA bacteremia  -Consult infectious disease  -Discontinue IV vancomycin and Zosyn and start IV ceftriaxone  -Consult orthopedic surgery for left hip aspiration Cx NGTD  -Consult to cardiology AC completed, no vegetations  -Consulted oromaxillary surgery, no concern for ongoing dental caries or abscesses, continue current IV regimen according  to ID no further intervention recommended at this time  -Goals of care discussion held with patient patient would like CODE STATUS to be changed to full code   -Recheck blood cultures from 316 continue to remain positive with GPC in pairs and chains    Lumbar MRI as below due to concern for pain  1.  At L5-S1, posterior disc bulging with evidence of mild mass effect on the traversing bilateral S1 nerve roots. There is mild-to-moderate bilateral foraminal stenosis.  2.  At L4-L5, there is mild-to-moderate central canal stenosis and bilateral foraminal stenosis.  3.  At L3-L4, there is mild central canal stenosis and mild-to-moderate bilateral foraminal stenosis.  4.  At L2-L3, there is mild central canal stenosis and mild bilateral foraminal stenosis.  -consult to neurosurgery  -recommend conservative management  -continues to have pain improves with movement     Bioprosthetic AVR (12/2022)   Patient had open AVR in 2022.  Valves not visualized well on TTE.    Continue ASA 81mg daily      Acute amnesia, resolved  MRI at [facility] showed punctate focus of diffusion abnormality in L hippocampal region, no hemorrhage, no significant chronic small vessel disease.  CTA no significant stenosis or dissection.  TTE:  LVEF 60-65%, valvles not well visiualized. 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 CTA head/neck: no significant stenosis, no acute infarct, no hemorrhage.  Stroke neurology consult  Continue telemetry    aspirin 81 mg   Recommend to avoid therapeutic anticoagulation  Atorvastatin 40 mg daily     Hyponatremia.resolved  Encourage PO intake     Anemia, mild   Hemoglobin continues to remain stable between 10 and 11  No clear signs of bleeding noted  -Continue to monitor hemoglobin with ongoing treatment of sepsis      Left hip pain   History of hip osteoarthritis    MSSA bacteremia in the setting of recent sepsis currently on IV antibiotics due to concern for left hip pain,  orthopedic surgery has been consulted  Recommend left hip aspiration to rule out potential septic arthritis Cx NGTD     Constipation  Continue bowel regimen        Patient currently on IV antibiotic for ongoing bacteremia with MSSA.  Continues to have positive blood cultures awaiting repeat blood cultures drawn on 3/20 to be negative prior to final antibiotic plan.  Patient is not stable for discharge.     Diet: Regular Diet Adult    DVT Prophylaxis: ASA  Brown Catheter: Not present  Lines: None     Cardiac Monitoring: ACTIVE order. Indication: endocarditis  Code Status: Full Code      Clinically Significant Risk Factors                                    Disposition Plan      Expected Discharge Date: 03/22/2024        Discharge Comments: Home with assist when medically ready            Negra Austin  Hospitalist Service  [facility]  Securely message with [employer] (more info)  Text page via [employer] Paging/Directory   ______________________________________________________________________    Interval History   Patient is sitting in chair, patient continues to have significant discomfort in the left lower extremity, especially when he sits for a while.  Patient recalls that when he tries to exercise and stretch a little bit, the pain seems to be improving.  Patient did take some Percocet which help with the pain earlier.  Patient denies any chest pain dizziness lightheadedness nausea vomiting or abdominal pain.  No fevers noted overnight.  Discussed about persistent bacteremia with recent blood cultures still remaining positive.  Patient is hopeful for improvement and discharge as soon as possible.    Physical Exam   Vital Signs: Temp: 98.2  F (36.8  C) Temp src: Oral BP: (!) 136/93 Pulse: 98   Resp: 17 SpO2: 97 % O2 Device: None (Room air)    Weight: 133 lbs 9.6 oz    Constitutional: Awake, alert, cooperative, no apparent distress  Respiratory: Clear to auscultation bilaterally, no crackles or  wheezing  Cardiovascular: Regular rate and rhythm, normal S1 and S2, and no murmur noted  GI: Normal bowel sounds, soft, non-distended, non-tender  Skin/Integumen: No rashes, no cyanosis, no edema  Other: Alert oriented x 3, no apparent focal deficits.    Medical Decision Making

## 2024-03-20 NOTE — PROGRESS NOTES
[facility]    Infectious Disease Progress Note    Date of Service (when I saw the patient): 03/20/2024     Assessment & Plan   Miguel Pappas is a 68 year old who was admitted on 3/16/2024.   Impression:  68 with HLD, hyponatremia, mitral valve stenosis and aortic valve stenosis s/p bioprosthertic aortic valve placement   Admitted in 3/14/24 for evaluation of confusion   On admission was found to have leukocytosis, no fever  Blood cultures positive with Streptococcus gordonii  Suspected endocarditis  TIA possible septic embolism  Left hip pain concern for septic arthritis  History of for root canal done last month history of some dental infection that he has been dealing with for months  TTE prosthetic valve were not clearly visualized     Recommendations  Continue on  IV ceftriaxone based on susceptibilities to target treat the Streptococcus  Needs daily blood cultures till clear unfortunately cultures from the 18th coming back positive today  Get another set of blood cultures    Please continue to avoid any PICC or midline  AC: No valvular vegetations demonstrated   S/P aspiration on the hip joint: Very low WBC count cultures are pending I reviewed orthopedic evaluation and I also noted the plan for lumbar spine MRI  I also appreciate and reviewed oral surgeries consultation note        Scott Burns MD    Interval History   Tolerating antibiotics ok   No new rashes or issues with antibiotics   Labs reviewed   No changes to past medical, social or family history   Still has left hip pain but able to ambulate using walker      Physical Exam   Temp: 98.2  F (36.8  C) Temp src: Oral BP: (!) 136/93 Pulse: 96   Resp: 17 SpO2: 95 % O2 Device: None (Room air)    Vitals:    03/16/24 2112 03/17/24 0604 03/18/24 0620   Weight: 62.7 kg (138 lb 3.2 oz) 62.3 kg (137 lb 4.8 oz) 60.6 kg (133 lb 9.6 oz)     Vital Signs with Ranges  Temp:  [97.5  F (36.4  C)-99.3  F (37.4  C)] 98.2  F (36.8  C)  Pulse:   "[75-96] 96  Resp:  [16-18] 17  BP: (126-146)/(89-95) 136/93  SpO2:  [95 %-98 %] 95 %    Constitutional: Awake, alert, cooperative, no apparent distress  Lungs: Clear to auscultation bilaterally, no crackles or wheezing  Cardiovascular: Regular rate and rhythm, normal S1 and S2, and no murmur noted  Abdomen: Normal bowel sounds, soft, non-distended, non-tender  Skin: No rashes, no cyanosis, no edema  Other:    Medications      aspirin  81 mg Oral Daily    atorvastatin  40 mg Oral QPM    cefTRIAXone  2 g Intravenous Q24H    [Held by provider] polyethylene glycol  17 g Oral Daily    senna-docusate  1 tablet Oral BID    sodium chloride (PF)  3 mL Intracatheter Q8H       Data   All microbiology laboratory data reviewed.  Recent Labs   Lab Test 03/20/24  0815 03/19/24  0552 03/18/24  1210   WBC 11.6* 12.7* 10.6   HGB 11.5* 10.4* 10.6*   HCT 35.0* 30.8* 31.8*   MCV 86 84 86    356 328     Recent Labs   Lab Test 03/20/24  0815 03/19/24  0552 03/18/24  1210   CR 0.79 0.82 0.87     No lab results found.  No lab results found.    Invalid input(s): \"UC\"    All cultures:  Recent Labs   Lab 03/18/24  0843 03/16/24  0900 03/16/24  0854 03/15/24  1517 03/15/24  1512 03/14/24  2223   CULTURE No growth after 1 day  No anaerobic organisms isolated after 1 day Positive on the 4th day of incubation*  Gram positive cocci in pairs and chains* No growth after 3 days Positive on the 1st day of incubation*  Streptococcus gordonii* Positive on the 1st day of incubation*  Streptococcus gordonii* Positive on the 1st day of incubation*  Streptococcus gordonii*  Positive on the 1st day of incubation*  Streptococcus gordonii*      Blood culture:  Results for orders placed or performed during the hospital encounter of 03/14/24   Blood Culture Arm, Left    Specimen: Arm, Left; Blood   Result Value Ref Range    Culture Positive on the 4th day of incubation (A)     Culture Gram positive cocci in pairs and chains (AA)    Blood Culture " Hand, Right    Specimen: Hand, Right; Blood   Result Value Ref Range    Culture No growth after 3 days    Blood Culture Arm, Right    Specimen: Arm, Right; Blood   Result Value Ref Range    Culture Positive on the 1st day of incubation (A)     Culture Streptococcus gordonii (AA)    Blood Culture Arm, Left    Specimen: Arm, Left; Blood   Result Value Ref Range    Culture Positive on the 1st day of incubation (A)     Culture Streptococcus gordonii (AA)    Blood Culture Peripheral Blood    Specimen: Peripheral Blood   Result Value Ref Range    Culture Positive on the 1st day of incubation (A)     Culture Streptococcus gordonii (AA)        Susceptibility    Streptococcus gordonii - YEIMY     Ampicillin <=0.06 Susceptible ug/mL     Penicillin <=0.03 Susceptible ug/mL     Clindamycin <=0.06 Susceptible ug/mL     Cefotaxime <=0.25 Susceptible ug/mL     Ceftriaxone <=0.25 Susceptible ug/mL     Vancomycin 0.5 Susceptible ug/mL     Meropenem <=0.06 Susceptible ug/mL   Blood Culture Peripheral Blood    Specimen: Peripheral Blood   Result Value Ref Range    Culture Positive on the 1st day of incubation (A)     Culture Streptococcus gordonii (AA)       Urine culture:  No results found for this or any previous visit.

## 2024-03-20 NOTE — PROGRESS NOTES
Therapy: IV abx    Insurance: [employer]      Pt does not have coverage for IV abx with their [employer] plan. Drug would be billed to the part D and supplies would be self-pay. Based on Ceftriaxone 2gm q24h, total cost is $34.59/day for drug and supplies.    For nursing, patient should have coverage if homebound, however [facility] is not contracted with Medicare and an outside nursing agency would be utilized instead. If patient is not homebound, there is no coverage and I can see patient if patient agrees to self-pay for $90 per visit.    In reference to admission on 3/16/24 to check IV abx coverage      Please contact Intake with any questions, [phone] or In Basket pool,  Home Infusion ([phone]).

## 2024-03-20 NOTE — PLAN OF CARE
Goal Outcome Evaluation:         Pt A&Ox4. Neuros intact. Ind w/ walker. Prn oxy & tylenol given for pain. PIV SL.

## 2024-03-21 ENCOUNTER — APPOINTMENT (OUTPATIENT)
Dept: PHYSICAL THERAPY | Facility: CLINIC | Age: 69
DRG: 871 | End: 2024-03-21
Attending: STUDENT IN AN ORGANIZED HEALTH CARE EDUCATION/TRAINING PROGRAM
Payer: COMMERCIAL

## 2024-03-21 LAB
ANION GAP SERPL CALCULATED.3IONS-SCNC: 11 MMOL/L (ref 7–15)
BACTERIA BLD CULT: NO GROWTH
BUN SERPL-MCNC: 19.9 MG/DL (ref 8–23)
CALCIUM SERPL-MCNC: 9.7 MG/DL (ref 8.8–10.2)
CHLORIDE SERPL-SCNC: 96 MMOL/L (ref 98–107)
CREAT SERPL-MCNC: 0.8 MG/DL (ref 0.67–1.17)
DEPRECATED HCO3 PLAS-SCNC: 26 MMOL/L (ref 22–29)
EGFRCR SERPLBLD CKD-EPI 2021: >90 ML/MIN/1.73M2
ERYTHROCYTE [DISTWIDTH] IN BLOOD BY AUTOMATED COUNT: 13.3 % (ref 10–15)
GLUCOSE SERPL-MCNC: 133 MG/DL (ref 70–99)
HCT VFR BLD AUTO: 33.6 % (ref 40–53)
HGB BLD-MCNC: 11.1 G/DL (ref 13.3–17.7)
MCH RBC QN AUTO: 28.3 PG (ref 26.5–33)
MCHC RBC AUTO-ENTMCNC: 33 G/DL (ref 31.5–36.5)
MCV RBC AUTO: 86 FL (ref 78–100)
PLATELET # BLD AUTO: 428 10E3/UL (ref 150–450)
POTASSIUM SERPL-SCNC: 4.1 MMOL/L (ref 3.4–5.3)
RBC # BLD AUTO: 3.92 10E6/UL (ref 4.4–5.9)
SODIUM SERPL-SCNC: 133 MMOL/L (ref 135–145)
WBC # BLD AUTO: 15.3 10E3/UL (ref 4–11)

## 2024-03-21 PROCEDURE — 250N000013 HC RX MED GY IP 250 OP 250 PS 637: Performed by: INTERNAL MEDICINE

## 2024-03-21 PROCEDURE — 36415 COLL VENOUS BLD VENIPUNCTURE: CPT | Performed by: INTERNAL MEDICINE

## 2024-03-21 PROCEDURE — 97530 THERAPEUTIC ACTIVITIES: CPT | Mod: GP

## 2024-03-21 PROCEDURE — 250N000013 HC RX MED GY IP 250 OP 250 PS 637: Performed by: STUDENT IN AN ORGANIZED HEALTH CARE EDUCATION/TRAINING PROGRAM

## 2024-03-21 PROCEDURE — 80048 BASIC METABOLIC PNL TOTAL CA: CPT | Performed by: INTERNAL MEDICINE

## 2024-03-21 PROCEDURE — 120N000001 HC R&B MED SURG/OB

## 2024-03-21 PROCEDURE — 97116 GAIT TRAINING THERAPY: CPT | Mod: GP

## 2024-03-21 PROCEDURE — 250N000011 HC RX IP 250 OP 636: Performed by: INTERNAL MEDICINE

## 2024-03-21 PROCEDURE — 99233 SBSQ HOSP IP/OBS HIGH 50: CPT | Performed by: INTERNAL MEDICINE

## 2024-03-21 PROCEDURE — 85014 HEMATOCRIT: CPT | Performed by: INTERNAL MEDICINE

## 2024-03-21 RX ADMIN — OXYCODONE HYDROCHLORIDE 5 MG: 5 TABLET ORAL at 19:55

## 2024-03-21 RX ADMIN — CEFTRIAXONE SODIUM 2 G: 2 INJECTION, POWDER, FOR SOLUTION INTRAMUSCULAR; INTRAVENOUS at 14:51

## 2024-03-21 RX ADMIN — ATORVASTATIN CALCIUM 40 MG: 40 TABLET, FILM COATED ORAL at 19:54

## 2024-03-21 RX ADMIN — ASPIRIN 81 MG: 81 TABLET, COATED ORAL at 10:14

## 2024-03-21 ASSESSMENT — ACTIVITIES OF DAILY LIVING (ADL)
ADLS_ACUITY_SCORE: 42
ADLS_ACUITY_SCORE: 42
ADLS_ACUITY_SCORE: 40
ADLS_ACUITY_SCORE: 42
ADLS_ACUITY_SCORE: 40
ADLS_ACUITY_SCORE: 42
ADLS_ACUITY_SCORE: 41
ADLS_ACUITY_SCORE: 42
ADLS_ACUITY_SCORE: 40
ADLS_ACUITY_SCORE: 42
ADLS_ACUITY_SCORE: 40
ADLS_ACUITY_SCORE: 42
ADLS_ACUITY_SCORE: 42
ADLS_ACUITY_SCORE: 41
ADLS_ACUITY_SCORE: 42
ADLS_ACUITY_SCORE: 42
ADLS_ACUITY_SCORE: 40

## 2024-03-21 NOTE — PLAN OF CARE
Goal Outcome Evaluation:      Plan of Care Reviewed With: patient, family           Pt alert and orinted X4. Able to make needs known. Denied SOB, CP, N/V. CMS intact. LLE weak and stiff. Neurosurgery has signed off. Waiting for ID plan. R PIV WNL. Cardiac monitor NSR.

## 2024-03-21 NOTE — PLAN OF CARE
Goal Outcome Evaluation:    A&Ox4. VSS ex BP, on RA.  Afebrile. Denies CP, SOB. Tele NSR. Neuro intact. Ax1 GB & walker. Uses bedside urinal. Continent of B&B. PIV SL. Denies pain at this time. Will continue to monitor.

## 2024-03-21 NOTE — PROGRESS NOTES
[facility]    PROGRESS NOTE - Hospitalist Service    ASSESSMENT AND PLAN     Principal Problem:    Endocarditis    Miguel Pappas is a 68 year old male admitted on 3/16/2024. He was transferred from [facility] for possible transesophageal echocardiogram due to suspicion for SBE.  The patient presented with an episode of amnesia and was found to have streptococcal bacteremia.  He has a history of a bioprosthetic aortic valve replacement. For the past couple of weeks he has felt unwell and has at least one episode of documented fever.  He has had worsening left hip pain. In January he had a dental cleaning but took antibiotic prophylaxis.      Sepsis due to streptococcus gordonii bacteremia   Suspected subacute bacterial endocarditis   Possible TIA due to septic embolism  Possible left hip septic arthritis   Initially presented to [facility] with confusion and concern for stroke. Blood cultures obtained on admission to [facility] 3/14 resulting in gram + cocci in pairs and chains and streptococcus species by Vrgene GP panel.  He was initiated on Zosyn and Vancomycin. Chest xray and UA negative. The source is unclear but he reported subjective fevers for a few weeks before presentation. He also has severe bone on bone left hip arthritis which worsened suddenly a few weeks ago. WBC 19 on admission to [facility], improved to 12.5 on 3/16. --->158. Septic arthritis a consideration. Heart valves not well visualized on TTE done at [facility]. Transferred to [facility]   -Patient initially started on IV vancomycin and Zosyn  -MSSA bacteremia  -Consult infectious disease  -Discontinue IV vancomycin and Zosyn and start IV ceftriaxone  -Consult orthopedic surgery for left hip aspiration Cx NGTD  -Consult to cardiology AC completed, no vegetations  -Consulted oromaxillary surgery, no concern for ongoing dental caries or abscesses, continue current IV regimen according  to ID no further intervention recommended at this time  -Recheck blood cultures from 3/16 continue to remain positive with GPC in pairs and chains   Lumbar MRI as below due to concern for pain  1.  At L5-S1, posterior disc bulging with evidence of mild mass effect on the traversing bilateral S1 nerve roots. There is mild-to-moderate bilateral foraminal stenosis.  2.  At L4-L5, there is mild-to-moderate central canal stenosis and bilateral foraminal stenosis.  3.  At L3-L4, there is mild central canal stenosis and mild-to-moderate bilateral foraminal stenosis.  4.  At L2-L3, there is mild central canal stenosis and mild bilateral foraminal stenosis.  -consult to neurosurgery- no intervention   Repeat BC 3/20 with NGTD- appreciate ID final recs for discharge antibiotics     Bioprosthetic AVR (12/2022)   Patient had open AVR in 2022.  Valves not visualized well on TTE.    Continue ASA 81mg daily      Acute amnesia, resolved  MRI at [facility] showed punctate focus of diffusion abnormality in L hippocampal region, no hemorrhage, no significant chronic small vessel disease.  CTA no significant stenosis or dissection.  TTE:  LVEF 60-65%, valvles not well visiualized. 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 CTA head/neck: no significant stenosis, no acute infarct, no hemorrhage.  Stroke neurology consult  Continue telemetry    aspirin 81 mg   Recommend to avoid therapeutic anticoagulation  Atorvastatin 40 mg daily     Hyponatremia.resolved  Encourage PO intake     Anemia, mild   Hemoglobin continues to remain stable between 10 and 11  No clear signs of bleeding noted  -Continue to monitor hemoglobin with ongoing treatment of sepsis      Left hip pain   History of hip osteoarthritis    MSSA bacteremia in the setting of recent sepsis currently on IV antibiotics due to concern for left hip pain, orthopedic surgery has been consulted  Recommend left hip aspiration to rule out potential septic  arthritis Cx NGTD     Constipation  Continue bowel regimen     Barriers to discharge: Final BC results, ID recs for discharge antibiotics    Anticipated length of stay: 1-2 days- return to home with assist or Home care      Present on Admission:   Endocarditis      Subjective:  Patient resting comfortably in the chair.  Wife on the phone.  Updated wife.  Awaiting final ID recommendations.    PHYSICAL EXAM  Temp:  [97.9  F (36.6  C)-98.5  F (36.9  C)] 97.9  F (36.6  C)  Pulse:  [83-97] 97  Resp:  [16-17] 16  BP: (104-140)/(79-88) 104/79  SpO2:  [95 %-98 %] 98 %  Wt Readings from Last 1 Encounters:   03/18/24 60.6 kg (133 lb 9.6 oz)       Intake/Output Summary (Last 24 hours) at 3/21/2024 1313  Last data filed at 3/21/2024 0513  Gross per 24 hour   Intake --   Output 1550 ml   Net -1550 ml      Body mass index is 20.92 kg/m .    GENRL: Alert and answering questions appropriately. Not in acute distress. Sitting in a chair   CHEST: Breathing easily   HEART: Regular rate   EXTRM: trace pedal edema  NEURO: Following commands   PSYCH: Normal affect and mood.   INTGM: No skin rash    Medical Decision Making       55 MINUTES SPENT BY ME on the date of service doing chart review, history, exam, documentation & further activities per the note.      PERTINENT LABS/IMAGING:  Results for orders placed or performed during the hospital encounter of 03/16/24   XR Joint Aspiration Major Left    Impression    IMPRESSION:  Technically successful left hip aspiration.    GIGI COLLINS PA-C         SYSTEM ID:  [id number]   MR Lumbar Spine w/o Contrast    Impression    IMPRESSION:  1.  At L5-S1, posterior disc bulging with evidence of mild mass effect on the traversing bilateral S1 nerve roots. There is mild-to-moderate bilateral foraminal stenosis.  2.  At L4-L5, there is mild-to-moderate central canal stenosis and bilateral foraminal stenosis.  3.  At L3-L4, there is mild central canal stenosis and mild-to-moderate bilateral foraminal  "stenosis.  4.  At L2-L3, there is mild central canal stenosis and mild bilateral foraminal stenosis.   Transesophageal Echocardiogram   Result Value Ref Range    LVEF  60-65%      Most Recent 3 CBC's:  Recent Labs   Lab Test 03/21/24  0847 03/20/24  0815 03/19/24  0552   WBC 15.3* 11.6* 12.7*   HGB 11.1* 11.5* 10.4*   MCV 86 86 84    421 356     Most Recent 3 BMP's:  Recent Labs   Lab Test 03/21/24  0847 03/20/24  0815 03/19/24  0552   * 137 134*   POTASSIUM 4.1 4.4 4.3   CHLORIDE 96* 99 100   CO2 26 27 24   BUN 19.9 19.0 16.9   CR 0.80 0.79 0.82   ANIONGAP 11 11 10   RAMONA 9.7 9.8 9.4   * 96 103*     Most Recent 2 LFT's:  Recent Labs   Lab Test 11/30/23  1351 12/17/22  0354   AST 42 50*   ALT 39 23   ALKPHOS 101 40   BILITOTAL 0.2 0.6       Recent Labs   Lab Test 03/14/24  1640 11/30/23  1351   CHOL  --  203*   HDL  --  84   * 108*   TRIG  --  57     Recent Labs   Lab Test 03/14/24  1640 11/30/23  1351 10/26/18  1705   * 108* 115*     Recent Labs   Lab Test 03/21/24  0847   *   POTASSIUM 4.1   CHLORIDE 96*   CO2 26   *   BUN 19.9   CR 0.80   GFRESTIMATED >90   RAMONA 9.7     Recent Labs   Lab Test 03/14/24  1640 12/09/22  1212   A1C 5.8* 5.5     Recent Labs   Lab Test 03/21/24  0847 03/20/24  0815 03/19/24  0552   HGB 11.1* 11.5* 10.4*     No results for input(s): \"TROPONINI\" in the last 35819 hours.  Recent Labs   Lab Test 03/07/24  1052   NTBNP 996*     Recent Labs   Lab Test 03/14/24  1640   TSH 2.84     Recent Labs   Lab Test 03/14/24  1640 03/14/23  1334 03/07/23  1343   INR 1.08 2.4* 1.5*       Adeline Rathousky, DO  Hospitalist Service  [facility]      "

## 2024-03-22 ENCOUNTER — APPOINTMENT (OUTPATIENT)
Dept: PHYSICAL THERAPY | Facility: CLINIC | Age: 69
DRG: 871 | End: 2024-03-22
Attending: STUDENT IN AN ORGANIZED HEALTH CARE EDUCATION/TRAINING PROGRAM
Payer: COMMERCIAL

## 2024-03-22 LAB
ANION GAP SERPL CALCULATED.3IONS-SCNC: 11 MMOL/L (ref 7–15)
BUN SERPL-MCNC: 19.1 MG/DL (ref 8–23)
CALCIUM SERPL-MCNC: 9.8 MG/DL (ref 8.8–10.2)
CHLORIDE SERPL-SCNC: 93 MMOL/L (ref 98–107)
CREAT SERPL-MCNC: 0.87 MG/DL (ref 0.67–1.17)
DEPRECATED HCO3 PLAS-SCNC: 27 MMOL/L (ref 22–29)
EGFRCR SERPLBLD CKD-EPI 2021: >90 ML/MIN/1.73M2
ERYTHROCYTE [DISTWIDTH] IN BLOOD BY AUTOMATED COUNT: 13.5 % (ref 10–15)
GLUCOSE SERPL-MCNC: 132 MG/DL (ref 70–99)
HCT VFR BLD AUTO: 33.1 % (ref 40–53)
HGB BLD-MCNC: 11.1 G/DL (ref 13.3–17.7)
MCH RBC QN AUTO: 28.8 PG (ref 26.5–33)
MCHC RBC AUTO-ENTMCNC: 33.5 G/DL (ref 31.5–36.5)
MCV RBC AUTO: 86 FL (ref 78–100)
PLATELET # BLD AUTO: 438 10E3/UL (ref 150–450)
POTASSIUM SERPL-SCNC: 4.7 MMOL/L (ref 3.4–5.3)
RBC # BLD AUTO: 3.85 10E6/UL (ref 4.4–5.9)
SODIUM SERPL-SCNC: 131 MMOL/L (ref 135–145)
WBC # BLD AUTO: 15.8 10E3/UL (ref 4–11)

## 2024-03-22 PROCEDURE — 120N000001 HC R&B MED SURG/OB

## 2024-03-22 PROCEDURE — 36415 COLL VENOUS BLD VENIPUNCTURE: CPT | Performed by: INTERNAL MEDICINE

## 2024-03-22 PROCEDURE — 99232 SBSQ HOSP IP/OBS MODERATE 35: CPT | Performed by: HOSPITALIST

## 2024-03-22 PROCEDURE — 97530 THERAPEUTIC ACTIVITIES: CPT | Mod: GP | Performed by: PHYSICAL THERAPIST

## 2024-03-22 PROCEDURE — 250N000013 HC RX MED GY IP 250 OP 250 PS 637: Performed by: STUDENT IN AN ORGANIZED HEALTH CARE EDUCATION/TRAINING PROGRAM

## 2024-03-22 PROCEDURE — 99232 SBSQ HOSP IP/OBS MODERATE 35: CPT | Performed by: INTERNAL MEDICINE

## 2024-03-22 PROCEDURE — 97116 GAIT TRAINING THERAPY: CPT | Mod: GP | Performed by: PHYSICAL THERAPIST

## 2024-03-22 PROCEDURE — 85027 COMPLETE CBC AUTOMATED: CPT | Performed by: INTERNAL MEDICINE

## 2024-03-22 PROCEDURE — 87040 BLOOD CULTURE FOR BACTERIA: CPT | Performed by: INTERNAL MEDICINE

## 2024-03-22 PROCEDURE — 80048 BASIC METABOLIC PNL TOTAL CA: CPT | Performed by: INTERNAL MEDICINE

## 2024-03-22 PROCEDURE — 250N000013 HC RX MED GY IP 250 OP 250 PS 637: Performed by: INTERNAL MEDICINE

## 2024-03-22 PROCEDURE — 250N000011 HC RX IP 250 OP 636: Performed by: INTERNAL MEDICINE

## 2024-03-22 RX ORDER — OXYCODONE HYDROCHLORIDE 5 MG/1
5 TABLET ORAL EVERY 6 HOURS PRN
Qty: 20 TABLET | Refills: 0 | Status: SHIPPED | OUTPATIENT
Start: 2024-03-22 | End: 2024-06-13

## 2024-03-22 RX ADMIN — CEFTRIAXONE SODIUM 2 G: 2 INJECTION, POWDER, FOR SOLUTION INTRAMUSCULAR; INTRAVENOUS at 14:15

## 2024-03-22 RX ADMIN — OXYCODONE HYDROCHLORIDE 5 MG: 5 TABLET ORAL at 11:10

## 2024-03-22 RX ADMIN — ASPIRIN 81 MG: 81 TABLET, COATED ORAL at 11:06

## 2024-03-22 RX ADMIN — ATORVASTATIN CALCIUM 40 MG: 40 TABLET, FILM COATED ORAL at 21:01

## 2024-03-22 ASSESSMENT — ACTIVITIES OF DAILY LIVING (ADL)
ADLS_ACUITY_SCORE: 47
ADLS_ACUITY_SCORE: 46
ADLS_ACUITY_SCORE: 42
ADLS_ACUITY_SCORE: 46
ADLS_ACUITY_SCORE: 46
ADLS_ACUITY_SCORE: 42
ADLS_ACUITY_SCORE: 46
ADLS_ACUITY_SCORE: 42
ADLS_ACUITY_SCORE: 42
ADLS_ACUITY_SCORE: 46
ADLS_ACUITY_SCORE: 42
ADLS_ACUITY_SCORE: 42
ADLS_ACUITY_SCORE: 47
ADLS_ACUITY_SCORE: 47
ADLS_ACUITY_SCORE: 46
ADLS_ACUITY_SCORE: 42
ADLS_ACUITY_SCORE: 46
ADLS_ACUITY_SCORE: 46
ADLS_ACUITY_SCORE: 42
ADLS_ACUITY_SCORE: 46
ADLS_ACUITY_SCORE: 47
ADLS_ACUITY_SCORE: 42
ADLS_ACUITY_SCORE: 47

## 2024-03-22 NOTE — PROGRESS NOTES
CLINICAL NUTRITION SERVICES  -  ASSESSMENT NOTE    Recommendations Ordered by Registered Dietitian (RD):   Trial Special K bar   Allow PRN ordering of oral nutrition supplements    Malnutrition: Patient does not meet two of the above criteria necessary for diagnosing malnutrition     REASON FOR ASSESSMENT  Miguel Pappas is a 68 year old male seen by Registered Dietitian for [facility] of: transferred from [facility] for possible transesophageal echocardiogram due to suspicion for SBE.  The patient presented with an episode of amnesia and was found to have streptococcal bacteremia.  He has a history of a bioprosthetic aortic valve replacement. For the past couple of weeks he has felt unwell and has at least one episode of documented fever.  He has had worsening left hip pain. In January he had a dental cleaning but took antibiotic prophylaxis.     NUTRITION HISTORY  - Information obtained from chart review and pt in chair  - Patient is on a Regular diet at home  - Typical food/fluid intake is grazing and/or small frequent meals and was eating regularly PTA  - UBW: 135-140 lbs for several years since his heart valve operation. He denied any wt changes or clothes fitting differently  - Diet history: Miguel is a [place] near [place]. He loves his whole milk and also eats plenty of beef.   - Supplements: Politely declined regular supplements as he consumes adequate milk but after more talking, he was constipated the first few days of his admission and after having prunes or prune juice, his bowels have moved but stool remain loose. So, he was agreeable to having a Special K bar sent up now and will order more PRN, if he'd like    CURRENT NUTRITION ORDERS  Diet Order:   Regular     Current Intake/Tolerance: Miguel has had no troubles ordering and has been cleaning his plates.   - Flowsheets show now appetite has been recorded and variable intakes; several days with no intakes/day and just a  "couple with x1 intake of 100%.   - [employer] (meal ordering system), shows pt ordering meals BID    NUTRITION FOCUSED PHYSICAL ASSESSMENT FOR DIAGNOSING MALNUTRITION)  Yes    Observed:    No nutrition-related physical findings observed    ANTHROPOMETRICS  Height: 5' 7\"  Weight: 133 lbs 9.6 oz   Body mass index is 20.92 kg/m .  Weight Status:  Normal BMI  IBW: 145 lb 12 oz  % IBW: 109%  Weight History:  Wt Readings from Last 20 Encounters:   03/18/24 60.6 kg (133 lb 9.6 oz)   03/15/24 60.8 kg (134 lb)   03/14/24 62.6 kg (138 lb)   03/11/24 62.8 kg (138 lb 8 oz)   03/07/24 63 kg (139 lb)   03/04/24 63.5 kg (140 lb)   02/20/24 65.3 kg (144 lb)   11/30/23 62.1 kg (137 lb)   04/05/23 65.3 kg (144 lb)   02/20/23 64.5 kg (142 lb 4.8 oz)   02/03/23 63.2 kg (139 lb 4 oz)   01/09/23 63.1 kg (139 lb 3.2 oz)   12/21/22 63 kg (138 lb 12.8 oz)   11/15/22 63.6 kg (140 lb 4.8 oz)   11/10/22 64 kg (141 lb)   10/17/22 63.5 kg (140 lb 1.6 oz)   04/04/22 62.4 kg (137 lb 8 oz)   02/03/22 62.7 kg (138 lb 2 oz)   10/26/18 61.2 kg (135 lb)   12/05/14 60.8 kg (134 lb)     LABS  Labs reviewed     MEDICATIONS  Medications reviewed     ASSESSED NUTRITION NEEDS PER APPROVED PRACTICE GUIDELINES:  Dosing Weight 61 kg (actual)  Estimated Energy Needs: 2312-7003 kcals (25-30 Kcal/Kg)  Justification: maintenance  Estimated Protein Needs: 73-92 grams protein (1.2-1.5 g pro/Kg)  Justification: preservation of lean body mass with incrt  Estimated Fluid Needs: 8276-5849  mL (1 mL/Kcal)  Justification: maintenance    MALNUTRITION:   % Weight Loss:  Weight loss does not meet criteria for malnutrition   % Intake:  Decreased intake does not meet criteria for malnutrition  Subcutaneous Fat Loss:  None observed; suspect low baseline stores  Muscle Loss:  None observed; suspect low baseline  Fluid Retention:  None noted    Malnutrition Diagnosis: Patient does not meet two of the above criteria necessary for diagnosing malnutrition    NUTRITION " DIAGNOSIS:  Predicted suboptimal nutrient intake related to risk for po intake to decline while admitted     NUTRITION INTERVENTIONS  Recommendations / Nutrition Prescription  Trial Special K bar   Allow PRN ordering of oral nutrition supplements     Implementation  Nutrition education: Per Provider order if indicated   Medical Food Supplement    Nutrition Goals  PO intake - Meet estimated needs    MONITORING AND EVALUATION:  Progress towards goals will be monitored and evaluated per protocol and Practice Guidelines    Javier Che RD, LD

## 2024-03-22 NOTE — PROGRESS NOTES
[facility]    Infectious Disease Progress Note    Date of Service (when I saw the patient): 03/22/2024     Assessment & Plan   Miguel Pappas is a 68 year old who was admitted on 3/16/2024.   Impression:  68 with HLD, hyponatremia, mitral valve stenosis and aortic valve stenosis s/p bioprosthertic aortic valve placement   Admitted in 3/14/24 for evaluation of confusion   On admission was found to have leukocytosis, no fever  Blood cultures positive with Streptococcus gordonii  Suspected endocarditis  TIA possible septic embolism  Left hip pain concern for septic arthritis  History of for root canal done last month history of some dental infection that he has been dealing with for months  TTE prosthetic valve were not clearly visualized     Recommendations  Continue on  IV ceftriaxone based on susceptibilities to target treat the Streptococcus    Waiting on the blood cultures from 3/20    Please continue to avoid any PICC or midline blood cultures are negative for 3 days    AC: No valvular vegetations demonstrated still high suspicion for endocarditis given the organism and history    S/P aspiration on the hip joint: Very low WBC count cultures are pending I reviewed orthopedic evaluation and I also noted the plan for lumbar spine MRI  I also appreciate and reviewed oral surgeries consultation note      Plan:   If cultures from 3/20 from the blood stay negative for 5 days   Ok to place picc earliest on Monday  We will plan on 6 weeks of IV antibiotics  Today is day 2/ 42 granted cultures continue to stay negative     Scott Burns MD    Interval History   Tolerating antibiotics ok   No new rashes or issues with antibiotics   Labs reviewed   No changes to past medical, social or family history   Still has left hip pain but able to ambulate using walker      Physical Exam   Temp: 98  F (36.7  C) Temp src: Oral BP: 122/86 Pulse: 86   Resp: 18 SpO2: 98 % O2 Device: None (Room air) Oxygen  "Delivery: 2 LPM  Vitals:    03/16/24 2112 03/17/24 0604 03/18/24 0620   Weight: 62.7 kg (138 lb 3.2 oz) 62.3 kg (137 lb 4.8 oz) 60.6 kg (133 lb 9.6 oz)     Vital Signs with Ranges  Temp:  [97.9  F (36.6  C)-98.8  F (37.1  C)] 98  F (36.7  C)  Pulse:  [83-92] 86  Resp:  [18] 18  BP: (112-127)/(69-86) 122/86  SpO2:  [93 %-98 %] 98 %    Constitutional: Awake, alert, cooperative, no apparent distress  Lungs: Clear to auscultation bilaterally, no crackles or wheezing  Cardiovascular: Regular rate and rhythm, normal S1 and S2, and no murmur noted  Abdomen: Normal bowel sounds, soft, non-distended, non-tender  Skin: No rashes, no cyanosis, no edema  Other:    Medications      aspirin  81 mg Oral Daily    atorvastatin  40 mg Oral QPM    cefTRIAXone  2 g Intravenous Q24H    senna-docusate  1 tablet Oral BID    sodium chloride (PF)  3 mL Intracatheter Q8H       Data   All microbiology laboratory data reviewed.  Recent Labs   Lab Test 03/22/24  0854 03/21/24  0847 03/20/24  0815   WBC 15.8* 15.3* 11.6*   HGB 11.1* 11.1* 11.5*   HCT 33.1* 33.6* 35.0*   MCV 86 86 86    428 421     Recent Labs   Lab Test 03/22/24  0854 03/21/24  0847 03/20/24  0815   CR 0.87 0.80 0.79     No lab results found.  No lab results found.    Invalid input(s): \"UC\"    All cultures:  Recent Labs   Lab 03/20/24  1250 03/20/24  1245 03/18/24  0843 03/16/24  0900 03/16/24  0854 03/15/24  1517 03/15/24  1512   CULTURE No growth after 1 day No growth after 1 day No growth after 3 days  No anaerobic organisms isolated after 3 days Positive on the 4th day of incubation*  Streptococcus gordonii* No Growth Positive on the 1st day of incubation*  Streptococcus gordonii* Positive on the 1st day of incubation*  Streptococcus gordonii*      Blood culture:  Results for orders placed or performed during the hospital encounter of 03/16/24   Blood Culture Arm, Left    Specimen: Arm, Left; Blood   Result Value Ref Range    Culture No growth after 1 day    Blood " Culture Arm, Right    Specimen: Arm, Right; Blood   Result Value Ref Range    Culture No growth after 1 day    Results for orders placed or performed during the hospital encounter of 03/14/24   Blood Culture Arm, Left    Specimen: Arm, Left; Blood   Result Value Ref Range    Culture Positive on the 4th day of incubation (A)     Culture Streptococcus gordonii (AA)    Blood Culture Hand, Right    Specimen: Hand, Right; Blood   Result Value Ref Range    Culture No Growth    Blood Culture Arm, Right    Specimen: Arm, Right; Blood   Result Value Ref Range    Culture Positive on the 1st day of incubation (A)     Culture Streptococcus gordonii (AA)    Blood Culture Arm, Left    Specimen: Arm, Left; Blood   Result Value Ref Range    Culture Positive on the 1st day of incubation (A)     Culture Streptococcus gordonii (AA)    Blood Culture Peripheral Blood    Specimen: Peripheral Blood   Result Value Ref Range    Culture Positive on the 1st day of incubation (A)     Culture Streptococcus gordonii (AA)        Susceptibility    Streptococcus gordonii - YEIMY     Ampicillin <=0.06 Susceptible ug/mL     Penicillin <=0.03 Susceptible ug/mL     Clindamycin <=0.06 Susceptible ug/mL     Cefotaxime <=0.25 Susceptible ug/mL     Ceftriaxone <=0.25 Susceptible ug/mL     Vancomycin 0.5 Susceptible ug/mL     Meropenem <=0.06 Susceptible ug/mL   Blood Culture Peripheral Blood    Specimen: Peripheral Blood   Result Value Ref Range    Culture Positive on the 1st day of incubation (A)     Culture Streptococcus gordonii (AA)       Urine culture:  No results found for this or any previous visit.

## 2024-03-22 NOTE — PLAN OF CARE
Alert and oriented x's 4.  Up with SBA or independently to the bathroom.  Taking oxycodone for pain with adequate relief.  On telemetry-NSR.  Up in chair most of the day, per patient request, tolerated well.  Will continue to monitor.

## 2024-03-22 NOTE — PLAN OF CARE
Goal Outcome Evaluation:    A&Ox4. VSS on RA.  Afebrile. L hip pain managed with prn oxycodone. Denies CP, SOB. Tele NSR. Weakness on LLE. Neuro intact. Ax1 GB & walker. Uses bedside urinal. Continent of B&B. PIV SL. Will continue to monitor.

## 2024-03-22 NOTE — PROGRESS NOTES
[facility]    PROGRESS NOTE - Hospitalist Service    ASSESSMENT AND PLAN     Principal Problem:    Endocarditis    Miguel Pappas is a 68 year old male admitted on 3/16/2024. He was transferred from [facility] for possible transesophageal echocardiogram due to suspicion for SBE.  The patient presented with an episode of amnesia and was found to have streptococcal bacteremia.  He has a history of a bioprosthetic aortic valve replacement. For the past couple of weeks he has felt unwell and has at least one episode of documented fever.  He has had worsening left hip pain. In January he had a dental cleaning but took antibiotic prophylaxis.      Sepsis due to streptococcus gordonii bacteremia   Suspected subacute bacterial endocarditis   Possible TIA due to septic embolism  Possible left hip septic arthritis   Initially presented to [facility] with confusion and concern for stroke. Blood cultures obtained on admission to [facility] 3/14 resulting in gram + cocci in pairs and chains and streptococcus species by Vrgene GP panel.  He was initiated on Zosyn and Vancomycin. Chest xray and UA negative. The source is unclear but he reported subjective fevers for a few weeks before presentation. He also has severe bone on bone left hip arthritis which worsened suddenly a few weeks ago. WBC 19 on admission to [facility], improved to 12.5 on 3/16. --->158. Septic arthritis a consideration. Heart valves not well visualized on TTE done at [facility]. Transferred to [facility]   Consulted orthopedic surgery for left hip aspiration Cx NGTD  Consulted cardiology AC completed, no vegetations  Consulted oromaxillary surgery, no concern for ongoing dental caries or abscesses, continue current IV regimen according to ID no further intervention recommended at this time  Consult to neurosurgery- no intervention   Lumbar MRI as below due to concern for pain  1.  At L5-S1, posterior  disc bulging with evidence of mild mass effect on the traversing bilateral S1 nerve roots. There is mild-to-moderate bilateral foraminal stenosis.  2.  At L4-L5, there is mild-to-moderate central canal stenosis and bilateral foraminal stenosis.  3.  At L3-L4, there is mild central canal stenosis and mild-to-moderate bilateral foraminal stenosis.  4.  At L2-L3, there is mild central canal stenosis and mild bilateral foraminal stenosis.  Cultures grew strep as above. Recheck blood cultures from 3/16 continue to remain positive with GPC in pairs and chains (resulted positive on 3/18).   Patient initially started on IV vancomycin and Zosyn-->ceftriaxone.  ID on board.  - Cont  ceftriaxone  - Follow cultures.  - Repeat BC 3/20 with NGTD- appreciate ID final recs for discharge antibiotics     Bioprosthetic AVR (12/2022)   Patient had open AVR in 2022.  Valves not visualized well on TTE.    Continue ASA 81mg daily      Acute amnesia, resolved  MRI at [facility] showed punctate focus of diffusion abnormality in L hippocampal region, no hemorrhage, no significant chronic small vessel disease.  CTA no significant stenosis or dissection.  TTE:  LVEF 60-65%, valvles not well visiualized. 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 CTA head/neck: no significant stenosis, no acute infarct, no hemorrhage.  Stroke neurology consult  Continue telemetry    aspirin 81 mg   Recommend to avoid therapeutic anticoagulation  Atorvastatin 40 mg daily     Hyponatremia.resolved  Encourage PO intake     Anemia, mild   Hemoglobin continues to remain stable between 10 and 11  No clear signs of bleeding noted  -Continue to monitor hemoglobin with ongoing treatment of sepsis      Left hip pain   History of hip osteoarthritis    MSSA bacteremia in the setting of recent sepsis currently on IV antibiotics due to concern for left hip pain, orthopedic surgery has been consulted  Recommended left hip aspiration to rule out  potential septic arthritis Cx NGTD     Constipation  Continue bowel regimen     Barriers to discharge: Final BC results, ID recs for discharge antibiotics    Anticipated length of stay: 1-2 days- return to home with outpatient therapy.      Present on Admission:   Endocarditis      Subjective:  Patient resting comfortably in the chair.  Lower ext pain.  No cp/sob.      PHYSICAL EXAM  Temp:  [97.9  F (36.6  C)-98.8  F (37.1  C)] 98  F (36.7  C)  Pulse:  [83-92] 86  Resp:  [18] 18  BP: (112-127)/(69-86) 122/86  SpO2:  [93 %-98 %] 98 %  Wt Readings from Last 1 Encounters:   03/18/24 60.6 kg (133 lb 9.6 oz)       Intake/Output Summary (Last 24 hours) at 3/21/2024 1313  Last data filed at 3/21/2024 0513  Gross per 24 hour   Intake --   Output 1550 ml   Net -1550 ml      Body mass index is 20.92 kg/m .    GENRL: Alert and answering questions appropriately. Not in acute distress. Sitting in a chair   CHEST: Breathing easily   HEART: Regular rate   EXTRM: trace pedal edema  NEURO: Following commands   PSYCH: Normal affect and mood.   INTGM: No skin rash    Medical Decision Making       55 MINUTES SPENT BY ME on the date of service doing chart review, history, exam, documentation & further activities per the note.      PERTINENT LABS/IMAGING:  Results for orders placed or performed during the hospital encounter of 03/16/24   XR Joint Aspiration Major Left    Impression    IMPRESSION:  Technically successful left hip aspiration.    GIGI COLLINS PA-C         SYSTEM ID:  [id number]   MR Lumbar Spine w/o Contrast    Impression    IMPRESSION:  1.  At L5-S1, posterior disc bulging with evidence of mild mass effect on the traversing bilateral S1 nerve roots. There is mild-to-moderate bilateral foraminal stenosis.  2.  At L4-L5, there is mild-to-moderate central canal stenosis and bilateral foraminal stenosis.  3.  At L3-L4, there is mild central canal stenosis and mild-to-moderate bilateral foraminal stenosis.  4.  At L2-L3, there  "is mild central canal stenosis and mild bilateral foraminal stenosis.   Transesophageal Echocardiogram   Result Value Ref Range    LVEF  60-65%      Most Recent 3 CBC's:  Recent Labs   Lab Test 03/22/24  0854 03/21/24  0847 03/20/24  0815   WBC 15.8* 15.3* 11.6*   HGB 11.1* 11.1* 11.5*   MCV 86 86 86    428 421     Most Recent 3 BMP's:  Recent Labs   Lab Test 03/21/24  0847 03/20/24  0815 03/19/24  0552   * 137 134*   POTASSIUM 4.1 4.4 4.3   CHLORIDE 96* 99 100   CO2 26 27 24   BUN 19.9 19.0 16.9   CR 0.80 0.79 0.82   ANIONGAP 11 11 10   RAMONA 9.7 9.8 9.4   * 96 103*     Most Recent 2 LFT's:  Recent Labs   Lab Test 11/30/23  1351 12/17/22  0354   AST 42 50*   ALT 39 23   ALKPHOS 101 40   BILITOTAL 0.2 0.6       Recent Labs   Lab Test 03/14/24  1640 11/30/23  1351   CHOL  --  203*   HDL  --  84   * 108*   TRIG  --  57     Recent Labs   Lab Test 03/14/24  1640 11/30/23  1351 10/26/18  1705   * 108* 115*     Recent Labs   Lab Test 03/21/24  0847   *   POTASSIUM 4.1   CHLORIDE 96*   CO2 26   *   BUN 19.9   CR 0.80   GFRESTIMATED >90   RAMONA 9.7     Recent Labs   Lab Test 03/14/24  1640 12/09/22  1212   A1C 5.8* 5.5     Recent Labs   Lab Test 03/21/24  0847 03/20/24  0815 03/19/24  0552   HGB 11.1* 11.5* 10.4*     No results for input(s): \"TROPONINI\" in the last 46299 hours.  Recent Labs   Lab Test 03/07/24  1052   NTBNP 996*     Recent Labs   Lab Test 03/14/24  1640   TSH 2.84     Recent Labs   Lab Test 03/14/24  1640 03/14/23  1334 03/07/23  1343   INR 1.08 2.4* 1.5*       Adeline Leyva, DO  Hospitalist Service  [facility]      "

## 2024-03-22 NOTE — CONSULTS
"SPIRITUAL HEALTH SERVICES - Consult Note  [facility]  Referral Source/Reason for Visit: Intro to [facility]    Summary and Recommendations -  Miguel shared he had an operation 15 months ago where he felt well and lacks understanding as to why he is so sick right now. Pt yearns to return back home and address the pain in his hip. Miguel said he is well supported by family and his [employer] community.    Plan: Plan to follow up with pt Tuesday if still here.     Tanika Cummings  Resident     [facility] available 24/7 for emergent requests/referrals, either by paging the on-call  or by entering an ASAP/STAT consult in [facility], which will also page the on-call .    Assessment    Saw pt Miguel A Reiman per intro to [facility].    Patient/Family Understanding of Illness and Goals of Care -   Pt shared they came into the hospital with a blood infection and started experiencing pain in the knee.  Miguel mentioned he has stay much longer than anticipated and will have to stay over the weekend.     Distress and Loss -   Pt stated he blacked out when he was in the bathroom and there was a period of 20 minutes that he does not remember.  Miguel explained the uncertainty of what is causing the blood infection is difficult.    Strengths, Coping, and Resources -   Pt said, \"I try to remember there are worse people off than me\" as a way to get through this difficult time.  Miguel named his wife, son, daughter and son in law as supports to him during this time.   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     Meaning, Beliefs, and Spirituality -   Pt said he is [employer] and attends [employer] in [place].   Miguel commented his  came to visit him and appreciated that he came all this way to check in.    Pt welcomed prayer.     "

## 2024-03-23 LAB
ANION GAP SERPL CALCULATED.3IONS-SCNC: 12 MMOL/L (ref 7–15)
BACTERIA SNV CULT: NO GROWTH
BUN SERPL-MCNC: 18.2 MG/DL (ref 8–23)
CALCIUM SERPL-MCNC: 9.6 MG/DL (ref 8.8–10.2)
CHLORIDE SERPL-SCNC: 97 MMOL/L (ref 98–107)
CREAT SERPL-MCNC: 0.79 MG/DL (ref 0.67–1.17)
DEPRECATED HCO3 PLAS-SCNC: 25 MMOL/L (ref 22–29)
EGFRCR SERPLBLD CKD-EPI 2021: >90 ML/MIN/1.73M2
ERYTHROCYTE [DISTWIDTH] IN BLOOD BY AUTOMATED COUNT: 13.4 % (ref 10–15)
GLUCOSE SERPL-MCNC: 95 MG/DL (ref 70–99)
GRAM STAIN RESULT: NORMAL
GRAM STAIN RESULT: NORMAL
HCT VFR BLD AUTO: 30 % (ref 40–53)
HGB BLD-MCNC: 9.9 G/DL (ref 13.3–17.7)
MCH RBC QN AUTO: 28.1 PG (ref 26.5–33)
MCHC RBC AUTO-ENTMCNC: 33 G/DL (ref 31.5–36.5)
MCV RBC AUTO: 85 FL (ref 78–100)
PLATELET # BLD AUTO: 393 10E3/UL (ref 150–450)
POTASSIUM SERPL-SCNC: 4.3 MMOL/L (ref 3.4–5.3)
RBC # BLD AUTO: 3.52 10E6/UL (ref 4.4–5.9)
SODIUM SERPL-SCNC: 134 MMOL/L (ref 135–145)
WBC # BLD AUTO: 13.5 10E3/UL (ref 4–11)

## 2024-03-23 PROCEDURE — 85027 COMPLETE CBC AUTOMATED: CPT | Performed by: INTERNAL MEDICINE

## 2024-03-23 PROCEDURE — 250N000011 HC RX IP 250 OP 636: Performed by: INTERNAL MEDICINE

## 2024-03-23 PROCEDURE — 80048 BASIC METABOLIC PNL TOTAL CA: CPT | Performed by: INTERNAL MEDICINE

## 2024-03-23 PROCEDURE — 99232 SBSQ HOSP IP/OBS MODERATE 35: CPT | Performed by: INTERNAL MEDICINE

## 2024-03-23 PROCEDURE — 36415 COLL VENOUS BLD VENIPUNCTURE: CPT | Performed by: INTERNAL MEDICINE

## 2024-03-23 PROCEDURE — 250N000013 HC RX MED GY IP 250 OP 250 PS 637: Performed by: STUDENT IN AN ORGANIZED HEALTH CARE EDUCATION/TRAINING PROGRAM

## 2024-03-23 PROCEDURE — 120N000001 HC R&B MED SURG/OB

## 2024-03-23 PROCEDURE — 250N000013 HC RX MED GY IP 250 OP 250 PS 637: Performed by: INTERNAL MEDICINE

## 2024-03-23 RX ORDER — CYCLOBENZAPRINE HCL 5 MG
5 TABLET ORAL 3 TIMES DAILY
Status: DISCONTINUED | OUTPATIENT
Start: 2024-03-23 | End: 2024-03-26 | Stop reason: HOSPADM

## 2024-03-23 RX ORDER — GABAPENTIN 100 MG/1
100 CAPSULE ORAL 3 TIMES DAILY
Status: DISCONTINUED | OUTPATIENT
Start: 2024-03-23 | End: 2024-03-26 | Stop reason: HOSPADM

## 2024-03-23 RX ORDER — CYCLOBENZAPRINE HCL 10 MG
10 TABLET ORAL 3 TIMES DAILY
Status: DISCONTINUED | OUTPATIENT
Start: 2024-03-23 | End: 2024-03-23

## 2024-03-23 RX ORDER — IBUPROFEN 200 MG
200 TABLET ORAL EVERY 6 HOURS PRN
Status: DISCONTINUED | OUTPATIENT
Start: 2024-03-23 | End: 2024-03-26 | Stop reason: HOSPADM

## 2024-03-23 RX ADMIN — GABAPENTIN 100 MG: 100 CAPSULE ORAL at 15:13

## 2024-03-23 RX ADMIN — GABAPENTIN 100 MG: 100 CAPSULE ORAL at 11:20

## 2024-03-23 RX ADMIN — ATORVASTATIN CALCIUM 40 MG: 40 TABLET, FILM COATED ORAL at 20:21

## 2024-03-23 RX ADMIN — GABAPENTIN 100 MG: 100 CAPSULE ORAL at 21:12

## 2024-03-23 RX ADMIN — ACETAMINOPHEN 650 MG: 325 TABLET, FILM COATED ORAL at 09:22

## 2024-03-23 RX ADMIN — OXYCODONE HYDROCHLORIDE 5 MG: 5 TABLET ORAL at 04:46

## 2024-03-23 RX ADMIN — CYCLOBENZAPRINE 5 MG: 5 TABLET, FILM COATED ORAL at 15:13

## 2024-03-23 RX ADMIN — OXYCODONE HYDROCHLORIDE 5 MG: 5 TABLET ORAL at 20:21

## 2024-03-23 RX ADMIN — DOCUSATE SODIUM 50 MG AND SENNOSIDES 8.6 MG 1 TABLET: 8.6; 5 TABLET, FILM COATED ORAL at 09:17

## 2024-03-23 RX ADMIN — CYCLOBENZAPRINE 5 MG: 5 TABLET, FILM COATED ORAL at 21:12

## 2024-03-23 RX ADMIN — ASPIRIN 81 MG: 81 TABLET, COATED ORAL at 09:17

## 2024-03-23 RX ADMIN — CEFTRIAXONE SODIUM 2 G: 2 INJECTION, POWDER, FOR SOLUTION INTRAMUSCULAR; INTRAVENOUS at 15:13

## 2024-03-23 ASSESSMENT — ACTIVITIES OF DAILY LIVING (ADL)
ADLS_ACUITY_SCORE: 47

## 2024-03-23 NOTE — PROGRESS NOTES
[facility]    Medicine Progress Note - Hospitalist Service    Date of Admission:  3/16/2024    Assessment & Plan        Miguel Pappas is a 68 year old male admitted on 3/16/2024. He was transferred from [facility] for possible transesophageal echocardiogram due to suspicion for SBE.  The patient presented with an episode of amnesia and was found to have streptococcal bacteremia.  He has a history of a bioprosthetic aortic valve replacement. For the past couple of weeks he has felt unwell and has at least one episode of documented fever.  He has had worsening left hip pain. In January he had a dental cleaning but took antibiotic prophylaxis.      Sepsis due to streptococcus gordonii bacteremia   Suspected subacute bacterial endocarditis   Possible TIA due to septic embolism  Possible left hip septic arthritis   Initially presented to [facility] with confusion and concern for stroke. Blood cultures obtained on admission to [facility] 3/14 resulting in gram + cocci in pairs and chains and streptococcus species by Vrgene GP panel.  He was initiated on Zosyn and Vancomycin. Chest xray and UA negative. The source is unclear but he reported subjective fevers for a few weeks before presentation. He also has severe bone on bone left hip arthritis which worsened suddenly a few weeks ago. WBC 19 on admission to [facility], improved to 12.5 on 3/16. --->158. Septic arthritis a consideration. Heart valves not well visualized on TTE done at [facility]. Transferred to [facility]   Consulted orthopedic surgery for left hip aspiration Cx NGTD  Consulted cardiology AC completed, no vegetations  Consulted oromaxillary surgery, no concern for ongoing dental caries or abscesses, continue current IV regimen according to ID no further intervention recommended at this time  Consult to neurosurgery- no intervention   Lumbar MRI as below due to concern for pain  1.  At L5-S1,  posterior disc bulging with evidence of mild mass effect on the traversing bilateral S1 nerve roots. There is mild-to-moderate bilateral foraminal stenosis.  2.  At L4-L5, there is mild-to-moderate central canal stenosis and bilateral foraminal stenosis.  3.  At L3-L4, there is mild central canal stenosis and mild-to-moderate bilateral foraminal stenosis.  4.  At L2-L3, there is mild central canal stenosis and mild bilateral foraminal stenosis.  Cultures grew strep as above. Recheck blood cultures from 3/16 continue to remain positive with GPC in pairs and chains (resulted positive on 3/18).   Patient initially started on IV vancomycin and Zosyn-->ceftriaxone.  ID on board.  - Cont  ceftriaxone  - Follow cultures.  - Repeat BC 3/20 with NGTD- appreciate ID final recs for discharge antibiotics, await at least 5 days before finalizing negative blood cultures earliest PICC line placement likely on 3/25 which is Monday  -Discussed the above recommendations per ID with patient and patient's wife.  -Due to significant discomfort and pain limiting his mobility, will start Flexeril 5 mg 3 times daily along with ibuprofen as needed and gabapentin to see if any of these will provide relief along with aqua K-pad.  -Likely definitive management as outpatient with orthopedic surgery consultation due to significant left hip osteoarthritis.     Bioprosthetic AVR (12/2022)   Patient had open AVR in 2022.  Valves not visualized well on TTE.    Continue ASA 81mg daily      Acute amnesia, resolved  MRI at [facility] showed punctate focus of diffusion abnormality in L hippocampal region, no hemorrhage, no significant chronic small vessel disease.  CTA no significant stenosis or dissection.  TTE:  LVEF 60-65%, valvles not well visiualized. 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 CTA head/neck: no significant stenosis, no acute infarct, no hemorrhage.  Stroke neurology consult  Continue telemetry     aspirin 81 mg   Recommend to avoid therapeutic anticoagulation  Atorvastatin 40 mg daily     Hyponatremia.resolved  Encourage PO intake     Anemia, mild   Hemoglobin continues to remain stable between 10 and 11  No clear signs of bleeding noted  -Continue to monitor hemoglobin with ongoing treatment of sepsis      Left hip pain   History of hip osteoarthritis    MSSA bacteremia in the setting of recent sepsis currently on IV antibiotics due to concern for left hip pain, orthopedic surgery has been consulted  Recommended left hip aspiration to rule out potential septic arthritis Cx NGTD     Constipation  Continue bowel regimen        Diet: Regular Diet Adult  Snacks/Supplements Adult: Other; PRN; With Meals    DVT Prophylaxis: Pneumatic Compression Devices  Brown Catheter: Not present  Lines: None     Cardiac Monitoring: ACTIVE order. Indication: Tachyarrhythmias, acute (48 hours)  Code Status: Full Code      Clinically Significant Risk Factors                                    Disposition Plan     Expected Discharge Date: 03/24/2024, 12:00 PM      Discharge Comments: Home with assist when medically ready            Negra Austin  Hospitalist Service  [facility]  Securely message with [employer] (more info)  Text page via [employer] Paging/Directory   ______________________________________________________________________    Interval History   Patient is sitting in chair continues to have significant stiffness and pain in in the left hip radiating into the anterior aspect of the thigh and the leg.  Patient feels that the pain is limiting his ability to move, but once he starts moving the stiffness starts to improve but the pain is persistent.  Patient does not recall having any aggravating or relieving factors for the pain.  Continues to remain worried that his left hip is bothering him so much, if the infection is related to the hip pain.  Patient offers no complaints of chest pain dizziness  lightheadedness nausea or vomiting.  Appears uncomfortable sitting at the end of the chair.    Physical Exam   Vital Signs: Temp: 98.2  F (36.8  C) Temp src: Oral BP: 109/81 Pulse: 88   Resp: 16 SpO2: 97 % O2 Device: None (Room air)    Weight: 133 lbs 9.6 oz    Constitutional: Awake, alert, cooperative, no apparent distress  Respiratory: Clear to auscultation bilaterally, no crackles or wheezing  Cardiovascular: Regular rate and rhythm, normal S1 and S2, and no murmur noted  GI: Normal bowel sounds, soft, non-distended, non-tender  Skin/Integumen: No rashes, no cyanosis, no edema  Other: Alert oriented x 3, no apparent focal deficits    Medical Decision Making

## 2024-03-23 NOTE — PROGRESS NOTES
Pt A&O x4, up x SBA with walker and GB. VSS.RA. LLE pain managed with Tyl, Flexeril and Gabapentin. Tele: NSR. Discharge pending.

## 2024-03-23 NOTE — PLAN OF CARE
Date/Time 3/22/2024    Trauma/Ortho/Medical (Choose one)  Medical.    Diagnosis: Endocarditis.  POD#: Not surgical.  Mental Status: A&OX4.  Activity/dangle Up with assist of 1 with gait belt and walker.  Diet: Regular.  Pain: Denied pain.  Brown/Voiding: bathroom and urinal.  Tele/Restraints/Iso: No.  02/LDA: KATHRINE PRIEST.  D/C Date: Pending.  Other Info: Pt on telemetry.

## 2024-03-23 NOTE — PROGRESS NOTES
A&OX4, Vitally stable,  A1GB/W, able to make needs without any difficulties, Denies ,CP, N/V, voids adequately using urinal,on telemetry -NSR, plan pending.

## 2024-03-24 LAB
ANION GAP SERPL CALCULATED.3IONS-SCNC: 11 MMOL/L (ref 7–15)
BACTERIA BLD CULT: ABNORMAL
BACTERIA BLD CULT: ABNORMAL
BUN SERPL-MCNC: 21.1 MG/DL (ref 8–23)
CALCIUM SERPL-MCNC: 9.5 MG/DL (ref 8.8–10.2)
CHLORIDE SERPL-SCNC: 98 MMOL/L (ref 98–107)
CREAT SERPL-MCNC: 0.84 MG/DL (ref 0.67–1.17)
DEPRECATED HCO3 PLAS-SCNC: 26 MMOL/L (ref 22–29)
EGFRCR SERPLBLD CKD-EPI 2021: >90 ML/MIN/1.73M2
ERYTHROCYTE [DISTWIDTH] IN BLOOD BY AUTOMATED COUNT: 13.4 % (ref 10–15)
GLUCOSE SERPL-MCNC: 93 MG/DL (ref 70–99)
HCT VFR BLD AUTO: 30 % (ref 40–53)
HGB BLD-MCNC: 9.9 G/DL (ref 13.3–17.7)
MCH RBC QN AUTO: 28.5 PG (ref 26.5–33)
MCHC RBC AUTO-ENTMCNC: 33 G/DL (ref 31.5–36.5)
MCV RBC AUTO: 87 FL (ref 78–100)
PLATELET # BLD AUTO: 379 10E3/UL (ref 150–450)
POTASSIUM SERPL-SCNC: 4.3 MMOL/L (ref 3.4–5.3)
RBC # BLD AUTO: 3.47 10E6/UL (ref 4.4–5.9)
SODIUM SERPL-SCNC: 135 MMOL/L (ref 135–145)
WBC # BLD AUTO: 12.9 10E3/UL (ref 4–11)

## 2024-03-24 PROCEDURE — 99233 SBSQ HOSP IP/OBS HIGH 50: CPT | Performed by: INTERNAL MEDICINE

## 2024-03-24 PROCEDURE — 250N000013 HC RX MED GY IP 250 OP 250 PS 637: Performed by: STUDENT IN AN ORGANIZED HEALTH CARE EDUCATION/TRAINING PROGRAM

## 2024-03-24 PROCEDURE — 250N000013 HC RX MED GY IP 250 OP 250 PS 637: Performed by: INTERNAL MEDICINE

## 2024-03-24 PROCEDURE — 80048 BASIC METABOLIC PNL TOTAL CA: CPT | Performed by: INTERNAL MEDICINE

## 2024-03-24 PROCEDURE — 85027 COMPLETE CBC AUTOMATED: CPT | Performed by: INTERNAL MEDICINE

## 2024-03-24 PROCEDURE — 250N000011 HC RX IP 250 OP 636: Performed by: INTERNAL MEDICINE

## 2024-03-24 PROCEDURE — 120N000001 HC R&B MED SURG/OB

## 2024-03-24 PROCEDURE — 36415 COLL VENOUS BLD VENIPUNCTURE: CPT | Performed by: INTERNAL MEDICINE

## 2024-03-24 RX ADMIN — IBUPROFEN 200 MG: 200 TABLET, FILM COATED ORAL at 18:13

## 2024-03-24 RX ADMIN — GABAPENTIN 100 MG: 100 CAPSULE ORAL at 21:16

## 2024-03-24 RX ADMIN — CYCLOBENZAPRINE 5 MG: 5 TABLET, FILM COATED ORAL at 08:42

## 2024-03-24 RX ADMIN — CYCLOBENZAPRINE 5 MG: 5 TABLET, FILM COATED ORAL at 15:10

## 2024-03-24 RX ADMIN — GABAPENTIN 100 MG: 100 CAPSULE ORAL at 08:42

## 2024-03-24 RX ADMIN — ATORVASTATIN CALCIUM 40 MG: 40 TABLET, FILM COATED ORAL at 20:09

## 2024-03-24 RX ADMIN — DOCUSATE SODIUM 50 MG AND SENNOSIDES 8.6 MG 1 TABLET: 8.6; 5 TABLET, FILM COATED ORAL at 08:42

## 2024-03-24 RX ADMIN — CEFTRIAXONE SODIUM 2 G: 2 INJECTION, POWDER, FOR SOLUTION INTRAMUSCULAR; INTRAVENOUS at 15:10

## 2024-03-24 RX ADMIN — ASPIRIN 81 MG: 81 TABLET, COATED ORAL at 08:42

## 2024-03-24 RX ADMIN — CYCLOBENZAPRINE 5 MG: 5 TABLET, FILM COATED ORAL at 21:16

## 2024-03-24 RX ADMIN — DOCUSATE SODIUM 50 MG AND SENNOSIDES 8.6 MG 1 TABLET: 8.6; 5 TABLET, FILM COATED ORAL at 20:09

## 2024-03-24 RX ADMIN — GABAPENTIN 100 MG: 100 CAPSULE ORAL at 15:10

## 2024-03-24 ASSESSMENT — ACTIVITIES OF DAILY LIVING (ADL)
ADLS_ACUITY_SCORE: 47
ADLS_ACUITY_SCORE: 31
ADLS_ACUITY_SCORE: 47
ADLS_ACUITY_SCORE: 46
ADLS_ACUITY_SCORE: 31
ADLS_ACUITY_SCORE: 47
ADLS_ACUITY_SCORE: 46
ADLS_ACUITY_SCORE: 46
ADLS_ACUITY_SCORE: 47
ADLS_ACUITY_SCORE: 31
ADLS_ACUITY_SCORE: 47
ADLS_ACUITY_SCORE: 47
ADLS_ACUITY_SCORE: 46
ADLS_ACUITY_SCORE: 47
ADLS_ACUITY_SCORE: 47
ADLS_ACUITY_SCORE: 46
ADLS_ACUITY_SCORE: 31
ADLS_ACUITY_SCORE: 46
ADLS_ACUITY_SCORE: 31
ADLS_ACUITY_SCORE: 47
ADLS_ACUITY_SCORE: 47

## 2024-03-24 NOTE — PROGRESS NOTES
[facility]    Medicine Progress Note - Hospitalist Service    Date of Admission:  3/16/2024    Assessment & Plan      Miguel Pappas is a 68 year old male admitted on 3/16/2024. He was transferred from [facility] for possible transesophageal echocardiogram due to suspicion for SBE.  The patient presented with an episode of amnesia and was found to have streptococcal bacteremia.  He has a history of a bioprosthetic aortic valve replacement. For the past couple of weeks he has felt unwell and has at least one episode of documented fever.  He has had worsening left hip pain. In January he had a dental cleaning but took antibiotic prophylaxis.      Sepsis due to streptococcus gordonii bacteremia   Suspected subacute bacterial endocarditis   Possible TIA due to septic embolism  Possible left hip septic arthritis   Initially presented to [facility] with confusion and concern for stroke. Blood cultures obtained on admission to [facility] 3/14 resulting in gram + cocci in pairs and chains and streptococcus species by Vrgene GP panel.  He was initiated on Zosyn and Vancomycin. Chest xray and UA negative. The source is unclear but he reported subjective fevers for a few weeks before presentation. He also has severe bone on bone left hip arthritis which worsened suddenly a few weeks ago. WBC 19 on admission to [facility], improved to 12.5 on 3/16. --->158. Septic arthritis a consideration. Heart valves not well visualized on TTE done at [facility]. Transferred to [facility]   Consulted orthopedic surgery for left hip aspiration Cx NGTD  Consulted cardiology AC completed, no vegetations  Consulted oromaxillary surgery, no concern for ongoing dental caries or abscesses, continue current IV regimen according to ID no further intervention recommended at this time  Consult to neurosurgery- no intervention   Lumbar MRI as below due to concern for pain  1.  At L5-S1,  posterior disc bulging with evidence of mild mass effect on the traversing bilateral S1 nerve roots. There is mild-to-moderate bilateral foraminal stenosis.  2.  At L4-L5, there is mild-to-moderate central canal stenosis and bilateral foraminal stenosis.  3.  At L3-L4, there is mild central canal stenosis and mild-to-moderate bilateral foraminal stenosis.  4.  At L2-L3, there is mild central canal stenosis and mild bilateral foraminal stenosis.    Cultures grew strep as above. Recheck blood cultures from 3/16 continue to remain positive with GPC in pairs and chains (resulted positive on 3/18).   Patient initially started on IV vancomycin and Zosyn-->ceftriaxone.  ID on board.  - Cont  ceftriaxone  - Follow cultures.  Continue to remain negative to date.  - Repeat BC 3/20 with NGTD- appreciate ID final recs for discharge antibiotics, await at least 5 days before finalizing negative blood cultures earliest PICC line placement likely on 3/25 which is Monday  -Discussed the above recommendations per ID with patient and patient's wife.  -Due to significant discomfort and pain limiting his mobility, po  Flexeril 5 mg 3 times daily along with ibuprofen as needed and gabapentin to see if any of these will provide relief along with aqua K-pad.  -Likely definitive management as outpatient with orthopedic surgery consultation due to significant left hip osteoarthritis.     Bioprosthetic AVR (12/2022)   Patient had open AVR in 2022.  Valves not visualized well on TTE.    Continue ASA 81mg daily      Acute amnesia, resolved  MRI at [facility] showed punctate focus of diffusion abnormality in L hippocampal region, no hemorrhage, no significant chronic small vessel disease.  CTA no significant stenosis or dissection.  TTE:  LVEF 60-65%, valvles not well visiualized. 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 CTA head/neck: no significant stenosis, no acute infarct, no hemorrhage.  Stroke neurology  consult  Continue telemetry    aspirin 81 mg   Recommend to avoid therapeutic anticoagulation  Atorvastatin 40 mg daily     Hyponatremia.resolved  Encourage PO intake     Anemia, mild   Hemoglobin continues to remain stable between 10 and 11  No clear signs of bleeding noted  -Continue to monitor hemoglobin with ongoing treatment of sepsis      Left hip pain   History of hip osteoarthritis    MSSA bacteremia in the setting of recent sepsis currently on IV antibiotics due to concern for left hip pain, orthopedic surgery has been consulted  Recommended left hip aspiration to rule out potential septic arthritis Cx NGTD     Constipation  Continue bowel regimen     Awaiting final IV antibiotic plan, not stable for discharge.     Diet: Regular Diet Adult  Snacks/Supplements Adult: Other; PRN; With Meals    DVT Prophylaxis: Pneumatic Compression Devices  Brown Catheter: Not present  Lines: None     Cardiac Monitoring: ACTIVE order. Indication: Tachyarrhythmias, acute (48 hours)  Code Status: Full Code      Clinically Significant Risk Factors                                    Disposition Plan      Expected Discharge Date: 03/25/2024, 12:00 PM      Discharge Comments: Home with assist when medically ready  ID- [id number]. Picc placement 3/25            [facility]  Securely message with [employer] (more info)  Text page via [employer] Paging/Directory   ______________________________________________________________________    Interval History   Patient is sitting in chair, continues to have significant discomfort in the left hip radiating into the anterior aspect of the thigh, mostly concern for stiffness, which improves slightly when he starts to walk, but remains uncomfortable while sitting.  Patient continues to question about the onset of such severe pain with ongoing infection.  Patient does recall that he has had some discomfort in the hip and observed some weakness while  performing day-to-day activities intermittently but this sudden onset of that severe pain and stiffness with no significant relief he is bothering him.  Patient denies any fever or chills overnight.  Discussed about the possibility of joint injection in the future once cleared from infection if unable to get surgical intervention right away on the left hip.  Patient did not sleep well last night.  Other than that did not think there was much difference in the stiffness of the pain even after starting the Flexeril and gabapentin.  Discussed about ibuprofen, patient would like to alternate with Tylenol and see if that helps.    Physical Exam   Vital Signs: Temp: 97.9  F (36.6  C) Temp src: Oral BP: 107/74 Pulse: 92   Resp: 16 SpO2: 95 % O2 Device: None (Room air)    Weight: 133 lbs 9.6 oz    Constitutional: Awake, alert, cooperative, no apparent distress  Respiratory: Clear to auscultation bilaterally, no crackles or wheezing  Cardiovascular: Regular rate and rhythm, normal S1 and S2, and no murmur noted  GI: Normal bowel sounds, soft, non-distended, non-tender  Skin/Integumen: No rashes, no cyanosis, no edema  Other: Alert oriented x 3, no apparent focal deficits    Medical Decision Making

## 2024-03-24 NOTE — PROGRESS NOTES
Vitally stable on RA, needs met this shift, A1GB/W,  denies CP, SOB, N/V, taking oxycodone with for pain with adequate relief, on tele NSR, discharge pending.

## 2024-03-24 NOTE — PROGRESS NOTES
Pt A&O x4, up x1 with walker and GB. VSS.RA. LLE pain managed with Ibuprofen, Flexeril, Gabapentin and T pump. Tele: NSR. Discharge pending.

## 2024-03-24 NOTE — PROVIDER NOTIFICATION
MD Notification    Notified Person: MD    Notified Person Name: Negra Austin    Notification Date/Time: 3/24/24 1330    Notification Interaction: verona    Purpose of Notification: blood culture that collected on 3/16 is positive for streptococcus gordoni    Orders Received:    Comments:

## 2024-03-25 ENCOUNTER — APPOINTMENT (OUTPATIENT)
Dept: PHYSICAL THERAPY | Facility: CLINIC | Age: 69
DRG: 871 | End: 2024-03-25
Attending: STUDENT IN AN ORGANIZED HEALTH CARE EDUCATION/TRAINING PROGRAM
Payer: COMMERCIAL

## 2024-03-25 PROBLEM — R78.81 BACTEREMIA: Status: ACTIVE | Noted: 2024-03-25

## 2024-03-25 LAB
ANION GAP SERPL CALCULATED.3IONS-SCNC: 11 MMOL/L (ref 7–15)
BACTERIA BLD CULT: NO GROWTH
BACTERIA BLD CULT: NO GROWTH
BUN SERPL-MCNC: 20.5 MG/DL (ref 8–23)
CALCIUM SERPL-MCNC: 9.8 MG/DL (ref 8.8–10.2)
CHLORIDE SERPL-SCNC: 99 MMOL/L (ref 98–107)
CREAT SERPL-MCNC: 0.79 MG/DL (ref 0.67–1.17)
DEPRECATED HCO3 PLAS-SCNC: 28 MMOL/L (ref 22–29)
EGFRCR SERPLBLD CKD-EPI 2021: >90 ML/MIN/1.73M2
ERYTHROCYTE [DISTWIDTH] IN BLOOD BY AUTOMATED COUNT: 13.5 % (ref 10–15)
GLUCOSE SERPL-MCNC: 93 MG/DL (ref 70–99)
HCT VFR BLD AUTO: 32.2 % (ref 40–53)
HGB BLD-MCNC: 10.8 G/DL (ref 13.3–17.7)
MCH RBC QN AUTO: 29.2 PG (ref 26.5–33)
MCHC RBC AUTO-ENTMCNC: 33.5 G/DL (ref 31.5–36.5)
MCV RBC AUTO: 87 FL (ref 78–100)
PLATELET # BLD AUTO: 414 10E3/UL (ref 150–450)
POTASSIUM SERPL-SCNC: 4.7 MMOL/L (ref 3.4–5.3)
RBC # BLD AUTO: 3.7 10E6/UL (ref 4.4–5.9)
SODIUM SERPL-SCNC: 138 MMOL/L (ref 135–145)
WBC # BLD AUTO: 13.7 10E3/UL (ref 4–11)

## 2024-03-25 PROCEDURE — 99232 SBSQ HOSP IP/OBS MODERATE 35: CPT | Performed by: PHYSICIAN ASSISTANT

## 2024-03-25 PROCEDURE — 250N000013 HC RX MED GY IP 250 OP 250 PS 637: Performed by: INTERNAL MEDICINE

## 2024-03-25 PROCEDURE — 36415 COLL VENOUS BLD VENIPUNCTURE: CPT | Performed by: INTERNAL MEDICINE

## 2024-03-25 PROCEDURE — 97530 THERAPEUTIC ACTIVITIES: CPT | Mod: GP

## 2024-03-25 PROCEDURE — 85027 COMPLETE CBC AUTOMATED: CPT | Performed by: INTERNAL MEDICINE

## 2024-03-25 PROCEDURE — 99233 SBSQ HOSP IP/OBS HIGH 50: CPT | Performed by: INTERNAL MEDICINE

## 2024-03-25 PROCEDURE — 250N000011 HC RX IP 250 OP 636: Performed by: INTERNAL MEDICINE

## 2024-03-25 PROCEDURE — 120N000001 HC R&B MED SURG/OB

## 2024-03-25 PROCEDURE — 80048 BASIC METABOLIC PNL TOTAL CA: CPT | Performed by: INTERNAL MEDICINE

## 2024-03-25 PROCEDURE — 97116 GAIT TRAINING THERAPY: CPT | Mod: GP

## 2024-03-25 PROCEDURE — 250N000013 HC RX MED GY IP 250 OP 250 PS 637: Performed by: STUDENT IN AN ORGANIZED HEALTH CARE EDUCATION/TRAINING PROGRAM

## 2024-03-25 RX ORDER — CEFTRIAXONE 2 G/1
2 INJECTION, POWDER, FOR SOLUTION INTRAMUSCULAR; INTRAVENOUS EVERY 24 HOURS
Status: CANCELLED
Start: 2024-03-27

## 2024-03-25 RX ORDER — ALBUTEROL SULFATE 90 UG/1
1-2 AEROSOL, METERED RESPIRATORY (INHALATION)
Status: CANCELLED
Start: 2024-03-26

## 2024-03-25 RX ORDER — DIPHENHYDRAMINE HYDROCHLORIDE 50 MG/ML
50 INJECTION INTRAMUSCULAR; INTRAVENOUS
Status: CANCELLED
Start: 2024-03-26

## 2024-03-25 RX ORDER — EPINEPHRINE 1 MG/ML
0.3 INJECTION, SOLUTION, CONCENTRATE INTRAVENOUS EVERY 5 MIN PRN
Status: CANCELLED | OUTPATIENT
Start: 2024-03-26

## 2024-03-25 RX ORDER — HEPARIN SODIUM,PORCINE 10 UNIT/ML
5-20 VIAL (ML) INTRAVENOUS DAILY PRN
Status: CANCELLED | OUTPATIENT
Start: 2024-03-26

## 2024-03-25 RX ORDER — ALBUTEROL SULFATE 0.83 MG/ML
2.5 SOLUTION RESPIRATORY (INHALATION)
Status: CANCELLED | OUTPATIENT
Start: 2024-03-26

## 2024-03-25 RX ORDER — HEPARIN SODIUM (PORCINE) LOCK FLUSH IV SOLN 100 UNIT/ML 100 UNIT/ML
5 SOLUTION INTRAVENOUS
Status: CANCELLED | OUTPATIENT
Start: 2024-03-26

## 2024-03-25 RX ORDER — MEPERIDINE HYDROCHLORIDE 25 MG/ML
25 INJECTION INTRAMUSCULAR; INTRAVENOUS; SUBCUTANEOUS EVERY 30 MIN PRN
Status: CANCELLED | OUTPATIENT
Start: 2024-03-26

## 2024-03-25 RX ORDER — METHYLPREDNISOLONE SODIUM SUCCINATE 125 MG/2ML
125 INJECTION, POWDER, LYOPHILIZED, FOR SOLUTION INTRAMUSCULAR; INTRAVENOUS
Status: CANCELLED
Start: 2024-03-26

## 2024-03-25 RX ORDER — CEFTRIAXONE 2 G/1
2 INJECTION, POWDER, FOR SOLUTION INTRAMUSCULAR; INTRAVENOUS EVERY 24 HOURS
DISCHARGE
Start: 2024-03-25 | End: 2024-05-01

## 2024-03-25 RX ADMIN — ASPIRIN 81 MG: 81 TABLET, COATED ORAL at 08:28

## 2024-03-25 RX ADMIN — CYCLOBENZAPRINE 5 MG: 5 TABLET, FILM COATED ORAL at 21:03

## 2024-03-25 RX ADMIN — GABAPENTIN 100 MG: 100 CAPSULE ORAL at 21:03

## 2024-03-25 RX ADMIN — ATORVASTATIN CALCIUM 40 MG: 40 TABLET, FILM COATED ORAL at 21:03

## 2024-03-25 RX ADMIN — GABAPENTIN 100 MG: 100 CAPSULE ORAL at 16:02

## 2024-03-25 RX ADMIN — CYCLOBENZAPRINE 5 MG: 5 TABLET, FILM COATED ORAL at 16:02

## 2024-03-25 RX ADMIN — OXYCODONE HYDROCHLORIDE 5 MG: 5 TABLET ORAL at 16:12

## 2024-03-25 RX ADMIN — DOCUSATE SODIUM 50 MG AND SENNOSIDES 8.6 MG 1 TABLET: 8.6; 5 TABLET, FILM COATED ORAL at 08:28

## 2024-03-25 RX ADMIN — CYCLOBENZAPRINE 5 MG: 5 TABLET, FILM COATED ORAL at 08:28

## 2024-03-25 RX ADMIN — GABAPENTIN 100 MG: 100 CAPSULE ORAL at 08:28

## 2024-03-25 RX ADMIN — CEFTRIAXONE SODIUM 2 G: 2 INJECTION, POWDER, FOR SOLUTION INTRAMUSCULAR; INTRAVENOUS at 16:02

## 2024-03-25 ASSESSMENT — ACTIVITIES OF DAILY LIVING (ADL)
ADLS_ACUITY_SCORE: 31
DEPENDENT_IADLS:: INDEPENDENT
ADLS_ACUITY_SCORE: 31

## 2024-03-25 NOTE — PROGRESS NOTES
[facility]    Infectious Disease Progress Note    Date of Service (when I saw the patient): 03/25/2024     Assessment & Plan   Miguel Pappas is a 68 year old who was admitted on 3/16/2024.     Impression:  68 with HLD, hyponatremia, mitral valve stenosis and aortic valve stenosis s/p bioprosthertic aortic valve placement   Admitted 3/14/24 for evaluation of confusion   On admission was found to have leukocytosis, no fever  Blood cultures positive with Streptococcus gordonii  Suspected endocarditis. AC with no valvular vegetations demonstrated but still high suspicion for endocarditis given the organism and history  TIA, possible septic embolism  Left hip pain with concern for septic arthritis s/p aspiration with low WBC and negative cultures. Has significant OA.   Lumbar spine MRI with no evidence osteo/discitis. Does show stenosis with L5-S1 disc bulge but steroid injection on hold until infection treated.   History of for root canal done last month history of some dental infection that he has been dealing with for months  Afebrile. Persistent leukocytosis.     Recommendations:  Continue on  IV ceftriaxone based on susceptibilities to target treat the Streptococcus  Will need 6 weeks of antibiotics (last day of therapy 5/1/24). Orders placed in discharge navigator.   Place PICC. Ordered.   Follow-up in ID clinic in 3 weeks with Dr. Burns. We will call to schedule.     Patient and plan discussed with Dr. Butts.     Nikky Webb PA-C    Interval History   Tolerating antibiotics ok   No new rashes or issues with antibiotics   Labs reviewed   No changes to past medical, social or family history   Still has left hip pain but able to ambulate using walker      Physical Exam   Temp: 98.2  F (36.8  C) Temp src: Oral BP: 120/79 Pulse: 82   Resp: 16 SpO2: 95 % O2 Device: None (Room air)    Vitals:    03/16/24 2112 03/17/24 0604 03/18/24 0620   Weight: 62.7 kg (138 lb 3.2 oz) 62.3 kg (137 lb 4.8  oz) 60.6 kg (133 lb 9.6 oz)     Vital Signs with Ranges  Temp:  [97.8  F (36.6  C)-98.2  F (36.8  C)] 98.2  F (36.8  C)  Pulse:  [82-96] 82  Resp:  [16] 16  BP: (101-120)/(70-79) 120/79  SpO2:  [95 %-96 %] 95 %    Constitutional: Awake, alert, cooperative, no apparent distress  Lungs: Clear to auscultation bilaterally, no crackles or wheezing  Cardiovascular: Regular rate and rhythm, normal S1 and S2, and no murmur noted  Abdomen: Normal bowel sounds, soft, non-distended, non-tender  Skin: No rashes, no cyanosis, no edema  Other:    Medications      aspirin  81 mg Oral Daily    atorvastatin  40 mg Oral QPM    cefTRIAXone  2 g Intravenous Q24H    cyclobenzaprine  5 mg Oral TID    gabapentin  100 mg Oral TID    senna-docusate  1 tablet Oral BID    sodium chloride (PF)  3 mL Intracatheter Q8H       Data   All microbiology laboratory data reviewed.  Recent Labs   Lab Test 03/25/24  0831 03/24/24  0728 03/23/24  0719   WBC 13.7* 12.9* 13.5*   HGB 10.8* 9.9* 9.9*   HCT 32.2* 30.0* 30.0*   MCV 87 87 85    379 393     Recent Labs   Lab Test 03/25/24  0831 03/24/24  0728 03/23/24  0719   CR 0.79 0.84 0.79              03/22/2024 1252 03/24/2024 1532 Blood Culture Arm, Right [[id number]]   Blood from Arm, Right    Preliminary result Component Value   Culture No growth after 2 days P             03/22/2024 1247 03/24/2024 1532 Blood Culture Arm, Left [[id number]]   Blood from Arm, Left    Preliminary result Component Value   Culture No growth after 2 days P             03/20/2024 1250 03/24/2024 1605 Blood Culture Arm, Right [[id number]]   Blood from Arm, Right    Preliminary result Component Value   Culture No growth after 4 days P             03/20/2024 1245 03/24/2024 1605 Blood Culture Arm, Left [[id number]]   Blood from Arm, Left    Preliminary result Component Value   Culture No growth after 4 days P             03/18/2024 0843 03/18/2024 1228 Cell count with differential fluid [[id number]]    (Abnormal)    Synovial fluid from Hip, Left    Final result Component Value   No component results          03/18/2024 0843 03/23/2024 0712 Synovial fluid Aerobic Bacterial Culture Routine With Gram Stain [[id number]]   Synovial fluid from Hip, Left    Final result Component Value   Culture No Growth   Gram Stain Result No organisms seen    2+ WBC seen             03/18/2024 0843 03/24/2024 1347 Anaerobic Bacterial Culture Routine [[id number]]   Synovial fluid from Hip, Left    Preliminary result Component Value   Culture No anaerobic organisms isolated after 6 days P             03/18/2024 0843 03/18/2024 1228 Cell Count Body Fluid [[id number]]    (Abnormal)   Synovial fluid from Hip, Left    Final result Component Value Units   Color Pink Abnormal     Clarity Turbid Abnormal     Cell Count Fluid Source Hip, Left    Total Nucleated Cells 523 /uL          03/18/2024 0843 03/18/2024 1228 Differential Body Fluid [[id number]]    Synovial fluid from Hip, Left    Final result Component Value Units   % Neutrophils 99 %   % Lymphocytes 0 %   % Monocyte/Macrophages 1 %          03/16/2024 0900 03/24/2024 1308 Blood Culture Arm, Left [[id number]]   (Abnormal)   Blood from Arm, Left    Final result Component Value   Culture Positive on the 4th day of incubation Abnormal     Streptococcus gordonii Panic     1 of 2 bottles  Susceptibilities done on previous cultures             03/16/2024 0854 03/21/2024 1946 Blood Culture Hand, Right [[id number]]   Blood from Hand, Right    Final result Component Value   Culture No Growth             03/15/2024 1524 03/15/2024 1956 MRSA MSSA PCR, Nasal Swab [[id number]]    Swab from Nare, Left    Final result Component Value   MRSA Target DNA Negative   SA Target DNA Negative          03/15/2024 1517 03/18/2024 0747 Blood Culture Arm, Right [[id number]]   (Abnormal)   Blood from Arm, Right    Final result Component Value   Culture Positive on the 1st day of incubation Abnormal      Streptococcus gordonii Panic     2 of 2 bottles  Susceptibilities done on previous cultures             03/15/2024 1512 03/18/2024 0746 Blood Culture Arm, Left [[id number]]   (Abnormal)   Blood from Arm, Left    Final result Component Value   Culture Positive on the 1st day of incubation Abnormal     Streptococcus gordonii Panic     2 of 2 bottles  Susceptibilities done on previous cultures             03/14/2024 2223 03/17/2024 0755 Blood Culture Peripheral Blood [[id number]]   (Abnormal)   Peripheral Blood    Final result Component Value   Culture Positive on the 1st day of incubation Abnormal     Streptococcus gordonii Panic     2 of 2 bottles  Susceptibilities done on previous cultures             03/14/2024 2223 03/17/2024 1007 Blood Culture Peripheral Blood [[id number]]    (Abnormal)   Peripheral Blood    Final result Component Value   Culture Positive on the 1st day of incubation Abnormal     Streptococcus gordonii Panic     2 of 2 bottles       Susceptibility     Streptococcus gordonii     YEIMY     Ampicillin <=0.06 ug/mL Susceptible     Cefotaxime <=0.25 ug/mL Susceptible     Ceftriaxone <=0.25 ug/mL Susceptible     Clindamycin <=0.06 ug/mL Susceptible     Meropenem <=0.06 ug/mL Susceptible     Penicillin <=0.03 ug/mL Susceptible     Vancomycin 0.5 ug/mL Susceptible                      Complete AC Adult 3/18/24  ______________________________________________________________________________  Interpretation Summary     Left ventricular systolic function is normal.  The visual ejection fraction is 60-65%.  The right ventricle is normal in structure, function and size.  No thrombus is detected in the left atrial appendage.  Intact atrial septum.  No valvular vegetations demonstrated.  s/p 21mm Resilia TAVR 12/2022, well seated with normal Doppler interrogation.  Trace valvular AI.  Normal size ascending aorta.  There is no pericardial effusion.  A contrast injection (Bubble Study) was performed that was  negative for flow  across the interatrial septum.       EXAM: MR LUMBAR SPINE W/O CONTRAST  LOCATION: [facility]  DATE: 3/19/2024    INDICATION: Left low leg pain; Low back pain; Neurologic deficit, non traumatic; LE weakness. Increasing persistent or sudden onset LE weakness. No known automatically detected potential contraindications to imaging.  COMPARISON: None.  TECHNIQUE: Routine Lumbar Spine MRI without IV contrast.    FINDINGS:  Nomenclature is based on 5 lumbar type vertebral bodies. Normal vertebral body heights, alignment and marrow signal. Normal distal spinal cord and cauda equina with conus medullaris at L1. No extraspinal abnormality. Unremarkable visualized bony pelvis.    T12-L1: Normal disc height and signal. No herniation. Mild-to-moderate facet arthropathy. No spinal canal or neural foraminal stenosis.    L1-L2: Normal disc height and signal. No herniation. Mild facet arthropathy. No spinal canal or neural foraminal stenosis.    L2-L3: Loss of disc signal and mild-to-moderate loss of disc height. Generalized disc bulge. Moderate facet arthropathy. Mild central canal stenosis. Mild bilateral foraminal stenosis.    L3-L4: Loss of disc signal and mild-to-moderate loss of disc height. Mild disc bulging. Small central annular tear. Moderate facet arthropathy. Mild central canal stenosis. Mild-to-moderate bilateral foraminal stenosis.    L4-L5: Loss of disc signal and mild-to-moderate loss of disc height. Posterior disc bulging with superimposed small central disc protrusion. Mild-to-moderate facet arthropathy. Mild-to-moderate central canal stenosis. Mild-to-moderate bilateral foraminal   stenosis.    L5-S1: Loss of disc signal and mild-to-moderate loss of disc height. Chronic degenerative endplate changes. Generalized disc bulging. The posterior margin of the disc bulge makes contact with and mildly displaces the traversing bilateral S1 nerve roots.  Moderate facet  arthropathy. No central canal stenosis. Mild-to-moderate bilateral foraminal stenosis.   Impression:     IMPRESSION:  1.  At L5-S1, posterior disc bulging with evidence of mild mass effect on the traversing bilateral S1 nerve roots. There is mild-to-moderate bilateral foraminal stenosis.  2.  At L4-L5, there is mild-to-moderate central canal stenosis and bilateral foraminal stenosis.  3.  At L3-L4, there is mild central canal stenosis and mild-to-moderate bilateral foraminal stenosis.  4.  At L2-L3, there is mild central canal stenosis and mild bilateral foraminal stenosis.

## 2024-03-25 NOTE — PROGRESS NOTES
A&OX4,  A1GB/W, vitally stable on RA, able to make needs known without any difficulties, Denies CP, SOB, N/V, voids adequately using urinal,  on telemetry NSR, Plan pending.

## 2024-03-25 NOTE — PROGRESS NOTES
MD Aware   Patient should call back if symtoms worsen PICC order received.  Primary nursing updated, if VAT unable to place PICC tonight, will place 3/26/24.

## 2024-03-25 NOTE — PROGRESS NOTES
[facility]    Medicine Progress Note - Hospitalist Service    Date of Admission:  3/16/2024    Assessment & Plan      Miguel Pappas is a 68 year old male admitted on 3/16/2024. He was transferred from [facility] for possible transesophageal echocardiogram due to suspicion for SBE.  The patient presented with an episode of amnesia and was found to have streptococcal bacteremia.  He has a history of a bioprosthetic aortic valve replacement. For the past couple of weeks he has felt unwell and has at least one episode of documented fever.  He has had worsening left hip pain. In January he had a dental cleaning but took antibiotic prophylaxis.      Sepsis due to streptococcus gordonii bacteremia   Suspected subacute bacterial endocarditis   Possible TIA due to septic embolism  Possible left hip septic arthritis   Initially presented to [facility] with confusion and concern for stroke. Blood cultures obtained on admission to [facility] 3/14 resulting in gram + cocci in pairs and chains and streptococcus species by Vrgene GP panel.  He was initiated on Zosyn and Vancomycin. Chest xray and UA negative. The source is unclear but he reported subjective fevers for a few weeks before presentation. He also has severe bone on bone left hip arthritis which worsened suddenly a few weeks ago. WBC 19 on admission to [facility], improved to 12.5 on 3/16. --->158. Septic arthritis a consideration. Heart valves not well visualized on TTE done at [facility]. Transferred to [facility]   Consulted orthopedic surgery for left hip aspiration Cx NGTD  Consulted cardiology AC completed, no vegetations  Consulted oromaxillary surgery, no concern for ongoing dental caries or abscesses, continue current IV regimen according to ID no further intervention recommended at this time  Consult to neurosurgery- no intervention   Lumbar MRI as below due to concern for pain  1.  At L5-S1,  posterior disc bulging with evidence of mild mass effect on the traversing bilateral S1 nerve roots. There is mild-to-moderate bilateral foraminal stenosis.  2.  At L4-L5, there is mild-to-moderate central canal stenosis and bilateral foraminal stenosis.  3.  At L3-L4, there is mild central canal stenosis and mild-to-moderate bilateral foraminal stenosis.  4.  At L2-L3, there is mild central canal stenosis and mild bilateral foraminal stenosis.    Cultures grew strep as above. Recheck blood cultures from 3/16 continue to remain positive with GPC in pairs and chains (resulted positive on 3/18).   Patient initially started on IV vancomycin and Zosyn-->ceftriaxone.  Consult to ID  -Patient monitored in the hospital,, blood cultures monitored negative to date, PICC line placed on 3/25 per ID recommendations  -Due to significant discomfort and pain limiting his mobility, po  Flexeril 5 mg 3 times daily along with ibuprofen as needed and gabapentin to see if any of these will provide relief along with aqua K-pad.  -Likely definitive management as outpatient with orthopedic surgery consultation due to significant left hip osteoarthritis.     Bioprosthetic AVR (12/2022)   Patient had open AVR in 2022.  Valves not visualized well on TTE.    Continue ASA 81mg daily      Acute amnesia, resolved  MRI at [facility] showed punctate focus of diffusion abnormality in L hippocampal region, no hemorrhage, no significant chronic small vessel disease.  CTA no significant stenosis or dissection.  TTE:  LVEF 60-65%, valvles not well visiualized. 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 CTA head/neck: no significant stenosis, no acute infarct, no hemorrhage.  Stroke neurology consult  Continue telemetry    aspirin 81 mg   Recommend to avoid therapeutic anticoagulation  Atorvastatin 40 mg daily     Hyponatremia.resolved  Encourage PO intake     Anemia, mild   Hemoglobin continues to remain stable between 10  and 11  No clear signs of bleeding noted  -Continue to monitor hemoglobin with ongoing treatment of sepsis      Left hip pain   History of hip osteoarthritis    MSSA bacteremia in the setting of recent sepsis currently on IV antibiotics due to concern for left hip pain, orthopedic surgery has been consulted  Recommended left hip aspiration to rule out potential septic arthritis Cx NGTD     Constipation  Continue bowel regimen     PICC line placed on 3/25, patient likely discharge tomorrow with IV ceftriaxone.  Consult to  for IV antibiotic regimen.     Diet: Regular Diet Adult  Snacks/Supplements Adult: Other; PRN; With Meals    DVT Prophylaxis: Low Risk/Ambulatory with no VTE prophylaxis indicated  Brown Catheter: Not present  Lines: None     Cardiac Monitoring: ACTIVE order. Indication: Tachyarrhythmias, acute (48 hours)  Code Status: Full Code      Clinically Significant Risk Factors                             # Financial/Environmental Concerns: none (denies)         Disposition Plan      Expected Discharge Date: 03/26/2024, 12:00 PM    Destination: home with family;outpatient Tx  Discharge Comments: Home with assist when medically ready  ID- ABX. Picc placement 3/25            [facility]  Securely message with [employer] (more info)  Text page via [employer] Paging/Directory   ______________________________________________________________________    Interval History   Patient is sitting comfortably in chair, with his legs up, feels he was able to walk a little bit further than yesterday, continues to have stiffness and pain but manageable with ongoing pain control regimen.  No fever or chills noted overnight.  No new concerns per patient.    Physical Exam   Vital Signs: Temp: 98.2  F (36.8  C) Temp src: Oral BP: 120/79 Pulse: 82   Resp: 16 SpO2: 95 % O2 Device: None (Room air)    Weight: 133 lbs 9.6 oz    Constitutional: Awake, alert,  cooperative, no apparent distress  Respiratory: Clear to auscultation bilaterally, no crackles or wheezing  Cardiovascular: Regular rate and rhythm, normal S1 and S2, and no murmur noted  GI: Normal bowel sounds, soft, non-distended, non-tender  Skin/Integumen: No rashes, no cyanosis, no edema  Other: Alert oriented x 3, no apparent focal deficits.    Medical Decision Making

## 2024-03-25 NOTE — CONSULTS
Care Management Initial Consult    General Information  Assessment completed with: Patient,    Type of CM/SW Visit: Initial Assessment    Primary Care Provider verified and updated as needed: Yes (Dr. Gongora)   Readmission within the last 30 days: no previous admission in last 30 days      Reason for Consult: discharge planning  Advance Care Planning: Advance Care Planning Reviewed: present on chart, verified with patient          Communication Assessment  Patient's communication style: spoken language (English or Bilingual)    Hearing Difficulty or Deaf: yes   Wear Glasses or Blind: no    Cognitive  Cognitive/Neuro/Behavioral: unchanged from my previous assessment  Level of Consciousness: alert  Arousal Level: opens eyes spontaneously  Orientation: oriented x 4  Mood/Behavior: calm, cooperative  Best Language: 0 - No aphasia  Speech: clear, logical    Living Environment:   People in home: spouse  [place]  Current living Arrangements: house      Able to return to prior arrangements: yes  Living Arrangement Comments: house is one level; 2 steps to enter    Family/Social Support:  Care provided by: spouse/significant other  Provides care for: no one  Marital Status:   Wife, Children          Description of Support System: Supportive, Involved         Current Resources:   Patient receiving home care services: No     Community Resources:    Equipment currently used at home: walker, standard, shower chair, other (see comments) ([facility] plan on installing grab bars in shower)  Supplies currently used at home:      Employment/Financial:  Employment Status: other (see comments) ([employer]; continues to work when able)        Financial Concerns: none (denies)           Does the patient's insurance plan have a 3 day qualifying hospital stay waiver?  Yes     Which insurance plan 3 day waiver is available? Alternative insurance waiver    Will the waiver be used for post-acute placement? No    Lifestyle & Psychosocial  Needs:  Social Determinants of Health     Food Insecurity: Low Risk  (11/30/2023)    Food Insecurity     Within the past 12 months, did you worry that your food would run out before you got money to buy more?: No     Within the past 12 months, did the food you bought just not last and you didn t have money to get more?: No   Depression: Not at risk (2/20/2024)    PHQ-2     PHQ-2 Score: 0   Housing Stability: Low Risk  (11/30/2023)    Housing Stability     Do you have housing? : Yes     Are you worried about losing your housing?: No   Tobacco Use: Low Risk  (3/14/2024)    Patient History     Smoking Tobacco Use: Never     Smokeless Tobacco Use: Never     Passive Exposure: Never   Financial Resource Strain: Low Risk  (11/30/2023)    Financial Resource Strain     Within the past 12 months, have you or your family members you live with been unable to get utilities (heat, electricity) when it was really needed?: No   Alcohol Use: Not on file   Transportation Needs: Low Risk  (11/30/2023)    Transportation Needs     Within the past 12 months, has lack of transportation kept you from medical appointments, getting your medicines, non-medical meetings or appointments, work, or from getting things that you need?: No   Physical Activity: Not on file   Interpersonal Safety: Not on file   Stress: Not on file   Social Connections: Not on file       Functional Status:  Prior to admission patient needed assistance:   Dependent ADLs:: Ambulation-walker, Independent  Dependent IADLs:: Independent       Mental Health Status:          Chemical Dependency Status:                Values/Beliefs:  Spiritual, Cultural Beliefs, [employer]ism Practices, Values that affect care: no               Additional Information:  Met with patient; explained role in discharge planning.    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  He lives with his spouse; his daughter works the farm and has a home [place].    He is being treated for bacteremia and will be requiring outpatient or home infusion of antibiotic at discharge which should be today or tomorrow likely.    He was quoted the home care estimated costs for daily 2 gm Ceftriaxone at $34.59 a day plus $90 per each nurse visit for approximate cost of $1397.70 total.  He would prefer outpatient infusion center if possible.  Writer will inquire about this for the [facility].    PT recommends outpatient PT.      Care management will continue to follow for outpatient IV infusion services.    Addendum at 1:07:  called [facility] infusion center at [phone].  Spoke with Meera.  They can manage his infusion; will need the therapy plan orders in. Need to update them on when the therapy plan is in place and expected start.    Addendum at 3:23 pm:  verified with patient that he is willing to go to the [facility] infusion center daily for 37 days and this is what he prefers.  It is only 2 miles from his house.  Spoke with WILIAN Weiss for ID and she will put in the therapy plan in VOZ.  Spoke with Meera at the infusion center and she will schedule him starting Wednesday afternoon.  We need to give his dose tomorrow prior to his discharge.  Hospitalist updated as well.      Cass Srivastava RN  Inpatient Float Care Coordinator  [facility]

## 2024-03-26 ENCOUNTER — APPOINTMENT (OUTPATIENT)
Dept: PHYSICAL THERAPY | Facility: CLINIC | Age: 69
DRG: 871 | End: 2024-03-26
Attending: STUDENT IN AN ORGANIZED HEALTH CARE EDUCATION/TRAINING PROGRAM
Payer: COMMERCIAL

## 2024-03-26 VITALS
OXYGEN SATURATION: 94 % | TEMPERATURE: 98.2 F | SYSTOLIC BLOOD PRESSURE: 105 MMHG | DIASTOLIC BLOOD PRESSURE: 71 MMHG | RESPIRATION RATE: 18 BRPM | WEIGHT: 133.6 LBS | BODY MASS INDEX: 20.92 KG/M2 | HEART RATE: 71 BPM

## 2024-03-26 LAB
ANION GAP SERPL CALCULATED.3IONS-SCNC: 10 MMOL/L (ref 7–15)
BUN SERPL-MCNC: 22.4 MG/DL (ref 8–23)
CALCIUM SERPL-MCNC: 9.8 MG/DL (ref 8.8–10.2)
CHLORIDE SERPL-SCNC: 98 MMOL/L (ref 98–107)
CREAT SERPL-MCNC: 0.9 MG/DL (ref 0.67–1.17)
DEPRECATED HCO3 PLAS-SCNC: 27 MMOL/L (ref 22–29)
EGFRCR SERPLBLD CKD-EPI 2021: >90 ML/MIN/1.73M2
ERYTHROCYTE [DISTWIDTH] IN BLOOD BY AUTOMATED COUNT: 13.3 % (ref 10–15)
GLUCOSE SERPL-MCNC: 93 MG/DL (ref 70–99)
HCT VFR BLD AUTO: 30.5 % (ref 40–53)
HGB BLD-MCNC: 10.1 G/DL (ref 13.3–17.7)
MCH RBC QN AUTO: 28.8 PG (ref 26.5–33)
MCHC RBC AUTO-ENTMCNC: 33.1 G/DL (ref 31.5–36.5)
MCV RBC AUTO: 87 FL (ref 78–100)
PLATELET # BLD AUTO: 375 10E3/UL (ref 150–450)
POTASSIUM SERPL-SCNC: 4.6 MMOL/L (ref 3.4–5.3)
RBC # BLD AUTO: 3.51 10E6/UL (ref 4.4–5.9)
SODIUM SERPL-SCNC: 135 MMOL/L (ref 135–145)
WBC # BLD AUTO: 12.7 10E3/UL (ref 4–11)

## 2024-03-26 PROCEDURE — 85027 COMPLETE CBC AUTOMATED: CPT | Performed by: INTERNAL MEDICINE

## 2024-03-26 PROCEDURE — 36569 INSJ PICC 5 YR+ W/O IMAGING: CPT

## 2024-03-26 PROCEDURE — 250N000013 HC RX MED GY IP 250 OP 250 PS 637: Performed by: INTERNAL MEDICINE

## 2024-03-26 PROCEDURE — 97530 THERAPEUTIC ACTIVITIES: CPT | Mod: GP

## 2024-03-26 PROCEDURE — 99239 HOSP IP/OBS DSCHRG MGMT >30: CPT | Performed by: INTERNAL MEDICINE

## 2024-03-26 PROCEDURE — 272N000450 HC KIT 4FR POWER PICC SINGLE LUMEN

## 2024-03-26 PROCEDURE — 80048 BASIC METABOLIC PNL TOTAL CA: CPT | Performed by: INTERNAL MEDICINE

## 2024-03-26 PROCEDURE — 250N000011 HC RX IP 250 OP 636: Performed by: INTERNAL MEDICINE

## 2024-03-26 PROCEDURE — 36415 COLL VENOUS BLD VENIPUNCTURE: CPT | Performed by: INTERNAL MEDICINE

## 2024-03-26 PROCEDURE — 97116 GAIT TRAINING THERAPY: CPT | Mod: GP

## 2024-03-26 PROCEDURE — 999N000040 HC STATISTIC CONSULT NO CHARGE VASC ACCESS

## 2024-03-26 PROCEDURE — 250N000009 HC RX 250: Performed by: PHYSICIAN ASSISTANT

## 2024-03-26 PROCEDURE — 250N000013 HC RX MED GY IP 250 OP 250 PS 637: Performed by: STUDENT IN AN ORGANIZED HEALTH CARE EDUCATION/TRAINING PROGRAM

## 2024-03-26 RX ORDER — CYCLOBENZAPRINE HCL 5 MG
5 TABLET ORAL 3 TIMES DAILY
Qty: 60 TABLET | Refills: 0 | Status: SHIPPED | OUTPATIENT
Start: 2024-03-26 | End: 2024-06-13

## 2024-03-26 RX ORDER — GABAPENTIN 100 MG/1
100 CAPSULE ORAL 3 TIMES DAILY
Qty: 30 CAPSULE | Refills: 0 | Status: SHIPPED | OUTPATIENT
Start: 2024-03-26 | End: 2024-06-13

## 2024-03-26 RX ORDER — ATORVASTATIN CALCIUM 40 MG/1
40 TABLET, FILM COATED ORAL EVERY EVENING
Qty: 60 TABLET | Refills: 1 | Status: SHIPPED | OUTPATIENT
Start: 2024-03-26 | End: 2024-06-04

## 2024-03-26 RX ORDER — IBUPROFEN 200 MG
200 TABLET ORAL EVERY 6 HOURS PRN
COMMUNITY
Start: 2024-03-26 | End: 2024-06-13

## 2024-03-26 RX ORDER — ACETAMINOPHEN 325 MG/1
650 TABLET ORAL EVERY 4 HOURS PRN
COMMUNITY
Start: 2024-03-26

## 2024-03-26 RX ADMIN — DOCUSATE SODIUM 50 MG AND SENNOSIDES 8.6 MG 1 TABLET: 8.6; 5 TABLET, FILM COATED ORAL at 08:59

## 2024-03-26 RX ADMIN — LIDOCAINE HYDROCHLORIDE ANHYDROUS 1 ML: 10 INJECTION, SOLUTION INFILTRATION at 07:55

## 2024-03-26 RX ADMIN — CYCLOBENZAPRINE 5 MG: 5 TABLET, FILM COATED ORAL at 08:59

## 2024-03-26 RX ADMIN — GABAPENTIN 100 MG: 100 CAPSULE ORAL at 08:59

## 2024-03-26 RX ADMIN — ASPIRIN 81 MG: 81 TABLET, COATED ORAL at 08:59

## 2024-03-26 RX ADMIN — ACETAMINOPHEN 650 MG: 325 TABLET, FILM COATED ORAL at 02:27

## 2024-03-26 RX ADMIN — CEFTRIAXONE SODIUM 2 G: 2 INJECTION, POWDER, FOR SOLUTION INTRAMUSCULAR; INTRAVENOUS at 11:31

## 2024-03-26 RX ADMIN — OXYCODONE HYDROCHLORIDE 5 MG: 5 TABLET ORAL at 02:27

## 2024-03-26 ASSESSMENT — ACTIVITIES OF DAILY LIVING (ADL)
ADLS_ACUITY_SCORE: 31

## 2024-03-26 NOTE — DISCHARGE SUMMARY
[facility]  Hospitalist Discharge Summary      Date of Admission:  3/16/2024  Date of Discharge:  3/26/2024  Discharging Provider: Negra Austin  Discharge Service: Hospitalist Service    Discharge Diagnoses   Sepsis    Clinically Significant Risk Factors          Follow-ups Needed After Discharge   Follow-up Appointments     Follow-up and recommended labs and tests       Follow-up with a total hip surgeon at [facility] to discuss   treatment options for your left hip.    [facility] clinic numbers:  [place] [phone]  Walk in clinic hours: 8 am - 8 pm--        Follow-up and recommended labs and tests       Follow up with primary care provider, Thony Gongora, within 7 days for   hospital follow- up.  The following labs/tests are recommended: CBC,CMP.  - this appointment has been made for Thursday April 4 at 9:15 am as noted   below.  The labs will be drawn on Tuesday 4/2 with your infusion   appointment.            Unresulted Labs Ordered in the Past 30 Days of this Admission       Date and Time Order Name Status Description    3/22/2024 12:28 PM Blood Culture Arm, Right Preliminary     3/22/2024 12:28 PM Blood Culture Arm, Left Preliminary     3/18/2024  7:44 AM Anaerobic Bacterial Culture Routine Preliminary         These results will be followed up by PCP    Discharge Disposition   Discharged to home  Condition at discharge: Stable    Hospital Course     Miguel Pappas is a 68 year old male admitted on 3/16/2024. He was transferred from [facility] for possible transesophageal echocardiogram due to suspicion for SBE.  The patient presented with an episode of amnesia and was found to have streptococcal bacteremia.  He has a history of a bioprosthetic aortic valve replacement. For the past couple of weeks he has felt unwell and has at least one episode of documented fever.  He has had worsening left hip pain. In January he had a dental cleaning but took  antibiotic prophylaxis.      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 Blood cultures obtained on admission to [facility] 3/14 resulting in gram + cocci in pairs and chains and streptococcus species by Vrgene GP panel.  He was initiated on Zosyn and Vancomycin. Chest xray and UA negative. The source is unclear but he reported subjective fevers for a few weeks before presentation. He also has severe bone on bone left hip arthritis which worsened suddenly a few weeks ago. WBC 19 on admission to [facility], improved to 12.5 on 3/16. --->158. Septic arthritis a consideration. Heart valves not well visualized on TTE done at [facility]. Transferred to [facility]   Consulted orthopedic surgery for left hip aspiration Cx NGTD  Consulted cardiology AC completed, no vegetations  Consulted oromaxillary surgery, no concern for ongoing dental caries or abscesses, continue current IV regimen according to ID no further intervention recommended at this time  Consult to neurosurgery- no intervention   Lumbar MRI as below due to concern for pain  1.  At L5-S1, posterior disc bulging with evidence of mild mass effect on the traversing bilateral S1 nerve roots. There is mild-to-moderate bilateral foraminal stenosis.  2.  At L4-L5, there is mild-to-moderate central canal stenosis and bilateral foraminal stenosis.  3.  At L3-L4, there is mild central canal stenosis and mild-to-moderate bilateral foraminal stenosis.  4.  At L2-L3, there is mild central canal stenosis and mild bilateral foraminal stenosis.     Patient initially started on IV vancomycin and Zosyn--> has been transitioned to IV ceftriaxone to be continued at discharge for 6 weeks last day of therapy is 5/1/2024.  Consult to ID  -Patient monitored in the  hospital,, blood cultures monitored negative to date, PICC line placed on 3/25 per ID recommendations, patient to discharge home with IV ceftriaxone outpatient antibiotic regimen.  -Due to significant discomfort and pain limiting his mobility, po  Flexeril 5 mg 3 times daily along with ibuprofen as needed and gabapentin t  -Likely definitive management as outpatient with orthopedic surgery consultation due to significant left hip osteoarthritis.     Bioprosthetic AVR (12/2022)   Patient had open AVR in 2022.  Valves not visualized well on TTE.    Continue ASA 81mg daily      Acute amnesia, resolved  MRI at [facility] showed punctate focus of diffusion abnormality in L hippocampal region, no hemorrhage, no significant chronic small vessel disease.  CTA no significant stenosis or dissection.  TTE:  LVEF 60-65%, valvles not well visiualized. 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 CTA head/neck: no significant stenosis, no acute infarct, no hemorrhage.  Stroke neurology consult during hospital stay   aspirin 81 mg   Recommend to avoid therapeutic anticoagulation  Atorvastatin 40 mg daily     Hyponatremia.resolved  Recheck BMP with PCP follow-up     Anemia, mild   Hemoglobin continues to remain stable between 10 and 11  Follow-up in PCP clinic and recheck     Left hip pain   History of hip osteoarthritis    MSSA bacteremia in the setting of recent sepsis currently on IV antibiotics due to concern for left hip pain, orthopedic surgery has been consulted  Recommended left hip aspiration to rule out potential septic arthritis Cx NGTD  Hip x-rays reviewed as above, significant osteoarthritis, pain control along with Flexeril and gabapentin continued at discharge follow-up in PCP clinic, and orthopedic surgery for possible intervention in the future.       Consultations This Hospital Stay   PHYSICAL THERAPY ADULT IP CONSULT  OCCUPATIONAL THERAPY ADULT IP CONSULT  NEUROLOGY IP STROKE  CONSULT  PHARMACY TO DOSE VANCO  INFECTIOUS DISEASES IP CONSULT  ORTHOPEDIC SURGERY IP CONSULT  CARDIOLOGY IP CONSULT  ORAL SURGERY IP CONSULT  VASCULAR ACCESS ADULT IP CONSULT  NEUROSURGERY IP CONSULT  SPIRITUAL HEALTH SERVICES IP CONSULT  CARE MANAGEMENT / SOCIAL WORK IP CONSULT  VASCULAR ACCESS ADULT IP CONSULT  VASCULAR ACCESS ADULT IP CONSULT    Code Status   Full Code    Time Spent on this Encounter   I, Negra Austin, personally saw the patient today and spent greater than 30 minutes discharging this patient.       Negra Austin  [facility] ORTHOPED[facility] SPINE  [address] [address]  Phone: [phone]  Fax: [phone]  ______________________________________________________________________    Physical Exam   Vital Signs: Temp: 98.2  F (36.8  C) Temp src: Oral BP: 105/71 Pulse: 71   Resp: 18 SpO2: 94 % O2 Device: None (Room air)    Weight: 133 lbs 9.6 oz  Constitutional: Awake, alert, cooperative, no apparent distress  Respiratory: Clear to auscultation bilaterally, no crackles or wheezing  Cardiovascular: Regular rate and rhythm, normal S1 and S2, and no murmur noted  GI: Normal bowel sounds, soft, non-distended, non-tender  Skin/Integumen: No rashes, no cyanosis, no edema PICC line has been placed  Other: Alert oriented x 3, no apparent focal deficits       Primary Care Physician   Thony Gongora    Discharge Orders      Physical Therapy  Referral      Primary Care - Care Coordination Referral      Follow-up and recommended labs and tests     Follow-up with a total hip surgeon at [facility] to discuss treatment options for your left hip.    [facility] clinic numbers:  [place] [phone]  Walk in clinic hours: 8 am - 8 pm--     Activity    Your activity upon discharge: activity as tolerated, WBAT with gait aide if needed     Reason for your hospital stay    sepsis     Follow-up and recommended labs and tests     Follow up with primary care  provider, Thony Gongora, within 7 days for hospital follow- up.  The following labs/tests are recommended: CBC,CMP.  - this appointment has been made for Thursday April 4 at 9:15 am as noted below.  The labs will be drawn on Tuesday 4/2 with your infusion appointment.     Activity    Your activity upon discharge: activity as tolerated     Diet    Follow this diet upon discharge: Orders Placed This Encounter      Snacks/Supplements Adult: Other; PRN; With Meals      Regular Diet Adult       Significant Results and Procedures   Results for orders placed or performed during the hospital encounter of 03/16/24   XR Joint Aspiration Major Left    Narrative    EXAM: XR JOINT ASPIRATION MAJOR LEFT  3/18/2024 9:02 AM       History:  Concern for left hip septic arthritis. New onset of left hip  pain. Request for aspiration.    PROCEDURE: The risks (including bleeding, infection, and allergy to  contrast and medications) and benefits of the procedure were explained  to the patient and consent was obtained.  Using sterile technique and  fluoroscopic guidance, a #20 gauge needle was placed into the left hip  joint using an anterior approach.  No fluid initially aspirated  despite numerous positions within the joint space. Hip flexion also  attempted without success. 5 mL of preservative-free saline was then  injected into the joint space for irrigation with subsequently  aspirating 3 mL of fluid. 3 mL of bupivacaine 0.5% was then injected  into the joint space for temporary pain relief.  Estimated blood loss  during the procedure was less than 5 mL. No initial complication.  Fluid sent to lab for analysis.      Fluoroscopy time: 0.1 minutes  Images Obtained: 2  Medications used: 3 mL 1% lidocaine, 5 mL preservative-free saline and  3 mL of ropivacaine 0.5%      Impression    IMPRESSION:  Technically successful left hip aspiration.    GIGI COLLINS PA-C         SYSTEM ID:  [id number]   MR Lumbar Spine w/o Contrast    Narrative     EXAM: MR LUMBAR SPINE W/O CONTRAST  LOCATION: [facility]  DATE: 3/19/2024    INDICATION: Left low leg pain; Low back pain; Neurologic deficit, non traumatic; LE weakness. Increasing persistent or sudden onset LE weakness. No known automatically detected potential contraindications to imaging.  COMPARISON: None.  TECHNIQUE: Routine Lumbar Spine MRI without IV contrast.    FINDINGS:   Nomenclature is based on 5 lumbar type vertebral bodies. Normal vertebral body heights, alignment and marrow signal. Normal distal spinal cord and cauda equina with conus medullaris at L1. No extraspinal abnormality. Unremarkable visualized bony pelvis.    T12-L1: Normal disc height and signal. No herniation. Mild-to-moderate facet arthropathy. No spinal canal or neural foraminal stenosis.     L1-L2: Normal disc height and signal. No herniation. Mild facet arthropathy. No spinal canal or neural foraminal stenosis.    L2-L3: Loss of disc signal and mild-to-moderate loss of disc height. Generalized disc bulge. Moderate facet arthropathy. Mild central canal stenosis. Mild bilateral foraminal stenosis.     L3-L4: Loss of disc signal and mild-to-moderate loss of disc height. Mild disc bulging. Small central annular tear. Moderate facet arthropathy. Mild central canal stenosis. Mild-to-moderate bilateral foraminal stenosis.    L4-L5: Loss of disc signal and mild-to-moderate loss of disc height. Posterior disc bulging with superimposed small central disc protrusion. Mild-to-moderate facet arthropathy. Mild-to-moderate central canal stenosis. Mild-to-moderate bilateral foraminal   stenosis.    L5-S1: Loss of disc signal and mild-to-moderate loss of disc height. Chronic degenerative endplate changes. Generalized disc bulging. The posterior margin of the disc bulge makes contact with and mildly displaces the traversing bilateral S1 nerve roots.   Moderate facet arthropathy. No central canal stenosis. Mild-to-moderate  bilateral foraminal stenosis.      Impression    IMPRESSION:  1.  At L5-S1, posterior disc bulging with evidence of mild mass effect on the traversing bilateral S1 nerve roots. There is mild-to-moderate bilateral foraminal stenosis.  2.  At L4-L5, there is mild-to-moderate central canal stenosis and bilateral foraminal stenosis.  3.  At L3-L4, there is mild central canal stenosis and mild-to-moderate bilateral foraminal stenosis.  4.  At L2-L3, there is mild central canal stenosis and mild bilateral foraminal stenosis.   Transesophageal Echocardiogram     Value    LVEF  60-65%    [facility]    [id number]  [id number]  [id number]  [id number]^GUNNER^KIRA^LÓPEZ     [facility]  Echocardiography Laboratory  [facility] [place] [address]     Name: ALEXIA GRACE  MRN: [id number]  : 1955  Study Date: 2024 09:24 AM  Age: 68 yrs  Gender: Male  Patient Location: [facility]  Reason For Study: Endocarditis  Ordering Physician: KIRA MARTINO  Referring Physician: MARLEEN BOURNE  Performed By: Una Mclaughlin     BSA: 1.7 m2  Height: 67 in  Weight: 133 lb  HR: 83  BP: 123/92 mmHg  ______________________________________________________________________________  Procedure  Complete AC Adult. AC Probe serial #[id number] was used during the procedure.  The heart rate, respiratory rate and response to care were monitored  throughout the procedure with the assistance of the nurse.  ______________________________________________________________________________  Interpretation Summary     Left ventricular systolic function is normal.  The visual ejection fraction is 60-65%.  The right ventricle is normal in structure, function and size.  No thrombus is detected in the left atrial appendage.  Intact atrial septum.  No valvular vegetations demonstrated.  s/p 21mm Resilia TAVR 2022, well seated with normal Doppler interrogation.  Trace valvular AI.  Normal size ascending aorta.  There is no  pericardial effusion.  A contrast injection (Bubble Study) was performed that was negative for flow  across the interatrial septum.  ______________________________________________________________________________  AC  Fentanyl (50mcg) was given intravenously. Versed (3mg) was given  intravenously. I determined this patient to be an appropriate candidate for  the planned sedation and procedure and have reassessed the patient immediately  prior to sedation and procedure. Total sedation time: 25 minutes of continuous  bedside 1:1 monitoring.     Left Ventricle  The left ventricle is normal in structure, function and size. Left ventricular  systolic function is normal. The visual ejection fraction is 60-65%. Normal  left ventricular wall motion.     Right Ventricle  The right ventricle is normal in structure, function and size.     Atria  The left atrium is borderline dilated. Right atrial size is normal. Intact  atrial septum. A contrast injection (Bubble Study) was performed that was  negative for flow across the interatrial septum. No thrombus is detected in  the left atrial appendage.     Mitral Valve  There is mild mitral annular calcification. There is mild (1+) mitral  regurgitation.     Tricuspid Valve  The tricuspid valve is normal in structure and function. The right ventricular  systolic pressure is approximated at 21.2 mmHg plus the right atrial pressure.  Right ventricular systolic pressure is normal. There is mild (1+) tricuspid  regurgitation.     Aortic Valve  There is trace aortic regurgitation. This aortic regurgitation is central  valvular. The mean AoV pressure gradient is 9.9 mmHg. The prosthetic aortic  valve appears to open well. The prosthetic aortic valve is well-seated.     Pulmonic Valve  Normal pulmonic valve. There is trace pulmonic valvular regurgitation.     Vessels  The aortic root is normal size. Normal size ascending aorta. Inferior vena  cava not well visualized for estimation of right  atrial pressure. The  pulmonary artery is normal size. Normal pulmonary vein velocity.     Pericardial/Pleural  There is no pericardial effusion.  ______________________________________________________________________________  MMode/2D Measurements & Calculations  asc Aorta Diam: 3.5 cm     Asc Ao diam index BSA (cm/m2): 2.1  Asc Ao diam index Ht(cm/m): 2.1     Doppler Measurements & Calculations  Ao V2 max: 222.1 cm/sec  Ao max P.0 mmHg  Ao V2 mean: 139.4 cm/sec  Ao mean P.9 mmHg  Ao V2 VTI: 40.9 cm  TR max maikol: 230.0 cm/sec  TR max P.2 mmHg     ______________________________________________________________________________  Report approved by: Vijay Shi 2024 04:29 PM             Discharge Medications   Discharge Medication List as of 3/26/2024 11:43 AM        START taking these medications    Details   acetaminophen (TYLENOL) 325 MG tablet Take 2 tablets (650 mg) by mouth every 4 hours as needed for mild pain or other (and adjunct with moderate or severe pain or per patient request), OTC      atorvastatin (LIPITOR) 40 MG tablet Take 1 tablet (40 mg) by mouth every evening, Disp-60 tablet, R-1, E-Prescribe      cefTRIAXone (ROCEPHIN) 2 GM vial Inject 2 g into the vein every 24 hours for 37 days Check CBC with diff, creatinine, AST, and CRP weekly and fax results to [facility] Transitional      cyclobenzaprine (FLEXERIL) 5 MG tablet Take 1 tablet (5 mg) by mouth 3 times daily, Disp-60 tablet, R-0, E-Prescribe      gabapentin (NEURONTIN) 100 MG capsule Take 1 capsule (100 mg) by mouth 3 times daily, Disp-30 capsule, R-0, E-Prescribe      ibuprofen (ADVIL/MOTRIN) 200 MG tablet Take 1 tablet (200 mg) by mouth every 6 hours as needed for inflammatory pain, OTC      oxyCODONE (ROXICODONE) 5 MG tablet Take 1 tablet (5 mg) by mouth every 6 hours as needed for moderate pain, Disp-20 tablet, R-0, Local Print           CONTINUE these medications which have NOT CHANGED    Details    amoxicillin (AMOXIL) 500 MG capsule Take 2,000 mg by mouth once as needed (dental prophy), Historical      ascorbic acid (VITAMIN C) 250 MG CHEW chewable tablet Take 250 mg by mouth daily, Historical      aspirin 81 MG tablet Take 81 mg by mouth at bedtime, Historical      ferrous sulfate (FEROSUL) 325 (65 Fe) MG tablet Take 325 mg by mouth daily (with breakfast), Historical      GLUCOSAMINE CHONDROITIN OR TABS Take 1 tablet by mouth at bedtime, R-0, Historical      Multiple Vitamins-Minerals (CENTRUM SILVER PO) Take 1 tablet by mouth at bedtime, Disp-30, R-0, Historical      Vitamin D3 (CHOLECALCIFEROL) 25 mcg (1000 units) tablet Take 25 mcg by mouth daily, Historical           Allergies   Allergies   Allergen Reactions    No Known Drug Allergy

## 2024-03-26 NOTE — PROGRESS NOTES
Care Management Discharge Note    Discharge Date: 03/26/2024       Discharge Disposition:      Discharge Services:      Discharge DME:      Discharge Transportation: family or friend will provide    Private pay costs discussed: Not applicable    Does the patient's insurance plan have a 3 day qualifying hospital stay waiver?  Yes     Which insurance plan 3 day waiver is available? Alternative insurance waiver    Will the waiver be used for post-acute placement? No    PAS Confirmation Code:    Patient/family educated on Medicare website which has current facility and service quality ratings:      Education Provided on the Discharge Plan:    Persons Notified of Discharge Plans: Patient  Patient/Family in Agreement with the Plan: yes    Handoff Referral Completed: yes    Additional Information:  Patient discharging today.  Son is here; just waiting for meds to be filled; otherwise ready to go.    Patient will be attending daily antibiotic infusions at the [facility]; these are listed on the AVS.      PCP appointment made and on the AVS for next Thursday 4/4.  Writer notified the infusion center regarding the labs - they will draw them on Tuesday.    Just noticed the referral for outpatient PT was not on the AVS; verona paged Dr. Austin to add. - this was added and bedside nurse made aware to inform the patient.      Cass Srivastava RN  Inpatient Float Care Coordinator  [facility]

## 2024-03-26 NOTE — PLAN OF CARE
Goal Outcome Evaluation:      Plan of Care Reviewed With: patient           Pt alert and oriented x4. Denied SOB, CP, N/V. No adverse reaction to IV ABT. To to have PICC line placed in AM. Pt aware. Pt to discharge by noon and will need his daily IV ABT administered prior. Will get daily ABT at [facility] starting Wednesday. Ambulated [address] X2 on shift.

## 2024-03-26 NOTE — PROCEDURES
[facility]    Single Lumen PICC Placement    Date/Time: 3/26/2024 7:50 AM    Performed by: Leandra Momin RN  Authorized by: Nikky Webb PA-C      UNIVERSAL PROTOCOL   Site Marked: Yes  Prior Images Obtained and Reviewed:  Yes  Required items: Required blood products, implants, devices and special equipment available    Patient identity confirmed:  Verbally with patient and arm band  NA - No sedation, light sedation, or local anesthesia  Confirmation Checklist:  Patient's identity using two indicators, relevant allergies, procedure was appropriate and matched the consent or emergent situation and correct equipment/implants were available  Time out: Immediately prior to the procedure a time out was called    Universal Protocol: the Joint Commission Universal Protocol was followed    Preparation: Patient was prepped and draped in usual sterile fashion    ESBL (mL):  1     ANESTHESIA    Local Anesthetic:  Lidocaine 1% without epinephrine  Anesthetic Total (mL):  1      SEDATION    Patient Sedated: No        Preparation: skin prepped with ChloraPrep  Skin prep agent: skin prep agent completely dried prior to procedure  Sterile barriers: maximum sterile barriers were used: cap, mask, sterile gown, sterile gloves, and large sterile sheet  Hand hygiene: hand hygiene performed prior to central venous catheter insertion  Type of line used: PICC  Catheter type: single lumen  Lumen Identification: Purple  Catheter size: 4 Fr  Brand: Bard  Lot number: [id number]  Placement method: venipuncture, ultrasound, MST and tip navigation system  Number of attempts: 1  Difficulty threading catheter: no  Successful placement: yes  Orientation: right    Location: basilic vein  Tip Location: IVC/RA Junction  Arm circumference: adults 10 cm  Extremity circumference: 27  Visible catheter length: 5  Total catheter length: 45  Dressing and securement: chlorhexidine disc applied, blood cleaned with CHG, blood  removed, occlusive dressing applied and subcutaneous anchor securement system  Post procedure assessment: blood return through all ports and placement verified by 3CG technology  PROCEDURE   Patient Tolerance:  Patient tolerated the procedure well with no immediate complicationsDescribe Procedure: Patient instruct on PICC placement. Patient verbalized an understanding. Patient gave verbal and written consent for PICC placement. VAT RN placed 4 FR single lumen catheter in basilic vein. Patient tolerated well. Blood return noted. PICC placement was confirmed with 3 CG technology noting green highlights on rhythm strip. RN caring for patient updated that PICC is ok to use.   Disposal: sharps and needle count correct at the end of procedure, needles and guidewire disposed in sharps container

## 2024-03-26 NOTE — PLAN OF CARE
Goal Outcome Evaluation:    A&Ox4. VSS. PICC placed. Discharge instructions and meds provided to pt. Pt verbalized understanding. Discharged home with family.

## 2024-03-26 NOTE — PLAN OF CARE
Physical Therapy Discharge Summary    Reason for therapy discharge:    Discharged to home with outpatient therapy.    Progress towards therapy goal(s). See goals on Care Plan in [facility] electronic health record for goal details.  Goals partially met.  Barriers to achieving goals:   discharge from facility.    Therapy recommendation(s):    Continued therapy is recommended.  Rationale/Recommendations:  To further improve mobility and independence.

## 2024-03-26 NOTE — PLAN OF CARE
Goal Outcome Evaluation:         Pt A&Ox4. SBA w walker. Prn tylenol & oxy given for pain. PICC line to be placed in the morning as well as IV abx to be administered before pt discharges at ~12pm.

## 2024-03-27 ENCOUNTER — INFUSION THERAPY VISIT (OUTPATIENT)
Dept: INFUSION THERAPY | Facility: CLINIC | Age: 69
End: 2024-03-27
Attending: PHYSICIAN ASSISTANT
Payer: COMMERCIAL

## 2024-03-27 ENCOUNTER — PATIENT OUTREACH (OUTPATIENT)
Dept: CARE COORDINATION | Facility: CLINIC | Age: 69
End: 2024-03-27

## 2024-03-27 VITALS
HEART RATE: 75 BPM | DIASTOLIC BLOOD PRESSURE: 74 MMHG | TEMPERATURE: 97 F | OXYGEN SATURATION: 98 % | SYSTOLIC BLOOD PRESSURE: 113 MMHG | RESPIRATION RATE: 18 BRPM

## 2024-03-27 DIAGNOSIS — R78.81 BACTEREMIA: Primary | ICD-10-CM

## 2024-03-27 DIAGNOSIS — I33.0 INFECTIVE ENDOCARDITIS, DUE TO UNSPECIFIED ORGANISM, UNSPECIFIED CHRONICITY: ICD-10-CM

## 2024-03-27 DIAGNOSIS — I38 VALVULAR ENDOCARDITIS: Primary | ICD-10-CM

## 2024-03-27 LAB
ALBUMIN SERPL BCG-MCNC: 3.5 G/DL (ref 3.5–5.2)
ALP SERPL-CCNC: 102 U/L (ref 40–150)
ALT SERPL W P-5'-P-CCNC: 21 U/L (ref 0–70)
ANION GAP SERPL CALCULATED.3IONS-SCNC: 11 MMOL/L (ref 7–15)
AST SERPL W P-5'-P-CCNC: 18 U/L (ref 0–45)
BACTERIA BLD CULT: NO GROWTH
BACTERIA BLD CULT: NO GROWTH
BASOPHILS # BLD AUTO: 0.1 10E3/UL (ref 0–0.2)
BASOPHILS NFR BLD AUTO: 1 %
BILIRUB SERPL-MCNC: 0.2 MG/DL
BUN SERPL-MCNC: 22.6 MG/DL (ref 8–23)
CALCIUM SERPL-MCNC: 9.4 MG/DL (ref 8.8–10.2)
CHLORIDE SERPL-SCNC: 99 MMOL/L (ref 98–107)
CREAT SERPL-MCNC: 1.21 MG/DL (ref 0.67–1.17)
DEPRECATED HCO3 PLAS-SCNC: 27 MMOL/L (ref 22–29)
EGFRCR SERPLBLD CKD-EPI 2021: 64 ML/MIN/1.73M2
EOSINOPHIL # BLD AUTO: 0.7 10E3/UL (ref 0–0.7)
EOSINOPHIL NFR BLD AUTO: 6 %
ERYTHROCYTE [DISTWIDTH] IN BLOOD BY AUTOMATED COUNT: 13.6 % (ref 10–15)
GLUCOSE SERPL-MCNC: 86 MG/DL (ref 70–99)
HCT VFR BLD AUTO: 29.4 % (ref 40–53)
HGB BLD-MCNC: 9.8 G/DL (ref 13.3–17.7)
IMM GRANULOCYTES # BLD: 0.1 10E3/UL
IMM GRANULOCYTES NFR BLD: 0 %
LYMPHOCYTES # BLD AUTO: 1.6 10E3/UL (ref 0.8–5.3)
LYMPHOCYTES NFR BLD AUTO: 14 %
MCH RBC QN AUTO: 28.7 PG (ref 26.5–33)
MCHC RBC AUTO-ENTMCNC: 33.3 G/DL (ref 31.5–36.5)
MCV RBC AUTO: 86 FL (ref 78–100)
MONOCYTES # BLD AUTO: 1.3 10E3/UL (ref 0–1.3)
MONOCYTES NFR BLD AUTO: 11 %
NEUTROPHILS # BLD AUTO: 8.3 10E3/UL (ref 1.6–8.3)
NEUTROPHILS NFR BLD AUTO: 69 %
NRBC # BLD AUTO: 0 10E3/UL
NRBC BLD AUTO-RTO: 0 /100
PLATELET # BLD AUTO: 386 10E3/UL (ref 150–450)
POTASSIUM SERPL-SCNC: 4 MMOL/L (ref 3.4–5.3)
PROT SERPL-MCNC: 7.2 G/DL (ref 6.4–8.3)
RBC # BLD AUTO: 3.42 10E6/UL (ref 4.4–5.9)
SODIUM SERPL-SCNC: 137 MMOL/L (ref 135–145)
WBC # BLD AUTO: 11.9 10E3/UL (ref 4–11)

## 2024-03-27 PROCEDURE — 85025 COMPLETE CBC W/AUTO DIFF WBC: CPT | Performed by: PHYSICIAN ASSISTANT

## 2024-03-27 PROCEDURE — 96365 THER/PROPH/DIAG IV INF INIT: CPT

## 2024-03-27 PROCEDURE — 250N000011 HC RX IP 250 OP 636: Performed by: PHYSICIAN ASSISTANT

## 2024-03-27 PROCEDURE — 80053 COMPREHEN METABOLIC PANEL: CPT | Performed by: PHYSICIAN ASSISTANT

## 2024-03-27 RX ORDER — ALBUTEROL SULFATE 90 UG/1
1-2 AEROSOL, METERED RESPIRATORY (INHALATION)
Status: CANCELLED
Start: 2024-03-27

## 2024-03-27 RX ORDER — HEPARIN SODIUM,PORCINE 10 UNIT/ML
5-20 VIAL (ML) INTRAVENOUS DAILY PRN
Status: CANCELLED | OUTPATIENT
Start: 2024-03-27

## 2024-03-27 RX ORDER — EPINEPHRINE 1 MG/ML
0.3 INJECTION, SOLUTION, CONCENTRATE INTRAVENOUS EVERY 5 MIN PRN
Status: CANCELLED | OUTPATIENT
Start: 2024-03-27

## 2024-03-27 RX ORDER — METHYLPREDNISOLONE SODIUM SUCCINATE 125 MG/2ML
125 INJECTION, POWDER, LYOPHILIZED, FOR SOLUTION INTRAMUSCULAR; INTRAVENOUS
Status: CANCELLED
Start: 2024-03-27

## 2024-03-27 RX ORDER — HEPARIN SODIUM (PORCINE) LOCK FLUSH IV SOLN 100 UNIT/ML 100 UNIT/ML
5 SOLUTION INTRAVENOUS
Status: CANCELLED | OUTPATIENT
Start: 2024-03-27

## 2024-03-27 RX ORDER — CEFTRIAXONE 2 G/1
2 INJECTION, POWDER, FOR SOLUTION INTRAMUSCULAR; INTRAVENOUS ONCE
Status: COMPLETED | OUTPATIENT
Start: 2024-03-27 | End: 2024-03-27

## 2024-03-27 RX ORDER — ALBUTEROL SULFATE 0.83 MG/ML
2.5 SOLUTION RESPIRATORY (INHALATION)
Status: CANCELLED | OUTPATIENT
Start: 2024-03-27

## 2024-03-27 RX ORDER — MEPERIDINE HYDROCHLORIDE 25 MG/ML
25 INJECTION INTRAMUSCULAR; INTRAVENOUS; SUBCUTANEOUS EVERY 30 MIN PRN
Status: CANCELLED | OUTPATIENT
Start: 2024-03-27

## 2024-03-27 RX ORDER — CEFTRIAXONE 2 G/1
2 INJECTION, POWDER, FOR SOLUTION INTRAMUSCULAR; INTRAVENOUS EVERY 24 HOURS
Status: CANCELLED
Start: 2024-03-28

## 2024-03-27 RX ORDER — DIPHENHYDRAMINE HYDROCHLORIDE 50 MG/ML
50 INJECTION INTRAMUSCULAR; INTRAVENOUS
Status: CANCELLED
Start: 2024-03-27

## 2024-03-27 RX ADMIN — CEFTRIAXONE SODIUM 2 G: 2 INJECTION, POWDER, FOR SOLUTION INTRAMUSCULAR; INTRAVENOUS at 15:10

## 2024-03-27 NOTE — LETTER
[facility] CARE COORDINATION  [address]   [place] [address]    March 27, 2024    Miguel Pappas  [address]  [place] [address]      Dear Miguel,    I am a clinic care coordinator who works with Thony Gongora MD with the [facility]. I wanted to introduce myself and provide you with my contact information for you to be able to call me with any questions or concerns. I wanted to thank you for spending the time to talk with me.  Below is a description of clinic care coordination and how I can further assist you.       The clinic care coordination team is made up of a registered nurse, , financial resource worker and community health worker who understand the health care system. The goal of clinic care coordination is to help you manage your health and improve access to the health care system. Our team works alongside your provider to assist you in determining your health and social needs. We can help you obtain health care and community resources, providing you with necessary information and education. We can work with you through any barriers and develop a care plan that helps coordinate and strengthen the communication between you and your care team.  Our services are voluntary and are offered without charge to you personally.    Please feel free to contact me with any questions or concerns regarding care coordination and what we can offer.      We are focused on providing you with the highest-quality healthcare experience possible.    Sincerely,     Armen Ross MSN, RN, PHN, CCM / Covering for Syl Wilson phone [phone].  Primary Care Clinical RN Care Coordinator  [facility]  3/27/2024   10:24 AM  [email or web address]  Office: [phone]

## 2024-03-27 NOTE — PROGRESS NOTES
Post hospitalization care management note:    Patient needs labs drawn; noted on AVS to have CBC and CMP within 7 days.  Infusion center to draw.    [place] from [place] at [facility].  They need an actual order (not as written in narrative on AVS).    Message left with ID office to ask if PA could add in orders as discharging hospitalist is not here today.    Order was added and is on AVS for 4/3 2:30pm    Cass Srivastava, RN  Inpatient Float Care Coordinator  [facility].

## 2024-03-27 NOTE — PROGRESS NOTES
Infusion Nursing Note:  Miguel Pappas presents today for PICC labs.    Patient seen by provider today: No   present during visit today: Not Applicable.    Note: N/A.      Intravenous Access:  Labs drawn without difficulty.  PICC.      Manuela Brnuner RN

## 2024-03-27 NOTE — PROGRESS NOTES
Clinic Care Coordination Contact  [facility]: Post-Discharge Note  SITUATION                                                      Admission:    Admission Date: 03/16/24   Reason for Admission: hip pain, confusion  Discharge:   Discharge Date: 03/26/24  Discharge Diagnosis: Discharge Diagnoses  Sepsis    BACKGROUND                                                      Per hospital discharge summary and inpatient provider notes:  Discharge Diagnoses  Sepsis    ASSESSMENT           Discharge Assessment  How are you doing now that you are home?: I am better now that I am home.  Although my hip hurts whenever I move.  How are your symptoms? (Red Flag symptoms escalate to triage hotline per guidelines): Unchanged  Do you feel your condition is stable enough to be safe at home until your provider visit?: Yes  Does the patient have their discharge instructions? : Yes  Does the patient have questions regarding their discharge instructions? : No  Were you started on any new medications or were there changes to any of your previous medications? : Yes  Does the patient have all of their medications?: Yes  Do you have questions regarding any of your medications? : No  Do you have all of your needed medical supplies or equipment (DME)?  (i.e. oxygen tank, CPAP, cane, etc.): Yes  Discharge follow-up appointment scheduled within 14 calendar days? : Yes  Discharge Follow Up Appointment Date: 04/03/24  Discharge Follow Up Appointment Scheduled with?: Primary Care Provider         Post-op (Clinicians Only)  Did the patient have surgery or a procedure: No  Fever: No  Chills: No  Pressure Reduction Devices: positioning supports utilized  Eating & Drinking: eating and drinking without complaints/concerns  PO Intake: regular diet  Bowel Function: normal  Urinary Status: voiding without complaint/concerns        PLAN                                                      Outpatient Plan:  The patient and his spouse have accepted  Primary care-care Coordination.  They would like Syl RECINOS The lead RN NOMAN to call and set up an appointment for the complete assessment.    Future Appointments   Date Time Provider Department [place]   3/27/2024  2:30 PM PH INFUSION CHAIR 10 [facility]   3/27/2024  3:00 PM PH INFUSION NURSE [facility]   3/28/2024  3:00 PM PH INFUSION NURSE [facility]   3/29/2024  3:30 PM PH INFUSION NURSE [facility]   4/1/2024  2:30 PM PH INFUSION NURSE [facility]   4/2/2024  9:00 AM Carlos Grayson, PT [facility]   4/2/2024  3:00 PM PH INFUSION NURSE [facility]   4/3/2024  3:00 PM PH INFUSION NURSE [facility]   4/4/2024  9:30 AM Ivon Brock PA-C [facility]   4/4/2024  2:30 PM PH INFUSION NURSE [facility]   4/5/2024  2:30 PM PH INFUSION NURSE [facility]   4/8/2024  3:00 PM PH INFUSION NURSE [facility]   4/9/2024  9:00 AM Carlos Grayson, PT [facility]   4/9/2024  3:30 PM PH INFUSION NURSE [facility]   4/10/2024  3:30 PM PH INFUSION NURSE [facility]   4/11/2024  3:30 PM PH INFUSION NURSE [facility]   4/12/2024  3:30 PM PH INFUSION NURSE [facility]   4/15/2024  3:30 PM PH INFUSION NURSE [facility]   4/16/2024  9:00 AM Carlos Grayson, PT [facility]   4/16/2024  3:00 PM PH INFUSION NURSE [facility]   4/17/2024  3:00 PM PH INFUSION NURSE [facility]   4/18/2024  3:30 PM PH INFUSION NURSE [facility]   4/19/2024  3:30 PM PH INFUSION NURSE [facility]   4/22/2024  3:30 PM PH INFUSION NURSE [facility]   4/23/2024  3:00 PM PH INFUSION NURSE [facility]   4/24/2024  3:30 PM PH IN[facility]ION NURSE [facility]   4/25/2024  3:30 PM PH INF[facility]ION NURSE [facility]   4/26/2024  3:30 PM PH INF[facility]ION NURSE [facility]   4/29/2024  2:30 PM PH INF[facility]ION NURSE [facility]   4/30/2024  3:00 PM PH INF[facility]ION NURSE [facility]   5/1/2024   3:00 PM  INFUSION NURSE [facility]   7/18/2024  8:15 AM Bran Geller MD [facility]   12/10/2024  2:20 PM Thony Gongora MD [facility]         For any urgent concerns, please contact our 24 hour nurse triage line: [phone] ([phone])         Armen Ross MSN, RN, PHN, CCM / Covering for Syl Wilson phone [phone]  Primary Care Clinical RN Care Coordinator  [facility]  3/27/2024   10:28 AM  [email or web address]  Office: [phone]

## 2024-03-27 NOTE — PROGRESS NOTES
Infusion Nursing Note:  Miguel Pappas presents today for Rocephin.    Patient seen by provider today: No   present during visit today: Not Applicable.    Note: Arrives ambulatory with wife and grandson.       Intravenous Access:  PICC.    Treatment Conditions:  Not Applicable.      Post Infusion Assessment:  Patient tolerated infusion without incident.       Discharge Plan:   Patient discharged in stable condition accompanied by: wife.  Departure Mode: Ambulatory.      Manuela Brunner RN

## 2024-03-28 ENCOUNTER — INFUSION THERAPY VISIT (OUTPATIENT)
Dept: INFUSION THERAPY | Facility: CLINIC | Age: 69
End: 2024-03-28
Attending: PHYSICIAN ASSISTANT
Payer: COMMERCIAL

## 2024-03-28 VITALS
BODY MASS INDEX: 21.61 KG/M2 | HEART RATE: 87 BPM | WEIGHT: 138 LBS | TEMPERATURE: 97.8 F | DIASTOLIC BLOOD PRESSURE: 70 MMHG | OXYGEN SATURATION: 99 % | RESPIRATION RATE: 20 BRPM | SYSTOLIC BLOOD PRESSURE: 111 MMHG

## 2024-03-28 DIAGNOSIS — R78.81 BACTEREMIA: Primary | ICD-10-CM

## 2024-03-28 DIAGNOSIS — I33.0 INFECTIVE ENDOCARDITIS, DUE TO UNSPECIFIED ORGANISM, UNSPECIFIED CHRONICITY: ICD-10-CM

## 2024-03-28 PROCEDURE — 250N000011 HC RX IP 250 OP 636: Performed by: PHYSICIAN ASSISTANT

## 2024-03-28 PROCEDURE — 96365 THER/PROPH/DIAG IV INF INIT: CPT

## 2024-03-28 RX ORDER — ALBUTEROL SULFATE 90 UG/1
1-2 AEROSOL, METERED RESPIRATORY (INHALATION)
Status: CANCELLED
Start: 2024-03-28

## 2024-03-28 RX ORDER — CEFTRIAXONE 2 G/1
2 INJECTION, POWDER, FOR SOLUTION INTRAMUSCULAR; INTRAVENOUS ONCE
Status: COMPLETED | OUTPATIENT
Start: 2024-03-28 | End: 2024-03-28

## 2024-03-28 RX ORDER — METHYLPREDNISOLONE SODIUM SUCCINATE 125 MG/2ML
125 INJECTION, POWDER, LYOPHILIZED, FOR SOLUTION INTRAMUSCULAR; INTRAVENOUS
Status: CANCELLED
Start: 2024-03-28

## 2024-03-28 RX ORDER — DIPHENHYDRAMINE HYDROCHLORIDE 50 MG/ML
50 INJECTION INTRAMUSCULAR; INTRAVENOUS
Status: CANCELLED
Start: 2024-03-28

## 2024-03-28 RX ORDER — CEFTRIAXONE 2 G/1
2 INJECTION, POWDER, FOR SOLUTION INTRAMUSCULAR; INTRAVENOUS EVERY 24 HOURS
Status: CANCELLED
Start: 2024-03-29

## 2024-03-28 RX ORDER — HEPARIN SODIUM (PORCINE) LOCK FLUSH IV SOLN 100 UNIT/ML 100 UNIT/ML
5 SOLUTION INTRAVENOUS
Status: CANCELLED | OUTPATIENT
Start: 2024-03-28

## 2024-03-28 RX ORDER — ALBUTEROL SULFATE 0.83 MG/ML
2.5 SOLUTION RESPIRATORY (INHALATION)
Status: CANCELLED | OUTPATIENT
Start: 2024-03-28

## 2024-03-28 RX ORDER — MEPERIDINE HYDROCHLORIDE 25 MG/ML
25 INJECTION INTRAMUSCULAR; INTRAVENOUS; SUBCUTANEOUS EVERY 30 MIN PRN
Status: CANCELLED | OUTPATIENT
Start: 2024-03-28

## 2024-03-28 RX ORDER — HEPARIN SODIUM,PORCINE 10 UNIT/ML
5-20 VIAL (ML) INTRAVENOUS DAILY PRN
Status: CANCELLED | OUTPATIENT
Start: 2024-03-28

## 2024-03-28 RX ORDER — EPINEPHRINE 1 MG/ML
0.3 INJECTION, SOLUTION, CONCENTRATE INTRAVENOUS EVERY 5 MIN PRN
Status: CANCELLED | OUTPATIENT
Start: 2024-03-28

## 2024-03-28 RX ADMIN — CEFTRIAXONE SODIUM 2 G: 2 INJECTION, POWDER, FOR SOLUTION INTRAMUSCULAR; INTRAVENOUS at 15:12

## 2024-03-28 ASSESSMENT — PAIN SCALES - GENERAL: PAINLEVEL: MODERATE PAIN (5)

## 2024-03-28 NOTE — PROGRESS NOTES
Infusion Nursing Note:  Miguel Pappas presents today for IV Rocephin.    Patient seen by provider today: No   present during visit today: Not Applicable.    Note: Pt denies any side effects/ sx previous doses given. .      Intravenous Access:  PICC.    Treatment Conditions:  Not Applicable.      Post Infusion Assessment:  Patient tolerated infusion without incident.  Patient observed for 5 minutes post Rocephin.  Blood return noted pre and post infusion.  Site patent and intact, free from redness, edema or discomfort.  No evidence of extravasations.       Discharge Plan:   Discharge instructions reviewed with: Patient.  Patient discharged in stable condition accompanied by: wife.  Departure Mode: Ambulatory w/ walker  .      Mackenzie Nguyen RN

## 2024-03-29 ENCOUNTER — INFUSION THERAPY VISIT (OUTPATIENT)
Dept: INFUSION THERAPY | Facility: CLINIC | Age: 69
End: 2024-03-29
Attending: PHYSICIAN ASSISTANT
Payer: COMMERCIAL

## 2024-03-29 VITALS
TEMPERATURE: 98.4 F | SYSTOLIC BLOOD PRESSURE: 115 MMHG | HEART RATE: 89 BPM | OXYGEN SATURATION: 98 % | DIASTOLIC BLOOD PRESSURE: 71 MMHG | RESPIRATION RATE: 16 BRPM

## 2024-03-29 DIAGNOSIS — I33.0 INFECTIVE ENDOCARDITIS, DUE TO UNSPECIFIED ORGANISM, UNSPECIFIED CHRONICITY: ICD-10-CM

## 2024-03-29 DIAGNOSIS — R78.81 BACTEREMIA: Primary | ICD-10-CM

## 2024-03-29 PROCEDURE — 96365 THER/PROPH/DIAG IV INF INIT: CPT

## 2024-03-29 PROCEDURE — 250N000011 HC RX IP 250 OP 636: Performed by: PHYSICIAN ASSISTANT

## 2024-03-29 RX ORDER — CEFTRIAXONE 2 G/1
2 INJECTION, POWDER, FOR SOLUTION INTRAMUSCULAR; INTRAVENOUS EVERY 24 HOURS
Status: CANCELLED
Start: 2024-03-30

## 2024-03-29 RX ORDER — HEPARIN SODIUM (PORCINE) LOCK FLUSH IV SOLN 100 UNIT/ML 100 UNIT/ML
5 SOLUTION INTRAVENOUS
Status: DISCONTINUED | OUTPATIENT
Start: 2024-03-29 | End: 2024-03-29 | Stop reason: HOSPADM

## 2024-03-29 RX ORDER — EPINEPHRINE 1 MG/ML
0.3 INJECTION, SOLUTION, CONCENTRATE INTRAVENOUS EVERY 5 MIN PRN
Status: CANCELLED | OUTPATIENT
Start: 2024-03-29

## 2024-03-29 RX ORDER — HEPARIN SODIUM,PORCINE 10 UNIT/ML
5-20 VIAL (ML) INTRAVENOUS DAILY PRN
Status: CANCELLED | OUTPATIENT
Start: 2024-03-29

## 2024-03-29 RX ORDER — METHYLPREDNISOLONE SODIUM SUCCINATE 125 MG/2ML
125 INJECTION, POWDER, LYOPHILIZED, FOR SOLUTION INTRAMUSCULAR; INTRAVENOUS
Status: CANCELLED
Start: 2024-03-29

## 2024-03-29 RX ORDER — ALBUTEROL SULFATE 0.83 MG/ML
2.5 SOLUTION RESPIRATORY (INHALATION)
Status: CANCELLED | OUTPATIENT
Start: 2024-03-29

## 2024-03-29 RX ORDER — CEFTRIAXONE 2 G/1
2 INJECTION, POWDER, FOR SOLUTION INTRAMUSCULAR; INTRAVENOUS EVERY 24 HOURS
Qty: 740 ML | Refills: 0 | Status: DISCONTINUED | OUTPATIENT
Start: 2024-03-29 | End: 2024-03-29 | Stop reason: HOSPADM

## 2024-03-29 RX ORDER — HEPARIN SODIUM,PORCINE 10 UNIT/ML
5-20 VIAL (ML) INTRAVENOUS DAILY PRN
Status: DISCONTINUED | OUTPATIENT
Start: 2024-03-29 | End: 2024-03-29 | Stop reason: HOSPADM

## 2024-03-29 RX ORDER — MEPERIDINE HYDROCHLORIDE 25 MG/ML
25 INJECTION INTRAMUSCULAR; INTRAVENOUS; SUBCUTANEOUS EVERY 30 MIN PRN
Status: CANCELLED | OUTPATIENT
Start: 2024-03-29

## 2024-03-29 RX ORDER — HEPARIN SODIUM (PORCINE) LOCK FLUSH IV SOLN 100 UNIT/ML 100 UNIT/ML
5 SOLUTION INTRAVENOUS
Status: CANCELLED | OUTPATIENT
Start: 2024-03-29

## 2024-03-29 RX ORDER — ALBUTEROL SULFATE 90 UG/1
1-2 AEROSOL, METERED RESPIRATORY (INHALATION)
Status: CANCELLED
Start: 2024-03-29

## 2024-03-29 RX ORDER — DIPHENHYDRAMINE HYDROCHLORIDE 50 MG/ML
50 INJECTION INTRAMUSCULAR; INTRAVENOUS
Status: CANCELLED
Start: 2024-03-29

## 2024-03-29 RX ADMIN — CEFTRIAXONE SODIUM 2 G: 2 INJECTION, POWDER, FOR SOLUTION INTRAMUSCULAR; INTRAVENOUS at 15:33

## 2024-03-29 ASSESSMENT — PAIN SCALES - GENERAL: PAINLEVEL: MODERATE PAIN (5)

## 2024-03-29 NOTE — PROGRESS NOTES
Infusion Nursing Note:  Miguel GREER Portiajeffreytyrel presents today for  IV rocephin.    Patient seen by provider today: No   present during visit today: Not Applicable.    Note: Patient here today with his wife for his Rocephin infusion.  Will plan for patient to get his infusions over the weekend in the ED.  Form with patient information was sent down to the ED.  Plan will be for patient to come in both Saturday and Sunday around 1430. .      Intravenous Access:  PICC-flushes nicely with good blood return     Treatment Conditions:  Not Applicable.      Post Infusion Assessment:  Patient tolerated infusion without incident.  Blood return noted pre and post infusion.       Discharge Plan:   Patient discharged in stable condition accompanied by: self and wife.  Departure Mode: Ambulatory with walker  Patient will return to the ED tomorrow around 1430.      Sherlyn Raya RN

## 2024-03-30 ENCOUNTER — HOSPITAL ENCOUNTER (EMERGENCY)
Facility: CLINIC | Age: 69
Discharge: HOME | End: 2024-03-30
Admitting: EMERGENCY MEDICINE
Payer: COMMERCIAL

## 2024-03-30 VITALS
HEART RATE: 82 BPM | TEMPERATURE: 97.5 F | OXYGEN SATURATION: 100 % | DIASTOLIC BLOOD PRESSURE: 96 MMHG | RESPIRATION RATE: 20 BRPM | SYSTOLIC BLOOD PRESSURE: 144 MMHG

## 2024-03-30 DIAGNOSIS — I33.0 INFECTIVE ENDOCARDITIS, DUE TO UNSPECIFIED ORGANISM, UNSPECIFIED CHRONICITY: ICD-10-CM

## 2024-03-30 DIAGNOSIS — R78.81 BACTEREMIA: ICD-10-CM

## 2024-03-30 PROCEDURE — 96365 THER/PROPH/DIAG IV INF INIT: CPT | Performed by: EMERGENCY MEDICINE

## 2024-03-30 PROCEDURE — 999N000104 HC STATISTIC NO CHARGE: Performed by: EMERGENCY MEDICINE

## 2024-03-30 PROCEDURE — 250N000011 HC RX IP 250 OP 636: Performed by: PHYSICIAN ASSISTANT

## 2024-03-30 RX ORDER — EPINEPHRINE 1 MG/ML
0.3 INJECTION, SOLUTION, CONCENTRATE INTRAVENOUS EVERY 5 MIN PRN
Status: CANCELLED | OUTPATIENT
Start: 2024-03-30

## 2024-03-30 RX ORDER — ALBUTEROL SULFATE 90 UG/1
1-2 AEROSOL, METERED RESPIRATORY (INHALATION)
Status: CANCELLED
Start: 2024-03-30

## 2024-03-30 RX ORDER — DIPHENHYDRAMINE HYDROCHLORIDE 50 MG/ML
50 INJECTION INTRAMUSCULAR; INTRAVENOUS
Status: CANCELLED
Start: 2024-03-30

## 2024-03-30 RX ORDER — CEFTRIAXONE 2 G/1
2 INJECTION, POWDER, FOR SOLUTION INTRAMUSCULAR; INTRAVENOUS EVERY 24 HOURS
Qty: 740 ML | Refills: 0 | Status: DISCONTINUED | OUTPATIENT
Start: 2024-03-30 | End: 2024-03-30 | Stop reason: HOSPADM

## 2024-03-30 RX ORDER — ALBUTEROL SULFATE 0.83 MG/ML
2.5 SOLUTION RESPIRATORY (INHALATION)
Status: CANCELLED | OUTPATIENT
Start: 2024-03-30

## 2024-03-30 RX ORDER — HEPARIN SODIUM (PORCINE) LOCK FLUSH IV SOLN 100 UNIT/ML 100 UNIT/ML
5 SOLUTION INTRAVENOUS
Status: DISCONTINUED | OUTPATIENT
Start: 2024-03-30 | End: 2024-03-30 | Stop reason: HOSPADM

## 2024-03-30 RX ORDER — HEPARIN SODIUM,PORCINE 10 UNIT/ML
5-20 VIAL (ML) INTRAVENOUS DAILY PRN
Status: CANCELLED | OUTPATIENT
Start: 2024-03-30

## 2024-03-30 RX ORDER — MEPERIDINE HYDROCHLORIDE 25 MG/ML
25 INJECTION INTRAMUSCULAR; INTRAVENOUS; SUBCUTANEOUS EVERY 30 MIN PRN
Status: CANCELLED | OUTPATIENT
Start: 2024-03-30

## 2024-03-30 RX ORDER — CEFTRIAXONE 2 G/1
2 INJECTION, POWDER, FOR SOLUTION INTRAMUSCULAR; INTRAVENOUS EVERY 24 HOURS
Status: CANCELLED
Start: 2024-03-31

## 2024-03-30 RX ORDER — METHYLPREDNISOLONE SODIUM SUCCINATE 125 MG/2ML
125 INJECTION, POWDER, LYOPHILIZED, FOR SOLUTION INTRAMUSCULAR; INTRAVENOUS
Status: CANCELLED
Start: 2024-03-30

## 2024-03-30 RX ORDER — HEPARIN SODIUM,PORCINE 10 UNIT/ML
5-20 VIAL (ML) INTRAVENOUS DAILY PRN
Status: DISCONTINUED | OUTPATIENT
Start: 2024-03-30 | End: 2024-03-30 | Stop reason: HOSPADM

## 2024-03-30 RX ORDER — HEPARIN SODIUM (PORCINE) LOCK FLUSH IV SOLN 100 UNIT/ML 100 UNIT/ML
5 SOLUTION INTRAVENOUS
Status: CANCELLED | OUTPATIENT
Start: 2024-03-30

## 2024-03-30 RX ADMIN — CEFTRIAXONE SODIUM 2 G: 2 INJECTION, POWDER, FOR SOLUTION INTRAMUSCULAR; INTRAVENOUS at 14:59

## 2024-03-30 ASSESSMENT — ACTIVITIES OF DAILY LIVING (ADL): ADLS_ACUITY_SCORE: 38

## 2024-03-31 ENCOUNTER — HOSPITAL ENCOUNTER (EMERGENCY)
Facility: CLINIC | Age: 69
Discharge: HOME | End: 2024-03-31
Admitting: EMERGENCY MEDICINE
Payer: COMMERCIAL

## 2024-03-31 VITALS
TEMPERATURE: 98.2 F | OXYGEN SATURATION: 100 % | WEIGHT: 138 LBS | DIASTOLIC BLOOD PRESSURE: 88 MMHG | RESPIRATION RATE: 18 BRPM | BODY MASS INDEX: 21.61 KG/M2 | HEART RATE: 81 BPM | SYSTOLIC BLOOD PRESSURE: 119 MMHG

## 2024-03-31 DIAGNOSIS — R78.81 BACTEREMIA: ICD-10-CM

## 2024-03-31 DIAGNOSIS — I33.0 INFECTIVE ENDOCARDITIS, DUE TO UNSPECIFIED ORGANISM, UNSPECIFIED CHRONICITY: ICD-10-CM

## 2024-03-31 PROCEDURE — 96365 THER/PROPH/DIAG IV INF INIT: CPT

## 2024-03-31 PROCEDURE — 999N000104 HC STATISTIC NO CHARGE: Performed by: EMERGENCY MEDICINE

## 2024-03-31 PROCEDURE — 250N000011 HC RX IP 250 OP 636: Performed by: PHYSICIAN ASSISTANT

## 2024-03-31 RX ORDER — MEPERIDINE HYDROCHLORIDE 25 MG/ML
25 INJECTION INTRAMUSCULAR; INTRAVENOUS; SUBCUTANEOUS EVERY 30 MIN PRN
Status: CANCELLED | OUTPATIENT
Start: 2024-03-31

## 2024-03-31 RX ORDER — HEPARIN SODIUM (PORCINE) LOCK FLUSH IV SOLN 100 UNIT/ML 100 UNIT/ML
5 SOLUTION INTRAVENOUS
Status: DISCONTINUED | OUTPATIENT
Start: 2024-03-31 | End: 2024-03-31 | Stop reason: HOSPADM

## 2024-03-31 RX ORDER — METHYLPREDNISOLONE SODIUM SUCCINATE 125 MG/2ML
125 INJECTION, POWDER, LYOPHILIZED, FOR SOLUTION INTRAMUSCULAR; INTRAVENOUS
Status: CANCELLED
Start: 2024-03-31

## 2024-03-31 RX ORDER — ALBUTEROL SULFATE 90 UG/1
1-2 AEROSOL, METERED RESPIRATORY (INHALATION)
Status: CANCELLED
Start: 2024-03-31

## 2024-03-31 RX ORDER — CEFTRIAXONE 2 G/1
2 INJECTION, POWDER, FOR SOLUTION INTRAMUSCULAR; INTRAVENOUS EVERY 24 HOURS
Status: CANCELLED
Start: 2024-04-01

## 2024-03-31 RX ORDER — DIPHENHYDRAMINE HYDROCHLORIDE 50 MG/ML
50 INJECTION INTRAMUSCULAR; INTRAVENOUS
Status: CANCELLED
Start: 2024-03-31

## 2024-03-31 RX ORDER — HEPARIN SODIUM (PORCINE) LOCK FLUSH IV SOLN 100 UNIT/ML 100 UNIT/ML
5 SOLUTION INTRAVENOUS
Status: CANCELLED | OUTPATIENT
Start: 2024-03-31

## 2024-03-31 RX ORDER — CEFTRIAXONE 2 G/1
2 INJECTION, POWDER, FOR SOLUTION INTRAMUSCULAR; INTRAVENOUS EVERY 24 HOURS
Status: CANCELLED
Start: 2024-03-31

## 2024-03-31 RX ORDER — HEPARIN SODIUM,PORCINE 10 UNIT/ML
5-20 VIAL (ML) INTRAVENOUS DAILY PRN
Status: DISCONTINUED | OUTPATIENT
Start: 2024-03-31 | End: 2024-03-31 | Stop reason: HOSPADM

## 2024-03-31 RX ORDER — EPINEPHRINE 1 MG/ML
0.3 INJECTION, SOLUTION, CONCENTRATE INTRAVENOUS EVERY 5 MIN PRN
Status: CANCELLED | OUTPATIENT
Start: 2024-03-31

## 2024-03-31 RX ORDER — ALBUTEROL SULFATE 0.83 MG/ML
2.5 SOLUTION RESPIRATORY (INHALATION)
Status: CANCELLED | OUTPATIENT
Start: 2024-03-31

## 2024-03-31 RX ORDER — CEFTRIAXONE 2 G/1
2 INJECTION, POWDER, FOR SOLUTION INTRAMUSCULAR; INTRAVENOUS EVERY 24 HOURS
Qty: 740 ML | Refills: 0 | Status: DISCONTINUED | OUTPATIENT
Start: 2024-03-31 | End: 2024-03-31 | Stop reason: HOSPADM

## 2024-03-31 RX ORDER — HEPARIN SODIUM,PORCINE 10 UNIT/ML
5-20 VIAL (ML) INTRAVENOUS DAILY PRN
Status: CANCELLED | OUTPATIENT
Start: 2024-03-31

## 2024-03-31 RX ADMIN — CEFTRIAXONE SODIUM 2 G: 2 INJECTION, POWDER, FOR SOLUTION INTRAMUSCULAR; INTRAVENOUS at 11:29

## 2024-03-31 ASSESSMENT — ACTIVITIES OF DAILY LIVING (ADL)
ADLS_ACUITY_SCORE: 36
ADLS_ACUITY_SCORE: 36

## 2024-03-31 ASSESSMENT — COLUMBIA-SUICIDE SEVERITY RATING SCALE - C-SSRS
1. IN THE PAST MONTH, HAVE YOU WISHED YOU WERE DEAD OR WISHED YOU COULD GO TO SLEEP AND NOT WAKE UP?: NO
6. HAVE YOU EVER DONE ANYTHING, STARTED TO DO ANYTHING, OR PREPARED TO DO ANYTHING TO END YOUR LIFE?: NO
2. HAVE YOU ACTUALLY HAD ANY THOUGHTS OF KILLING YOURSELF IN THE PAST MONTH?: NO

## 2024-03-31 NOTE — ED TRIAGE NOTES
Pt here for infusion therapy. PICC right arm     Triage Assessment (Adult)       Row Name 03/31/24 1101          Triage Assessment    Airway WDL WDL        Respiratory WDL    Respiratory WDL WDL        Cardiac WDL    Cardiac WDL WDL

## 2024-04-01 ENCOUNTER — INFUSION THERAPY VISIT (OUTPATIENT)
Dept: INFUSION THERAPY | Facility: CLINIC | Age: 69
End: 2024-04-01
Attending: PHYSICIAN ASSISTANT
Payer: COMMERCIAL

## 2024-04-01 VITALS
TEMPERATURE: 97.8 F | SYSTOLIC BLOOD PRESSURE: 122 MMHG | OXYGEN SATURATION: 97 % | HEART RATE: 103 BPM | DIASTOLIC BLOOD PRESSURE: 72 MMHG | WEIGHT: 143 LBS | RESPIRATION RATE: 18 BRPM | BODY MASS INDEX: 22.4 KG/M2

## 2024-04-01 DIAGNOSIS — I33.0 INFECTIVE ENDOCARDITIS, DUE TO UNSPECIFIED ORGANISM, UNSPECIFIED CHRONICITY: ICD-10-CM

## 2024-04-01 DIAGNOSIS — R78.81 BACTEREMIA: Primary | ICD-10-CM

## 2024-04-01 LAB — BACTERIA SNV CULT: NORMAL

## 2024-04-01 PROCEDURE — 250N000011 HC RX IP 250 OP 636: Performed by: PHYSICIAN ASSISTANT

## 2024-04-01 PROCEDURE — 96365 THER/PROPH/DIAG IV INF INIT: CPT

## 2024-04-01 RX ORDER — CEFTRIAXONE 2 G/1
2 INJECTION, POWDER, FOR SOLUTION INTRAMUSCULAR; INTRAVENOUS EVERY 24 HOURS
Status: CANCELLED
Start: 2024-04-01

## 2024-04-01 RX ORDER — ALBUTEROL SULFATE 90 UG/1
1-2 AEROSOL, METERED RESPIRATORY (INHALATION)
Status: CANCELLED
Start: 2024-04-01

## 2024-04-01 RX ORDER — METHYLPREDNISOLONE SODIUM SUCCINATE 125 MG/2ML
125 INJECTION, POWDER, LYOPHILIZED, FOR SOLUTION INTRAMUSCULAR; INTRAVENOUS
Status: CANCELLED
Start: 2024-04-01

## 2024-04-01 RX ORDER — CEFTRIAXONE 2 G/1
2 INJECTION, POWDER, FOR SOLUTION INTRAMUSCULAR; INTRAVENOUS EVERY 24 HOURS
Status: DISCONTINUED | OUTPATIENT
Start: 2024-04-01 | End: 2024-04-01 | Stop reason: HOSPADM

## 2024-04-01 RX ORDER — ALBUTEROL SULFATE 0.83 MG/ML
2.5 SOLUTION RESPIRATORY (INHALATION)
Status: CANCELLED | OUTPATIENT
Start: 2024-04-01

## 2024-04-01 RX ORDER — HEPARIN SODIUM,PORCINE 10 UNIT/ML
5-20 VIAL (ML) INTRAVENOUS DAILY PRN
Status: CANCELLED | OUTPATIENT
Start: 2024-04-01

## 2024-04-01 RX ORDER — MEPERIDINE HYDROCHLORIDE 25 MG/ML
25 INJECTION INTRAMUSCULAR; INTRAVENOUS; SUBCUTANEOUS EVERY 30 MIN PRN
Status: CANCELLED | OUTPATIENT
Start: 2024-04-01

## 2024-04-01 RX ORDER — HEPARIN SODIUM (PORCINE) LOCK FLUSH IV SOLN 100 UNIT/ML 100 UNIT/ML
5 SOLUTION INTRAVENOUS
Status: CANCELLED | OUTPATIENT
Start: 2024-04-01

## 2024-04-01 RX ORDER — DIPHENHYDRAMINE HYDROCHLORIDE 50 MG/ML
50 INJECTION INTRAMUSCULAR; INTRAVENOUS
Status: CANCELLED
Start: 2024-04-01

## 2024-04-01 RX ORDER — EPINEPHRINE 1 MG/ML
0.3 INJECTION, SOLUTION, CONCENTRATE INTRAVENOUS EVERY 5 MIN PRN
Status: CANCELLED | OUTPATIENT
Start: 2024-04-01

## 2024-04-01 RX ADMIN — CEFTRIAXONE SODIUM 2 G: 2 INJECTION, POWDER, FOR SOLUTION INTRAMUSCULAR; INTRAVENOUS at 14:47

## 2024-04-01 ASSESSMENT — PAIN SCALES - GENERAL: PAINLEVEL: MILD PAIN (3)

## 2024-04-01 NOTE — PROGRESS NOTES
Infusion Nursing Note:  Miguel Pappas presents today for Rocephin.    Patient seen by provider today: No   present during visit today: Not Applicable.    Note: Miguel arrived via walker. Slow gait. Pain with when going from siting to standing. States he is feeling better. Was able to celebrate Easter with family.      Intravenous Access:  PICC.    Treatment Conditions:  Not Applicable.      Post Infusion Assessment:  Patient tolerated infusion without incident.  Blood return noted pre and post infusion.  Site patent and intact, free from redness, edema or discomfort.  No evidence of extravasations.       Discharge Plan:   Discharge instructions reviewed with: Patient.  Patient and/or family verbalized understanding of discharge instructions and all questions answered.  Patient discharged in stable condition accompanied by: spouse.  Departure Mode: Ambulatory with walker.      Susan So RN

## 2024-04-02 ENCOUNTER — THERAPY VISIT (OUTPATIENT)
Dept: PHYSICAL THERAPY | Facility: CLINIC | Age: 69
End: 2024-04-02
Payer: COMMERCIAL

## 2024-04-02 ENCOUNTER — INFUSION THERAPY VISIT (OUTPATIENT)
Dept: INFUSION THERAPY | Facility: CLINIC | Age: 69
End: 2024-04-02
Attending: PHYSICIAN ASSISTANT
Payer: COMMERCIAL

## 2024-04-02 VITALS
HEART RATE: 96 BPM | TEMPERATURE: 97.9 F | SYSTOLIC BLOOD PRESSURE: 116 MMHG | DIASTOLIC BLOOD PRESSURE: 75 MMHG | OXYGEN SATURATION: 99 %

## 2024-04-02 DIAGNOSIS — I33.0 INFECTIVE ENDOCARDITIS, DUE TO UNSPECIFIED ORGANISM, UNSPECIFIED CHRONICITY: ICD-10-CM

## 2024-04-02 DIAGNOSIS — I38 VALVULAR ENDOCARDITIS: ICD-10-CM

## 2024-04-02 DIAGNOSIS — M25.552 HIP PAIN, LEFT: Primary | ICD-10-CM

## 2024-04-02 DIAGNOSIS — R78.81 BACTEREMIA: Primary | ICD-10-CM

## 2024-04-02 LAB
ALT SERPL W P-5'-P-CCNC: 30 U/L (ref 0–70)
AST SERPL W P-5'-P-CCNC: 26 U/L (ref 0–45)
BASOPHILS # BLD AUTO: 0.1 10E3/UL (ref 0–0.2)
BASOPHILS NFR BLD AUTO: 1 %
CREAT SERPL-MCNC: 0.94 MG/DL (ref 0.67–1.17)
CRP SERPL-MCNC: 35.99 MG/L
EGFRCR SERPLBLD CKD-EPI 2021: 87 ML/MIN/1.73M2
EOSINOPHIL # BLD AUTO: 0.9 10E3/UL (ref 0–0.7)
EOSINOPHIL NFR BLD AUTO: 8 %
ERYTHROCYTE [DISTWIDTH] IN BLOOD BY AUTOMATED COUNT: 14 % (ref 10–15)
HCT VFR BLD AUTO: 30.3 % (ref 40–53)
HGB BLD-MCNC: 10.1 G/DL (ref 13.3–17.7)
IMM GRANULOCYTES # BLD: 0.1 10E3/UL
IMM GRANULOCYTES NFR BLD: 0 %
LYMPHOCYTES # BLD AUTO: 1.4 10E3/UL (ref 0.8–5.3)
LYMPHOCYTES NFR BLD AUTO: 12 %
MCH RBC QN AUTO: 28.9 PG (ref 26.5–33)
MCHC RBC AUTO-ENTMCNC: 33.3 G/DL (ref 31.5–36.5)
MCV RBC AUTO: 87 FL (ref 78–100)
MONOCYTES # BLD AUTO: 1.2 10E3/UL (ref 0–1.3)
MONOCYTES NFR BLD AUTO: 10 %
NEUTROPHILS # BLD AUTO: 8.2 10E3/UL (ref 1.6–8.3)
NEUTROPHILS NFR BLD AUTO: 69 %
NRBC # BLD AUTO: 0 10E3/UL
NRBC BLD AUTO-RTO: 0 /100
PLATELET # BLD AUTO: 319 10E3/UL (ref 150–450)
RBC # BLD AUTO: 3.5 10E6/UL (ref 4.4–5.9)
WBC # BLD AUTO: 11.9 10E3/UL (ref 4–11)

## 2024-04-02 PROCEDURE — 84450 TRANSFERASE (AST) (SGOT): CPT

## 2024-04-02 PROCEDURE — 86140 C-REACTIVE PROTEIN: CPT

## 2024-04-02 PROCEDURE — 250N000011 HC RX IP 250 OP 636: Performed by: PHYSICIAN ASSISTANT

## 2024-04-02 PROCEDURE — 97110 THERAPEUTIC EXERCISES: CPT | Mod: GP | Performed by: PHYSICAL THERAPIST

## 2024-04-02 PROCEDURE — 96365 THER/PROPH/DIAG IV INF INIT: CPT

## 2024-04-02 PROCEDURE — 97140 MANUAL THERAPY 1/> REGIONS: CPT | Mod: GP | Performed by: PHYSICAL THERAPIST

## 2024-04-02 PROCEDURE — 84460 ALANINE AMINO (ALT) (SGPT): CPT

## 2024-04-02 PROCEDURE — 85025 COMPLETE CBC W/AUTO DIFF WBC: CPT

## 2024-04-02 PROCEDURE — 36592 COLLECT BLOOD FROM PICC: CPT

## 2024-04-02 PROCEDURE — 82565 ASSAY OF CREATININE: CPT

## 2024-04-02 RX ORDER — ALBUTEROL SULFATE 90 UG/1
1-2 AEROSOL, METERED RESPIRATORY (INHALATION)
Status: CANCELLED
Start: 2024-04-02

## 2024-04-02 RX ORDER — CEFTRIAXONE 2 G/1
2 INJECTION, POWDER, FOR SOLUTION INTRAMUSCULAR; INTRAVENOUS EVERY 24 HOURS
Status: DISCONTINUED | OUTPATIENT
Start: 2024-04-02 | End: 2024-04-09 | Stop reason: HOSPADM

## 2024-04-02 RX ORDER — DIPHENHYDRAMINE HYDROCHLORIDE 50 MG/ML
50 INJECTION INTRAMUSCULAR; INTRAVENOUS
Status: CANCELLED
Start: 2024-04-02

## 2024-04-02 RX ORDER — HEPARIN SODIUM (PORCINE) LOCK FLUSH IV SOLN 100 UNIT/ML 100 UNIT/ML
5 SOLUTION INTRAVENOUS
Status: CANCELLED | OUTPATIENT
Start: 2024-04-02

## 2024-04-02 RX ORDER — EPINEPHRINE 1 MG/ML
0.3 INJECTION, SOLUTION, CONCENTRATE INTRAVENOUS EVERY 5 MIN PRN
Status: CANCELLED | OUTPATIENT
Start: 2024-04-02

## 2024-04-02 RX ORDER — ALBUTEROL SULFATE 0.83 MG/ML
2.5 SOLUTION RESPIRATORY (INHALATION)
Status: CANCELLED | OUTPATIENT
Start: 2024-04-02

## 2024-04-02 RX ORDER — HEPARIN SODIUM,PORCINE 10 UNIT/ML
5-20 VIAL (ML) INTRAVENOUS DAILY PRN
Status: CANCELLED | OUTPATIENT
Start: 2024-04-02

## 2024-04-02 RX ORDER — MEPERIDINE HYDROCHLORIDE 25 MG/ML
25 INJECTION INTRAMUSCULAR; INTRAVENOUS; SUBCUTANEOUS EVERY 30 MIN PRN
Status: CANCELLED | OUTPATIENT
Start: 2024-04-02

## 2024-04-02 RX ORDER — METHYLPREDNISOLONE SODIUM SUCCINATE 125 MG/2ML
125 INJECTION, POWDER, LYOPHILIZED, FOR SOLUTION INTRAMUSCULAR; INTRAVENOUS
Status: CANCELLED
Start: 2024-04-02

## 2024-04-02 RX ORDER — CEFTRIAXONE 2 G/1
2 INJECTION, POWDER, FOR SOLUTION INTRAMUSCULAR; INTRAVENOUS EVERY 24 HOURS
Status: CANCELLED
Start: 2024-04-02

## 2024-04-02 RX ADMIN — CEFTRIAXONE SODIUM 2 G: 2 INJECTION, POWDER, FOR SOLUTION INTRAMUSCULAR; INTRAVENOUS at 15:40

## 2024-04-03 ENCOUNTER — PATIENT OUTREACH (OUTPATIENT)
Dept: CARE COORDINATION | Facility: CLINIC | Age: 69
End: 2024-04-03
Payer: COMMERCIAL

## 2024-04-03 ENCOUNTER — INFUSION THERAPY VISIT (OUTPATIENT)
Dept: INFUSION THERAPY | Facility: CLINIC | Age: 69
End: 2024-04-03
Attending: PHYSICIAN ASSISTANT
Payer: COMMERCIAL

## 2024-04-03 VITALS
TEMPERATURE: 98.1 F | OXYGEN SATURATION: 98 % | HEART RATE: 95 BPM | SYSTOLIC BLOOD PRESSURE: 107 MMHG | DIASTOLIC BLOOD PRESSURE: 71 MMHG | RESPIRATION RATE: 16 BRPM

## 2024-04-03 DIAGNOSIS — R78.81 BACTEREMIA: Primary | ICD-10-CM

## 2024-04-03 DIAGNOSIS — I33.0 INFECTIVE ENDOCARDITIS, DUE TO UNSPECIFIED ORGANISM, UNSPECIFIED CHRONICITY: ICD-10-CM

## 2024-04-03 PROCEDURE — 96365 THER/PROPH/DIAG IV INF INIT: CPT

## 2024-04-03 PROCEDURE — 250N000011 HC RX IP 250 OP 636: Performed by: PHYSICIAN ASSISTANT

## 2024-04-03 RX ORDER — HEPARIN SODIUM (PORCINE) LOCK FLUSH IV SOLN 100 UNIT/ML 100 UNIT/ML
5 SOLUTION INTRAVENOUS
Status: CANCELLED | OUTPATIENT
Start: 2024-04-03

## 2024-04-03 RX ORDER — EPINEPHRINE 1 MG/ML
0.3 INJECTION, SOLUTION, CONCENTRATE INTRAVENOUS EVERY 5 MIN PRN
Status: CANCELLED | OUTPATIENT
Start: 2024-04-03

## 2024-04-03 RX ORDER — CEFTRIAXONE 2 G/1
2 INJECTION, POWDER, FOR SOLUTION INTRAMUSCULAR; INTRAVENOUS EVERY 24 HOURS
Status: DISCONTINUED | OUTPATIENT
Start: 2024-04-03 | End: 2024-04-03 | Stop reason: HOSPADM

## 2024-04-03 RX ORDER — METHYLPREDNISOLONE SODIUM SUCCINATE 125 MG/2ML
125 INJECTION, POWDER, LYOPHILIZED, FOR SOLUTION INTRAMUSCULAR; INTRAVENOUS
Status: CANCELLED
Start: 2024-04-03

## 2024-04-03 RX ORDER — ALBUTEROL SULFATE 0.83 MG/ML
2.5 SOLUTION RESPIRATORY (INHALATION)
Status: CANCELLED | OUTPATIENT
Start: 2024-04-03

## 2024-04-03 RX ORDER — ALBUTEROL SULFATE 90 UG/1
1-2 AEROSOL, METERED RESPIRATORY (INHALATION)
Status: CANCELLED
Start: 2024-04-03

## 2024-04-03 RX ORDER — HEPARIN SODIUM,PORCINE 10 UNIT/ML
5-20 VIAL (ML) INTRAVENOUS DAILY PRN
Status: CANCELLED | OUTPATIENT
Start: 2024-04-03

## 2024-04-03 RX ORDER — DIPHENHYDRAMINE HYDROCHLORIDE 50 MG/ML
50 INJECTION INTRAMUSCULAR; INTRAVENOUS
Status: CANCELLED
Start: 2024-04-03

## 2024-04-03 RX ORDER — MEPERIDINE HYDROCHLORIDE 25 MG/ML
25 INJECTION INTRAMUSCULAR; INTRAVENOUS; SUBCUTANEOUS EVERY 30 MIN PRN
Status: CANCELLED | OUTPATIENT
Start: 2024-04-03

## 2024-04-03 RX ORDER — CEFTRIAXONE 2 G/1
2 INJECTION, POWDER, FOR SOLUTION INTRAMUSCULAR; INTRAVENOUS EVERY 24 HOURS
Status: CANCELLED
Start: 2024-04-03

## 2024-04-03 RX ADMIN — CEFTRIAXONE SODIUM 2 G: 2 INJECTION, POWDER, FOR SOLUTION INTRAMUSCULAR; INTRAVENOUS at 15:11

## 2024-04-03 ASSESSMENT — PAIN SCALES - GENERAL: PAINLEVEL: MODERATE PAIN (4)

## 2024-04-03 ASSESSMENT — ACTIVITIES OF DAILY LIVING (ADL): DEPENDENT_IADLS:: INDEPENDENT

## 2024-04-03 NOTE — PROGRESS NOTES
Infusion Nursing Note:  Miguel GREER Portiajeffreytyrel presents today for Ceftriaxone.    Patient seen by provider today: No   present during visit today: Not Applicable.    Note: N/A.      Intravenous Access:  PICC.    Treatment Conditions:  Not Applicable.      Post Infusion Assessment:  Patient tolerated infusion without incident.  Blood return noted pre and post infusion.  Site patent and intact, free from redness, edema or discomfort.  No evidence of extravasations.  Access discontinued per protocol.       Discharge Plan:   AVS to patient via MYCHART.  Patient will return 4/4 for next appointment.   Patient discharged in stable condition accompanied by: wife.  Departure Mode: Ambulatory w/walker.      Sherrell Bean RN

## 2024-04-03 NOTE — PROGRESS NOTES
Clinic Care Coordination Contact    OUTREACH    Referral Information:  Referral Source: IP Handoff         Chief Complaint   Patient presents with    Clinic Care Coordination - Initial     RN CC- Initial        Universal Utilization: 86.5% Admission or ED Risk     Utilization      No Show Count (past year)  1             ED Visits  3             Hospital Admissions  2                    Current as of: 4/3/2024  7:55 AM              Clinical Concerns:  Current Medical Concerns:  RN CC called and spoke to the patient. He was recently discharged from the hospital. TCM was completed and patient and wife wanted a follow up call. RN CC called to follow up. Patient said things are going well so far. He has all medications and has been taking them as prescribed. Patient said he feels pretty good overall. He has a follow up appointment in clinic tomorrow. Patient can't think of any needs at this time.      Current Behavioral Concerns:  None noted.       Education Provided to patient: Advised patient to call clinic with questions or concerns. Advised patient to call pharmacy for refill needs.       Health Maintenance Reviewed: Due/Overdue   Health Maintenance Due   Topic Date Due    Pneumococcal Vaccine: 65+ Years (1 of 2 - PCV) Never done    ZOSTER IMMUNIZATION (1 of 2) Never done    RSV VACCINE (Pregnancy & 60+) (1 - 1-dose 60+ series) Never done    COVID-19 Vaccine (4 - 2023-24 season) 09/01/2023       Clinical Pathway: None    Medication Management:  Medication review status: Medications reviewed and no changes reported per patient.           Functional Status:  Dependent ADLs:: Ambulation-walker, Independent  Dependent IADLs:: Independent    Living Situation:       Lifestyle & Psychosocial Needs:    Social Determinants of Health     Food Insecurity: Low Risk  (11/30/2023)    Food Insecurity     Within the past 12 months, did you worry that your food would run out before you got money to buy more?: No     Within the past  12 months, did the food you bought just not last and you didn t have money to get more?: No   Depression: Not at risk (2/20/2024)    PHQ-2     PHQ-2 Score: 0   Housing Stability: Low Risk  (11/30/2023)    Housing Stability     Do you have housing? : Yes     Are you worried about losing your housing?: No   Tobacco Use: Low Risk  (3/14/2024)    Patient History     Smoking Tobacco Use: Never     Smokeless Tobacco Use: Never     Passive Exposure: Never   Financial Resource Strain: Low Risk  (11/30/2023)    Financial Resource Strain     Within the past 12 months, have you or your family members you live with been unable to get utilities (heat, electricity) when it was really needed?: No   Alcohol Use: Not on file   Transportation Needs: Low Risk  (11/30/2023)    Transportation Needs     Within the past 12 months, has lack of transportation kept you from medical appointments, getting your medicines, non-medical meetings or appointments, work, or from getting things that you need?: No   Physical Activity: Not on file   Interpersonal Safety: Not on file   Stress: Not on file   Social Connections: Not on file      Resources and Interventions:  Current Resources:      Community Resources: None     Equipment Currently Used at Home: walker, standard, shower chair, other (see comments) ([facility] plan on installing grab bars in shower)  Employment Status: other (see comments) ([employer]; continues to work when able)     Advance Care Plan/Directive  Advanced Care Plans/Directives on file:: Yes  Status of record:: On File and Validated  Type Advanced Care Plans/Directives: Advanced Directive - On File     Care Plan:      Patient/Caregiver understanding: Patient/caregiver verbalized understanding and denies any additional questions or concerns at this time. RNCC engaged in AIDET communications during encounter.      Future Appointments                Today PH INFUSION NURSE; PH INFUSION CHAIR 10 [facility]  [facility]    Today PH INFUSION CHAIR 2; PH INFUSION NURSE [facility]fusion Services [facility]    Tomorrow Ivon Brock PA-C [facility]    Tomorrow PH INFUSION CHAIR 7; PH INFUSION NURSE [facility]fusion [facility]    In 2 days PH INFUSION CHAIR 1; PH INFUSION NURSE [facility]fusion Services [facility]    In 5 days PH INFUSION CHAIR 1; PH INFUSION NURSE [facility] Infusion Services [facility]    In 6 days Carlos Grayson, PT [facility]    In 6 days PH INFUSION CHAIR 1; PH INFUSION NURSE [facility] Infusion [facility]    In 1 week PH INFUSION NURSE; PH INFUSION CHAIR 10 [facility] Infusion [facility]    In 1 week PH INFUSION CHAIR 1; PH INFUSION NURSE [facility] Infusion [facility]    In 1 week PH INFUSION CHAIR 3; PH INFUSION NURSE [facility] Infusion [facility]    In 1 week PH INFUSION CHAIR 1; PH INFUSION NURSE [facility] Infusion [facility]    In 1 week PH INFUSION CHAIR 1; PH INFUSION NURSE [facility] Infusion [facility]    In 1 week Carlos Grayson, PT [facility]    In 1 week PH INFUSION CHAIR 4; PH INFUSION NURSE [facility] Infusion [facility]    In 2 weeks Levi Campbell MD [facility]    In 2 weeks PH INFUSION NURSE; PH INFUSION CHAIR 10 [facility] Infusion [facility]    In 2 weeks PH INFUSION CHAIR 5; PH INFUSION NURSE [facility] Infusion [facility]    In 2 weeks PH INFUSION CHAIR 2; PH INFUSION NURSE [facility] Infusion [facility]    In 2 weeks PH  INFUSION CHAIR 1; PH INFUSION NURSE [facility] Infusion Services [facility]    In 2 weeks PH INFUSION CHAIR 1; PH INFUSION NURSE [facility]fusion [facility]    In 2 weeks PH INFUSION CHAIR 2; PH INFUSION NURSE [facility]fusion [facility]    In 3 weeks PH INFUSION NURSE; PH INFUSION CHAIR 10 [facility]fusion [facility]    In 3 weeks PH INFUSION CHAIR 1; PH INFUSION NURSE [facility]fusion [facility]    In 3 weeks PH INFUSION CHAIR 2; PH INFUSION NURSE [facility]fusion [facility]    In 3 weeks PH INFUSION CHAIR 1; PH INFUSION NURSE [facility] Infusion [facility]    In 3 weeks PH INFUSION CHAIR 1; PH INFUSION NURSE [facility] Infusion [facility]    In 3 weeks PH INFUSION CHAIR 5; PH INFUSION NURSE [facility] Infusion [facility]    In 4 weeks PH INFUSION NURSE; PH INFUSION CHAIR 10 [facility] Infusion [facility]    In 4 weeks PH INFUSION CHAIR 1; PH INFUSION NURSE [facility] Infusion [facility]    In 3 months Bran Geller MD [facility]    In 8 months Thony Gongora MD [facility]            Plan: Patient has no goals he wants to work on and declines care coordination at this time. He will reach out to the clinic if any needs come up.     Syl Wilson RN Care Coordination   [facility]  Email: [email or web address]  Phone: [phone]

## 2024-04-04 ENCOUNTER — OFFICE VISIT (OUTPATIENT)
Dept: FAMILY MEDICINE | Facility: CLINIC | Age: 69
End: 2024-04-04
Payer: COMMERCIAL

## 2024-04-04 ENCOUNTER — INFUSION THERAPY VISIT (OUTPATIENT)
Dept: INFUSION THERAPY | Facility: CLINIC | Age: 69
End: 2024-04-04
Attending: PHYSICIAN ASSISTANT
Payer: COMMERCIAL

## 2024-04-04 VITALS
DIASTOLIC BLOOD PRESSURE: 82 MMHG | RESPIRATION RATE: 18 BRPM | HEART RATE: 90 BPM | SYSTOLIC BLOOD PRESSURE: 137 MMHG | OXYGEN SATURATION: 98 % | TEMPERATURE: 97.8 F

## 2024-04-04 VITALS
SYSTOLIC BLOOD PRESSURE: 128 MMHG | DIASTOLIC BLOOD PRESSURE: 80 MMHG | WEIGHT: 140 LBS | TEMPERATURE: 97.3 F | RESPIRATION RATE: 20 BRPM | OXYGEN SATURATION: 97 % | BODY MASS INDEX: 21.93 KG/M2 | HEART RATE: 88 BPM

## 2024-04-04 DIAGNOSIS — Z86.73 HISTORY OF TIA (TRANSIENT ISCHEMIC ATTACK) AND STROKE: ICD-10-CM

## 2024-04-04 DIAGNOSIS — R78.81 BACTEREMIA: ICD-10-CM

## 2024-04-04 DIAGNOSIS — I33.0 INFECTIVE ENDOCARDITIS, DUE TO UNSPECIFIED ORGANISM, UNSPECIFIED CHRONICITY: ICD-10-CM

## 2024-04-04 DIAGNOSIS — Z86.19 HISTORY OF SEPSIS: ICD-10-CM

## 2024-04-04 DIAGNOSIS — R78.81 BACTEREMIA: Primary | ICD-10-CM

## 2024-04-04 DIAGNOSIS — M25.552 HIP PAIN, LEFT: ICD-10-CM

## 2024-04-04 DIAGNOSIS — I33.9 ACUTE ENDOCARDITIS, UNSPECIFIED ENDOCARDITIS TYPE: Primary | ICD-10-CM

## 2024-04-04 LAB
ALBUMIN SERPL BCG-MCNC: 3.7 G/DL (ref 3.5–5.2)
ALP SERPL-CCNC: 119 U/L (ref 40–150)
ALT SERPL W P-5'-P-CCNC: 41 U/L (ref 0–70)
ANION GAP SERPL CALCULATED.3IONS-SCNC: 11 MMOL/L (ref 7–15)
AST SERPL W P-5'-P-CCNC: 37 U/L (ref 0–45)
BILIRUB SERPL-MCNC: 0.2 MG/DL
BUN SERPL-MCNC: 18.7 MG/DL (ref 8–23)
CALCIUM SERPL-MCNC: 9.8 MG/DL (ref 8.8–10.2)
CHLORIDE SERPL-SCNC: 98 MMOL/L (ref 98–107)
CREAT SERPL-MCNC: 0.86 MG/DL (ref 0.67–1.17)
DEPRECATED HCO3 PLAS-SCNC: 29 MMOL/L (ref 22–29)
EGFRCR SERPLBLD CKD-EPI 2021: >90 ML/MIN/1.73M2
GLUCOSE SERPL-MCNC: 77 MG/DL (ref 70–99)
POTASSIUM SERPL-SCNC: 4.7 MMOL/L (ref 3.4–5.3)
PROT SERPL-MCNC: 7.6 G/DL (ref 6.4–8.3)
SODIUM SERPL-SCNC: 138 MMOL/L (ref 135–145)

## 2024-04-04 PROCEDURE — 99495 TRANSJ CARE MGMT MOD F2F 14D: CPT

## 2024-04-04 PROCEDURE — 96365 THER/PROPH/DIAG IV INF INIT: CPT

## 2024-04-04 PROCEDURE — 80053 COMPREHEN METABOLIC PANEL: CPT

## 2024-04-04 PROCEDURE — 250N000011 HC RX IP 250 OP 636: Performed by: PHYSICIAN ASSISTANT

## 2024-04-04 PROCEDURE — 36415 COLL VENOUS BLD VENIPUNCTURE: CPT

## 2024-04-04 RX ORDER — METHYLPREDNISOLONE SODIUM SUCCINATE 125 MG/2ML
125 INJECTION, POWDER, LYOPHILIZED, FOR SOLUTION INTRAMUSCULAR; INTRAVENOUS
Status: CANCELLED
Start: 2024-04-04

## 2024-04-04 RX ORDER — HEPARIN SODIUM (PORCINE) LOCK FLUSH IV SOLN 100 UNIT/ML 100 UNIT/ML
5 SOLUTION INTRAVENOUS
Status: CANCELLED | OUTPATIENT
Start: 2024-04-04

## 2024-04-04 RX ORDER — DIPHENHYDRAMINE HYDROCHLORIDE 50 MG/ML
50 INJECTION INTRAMUSCULAR; INTRAVENOUS
Status: CANCELLED
Start: 2024-04-04

## 2024-04-04 RX ORDER — CEFTRIAXONE 2 G/1
2 INJECTION, POWDER, FOR SOLUTION INTRAMUSCULAR; INTRAVENOUS EVERY 24 HOURS
Status: CANCELLED
Start: 2024-04-04

## 2024-04-04 RX ORDER — CEFTRIAXONE 2 G/1
2 INJECTION, POWDER, FOR SOLUTION INTRAMUSCULAR; INTRAVENOUS EVERY 24 HOURS
Status: DISCONTINUED | OUTPATIENT
Start: 2024-04-04 | End: 2024-04-04 | Stop reason: HOSPADM

## 2024-04-04 RX ORDER — ALBUTEROL SULFATE 0.83 MG/ML
2.5 SOLUTION RESPIRATORY (INHALATION)
Status: CANCELLED | OUTPATIENT
Start: 2024-04-04

## 2024-04-04 RX ORDER — HEPARIN SODIUM,PORCINE 10 UNIT/ML
5-20 VIAL (ML) INTRAVENOUS DAILY PRN
Status: CANCELLED | OUTPATIENT
Start: 2024-04-04

## 2024-04-04 RX ORDER — ALBUTEROL SULFATE 90 UG/1
1-2 AEROSOL, METERED RESPIRATORY (INHALATION)
Status: CANCELLED
Start: 2024-04-04

## 2024-04-04 RX ORDER — EPINEPHRINE 1 MG/ML
0.3 INJECTION, SOLUTION, CONCENTRATE INTRAVENOUS EVERY 5 MIN PRN
Status: CANCELLED | OUTPATIENT
Start: 2024-04-04

## 2024-04-04 RX ORDER — MEPERIDINE HYDROCHLORIDE 25 MG/ML
25 INJECTION INTRAMUSCULAR; INTRAVENOUS; SUBCUTANEOUS EVERY 30 MIN PRN
Status: CANCELLED | OUTPATIENT
Start: 2024-04-04

## 2024-04-04 RX ADMIN — CEFTRIAXONE SODIUM 2 G: 2 INJECTION, POWDER, FOR SOLUTION INTRAMUSCULAR; INTRAVENOUS at 14:49

## 2024-04-04 ASSESSMENT — PAIN SCALES - GENERAL
PAINLEVEL: MILD PAIN (2)
PAINLEVEL: MILD PAIN (3)

## 2024-04-04 NOTE — PATIENT INSTRUCTIONS
Hospital follow-up completed today    Lab draw today    Infusion scheduled for today    Follow-up with infectious disease as scheduled    Follow-up with orthopedics as scheduled    Follow-up with your primary care provider    Follow-up sooner as needed    Continue aspirin 81 mg    Continue gabapentin and Flexeril until you follow-up with orthopedics, that has further recommendations of continuing medication versus alternate recommendations    Go to the ER if you develop chest pain, shortness of breath, fevers, chills, abdominal pain or any emergent symptoms    Discharge instructions:  Discharge Orders          Physical Therapy  Referral       Primary Care - Care Coordination Referral       Follow-up and recommended labs and tests      Follow-up with a total hip surgeon at [facility] to discuss treatment options for your left hip.    [facility] clinic numbers:  Lolis Napavine [phone]  Walk in clinic hours: 8 am - 8 pm--          Activity     Your activity upon discharge: activity as tolerated, WBAT with gait aide if needed          Reason for your hospital stay     sepsis          Follow-up and recommended labs and tests      Follow up with primary care provider, Thony Gongora, within 7 days for hospital follow- up.  The following labs/tests are recommended: CBC,CMP.  - this appointment has been made for Thursday April 4 at 9:15 am as noted below.  The labs will be drawn on Tuesday 4/2 with your infusion appointment.          Activity     Your activity upon discharge: activity as tolerated          Diet     Follow this diet upon discharge: Orders Placed This Encounter      Snacks/Supplements Adult: Other; PRN; With Meals      Regular Diet Adult      Patient understood and verbally consented to the treatment plan. Discussed symptoms that would warrant an urgent or emergent visit. All of the patients' questions were answered. Patient was instructed to contact the clinic if questions or concerns  arise. Recommend follow up appointments if symptoms worsen or fail to improve. Recommend follow up as needed. Recommend ER in the case of an emergency.    Ivon Brock PA-C    Please note: Voice recognition software may have been used in preparing this note, unintended word substitutions may be present.

## 2024-04-04 NOTE — PROGRESS NOTES
Infusion Nursing Note:  Miguel Pappas presents today for IV Abx..    Patient seen by provider today: No   present during visit today: Not Applicable.    Note: Patient ambulated to IVO. No complaints. VSS.      Intravenous Access:  PICC.    Treatment Conditions:  Not Applicable.      Post Infusion Assessment:  Patient tolerated infusion without incident.  Blood return noted pre and post infusion.       Discharge Plan:   Patient discharged in stable condition accompanied by: self and wife.  Departure Mode: Ambulatory with walker.      Berta Barton RN

## 2024-04-04 NOTE — PROGRESS NOTES
Assessment & Plan     Acute endocarditis, unspecified endocarditis type  - Comprehensive metabolic panel (BMP + Alb, Alk Phos, ALT, AST, Total. Bili, TP); Future  - Comprehensive metabolic panel (BMP + Alb, Alk Phos, ALT, AST, Total. Bili, TP)    Bacteremia  - Comprehensive metabolic panel (BMP + Alb, Alk Phos, ALT, AST, Total. Bili, TP); Future  - Comprehensive metabolic panel (BMP + Alb, Alk Phos, ALT, AST, Total. Bili, TP)    History of sepsis  - Comprehensive metabolic panel (BMP + Alb, Alk Phos, ALT, AST, Total. Bili, TP); Future  - Comprehensive metabolic panel (BMP + Alb, Alk Phos, ALT, AST, Total. Bili, TP)    History of TIA (transient ischemic attack) and stroke    Hip pain, left      Ordering of each unique test  I spent a total of 30 minutes on the day of the visit.   Time spent by me doing chart review, history and exam, documentation and further activities per the note    MED REC REQUIRED  Post Medication Reconciliation Status: discharge medications reconciled, continue medications without change    See Patient Instructions  Patient Instructions   Hospital follow-up completed today    Lab draw today    Infusion scheduled for today    Follow-up with infectious disease as scheduled    Follow-up with orthopedics as scheduled    Follow-up with your primary care provider    Follow-up sooner as needed    Continue aspirin 81 mg    Continue gabapentin and Flexeril until you follow-up with orthopedics, that has further recommendations of continuing medication versus alternate recommendations    Go to the ER if you develop chest pain, shortness of breath, fevers, chills, abdominal pain or any emergent symptoms    Discharge instructions:  Discharge Orders          Physical Therapy  Referral       Primary Care - Care Coordination Referral       Follow-up and recommended labs and tests      Follow-up with a total hip surgeon at [facility] to discuss treatment options for your left hip.    [facility]  clinic numbers:  Lolis Larson [phone]  Walk in clinic hours: 8 am - 8 pm--          Activity     Your activity upon discharge: activity as tolerated, WBAT with gait aide if needed          Reason for your hospital stay     sepsis          Follow-up and recommended labs and tests      Follow up with primary care provider, Thony Gongora, within 7 days for hospital follow- up.  The following labs/tests are recommended: CBC,CMP.  - this appointment has been made for Thursday April 4 at 9:15 am as noted below.  The labs will be drawn on Tuesday 4/2 with your infusion appointment.          Activity     Your activity upon discharge: activity as tolerated          Diet     Follow this diet upon discharge: Orders Placed This Encounter      Snacks/Supplements Adult: Other; PRN; With Meals      Regular Diet Adult      Patient understood and verbally consented to the treatment plan. Discussed symptoms that would warrant an urgent or emergent visit. All of the patients' questions were answered. Patient was instructed to contact the clinic if questions or concerns arise. Recommend follow up appointments if symptoms worsen or fail to improve. Recommend follow up as needed. Recommend ER in the case of an emergency.    Ivon Brock PA-C    Please note: Voice recognition software may have been used in preparing this note, unintended word substitutions may be present.        Leann Rivera is a 68 year old, presenting for the following health issues:  Hospital F/U        4/4/2024     8:56 AM   Additional Questions   Roomed by Diandra WALLACE   Accompanied by wife     HPI         3/27/2024    10:18 AM   Post Discharge Outreach   Admission Date 3/16/2024   Reason for Admission hip pain, confusion   Discharge Date 3/26/2024   Discharge Diagnosis Discharge Diagnoses  Sepsis   How are you doing now that you are home? I am better now that I am home.  Although my hip hurts whenever I move.   How are your symptoms? (Red Flag  symptoms escalate to triage hotline per guidelines) Unchanged   Do you feel your condition is stable enough to be safe at home until your provider visit? Yes   Does the patient have their discharge instructions?  Yes   Does the patient have questions regarding their discharge instructions?  No   Were you started on any new medications or were there changes to any of your previous medications?  Yes   Does the patient have all of their medications? Yes   Do you have questions regarding any of your medications?  No   Do you have all of your needed medical supplies or equipment (DME)?  (i.e. oxygen tank, CPAP, cane, etc.) Yes   Discharge follow-up appointment scheduled within 14 calendar days?  Yes   Discharge Follow Up Appointment Date 4/3/2024   Discharge Follow Up Appointment Scheduled with? Primary Care Provider     Hospital Follow-up Visit:    Hospital/Nursing Home/IP Rehab Facility: [facility]  Date of Admission: 3/16/2024  Date of Discharge: 3/26/2024  Reason(s) for Admission: AVR- bioprosthetic, hip pain, left infection endocarditis    Was your hospitalization related to COVID-19? No   Problems taking medications regularly:  None  Medication changes since discharge:   START taking these medications     Details   acetaminophen (TYLENOL) 325 MG tablet Take 2 tablets (650 mg) by mouth every 4 hours as needed for mild pain or other (and adjunct with moderate or severe pain or per patient request), OTC       atorvastatin (LIPITOR) 40 MG tablet Take 1 tablet (40 mg) by mouth every evening, Disp-60 tablet, R-1, E-Prescribe       cefTRIAXone (ROCEPHIN) 2 GM vial Inject 2 g into the vein every 24 hours for 37 days Check CBC with diff, creatinine, AST, and CRP weekly and fax results to [facility] Transitional       cyclobenzaprine (FLEXERIL) 5 MG tablet Take 1 tablet (5 mg) by mouth 3 times daily, Disp-60 tablet, R-0, E-Prescribe       gabapentin (NEURONTIN) 100 MG capsule Take 1  capsule (100 mg) by mouth 3 times daily, Disp-30 capsule, R-0, E-Prescribe       ibuprofen (ADVIL/MOTRIN) 200 MG tablet Take 1 tablet (200 mg) by mouth every 6 hours as needed for inflammatory pain, OTC       oxyCODONE (ROXICODONE) 5 MG tablet Take 1 tablet (5 mg) by mouth every 6 hours as needed for moderate pain, Disp-20 tablet, R-0, Local Print             Problems adhering to non-medication therapy:  None    Summary of hospitalization:  [facility] hospital discharge summary reviewed  Diagnostic Tests/Treatments reviewed.  Follow up needed: Labs, IV antibiotic treatments  Other Healthcare Providers Involved in Patient s Care:          Infectious disease, orthopedics, physical therapy  Update since discharge: improved.         Plan of care communicated with patient and family      Since hospital discharge:  Patient symptoms have improved significantly.  Confusion has resolved and he seems to be back to his baseline.  He is accompanied by his wife.  He is no longer having any strokelike symptoms.  Negative for facial drooping, changes in gait, change in his speech, or other emergent symptoms.  Negative for chest pain, shortness of breath, nausea, vomiting, abdominal pain.  He does have an infusion scheduled for today.  He does have questions from his hospital stay regarding if he should be taking gabapentin and muscle relaxer Flexeril daily or if he should take this until the prescription is gone.  He had labs drawn on Tuesday, however did not have a CMP done.  Negative for fevers or chills.  He has follow-up scheduled with [facility] due to his left hip pain.  Left leg pain has improved with Flexeril and gabapentin.  He has a follow-up appointment with infectious disease towards the end of the month and he will be continuing IV antibiotic treatment as scheduled.  He has also been working with physical therapy.  Him and his wife say that things at home are going well and he is no longer limited,  mobility has improved.  He is able to complete activities of daily living.  Patient and his wife had no further questions or concerns about the hospital stay.  They will continue to monitor symptoms closely.                     Review of Systems  Constitutional, HEENT, cardiovascular, pulmonary, GI, , musculoskeletal, neuro, skin, endocrine and psych systems are negative, except as otherwise noted.      Objective    /80   Pulse 88   Temp 97.3  F (36.3  C) (Temporal)   Resp 20   Wt 63.5 kg (140 lb)   SpO2 97%   BMI 21.93 kg/m    Body mass index is 21.93 kg/m .  Physical Exam   GENERAL: alert and no distress.  Walks slowly with a walker.  EYES: Eyes grossly normal to inspection, PERRL and conjunctivae and sclerae normal  RESP: lungs clear to auscultation - no rales, rhonchi or wheezes  CV: regular rate and rhythm, normal S1 S2, no S3 or S4, no murmur, click or rub, negative for edema over the lower calves bilaterally, there is moderate edema of the feet bilaterally.    SKIN: no suspicious lesions or rashes  NEURO: Normal strength and tone, mentation intact and speech normal  PSYCH: mentation appears normal, affect normal/bright    Infusion Therapy Visit on 04/02/2024   Component Date Value Ref Range Status    CRP Inflammation 04/02/2024 35.99 (H)  <5.00 mg/L Final    Creatinine 04/02/2024 0.94  0.67 - 1.17 mg/dL Final    GFR Estimate 04/02/2024 87  >60 mL/min/1.73m2 Final    AST 04/02/2024 26  0 - 45 U/L Final    ALT 04/02/2024 30  0 - 70 U/L Final    WBC Count 04/02/2024 11.9 (H)  4.0 - 11.0 10e3/uL Final    RBC Count 04/02/2024 3.50 (L)  4.40 - 5.90 10e6/uL Final    Hemoglobin 04/02/2024 10.1 (L)  13.3 - 17.7 g/dL Final    Hematocrit 04/02/2024 30.3 (L)  40.0 - 53.0 % Final    MCV 04/02/2024 87  78 - 100 fL Final    MCH 04/02/2024 28.9  26.5 - 33.0 pg Final    MCHC 04/02/2024 33.3  31.5 - 36.5 g/dL Final    RDW 04/02/2024 14.0  10.0 - 15.0 % Final    Platelet Count 04/02/2024 319  150 - 450 10e3/uL  Final    % Neutrophils 04/02/2024 69  % Final    % Lymphocytes 04/02/2024 12  % Final    % Monocytes 04/02/2024 10  % Final    % Eosinophils 04/02/2024 8  % Final    % Basophils 04/02/2024 1  % Final    % Immature Granulocytes 04/02/2024 0  % Final    NRBCs per 100 WBC 04/02/2024 0  <1 /100 Final    Absolute Neutrophils 04/02/2024 8.2  1.6 - 8.3 10e3/uL Final    Absolute Lymphocytes 04/02/2024 1.4  0.8 - 5.3 10e3/uL Final    Absolute Monocytes 04/02/2024 1.2  0.0 - 1.3 10e3/uL Final    Absolute Eosinophils 04/02/2024 0.9 (H)  0.0 - 0.7 10e3/uL Final    Absolute Basophils 04/02/2024 0.1  0.0 - 0.2 10e3/uL Final    Absolute Immature Granulocytes 04/02/2024 0.1  <=0.4 10e3/uL Final    Absolute NRBCs 04/02/2024 0.0  10e3/uL Final     Results for orders placed or performed in visit on 04/04/24   Comprehensive metabolic panel (BMP + Alb, Alk Phos, ALT, AST, Total. Bili, TP)     Status: Normal   Result Value Ref Range    Sodium 138 135 - 145 mmol/L    Potassium 4.7 3.4 - 5.3 mmol/L    Carbon Dioxide (CO2) 29 22 - 29 mmol/L    Anion Gap 11 7 - 15 mmol/L    Urea Nitrogen 18.7 8.0 - 23.0 mg/dL    Creatinine 0.86 0.67 - 1.17 mg/dL    GFR Estimate >90 >60 mL/min/1.73m2    Calcium 9.8 8.8 - 10.2 mg/dL    Chloride 98 98 - 107 mmol/L    Glucose 77 70 - 99 mg/dL    Alkaline Phosphatase 119 40 - 150 U/L    AST 37 0 - 45 U/L    ALT 41 0 - 70 U/L    Protein Total 7.6 6.4 - 8.3 g/dL    Albumin 3.7 3.5 - 5.2 g/dL    Bilirubin Total 0.2 <=1.2 mg/dL     No results found.        Signed Electronically by: Ivon Brock PA-C

## 2024-04-05 ENCOUNTER — INFUSION THERAPY VISIT (OUTPATIENT)
Dept: INFUSION THERAPY | Facility: CLINIC | Age: 69
End: 2024-04-05
Attending: PHYSICIAN ASSISTANT
Payer: COMMERCIAL

## 2024-04-05 VITALS
SYSTOLIC BLOOD PRESSURE: 124 MMHG | RESPIRATION RATE: 20 BRPM | OXYGEN SATURATION: 98 % | TEMPERATURE: 98.1 F | DIASTOLIC BLOOD PRESSURE: 73 MMHG | HEART RATE: 92 BPM

## 2024-04-05 DIAGNOSIS — R78.81 BACTEREMIA: Primary | ICD-10-CM

## 2024-04-05 DIAGNOSIS — I33.0 INFECTIVE ENDOCARDITIS, DUE TO UNSPECIFIED ORGANISM, UNSPECIFIED CHRONICITY: ICD-10-CM

## 2024-04-05 PROCEDURE — 96365 THER/PROPH/DIAG IV INF INIT: CPT

## 2024-04-05 PROCEDURE — 250N000011 HC RX IP 250 OP 636: Performed by: PHYSICIAN ASSISTANT

## 2024-04-05 RX ORDER — MEPERIDINE HYDROCHLORIDE 25 MG/ML
25 INJECTION INTRAMUSCULAR; INTRAVENOUS; SUBCUTANEOUS EVERY 30 MIN PRN
Status: CANCELLED | OUTPATIENT
Start: 2024-04-05

## 2024-04-05 RX ORDER — METHYLPREDNISOLONE SODIUM SUCCINATE 125 MG/2ML
125 INJECTION, POWDER, LYOPHILIZED, FOR SOLUTION INTRAMUSCULAR; INTRAVENOUS
Status: CANCELLED
Start: 2024-04-05

## 2024-04-05 RX ORDER — DIPHENHYDRAMINE HYDROCHLORIDE 50 MG/ML
50 INJECTION INTRAMUSCULAR; INTRAVENOUS
Status: CANCELLED
Start: 2024-04-05

## 2024-04-05 RX ORDER — CEFTRIAXONE 2 G/1
2 INJECTION, POWDER, FOR SOLUTION INTRAMUSCULAR; INTRAVENOUS EVERY 24 HOURS
Status: DISCONTINUED | OUTPATIENT
Start: 2024-04-05 | End: 2024-04-05 | Stop reason: HOSPADM

## 2024-04-05 RX ORDER — HEPARIN SODIUM,PORCINE 10 UNIT/ML
5-20 VIAL (ML) INTRAVENOUS DAILY PRN
Status: CANCELLED | OUTPATIENT
Start: 2024-04-05

## 2024-04-05 RX ORDER — CEFTRIAXONE 2 G/1
2 INJECTION, POWDER, FOR SOLUTION INTRAMUSCULAR; INTRAVENOUS EVERY 24 HOURS
Status: CANCELLED
Start: 2024-04-05

## 2024-04-05 RX ORDER — ALBUTEROL SULFATE 90 UG/1
1-2 AEROSOL, METERED RESPIRATORY (INHALATION)
Status: CANCELLED
Start: 2024-04-05

## 2024-04-05 RX ORDER — HEPARIN SODIUM (PORCINE) LOCK FLUSH IV SOLN 100 UNIT/ML 100 UNIT/ML
5 SOLUTION INTRAVENOUS
Status: CANCELLED | OUTPATIENT
Start: 2024-04-05

## 2024-04-05 RX ORDER — ALBUTEROL SULFATE 0.83 MG/ML
2.5 SOLUTION RESPIRATORY (INHALATION)
Status: CANCELLED | OUTPATIENT
Start: 2024-04-05

## 2024-04-05 RX ORDER — EPINEPHRINE 1 MG/ML
0.3 INJECTION, SOLUTION, CONCENTRATE INTRAVENOUS EVERY 5 MIN PRN
Status: CANCELLED | OUTPATIENT
Start: 2024-04-05

## 2024-04-05 RX ADMIN — CEFTRIAXONE SODIUM 2 G: 2 INJECTION, POWDER, FOR SOLUTION INTRAMUSCULAR; INTRAVENOUS at 14:40

## 2024-04-05 ASSESSMENT — PAIN SCALES - GENERAL: PAINLEVEL: MILD PAIN (3)

## 2024-04-05 NOTE — PROGRESS NOTES
Infusion Nursing Note:  Miguel Pappas presents today for Rocephin.    Patient seen by provider today: No   present during visit today: Not Applicable.    Note: Patient ambulated with walker to IVO. VSS. No complaints.      Intravenous Access:  PICC.    Treatment Conditions:  Not Applicable.      Post Infusion Assessment:  Patient tolerated infusion without incident.  Blood return noted pre and post infusion.       Discharge Plan:   Patient discharged in stable condition accompanied by: self and wife.  Departure Mode: Ambulatory.  Patient going to ER over weekend.     Berta Barton RN

## 2024-04-06 ENCOUNTER — HOSPITAL ENCOUNTER (EMERGENCY)
Facility: CLINIC | Age: 69
Discharge: HOME | End: 2024-04-06
Admitting: EMERGENCY MEDICINE
Payer: COMMERCIAL

## 2024-04-06 VITALS
RESPIRATION RATE: 18 BRPM | TEMPERATURE: 97.5 F | BODY MASS INDEX: 21.97 KG/M2 | HEIGHT: 67 IN | WEIGHT: 140 LBS | SYSTOLIC BLOOD PRESSURE: 135 MMHG | DIASTOLIC BLOOD PRESSURE: 84 MMHG | HEART RATE: 82 BPM | OXYGEN SATURATION: 96 %

## 2024-04-06 DIAGNOSIS — R78.81 BACTEREMIA: ICD-10-CM

## 2024-04-06 DIAGNOSIS — I33.0 INFECTIVE ENDOCARDITIS, DUE TO UNSPECIFIED ORGANISM, UNSPECIFIED CHRONICITY: ICD-10-CM

## 2024-04-06 PROCEDURE — 96365 THER/PROPH/DIAG IV INF INIT: CPT | Performed by: EMERGENCY MEDICINE

## 2024-04-06 PROCEDURE — 999N000104 HC STATISTIC NO CHARGE

## 2024-04-06 PROCEDURE — 250N000011 HC RX IP 250 OP 636: Performed by: PHYSICIAN ASSISTANT

## 2024-04-06 RX ORDER — METHYLPREDNISOLONE SODIUM SUCCINATE 125 MG/2ML
125 INJECTION, POWDER, LYOPHILIZED, FOR SOLUTION INTRAMUSCULAR; INTRAVENOUS
Status: DISCONTINUED | OUTPATIENT
Start: 2024-04-06 | End: 2024-04-06 | Stop reason: HOSPADM

## 2024-04-06 RX ORDER — ALBUTEROL SULFATE 0.83 MG/ML
2.5 SOLUTION RESPIRATORY (INHALATION)
Status: DISCONTINUED | OUTPATIENT
Start: 2024-04-06 | End: 2024-04-06 | Stop reason: HOSPADM

## 2024-04-06 RX ORDER — HEPARIN SODIUM,PORCINE 10 UNIT/ML
5-20 VIAL (ML) INTRAVENOUS DAILY PRN
Status: CANCELLED | OUTPATIENT
Start: 2024-04-06

## 2024-04-06 RX ORDER — MEPERIDINE HYDROCHLORIDE 25 MG/ML
25 INJECTION INTRAMUSCULAR; INTRAVENOUS; SUBCUTANEOUS EVERY 30 MIN PRN
Status: CANCELLED | OUTPATIENT
Start: 2024-04-06

## 2024-04-06 RX ORDER — CEFTRIAXONE 2 G/1
2 INJECTION, POWDER, FOR SOLUTION INTRAMUSCULAR; INTRAVENOUS EVERY 24 HOURS
Status: CANCELLED
Start: 2024-04-06

## 2024-04-06 RX ORDER — EPINEPHRINE 1 MG/ML
0.3 INJECTION, SOLUTION, CONCENTRATE INTRAVENOUS EVERY 5 MIN PRN
Status: CANCELLED | OUTPATIENT
Start: 2024-04-06

## 2024-04-06 RX ORDER — DIPHENHYDRAMINE HYDROCHLORIDE 50 MG/ML
50 INJECTION INTRAMUSCULAR; INTRAVENOUS
Status: DISCONTINUED | OUTPATIENT
Start: 2024-04-06 | End: 2024-04-06 | Stop reason: HOSPADM

## 2024-04-06 RX ORDER — HEPARIN SODIUM (PORCINE) LOCK FLUSH IV SOLN 100 UNIT/ML 100 UNIT/ML
5 SOLUTION INTRAVENOUS
Status: CANCELLED | OUTPATIENT
Start: 2024-04-06

## 2024-04-06 RX ORDER — CEFTRIAXONE 2 G/1
2 INJECTION, POWDER, FOR SOLUTION INTRAMUSCULAR; INTRAVENOUS ONCE
Status: COMPLETED | OUTPATIENT
Start: 2024-04-06 | End: 2024-04-06

## 2024-04-06 RX ORDER — METHYLPREDNISOLONE SODIUM SUCCINATE 125 MG/2ML
125 INJECTION, POWDER, LYOPHILIZED, FOR SOLUTION INTRAMUSCULAR; INTRAVENOUS
Status: CANCELLED
Start: 2024-04-06

## 2024-04-06 RX ORDER — HEPARIN SODIUM,PORCINE 10 UNIT/ML
5-20 VIAL (ML) INTRAVENOUS DAILY PRN
Status: DISCONTINUED | OUTPATIENT
Start: 2024-04-06 | End: 2024-04-06 | Stop reason: HOSPADM

## 2024-04-06 RX ORDER — ALBUTEROL SULFATE 0.83 MG/ML
2.5 SOLUTION RESPIRATORY (INHALATION)
Status: CANCELLED | OUTPATIENT
Start: 2024-04-06

## 2024-04-06 RX ORDER — MEPERIDINE HYDROCHLORIDE 25 MG/ML
25 INJECTION INTRAMUSCULAR; INTRAVENOUS; SUBCUTANEOUS EVERY 30 MIN PRN
Status: DISCONTINUED | OUTPATIENT
Start: 2024-04-06 | End: 2024-04-06 | Stop reason: HOSPADM

## 2024-04-06 RX ORDER — ALBUTEROL SULFATE 90 UG/1
1-2 AEROSOL, METERED RESPIRATORY (INHALATION)
Status: CANCELLED
Start: 2024-04-06

## 2024-04-06 RX ORDER — EPINEPHRINE 1 MG/ML
0.3 INJECTION, SOLUTION, CONCENTRATE INTRAVENOUS EVERY 5 MIN PRN
Status: DISCONTINUED | OUTPATIENT
Start: 2024-04-06 | End: 2024-04-06 | Stop reason: HOSPADM

## 2024-04-06 RX ORDER — HEPARIN SODIUM (PORCINE) LOCK FLUSH IV SOLN 100 UNIT/ML 100 UNIT/ML
5 SOLUTION INTRAVENOUS
Status: DISCONTINUED | OUTPATIENT
Start: 2024-04-06 | End: 2024-04-06 | Stop reason: HOSPADM

## 2024-04-06 RX ORDER — DIPHENHYDRAMINE HYDROCHLORIDE 50 MG/ML
50 INJECTION INTRAMUSCULAR; INTRAVENOUS
Status: CANCELLED
Start: 2024-04-06

## 2024-04-06 RX ORDER — ALBUTEROL SULFATE 90 UG/1
1-2 AEROSOL, METERED RESPIRATORY (INHALATION)
Status: DISCONTINUED | OUTPATIENT
Start: 2024-04-06 | End: 2024-04-06 | Stop reason: HOSPADM

## 2024-04-06 RX ADMIN — CEFTRIAXONE SODIUM 2 G: 2 INJECTION, POWDER, FOR SOLUTION INTRAMUSCULAR; INTRAVENOUS at 11:43

## 2024-04-06 ASSESSMENT — COLUMBIA-SUICIDE SEVERITY RATING SCALE - C-SSRS
6. HAVE YOU EVER DONE ANYTHING, STARTED TO DO ANYTHING, OR PREPARED TO DO ANYTHING TO END YOUR LIFE?: NO
1. IN THE PAST MONTH, HAVE YOU WISHED YOU WERE DEAD OR WISHED YOU COULD GO TO SLEEP AND NOT WAKE UP?: NO
2. HAVE YOU ACTUALLY HAD ANY THOUGHTS OF KILLING YOURSELF IN THE PAST MONTH?: NO

## 2024-04-06 ASSESSMENT — ACTIVITIES OF DAILY LIVING (ADL): ADLS_ACUITY_SCORE: 38

## 2024-04-06 NOTE — ED TRIAGE NOTES
Here for IVO     Triage Assessment (Adult)       Row Name 04/06/24 1139          Triage Assessment    Airway WDL WDL        Respiratory WDL    Respiratory WDL WDL        Skin Circulation/Temperature WDL    Skin Circulation/Temperature WDL WDL        Peripheral/Neurovascular WDL    Peripheral Neurovascular WDL WDL        Cognitive/Neuro/Behavioral WDL    Cognitive/Neuro/Behavioral WDL WDL

## 2024-04-07 ENCOUNTER — HOSPITAL ENCOUNTER (EMERGENCY)
Facility: CLINIC | Age: 69
Discharge: HOME | End: 2024-04-07
Admitting: EMERGENCY MEDICINE
Payer: COMMERCIAL

## 2024-04-07 VITALS
TEMPERATURE: 97.7 F | RESPIRATION RATE: 20 BRPM | WEIGHT: 140 LBS | OXYGEN SATURATION: 97 % | SYSTOLIC BLOOD PRESSURE: 128 MMHG | BODY MASS INDEX: 21.93 KG/M2 | HEART RATE: 93 BPM | DIASTOLIC BLOOD PRESSURE: 84 MMHG

## 2024-04-07 DIAGNOSIS — R78.81 BACTEREMIA: ICD-10-CM

## 2024-04-07 DIAGNOSIS — I33.0 INFECTIVE ENDOCARDITIS, DUE TO UNSPECIFIED ORGANISM, UNSPECIFIED CHRONICITY: ICD-10-CM

## 2024-04-07 PROCEDURE — 96365 THER/PROPH/DIAG IV INF INIT: CPT | Performed by: EMERGENCY MEDICINE

## 2024-04-07 PROCEDURE — 250N000011 HC RX IP 250 OP 636: Performed by: PHYSICIAN ASSISTANT

## 2024-04-07 PROCEDURE — 999N000104 HC STATISTIC NO CHARGE: Performed by: EMERGENCY MEDICINE

## 2024-04-07 RX ORDER — METHYLPREDNISOLONE SODIUM SUCCINATE 125 MG/2ML
125 INJECTION, POWDER, LYOPHILIZED, FOR SOLUTION INTRAMUSCULAR; INTRAVENOUS
Status: CANCELLED
Start: 2024-04-07

## 2024-04-07 RX ORDER — CEFTRIAXONE 2 G/1
2 INJECTION, POWDER, FOR SOLUTION INTRAMUSCULAR; INTRAVENOUS EVERY 24 HOURS
Status: CANCELLED
Start: 2024-04-07

## 2024-04-07 RX ORDER — HEPARIN SODIUM (PORCINE) LOCK FLUSH IV SOLN 100 UNIT/ML 100 UNIT/ML
5 SOLUTION INTRAVENOUS
Status: DISCONTINUED | OUTPATIENT
Start: 2024-04-07 | End: 2024-04-07 | Stop reason: HOSPADM

## 2024-04-07 RX ORDER — EPINEPHRINE 1 MG/ML
0.3 INJECTION, SOLUTION, CONCENTRATE INTRAVENOUS EVERY 5 MIN PRN
Status: DISCONTINUED | OUTPATIENT
Start: 2024-04-07 | End: 2024-04-07 | Stop reason: HOSPADM

## 2024-04-07 RX ORDER — DIPHENHYDRAMINE HYDROCHLORIDE 50 MG/ML
50 INJECTION INTRAMUSCULAR; INTRAVENOUS
Status: DISCONTINUED | OUTPATIENT
Start: 2024-04-07 | End: 2024-04-07 | Stop reason: HOSPADM

## 2024-04-07 RX ORDER — DIPHENHYDRAMINE HYDROCHLORIDE 50 MG/ML
50 INJECTION INTRAMUSCULAR; INTRAVENOUS
Status: CANCELLED
Start: 2024-04-07

## 2024-04-07 RX ORDER — EPINEPHRINE 1 MG/ML
0.3 INJECTION, SOLUTION, CONCENTRATE INTRAVENOUS EVERY 5 MIN PRN
Status: CANCELLED | OUTPATIENT
Start: 2024-04-07

## 2024-04-07 RX ORDER — HEPARIN SODIUM,PORCINE 10 UNIT/ML
5-20 VIAL (ML) INTRAVENOUS DAILY PRN
Status: DISCONTINUED | OUTPATIENT
Start: 2024-04-07 | End: 2024-04-07 | Stop reason: HOSPADM

## 2024-04-07 RX ORDER — ALBUTEROL SULFATE 90 UG/1
1-2 AEROSOL, METERED RESPIRATORY (INHALATION)
Status: CANCELLED
Start: 2024-04-07

## 2024-04-07 RX ORDER — ALBUTEROL SULFATE 90 UG/1
1-2 AEROSOL, METERED RESPIRATORY (INHALATION)
Status: DISCONTINUED | OUTPATIENT
Start: 2024-04-07 | End: 2024-04-07 | Stop reason: HOSPADM

## 2024-04-07 RX ORDER — METHYLPREDNISOLONE SODIUM SUCCINATE 125 MG/2ML
125 INJECTION, POWDER, LYOPHILIZED, FOR SOLUTION INTRAMUSCULAR; INTRAVENOUS
Status: DISCONTINUED | OUTPATIENT
Start: 2024-04-07 | End: 2024-04-07 | Stop reason: HOSPADM

## 2024-04-07 RX ORDER — ALBUTEROL SULFATE 0.83 MG/ML
2.5 SOLUTION RESPIRATORY (INHALATION)
Status: DISCONTINUED | OUTPATIENT
Start: 2024-04-07 | End: 2024-04-07 | Stop reason: HOSPADM

## 2024-04-07 RX ORDER — MEPERIDINE HYDROCHLORIDE 25 MG/ML
25 INJECTION INTRAMUSCULAR; INTRAVENOUS; SUBCUTANEOUS EVERY 30 MIN PRN
Status: CANCELLED | OUTPATIENT
Start: 2024-04-07

## 2024-04-07 RX ORDER — HEPARIN SODIUM (PORCINE) LOCK FLUSH IV SOLN 100 UNIT/ML 100 UNIT/ML
5 SOLUTION INTRAVENOUS
Status: CANCELLED | OUTPATIENT
Start: 2024-04-07

## 2024-04-07 RX ORDER — ALBUTEROL SULFATE 0.83 MG/ML
2.5 SOLUTION RESPIRATORY (INHALATION)
Status: CANCELLED | OUTPATIENT
Start: 2024-04-07

## 2024-04-07 RX ORDER — CEFTRIAXONE 2 G/1
2 INJECTION, POWDER, FOR SOLUTION INTRAMUSCULAR; INTRAVENOUS ONCE
Status: COMPLETED | OUTPATIENT
Start: 2024-04-07 | End: 2024-04-07

## 2024-04-07 RX ORDER — MEPERIDINE HYDROCHLORIDE 25 MG/ML
25 INJECTION INTRAMUSCULAR; INTRAVENOUS; SUBCUTANEOUS EVERY 30 MIN PRN
Status: DISCONTINUED | OUTPATIENT
Start: 2024-04-07 | End: 2024-04-07 | Stop reason: HOSPADM

## 2024-04-07 RX ORDER — HEPARIN SODIUM,PORCINE 10 UNIT/ML
5-20 VIAL (ML) INTRAVENOUS DAILY PRN
Status: CANCELLED | OUTPATIENT
Start: 2024-04-07

## 2024-04-07 RX ADMIN — CEFTRIAXONE SODIUM 2 G: 2 INJECTION, POWDER, FOR SOLUTION INTRAMUSCULAR; INTRAVENOUS at 12:02

## 2024-04-07 ASSESSMENT — ACTIVITIES OF DAILY LIVING (ADL): ADLS_ACUITY_SCORE: 38

## 2024-04-08 ENCOUNTER — INFUSION THERAPY VISIT (OUTPATIENT)
Dept: INFUSION THERAPY | Facility: CLINIC | Age: 69
End: 2024-04-08
Attending: PHYSICIAN ASSISTANT
Payer: COMMERCIAL

## 2024-04-08 VITALS
SYSTOLIC BLOOD PRESSURE: 116 MMHG | RESPIRATION RATE: 16 BRPM | OXYGEN SATURATION: 98 % | DIASTOLIC BLOOD PRESSURE: 75 MMHG | TEMPERATURE: 98.6 F | HEART RATE: 90 BPM

## 2024-04-08 DIAGNOSIS — R78.81 BACTEREMIA: Primary | ICD-10-CM

## 2024-04-08 DIAGNOSIS — I33.0 INFECTIVE ENDOCARDITIS, DUE TO UNSPECIFIED ORGANISM, UNSPECIFIED CHRONICITY: ICD-10-CM

## 2024-04-08 PROCEDURE — 96365 THER/PROPH/DIAG IV INF INIT: CPT

## 2024-04-08 PROCEDURE — 250N000011 HC RX IP 250 OP 636: Performed by: PHYSICIAN ASSISTANT

## 2024-04-08 RX ORDER — HEPARIN SODIUM,PORCINE 10 UNIT/ML
5-20 VIAL (ML) INTRAVENOUS DAILY PRN
Status: DISCONTINUED | OUTPATIENT
Start: 2024-04-08 | End: 2024-04-08 | Stop reason: HOSPADM

## 2024-04-08 RX ORDER — ALBUTEROL SULFATE 90 UG/1
1-2 AEROSOL, METERED RESPIRATORY (INHALATION)
Status: CANCELLED
Start: 2024-04-08

## 2024-04-08 RX ORDER — MEPERIDINE HYDROCHLORIDE 25 MG/ML
25 INJECTION INTRAMUSCULAR; INTRAVENOUS; SUBCUTANEOUS EVERY 30 MIN PRN
Status: CANCELLED | OUTPATIENT
Start: 2024-04-08

## 2024-04-08 RX ORDER — ALBUTEROL SULFATE 0.83 MG/ML
2.5 SOLUTION RESPIRATORY (INHALATION)
Status: CANCELLED | OUTPATIENT
Start: 2024-04-08

## 2024-04-08 RX ORDER — METHYLPREDNISOLONE SODIUM SUCCINATE 125 MG/2ML
125 INJECTION, POWDER, LYOPHILIZED, FOR SOLUTION INTRAMUSCULAR; INTRAVENOUS
Status: CANCELLED
Start: 2024-04-08

## 2024-04-08 RX ORDER — EPINEPHRINE 1 MG/ML
0.3 INJECTION, SOLUTION, CONCENTRATE INTRAVENOUS EVERY 5 MIN PRN
Status: CANCELLED | OUTPATIENT
Start: 2024-04-08

## 2024-04-08 RX ORDER — CEFTRIAXONE 2 G/1
2 INJECTION, POWDER, FOR SOLUTION INTRAMUSCULAR; INTRAVENOUS EVERY 24 HOURS
Status: DISCONTINUED | OUTPATIENT
Start: 2024-04-08 | End: 2024-04-08 | Stop reason: HOSPADM

## 2024-04-08 RX ORDER — DIPHENHYDRAMINE HYDROCHLORIDE 50 MG/ML
50 INJECTION INTRAMUSCULAR; INTRAVENOUS
Status: CANCELLED
Start: 2024-04-08

## 2024-04-08 RX ORDER — CEFTRIAXONE 2 G/1
2 INJECTION, POWDER, FOR SOLUTION INTRAMUSCULAR; INTRAVENOUS EVERY 24 HOURS
Status: CANCELLED
Start: 2024-04-08

## 2024-04-08 RX ORDER — HEPARIN SODIUM (PORCINE) LOCK FLUSH IV SOLN 100 UNIT/ML 100 UNIT/ML
5 SOLUTION INTRAVENOUS
Status: CANCELLED | OUTPATIENT
Start: 2024-04-08

## 2024-04-08 RX ORDER — HEPARIN SODIUM,PORCINE 10 UNIT/ML
5-20 VIAL (ML) INTRAVENOUS DAILY PRN
Status: CANCELLED | OUTPATIENT
Start: 2024-04-08

## 2024-04-08 RX ADMIN — CEFTRIAXONE SODIUM 2 G: 2 INJECTION, POWDER, FOR SOLUTION INTRAMUSCULAR; INTRAVENOUS at 15:05

## 2024-04-08 ASSESSMENT — PAIN SCALES - GENERAL: PAINLEVEL: MILD PAIN (3)

## 2024-04-08 NOTE — PROGRESS NOTES
Infusion Nursing Note:  Miguel Pappas presents today for IV Rocephin.    Patient seen by provider today: No   present during visit today: Not Applicable.    Note: Pt arrives via walker with wife and grandchild.      Intravenous Access:  PICC.    Treatment Conditions:  Not Applicable.      Post Infusion Assessment:  Patient tolerated infusion without incident.       Discharge Plan:   Patient discharged in stable condition accompanied by: self.  Departure Mode: Ambulatory.      Manuela Brunner RN

## 2024-04-09 ENCOUNTER — THERAPY VISIT (OUTPATIENT)
Dept: PHYSICAL THERAPY | Facility: CLINIC | Age: 69
End: 2024-04-09
Payer: COMMERCIAL

## 2024-04-09 ENCOUNTER — INFUSION THERAPY VISIT (OUTPATIENT)
Dept: INFUSION THERAPY | Facility: CLINIC | Age: 69
End: 2024-04-09
Attending: PHYSICIAN ASSISTANT
Payer: COMMERCIAL

## 2024-04-09 VITALS
TEMPERATURE: 97.8 F | HEART RATE: 93 BPM | SYSTOLIC BLOOD PRESSURE: 121 MMHG | DIASTOLIC BLOOD PRESSURE: 71 MMHG | RESPIRATION RATE: 16 BRPM | OXYGEN SATURATION: 98 %

## 2024-04-09 DIAGNOSIS — I33.0 INFECTIVE ENDOCARDITIS, DUE TO UNSPECIFIED ORGANISM, UNSPECIFIED CHRONICITY: ICD-10-CM

## 2024-04-09 DIAGNOSIS — M25.552 HIP PAIN, LEFT: Primary | ICD-10-CM

## 2024-04-09 DIAGNOSIS — R78.81 BACTEREMIA: Primary | ICD-10-CM

## 2024-04-09 PROCEDURE — 97140 MANUAL THERAPY 1/> REGIONS: CPT | Mod: GP | Performed by: PHYSICAL THERAPIST

## 2024-04-09 PROCEDURE — 250N000011 HC RX IP 250 OP 636: Performed by: PHYSICIAN ASSISTANT

## 2024-04-09 PROCEDURE — 97110 THERAPEUTIC EXERCISES: CPT | Mod: GP | Performed by: PHYSICAL THERAPIST

## 2024-04-09 PROCEDURE — 96365 THER/PROPH/DIAG IV INF INIT: CPT

## 2024-04-09 RX ORDER — MEPERIDINE HYDROCHLORIDE 25 MG/ML
25 INJECTION INTRAMUSCULAR; INTRAVENOUS; SUBCUTANEOUS EVERY 30 MIN PRN
Status: CANCELLED | OUTPATIENT
Start: 2024-04-09

## 2024-04-09 RX ORDER — ALBUTEROL SULFATE 0.83 MG/ML
2.5 SOLUTION RESPIRATORY (INHALATION)
Status: CANCELLED | OUTPATIENT
Start: 2024-04-09

## 2024-04-09 RX ORDER — EPINEPHRINE 1 MG/ML
0.3 INJECTION, SOLUTION, CONCENTRATE INTRAVENOUS EVERY 5 MIN PRN
Status: CANCELLED | OUTPATIENT
Start: 2024-04-09

## 2024-04-09 RX ORDER — DIPHENHYDRAMINE HYDROCHLORIDE 50 MG/ML
50 INJECTION INTRAMUSCULAR; INTRAVENOUS
Status: CANCELLED
Start: 2024-04-09

## 2024-04-09 RX ORDER — HEPARIN SODIUM,PORCINE 10 UNIT/ML
5-20 VIAL (ML) INTRAVENOUS DAILY PRN
Status: CANCELLED | OUTPATIENT
Start: 2024-04-09

## 2024-04-09 RX ORDER — CEFTRIAXONE 2 G/1
2 INJECTION, POWDER, FOR SOLUTION INTRAMUSCULAR; INTRAVENOUS EVERY 24 HOURS
Status: DISCONTINUED | OUTPATIENT
Start: 2024-04-09 | End: 2024-04-09 | Stop reason: HOSPADM

## 2024-04-09 RX ORDER — ALBUTEROL SULFATE 90 UG/1
1-2 AEROSOL, METERED RESPIRATORY (INHALATION)
Status: CANCELLED
Start: 2024-04-09

## 2024-04-09 RX ORDER — HEPARIN SODIUM (PORCINE) LOCK FLUSH IV SOLN 100 UNIT/ML 100 UNIT/ML
5 SOLUTION INTRAVENOUS
Status: CANCELLED | OUTPATIENT
Start: 2024-04-09

## 2024-04-09 RX ORDER — METHYLPREDNISOLONE SODIUM SUCCINATE 125 MG/2ML
125 INJECTION, POWDER, LYOPHILIZED, FOR SOLUTION INTRAMUSCULAR; INTRAVENOUS
Status: CANCELLED
Start: 2024-04-09

## 2024-04-09 RX ORDER — CEFTRIAXONE 2 G/1
2 INJECTION, POWDER, FOR SOLUTION INTRAMUSCULAR; INTRAVENOUS EVERY 24 HOURS
Status: CANCELLED
Start: 2024-04-09

## 2024-04-09 RX ADMIN — CEFTRIAXONE SODIUM 2 G: 2 INJECTION, POWDER, FOR SOLUTION INTRAMUSCULAR; INTRAVENOUS at 15:47

## 2024-04-09 NOTE — PROGRESS NOTES
Infusion Nursing Note:  Miguel Pappas presents today for Rocephin.    Patient seen by provider today: No   present during visit today: Not Applicable.    Note: Patient ambulated with wheeled walker. VSS. .      Intravenous Access:  PICC.    Treatment Conditions:  Not Applicable.      Post Infusion Assessment:  Patient tolerated infusion without incident.  Blood return noted pre and post infusion.       Discharge Plan:   Patient discharged in stable condition accompanied by: self and wife.  Departure Mode: Ambulatory.      Berta Barton RN

## 2024-04-10 ENCOUNTER — LAB (OUTPATIENT)
Dept: INFUSION THERAPY | Facility: CLINIC | Age: 69
End: 2024-04-10
Attending: INTERNAL MEDICINE
Payer: COMMERCIAL

## 2024-04-10 ENCOUNTER — INFUSION THERAPY VISIT (OUTPATIENT)
Dept: INFUSION THERAPY | Facility: CLINIC | Age: 69
End: 2024-04-10
Attending: PHYSICIAN ASSISTANT
Payer: COMMERCIAL

## 2024-04-10 ENCOUNTER — TELEPHONE (OUTPATIENT)
Dept: NEUROLOGY | Facility: CLINIC | Age: 69
End: 2024-04-10
Payer: COMMERCIAL

## 2024-04-10 VITALS
DIASTOLIC BLOOD PRESSURE: 76 MMHG | SYSTOLIC BLOOD PRESSURE: 122 MMHG | RESPIRATION RATE: 16 BRPM | OXYGEN SATURATION: 97 % | HEART RATE: 99 BPM | TEMPERATURE: 97.3 F

## 2024-04-10 DIAGNOSIS — R78.81 BACTEREMIA: Primary | ICD-10-CM

## 2024-04-10 DIAGNOSIS — I33.0 INFECTIVE ENDOCARDITIS, DUE TO UNSPECIFIED ORGANISM, UNSPECIFIED CHRONICITY: ICD-10-CM

## 2024-04-10 DIAGNOSIS — I38 VALVULAR ENDOCARDITIS: ICD-10-CM

## 2024-04-10 LAB
ALT SERPL W P-5'-P-CCNC: 38 U/L (ref 0–70)
AST SERPL W P-5'-P-CCNC: 26 U/L (ref 0–45)
BASOPHILS # BLD AUTO: 0.1 10E3/UL (ref 0–0.2)
BASOPHILS NFR BLD AUTO: 1 %
CREAT SERPL-MCNC: 1.54 MG/DL (ref 0.67–1.17)
CRP SERPL-MCNC: 39.91 MG/L
EGFRCR SERPLBLD CKD-EPI 2021: 48 ML/MIN/1.73M2
EOSINOPHIL # BLD AUTO: 1 10E3/UL (ref 0–0.7)
EOSINOPHIL NFR BLD AUTO: 10 %
ERYTHROCYTE [DISTWIDTH] IN BLOOD BY AUTOMATED COUNT: 14 % (ref 10–15)
HCT VFR BLD AUTO: 30.7 % (ref 40–53)
HGB BLD-MCNC: 10.3 G/DL (ref 13.3–17.7)
IMM GRANULOCYTES # BLD: 0.1 10E3/UL
IMM GRANULOCYTES NFR BLD: 1 %
LYMPHOCYTES # BLD AUTO: 1.5 10E3/UL (ref 0.8–5.3)
LYMPHOCYTES NFR BLD AUTO: 14 %
MCH RBC QN AUTO: 28.8 PG (ref 26.5–33)
MCHC RBC AUTO-ENTMCNC: 33.6 G/DL (ref 31.5–36.5)
MCV RBC AUTO: 86 FL (ref 78–100)
MONOCYTES # BLD AUTO: 1.2 10E3/UL (ref 0–1.3)
MONOCYTES NFR BLD AUTO: 11 %
NEUTROPHILS # BLD AUTO: 6.7 10E3/UL (ref 1.6–8.3)
NEUTROPHILS NFR BLD AUTO: 64 %
NRBC # BLD AUTO: 0 10E3/UL
NRBC BLD AUTO-RTO: 0 /100
PLATELET # BLD AUTO: 261 10E3/UL (ref 150–450)
RBC # BLD AUTO: 3.58 10E6/UL (ref 4.4–5.9)
WBC # BLD AUTO: 10.6 10E3/UL (ref 4–11)

## 2024-04-10 PROCEDURE — 96365 THER/PROPH/DIAG IV INF INIT: CPT

## 2024-04-10 PROCEDURE — 36592 COLLECT BLOOD FROM PICC: CPT

## 2024-04-10 PROCEDURE — 85025 COMPLETE CBC W/AUTO DIFF WBC: CPT

## 2024-04-10 PROCEDURE — 86140 C-REACTIVE PROTEIN: CPT

## 2024-04-10 PROCEDURE — 84460 ALANINE AMINO (ALT) (SGPT): CPT

## 2024-04-10 PROCEDURE — 82565 ASSAY OF CREATININE: CPT

## 2024-04-10 PROCEDURE — 250N000011 HC RX IP 250 OP 636: Performed by: PHYSICIAN ASSISTANT

## 2024-04-10 PROCEDURE — 84450 TRANSFERASE (AST) (SGOT): CPT

## 2024-04-10 RX ORDER — ALBUTEROL SULFATE 0.83 MG/ML
2.5 SOLUTION RESPIRATORY (INHALATION)
Status: CANCELLED | OUTPATIENT
Start: 2024-04-10

## 2024-04-10 RX ORDER — CEFTRIAXONE 2 G/1
2 INJECTION, POWDER, FOR SOLUTION INTRAMUSCULAR; INTRAVENOUS EVERY 24 HOURS
Status: CANCELLED
Start: 2024-04-10

## 2024-04-10 RX ORDER — CEFTRIAXONE 2 G/1
2 INJECTION, POWDER, FOR SOLUTION INTRAMUSCULAR; INTRAVENOUS ONCE
Status: COMPLETED | OUTPATIENT
Start: 2024-04-10 | End: 2024-04-10

## 2024-04-10 RX ORDER — HEPARIN SODIUM,PORCINE 10 UNIT/ML
5-20 VIAL (ML) INTRAVENOUS DAILY PRN
Status: CANCELLED | OUTPATIENT
Start: 2024-04-10

## 2024-04-10 RX ORDER — METHYLPREDNISOLONE SODIUM SUCCINATE 125 MG/2ML
125 INJECTION, POWDER, LYOPHILIZED, FOR SOLUTION INTRAMUSCULAR; INTRAVENOUS
Status: CANCELLED
Start: 2024-04-10

## 2024-04-10 RX ORDER — DIPHENHYDRAMINE HYDROCHLORIDE 50 MG/ML
50 INJECTION INTRAMUSCULAR; INTRAVENOUS
Status: CANCELLED
Start: 2024-04-10

## 2024-04-10 RX ORDER — HEPARIN SODIUM (PORCINE) LOCK FLUSH IV SOLN 100 UNIT/ML 100 UNIT/ML
5 SOLUTION INTRAVENOUS
Status: CANCELLED | OUTPATIENT
Start: 2024-04-10

## 2024-04-10 RX ORDER — EPINEPHRINE 1 MG/ML
0.3 INJECTION, SOLUTION, CONCENTRATE INTRAVENOUS EVERY 5 MIN PRN
Status: CANCELLED | OUTPATIENT
Start: 2024-04-10

## 2024-04-10 RX ORDER — MEPERIDINE HYDROCHLORIDE 25 MG/ML
25 INJECTION INTRAMUSCULAR; INTRAVENOUS; SUBCUTANEOUS EVERY 30 MIN PRN
Status: CANCELLED | OUTPATIENT
Start: 2024-04-10

## 2024-04-10 RX ORDER — ALBUTEROL SULFATE 90 UG/1
1-2 AEROSOL, METERED RESPIRATORY (INHALATION)
Status: CANCELLED
Start: 2024-04-10

## 2024-04-10 RX ADMIN — CEFTRIAXONE SODIUM 2 G: 2 INJECTION, POWDER, FOR SOLUTION INTRAMUSCULAR; INTRAVENOUS at 15:24

## 2024-04-10 NOTE — TELEPHONE ENCOUNTER
[facility] Call Center    Phone Message    May a detailed message be left on voicemail: yes     Reason for Call: Appointment Intake    Referring Provider Name:   Sarah Loving APRN CNP    Diagnosis and/or Symptoms:   Confusion  possible stroke vs TGA     CESAR schedules not populating  Please assist with scheduling    Pt prefers virtual visit.    Action Taken: Other: Neurology    Travel Screening: Not Applicable

## 2024-04-10 NOTE — PROGRESS NOTES
Infusion Nursing Note:  Miguel Pappas presents today for Ceftriaxone.    Patient seen by provider today: No   present during visit today: Not Applicable.    Note: Patient doing ok today, good spirits, calm, alert. Mild hip pain. No new sx. VSS, afebrile.       Intravenous Access:  PICC.    Treatment Conditions:  Not Applicable.      Post Infusion Assessment:  Patient tolerated infusion without incident.  Blood return noted pre and post infusion.  Site patent and intact, free from redness, edema or discomfort.  No evidence of extravasations.  PICC line flushed with NS following med administration.       [facility] Plan[facility]   [facility]  Patient will return 4/11 for next appointment.   Patient discharged in stable condition accompanied by: wife.  Departure Mode: Ambulatory w/walker.      Sherrell Bean RN

## 2024-04-10 NOTE — PROGRESS NOTES
Infusion Nursing Note:  Miguel Pappas presents today for PICC labs.    Patient seen by provider today: No   present during visit today: Not Applicable.    Note: N/A.      Intravenous Access:  PICC  Lab draw site R arm.  Labs drawn without difficulty.  Site WDL.  Drsg change done today.       Sherrell Bean RN

## 2024-04-10 NOTE — PROGRESS NOTES
Infusion Nursing Note:  Miguel PERCY Pappas presents today for Ceftriaxone.    Patient seen by provider today: No   present during visit today: Not Applicable.    Note: PICC line drsg changed with Rocephin infusion.      Intravenous Access:  Labs drawn without difficulty.  PICC.    Treatment Conditions:  Not Applicable.      Post Infusion Assessment:  Patient tolerated infusion without incident.  Blood return noted pre and post infusion.  Site patent and intact, free from redness, edema or discomfort.  No evidence of extravasations.       Discharge Plan:   Discharge instructions reviewed with: Patient.  Patient discharged in stable condition accompanied by: self.  Departure Mode: Ambulatory w/walker.      Mackenzie Nguyen RN

## 2024-04-10 NOTE — TELEPHONE ENCOUNTER
"Progress Note  Pulmonary/Critical Care      Admit Date: 12/17/2018      SUBJECTIVE:      Patient ID: Enmanuel Armenta is a 63 y.o. male.    HPI/Interval history (See H&P for complete P,F,SHx)   The patient is less responsive today.  He has a worsened metabolic acidosis.  He is requiring Bipap.  I really do not want to reintubate this patient who prognosis appears very poor.   ROS  Lethargic, and unresponsive to verbal commands.  OBJECTIVE:     Vital Signs Range (Last 24H):  Temp:  [97.6 °F (36.4 °C)-99.1 °F (37.3 °C)]   Pulse:  [74-91]   Resp:  [10-33]   BP: (128-164)/(63-85)   SpO2:  [88 %-100 %]   Arterial Line BP: (189)/(73)     I & O (Last 24H):    Intake/Output Summary (Last 24 hours) at 1/6/2019 0839  Last data filed at 1/6/2019 0649  Gross per 24 hour   Intake 2553.33 ml   Output 2290 ml   Net 263.33 ml        Estimated body mass index is 25.93 kg/m² as calculated from the following:    Height as of this encounter: 5' 7" (1.702 m).    Weight as of this encounter: 75.1 kg (165 lb 9.1 oz).    Physical Exam  GENERAL: Older patient in no distress, resting on Bipap.  HEENT: Pupils equal and reactive. Extraocular movements intact. Nose and pharynx covered with Bipap.  NECK: Supple. R IJ catheter.  HEART: Regular rate and rhythm. No murmur or gallop auscultated.  LUNGS: Crackles in the bases. Lung excursion symmetrical. No change in fremitus.   ABDOMEN: Bowel sounds present. Non-tender, no masses palpated. Rectal tube in place.  : Normal anatomy.Samson in place with urine.  EXTREMITIES: Decreased muscle mass bilaterally.   LYMPHATICS: No adenopathy palpated, no edema.  SKIN: Dry, intact, no lesions.   NEURO: Growls to intense tactile stimulation  PSYCH: Unable to assess.    Laboratory/Diagnostic Data:    Vent Settings- Oxygen Concentration (%):  [50] 50    ABG  Recent Labs   Lab 01/06/19  0806   PH 7.261*   PO2 71*   PCO2 47.9*   HCO3 21.5*   BE -6       Recent Labs   Lab 01/06/19  0341   WBC 14.40*   HGB 11.2* " Pts Wife returned call to the clinic, writer and other schedulers are not able to pull a template for the offered time. Please call [phone] for scheduling.     HCT 34.2*   *   *   K 3.6   *   CO2 22*   BUN 48*   CREATININE 1.1   AST 54*   ALT 70*   PROT 5.8*   ALBUMIN 2.0*   BILITOT 0.3   PHOS 3.0          Microbiology:    Microbiology Results (last 7 days)     Procedure Component Value Units Date/Time    Culture, Respiratory with Gram Stain [[id number]] Collected:  01/02/19 1229    Order Status:  Canceled Specimen:  Respiratory from Sputum, Expectorated Updated:  01/02/19 1230    Culture, Respiratory with Gram Stain [[id number]]  (Susceptibility) Collected:  12/29/18 1325    Order Status:  Completed Specimen:  Sputum Updated:  01/02/19 1112     Respiratory Culture No Pseudomonas isolated.     Respiratory Culture --     METHICILLIN RESISTANT STAPHYLOCOCCUS AUREUS  Few  Normal respiratory ruddy also present       Gram Stain (Respiratory) <10 epithelial cells per low power field.     Gram Stain (Respiratory) Rare WBC's     Gram Stain (Respiratory) Rare Gram positive cocci          Radiology:      ASSESSMENT/PLAN:     Active Problems:  Steady decline in mentation and unknown baseline  Acute respiratory failure, hypoxemic and hypercapneic  Metabolic acidosis  Aspiration pneumonia with MRSA  Septic shock, resolved  COPD  Anemia   Muscular dystrophy  PEG tube  Hypernatremic, getting free water and TPN adjusted    Continue Bipap  Tomorrow, when MD's that know him return, decisions as to where we are going with this patient need to be made

## 2024-04-10 NOTE — TELEPHONE ENCOUNTER
A message was left for patient to call and schedule a stroke clinic appointment with the following provider:    Shanna Cabrera - June 4-2024 there are several openings for this patient to select from for scheduling.     Patient advised to call [phone]

## 2024-04-10 NOTE — TELEPHONE ENCOUNTER
[facility] Call Center    Phone Message    May a detailed message be left on voicemail: yes     Reason for Call: Other:    Patient's wife, Chayito, returning call from clinic to schedule hospital stroke follow up. Writer unable to schedule on 06/04/2024 under appropriate visit type. Patient's wife requesting appointment around 11:00am if available. Please call to efren, [phone]     Action Taken: Message routed to:  Other: [facility] Neurology    Travel Screening: Not Applicable

## 2024-04-11 ENCOUNTER — INFUSION THERAPY VISIT (OUTPATIENT)
Dept: INFUSION THERAPY | Facility: CLINIC | Age: 69
End: 2024-04-11
Attending: PHYSICIAN ASSISTANT
Payer: COMMERCIAL

## 2024-04-11 VITALS
SYSTOLIC BLOOD PRESSURE: 110 MMHG | TEMPERATURE: 98.7 F | OXYGEN SATURATION: 97 % | RESPIRATION RATE: 18 BRPM | DIASTOLIC BLOOD PRESSURE: 68 MMHG | HEART RATE: 91 BPM

## 2024-04-11 DIAGNOSIS — I33.0 INFECTIVE ENDOCARDITIS, DUE TO UNSPECIFIED ORGANISM, UNSPECIFIED CHRONICITY: ICD-10-CM

## 2024-04-11 DIAGNOSIS — R78.81 BACTEREMIA: Primary | ICD-10-CM

## 2024-04-11 PROCEDURE — 250N000011 HC RX IP 250 OP 636: Performed by: PHYSICIAN ASSISTANT

## 2024-04-11 PROCEDURE — 96365 THER/PROPH/DIAG IV INF INIT: CPT

## 2024-04-11 RX ORDER — HEPARIN SODIUM,PORCINE 10 UNIT/ML
5-20 VIAL (ML) INTRAVENOUS DAILY PRN
Status: DISCONTINUED | OUTPATIENT
Start: 2024-04-11 | End: 2024-04-11 | Stop reason: HOSPADM

## 2024-04-11 RX ORDER — CEFTRIAXONE 2 G/1
2 INJECTION, POWDER, FOR SOLUTION INTRAMUSCULAR; INTRAVENOUS EVERY 24 HOURS
Status: CANCELLED
Start: 2024-04-11

## 2024-04-11 RX ORDER — MEPERIDINE HYDROCHLORIDE 25 MG/ML
25 INJECTION INTRAMUSCULAR; INTRAVENOUS; SUBCUTANEOUS EVERY 30 MIN PRN
Status: CANCELLED | OUTPATIENT
Start: 2024-04-11

## 2024-04-11 RX ORDER — CEFTRIAXONE 2 G/1
2 INJECTION, POWDER, FOR SOLUTION INTRAMUSCULAR; INTRAVENOUS EVERY 24 HOURS
Status: DISCONTINUED | OUTPATIENT
Start: 2024-04-11 | End: 2024-04-11 | Stop reason: HOSPADM

## 2024-04-11 RX ORDER — METHYLPREDNISOLONE SODIUM SUCCINATE 125 MG/2ML
125 INJECTION, POWDER, LYOPHILIZED, FOR SOLUTION INTRAMUSCULAR; INTRAVENOUS
Status: CANCELLED
Start: 2024-04-11

## 2024-04-11 RX ORDER — ALBUTEROL SULFATE 0.83 MG/ML
2.5 SOLUTION RESPIRATORY (INHALATION)
Status: CANCELLED | OUTPATIENT
Start: 2024-04-11

## 2024-04-11 RX ORDER — DIPHENHYDRAMINE HYDROCHLORIDE 50 MG/ML
50 INJECTION INTRAMUSCULAR; INTRAVENOUS
Status: CANCELLED
Start: 2024-04-11

## 2024-04-11 RX ORDER — EPINEPHRINE 1 MG/ML
0.3 INJECTION, SOLUTION, CONCENTRATE INTRAVENOUS EVERY 5 MIN PRN
Status: CANCELLED | OUTPATIENT
Start: 2024-04-11

## 2024-04-11 RX ORDER — HEPARIN SODIUM,PORCINE 10 UNIT/ML
5-20 VIAL (ML) INTRAVENOUS DAILY PRN
Status: CANCELLED | OUTPATIENT
Start: 2024-04-11

## 2024-04-11 RX ORDER — HEPARIN SODIUM (PORCINE) LOCK FLUSH IV SOLN 100 UNIT/ML 100 UNIT/ML
5 SOLUTION INTRAVENOUS
Status: CANCELLED | OUTPATIENT
Start: 2024-04-11

## 2024-04-11 RX ORDER — ALBUTEROL SULFATE 90 UG/1
1-2 AEROSOL, METERED RESPIRATORY (INHALATION)
Status: CANCELLED
Start: 2024-04-11

## 2024-04-11 RX ADMIN — CEFTRIAXONE SODIUM 2 G: 2 INJECTION, POWDER, FOR SOLUTION INTRAMUSCULAR; INTRAVENOUS at 15:34

## 2024-04-11 ASSESSMENT — PAIN SCALES - GENERAL: PAINLEVEL: NO PAIN (1)

## 2024-04-11 NOTE — PROGRESS NOTES
Infusion Nursing Note:  Miguel PERCY Pappas presents today for Ceftriaxone.    Patient seen by provider today: No   present during visit today: Not Applicable.    Note: N/A.      Intravenous Access:  PICC.    Treatment Conditions:  Not Applicable.      Post Infusion Assessment:  Patient tolerated infusion without incident.  Blood return noted pre and post infusion.  Site patent and intact, free from redness, edema or discomfort.  No evidence of extravasations.    Discharge Plan:   Discharge instructions reviewed with: Patient.  Patient and/or family verbalized understanding of discharge instructions and all questions answered.  Patient discharged in stable condition accompanied by: self.  Departure Mode: Ambulatory.      Susan So RN

## 2024-04-12 ENCOUNTER — INFUSION THERAPY VISIT (OUTPATIENT)
Dept: INFUSION THERAPY | Facility: CLINIC | Age: 69
End: 2024-04-12
Attending: PHYSICIAN ASSISTANT
Payer: COMMERCIAL

## 2024-04-12 VITALS
TEMPERATURE: 98.7 F | DIASTOLIC BLOOD PRESSURE: 78 MMHG | OXYGEN SATURATION: 98 % | HEART RATE: 92 BPM | SYSTOLIC BLOOD PRESSURE: 115 MMHG | RESPIRATION RATE: 16 BRPM

## 2024-04-12 DIAGNOSIS — R78.81 BACTEREMIA: Primary | ICD-10-CM

## 2024-04-12 DIAGNOSIS — I33.0 INFECTIVE ENDOCARDITIS, DUE TO UNSPECIFIED ORGANISM, UNSPECIFIED CHRONICITY: ICD-10-CM

## 2024-04-12 PROCEDURE — 96365 THER/PROPH/DIAG IV INF INIT: CPT

## 2024-04-12 PROCEDURE — 250N000011 HC RX IP 250 OP 636: Performed by: PHYSICIAN ASSISTANT

## 2024-04-12 RX ORDER — ALBUTEROL SULFATE 0.83 MG/ML
2.5 SOLUTION RESPIRATORY (INHALATION)
Status: CANCELLED | OUTPATIENT
Start: 2024-04-12

## 2024-04-12 RX ORDER — MEPERIDINE HYDROCHLORIDE 25 MG/ML
25 INJECTION INTRAMUSCULAR; INTRAVENOUS; SUBCUTANEOUS EVERY 30 MIN PRN
Status: CANCELLED | OUTPATIENT
Start: 2024-04-12

## 2024-04-12 RX ORDER — EPINEPHRINE 1 MG/ML
0.3 INJECTION, SOLUTION, CONCENTRATE INTRAVENOUS EVERY 5 MIN PRN
Status: CANCELLED | OUTPATIENT
Start: 2024-04-12

## 2024-04-12 RX ORDER — DIPHENHYDRAMINE HYDROCHLORIDE 50 MG/ML
50 INJECTION INTRAMUSCULAR; INTRAVENOUS
Status: CANCELLED
Start: 2024-04-12

## 2024-04-12 RX ORDER — HEPARIN SODIUM,PORCINE 10 UNIT/ML
5-20 VIAL (ML) INTRAVENOUS DAILY PRN
Status: CANCELLED | OUTPATIENT
Start: 2024-04-12

## 2024-04-12 RX ORDER — ALBUTEROL SULFATE 90 UG/1
1-2 AEROSOL, METERED RESPIRATORY (INHALATION)
Status: CANCELLED
Start: 2024-04-12

## 2024-04-12 RX ORDER — HEPARIN SODIUM (PORCINE) LOCK FLUSH IV SOLN 100 UNIT/ML 100 UNIT/ML
5 SOLUTION INTRAVENOUS
Status: DISCONTINUED | OUTPATIENT
Start: 2024-04-12 | End: 2024-04-12 | Stop reason: HOSPADM

## 2024-04-12 RX ORDER — CEFTRIAXONE 2 G/1
2 INJECTION, POWDER, FOR SOLUTION INTRAMUSCULAR; INTRAVENOUS EVERY 24 HOURS
Status: CANCELLED
Start: 2024-04-12

## 2024-04-12 RX ORDER — METHYLPREDNISOLONE SODIUM SUCCINATE 125 MG/2ML
125 INJECTION, POWDER, LYOPHILIZED, FOR SOLUTION INTRAMUSCULAR; INTRAVENOUS
Status: CANCELLED
Start: 2024-04-12

## 2024-04-12 RX ORDER — HEPARIN SODIUM (PORCINE) LOCK FLUSH IV SOLN 100 UNIT/ML 100 UNIT/ML
5 SOLUTION INTRAVENOUS
Status: CANCELLED | OUTPATIENT
Start: 2024-04-12

## 2024-04-12 RX ORDER — CEFTRIAXONE 2 G/1
2 INJECTION, POWDER, FOR SOLUTION INTRAMUSCULAR; INTRAVENOUS EVERY 24 HOURS
Status: DISCONTINUED | OUTPATIENT
Start: 2024-04-12 | End: 2024-04-12 | Stop reason: HOSPADM

## 2024-04-12 RX ADMIN — CEFTRIAXONE SODIUM 2 G: 2 INJECTION, POWDER, FOR SOLUTION INTRAMUSCULAR; INTRAVENOUS at 15:36

## 2024-04-12 ASSESSMENT — HOOS JR
BENDING TO THE FLOOR TO PICK UP OBJECT: EXTREME
RISING FROM SITTING: SEVERE
SITTING: SEVERE
WALKING ON UNEVEN SURFACE: MODERATE
HOOS JR TOTAL INTERVAL SCORE: 29.01
LYING IN BED (TURNING OVER, MAINTAINING HIP POSITION): EXTREME
GOING UP OR DOWN STAIRS: SEVERE

## 2024-04-12 NOTE — PROGRESS NOTES
Infusion Nursing Note:  Miguel Pappas presents today for IV rocephin.    Patient seen by provider today: No   present during visit today: Not Applicable.    Note: Miguel is feeling well today.  Started walking with cane instead of walker today.      Intravenous Access:  PICC.    Treatment Conditions:  Not Applicable.      Post Infusion Assessment:  Patient tolerated infusion without incident.  Blood return noted pre and post infusion.  No evidence of extravasations.  Access discontinued per protocol.       Discharge Plan:   Discharge instructions reviewed with: Patient.  Patient and/or family verbalized understanding of discharge instructions and all questions answered.  Patient discharged in stable condition accompanied by: self.  Departure Mode: cane.      Susan So RN

## 2024-04-13 ENCOUNTER — HOSPITAL ENCOUNTER (EMERGENCY)
Facility: CLINIC | Age: 69
Discharge: HOME | End: 2024-04-13
Admitting: EMERGENCY MEDICINE
Payer: COMMERCIAL

## 2024-04-13 VITALS
SYSTOLIC BLOOD PRESSURE: 127 MMHG | DIASTOLIC BLOOD PRESSURE: 93 MMHG | OXYGEN SATURATION: 100 % | RESPIRATION RATE: 20 BRPM | TEMPERATURE: 97.8 F | HEART RATE: 87 BPM

## 2024-04-13 DIAGNOSIS — R78.81 BACTEREMIA: ICD-10-CM

## 2024-04-13 DIAGNOSIS — I33.0 INFECTIVE ENDOCARDITIS, DUE TO UNSPECIFIED ORGANISM, UNSPECIFIED CHRONICITY: ICD-10-CM

## 2024-04-13 PROCEDURE — 250N000011 HC RX IP 250 OP 636: Performed by: PHYSICIAN ASSISTANT

## 2024-04-13 PROCEDURE — 999N000104 HC STATISTIC NO CHARGE

## 2024-04-13 PROCEDURE — 96365 THER/PROPH/DIAG IV INF INIT: CPT

## 2024-04-13 RX ORDER — HEPARIN SODIUM (PORCINE) LOCK FLUSH IV SOLN 100 UNIT/ML 100 UNIT/ML
5 SOLUTION INTRAVENOUS
Status: CANCELLED | OUTPATIENT
Start: 2024-04-13

## 2024-04-13 RX ORDER — EPINEPHRINE 1 MG/ML
0.3 INJECTION, SOLUTION, CONCENTRATE INTRAVENOUS EVERY 5 MIN PRN
Status: CANCELLED | OUTPATIENT
Start: 2024-04-13

## 2024-04-13 RX ORDER — MEPERIDINE HYDROCHLORIDE 25 MG/ML
25 INJECTION INTRAMUSCULAR; INTRAVENOUS; SUBCUTANEOUS EVERY 30 MIN PRN
Status: CANCELLED | OUTPATIENT
Start: 2024-04-13

## 2024-04-13 RX ORDER — DIPHENHYDRAMINE HYDROCHLORIDE 50 MG/ML
50 INJECTION INTRAMUSCULAR; INTRAVENOUS
Status: CANCELLED
Start: 2024-04-13

## 2024-04-13 RX ORDER — ALBUTEROL SULFATE 0.83 MG/ML
2.5 SOLUTION RESPIRATORY (INHALATION)
Status: CANCELLED | OUTPATIENT
Start: 2024-04-13

## 2024-04-13 RX ORDER — METHYLPREDNISOLONE SODIUM SUCCINATE 125 MG/2ML
125 INJECTION, POWDER, LYOPHILIZED, FOR SOLUTION INTRAMUSCULAR; INTRAVENOUS
Status: CANCELLED
Start: 2024-04-13

## 2024-04-13 RX ORDER — CEFTRIAXONE 2 G/1
2 INJECTION, POWDER, FOR SOLUTION INTRAMUSCULAR; INTRAVENOUS EVERY 24 HOURS
Status: CANCELLED
Start: 2024-04-13

## 2024-04-13 RX ORDER — CEFTRIAXONE 2 G/1
2 INJECTION, POWDER, FOR SOLUTION INTRAMUSCULAR; INTRAVENOUS EVERY 24 HOURS
Status: DISCONTINUED | OUTPATIENT
Start: 2024-04-13 | End: 2024-04-13 | Stop reason: HOSPADM

## 2024-04-13 RX ORDER — ALBUTEROL SULFATE 90 UG/1
1-2 AEROSOL, METERED RESPIRATORY (INHALATION)
Status: CANCELLED
Start: 2024-04-13

## 2024-04-13 RX ORDER — HEPARIN SODIUM,PORCINE 10 UNIT/ML
5-20 VIAL (ML) INTRAVENOUS DAILY PRN
Status: CANCELLED | OUTPATIENT
Start: 2024-04-13

## 2024-04-13 RX ADMIN — CEFTRIAXONE SODIUM 2 G: 2 INJECTION, POWDER, FOR SOLUTION INTRAMUSCULAR; INTRAVENOUS at 11:56

## 2024-04-13 ASSESSMENT — ACTIVITIES OF DAILY LIVING (ADL): ADLS_ACUITY_SCORE: 36

## 2024-04-14 ENCOUNTER — HOSPITAL ENCOUNTER (EMERGENCY)
Facility: CLINIC | Age: 69
Discharge: HOME | End: 2024-04-14
Admitting: EMERGENCY MEDICINE
Payer: COMMERCIAL

## 2024-04-14 VITALS
HEART RATE: 89 BPM | TEMPERATURE: 97.7 F | DIASTOLIC BLOOD PRESSURE: 76 MMHG | SYSTOLIC BLOOD PRESSURE: 123 MMHG | RESPIRATION RATE: 20 BRPM

## 2024-04-14 DIAGNOSIS — I33.0 INFECTIVE ENDOCARDITIS, DUE TO UNSPECIFIED ORGANISM, UNSPECIFIED CHRONICITY: ICD-10-CM

## 2024-04-14 DIAGNOSIS — R78.81 BACTEREMIA: ICD-10-CM

## 2024-04-14 PROCEDURE — 96365 THER/PROPH/DIAG IV INF INIT: CPT

## 2024-04-14 PROCEDURE — 999N000104 HC STATISTIC NO CHARGE

## 2024-04-14 PROCEDURE — 250N000011 HC RX IP 250 OP 636: Performed by: PHYSICIAN ASSISTANT

## 2024-04-14 RX ORDER — MEPERIDINE HYDROCHLORIDE 25 MG/ML
25 INJECTION INTRAMUSCULAR; INTRAVENOUS; SUBCUTANEOUS EVERY 30 MIN PRN
Status: CANCELLED | OUTPATIENT
Start: 2024-04-14

## 2024-04-14 RX ORDER — METHYLPREDNISOLONE SODIUM SUCCINATE 125 MG/2ML
125 INJECTION, POWDER, LYOPHILIZED, FOR SOLUTION INTRAMUSCULAR; INTRAVENOUS
Status: CANCELLED
Start: 2024-04-14

## 2024-04-14 RX ORDER — DIPHENHYDRAMINE HYDROCHLORIDE 50 MG/ML
50 INJECTION INTRAMUSCULAR; INTRAVENOUS
Status: CANCELLED
Start: 2024-04-14

## 2024-04-14 RX ORDER — CEFTRIAXONE 2 G/1
2 INJECTION, POWDER, FOR SOLUTION INTRAMUSCULAR; INTRAVENOUS EVERY 24 HOURS
Status: DISCONTINUED | OUTPATIENT
Start: 2024-04-14 | End: 2024-04-14 | Stop reason: HOSPADM

## 2024-04-14 RX ORDER — ALBUTEROL SULFATE 0.83 MG/ML
2.5 SOLUTION RESPIRATORY (INHALATION)
Status: CANCELLED | OUTPATIENT
Start: 2024-04-14

## 2024-04-14 RX ORDER — HEPARIN SODIUM (PORCINE) LOCK FLUSH IV SOLN 100 UNIT/ML 100 UNIT/ML
5 SOLUTION INTRAVENOUS
Status: CANCELLED | OUTPATIENT
Start: 2024-04-14

## 2024-04-14 RX ORDER — HEPARIN SODIUM,PORCINE 10 UNIT/ML
5-20 VIAL (ML) INTRAVENOUS DAILY PRN
Status: CANCELLED | OUTPATIENT
Start: 2024-04-14

## 2024-04-14 RX ORDER — HEPARIN SODIUM,PORCINE 10 UNIT/ML
5-20 VIAL (ML) INTRAVENOUS DAILY PRN
Status: DISCONTINUED | OUTPATIENT
Start: 2024-04-14 | End: 2024-04-14 | Stop reason: HOSPADM

## 2024-04-14 RX ORDER — HEPARIN SODIUM (PORCINE) LOCK FLUSH IV SOLN 100 UNIT/ML 100 UNIT/ML
5 SOLUTION INTRAVENOUS
Status: DISCONTINUED | OUTPATIENT
Start: 2024-04-14 | End: 2024-04-14 | Stop reason: HOSPADM

## 2024-04-14 RX ORDER — ALBUTEROL SULFATE 90 UG/1
1-2 AEROSOL, METERED RESPIRATORY (INHALATION)
Status: CANCELLED
Start: 2024-04-14

## 2024-04-14 RX ORDER — CEFTRIAXONE 2 G/1
2 INJECTION, POWDER, FOR SOLUTION INTRAMUSCULAR; INTRAVENOUS EVERY 24 HOURS
Status: CANCELLED
Start: 2024-04-14

## 2024-04-14 RX ORDER — EPINEPHRINE 1 MG/ML
0.3 INJECTION, SOLUTION, CONCENTRATE INTRAVENOUS EVERY 5 MIN PRN
Status: CANCELLED | OUTPATIENT
Start: 2024-04-14

## 2024-04-14 RX ADMIN — CEFTRIAXONE SODIUM 2 G: 2 INJECTION, POWDER, FOR SOLUTION INTRAMUSCULAR; INTRAVENOUS at 11:20

## 2024-04-14 NOTE — ED TRIAGE NOTES
Here for infusion     Triage Assessment (Adult)       Row Name 04/14/24 1110          Triage Assessment    Airway WDL WDL        Respiratory WDL    Respiratory WDL WDL        Skin Circulation/Temperature WDL    Skin Circulation/Temperature WDL WDL        Cardiac WDL    Cardiac WDL WDL        Peripheral/Neurovascular WDL    Peripheral Neurovascular WDL WDL        Cognitive/Neuro/Behavioral WDL    Cognitive/Neuro/Behavioral WDL WDL

## 2024-04-15 ENCOUNTER — INFUSION THERAPY VISIT (OUTPATIENT)
Dept: INFUSION THERAPY | Facility: CLINIC | Age: 69
End: 2024-04-15
Attending: PHYSICIAN ASSISTANT
Payer: COMMERCIAL

## 2024-04-15 VITALS
DIASTOLIC BLOOD PRESSURE: 79 MMHG | HEART RATE: 95 BPM | SYSTOLIC BLOOD PRESSURE: 117 MMHG | RESPIRATION RATE: 18 BRPM | TEMPERATURE: 97.8 F | OXYGEN SATURATION: 98 %

## 2024-04-15 DIAGNOSIS — R78.81 BACTEREMIA: Primary | ICD-10-CM

## 2024-04-15 DIAGNOSIS — I33.0 INFECTIVE ENDOCARDITIS, DUE TO UNSPECIFIED ORGANISM, UNSPECIFIED CHRONICITY: ICD-10-CM

## 2024-04-15 PROCEDURE — 96365 THER/PROPH/DIAG IV INF INIT: CPT

## 2024-04-15 PROCEDURE — 250N000011 HC RX IP 250 OP 636: Performed by: PHYSICIAN ASSISTANT

## 2024-04-15 RX ORDER — ALBUTEROL SULFATE 0.83 MG/ML
2.5 SOLUTION RESPIRATORY (INHALATION)
Status: CANCELLED | OUTPATIENT
Start: 2024-04-15

## 2024-04-15 RX ORDER — METHYLPREDNISOLONE SODIUM SUCCINATE 125 MG/2ML
125 INJECTION, POWDER, LYOPHILIZED, FOR SOLUTION INTRAMUSCULAR; INTRAVENOUS
Status: CANCELLED
Start: 2024-04-15

## 2024-04-15 RX ORDER — EPINEPHRINE 1 MG/ML
0.3 INJECTION, SOLUTION, CONCENTRATE INTRAVENOUS EVERY 5 MIN PRN
Status: CANCELLED | OUTPATIENT
Start: 2024-04-15

## 2024-04-15 RX ORDER — CEFTRIAXONE 2 G/1
2 INJECTION, POWDER, FOR SOLUTION INTRAMUSCULAR; INTRAVENOUS EVERY 24 HOURS
Status: DISCONTINUED | OUTPATIENT
Start: 2024-04-15 | End: 2024-04-15 | Stop reason: HOSPADM

## 2024-04-15 RX ORDER — ALBUTEROL SULFATE 90 UG/1
1-2 AEROSOL, METERED RESPIRATORY (INHALATION)
Status: CANCELLED
Start: 2024-04-15

## 2024-04-15 RX ORDER — HEPARIN SODIUM,PORCINE 10 UNIT/ML
5-20 VIAL (ML) INTRAVENOUS DAILY PRN
Status: CANCELLED | OUTPATIENT
Start: 2024-04-15

## 2024-04-15 RX ORDER — HEPARIN SODIUM (PORCINE) LOCK FLUSH IV SOLN 100 UNIT/ML 100 UNIT/ML
5 SOLUTION INTRAVENOUS
Status: CANCELLED | OUTPATIENT
Start: 2024-04-15

## 2024-04-15 RX ORDER — DIPHENHYDRAMINE HYDROCHLORIDE 50 MG/ML
50 INJECTION INTRAMUSCULAR; INTRAVENOUS
Status: CANCELLED
Start: 2024-04-15

## 2024-04-15 RX ORDER — MEPERIDINE HYDROCHLORIDE 25 MG/ML
25 INJECTION INTRAMUSCULAR; INTRAVENOUS; SUBCUTANEOUS EVERY 30 MIN PRN
Status: CANCELLED | OUTPATIENT
Start: 2024-04-15

## 2024-04-15 RX ORDER — CEFTRIAXONE 2 G/1
2 INJECTION, POWDER, FOR SOLUTION INTRAMUSCULAR; INTRAVENOUS EVERY 24 HOURS
Status: CANCELLED
Start: 2024-04-15

## 2024-04-15 RX ADMIN — CEFTRIAXONE SODIUM 2 G: 2 INJECTION, POWDER, FOR SOLUTION INTRAMUSCULAR; INTRAVENOUS at 14:47

## 2024-04-15 NOTE — PROGRESS NOTES
Infusion Nursing Note:  Miguel Pappas presents today for ceftriaxone.    Patient seen by provider today: No   present during visit today: Not Applicable.    Note: Patient  ambulated to IVO. No complaints. VSS. .      Intravenous Access:  PICC.    Treatment Conditions:  Not Applicable.      Post Infusion Assessment:  Patient tolerated infusion without incident.  Blood return noted pre and post infusion.       Discharge Plan:   Patient discharged in stable condition accompanied by: self and wife.  Departure Mode: Ambulatory.      Berta Barton RN

## 2024-04-16 ENCOUNTER — LAB (OUTPATIENT)
Dept: INFUSION THERAPY | Facility: CLINIC | Age: 69
End: 2024-04-16
Attending: PHYSICIAN ASSISTANT
Payer: COMMERCIAL

## 2024-04-16 ENCOUNTER — THERAPY VISIT (OUTPATIENT)
Dept: PHYSICAL THERAPY | Facility: CLINIC | Age: 69
End: 2024-04-16
Payer: COMMERCIAL

## 2024-04-16 VITALS
DIASTOLIC BLOOD PRESSURE: 81 MMHG | OXYGEN SATURATION: 99 % | TEMPERATURE: 98.2 F | HEART RATE: 99 BPM | RESPIRATION RATE: 16 BRPM | SYSTOLIC BLOOD PRESSURE: 125 MMHG

## 2024-04-16 DIAGNOSIS — R78.81 BACTEREMIA: Primary | ICD-10-CM

## 2024-04-16 DIAGNOSIS — I33.0 INFECTIVE ENDOCARDITIS, DUE TO UNSPECIFIED ORGANISM, UNSPECIFIED CHRONICITY: ICD-10-CM

## 2024-04-16 DIAGNOSIS — I38 VALVULAR ENDOCARDITIS: ICD-10-CM

## 2024-04-16 DIAGNOSIS — M25.552 HIP PAIN, LEFT: Primary | ICD-10-CM

## 2024-04-16 LAB
ALT SERPL W P-5'-P-CCNC: 28 U/L (ref 0–70)
AST SERPL W P-5'-P-CCNC: 26 U/L (ref 0–45)
BASOPHILS # BLD AUTO: 0.1 10E3/UL (ref 0–0.2)
BASOPHILS NFR BLD AUTO: 1 %
CREAT SERPL-MCNC: 1.06 MG/DL (ref 0.67–1.17)
CRP SERPL-MCNC: 42.42 MG/L
EGFRCR SERPLBLD CKD-EPI 2021: 75 ML/MIN/1.73M2
EOSINOPHIL # BLD AUTO: 1 10E3/UL (ref 0–0.7)
EOSINOPHIL NFR BLD AUTO: 9 %
ERYTHROCYTE [DISTWIDTH] IN BLOOD BY AUTOMATED COUNT: 14.2 % (ref 10–15)
HCT VFR BLD AUTO: 32.3 % (ref 40–53)
HGB BLD-MCNC: 10.7 G/DL (ref 13.3–17.7)
IMM GRANULOCYTES # BLD: 0 10E3/UL
IMM GRANULOCYTES NFR BLD: 0 %
LYMPHOCYTES # BLD AUTO: 1.4 10E3/UL (ref 0.8–5.3)
LYMPHOCYTES NFR BLD AUTO: 12 %
MCH RBC QN AUTO: 28.8 PG (ref 26.5–33)
MCHC RBC AUTO-ENTMCNC: 33.1 G/DL (ref 31.5–36.5)
MCV RBC AUTO: 87 FL (ref 78–100)
MONOCYTES # BLD AUTO: 1.3 10E3/UL (ref 0–1.3)
MONOCYTES NFR BLD AUTO: 12 %
NEUTROPHILS # BLD AUTO: 7.4 10E3/UL (ref 1.6–8.3)
NEUTROPHILS NFR BLD AUTO: 66 %
NRBC # BLD AUTO: 0 10E3/UL
NRBC BLD AUTO-RTO: 0 /100
PLATELET # BLD AUTO: 285 10E3/UL (ref 150–450)
RBC # BLD AUTO: 3.72 10E6/UL (ref 4.4–5.9)
WBC # BLD AUTO: 11.2 10E3/UL (ref 4–11)

## 2024-04-16 PROCEDURE — 97140 MANUAL THERAPY 1/> REGIONS: CPT | Mod: GP | Performed by: PHYSICAL THERAPIST

## 2024-04-16 PROCEDURE — 82565 ASSAY OF CREATININE: CPT

## 2024-04-16 PROCEDURE — 85004 AUTOMATED DIFF WBC COUNT: CPT

## 2024-04-16 PROCEDURE — 36592 COLLECT BLOOD FROM PICC: CPT

## 2024-04-16 PROCEDURE — 86140 C-REACTIVE PROTEIN: CPT

## 2024-04-16 PROCEDURE — 84450 TRANSFERASE (AST) (SGOT): CPT

## 2024-04-16 PROCEDURE — 250N000011 HC RX IP 250 OP 636: Performed by: PHYSICIAN ASSISTANT

## 2024-04-16 PROCEDURE — 84460 ALANINE AMINO (ALT) (SGPT): CPT

## 2024-04-16 PROCEDURE — 96365 THER/PROPH/DIAG IV INF INIT: CPT

## 2024-04-16 RX ORDER — ALBUTEROL SULFATE 0.83 MG/ML
2.5 SOLUTION RESPIRATORY (INHALATION)
Status: CANCELLED | OUTPATIENT
Start: 2024-04-16

## 2024-04-16 RX ORDER — ALBUTEROL SULFATE 90 UG/1
1-2 AEROSOL, METERED RESPIRATORY (INHALATION)
Status: CANCELLED
Start: 2024-04-16

## 2024-04-16 RX ORDER — CEFTRIAXONE 2 G/1
2 INJECTION, POWDER, FOR SOLUTION INTRAMUSCULAR; INTRAVENOUS EVERY 24 HOURS
Status: CANCELLED
Start: 2024-04-16

## 2024-04-16 RX ORDER — HEPARIN SODIUM,PORCINE 10 UNIT/ML
5-20 VIAL (ML) INTRAVENOUS DAILY PRN
Status: CANCELLED | OUTPATIENT
Start: 2024-04-16

## 2024-04-16 RX ORDER — METHYLPREDNISOLONE SODIUM SUCCINATE 125 MG/2ML
125 INJECTION, POWDER, LYOPHILIZED, FOR SOLUTION INTRAMUSCULAR; INTRAVENOUS
Status: CANCELLED
Start: 2024-04-16

## 2024-04-16 RX ORDER — EPINEPHRINE 1 MG/ML
0.3 INJECTION, SOLUTION, CONCENTRATE INTRAVENOUS EVERY 5 MIN PRN
Status: CANCELLED | OUTPATIENT
Start: 2024-04-16

## 2024-04-16 RX ORDER — MEPERIDINE HYDROCHLORIDE 25 MG/ML
25 INJECTION INTRAMUSCULAR; INTRAVENOUS; SUBCUTANEOUS EVERY 30 MIN PRN
Status: CANCELLED | OUTPATIENT
Start: 2024-04-16

## 2024-04-16 RX ORDER — DIPHENHYDRAMINE HYDROCHLORIDE 50 MG/ML
50 INJECTION INTRAMUSCULAR; INTRAVENOUS
Status: CANCELLED
Start: 2024-04-16

## 2024-04-16 RX ORDER — CEFTRIAXONE 2 G/1
2 INJECTION, POWDER, FOR SOLUTION INTRAMUSCULAR; INTRAVENOUS EVERY 24 HOURS
Status: DISCONTINUED | OUTPATIENT
Start: 2024-04-16 | End: 2024-04-16 | Stop reason: HOSPADM

## 2024-04-16 RX ORDER — HEPARIN SODIUM (PORCINE) LOCK FLUSH IV SOLN 100 UNIT/ML 100 UNIT/ML
5 SOLUTION INTRAVENOUS
Status: CANCELLED | OUTPATIENT
Start: 2024-04-16

## 2024-04-16 RX ADMIN — CEFTRIAXONE SODIUM 2 G: 2 INJECTION, POWDER, FOR SOLUTION INTRAMUSCULAR; INTRAVENOUS at 14:57

## 2024-04-16 NOTE — PROGRESS NOTES
Infusion Nursing Note:  Miguel Pappas presents today for [facility] labs.    Patient seen by provider today: No   present during visit today: Not Applicable.    Intravenous Access:  PICC.      Susan So RN

## 2024-04-16 NOTE — PROGRESS NOTES
Infusion Nursing Note:  Miguel Pappas presents today for Rocephin 2g.    Patient seen by provider today: No   present during visit today: Not Applicable.    Note: Miguel is sore today from PT workout, otherwise no new complaints.      Intravenous Access:  PICC.    Treatment Conditions:  Not Applicable.      Post Infusion Assessment:  Patient tolerated infusion without incident.  Blood return noted pre and post infusion.  Site patent and intact, free from redness, edema or discomfort.  No evidence of extravasations.       Discharge Plan:   Discharge instructions reviewed with: Patient.  Patient and/or family verbalized understanding of discharge instructions and all questions answered.  Patient discharged in stable condition accompanied by: self.  Departure Mode: Ambulatory.      Susan So RN

## 2024-04-17 ENCOUNTER — OFFICE VISIT (OUTPATIENT)
Dept: ORTHOPEDICS | Facility: CLINIC | Age: 69
End: 2024-04-17
Payer: COMMERCIAL

## 2024-04-17 ENCOUNTER — INFUSION THERAPY VISIT (OUTPATIENT)
Dept: INFUSION THERAPY | Facility: CLINIC | Age: 69
End: 2024-04-17
Attending: PHYSICIAN ASSISTANT
Payer: COMMERCIAL

## 2024-04-17 VITALS
HEIGHT: 67 IN | SYSTOLIC BLOOD PRESSURE: 118 MMHG | DIASTOLIC BLOOD PRESSURE: 80 MMHG | WEIGHT: 141 LBS | BODY MASS INDEX: 22.13 KG/M2

## 2024-04-17 DIAGNOSIS — M25.552 HIP PAIN, LEFT: ICD-10-CM

## 2024-04-17 DIAGNOSIS — M16.12 PRIMARY OSTEOARTHRITIS OF LEFT HIP: ICD-10-CM

## 2024-04-17 DIAGNOSIS — I33.0 INFECTIVE ENDOCARDITIS, DUE TO UNSPECIFIED ORGANISM, UNSPECIFIED CHRONICITY: ICD-10-CM

## 2024-04-17 DIAGNOSIS — R78.81 BACTEREMIA: Primary | ICD-10-CM

## 2024-04-17 PROCEDURE — 99213 OFFICE O/P EST LOW 20 MIN: CPT | Performed by: ORTHOPAEDIC SURGERY

## 2024-04-17 PROCEDURE — 96365 THER/PROPH/DIAG IV INF INIT: CPT

## 2024-04-17 PROCEDURE — 250N000011 HC RX IP 250 OP 636: Performed by: PHYSICIAN ASSISTANT

## 2024-04-17 RX ORDER — CEFTRIAXONE 2 G/1
2 INJECTION, POWDER, FOR SOLUTION INTRAMUSCULAR; INTRAVENOUS EVERY 24 HOURS
Status: DISCONTINUED | OUTPATIENT
Start: 2024-04-17 | End: 2024-04-17 | Stop reason: HOSPADM

## 2024-04-17 RX ORDER — ALBUTEROL SULFATE 0.83 MG/ML
2.5 SOLUTION RESPIRATORY (INHALATION)
Status: CANCELLED | OUTPATIENT
Start: 2024-04-17

## 2024-04-17 RX ORDER — ALBUTEROL SULFATE 90 UG/1
1-2 AEROSOL, METERED RESPIRATORY (INHALATION)
Status: CANCELLED
Start: 2024-04-17

## 2024-04-17 RX ORDER — EPINEPHRINE 1 MG/ML
0.3 INJECTION, SOLUTION, CONCENTRATE INTRAVENOUS EVERY 5 MIN PRN
Status: CANCELLED | OUTPATIENT
Start: 2024-04-17

## 2024-04-17 RX ORDER — HEPARIN SODIUM (PORCINE) LOCK FLUSH IV SOLN 100 UNIT/ML 100 UNIT/ML
5 SOLUTION INTRAVENOUS
Status: CANCELLED | OUTPATIENT
Start: 2024-04-17

## 2024-04-17 RX ORDER — METHYLPREDNISOLONE SODIUM SUCCINATE 125 MG/2ML
125 INJECTION, POWDER, LYOPHILIZED, FOR SOLUTION INTRAMUSCULAR; INTRAVENOUS
Status: CANCELLED
Start: 2024-04-17

## 2024-04-17 RX ORDER — HEPARIN SODIUM,PORCINE 10 UNIT/ML
5-20 VIAL (ML) INTRAVENOUS DAILY PRN
Status: CANCELLED | OUTPATIENT
Start: 2024-04-17

## 2024-04-17 RX ORDER — MEPERIDINE HYDROCHLORIDE 25 MG/ML
25 INJECTION INTRAMUSCULAR; INTRAVENOUS; SUBCUTANEOUS EVERY 30 MIN PRN
Status: CANCELLED | OUTPATIENT
Start: 2024-04-17

## 2024-04-17 RX ORDER — DIPHENHYDRAMINE HYDROCHLORIDE 50 MG/ML
50 INJECTION INTRAMUSCULAR; INTRAVENOUS
Status: CANCELLED
Start: 2024-04-17

## 2024-04-17 RX ORDER — CEFTRIAXONE 2 G/1
2 INJECTION, POWDER, FOR SOLUTION INTRAMUSCULAR; INTRAVENOUS EVERY 24 HOURS
Status: CANCELLED
Start: 2024-04-17

## 2024-04-17 RX ADMIN — CEFTRIAXONE SODIUM 2 G: 2 INJECTION, POWDER, FOR SOLUTION INTRAMUSCULAR; INTRAVENOUS at 15:06

## 2024-04-17 ASSESSMENT — PAIN SCALES - GENERAL: PAINLEVEL: NO PAIN (1)

## 2024-04-17 NOTE — LETTER
4/17/2024         RE: Miguel Pappas  [address] [id number]        Dear Colleague,    Thank you for referring your patient, Miguel Pappas, to the [facility]. Please see a copy of my visit note below.    S:  Perales, in and out of equipment.  Developed infection oral/jaw and now on antibiotics with PICC line.Started about a month ago.  Since then has had exacerbation L hip pain and function, hip improved as he has improved over time.  States he has had hip aspiration and no infection currently found.         Patient Active Problem List   Diagnosis     S/P AVR- bioprosthetic     Prophylactic antibiotic     Hyponatremia     Hypochloremia     SOB (shortness of breath)     Primary osteoarthritis of left hip     Elevated brain natriuretic peptide (BNP) level     Osteopenia, unspecified location     Hip pain, left     Confusion     Word finding difficulty     Leukocytosis, unspecified type     Endocarditis     Bacteremia            Past Medical History:   Diagnosis Date     * * * SBE PROPHYLAXIS * * *     no longer indicated - 2007 AHA guidelnies     Mitral valve stenosis and aortic valve stenosis     2/6 murmur     Pure hypercholesterolemia 1/26/2007            Past Surgical History:   Procedure Laterality Date     COLONOSCOPY N/A 12/5/2014    Procedure: COLONOSCOPY;  Surgeon: Omar Chisholm MD;  Location:  GI     CV CORONARY ANGIOGRAM N/A 11/15/2022    Procedure: Coronary Angiogram;  Surgeon: Edson Nava MD;  Location:  HEART CARDIAC CATH LAB     HC REMOVAL OF TONSILS,<9 Y/O      Tonsils <10y.o.     REPLACE VALVE AORTIC N/A 12/16/2022    Procedure: AORTIC VALVE REPLACEMENT WITH 21MM INSPIRIS RESILIA VALVE, PLACEMENT OF TEMPORARY VENTRICULAR AND ATRIAL PACING WIRES AND TRANSESPHOGEAL ECHOCARDIOGRAM;  Surgeon: Kaila Abbott MD;  Location:  OR     [facility] COLONOSCOPY W/WO BRUSH/WASH  2/8/2005             Social History     Tobacco Use     Smoking  status: Never     Passive exposure: Never     Smokeless tobacco: Never   Substance Use Topics     Alcohol use: Yes     Comment: 1-2 beer monthly  maybe            Family History   Problem Relation Age of Onset     Cancer Father         Spleen cancer     Hypertension Father      Allergies Father         PCN     Gastrointestinal Disease Father         ulcer     Heart Disease Father         Hx: meds and angioplasty     Hypertension Brother      Cerebrovascular Disease Paternal Grandfather      Arthritis Paternal Grandfather      Arthritis Mother      Hypertension Mother      Allergies Son         Dust , Mold     Depression Sister         X 2               Allergies   Allergen Reactions     No Known Drug Allergy             Current Outpatient Medications   Medication Sig Dispense Refill     acetaminophen (TYLENOL) 325 MG tablet Take 2 tablets (650 mg) by mouth every 4 hours as needed for mild pain or other (and adjunct with moderate or severe pain or per patient request)       ascorbic acid (VITAMIN C) 250 MG CHEW chewable tablet Take 250 mg by mouth daily       aspirin 81 MG tablet Take 81 mg by mouth at bedtime       atorvastatin (LIPITOR) 40 MG tablet Take 1 tablet (40 mg) by mouth every evening 60 tablet 1     cefTRIAXone (ROCEPHIN) 2 GM vial Inject 2 g into the vein every 24 hours for 37 days Check CBC with diff, creatinine, AST, and CRP weekly and [facility].       cyclobenzaprine (FLEXERIL) 5 MG tablet Take 1 tablet (5 mg) by mouth 3 times daily 60 tablet 0     ferrous sulfate (FEROSUL) 325 (65 Fe) MG tablet Take 325 mg by mouth daily (with breakfast)       gabapentin (NEURONTIN) 100 MG capsule Take 1 capsule (100 mg) by mouth 3 times daily 30 capsule 0     GLUCOSAMINE CHONDROITIN OR TABS Take 1 tablet by mouth at bedtime  0     ibuprofen (ADVIL/MOTRIN) 200 MG tablet Take 1 tablet (200 mg) by mouth every 6 hours as needed for inflammatory pain       Multiple Vitamins-Minerals (CENTRUM  SILVER PO) Take 1 tablet by mouth at bedtime 30 0     Vitamin D3 (CHOLECALCIFEROL) 25 mcg (1000 units) tablet Take 25 mcg by mouth daily       amoxicillin (AMOXIL) 500 MG capsule Take 2,000 mg by mouth once as needed (dental prophy) (Patient not taking: Reported on 4/17/2024)       oxyCODONE (ROXICODONE) 5 MG tablet Take 1 tablet (5 mg) by mouth every 6 hours as needed for moderate pain (Patient not taking: Reported on 4/17/2024) 20 tablet 0          Review Of Systems  Skin: negative  Eyes: negative  Ears/Nose/Throat: negative  Respiratory: No shortness of breath, dyspnea on exertion, cough, or hemoptysis    O: Physical Exam:  Limited to no internal rotation Lhip.  Limited abduction as well.  Adequate flexion and extension L hip.  CMS intact.    Lab:CRP 42.42, WBC 11.2  66%polys    Images:     XR PELVIS AND HIP LEFT 1 VIEW  3/7/2024 10:53 AM      HISTORY: Hip pain, left  COMPARISON: None                                                                      IMPRESSION: No acute fracture or malalignment. Severe left hip joint  degenerative changes with bone-on-bone articulation. Small marginal  erosion at the left femoral head-neck junction. Mild right hip joint  degenerative changes. Moderate degenerative changes of the sacroiliac  joints and lower lumbar spine. Osteopenia.     A:  L hip arthropathy      P:  Discussed intervention such as intra articular injection as well as hip arthroplasty.  Patient to visit with Infectious Disease next week and will discuss appropriate timing for further intervention.  We discussed procedures/risks/benefits and will provide with most recent consensus regarding intervention about time of treatment for oral/dental infection.  See back in a month to discuss further.  Notify if exacerbation symptoms.             In addition to the above assessment and plan each active problem on Miguel's problem list was evaluated today. This included the questioning of Miguel for any medication  problems. We will continue the current treatment plan for these active problems except as noted.        Again, thank you for allowing me to participate in the care of your patient.        Sincerely,        Levi Campbell MD

## 2024-04-17 NOTE — PROGRESS NOTES
S:  Perales, in and out of equipment.  Developed infection oral/jaw and now on antibiotics with PICC line.Started about a month ago.  Since then has had exacerbation L hip pain and function, hip improved as he has improved over time.  States he has had hip aspiration and no infection currently found.         Patient Active Problem List   Diagnosis    S/P AVR- bioprosthetic    Prophylactic antibiotic    Hyponatremia    Hypochloremia    SOB (shortness of breath)    Primary osteoarthritis of left hip    Elevated brain natriuretic peptide (BNP) level    Osteopenia, unspecified location    Hip pain, left    Confusion    Word finding difficulty    Leukocytosis, unspecified type    Endocarditis    Bacteremia            Past Medical History:   Diagnosis Date    * * * SBE PROPHYLAXIS * * *     no longer indicated - 2007 AHA guidelnies    Mitral valve stenosis and aortic valve stenosis     2/6 murmur    Pure hypercholesterolemia 1/26/2007            Past Surgical History:   Procedure Laterality Date    COLONOSCOPY N/A 12/5/2014    Procedure: COLONOSCOPY;  Surgeon: Omar Chisholm MD;  Location:  GI    CV CORONARY ANGIOGRAM N/A 11/15/2022    Procedure: Coronary Angiogram;  Surgeon: Edson Nava MD;  Location:  HEART CARDIAC CATH LAB    HC REMOVAL OF TONSILS,<9 Y/O      Tonsils <10y.o.    REPLACE VALVE AORTIC N/A 12/16/2022    Procedure: AORTIC VALVE REPLACEMENT WITH 21MM INSPIRIS RESILIA VALVE, PLACEMENT OF TEMPORARY VENTRICULAR AND ATRIAL PACING WIRES AND TRANSESPHOGEAL ECHOCARDIOGRAM;  Surgeon: Kaila Abbott MD;  Location:  OR    [facility] COLONOSCOPY W/WO BRUSH/WASH  2/8/2005             Social History     Tobacco Use    Smoking status: Never     Passive exposure: Never    Smokeless tobacco: Never   Substance Use Topics    Alcohol use: Yes     Comment: 1-2 beer monthly  maybe            Family History   Problem Relation Age of Onset    Cancer Father         Spleen cancer    Hypertension Father      Allergies Father         PCN    Gastrointestinal Disease Father         ulcer    Heart Disease Father         Hx: meds and angioplasty    Hypertension Brother     Cerebrovascular Disease Paternal Grandfather     Arthritis Paternal Grandfather     Arthritis Mother     Hypertension Mother     Allergies Son         Dust , Mold    Depression Sister         X 2               Allergies   Allergen Reactions    No Known Drug Allergy             Current Outpatient Medications   Medication Sig Dispense Refill    acetaminophen (TYLENOL) 325 MG tablet Take 2 tablets (650 mg) by mouth every 4 hours as needed for mild pain or other (and adjunct with moderate or severe pain or per patient request)      ascorbic acid (VITAMIN C) 250 MG CHEW chewable tablet Take 250 mg by mouth daily      aspirin 81 MG tablet Take 81 mg by mouth at bedtime      atorvastatin (LIPITOR) 40 MG tablet Take 1 tablet (40 mg) by mouth every evening 60 tablet 1    cefTRIAXone (ROCEPHIN) 2 GM vial Inject 2 g into the vein every 24 hours for 37 days Check CBC with diff, creatinine, AST, and CRP weekly and [facility].      cyclobenzaprine (FLEXERIL) 5 MG tablet Take 1 tablet (5 mg) by mouth 3 times daily 60 tablet 0    ferrous sulfate (FEROSUL) 325 (65 Fe) MG tablet Take 325 mg by mouth daily (with breakfast)      gabapentin (NEURONTIN) 100 MG capsule Take 1 capsule (100 mg) by mouth 3 times daily 30 capsule 0    GLUCOSAMINE CHONDROITIN OR TABS Take 1 tablet by mouth at bedtime  0    ibuprofen (ADVIL/MOTRIN) 200 MG tablet Take 1 tablet (200 mg) by mouth every 6 hours as needed for inflammatory pain      Multiple Vitamins-Minerals (CENTRUM SILVER PO) Take 1 tablet by mouth at bedtime 30 0    Vitamin D3 (CHOLECALCIFEROL) 25 mcg (1000 units) tablet Take 25 mcg by mouth daily      amoxicillin (AMOXIL) 500 MG capsule Take 2,000 mg by mouth once as needed (dental prophy) (Patient not taking: Reported on 4/17/2024)      oxyCODONE (ROXICODONE)  5 MG tablet Take 1 tablet (5 mg) by mouth every 6 hours as needed for moderate pain (Patient not taking: Reported on 4/17/2024) 20 tablet 0          Review Of Systems  Skin: negative  Eyes: negative  Ears/Nose/Throat: negative  Respiratory: No shortness of breath, dyspnea on exertion, cough, or hemoptysis    O: Physical Exam:  Limited to no internal rotation Lhip.  Limited abduction as well.  Adequate flexion and extension L hip.  CMS intact.    Lab:CRP 42.42, WBC 11.2  66%polys    Images:     XR PELVIS AND HIP LEFT 1 VIEW  3/7/2024 10:53 AM      HISTORY: Hip pain, left  COMPARISON: None                                                                      IMPRESSION: No acute fracture or malalignment. Severe left hip joint  degenerative changes with bone-on-bone articulation. Small marginal  erosion at the left femoral head-neck junction. Mild right hip joint  degenerative changes. Moderate degenerative changes of the sacroiliac  joints and lower lumbar spine. Osteopenia.     A:  L hip arthropathy      P:  Discussed intervention such as intra articular injection as well as hip arthroplasty.  Patient to visit with Infectious Disease next week and will discuss appropriate timing for further intervention.  We discussed procedures/risks/benefits and will provide with most recent consensus regarding intervention about time of treatment for oral/dental infection.  See back in a month to discuss further.  Notify if exacerbation symptoms.             In addition to the above assessment and plan each active problem on Miguel's problem list was evaluated today. This included the questioning of Miguel for any medication problems. We will continue the current treatment plan for these active problems except as noted.

## 2024-04-17 NOTE — PROGRESS NOTES
Infusion Nursing Note:  Miguel GREER Elyse presents today for Ceftriaxone.    Patient seen by provider today: Yes, Dr. Campbell.    present during visit today: Not Applicable.    Note: N/A.      Intravenous Access:  PICC.    Treatment Conditions:  Not Applicable.      Post Infusion Assessment:  Patient tolerated infusion without incident.  Blood return noted pre and post infusion.  Site patent and intact, free from redness, edema or discomfort.  No evidence of extravasations.  Access flushed with NS after medication given.       Discharge Plan:   [facility]  Patient will return 4/18 for next appointment.   Patient discharged in stable condition accompanied by: wife.  Departure Mode: Ambulatory w/cane.      Sherrell Bean RN

## 2024-04-18 ENCOUNTER — INFUSION THERAPY VISIT (OUTPATIENT)
Dept: INFUSION THERAPY | Facility: CLINIC | Age: 69
End: 2024-04-18
Attending: PHYSICIAN ASSISTANT
Payer: COMMERCIAL

## 2024-04-18 VITALS
TEMPERATURE: 99.3 F | OXYGEN SATURATION: 98 % | SYSTOLIC BLOOD PRESSURE: 131 MMHG | DIASTOLIC BLOOD PRESSURE: 75 MMHG | RESPIRATION RATE: 16 BRPM | HEART RATE: 95 BPM

## 2024-04-18 DIAGNOSIS — R78.81 BACTEREMIA: Primary | ICD-10-CM

## 2024-04-18 PROCEDURE — 250N000011 HC RX IP 250 OP 636

## 2024-04-18 PROCEDURE — 96365 THER/PROPH/DIAG IV INF INIT: CPT

## 2024-04-18 RX ORDER — CEFTRIAXONE 2 G/1
2 INJECTION, POWDER, FOR SOLUTION INTRAMUSCULAR; INTRAVENOUS EVERY 24 HOURS
Status: DISCONTINUED | OUTPATIENT
Start: 2024-04-18 | End: 2024-04-18 | Stop reason: HOSPADM

## 2024-04-18 RX ADMIN — CEFTRIAXONE SODIUM 2 G: 2 INJECTION, POWDER, FOR SOLUTION INTRAMUSCULAR; INTRAVENOUS at 15:40

## 2024-04-18 ASSESSMENT — PAIN SCALES - GENERAL: PAINLEVEL: NO PAIN (0)

## 2024-04-18 NOTE — PROGRESS NOTES
Infusion Nursing Note:  Miguel Pappas presents today for IV Rocephin.    Patient seen by provider today: No   present during visit today: Not Applicable.    Note: N/A.      Intravenous Access:  PICC.    Treatment Conditions:  Not Applicable.      Post Infusion Assessment:  Patient tolerated infusion without incident.       Discharge Plan:   Patient discharged in stable condition accompanied by: self.  Departure Mode: Ambulatory with cane.      Manuela Brunner RN

## 2024-04-19 ENCOUNTER — INFUSION THERAPY VISIT (OUTPATIENT)
Dept: INFUSION THERAPY | Facility: CLINIC | Age: 69
End: 2024-04-19
Attending: INTERNAL MEDICINE
Payer: COMMERCIAL

## 2024-04-19 DIAGNOSIS — I33.0 INFECTIVE ENDOCARDITIS, DUE TO UNSPECIFIED ORGANISM, UNSPECIFIED CHRONICITY: ICD-10-CM

## 2024-04-19 DIAGNOSIS — R78.81 BACTEREMIA: Primary | ICD-10-CM

## 2024-04-19 PROCEDURE — 250N000011 HC RX IP 250 OP 636: Performed by: PHYSICIAN ASSISTANT

## 2024-04-19 PROCEDURE — 96365 THER/PROPH/DIAG IV INF INIT: CPT

## 2024-04-19 RX ORDER — DIPHENHYDRAMINE HYDROCHLORIDE 50 MG/ML
50 INJECTION INTRAMUSCULAR; INTRAVENOUS
Status: CANCELLED
Start: 2024-04-19

## 2024-04-19 RX ORDER — ALBUTEROL SULFATE 90 UG/1
1-2 AEROSOL, METERED RESPIRATORY (INHALATION)
Status: CANCELLED
Start: 2024-04-19

## 2024-04-19 RX ORDER — HEPARIN SODIUM,PORCINE 10 UNIT/ML
5-20 VIAL (ML) INTRAVENOUS DAILY PRN
Status: CANCELLED | OUTPATIENT
Start: 2024-04-19

## 2024-04-19 RX ORDER — ALBUTEROL SULFATE 0.83 MG/ML
2.5 SOLUTION RESPIRATORY (INHALATION)
Status: CANCELLED | OUTPATIENT
Start: 2024-04-19

## 2024-04-19 RX ORDER — HEPARIN SODIUM (PORCINE) LOCK FLUSH IV SOLN 100 UNIT/ML 100 UNIT/ML
5 SOLUTION INTRAVENOUS
Status: CANCELLED | OUTPATIENT
Start: 2024-04-19

## 2024-04-19 RX ORDER — METHYLPREDNISOLONE SODIUM SUCCINATE 125 MG/2ML
125 INJECTION, POWDER, LYOPHILIZED, FOR SOLUTION INTRAMUSCULAR; INTRAVENOUS
Status: CANCELLED
Start: 2024-04-19

## 2024-04-19 RX ORDER — CEFTRIAXONE 2 G/1
2 INJECTION, POWDER, FOR SOLUTION INTRAMUSCULAR; INTRAVENOUS EVERY 24 HOURS
Status: CANCELLED
Start: 2024-04-19

## 2024-04-19 RX ORDER — CEFTRIAXONE 2 G/1
2 INJECTION, POWDER, FOR SOLUTION INTRAMUSCULAR; INTRAVENOUS EVERY 24 HOURS
Status: DISCONTINUED | OUTPATIENT
Start: 2024-04-19 | End: 2024-04-19 | Stop reason: HOSPADM

## 2024-04-19 RX ORDER — MEPERIDINE HYDROCHLORIDE 25 MG/ML
25 INJECTION INTRAMUSCULAR; INTRAVENOUS; SUBCUTANEOUS EVERY 30 MIN PRN
Status: CANCELLED | OUTPATIENT
Start: 2024-04-19

## 2024-04-19 RX ORDER — EPINEPHRINE 1 MG/ML
0.3 INJECTION, SOLUTION, CONCENTRATE INTRAVENOUS EVERY 5 MIN PRN
Status: CANCELLED | OUTPATIENT
Start: 2024-04-19

## 2024-04-19 RX ADMIN — CEFTRIAXONE SODIUM 2 G: 2 INJECTION, POWDER, FOR SOLUTION INTRAMUSCULAR; INTRAVENOUS at 15:49

## 2024-04-19 NOTE — PROGRESS NOTES
Infusion Nursing Note:  Miguel Pappas presents today for Ceftriaxone.    Patient seen by provider today: No   present during visit today: Not Applicable.    Note: Miguel is reporting ear sx, ringing in his ears, high pitched sound at times. He does have hx of hearing loss in L ear prior to this treatment. He has had some cold sx lately. Sore throat a couple days ago. Cough on occasion. Has only taken Tylenol so far for this. May consider taking a cold medication or decongestant over the weekend. Has appt with Dr. Geller the beginning of May. VSS, 99.3 temporal temp.  Denies pain.   Wife and Patient state they will notify Physician/health care team if Ear sx worsen or persist over the next couple days.       Intravenous Access:  PICC.    Treatment Conditions:  Not Applicable.      Post Infusion Assessment:  Patient tolerated infusion without incident.  Blood return noted pre and post infusion.  Site patent and intact, free from redness, edema or discomfort.  No evidence of extravasations.  Access discontinued per protocol.       Discharge Plan:   AVS to patient via MYCHART.  Patient will return 4/22 for next appointment. Will go to ER for outpatient antibiotics on Saturday and Sunday. Msg sent to ER staff.   Patient discharged in stable condition accompanied by: wife.  Departure Mode: Ambulatory w/ quad cane.      Sherrell Bean RN

## 2024-04-20 ENCOUNTER — HOSPITAL ENCOUNTER (EMERGENCY)
Facility: CLINIC | Age: 69
Discharge: HOME | End: 2024-04-20
Admitting: EMERGENCY MEDICINE
Payer: COMMERCIAL

## 2024-04-20 VITALS
HEART RATE: 100 BPM | SYSTOLIC BLOOD PRESSURE: 133 MMHG | RESPIRATION RATE: 18 BRPM | DIASTOLIC BLOOD PRESSURE: 89 MMHG | TEMPERATURE: 98.3 F | OXYGEN SATURATION: 97 %

## 2024-04-20 DIAGNOSIS — R78.81 BACTEREMIA: ICD-10-CM

## 2024-04-20 DIAGNOSIS — I33.0 INFECTIVE ENDOCARDITIS, DUE TO UNSPECIFIED ORGANISM, UNSPECIFIED CHRONICITY: ICD-10-CM

## 2024-04-20 PROCEDURE — 250N000011 HC RX IP 250 OP 636: Performed by: PHYSICIAN ASSISTANT

## 2024-04-20 PROCEDURE — 96365 THER/PROPH/DIAG IV INF INIT: CPT | Performed by: EMERGENCY MEDICINE

## 2024-04-20 PROCEDURE — 999N000104 HC STATISTIC NO CHARGE: Performed by: EMERGENCY MEDICINE

## 2024-04-20 RX ORDER — METHYLPREDNISOLONE SODIUM SUCCINATE 125 MG/2ML
125 INJECTION, POWDER, LYOPHILIZED, FOR SOLUTION INTRAMUSCULAR; INTRAVENOUS
Status: CANCELLED
Start: 2024-04-20

## 2024-04-20 RX ORDER — DIPHENHYDRAMINE HYDROCHLORIDE 50 MG/ML
50 INJECTION INTRAMUSCULAR; INTRAVENOUS
Status: CANCELLED
Start: 2024-04-20

## 2024-04-20 RX ORDER — ALBUTEROL SULFATE 90 UG/1
1-2 AEROSOL, METERED RESPIRATORY (INHALATION)
Status: CANCELLED
Start: 2024-04-20

## 2024-04-20 RX ORDER — MEPERIDINE HYDROCHLORIDE 25 MG/ML
25 INJECTION INTRAMUSCULAR; INTRAVENOUS; SUBCUTANEOUS EVERY 30 MIN PRN
Status: CANCELLED | OUTPATIENT
Start: 2024-04-20

## 2024-04-20 RX ORDER — HEPARIN SODIUM,PORCINE 10 UNIT/ML
5-20 VIAL (ML) INTRAVENOUS DAILY PRN
Status: CANCELLED | OUTPATIENT
Start: 2024-04-20

## 2024-04-20 RX ORDER — HEPARIN SODIUM (PORCINE) LOCK FLUSH IV SOLN 100 UNIT/ML 100 UNIT/ML
5 SOLUTION INTRAVENOUS
Status: CANCELLED | OUTPATIENT
Start: 2024-04-20

## 2024-04-20 RX ORDER — CEFTRIAXONE 2 G/1
2 INJECTION, POWDER, FOR SOLUTION INTRAMUSCULAR; INTRAVENOUS EVERY 24 HOURS
Status: CANCELLED
Start: 2024-04-20

## 2024-04-20 RX ORDER — ALBUTEROL SULFATE 0.83 MG/ML
2.5 SOLUTION RESPIRATORY (INHALATION)
Status: CANCELLED | OUTPATIENT
Start: 2024-04-20

## 2024-04-20 RX ORDER — EPINEPHRINE 1 MG/ML
0.3 INJECTION, SOLUTION, CONCENTRATE INTRAVENOUS EVERY 5 MIN PRN
Status: CANCELLED | OUTPATIENT
Start: 2024-04-20

## 2024-04-20 RX ORDER — CEFTRIAXONE 2 G/1
2 INJECTION, POWDER, FOR SOLUTION INTRAMUSCULAR; INTRAVENOUS EVERY 24 HOURS
Status: DISCONTINUED | OUTPATIENT
Start: 2024-04-20 | End: 2024-04-20 | Stop reason: HOSPADM

## 2024-04-20 RX ADMIN — CEFTRIAXONE SODIUM 2 G: 2 INJECTION, POWDER, FOR SOLUTION INTRAMUSCULAR; INTRAVENOUS at 12:17

## 2024-04-20 ASSESSMENT — ACTIVITIES OF DAILY LIVING (ADL): ADLS_ACUITY_SCORE: 38

## 2024-04-20 NOTE — ED NOTES
Reviewed discharge instruction, verbalized understanding. No questions or concerns. IVO complete, pt tolerated well.

## 2024-04-20 NOTE — ED TRIAGE NOTES
Pt here for IVO abx per therapy plan.      Triage Assessment (Adult)       Row Name 04/20/24 2311          Triage Assessment    Airway WDL WDL        Respiratory WDL    Respiratory WDL WDL        Cardiac WDL    Cardiac WDL WDL

## 2024-04-21 ENCOUNTER — HOSPITAL ENCOUNTER (EMERGENCY)
Facility: CLINIC | Age: 69
Discharge: HOME | End: 2024-04-21
Admitting: EMERGENCY MEDICINE
Payer: COMMERCIAL

## 2024-04-21 VITALS
TEMPERATURE: 97.8 F | OXYGEN SATURATION: 99 % | HEIGHT: 67 IN | HEART RATE: 88 BPM | WEIGHT: 140 LBS | DIASTOLIC BLOOD PRESSURE: 86 MMHG | SYSTOLIC BLOOD PRESSURE: 132 MMHG | RESPIRATION RATE: 18 BRPM | BODY MASS INDEX: 21.97 KG/M2

## 2024-04-21 DIAGNOSIS — R78.81 BACTEREMIA: ICD-10-CM

## 2024-04-21 DIAGNOSIS — I33.0 INFECTIVE ENDOCARDITIS, DUE TO UNSPECIFIED ORGANISM, UNSPECIFIED CHRONICITY: ICD-10-CM

## 2024-04-21 PROCEDURE — 999N000104 HC STATISTIC NO CHARGE: Performed by: EMERGENCY MEDICINE

## 2024-04-21 PROCEDURE — 250N000011 HC RX IP 250 OP 636: Performed by: PHYSICIAN ASSISTANT

## 2024-04-21 PROCEDURE — 96365 THER/PROPH/DIAG IV INF INIT: CPT | Performed by: EMERGENCY MEDICINE

## 2024-04-21 RX ORDER — CEFTRIAXONE 2 G/1
2 INJECTION, POWDER, FOR SOLUTION INTRAMUSCULAR; INTRAVENOUS EVERY 24 HOURS
Status: CANCELLED
Start: 2024-04-21

## 2024-04-21 RX ORDER — HEPARIN SODIUM,PORCINE 10 UNIT/ML
5-20 VIAL (ML) INTRAVENOUS DAILY PRN
Status: CANCELLED | OUTPATIENT
Start: 2024-04-21

## 2024-04-21 RX ORDER — HEPARIN SODIUM (PORCINE) LOCK FLUSH IV SOLN 100 UNIT/ML 100 UNIT/ML
5 SOLUTION INTRAVENOUS
Status: CANCELLED | OUTPATIENT
Start: 2024-04-21

## 2024-04-21 RX ORDER — METHYLPREDNISOLONE SODIUM SUCCINATE 125 MG/2ML
125 INJECTION, POWDER, LYOPHILIZED, FOR SOLUTION INTRAMUSCULAR; INTRAVENOUS
Status: CANCELLED
Start: 2024-04-21

## 2024-04-21 RX ORDER — DIPHENHYDRAMINE HYDROCHLORIDE 50 MG/ML
50 INJECTION INTRAMUSCULAR; INTRAVENOUS
Status: CANCELLED
Start: 2024-04-21

## 2024-04-21 RX ORDER — ALBUTEROL SULFATE 90 UG/1
1-2 AEROSOL, METERED RESPIRATORY (INHALATION)
Status: CANCELLED
Start: 2024-04-21

## 2024-04-21 RX ORDER — ALBUTEROL SULFATE 0.83 MG/ML
2.5 SOLUTION RESPIRATORY (INHALATION)
Status: CANCELLED | OUTPATIENT
Start: 2024-04-21

## 2024-04-21 RX ORDER — CEFTRIAXONE 2 G/1
2 INJECTION, POWDER, FOR SOLUTION INTRAMUSCULAR; INTRAVENOUS EVERY 24 HOURS
Status: DISCONTINUED | OUTPATIENT
Start: 2024-04-21 | End: 2024-04-21 | Stop reason: HOSPADM

## 2024-04-21 RX ORDER — MEPERIDINE HYDROCHLORIDE 25 MG/ML
25 INJECTION INTRAMUSCULAR; INTRAVENOUS; SUBCUTANEOUS EVERY 30 MIN PRN
Status: CANCELLED | OUTPATIENT
Start: 2024-04-21

## 2024-04-21 RX ORDER — EPINEPHRINE 1 MG/ML
0.3 INJECTION, SOLUTION, CONCENTRATE INTRAVENOUS EVERY 5 MIN PRN
Status: CANCELLED | OUTPATIENT
Start: 2024-04-21

## 2024-04-21 RX ADMIN — CEFTRIAXONE SODIUM 2 G: 2 INJECTION, POWDER, FOR SOLUTION INTRAMUSCULAR; INTRAVENOUS at 11:14

## 2024-04-21 ASSESSMENT — ACTIVITIES OF DAILY LIVING (ADL): ADLS_ACUITY_SCORE: 38

## 2024-04-22 ENCOUNTER — INFUSION THERAPY VISIT (OUTPATIENT)
Dept: INFUSION THERAPY | Facility: CLINIC | Age: 69
End: 2024-04-22
Attending: PHYSICIAN ASSISTANT
Payer: COMMERCIAL

## 2024-04-22 VITALS
OXYGEN SATURATION: 100 % | RESPIRATION RATE: 18 BRPM | DIASTOLIC BLOOD PRESSURE: 58 MMHG | TEMPERATURE: 98.2 F | SYSTOLIC BLOOD PRESSURE: 108 MMHG | BODY MASS INDEX: 21.94 KG/M2 | HEART RATE: 86 BPM | WEIGHT: 140.1 LBS

## 2024-04-22 DIAGNOSIS — R78.81 BACTEREMIA: Primary | ICD-10-CM

## 2024-04-22 DIAGNOSIS — I33.0 INFECTIVE ENDOCARDITIS, DUE TO UNSPECIFIED ORGANISM, UNSPECIFIED CHRONICITY: ICD-10-CM

## 2024-04-22 PROCEDURE — 250N000011 HC RX IP 250 OP 636: Performed by: PHYSICIAN ASSISTANT

## 2024-04-22 PROCEDURE — 96365 THER/PROPH/DIAG IV INF INIT: CPT

## 2024-04-22 RX ORDER — HEPARIN SODIUM (PORCINE) LOCK FLUSH IV SOLN 100 UNIT/ML 100 UNIT/ML
5 SOLUTION INTRAVENOUS
Status: CANCELLED | OUTPATIENT
Start: 2024-04-22

## 2024-04-22 RX ORDER — EPINEPHRINE 1 MG/ML
0.3 INJECTION, SOLUTION, CONCENTRATE INTRAVENOUS EVERY 5 MIN PRN
Status: CANCELLED | OUTPATIENT
Start: 2024-04-22

## 2024-04-22 RX ORDER — METHYLPREDNISOLONE SODIUM SUCCINATE 125 MG/2ML
125 INJECTION, POWDER, LYOPHILIZED, FOR SOLUTION INTRAMUSCULAR; INTRAVENOUS
Status: CANCELLED
Start: 2024-04-22

## 2024-04-22 RX ORDER — ALBUTEROL SULFATE 0.83 MG/ML
2.5 SOLUTION RESPIRATORY (INHALATION)
Status: CANCELLED | OUTPATIENT
Start: 2024-04-22

## 2024-04-22 RX ORDER — CEFTRIAXONE 2 G/1
2 INJECTION, POWDER, FOR SOLUTION INTRAMUSCULAR; INTRAVENOUS EVERY 24 HOURS
Status: CANCELLED
Start: 2024-04-22

## 2024-04-22 RX ORDER — HEPARIN SODIUM,PORCINE 10 UNIT/ML
5-20 VIAL (ML) INTRAVENOUS DAILY PRN
Status: CANCELLED | OUTPATIENT
Start: 2024-04-22

## 2024-04-22 RX ORDER — ALBUTEROL SULFATE 90 UG/1
1-2 AEROSOL, METERED RESPIRATORY (INHALATION)
Status: CANCELLED
Start: 2024-04-22

## 2024-04-22 RX ORDER — DIPHENHYDRAMINE HYDROCHLORIDE 50 MG/ML
50 INJECTION INTRAMUSCULAR; INTRAVENOUS
Status: CANCELLED
Start: 2024-04-22

## 2024-04-22 RX ORDER — MEPERIDINE HYDROCHLORIDE 25 MG/ML
25 INJECTION INTRAMUSCULAR; INTRAVENOUS; SUBCUTANEOUS EVERY 30 MIN PRN
Status: CANCELLED | OUTPATIENT
Start: 2024-04-22

## 2024-04-22 RX ORDER — CEFTRIAXONE 2 G/1
2 INJECTION, POWDER, FOR SOLUTION INTRAMUSCULAR; INTRAVENOUS EVERY 24 HOURS
Status: DISCONTINUED | OUTPATIENT
Start: 2024-04-22 | End: 2024-04-22 | Stop reason: HOSPADM

## 2024-04-22 RX ADMIN — CEFTRIAXONE SODIUM 2 G: 2 INJECTION, POWDER, FOR SOLUTION INTRAMUSCULAR; INTRAVENOUS at 15:44

## 2024-04-22 NOTE — PROGRESS NOTES
"Infusion Nursing Note:  Miguel Pappas presents today for IV rocephin.    Patient seen by provider today: No   present during visit today: Not Applicable.    Note: Patient here today on his own for his IV rocephin. Patient states that over the last 5 infusions he has noticed half way into the infusion onset of cough, \"dry\" throat, runny nose.  Patient has also noticed increased ringing in ears. Patient h  Message was sent to provider and care  letting them know of the cough, runny nose, dry throat during infusion to see if they would like to continue with infusions. Patient currently is scheduled for 4 more days after today.    Intravenous Access:  PICC.    Treatment Conditions:  Not Applicable.      Post Infusion Assessment:  Patient tolerated infusion without incident.  Blood return noted pre and post infusion.       Discharge Plan:   Patient discharged in stable condition accompanied by: self.  Departure Mode: Ambulatory  Patient has appt to return to infusion tomorrow .      Sherlyn Raya RN  "

## 2024-04-23 ENCOUNTER — THERAPY VISIT (OUTPATIENT)
Dept: PHYSICAL THERAPY | Facility: CLINIC | Age: 69
End: 2024-04-23
Payer: COMMERCIAL

## 2024-04-23 ENCOUNTER — INFUSION THERAPY VISIT (OUTPATIENT)
Dept: INFUSION THERAPY | Facility: CLINIC | Age: 69
End: 2024-04-23
Attending: PHYSICIAN ASSISTANT
Payer: COMMERCIAL

## 2024-04-23 VITALS
HEART RATE: 53 BPM | TEMPERATURE: 98.6 F | OXYGEN SATURATION: 99 % | DIASTOLIC BLOOD PRESSURE: 78 MMHG | RESPIRATION RATE: 16 BRPM | SYSTOLIC BLOOD PRESSURE: 127 MMHG

## 2024-04-23 DIAGNOSIS — M25.552 HIP PAIN, LEFT: Primary | ICD-10-CM

## 2024-04-23 DIAGNOSIS — I33.0 INFECTIVE ENDOCARDITIS, DUE TO UNSPECIFIED ORGANISM, UNSPECIFIED CHRONICITY: ICD-10-CM

## 2024-04-23 DIAGNOSIS — R78.81 BACTEREMIA: Primary | ICD-10-CM

## 2024-04-23 PROCEDURE — 97140 MANUAL THERAPY 1/> REGIONS: CPT | Mod: GP | Performed by: PHYSICAL THERAPIST

## 2024-04-23 PROCEDURE — 96365 THER/PROPH/DIAG IV INF INIT: CPT

## 2024-04-23 PROCEDURE — 250N000011 HC RX IP 250 OP 636: Performed by: PHYSICIAN ASSISTANT

## 2024-04-23 RX ORDER — EPINEPHRINE 1 MG/ML
0.3 INJECTION, SOLUTION, CONCENTRATE INTRAVENOUS EVERY 5 MIN PRN
OUTPATIENT
Start: 2024-04-23

## 2024-04-23 RX ORDER — HEPARIN SODIUM,PORCINE 10 UNIT/ML
5-20 VIAL (ML) INTRAVENOUS DAILY PRN
OUTPATIENT
Start: 2024-04-23

## 2024-04-23 RX ORDER — MEPERIDINE HYDROCHLORIDE 25 MG/ML
25 INJECTION INTRAMUSCULAR; INTRAVENOUS; SUBCUTANEOUS EVERY 30 MIN PRN
OUTPATIENT
Start: 2024-04-23

## 2024-04-23 RX ORDER — CEFTRIAXONE 2 G/1
2 INJECTION, POWDER, FOR SOLUTION INTRAMUSCULAR; INTRAVENOUS EVERY 24 HOURS
Start: 2024-04-23

## 2024-04-23 RX ORDER — METHYLPREDNISOLONE SODIUM SUCCINATE 125 MG/2ML
125 INJECTION, POWDER, LYOPHILIZED, FOR SOLUTION INTRAMUSCULAR; INTRAVENOUS
Start: 2024-04-23

## 2024-04-23 RX ORDER — CEFTRIAXONE 2 G/1
2 INJECTION, POWDER, FOR SOLUTION INTRAMUSCULAR; INTRAVENOUS EVERY 24 HOURS
Status: DISCONTINUED | OUTPATIENT
Start: 2024-04-23 | End: 2024-04-23 | Stop reason: HOSPADM

## 2024-04-23 RX ORDER — HEPARIN SODIUM (PORCINE) LOCK FLUSH IV SOLN 100 UNIT/ML 100 UNIT/ML
5 SOLUTION INTRAVENOUS
OUTPATIENT
Start: 2024-04-23

## 2024-04-23 RX ORDER — DIPHENHYDRAMINE HYDROCHLORIDE 50 MG/ML
50 INJECTION INTRAMUSCULAR; INTRAVENOUS
Start: 2024-04-23

## 2024-04-23 RX ORDER — ALBUTEROL SULFATE 0.83 MG/ML
2.5 SOLUTION RESPIRATORY (INHALATION)
OUTPATIENT
Start: 2024-04-23

## 2024-04-23 RX ORDER — ALBUTEROL SULFATE 90 UG/1
1-2 AEROSOL, METERED RESPIRATORY (INHALATION)
Start: 2024-04-23

## 2024-04-23 RX ADMIN — CEFTRIAXONE SODIUM 2 G: 2 INJECTION, POWDER, FOR SOLUTION INTRAMUSCULAR; INTRAVENOUS at 15:17

## 2024-04-23 ASSESSMENT — ACTIVITIES OF DAILY LIVING (ADL)
WALKING_APPROXIMATELY_10_MINUTES: SLIGHT DIFFICULTY
HOW_WOULD_YOU_RATE_YOUR_CURRENT_LEVEL_OF_FUNCTION_DURING_YOUR_USUAL_ACTIVITIES_OF_DAILY_LIVING_FROM_0_TO_100_WITH_100_BEING_YOUR_LEVEL_OF_FUNCTION_PRIOR_TO_YOUR_HIP_PROBLEM_AND_0_BEING_THE_INABILITY_TO_PERFORM_ANY_OF_YOUR_USUAL_DAILY_ACTIVITIES?: 50
SITTING_FOR_15_MINUTES: MODERATE DIFFICULTY
TWISTING/PIVOTING_ON_INVOLVED_LEG: MODERATE DIFFICULTY
GETTING_INTO_AND_OUT_OF_AN_AVERAGE_CAR: SLIGHT DIFFICULTY
WALKING_15_MINUTES_OR_GREATER: SLIGHT DIFFICULTY
STANDING_FOR_15_MINUTES: SLIGHT DIFFICULTY
RECREATIONAL_ACTIVITIES: SLIGHT DIFFICULTY
GETTING_INTO_AND_OUT_OF_A_BATHTUB: MODERATE DIFFICULTY
STEPPING_UP_AND_DOWN_CURBS: SLIGHT DIFFICULTY
HOS_ADL_ITEM_SCORE_TOTAL: 46
GOING_DOWN_1_FLIGHT_OF_STAIRS: SLIGHT DIFFICULTY
PUTTING_ON_SOCKS_AND_SHOES: SLIGHT DIFFICULTY
GOING_UP_1_FLIGHT_OF_STAIRS: SLIGHT DIFFICULTY
HOS_ADL_HIGHEST_POTENTIAL_SCORE: 68
LIGHT_TO_MODERATE_WORK: SLIGHT DIFFICULTY
HOS_ADL_SCORE(%): 67.65
WALKING_INITIALLY: MODERATE DIFFICULTY
HOS_ADL_COUNT: 17
WALKING_DOWN_STEEP_HILLS: SLIGHT DIFFICULTY
WALKING_UP_STEEP_HILLS: SLIGHT DIFFICULTY
ROLLING_OVER_IN_BED: MODERATE DIFFICULTY
HEAVY_WORK: SLIGHT DIFFICULTY
DEEP_SQUATTING: MODERATE DIFFICULTY

## 2024-04-23 ASSESSMENT — PAIN SCALES - GENERAL: PAINLEVEL: NO PAIN (0)

## 2024-04-23 NOTE — PROGRESS NOTES
"Infusion Nursing Note:  Miguel Pappas presents today for Ceftriaxone.    Patient seen by provider today: No   present during visit today: Not Applicable.    Note: Patient arrives ambulatory with quad cane, in good spirits. Denies pain. 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 Did not notice allergy or throat sx at that time. Says last night he did have more ringing/\"louder\" in his ears after he laid down. No pain. Does not have ringing as much in his ears right now, no cough or sore throat.   Did have some throat irritation, \"feels scratchy\" part way through antibiotic. No throat swelling or cough.    VSS, afebrile today. He will continue to update us daily on these sx and when they occur. Also will call/notify Provider if worsening. Patient feels good overall.        Intravenous Access:  PICC.    Treatment Conditions:  Not Applicable.      Post Infusion Assessment:  Patient tolerated infusion without incident.  Blood return noted pre and post infusion.  Site patent and intact, free from redness, edema or discomfort.  No evidence of extravasations.  Access discontinued per protocol. Line flushed with NS.      Discharge Plan:   AVS to patient via MYCHART.  Patient will return 4/24 for next appointment.   Patient discharged in stable condition accompanied by: self.  Departure Mode: Ambulatory w/cane.      Sherrell Bean RN  "

## 2024-04-23 NOTE — PROGRESS NOTES
ENT Consultation    Miguel Pappas who is a 68 year old male seen in consultation at the request of audiologist.      History of Present Illness - Miguel Pappas is a 68 year old male presents for evaluation of asymmetric sensorineural hearing loss.  Noticed a gradual decline in his both ears especially on the left.  Had significant tinnitus previously but now just has a low-grade tinnitus in both ears.  Had couple of episodes of dizziness disequilibrium not true vertigo but also had some confusion disorientation was told that he might have some early mild stroke even though MRI results do not necessarily reflect that.  He did have a brain MRI that did not show any retrocochlear pathology.    EXAM: MR BRAIN W/O and W CONTRAST  LOCATION: [facility]  DATE: 3/14/2024     INDICATION: Confusion, episode of almost complete amnesia from earlier today  COMPARISON: None.  CONTRAST: 6.5 mL Gadavist  TECHNIQUE: Routine multiplanar multisequence head MRI without and with intravenous contrast.     FINDINGS:  INTRACRANIAL CONTENTS: No acute or subacute infarct. Left caudate head lacunar infarct is chronic. No mass, acute hemorrhage, or extra-axial fluid collections. Scattered nonspecific T2/FLAIR hyperintensities within the cerebral white matter most   consistent with mild chronic microvascular ischemic change. Mild generalized cerebral atrophy. No hydrocephalus. Normal position of the cerebellar tonsils. No pathologic contrast enhancement.     SELLA: No abnormality accounting for technique.     OSSEOUS STRUCTURES/SOFT TISSUES: Normal marrow signal. The major intracranial vascular flow voids are maintained.      ORBITS: No abnormality accounting for technique.      SINUSES/MASTOIDS: Mild mucosal thickening scattered about the paranasal sinuses. Scattered fluid/membrane thickening in the mastoid air cells bilaterally.                                                                        IMPRESSION:  1.  No acute or subacute ischemic change.  2.  No acute intracranial process or abnormal enhancement.  3.  Mild age-related changes as above.            BP Readings from Last 1 Encounters:   05/02/24 (!) 144/80       BP noted to be elevated today in office.  Patient to follow up with Primary Care provider regarding elevated blood pressure.    Miguel IS NOT a smoker/uses chewing tobacco.        Past Medical History -   Past Medical History:   Diagnosis Date    * * * SBE PROPHYLAXIS * * *     no longer indicated - 2007 AHA guidelnies    Mitral valve stenosis and aortic valve stenosis     2/6 murmur    Pure hypercholesterolemia 1/26/2007       Current Medications -   Current Outpatient Medications:     acetaminophen (TYLENOL) 325 MG tablet, Take 2 tablets (650 mg) by mouth every 4 hours as needed for mild pain or other (and adjunct with moderate or severe pain or per patient request), Disp: , Rfl:     ascorbic acid (VITAMIN C) 250 MG CHEW chewable tablet, Take 250 mg by mouth daily, Disp: , Rfl:     aspirin 81 MG tablet, Take 81 mg by mouth at bedtime, Disp: , Rfl:     atorvastatin (LIPITOR) 40 MG tablet, Take 1 tablet (40 mg) by mouth every evening, Disp: 60 tablet, Rfl: 1    ferrous sulfate (FEROSUL) 325 (65 Fe) MG tablet, Take 325 mg by mouth daily (with breakfast), Disp: , Rfl:     GLUCOSAMINE CHONDROITIN OR TABS, Take 1 tablet by mouth at bedtime, Disp: , Rfl: 0    ibuprofen (ADVIL/MOTRIN) 200 MG tablet, Take 1 tablet (200 mg) by mouth every 6 hours as needed for inflammatory pain, Disp: , Rfl:     Multiple Vitamins-Minerals (CENTRUM SILVER PO), Take 1 tablet by mouth at bedtime, Disp: 30, Rfl: 0    Vitamin D3 (CHOLECALCIFEROL) 25 mcg (1000 units) tablet, Take 25 mcg by mouth daily, Disp: , Rfl:     amoxicillin (AMOXIL) 500 MG capsule, Take 2,000 mg by mouth once as needed (dental prophy) (Patient not taking: Reported on 4/17/2024), Disp: , Rfl:     cyclobenzaprine (FLEXERIL) 5 MG tablet, Take 1  tablet (5 mg) by mouth 3 times daily (Patient not taking: Reported on 5/2/2024), Disp: 60 tablet, Rfl: 0    gabapentin (NEURONTIN) 100 MG capsule, Take 1 capsule (100 mg) by mouth 3 times daily (Patient not taking: Reported on 5/2/2024), Disp: 30 capsule, Rfl: 0    oxyCODONE (ROXICODONE) 5 MG tablet, Take 1 tablet (5 mg) by mouth every 6 hours as needed for moderate pain (Patient not taking: Reported on 4/17/2024), Disp: 20 tablet, Rfl: 0  No current facility-administered medications for this visit.    Allergies -   Allergies   Allergen Reactions    No Known Drug Allergy        Social History -   Social History     Socioeconomic History    Marital status:      Spouse name: Fabian    Number of children: 3    Years of education: 12   Occupational History    Occupation:    Tobacco Use    Smoking status: Never     Passive exposure: Never    Smokeless tobacco: Never   Vaping Use    Vaping status: Never Used   Substance and Sexual Activity    Alcohol use: Yes     Comment: 1-2 beer monthly  maybe    Drug use: No    Sexual activity: Yes     Partners: Female     Comment: Hx: wife had tubal ligation   Other Topics Concern     Service Yes     Comment: [employer]    Blood Transfusions No    Caffeine Concern No    Occupational Exposure No    Hobby Hazards No    Sleep Concern No    Stress Concern No    Weight Concern No    Special Diet Yes     Comment: Low fat and cholesterol high fiber    Back Care Yes     Comment: Hx: chiropractor for back inj and knee    Exercise Yes     Comment: every day    Bike Helmet No    Seat Belt Yes   Social History Narrative    Lives with spouse.     Social Determinants of Health     Financial Resource Strain: Low Risk  (11/30/2023)    Financial Resource Strain     Within the past 12 months, have you or your family members you live with been unable to get utilities (heat, electricity) when it was really needed?: No   Food Insecurity: Low Risk  (11/30/2023)    Food  "Insecurity     Within the past 12 months, did you worry that your food would run out before you got money to buy more?: No     Within the past 12 months, did the food you bought just not last and you didn t have money to get more?: No   Transportation Needs: Low Risk  (11/30/2023)    Transportation Needs     Within the past 12 months, has lack of transportation kept you from medical appointments, getting your medicines, non-medical meetings or appointments, work, or from getting things that you need?: No   Housing Stability: Low Risk  (11/30/2023)    Housing Stability     Do you have housing? : Yes     Are you worried about losing your housing?: No       Family History -   Family History   Problem Relation Age of Onset    Cancer Father         Spleen cancer    Hypertension Father     Allergies Father         PCN    Gastrointestinal Disease Father         ulcer    Heart Disease Father         Hx: meds and angioplasty    Hypertension Brother     Cerebrovascular Disease Paternal Grandfather     Arthritis Paternal Grandfather     Arthritis Mother     Hypertension Mother     Allergies Son         Dust , Mold    Depression Sister         X 2       Review of Systems - As per HPI and PMHx, otherwise review of system review of the head and neck negative. Otherwise 10+ review of system is negative    Physical Exam  BP (!) 144/80   Temp 98.1  F (36.7  C) (Temporal)   Ht 1.702 m (5' 7\")   Wt 64 kg (141 lb)   BMI 22.08 kg/m    BMI: Body mass index is 22.08 kg/m .    General - The patient is well nourished and well developed, and appears to have good nutritional status.  Alert and oriented to person and place, answers questions and cooperates with examination appropriately.    SKIN - No suspicious lesions or rashes.  Respiration - No respiratory distress.  Head and Face - Normocephalic and atraumatic, with no gross asymmetry noted of the contour of the facial features.  The facial nerve is intact, with strong symmetric " movements.    Voice and Breathing - The patient was breathing comfortably without the use of accessory muscles. The patients voice was clear and strong, and had appropriate pitch and quality.    Ears - Bilateral pinna and EACs with normal appearing overlying skin. Tympanic membrane intact with good mobility on pneumatic otoscopy bilaterally. Bony landmarks of the ossicular chain are normal. The tympanic membranes are normal in appearance. No retraction, perforation, or masses.  No fluid or purulence was seen in the external canal or the middle ear.     Eyes - Extraocular movements intact.  Sclera were not icteric or injected, conjunctiva were pink and moist.    Neck - Normal midline excursion of the laryngotracheal complex during swallowing.  Full range of motion on passive movement.  Palpation of the occipital, submental, submandibular, internal jugular chain, and supraclavicular nodes did not demonstrate any abnormal lymph nodes or masses.  The carotid pulse was palpable bilaterally.  Palpation of the thyroid was soft and smooth, with no nodules or goiter appreciated.  The trachea was mobile and midline.      Neuro - Nonfocal neuro exam is normal, CN 2 through 12 intact, normal gait and muscle tone.      Performed in clinic 12/21/2023:  Audiologic Studies - An audiogram and tympanogram were performed today as part of the evaluation and personally reviewed. The tympanogram shows Type A curves on the right and Type A curves on the left, with normal canal volumes and middle ear pressures.  The audiogram showed mild to moderate SNHL on the right and sloping moderate to profound SNHL on the left.    Word recognition score 100% on the right and 0 on the left.    A/P - Miguel Pappas is a 68 year old male with significant sensorineural hearing loss asymmetric.  We encourage him to continue hearing protection but also serious consideration for hearing aids.  Patient will work with audiology towards getting hearing aids.   Otherwise we will recheck hearing in a year.      Bran Geller MD

## 2024-04-24 ENCOUNTER — LAB (OUTPATIENT)
Dept: INFUSION THERAPY | Facility: CLINIC | Age: 69
End: 2024-04-24
Attending: INTERNAL MEDICINE
Payer: COMMERCIAL

## 2024-04-24 ENCOUNTER — INFUSION THERAPY VISIT (OUTPATIENT)
Dept: INFUSION THERAPY | Facility: CLINIC | Age: 69
End: 2024-04-24
Attending: PHYSICIAN ASSISTANT
Payer: COMMERCIAL

## 2024-04-24 ENCOUNTER — MEDICAL CORRESPONDENCE (OUTPATIENT)
Dept: HEALTH INFORMATION MANAGEMENT | Facility: CLINIC | Age: 69
End: 2024-04-24

## 2024-04-24 VITALS
TEMPERATURE: 98.7 F | OXYGEN SATURATION: 99 % | HEART RATE: 90 BPM | SYSTOLIC BLOOD PRESSURE: 145 MMHG | DIASTOLIC BLOOD PRESSURE: 74 MMHG | RESPIRATION RATE: 16 BRPM

## 2024-04-24 DIAGNOSIS — I38 VALVULAR ENDOCARDITIS: ICD-10-CM

## 2024-04-24 DIAGNOSIS — I33.0 INFECTIVE ENDOCARDITIS, DUE TO UNSPECIFIED ORGANISM, UNSPECIFIED CHRONICITY: ICD-10-CM

## 2024-04-24 DIAGNOSIS — R78.81 BACTEREMIA: Primary | ICD-10-CM

## 2024-04-24 LAB
ALT SERPL W P-5'-P-CCNC: 48 U/L (ref 0–70)
AST SERPL W P-5'-P-CCNC: 36 U/L (ref 0–45)
BASOPHILS # BLD AUTO: 0 10E3/UL (ref 0–0.2)
BASOPHILS NFR BLD AUTO: 0 %
CREAT SERPL-MCNC: 0.81 MG/DL (ref 0.67–1.17)
CRP SERPL-MCNC: 21.92 MG/L
EGFRCR SERPLBLD CKD-EPI 2021: >90 ML/MIN/1.73M2
EOSINOPHIL # BLD AUTO: 1 10E3/UL (ref 0–0.7)
EOSINOPHIL NFR BLD AUTO: 11 %
ERYTHROCYTE [DISTWIDTH] IN BLOOD BY AUTOMATED COUNT: 13.9 % (ref 10–15)
HCT VFR BLD AUTO: 30.5 % (ref 40–53)
HGB BLD-MCNC: 10.3 G/DL (ref 13.3–17.7)
IMM GRANULOCYTES # BLD: 0 10E3/UL
IMM GRANULOCYTES NFR BLD: 0 %
LYMPHOCYTES # BLD AUTO: 1.5 10E3/UL (ref 0.8–5.3)
LYMPHOCYTES NFR BLD AUTO: 16 %
MCH RBC QN AUTO: 28.9 PG (ref 26.5–33)
MCHC RBC AUTO-ENTMCNC: 33.8 G/DL (ref 31.5–36.5)
MCV RBC AUTO: 86 FL (ref 78–100)
MONOCYTES # BLD AUTO: 0.9 10E3/UL (ref 0–1.3)
MONOCYTES NFR BLD AUTO: 10 %
NEUTROPHILS # BLD AUTO: 5.7 10E3/UL (ref 1.6–8.3)
NEUTROPHILS NFR BLD AUTO: 63 %
NRBC # BLD AUTO: 0 10E3/UL
NRBC BLD AUTO-RTO: 0 /100
PLATELET # BLD AUTO: 272 10E3/UL (ref 150–450)
RBC # BLD AUTO: 3.56 10E6/UL (ref 4.4–5.9)
WBC # BLD AUTO: 9.1 10E3/UL (ref 4–11)

## 2024-04-24 PROCEDURE — 85049 AUTOMATED PLATELET COUNT: CPT

## 2024-04-24 PROCEDURE — 84450 TRANSFERASE (AST) (SGOT): CPT

## 2024-04-24 PROCEDURE — 258N000003 HC RX IP 258 OP 636: Performed by: INTERNAL MEDICINE

## 2024-04-24 PROCEDURE — 36592 COLLECT BLOOD FROM PICC: CPT

## 2024-04-24 PROCEDURE — 250N000011 HC RX IP 250 OP 636: Mod: JZ | Performed by: INTERNAL MEDICINE

## 2024-04-24 PROCEDURE — 82565 ASSAY OF CREATININE: CPT

## 2024-04-24 PROCEDURE — 86140 C-REACTIVE PROTEIN: CPT

## 2024-04-24 PROCEDURE — 96365 THER/PROPH/DIAG IV INF INIT: CPT

## 2024-04-24 PROCEDURE — 84460 ALANINE AMINO (ALT) (SGPT): CPT

## 2024-04-24 RX ORDER — ERTAPENEM 1 G/1
1 INJECTION, POWDER, LYOPHILIZED, FOR SOLUTION INTRAMUSCULAR; INTRAVENOUS ONCE
Status: DISCONTINUED | OUTPATIENT
Start: 2024-04-24 | End: 2024-04-24

## 2024-04-24 RX ORDER — MEPERIDINE HYDROCHLORIDE 25 MG/ML
25 INJECTION INTRAMUSCULAR; INTRAVENOUS; SUBCUTANEOUS EVERY 30 MIN PRN
Status: CANCELLED | OUTPATIENT
Start: 2024-04-25

## 2024-04-24 RX ORDER — HEPARIN SODIUM,PORCINE 10 UNIT/ML
5-20 VIAL (ML) INTRAVENOUS DAILY PRN
Status: CANCELLED | OUTPATIENT
Start: 2024-04-25

## 2024-04-24 RX ORDER — EPINEPHRINE 1 MG/ML
0.3 INJECTION, SOLUTION, CONCENTRATE INTRAVENOUS EVERY 5 MIN PRN
Status: CANCELLED | OUTPATIENT
Start: 2024-04-25

## 2024-04-24 RX ORDER — ALBUTEROL SULFATE 90 UG/1
1-2 AEROSOL, METERED RESPIRATORY (INHALATION)
Status: CANCELLED
Start: 2024-04-25

## 2024-04-24 RX ORDER — ERTAPENEM 1 G/1
1 INJECTION, POWDER, LYOPHILIZED, FOR SOLUTION INTRAMUSCULAR; INTRAVENOUS ONCE
Status: CANCELLED | OUTPATIENT
Start: 2024-04-25 | End: 2024-04-25

## 2024-04-24 RX ORDER — DIPHENHYDRAMINE HYDROCHLORIDE 50 MG/ML
50 INJECTION INTRAMUSCULAR; INTRAVENOUS
Status: CANCELLED
Start: 2024-04-24

## 2024-04-24 RX ORDER — ERTAPENEM 1 G/1
1 INJECTION, POWDER, LYOPHILIZED, FOR SOLUTION INTRAMUSCULAR; INTRAVENOUS ONCE
Status: COMPLETED | OUTPATIENT
Start: 2024-04-24 | End: 2024-04-24

## 2024-04-24 RX ORDER — METHYLPREDNISOLONE SODIUM SUCCINATE 125 MG/2ML
125 INJECTION, POWDER, LYOPHILIZED, FOR SOLUTION INTRAMUSCULAR; INTRAVENOUS
Status: CANCELLED
Start: 2024-04-24

## 2024-04-24 RX ORDER — METHYLPREDNISOLONE SODIUM SUCCINATE 125 MG/2ML
125 INJECTION, POWDER, LYOPHILIZED, FOR SOLUTION INTRAMUSCULAR; INTRAVENOUS
Status: CANCELLED
Start: 2024-04-25

## 2024-04-24 RX ORDER — ALBUTEROL SULFATE 0.83 MG/ML
2.5 SOLUTION RESPIRATORY (INHALATION)
Status: CANCELLED | OUTPATIENT
Start: 2024-04-25

## 2024-04-24 RX ORDER — EPINEPHRINE 1 MG/ML
0.3 INJECTION, SOLUTION, CONCENTRATE INTRAVENOUS EVERY 5 MIN PRN
Status: CANCELLED | OUTPATIENT
Start: 2024-04-24

## 2024-04-24 RX ORDER — DIPHENHYDRAMINE HYDROCHLORIDE 50 MG/ML
50 INJECTION INTRAMUSCULAR; INTRAVENOUS
Status: CANCELLED
Start: 2024-04-25

## 2024-04-24 RX ORDER — MEPERIDINE HYDROCHLORIDE 25 MG/ML
25 INJECTION INTRAMUSCULAR; INTRAVENOUS; SUBCUTANEOUS EVERY 30 MIN PRN
Status: CANCELLED | OUTPATIENT
Start: 2024-04-24

## 2024-04-24 RX ORDER — HEPARIN SODIUM,PORCINE 10 UNIT/ML
5-20 VIAL (ML) INTRAVENOUS DAILY PRN
Status: DISCONTINUED | OUTPATIENT
Start: 2024-04-24 | End: 2024-04-24 | Stop reason: HOSPADM

## 2024-04-24 RX ORDER — ALBUTEROL SULFATE 90 UG/1
1-2 AEROSOL, METERED RESPIRATORY (INHALATION)
Status: CANCELLED
Start: 2024-04-24

## 2024-04-24 RX ORDER — ALBUTEROL SULFATE 0.83 MG/ML
2.5 SOLUTION RESPIRATORY (INHALATION)
Status: CANCELLED | OUTPATIENT
Start: 2024-04-24

## 2024-04-24 RX ORDER — ERTAPENEM 1 G/1
1 INJECTION, POWDER, LYOPHILIZED, FOR SOLUTION INTRAMUSCULAR; INTRAVENOUS ONCE
Status: CANCELLED | OUTPATIENT
Start: 2024-04-24 | End: 2024-04-24

## 2024-04-24 RX ORDER — HEPARIN SODIUM,PORCINE 10 UNIT/ML
5-20 VIAL (ML) INTRAVENOUS DAILY PRN
Status: CANCELLED | OUTPATIENT
Start: 2024-04-24

## 2024-04-24 RX ADMIN — SODIUM CHLORIDE 250 ML: 9 INJECTION, SOLUTION INTRAVENOUS at 15:10

## 2024-04-24 RX ADMIN — ERTAPENEM SODIUM 1 G: 1 INJECTION, POWDER, LYOPHILIZED, FOR SOLUTION INTRAMUSCULAR; INTRAVENOUS at 15:39

## 2024-04-24 ASSESSMENT — PAIN SCALES - GENERAL: PAINLEVEL: NO PAIN (0)

## 2024-04-24 NOTE — PROGRESS NOTES
Infusion Nursing Note:  Miguel PERCY Pappas presents today for Ertapenem.    Patient seen by provider today: Yes: Seen by    present during visit today: Not Applicable.    Note: Patient here today for Ertapenem.  This is a change in antibiotic from what he had been on.  Patient had been on rocephin but was noticing that he was developing a cough half way through the infusion for about the last week.  Seen by provider and provider switched the antibiotic.       Intravenous Access:  PICC.    Treatment Conditions:  Not Applicable.      Post Infusion Assessment:  Patient tolerated infusion without incident.  Blood return noted pre and post infusion.       Discharge Plan:   Patient discharged in stable condition accompanied by: self and wife.  Departure Mode: Ambulatory with cane.  Patient has appt to return tomorrow.      Sherlyn Raya RN

## 2024-04-24 NOTE — PROGRESS NOTES
Infusion Nursing Note:  Miguel Pappas presents today for PICC labs.    Patient seen by provider today: Yes:    present during visit today: Not Applicable.    Note: Patient here today for PICC labs. .      Intravenous Access:  PICC.    Treatment Conditions:  Not Applicable.    Sherlyn Raya RN

## 2024-04-25 ENCOUNTER — INFUSION THERAPY VISIT (OUTPATIENT)
Dept: INFUSION THERAPY | Facility: CLINIC | Age: 69
End: 2024-04-25
Attending: PHYSICIAN ASSISTANT
Payer: COMMERCIAL

## 2024-04-25 VITALS
TEMPERATURE: 99.5 F | RESPIRATION RATE: 16 BRPM | SYSTOLIC BLOOD PRESSURE: 149 MMHG | OXYGEN SATURATION: 97 % | DIASTOLIC BLOOD PRESSURE: 78 MMHG | HEART RATE: 87 BPM

## 2024-04-25 DIAGNOSIS — I33.0 INFECTIVE ENDOCARDITIS, DUE TO UNSPECIFIED ORGANISM, UNSPECIFIED CHRONICITY: Primary | ICD-10-CM

## 2024-04-25 DIAGNOSIS — R78.81 BACTEREMIA: ICD-10-CM

## 2024-04-25 DIAGNOSIS — R78.81 BACTEREMIA: Primary | ICD-10-CM

## 2024-04-25 PROCEDURE — 96365 THER/PROPH/DIAG IV INF INIT: CPT

## 2024-04-25 PROCEDURE — 250N000011 HC RX IP 250 OP 636: Mod: JZ | Performed by: INTERNAL MEDICINE

## 2024-04-25 RX ORDER — MEPERIDINE HYDROCHLORIDE 25 MG/ML
25 INJECTION INTRAMUSCULAR; INTRAVENOUS; SUBCUTANEOUS EVERY 30 MIN PRN
Status: CANCELLED | OUTPATIENT
Start: 2024-04-26

## 2024-04-25 RX ORDER — EPINEPHRINE 1 MG/ML
0.3 INJECTION, SOLUTION, CONCENTRATE INTRAVENOUS EVERY 5 MIN PRN
Status: CANCELLED | OUTPATIENT
Start: 2024-04-26

## 2024-04-25 RX ORDER — ALBUTEROL SULFATE 0.83 MG/ML
2.5 SOLUTION RESPIRATORY (INHALATION)
Status: CANCELLED | OUTPATIENT
Start: 2024-04-26

## 2024-04-25 RX ORDER — ERTAPENEM 1 G/1
1 INJECTION, POWDER, LYOPHILIZED, FOR SOLUTION INTRAMUSCULAR; INTRAVENOUS ONCE
Status: COMPLETED | OUTPATIENT
Start: 2024-04-25 | End: 2024-04-25

## 2024-04-25 RX ORDER — ALBUTEROL SULFATE 90 UG/1
1-2 AEROSOL, METERED RESPIRATORY (INHALATION)
Status: CANCELLED
Start: 2024-04-26

## 2024-04-25 RX ORDER — HEPARIN SODIUM,PORCINE 10 UNIT/ML
5-20 VIAL (ML) INTRAVENOUS DAILY PRN
Status: CANCELLED | OUTPATIENT
Start: 2024-04-26

## 2024-04-25 RX ORDER — DIPHENHYDRAMINE HYDROCHLORIDE 50 MG/ML
50 INJECTION INTRAMUSCULAR; INTRAVENOUS
Status: CANCELLED
Start: 2024-04-26

## 2024-04-25 RX ORDER — ERTAPENEM 1 G/1
1 INJECTION, POWDER, LYOPHILIZED, FOR SOLUTION INTRAMUSCULAR; INTRAVENOUS ONCE
Status: CANCELLED | OUTPATIENT
Start: 2024-04-26 | End: 2024-04-26

## 2024-04-25 RX ORDER — METHYLPREDNISOLONE SODIUM SUCCINATE 125 MG/2ML
125 INJECTION, POWDER, LYOPHILIZED, FOR SOLUTION INTRAMUSCULAR; INTRAVENOUS
Status: CANCELLED
Start: 2024-04-26

## 2024-04-25 RX ADMIN — ERTAPENEM SODIUM 1 G: 1 INJECTION, POWDER, LYOPHILIZED, FOR SOLUTION INTRAMUSCULAR; INTRAVENOUS at 15:55

## 2024-04-25 ASSESSMENT — PAIN SCALES - GENERAL: PAINLEVEL: NO PAIN (0)

## 2024-04-25 NOTE — PROGRESS NOTES
Infusion Nursing Note:  Miguel Pappas presents today for Ertapenem.    Patient seen by provider today: No   present during visit today: Not Applicable.    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 Denies pain or symptoms from previous this week. Seems to have tolerated this new antibiotic so far. VSS, temporal temp 99.5.       Intravenous Access:  PICC.    Treatment Conditions:  Not Applicable.      Post Infusion Assessment:  Patient tolerated infusion without incident.  Blood return noted pre and post infusion.  Site patent and intact, free from redness, edema or discomfort.  No evidence of extravasations.  Access discontinued per protocol.       Discharge Plan:   AVS to patient via MYCHART.  Patient will return 4/26 for next appointment.   Patient discharged in stable condition accompanied by: self.  Departure Mode: Ambulatory.      Sherrell Bean RN

## 2024-04-26 ENCOUNTER — INFUSION THERAPY VISIT (OUTPATIENT)
Dept: INFUSION THERAPY | Facility: CLINIC | Age: 69
End: 2024-04-26
Attending: PHYSICIAN ASSISTANT
Payer: COMMERCIAL

## 2024-04-26 VITALS
OXYGEN SATURATION: 99 % | DIASTOLIC BLOOD PRESSURE: 72 MMHG | SYSTOLIC BLOOD PRESSURE: 117 MMHG | RESPIRATION RATE: 16 BRPM | HEART RATE: 89 BPM | TEMPERATURE: 97.9 F

## 2024-04-26 DIAGNOSIS — I33.0 INFECTIVE ENDOCARDITIS, DUE TO UNSPECIFIED ORGANISM, UNSPECIFIED CHRONICITY: Primary | ICD-10-CM

## 2024-04-26 DIAGNOSIS — R78.81 BACTEREMIA: ICD-10-CM

## 2024-04-26 PROCEDURE — 250N000011 HC RX IP 250 OP 636: Mod: JZ | Performed by: INTERNAL MEDICINE

## 2024-04-26 PROCEDURE — 96365 THER/PROPH/DIAG IV INF INIT: CPT

## 2024-04-26 RX ORDER — ERTAPENEM 1 G/1
1 INJECTION, POWDER, LYOPHILIZED, FOR SOLUTION INTRAMUSCULAR; INTRAVENOUS ONCE
Status: CANCELLED | OUTPATIENT
Start: 2024-04-27 | End: 2024-04-27

## 2024-04-26 RX ORDER — EPINEPHRINE 1 MG/ML
0.3 INJECTION, SOLUTION, CONCENTRATE INTRAVENOUS EVERY 5 MIN PRN
Status: CANCELLED | OUTPATIENT
Start: 2024-04-27

## 2024-04-26 RX ORDER — ERTAPENEM 1 G/1
1 INJECTION, POWDER, LYOPHILIZED, FOR SOLUTION INTRAMUSCULAR; INTRAVENOUS ONCE
Status: COMPLETED | OUTPATIENT
Start: 2024-04-26 | End: 2024-04-26

## 2024-04-26 RX ORDER — DIPHENHYDRAMINE HYDROCHLORIDE 50 MG/ML
50 INJECTION INTRAMUSCULAR; INTRAVENOUS
Status: CANCELLED
Start: 2024-04-27

## 2024-04-26 RX ORDER — METHYLPREDNISOLONE SODIUM SUCCINATE 125 MG/2ML
125 INJECTION, POWDER, LYOPHILIZED, FOR SOLUTION INTRAMUSCULAR; INTRAVENOUS
Status: CANCELLED
Start: 2024-04-27

## 2024-04-26 RX ORDER — MEPERIDINE HYDROCHLORIDE 25 MG/ML
25 INJECTION INTRAMUSCULAR; INTRAVENOUS; SUBCUTANEOUS EVERY 30 MIN PRN
Status: CANCELLED | OUTPATIENT
Start: 2024-04-27

## 2024-04-26 RX ORDER — HEPARIN SODIUM,PORCINE 10 UNIT/ML
5-20 VIAL (ML) INTRAVENOUS DAILY PRN
Status: CANCELLED | OUTPATIENT
Start: 2024-04-27

## 2024-04-26 RX ORDER — ALBUTEROL SULFATE 0.83 MG/ML
2.5 SOLUTION RESPIRATORY (INHALATION)
Status: CANCELLED | OUTPATIENT
Start: 2024-04-27

## 2024-04-26 RX ORDER — ALBUTEROL SULFATE 90 UG/1
1-2 AEROSOL, METERED RESPIRATORY (INHALATION)
Status: CANCELLED
Start: 2024-04-27

## 2024-04-26 RX ADMIN — ERTAPENEM SODIUM 1 G: 1 INJECTION, POWDER, LYOPHILIZED, FOR SOLUTION INTRAMUSCULAR; INTRAVENOUS at 15:00

## 2024-04-26 ASSESSMENT — PAIN SCALES - GENERAL: PAINLEVEL: NO PAIN (0)

## 2024-04-26 NOTE — PROGRESS NOTES
Infusion Nursing Note:  Miguel Pappas presents today for Ertapenem.    Patient seen by provider today: No   present during visit today: Not Applicable.    Note: Patient arrives ambulatory with cane, wife in attendance. Miguel denies pain or concerning symptoms. Seems to be tolerating Ertapenem so far. VSS, afebrile.       Intravenous Access:  PICC.    Treatment Conditions:  Not Applicable.      Post Infusion Assessment:  Patient tolerated infusion without incident.  Blood return noted pre and post infusion.  Site patent and intact, free from redness, edema or discomfort.  No evidence of extravasations.  Access discontinued per protocol. PICC line flushed with Normal Saline.       Discharge Plan:   AVS to patient via MYCHART.  Patient will return 4/29 to Infusion for next appointment. Will go to [facility] ER for his IV antibiotics 4/27 & 4/28. Msg sent to ER staff.  Patient discharged in stable condition accompanied by: wife.  Departure Mode: Ambulatory.      Sherrell Bean RN

## 2024-04-27 ENCOUNTER — HOSPITAL ENCOUNTER (EMERGENCY)
Facility: CLINIC | Age: 69
Discharge: HOME | End: 2024-04-27
Admitting: INTERNAL MEDICINE
Payer: COMMERCIAL

## 2024-04-27 VITALS
SYSTOLIC BLOOD PRESSURE: 143 MMHG | OXYGEN SATURATION: 100 % | DIASTOLIC BLOOD PRESSURE: 95 MMHG | TEMPERATURE: 98.3 F | WEIGHT: 140.1 LBS | RESPIRATION RATE: 18 BRPM | HEART RATE: 87 BPM | BODY MASS INDEX: 21.94 KG/M2

## 2024-04-27 DIAGNOSIS — R78.81 BACTEREMIA: ICD-10-CM

## 2024-04-27 DIAGNOSIS — I33.0 INFECTIVE ENDOCARDITIS, DUE TO UNSPECIFIED ORGANISM, UNSPECIFIED CHRONICITY: ICD-10-CM

## 2024-04-27 PROCEDURE — 999N000104 HC STATISTIC NO CHARGE

## 2024-04-27 PROCEDURE — 250N000011 HC RX IP 250 OP 636: Mod: JZ | Performed by: INTERNAL MEDICINE

## 2024-04-27 PROCEDURE — 96365 THER/PROPH/DIAG IV INF INIT: CPT

## 2024-04-27 RX ORDER — ALBUTEROL SULFATE 0.83 MG/ML
2.5 SOLUTION RESPIRATORY (INHALATION)
Status: CANCELLED | OUTPATIENT
Start: 2024-04-28

## 2024-04-27 RX ORDER — METHYLPREDNISOLONE SODIUM SUCCINATE 125 MG/2ML
125 INJECTION, POWDER, LYOPHILIZED, FOR SOLUTION INTRAMUSCULAR; INTRAVENOUS
Status: DISCONTINUED | OUTPATIENT
Start: 2024-04-27 | End: 2024-04-27 | Stop reason: HOSPADM

## 2024-04-27 RX ORDER — ALBUTEROL SULFATE 0.83 MG/ML
2.5 SOLUTION RESPIRATORY (INHALATION)
Status: DISCONTINUED | OUTPATIENT
Start: 2024-04-27 | End: 2024-04-27 | Stop reason: HOSPADM

## 2024-04-27 RX ORDER — ALBUTEROL SULFATE 90 UG/1
1-2 AEROSOL, METERED RESPIRATORY (INHALATION)
Status: DISCONTINUED | OUTPATIENT
Start: 2024-04-27 | End: 2024-04-27 | Stop reason: HOSPADM

## 2024-04-27 RX ORDER — ERTAPENEM 1 G/1
1 INJECTION, POWDER, LYOPHILIZED, FOR SOLUTION INTRAMUSCULAR; INTRAVENOUS ONCE
Status: CANCELLED | OUTPATIENT
Start: 2024-04-28 | End: 2024-04-28

## 2024-04-27 RX ORDER — EPINEPHRINE 1 MG/ML
0.3 INJECTION, SOLUTION, CONCENTRATE INTRAVENOUS EVERY 5 MIN PRN
Status: DISCONTINUED | OUTPATIENT
Start: 2024-04-27 | End: 2024-04-27 | Stop reason: HOSPADM

## 2024-04-27 RX ORDER — MEPERIDINE HYDROCHLORIDE 25 MG/ML
25 INJECTION INTRAMUSCULAR; INTRAVENOUS; SUBCUTANEOUS EVERY 30 MIN PRN
Status: DISCONTINUED | OUTPATIENT
Start: 2024-04-27 | End: 2024-04-27 | Stop reason: HOSPADM

## 2024-04-27 RX ORDER — EPINEPHRINE 1 MG/ML
0.3 INJECTION, SOLUTION, CONCENTRATE INTRAVENOUS EVERY 5 MIN PRN
Status: CANCELLED | OUTPATIENT
Start: 2024-04-28

## 2024-04-27 RX ORDER — HEPARIN SODIUM,PORCINE 10 UNIT/ML
5-20 VIAL (ML) INTRAVENOUS DAILY PRN
Status: DISCONTINUED | OUTPATIENT
Start: 2024-04-27 | End: 2024-04-27 | Stop reason: HOSPADM

## 2024-04-27 RX ORDER — DIPHENHYDRAMINE HYDROCHLORIDE 50 MG/ML
50 INJECTION INTRAMUSCULAR; INTRAVENOUS
Status: DISCONTINUED | OUTPATIENT
Start: 2024-04-27 | End: 2024-04-27 | Stop reason: HOSPADM

## 2024-04-27 RX ORDER — ERTAPENEM 1 G/1
1 INJECTION, POWDER, LYOPHILIZED, FOR SOLUTION INTRAMUSCULAR; INTRAVENOUS ONCE
Status: COMPLETED | OUTPATIENT
Start: 2024-04-27 | End: 2024-04-27

## 2024-04-27 RX ORDER — MEPERIDINE HYDROCHLORIDE 25 MG/ML
25 INJECTION INTRAMUSCULAR; INTRAVENOUS; SUBCUTANEOUS EVERY 30 MIN PRN
Status: CANCELLED | OUTPATIENT
Start: 2024-04-28

## 2024-04-27 RX ORDER — HEPARIN SODIUM,PORCINE 10 UNIT/ML
5-20 VIAL (ML) INTRAVENOUS DAILY PRN
Status: CANCELLED | OUTPATIENT
Start: 2024-04-28

## 2024-04-27 RX ORDER — DIPHENHYDRAMINE HYDROCHLORIDE 50 MG/ML
50 INJECTION INTRAMUSCULAR; INTRAVENOUS
Status: CANCELLED
Start: 2024-04-28

## 2024-04-27 RX ORDER — METHYLPREDNISOLONE SODIUM SUCCINATE 125 MG/2ML
125 INJECTION, POWDER, LYOPHILIZED, FOR SOLUTION INTRAMUSCULAR; INTRAVENOUS
Status: CANCELLED
Start: 2024-04-28

## 2024-04-27 RX ORDER — ALBUTEROL SULFATE 90 UG/1
1-2 AEROSOL, METERED RESPIRATORY (INHALATION)
Status: CANCELLED
Start: 2024-04-28

## 2024-04-27 RX ADMIN — ERTAPENEM SODIUM 1 G: 1 INJECTION, POWDER, LYOPHILIZED, FOR SOLUTION INTRAMUSCULAR; INTRAVENOUS at 12:38

## 2024-04-27 ASSESSMENT — ACTIVITIES OF DAILY LIVING (ADL): ADLS_ACUITY_SCORE: 36

## 2024-04-28 ENCOUNTER — HOSPITAL ENCOUNTER (EMERGENCY)
Facility: CLINIC | Age: 69
Discharge: HOME | End: 2024-04-28
Admitting: INTERNAL MEDICINE
Payer: COMMERCIAL

## 2024-04-28 VITALS
DIASTOLIC BLOOD PRESSURE: 95 MMHG | OXYGEN SATURATION: 100 % | WEIGHT: 141 LBS | HEIGHT: 67 IN | BODY MASS INDEX: 22.13 KG/M2 | HEART RATE: 84 BPM | RESPIRATION RATE: 20 BRPM | TEMPERATURE: 98.1 F | SYSTOLIC BLOOD PRESSURE: 133 MMHG

## 2024-04-28 DIAGNOSIS — R78.81 BACTEREMIA: ICD-10-CM

## 2024-04-28 DIAGNOSIS — I33.0 INFECTIVE ENDOCARDITIS, DUE TO UNSPECIFIED ORGANISM, UNSPECIFIED CHRONICITY: ICD-10-CM

## 2024-04-28 PROCEDURE — 96365 THER/PROPH/DIAG IV INF INIT: CPT

## 2024-04-28 PROCEDURE — 999N000104 HC STATISTIC NO CHARGE

## 2024-04-28 PROCEDURE — 250N000011 HC RX IP 250 OP 636: Mod: JZ | Performed by: INTERNAL MEDICINE

## 2024-04-28 RX ORDER — ALBUTEROL SULFATE 0.83 MG/ML
2.5 SOLUTION RESPIRATORY (INHALATION)
Status: DISCONTINUED | OUTPATIENT
Start: 2024-04-28 | End: 2024-04-28 | Stop reason: HOSPADM

## 2024-04-28 RX ORDER — ALBUTEROL SULFATE 90 UG/1
1-2 AEROSOL, METERED RESPIRATORY (INHALATION)
Status: DISCONTINUED | OUTPATIENT
Start: 2024-04-28 | End: 2024-04-28 | Stop reason: HOSPADM

## 2024-04-28 RX ORDER — EPINEPHRINE 1 MG/ML
0.3 INJECTION, SOLUTION, CONCENTRATE INTRAVENOUS EVERY 5 MIN PRN
Status: CANCELLED | OUTPATIENT
Start: 2024-04-29

## 2024-04-28 RX ORDER — DIPHENHYDRAMINE HYDROCHLORIDE 50 MG/ML
50 INJECTION INTRAMUSCULAR; INTRAVENOUS
Status: DISCONTINUED | OUTPATIENT
Start: 2024-04-28 | End: 2024-04-28 | Stop reason: HOSPADM

## 2024-04-28 RX ORDER — METHYLPREDNISOLONE SODIUM SUCCINATE 125 MG/2ML
125 INJECTION, POWDER, LYOPHILIZED, FOR SOLUTION INTRAMUSCULAR; INTRAVENOUS
Status: CANCELLED
Start: 2024-04-29

## 2024-04-28 RX ORDER — DIPHENHYDRAMINE HYDROCHLORIDE 50 MG/ML
50 INJECTION INTRAMUSCULAR; INTRAVENOUS
Status: CANCELLED
Start: 2024-04-29

## 2024-04-28 RX ORDER — HEPARIN SODIUM,PORCINE 10 UNIT/ML
5-20 VIAL (ML) INTRAVENOUS DAILY PRN
Status: CANCELLED | OUTPATIENT
Start: 2024-04-29

## 2024-04-28 RX ORDER — ERTAPENEM 1 G/1
1 INJECTION, POWDER, LYOPHILIZED, FOR SOLUTION INTRAMUSCULAR; INTRAVENOUS ONCE
Status: COMPLETED | OUTPATIENT
Start: 2024-04-28 | End: 2024-04-28

## 2024-04-28 RX ORDER — ALBUTEROL SULFATE 0.83 MG/ML
2.5 SOLUTION RESPIRATORY (INHALATION)
Status: CANCELLED | OUTPATIENT
Start: 2024-04-29

## 2024-04-28 RX ORDER — MEPERIDINE HYDROCHLORIDE 25 MG/ML
25 INJECTION INTRAMUSCULAR; INTRAVENOUS; SUBCUTANEOUS EVERY 30 MIN PRN
Status: CANCELLED | OUTPATIENT
Start: 2024-04-29

## 2024-04-28 RX ORDER — ALBUTEROL SULFATE 90 UG/1
1-2 AEROSOL, METERED RESPIRATORY (INHALATION)
Status: CANCELLED
Start: 2024-04-29

## 2024-04-28 RX ORDER — EPINEPHRINE 1 MG/ML
0.3 INJECTION, SOLUTION, CONCENTRATE INTRAVENOUS EVERY 5 MIN PRN
Status: DISCONTINUED | OUTPATIENT
Start: 2024-04-28 | End: 2024-04-28 | Stop reason: HOSPADM

## 2024-04-28 RX ORDER — METHYLPREDNISOLONE SODIUM SUCCINATE 125 MG/2ML
125 INJECTION, POWDER, LYOPHILIZED, FOR SOLUTION INTRAMUSCULAR; INTRAVENOUS
Status: DISCONTINUED | OUTPATIENT
Start: 2024-04-28 | End: 2024-04-28 | Stop reason: HOSPADM

## 2024-04-28 RX ORDER — ERTAPENEM 1 G/1
1 INJECTION, POWDER, LYOPHILIZED, FOR SOLUTION INTRAMUSCULAR; INTRAVENOUS ONCE
Status: CANCELLED | OUTPATIENT
Start: 2024-04-29 | End: 2024-04-29

## 2024-04-28 RX ORDER — MEPERIDINE HYDROCHLORIDE 25 MG/ML
25 INJECTION INTRAMUSCULAR; INTRAVENOUS; SUBCUTANEOUS EVERY 30 MIN PRN
Status: DISCONTINUED | OUTPATIENT
Start: 2024-04-28 | End: 2024-04-28 | Stop reason: HOSPADM

## 2024-04-28 RX ORDER — HEPARIN SODIUM,PORCINE 10 UNIT/ML
5-20 VIAL (ML) INTRAVENOUS DAILY PRN
Status: DISCONTINUED | OUTPATIENT
Start: 2024-04-28 | End: 2024-04-28 | Stop reason: HOSPADM

## 2024-04-28 RX ADMIN — ERTAPENEM SODIUM 1 G: 1 INJECTION, POWDER, LYOPHILIZED, FOR SOLUTION INTRAMUSCULAR; INTRAVENOUS at 11:35

## 2024-04-28 ASSESSMENT — COLUMBIA-SUICIDE SEVERITY RATING SCALE - C-SSRS
6. HAVE YOU EVER DONE ANYTHING, STARTED TO DO ANYTHING, OR PREPARED TO DO ANYTHING TO END YOUR LIFE?: NO
2. HAVE YOU ACTUALLY HAD ANY THOUGHTS OF KILLING YOURSELF IN THE PAST MONTH?: NO
1. IN THE PAST MONTH, HAVE YOU WISHED YOU WERE DEAD OR WISHED YOU COULD GO TO SLEEP AND NOT WAKE UP?: NO

## 2024-04-28 ASSESSMENT — ACTIVITIES OF DAILY LIVING (ADL): ADLS_ACUITY_SCORE: 36

## 2024-04-28 NOTE — ED TRIAGE NOTES
Pt presents for IVO. PICC line in right arm     Triage Assessment (Adult)       Row Name 04/28/24 1116          Triage Assessment    Airway WDL WDL        Respiratory WDL    Respiratory WDL WDL        Skin Circulation/Temperature WDL    Skin Circulation/Temperature WDL WDL        Cardiac WDL    Cardiac WDL WDL        Peripheral/Neurovascular WDL    Peripheral Neurovascular WDL WDL        Cognitive/Neuro/Behavioral WDL    Cognitive/Neuro/Behavioral WDL WDL

## 2024-04-29 ENCOUNTER — INFUSION THERAPY VISIT (OUTPATIENT)
Dept: INFUSION THERAPY | Facility: CLINIC | Age: 69
End: 2024-04-29
Attending: PHYSICIAN ASSISTANT
Payer: COMMERCIAL

## 2024-04-29 VITALS
DIASTOLIC BLOOD PRESSURE: 75 MMHG | RESPIRATION RATE: 20 BRPM | OXYGEN SATURATION: 99 % | TEMPERATURE: 97.9 F | HEART RATE: 87 BPM | SYSTOLIC BLOOD PRESSURE: 121 MMHG

## 2024-04-29 DIAGNOSIS — R78.81 BACTEREMIA: ICD-10-CM

## 2024-04-29 DIAGNOSIS — I33.0 INFECTIVE ENDOCARDITIS, DUE TO UNSPECIFIED ORGANISM, UNSPECIFIED CHRONICITY: Primary | ICD-10-CM

## 2024-04-29 PROCEDURE — 250N000011 HC RX IP 250 OP 636: Mod: JZ | Performed by: INTERNAL MEDICINE

## 2024-04-29 PROCEDURE — 96365 THER/PROPH/DIAG IV INF INIT: CPT

## 2024-04-29 RX ORDER — MEPERIDINE HYDROCHLORIDE 25 MG/ML
25 INJECTION INTRAMUSCULAR; INTRAVENOUS; SUBCUTANEOUS EVERY 30 MIN PRN
Status: CANCELLED | OUTPATIENT
Start: 2024-04-30

## 2024-04-29 RX ORDER — ALBUTEROL SULFATE 0.83 MG/ML
2.5 SOLUTION RESPIRATORY (INHALATION)
Status: CANCELLED | OUTPATIENT
Start: 2024-04-30

## 2024-04-29 RX ORDER — ALBUTEROL SULFATE 90 UG/1
1-2 AEROSOL, METERED RESPIRATORY (INHALATION)
Status: CANCELLED
Start: 2024-04-30

## 2024-04-29 RX ORDER — HEPARIN SODIUM,PORCINE 10 UNIT/ML
5-20 VIAL (ML) INTRAVENOUS DAILY PRN
Status: CANCELLED | OUTPATIENT
Start: 2024-04-30

## 2024-04-29 RX ORDER — ERTAPENEM 1 G/1
1 INJECTION, POWDER, LYOPHILIZED, FOR SOLUTION INTRAMUSCULAR; INTRAVENOUS ONCE
Status: COMPLETED | OUTPATIENT
Start: 2024-04-29 | End: 2024-04-29

## 2024-04-29 RX ORDER — ERTAPENEM 1 G/1
1 INJECTION, POWDER, LYOPHILIZED, FOR SOLUTION INTRAMUSCULAR; INTRAVENOUS ONCE
Status: CANCELLED | OUTPATIENT
Start: 2024-04-30 | End: 2024-04-30

## 2024-04-29 RX ORDER — METHYLPREDNISOLONE SODIUM SUCCINATE 125 MG/2ML
125 INJECTION, POWDER, LYOPHILIZED, FOR SOLUTION INTRAMUSCULAR; INTRAVENOUS
Status: CANCELLED
Start: 2024-04-30

## 2024-04-29 RX ORDER — EPINEPHRINE 1 MG/ML
0.3 INJECTION, SOLUTION, CONCENTRATE INTRAVENOUS EVERY 5 MIN PRN
Status: CANCELLED | OUTPATIENT
Start: 2024-04-30

## 2024-04-29 RX ORDER — DIPHENHYDRAMINE HYDROCHLORIDE 50 MG/ML
50 INJECTION INTRAMUSCULAR; INTRAVENOUS
Status: CANCELLED
Start: 2024-04-30

## 2024-04-29 RX ADMIN — ERTAPENEM SODIUM 1 G: 1 INJECTION, POWDER, LYOPHILIZED, FOR SOLUTION INTRAMUSCULAR; INTRAVENOUS at 14:39

## 2024-04-29 NOTE — PROGRESS NOTES
Infusion Nursing Note:  Miguel Pappas presents today for Ertapenum.    Patient seen by provider today: No   present during visit today: Not Applicable.    Note: Denies pain. Ambulatory onto unit with cane accompanied by wife.      Intravenous Access:  PICC.    Treatment Conditions:  Not Applicable.      Post Infusion Assessment:  Patient tolerated infusion without incident.  Blood return noted pre and post infusion.  Site patent and intact, free from redness, edema or discomfort.  No evidence of extravasations.       Discharge Plan:   Discharge instructions reviewed with: Family.  Copy of AVS reviewed with patient and/or family.  Patient will return tomorrow for next appointment.  Patient discharged in stable condition accompanied by: wife.  Departure Mode: Ambulatory with cane.      Vilma Salgado RN

## 2024-04-29 NOTE — PATIENT INSTRUCTIONS
Pt to return on tomorrow for Invanz. Copies of medication list and upcoming appointments given prior to discharge.

## 2024-04-30 ENCOUNTER — INFUSION THERAPY VISIT (OUTPATIENT)
Dept: INFUSION THERAPY | Facility: CLINIC | Age: 69
End: 2024-04-30
Attending: PHYSICIAN ASSISTANT
Payer: COMMERCIAL

## 2024-04-30 VITALS
TEMPERATURE: 97.8 F | HEART RATE: 89 BPM | SYSTOLIC BLOOD PRESSURE: 134 MMHG | RESPIRATION RATE: 16 BRPM | OXYGEN SATURATION: 98 % | DIASTOLIC BLOOD PRESSURE: 83 MMHG

## 2024-04-30 DIAGNOSIS — I33.0 INFECTIVE ENDOCARDITIS, DUE TO UNSPECIFIED ORGANISM, UNSPECIFIED CHRONICITY: Primary | ICD-10-CM

## 2024-04-30 DIAGNOSIS — R78.81 BACTEREMIA: ICD-10-CM

## 2024-04-30 PROCEDURE — 96365 THER/PROPH/DIAG IV INF INIT: CPT

## 2024-04-30 PROCEDURE — 250N000011 HC RX IP 250 OP 636: Mod: JZ | Performed by: INTERNAL MEDICINE

## 2024-04-30 RX ORDER — ERTAPENEM 1 G/1
1 INJECTION, POWDER, LYOPHILIZED, FOR SOLUTION INTRAMUSCULAR; INTRAVENOUS ONCE
Status: CANCELLED | OUTPATIENT
Start: 2024-05-01 | End: 2024-05-01

## 2024-04-30 RX ORDER — HEPARIN SODIUM,PORCINE 10 UNIT/ML
5-20 VIAL (ML) INTRAVENOUS DAILY PRN
Status: CANCELLED | OUTPATIENT
Start: 2024-05-01

## 2024-04-30 RX ORDER — ALBUTEROL SULFATE 90 UG/1
1-2 AEROSOL, METERED RESPIRATORY (INHALATION)
Status: CANCELLED
Start: 2024-05-01

## 2024-04-30 RX ORDER — ERTAPENEM 1 G/1
1 INJECTION, POWDER, LYOPHILIZED, FOR SOLUTION INTRAMUSCULAR; INTRAVENOUS ONCE
Status: COMPLETED | OUTPATIENT
Start: 2024-04-30 | End: 2024-04-30

## 2024-04-30 RX ORDER — HEPARIN SODIUM,PORCINE 10 UNIT/ML
5-20 VIAL (ML) INTRAVENOUS DAILY PRN
Status: DISCONTINUED | OUTPATIENT
Start: 2024-04-30 | End: 2024-04-30 | Stop reason: HOSPADM

## 2024-04-30 RX ORDER — DIPHENHYDRAMINE HYDROCHLORIDE 50 MG/ML
50 INJECTION INTRAMUSCULAR; INTRAVENOUS
Status: CANCELLED
Start: 2024-05-01

## 2024-04-30 RX ORDER — ERTAPENEM 1 G/1
1 INJECTION, POWDER, LYOPHILIZED, FOR SOLUTION INTRAMUSCULAR; INTRAVENOUS EVERY 24 HOURS
Status: CANCELLED
Start: 2024-04-30

## 2024-04-30 RX ORDER — METHYLPREDNISOLONE SODIUM SUCCINATE 125 MG/2ML
125 INJECTION, POWDER, LYOPHILIZED, FOR SOLUTION INTRAMUSCULAR; INTRAVENOUS
Status: CANCELLED
Start: 2024-05-01

## 2024-04-30 RX ORDER — EPINEPHRINE 1 MG/ML
0.3 INJECTION, SOLUTION, CONCENTRATE INTRAVENOUS EVERY 5 MIN PRN
Status: CANCELLED | OUTPATIENT
Start: 2024-05-01

## 2024-04-30 RX ORDER — ALBUTEROL SULFATE 0.83 MG/ML
2.5 SOLUTION RESPIRATORY (INHALATION)
Status: CANCELLED | OUTPATIENT
Start: 2024-05-01

## 2024-04-30 RX ORDER — MEPERIDINE HYDROCHLORIDE 25 MG/ML
25 INJECTION INTRAMUSCULAR; INTRAVENOUS; SUBCUTANEOUS EVERY 30 MIN PRN
Status: CANCELLED | OUTPATIENT
Start: 2024-05-01

## 2024-04-30 RX ADMIN — ERTAPENEM SODIUM 1 G: 1 INJECTION, POWDER, LYOPHILIZED, FOR SOLUTION INTRAMUSCULAR; INTRAVENOUS at 15:09

## 2024-04-30 ASSESSMENT — PAIN SCALES - GENERAL: PAINLEVEL: NO PAIN (0)

## 2024-04-30 NOTE — PROGRESS NOTES
Infusion Nursing Note:  Miguel Pappas presents today for Ertapenem.    Patient seen by provider today: No   present during visit today: Not Applicable.    Note: N/A.      Intravenous Access:  PICC.    Treatment Conditions:  Not Applicable.      Post Infusion Assessment:  Patient tolerated infusion without incident.       Discharge Plan:   Patient discharged in stable condition accompanied by: grandson  Departure Mode: Ambulatory with cane.      Manuela Brunner RN

## 2024-05-01 ENCOUNTER — INFUSION THERAPY VISIT (OUTPATIENT)
Dept: INFUSION THERAPY | Facility: CLINIC | Age: 69
End: 2024-05-01
Attending: PHYSICIAN ASSISTANT
Payer: COMMERCIAL

## 2024-05-01 ENCOUNTER — LAB (OUTPATIENT)
Dept: INFUSION THERAPY | Facility: CLINIC | Age: 69
End: 2024-05-01
Attending: INTERNAL MEDICINE
Payer: COMMERCIAL

## 2024-05-01 VITALS
HEART RATE: 85 BPM | DIASTOLIC BLOOD PRESSURE: 81 MMHG | OXYGEN SATURATION: 98 % | SYSTOLIC BLOOD PRESSURE: 121 MMHG | RESPIRATION RATE: 16 BRPM | TEMPERATURE: 97.8 F

## 2024-05-01 DIAGNOSIS — I33.0 INFECTIVE ENDOCARDITIS, DUE TO UNSPECIFIED ORGANISM, UNSPECIFIED CHRONICITY: Primary | ICD-10-CM

## 2024-05-01 DIAGNOSIS — R78.81 BACTEREMIA: ICD-10-CM

## 2024-05-01 DIAGNOSIS — I38 VALVULAR ENDOCARDITIS: ICD-10-CM

## 2024-05-01 LAB
ALT SERPL W P-5'-P-CCNC: 37 U/L (ref 0–70)
AST SERPL W P-5'-P-CCNC: 36 U/L (ref 0–45)
BASOPHILS # BLD AUTO: 0.1 10E3/UL (ref 0–0.2)
BASOPHILS NFR BLD AUTO: 1 %
CREAT SERPL-MCNC: 0.78 MG/DL (ref 0.67–1.17)
CRP SERPL-MCNC: 7.38 MG/L
EGFRCR SERPLBLD CKD-EPI 2021: >90 ML/MIN/1.73M2
EOSINOPHIL # BLD AUTO: 1.1 10E3/UL (ref 0–0.7)
EOSINOPHIL NFR BLD AUTO: 13 %
ERYTHROCYTE [DISTWIDTH] IN BLOOD BY AUTOMATED COUNT: 14.1 % (ref 10–15)
HCT VFR BLD AUTO: 32.5 % (ref 40–53)
HGB BLD-MCNC: 10.9 G/DL (ref 13.3–17.7)
IMM GRANULOCYTES # BLD: 0 10E3/UL
IMM GRANULOCYTES NFR BLD: 0 %
LYMPHOCYTES # BLD AUTO: 1.9 10E3/UL (ref 0.8–5.3)
LYMPHOCYTES NFR BLD AUTO: 21 %
MCH RBC QN AUTO: 29.2 PG (ref 26.5–33)
MCHC RBC AUTO-ENTMCNC: 33.5 G/DL (ref 31.5–36.5)
MCV RBC AUTO: 87 FL (ref 78–100)
MONOCYTES # BLD AUTO: 1 10E3/UL (ref 0–1.3)
MONOCYTES NFR BLD AUTO: 11 %
NEUTROPHILS # BLD AUTO: 4.9 10E3/UL (ref 1.6–8.3)
NEUTROPHILS NFR BLD AUTO: 54 %
NRBC # BLD AUTO: 0 10E3/UL
NRBC BLD AUTO-RTO: 0 /100
PLATELET # BLD AUTO: 286 10E3/UL (ref 150–450)
RBC # BLD AUTO: 3.73 10E6/UL (ref 4.4–5.9)
WBC # BLD AUTO: 9 10E3/UL (ref 4–11)

## 2024-05-01 PROCEDURE — 250N000011 HC RX IP 250 OP 636: Mod: JZ | Performed by: INTERNAL MEDICINE

## 2024-05-01 PROCEDURE — 86140 C-REACTIVE PROTEIN: CPT

## 2024-05-01 PROCEDURE — 85004 AUTOMATED DIFF WBC COUNT: CPT

## 2024-05-01 PROCEDURE — 82565 ASSAY OF CREATININE: CPT

## 2024-05-01 PROCEDURE — 96365 THER/PROPH/DIAG IV INF INIT: CPT

## 2024-05-01 PROCEDURE — 84460 ALANINE AMINO (ALT) (SGPT): CPT

## 2024-05-01 PROCEDURE — 84450 TRANSFERASE (AST) (SGOT): CPT

## 2024-05-01 PROCEDURE — 36592 COLLECT BLOOD FROM PICC: CPT

## 2024-05-01 RX ORDER — METHYLPREDNISOLONE SODIUM SUCCINATE 125 MG/2ML
125 INJECTION, POWDER, LYOPHILIZED, FOR SOLUTION INTRAMUSCULAR; INTRAVENOUS
Start: 2024-05-02

## 2024-05-01 RX ORDER — ERTAPENEM 1 G/1
1 INJECTION, POWDER, LYOPHILIZED, FOR SOLUTION INTRAMUSCULAR; INTRAVENOUS EVERY 24 HOURS
Status: CANCELLED
Start: 2024-05-02

## 2024-05-01 RX ORDER — ALBUTEROL SULFATE 90 UG/1
1-2 AEROSOL, METERED RESPIRATORY (INHALATION)
Start: 2024-05-02

## 2024-05-01 RX ORDER — ALBUTEROL SULFATE 0.83 MG/ML
2.5 SOLUTION RESPIRATORY (INHALATION)
OUTPATIENT
Start: 2024-05-02

## 2024-05-01 RX ORDER — EPINEPHRINE 1 MG/ML
0.3 INJECTION, SOLUTION, CONCENTRATE INTRAVENOUS EVERY 5 MIN PRN
OUTPATIENT
Start: 2024-05-02

## 2024-05-01 RX ORDER — MEPERIDINE HYDROCHLORIDE 25 MG/ML
25 INJECTION INTRAMUSCULAR; INTRAVENOUS; SUBCUTANEOUS EVERY 30 MIN PRN
OUTPATIENT
Start: 2024-05-02

## 2024-05-01 RX ORDER — DIPHENHYDRAMINE HYDROCHLORIDE 50 MG/ML
50 INJECTION INTRAMUSCULAR; INTRAVENOUS
Start: 2024-05-02

## 2024-05-01 RX ORDER — ERTAPENEM 1 G/1
1 INJECTION, POWDER, LYOPHILIZED, FOR SOLUTION INTRAMUSCULAR; INTRAVENOUS EVERY 24 HOURS
Status: COMPLETED | OUTPATIENT
Start: 2024-05-01 | End: 2024-05-01

## 2024-05-01 RX ORDER — HEPARIN SODIUM,PORCINE 10 UNIT/ML
5-20 VIAL (ML) INTRAVENOUS DAILY PRN
OUTPATIENT
Start: 2024-05-02

## 2024-05-01 RX ADMIN — ERTAPENEM SODIUM 1 G: 1 INJECTION, POWDER, LYOPHILIZED, FOR SOLUTION INTRAMUSCULAR; INTRAVENOUS at 15:26

## 2024-05-01 ASSESSMENT — PAIN SCALES - GENERAL: PAINLEVEL: NO PAIN (0)

## 2024-05-01 NOTE — PROGRESS NOTES
Infusion Nursing Note:  Miguel Pappas presents today for Invanz.    Patient seen by provider today: No   present during visit today: Not Applicable.    Note: Patient ambulated to IVO with wife. VSS. Denies pain. No complaints. Plan to discontinue PICC line after Invanz dose today.       Intravenous Access:  PICC.    Treatment Conditions:  Not Applicable.      Post Infusion Assessment:  Patient tolerated infusion without incident.  Access discontinued per protocol.  PICC line pulled without incident. Pressure applied to site x 5 minutes, no bleeding. Covered insertion site with vaseline gauze dressing, sterile 2 x 2 sterile gauze and tegaderm. Instructed to not lift anything with right arm x 24 hours to allow to seal.  Remove dressing tomorrow at 4pm. If dressing becomes saturated with blood to go to ER. .       Discharge Plan:   Patient discharged in stable condition accompanied by: self and wife.  Departure Mode: Ambulatory.      Berta Barton RN

## 2024-05-01 NOTE — PROGRESS NOTES
Patient here for PICC  lab draw prior to Invanz.    Intravenous access:       Labs drawn without difficulty.  green and purple tubes drawn.     Port flushed with 20 ml NS after.       Berta Barton RN

## 2024-05-02 ENCOUNTER — OFFICE VISIT (OUTPATIENT)
Dept: OTOLARYNGOLOGY | Facility: CLINIC | Age: 69
End: 2024-05-02
Payer: COMMERCIAL

## 2024-05-02 VITALS
BODY MASS INDEX: 22.13 KG/M2 | WEIGHT: 141 LBS | DIASTOLIC BLOOD PRESSURE: 80 MMHG | SYSTOLIC BLOOD PRESSURE: 144 MMHG | TEMPERATURE: 98.1 F | HEIGHT: 67 IN

## 2024-05-02 DIAGNOSIS — H90.3 ASYMMETRICAL SENSORINEURAL HEARING LOSS: Primary | ICD-10-CM

## 2024-05-02 PROCEDURE — 99203 OFFICE O/P NEW LOW 30 MIN: CPT | Performed by: OTOLARYNGOLOGY

## 2024-05-02 ASSESSMENT — PAIN SCALES - GENERAL: PAINLEVEL: NO PAIN (0)

## 2024-05-02 NOTE — LETTER
5/2/2024         RE: Miguel Pappas  [address] [facility] [id number]        Dear Colleague,    Thank you for referring your patient, Miguel Pappas, to the [facility]. Please see a copy of my visit note below.    ENT Consultation    Miguel Pappas who is a 68 year old male seen in consultation at the request of audiologist.      History of Present Illness - Miguel Pappas is a 68 year old male presents for evaluation of asymmetric sensorineural hearing loss.  Noticed a gradual decline in his both ears especially on the left.  Had significant tinnitus previously but now just has a low-grade tinnitus in both ears.  Had couple of episodes of dizziness disequilibrium not true vertigo but also had some confusion disorientation was told that he might have some early mild stroke even though MRI results do not necessarily reflect that.  He did have a brain MRI that did not show any retrocochlear pathology.    EXAM: MR BRAIN W/O and W CONTRAST  LOCATION: [facility]  DATE: 3/14/2024     INDICATION: Confusion, episode of almost complete amnesia from earlier today  COMPARISON: None.  CONTRAST: 6.5 mL Gadavist  TECHNIQUE: Routine multiplanar multisequence head MRI without and with intravenous contrast.     FINDINGS:  INTRACRANIAL CONTENTS: No acute or subacute infarct. Left caudate head lacunar infarct is chronic. No mass, acute hemorrhage, or extra-axial fluid collections. Scattered nonspecific T2/FLAIR hyperintensities within the cerebral white matter most   consistent with mild chronic microvascular ischemic change. Mild generalized cerebral atrophy. No hydrocephalus. Normal position of the cerebellar tonsils. No pathologic contrast enhancement.     SELLA: No abnormality accounting for technique.     OSSEOUS STRUCTURES/SOFT TISSUES: Normal marrow signal. The major intracranial vascular flow voids are maintained.      ORBITS: No abnormality  accounting for technique.      SINUSES/MASTOIDS: Mild mucosal thickening scattered about the paranasal sinuses. Scattered fluid/membrane thickening in the mastoid air cells bilaterally.                                                                       IMPRESSION:  1.  No acute or subacute ischemic change.  2.  No acute intracranial process or abnormal enhancement.  3.  Mild age-related changes as above.            BP Readings from Last 1 Encounters:   05/02/24 (!) 144/80       BP noted to be elevated today in office.  Patient to follow up with Primary Care provider regarding elevated blood pressure.    Miguel IS NOT a smoker/uses chewing tobacco.        Past Medical History -   Past Medical History:   Diagnosis Date     * * * SBE PROPHYLAXIS * * *     no longer indicated - 2007 AHA guidelnies     Mitral valve stenosis and aortic valve stenosis     2/6 murmur     Pure hypercholesterolemia 1/26/2007       Current Medications -   Current Outpatient Medications:      acetaminophen (TYLENOL) 325 MG tablet, Take 2 tablets (650 mg) by mouth every 4 hours as needed for mild pain or other (and adjunct with moderate or severe pain or per patient request), Disp: , Rfl:      ascorbic acid (VITAMIN C) 250 MG CHEW chewable tablet, Take 250 mg by mouth daily, Disp: , Rfl:      aspirin 81 MG tablet, Take 81 mg by mouth at bedtime, Disp: , Rfl:      atorvastatin (LIPITOR) 40 MG tablet, Take 1 tablet (40 mg) by mouth every evening, Disp: 60 tablet, Rfl: 1     ferrous sulfate (FEROSUL) 325 (65 Fe) MG tablet, Take 325 mg by mouth daily (with breakfast), Disp: , Rfl:      GLUCOSAMINE CHONDROITIN OR TABS, Take 1 tablet by mouth at bedtime, Disp: , Rfl: 0     ibuprofen (ADVIL/MOTRIN) 200 MG tablet, Take 1 tablet (200 mg) by mouth every 6 hours as needed for inflammatory pain, Disp: , Rfl:      Multiple Vitamins-Minerals (CENTRUM SILVER PO), Take 1 tablet by mouth at bedtime, Disp: 30, Rfl: 0     Vitamin D3 (CHOLECALCIFEROL) 25 mcg  (1000 units) tablet, Take 25 mcg by mouth daily, Disp: , Rfl:      amoxicillin (AMOXIL) 500 MG capsule, Take 2,000 mg by mouth once as needed (dental prophy) (Patient not taking: Reported on 4/17/2024), Disp: , Rfl:      cyclobenzaprine (FLEXERIL) 5 MG tablet, Take 1 tablet (5 mg) by mouth 3 times daily (Patient not taking: Reported on 5/2/2024), Disp: 60 tablet, Rfl: 0     gabapentin (NEURONTIN) 100 MG capsule, Take 1 capsule (100 mg) by mouth 3 times daily (Patient not taking: Reported on 5/2/2024), Disp: 30 capsule, Rfl: 0     oxyCODONE (ROXICODONE) 5 MG tablet, Take 1 tablet (5 mg) by mouth every 6 hours as needed for moderate pain (Patient not taking: Reported on 4/17/2024), Disp: 20 tablet, Rfl: 0  No current facility-administered medications for this visit.    Allergies -   Allergies   Allergen Reactions     No Known Drug Allergy        Social History -   Social History     Socioeconomic History     Marital status:      Spouse name: Fabian     Number of children: 3     Years of education: 12   Occupational History     Occupation:    Tobacco Use     Smoking status: Never     Passive exposure: Never     Smokeless tobacco: Never   Vaping Use     Vaping status: Never Used   Substance and Sexual Activity     Alcohol use: Yes     Comment: 1-2 beer monthly  maybe     Drug use: No     Sexual activity: Yes     Partners: Female     Comment: Hx: wife had tubal ligation   Other Topics Concern      Service Yes     Comment: [employer]     Blood Transfusions No     Caffeine Concern No     Occupational Exposure No     Hobby Hazards No     Sleep Concern No     Stress Concern No     Weight Concern No     Special Diet Yes     Comment: Low fat and cholesterol high fiber     Back Care Yes     Comment: Hx: chiropractor for back inj and knee     Exercise Yes     Comment: every day     Bike Helmet No     Seat Belt Yes   Social History Narrative    Lives with spouse.     Social Determinants of Health  "    Financial Resource Strain: Low Risk  (11/30/2023)    Financial Resource Strain      Within the past 12 months, have you or your family members you live with been unable to get utilities (heat, electricity) when it was really needed?: No   Food Insecurity: Low Risk  (11/30/2023)    Food Insecurity      Within the past 12 months, did you worry that your food would run out before you got money to buy more?: No      Within the past 12 months, did the food you bought just not last and you didn t have money to get more?: No   Transportation Needs: Low Risk  (11/30/2023)    Transportation Needs      Within the past 12 months, has lack of transportation kept you from medical appointments, getting your medicines, non-medical meetings or appointments, work, or from getting things that you need?: No   Housing Stability: Low Risk  (11/30/2023)    Housing Stability      Do you have housing? : Yes      Are you worried about losing your housing?: No       Family History -   Family History   Problem Relation Age of Onset     Cancer Father         Spleen cancer     Hypertension Father      Allergies Father         PCN     Gastrointestinal Disease Father         ulcer     Heart Disease Father         Hx: meds and angioplasty     Hypertension Brother      Cerebrovascular Disease Paternal Grandfather      Arthritis Paternal Grandfather      Arthritis Mother      Hypertension Mother      Allergies Son         Dust , Mold     Depression Sister         X 2       Review of Systems - As per HPI and PMHx, otherwise review of system review of the head and neck negative. Otherwise 10+ review of system is negative    Physical Exam  BP (!) 144/80   Temp 98.1  F (36.7  C) (Temporal)   Ht 1.702 m (5' 7\")   Wt 64 kg (141 lb)   BMI 22.08 kg/m    BMI: Body mass index is 22.08 kg/m .    General - The patient is well nourished and well developed, and appears to have good nutritional status.  Alert and oriented to person and place, answers " questions and cooperates with examination appropriately.    SKIN - No suspicious lesions or rashes.  Respiration - No respiratory distress.  Head and Face - Normocephalic and atraumatic, with no gross asymmetry noted of the contour of the facial features.  The facial nerve is intact, with strong symmetric movements.    Voice and Breathing - The patient was breathing comfortably without the use of accessory muscles. The patients voice was clear and strong, and had appropriate pitch and quality.    Ears - Bilateral pinna and EACs with normal appearing overlying skin. Tympanic membrane intact with good mobility on pneumatic otoscopy bilaterally. Bony landmarks of the ossicular chain are normal. The tympanic membranes are normal in appearance. No retraction, perforation, or masses.  No fluid or purulence was seen in the external canal or the middle ear.     Eyes - Extraocular movements intact.  Sclera were not icteric or injected, conjunctiva were pink and moist.    Neck - Normal midline excursion of the laryngotracheal complex during swallowing.  Full range of motion on passive movement.  Palpation of the occipital, submental, submandibular, internal jugular chain, and supraclavicular nodes did not demonstrate any abnormal lymph nodes or masses.  The carotid pulse was palpable bilaterally.  Palpation of the thyroid was soft and smooth, with no nodules or goiter appreciated.  The trachea was mobile and midline.      Neuro - Nonfocal neuro exam is normal, CN 2 through 12 intact, normal gait and muscle tone.      Performed in clinic 12/21/2023:  Audiologic Studies - An audiogram and tympanogram were performed today as part of the evaluation and personally reviewed. The tympanogram shows Type A curves on the right and Type A curves on the left, with normal canal volumes and middle ear pressures.  The audiogram showed mild to moderate SNHL on the right and sloping moderate to profound SNHL on the left.    Word recognition  score 100% on the right and 0 on the left.    A/P - Miguel Pappas is a 68 year old male with significant sensorineural hearing loss asymmetric.  We encourage him to continue hearing protection but also serious consideration for hearing aids.  Patient will work with audiology towards getting hearing aids.  Otherwise we will recheck hearing in a year.      Bran Geller MD       Again, thank you for allowing me to participate in the care of your patient.        Sincerely,        Bran Geller MD, MD

## 2024-05-06 ENCOUNTER — LAB (OUTPATIENT)
Dept: LAB | Facility: CLINIC | Age: 69
End: 2024-05-06
Payer: COMMERCIAL

## 2024-05-06 DIAGNOSIS — R78.81 BACTEREMIA: ICD-10-CM

## 2024-05-06 PROCEDURE — 87040 BLOOD CULTURE FOR BACTERIA: CPT

## 2024-05-06 PROCEDURE — 36415 COLL VENOUS BLD VENIPUNCTURE: CPT

## 2024-05-08 NOTE — PROGRESS NOTES
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  He notices no shortness of breath.  He does not have chest pain.  He has no limb swelling.  He has no GI concerns.  He has occasional musculoskeletal aches and pains.  He was doing some screening with his new health insurance and they suggested an ECG to be done.  Last echo for his heart valve issues was more than 10 years ago.      Problem list and histories reviewed & adjusted, as indicated.  Additional history: as documented    BP Readings from Last 3 Encounters:   10/26/18 122/58   12/05/14 140/74   11/14/14 118/58    Wt Readings from Last 3 Encounters:   10/26/18 135 lb (61.2 kg)   12/05/14 134 lb (60.8 kg)   11/14/14 134 lb 4.8 oz (60.9 kg)                  Labs reviewed in EPIC    Reviewed and updated as needed this visit by clinical staff  Tobacco  Allergies  Meds  Med Hx  Surg Hx  Fam Hx  Soc Hx      Reviewed and updated as needed this visit by Provider         ROS:  Constitutional, HEENT, cardiovascular, pulmonary, gi and gu systems are negative, except as otherwise noted.    OBJECTIVE:     /58  Pulse 78  Temp 97.5  F (36.4  C) (Tympanic)  Resp 20  Ht 5' 7\" (1.702 m)  Wt 135 lb (61.2 kg)  SpO2 99%  BMI 21.14 kg/m2  Body mass index is 21.14 kg/(m^2).  GENERAL: healthy, alert and no distress  EYES: Eyes grossly normal to inspection, PERRL and conjunctivae and sclerae normal  HENT: ear canals and TM's normal, nose and mouth without ulcers or lesions  NECK: no adenopathy, no asymmetry, masses, or scars and thyroid normal to palpation  RESP: lungs clear to auscultation - no rales, rhonchi or wheezes  CV: regular rates and rhythm, normal S1 S2, no S3 or S4, " "peripheral pulses strong, no peripheral edema and very harsh systolic murmur heard essentially throughout the precordium.  Not appreciate any other diastolic murmur.  ABDOMEN: soft, nontender, no hepatosplenomegaly, no masses and bowel sounds normal  MS: no gross musculoskeletal defects noted, no edema  SKIN: no suspicious lesions or rashes  NEURO: Normal strength and tone, sensory exam grossly normal and mentation intact  PSYCH: mentation appears normal, affect normal/bright    Diagnostic Test Results:  Results for orders placed or performed in visit on 10/26/18   Basic metabolic panel   Result Value Ref Range    Sodium 142 133 - 144 mmol/L    Potassium 4.2 3.4 - 5.3 mmol/L    Chloride 105 94 - 109 mmol/L    Carbon Dioxide 31 20 - 32 mmol/L    Anion Gap 6 3 - 14 mmol/L    Glucose 96 70 - 99 mg/dL    Urea Nitrogen 24 7 - 30 mg/dL    Creatinine 1.00 0.66 - 1.25 mg/dL    GFR Estimate 75 >60 mL/min/1.7m2    GFR Estimate If Black >90 >60 mL/min/1.7m2    Calcium 8.9 8.5 - 10.1 mg/dL   Lipid Profile   Result Value Ref Range    Cholesterol 209 (H) <200 mg/dL    Triglycerides 46 <150 mg/dL    HDL Cholesterol 85 >39 mg/dL    LDL Cholesterol Calculated 115 (H) <100 mg/dL    Non HDL Cholesterol 124 <130 mg/dL   CBC with platelets   Result Value Ref Range    WBC 5.9 4.0 - 11.0 10e9/L    RBC Count 4.27 (L) 4.4 - 5.9 10e12/L    Hemoglobin 12.8 (L) 13.3 - 17.7 g/dL    Hematocrit 37.9 (L) 40.0 - 53.0 %    MCV 89 78 - 100 fl    MCH 30.0 26.5 - 33.0 pg    MCHC 33.8 31.5 - 36.5 g/dL    RDW 12.8 10.0 - 15.0 %    Platelet Count 192 150 - 450 10e9/L       ASSESSMENT/PLAN:           Tobacco Cessation:   reports that he has never smoked. He has never used smokeless tobacco.      BMI:   Estimated body mass index is 21.14 kg/(m^2) as calculated from the following:    Height as of this encounter: 5' 7\" (1.702 m).    Weight as of this encounter: 135 lb (61.2 kg).         1. Aortic valve stenosis, etiology of cardiac valve disease " unspecified  Certainly still very prominent and it has been some years since we have not investigated this.  His EKG showed left ventricular hypertrophy most probably from the spell.  We will await echo report and perhaps have him see cardiology.  Laboratory was unremarkable  - Basic metabolic panel  - CBC with platelets  - EKG 12-lead complete w/read - Clinics    2. Hyperlipidemia LDL goal <100  The 10-year ASCVD risk score (Dalton YA Jr, et al., 2013) is: 8.9%    Values used to calculate the score:      Age: 63 years      Sex: Male      Is Non- : No      Diabetic: No      Tobacco smoker: No      Systolic Blood Pressure: 122 mmHg      Is BP treated: No      HDL Cholesterol: 85 mg/dL      Total Cholesterol: 209 mg/dL  With these numbers and this risk of 8.9% for coronary artery disease I will offer a statin for him for protection.  This is especially in line with his known valvular disease.  - Lipid Profile    3. Need for prophylactic vaccination and inoculation against influenza  Speaks for itself.  - FLU VACCINE, INCREASED ANTIGEN, PRESV FREE, AGE 65+ [20580]  - ADMIN INFLUENZA (For MEDICARE Patients ONLY) []    Patient Instructions   Once I see laboratory results, echocardiogram results, ECG results I will let you know suggestions.  In the meantime continue current health plan and activity.      Nathan Kamar Juarez MD  [facility]      Injectable Influenza Immunization Documentation    1.  Is the person to be vaccinated sick today?   No    2. Does the person to be vaccinated have an allergy to a component   of the vaccine?   No  Egg Allergy Algorithm Link    3. Has the person to be vaccinated ever had a serious reaction   to influenza vaccine in the past?   No    4. Has the person to be vaccinated ever had Guillain-Barré syndrome?   No    Prior to injection verified patient identity using patient's name and date of birth.  Due to injection administration, patient  instructed to remain in clinic for 15 minutes  afterwards, and to report any adverse reaction to me immediately.      Form completed by ................Armen Rey LPN,   October 26, 2018,      4:32 PM,   [facility]           36.9

## 2024-05-11 LAB
BACTERIA BLD CULT: NO GROWTH
BACTERIA BLD CULT: NO GROWTH

## 2024-05-14 ASSESSMENT — HOOS JR
RISING FROM SITTING: MILD
LYING IN BED (TURNING OVER, MAINTAINING HIP POSITION): MODERATE
WALKING ON UNEVEN SURFACE: MODERATE
GOING UP OR DOWN STAIRS: MILD
HOOS JR TOTAL INTERVAL SCORE: 61.82
SITTING: MILD
BENDING TO THE FLOOR TO PICK UP OBJECT: MODERATE

## 2024-05-17 ENCOUNTER — OFFICE VISIT (OUTPATIENT)
Dept: ORTHOPEDICS | Facility: CLINIC | Age: 69
End: 2024-05-17
Payer: COMMERCIAL

## 2024-05-17 VITALS
BODY MASS INDEX: 21.69 KG/M2 | RESPIRATION RATE: 18 BRPM | DIASTOLIC BLOOD PRESSURE: 92 MMHG | SYSTOLIC BLOOD PRESSURE: 145 MMHG | WEIGHT: 138.5 LBS | TEMPERATURE: 98.6 F

## 2024-05-17 DIAGNOSIS — M16.10 HIP ARTHRITIS: Primary | ICD-10-CM

## 2024-05-17 PROCEDURE — 99213 OFFICE O/P EST LOW 20 MIN: CPT | Performed by: ORTHOPAEDIC SURGERY

## 2024-05-17 ASSESSMENT — PAIN SCALES - GENERAL: PAINLEVEL: NO PAIN (1)

## 2024-05-17 NOTE — LETTER
5/17/2024         RE: Miguel Pappas  [address] [facility] [address]        Dear Colleague,    Thank you for referring your patient, Miguel Pappas, to the [facility]. Please see a copy of my visit note below.    S:  Left hip stable for now, can get up into equipment with care.  Infection seems to have improved, not  currently on antibiotics.         Patient Active Problem List   Diagnosis     S/P AVR- bioprosthetic     Prophylactic antibiotic     Hyponatremia     Hypochloremia     SOB (shortness of breath)     Primary osteoarthritis of left hip     Elevated brain natriuretic peptide (BNP) level     Osteopenia, unspecified location     Hip pain, left     Confusion     Word finding difficulty     Leukocytosis, unspecified type     Endocarditis     Bacteremia            Past Medical History:   Diagnosis Date     * * * SBE PROPHYLAXIS * * *     no longer indicated - 2007 AHA guidelnies     Mitral valve stenosis and aortic valve stenosis     2/6 murmur     Pure hypercholesterolemia 1/26/2007            Past Surgical History:   Procedure Laterality Date     COLONOSCOPY N/A 12/5/2014    Procedure: COLONOSCOPY;  Surgeon: Omar Chisholm MD;  Location:  GI     CV CORONARY ANGIOGRAM N/A 11/15/2022    Procedure: Coronary Angiogram;  Surgeon: Edson Nava MD;  Location:  HEART CARDIAC CATH LAB     HC REMOVAL OF TONSILS,<11 Y/O      Tonsils <10y.o.     REPLACE VALVE AORTIC N/A 12/16/2022    Procedure: AORTIC VALVE REPLACEMENT WITH 21MM INSPIRIS RESILIA VALVE, PLACEMENT OF TEMPORARY VENTRICULAR AND ATRIAL PACING WIRES AND TRANSESPHOGEAL ECHOCARDIOGRAM;  Surgeon: Kaila Abbott MD;  Location:  OR     [facility] COLONOSCOPY W/WO BRUSH/WASH  2/8/2005             Social History     Tobacco Use     Smoking status: Never     Passive exposure: Never     Smokeless tobacco: Never   Substance Use Topics     Alcohol use: Yes     Comment: 1-2 beer monthly  maybe             Family History   Problem Relation Age of Onset     Cancer Father         Spleen cancer     Hypertension Father      Allergies Father         PCN     Gastrointestinal Disease Father         ulcer     Heart Disease Father         Hx: meds and angioplasty     Hypertension Brother      Cerebrovascular Disease Paternal Grandfather      Arthritis Paternal Grandfather      Arthritis Mother      Hypertension Mother      Allergies Son         Dust , Mold     Depression Sister         X 2               Allergies   Allergen Reactions     No Known Drug Allergy             Current Outpatient Medications   Medication Sig Dispense Refill     acetaminophen (TYLENOL) 325 MG tablet Take 2 tablets (650 mg) by mouth every 4 hours as needed for mild pain or other (and adjunct with moderate or severe pain or per patient request)       ascorbic acid (VITAMIN C) 250 MG CHEW chewable tablet Take 250 mg by mouth daily       aspirin 81 MG tablet Take 81 mg by mouth at bedtime       atorvastatin (LIPITOR) 40 MG tablet Take 1 tablet (40 mg) by mouth every evening 60 tablet 1     ferrous sulfate (FEROSUL) 325 (65 Fe) MG tablet Take 325 mg by mouth daily (with breakfast)       GLUCOSAMINE CHONDROITIN OR TABS Take 1 tablet by mouth at bedtime  0     ibuprofen (ADVIL/MOTRIN) 200 MG tablet Take 1 tablet (200 mg) by mouth every 6 hours as needed for inflammatory pain       Multiple Vitamins-Minerals (CENTRUM SILVER PO) Take 1 tablet by mouth at bedtime 30 0     Vitamin D3 (CHOLECALCIFEROL) 25 mcg (1000 units) tablet Take 25 mcg by mouth daily       amoxicillin (AMOXIL) 500 MG capsule Take 2,000 mg by mouth once as needed (dental prophy) (Patient not taking: Reported on 4/17/2024)       cyclobenzaprine (FLEXERIL) 5 MG tablet Take 1 tablet (5 mg) by mouth 3 times daily (Patient not taking: Reported on 5/2/2024) 60 tablet 0     gabapentin (NEURONTIN) 100 MG capsule Take 1 capsule (100 mg) by mouth 3 times daily (Patient not taking: Reported on  5/2/2024) 30 capsule 0     oxyCODONE (ROXICODONE) 5 MG tablet Take 1 tablet (5 mg) by mouth every 6 hours as needed for moderate pain (Patient not taking: Reported on 4/17/2024) 20 tablet 0          Review Of Systems  Skin: negative  Eyes: negative  Ears/Nose/Throat: negative  Respiratory: No shortness of breath, dyspnea on exertion, cough, or hemoptysis    O: Physical Exam:  Limited rotation L hip.  Limited abduction.  CMS intact to L foot.    Lab:CRP 7.38, blood cultures no growth    Images:XR PELVIS AND HIP LEFT 1 VIEW  3/7/2024 10:53 AM      HISTORY: Hip pain, left  COMPARISON: None                                                                      IMPRESSION: No acute fracture or malalignment. Severe left hip joint  degenerative changes with bone-on-bone articulation. Small marginal  erosion at the left femoral head-neck junction. Mild right hip joint  degenerative changes. Moderate degenerative changes of the sacroiliac  joints and lower lumbar spine. Osteopenia.          A:  L hip arthrosis, recovering from sepsis      P: discussed injection vs arthroplasty, at this point patient would like to recover from recent episode sepsis, continue to increase activity and see how he does.  Will let  us know if he'd like to proceed with injection or surgical intervention.  See back in a few months.              In addition to the above assessment and plan each active problem on Miguel's problem list was evaluated today. This included the questioning of Miguel for any medication problems. We will continue the current treatment plan for these active problems except as noted.        Again, thank you for allowing me to participate in the care of your patient.        Sincerely,        Levi Campbell MD

## 2024-05-19 NOTE — PROGRESS NOTES
S:  Left hip stable for now, can get up into equipment with care.  Infection seems to have improved, not  currently on antibiotics.         Patient Active Problem List   Diagnosis    S/P AVR- bioprosthetic    Prophylactic antibiotic    Hyponatremia    Hypochloremia    SOB (shortness of breath)    Primary osteoarthritis of left hip    Elevated brain natriuretic peptide (BNP) level    Osteopenia, unspecified location    Hip pain, left    Confusion    Word finding difficulty    Leukocytosis, unspecified type    Endocarditis    Bacteremia            Past Medical History:   Diagnosis Date    * * * SBE PROPHYLAXIS * * *     no longer indicated - 2007 AHA guidelnies    Mitral valve stenosis and aortic valve stenosis     2/6 murmur    Pure hypercholesterolemia 1/26/2007            Past Surgical History:   Procedure Laterality Date    COLONOSCOPY N/A 12/5/2014    Procedure: COLONOSCOPY;  Surgeon: Omar Chisholm MD;  Location:  GI    CV CORONARY ANGIOGRAM N/A 11/15/2022    Procedure: Coronary Angiogram;  Surgeon: Edson Nava MD;  Location:  [facility]ATH [facility]    HC REMOVAL OF TONSILS,<11 Y/O      Tonsils <10y.o.    REPLACE VALVE AORTIC N/A 12/16/2022    Procedure: AORTIC VALVE REPLACEMENT WITH 21MM INSPIRIS RESILIA VALVE, PLACEMENT OF TEMPORARY VENTRICULAR AND ATRIAL PACING WIRES AND TRANSESPHOGEAL ECHOCARDIOGRAM;  Surgeon: Kaila Abbott MD;  Location:  OR    [facility] COLONOSCOPY W/WO BRUSH/WASH  2/8/2005             Social History     Tobacco Use    Smoking status: Never     Passive exposure: Never    Smokeless tobacco: Never   Substance Use Topics    Alcohol use: Yes     Comment: 1-2 beer monthly  maybe            Family History   Problem Relation Age of Onset    Cancer Father         Spleen cancer    Hypertension Father     Allergies Father         PCN    Gastrointestinal Disease Father         ulcer    Heart Disease Father         Hx: meds and angioplasty    Hypertension Brother      Cerebrovascular Disease Paternal Grandfather     Arthritis Paternal Grandfather     Arthritis Mother     Hypertension Mother     Allergies Son         Dust , Mold    Depression Sister         X 2               Allergies   Allergen Reactions    No Known Drug Allergy             Current Outpatient Medications   Medication Sig Dispense Refill    acetaminophen (TYLENOL) 325 MG tablet Take 2 tablets (650 mg) by mouth every 4 hours as needed for mild pain or other (and adjunct with moderate or severe pain or per patient request)      ascorbic acid (VITAMIN C) 250 MG CHEW chewable tablet Take 250 mg by mouth daily      aspirin 81 MG tablet Take 81 mg by mouth at bedtime      atorvastatin (LIPITOR) 40 MG tablet Take 1 tablet (40 mg) by mouth every evening 60 tablet 1    ferrous sulfate (FEROSUL) 325 (65 Fe) MG tablet Take 325 mg by mouth daily (with breakfast)      GLUCOSAMINE CHONDROITIN OR TABS Take 1 tablet by mouth at bedtime  0    ibuprofen (ADVIL/MOTRIN) 200 MG tablet Take 1 tablet (200 mg) by mouth every 6 hours as needed for inflammatory pain      Multiple Vitamins-Minerals (CENTRUM SILVER PO) Take 1 tablet by mouth at bedtime 30 0    Vitamin D3 (CHOLECALCIFEROL) 25 mcg (1000 units) tablet Take 25 mcg by mouth daily      amoxicillin (AMOXIL) 500 MG capsule Take 2,000 mg by mouth once as needed (dental prophy) (Patient not taking: Reported on 4/17/2024)      cyclobenzaprine (FLEXERIL) 5 MG tablet Take 1 tablet (5 mg) by mouth 3 times daily (Patient not taking: Reported on 5/2/2024) 60 tablet 0    gabapentin (NEURONTIN) 100 MG capsule Take 1 capsule (100 mg) by mouth 3 times daily (Patient not taking: Reported on 5/2/2024) 30 capsule 0    oxyCODONE (ROXICODONE) 5 MG tablet Take 1 tablet (5 mg) by mouth every 6 hours as needed for moderate pain (Patient not taking: Reported on 4/17/2024) 20 tablet 0          Review Of Systems  Skin: negative  Eyes: negative  Ears/Nose/Throat: negative  Respiratory: No shortness of  breath, dyspnea on exertion, cough, or hemoptysis    O: Physical Exam:  Limited rotation L hip.  Limited abduction.  CMS intact to L foot.    Lab:CRP 7.38, blood cultures no growth    Images:XR PELVIS AND HIP LEFT 1 VIEW  3/7/2024 10:53 AM      HISTORY: Hip pain, left  COMPARISON: None                                                                      IMPRESSION: No acute fracture or malalignment. Severe left hip joint  degenerative changes with bone-on-bone articulation. Small marginal  erosion at the left femoral head-neck junction. Mild right hip joint  degenerative changes. Moderate degenerative changes of the sacroiliac  joints and lower lumbar spine. Osteopenia.          A:  L hip arthrosis, recovering from sepsis      P: discussed injection vs arthroplasty, at this point patient would like to recover from recent episode sepsis, continue to increase activity and see how he does.  Will let  us know if he'd like to proceed with injection or surgical intervention.  See back in a few months.              In addition to the above assessment and plan each active problem on Miguel's problem list was evaluated today. This included the questioning of Miguel for any medication problems. We will continue the current treatment plan for these active problems except as noted.

## 2024-05-20 ENCOUNTER — TELEPHONE (OUTPATIENT)
Dept: ORTHOPEDICS | Facility: CLINIC | Age: 69
End: 2024-05-20
Payer: COMMERCIAL

## 2024-05-22 NOTE — TELEPHONE ENCOUNTER
Type of surgery: ARTHROPLASTY, HIP, TOTAL, DIRECT ANTERIOR APPROACH (Left)   Location of surgery: [facility]  Date and time of surgery: 10/28  Surgeon: Shannan  Pre-Op Appt Date: 10/28  Post-Op Appt Date: 11/11   Packet sent out: Yes  Pre-cert/Authorization completed:  Not Applicable  Date: na

## 2024-05-24 NOTE — PROGRESS NOTES
DISCHARGE  Reason for Discharge: Patient chooses to discontinue therapy.  Pt cancelling appts, running into insurance/financial issues. Pt welcome to return with new orders once he resolves insurance problems.    Equipment Issued:     Discharge Plan: Patient to continue home program.    Referring Provider:  Ivon Brock PA-C       04/23/24 0500   Appointment Info   Signing clinician's name / credentials Carlos Grayson PT   Total/Authorized Visits 5/10   Visits Used 5   Medical Diagnosis Hip pain, left (M25.552)   PT Tx Diagnosis L hip pain, impaired mobility   Quick Adds Certification   Progress Note/Certification   Start of Care Date 03/14/24   Onset of illness/injury or Date of Surgery 03/14/23   Therapy Frequency 1x/week   Predicted Duration 8 weeks   Certification date from 03/14/24   Certification date to 05/09/24   Progress Note Due Date 05/09/24   Progress Note Completed Date 03/14/24   GOALS   PT Goals 2;3   PT Goal 1   Goal Identifier HEP   Goal Description Pt will demo independence in performance and progression of strengthening and balance HEP in order to improve CLOF.   Goal Progress Pt is managing his HEP well on his own, able to progress. Continue monitoring for pt mgmt.   Target Date   (Ongoing, updates as needed)   PT Goal 2   Goal Identifier HOS   Goal Description Pt will demo improved hip function and pain as shown by increased HOS score of at least 15 points in order to show significant improvement and greater return to previous function.   Goal Progress 46/68, continue goal   Target Date 05/09/24   PT Goal 3   Goal Identifier Walking   Goal Description Pt will demo ability to walk with 2ww at least 100' with pain rating no greater than 3/10 in order to allow pt greater access around his home and within the community.   Goal Progress Pt rating pain at rest 0/10, with walking about a half mile pt reports his pain gets better. Pt is using NBQC.   Target Date 05/09/24   Subjective Report    Subjective Report Pt reports he is now able to get socks on both feet. Pt has been walking a little without AD.   Objective Measures   Objective Measures Objective Measure 1   Objective Measure 1   Objective Measure Pain   Details 0/10 at rest   Treatment Interventions (PT)   Interventions Therapeutic Procedure/Exercise   Manual Therapy   Manual Therapy: Mobilization, MFR, MLD, friction massage minutes ([id number]) 30   Manual Therapy 1 POC, education   Manual Therapy 1 - Details Pt reassessed goals with significant improvements noted. Pt still not feeling 100%, but noting improvements in his overall mobility and function. Pt not wanting to perform any exercises today, but is highly receptive to all education provided. Pt with questions about continuing HEP, progressing at home, knows he will need surgical replacement of his L hip soon and curious how to know when to make the call. Discussed pt current deficits, abilities and weighing how negatively his deficits are impacting his daily living and function as a . Pt thankful for education provided. Pt wanting to place POC on hold for now, will reach out if needing to schedule any further appts. Will monitor pt upcoming appts for scheduled surgery.   Patient Response/Progress Pt receptive to all education, feels he has made good progress, wanting to place POC on hold for now.   Education   Learner/Method Patient;Significant Other   Education Comments Natural aging, pain mgmt, exercises   Plan   Home program Quad, glute, HS sets   Updates to plan of care Calf and HS stretching   Plan for next session Place POC on hold, monitor for upcoming surgery if pt deciding to get RADHA.   Total Session Time   Timed Code Treatment Minutes 30   Total Treatment Time (sum of timed and untimed services) 30

## 2024-05-29 ENCOUNTER — OFFICE VISIT (OUTPATIENT)
Dept: FAMILY MEDICINE | Facility: CLINIC | Age: 69
End: 2024-05-29
Payer: COMMERCIAL

## 2024-05-29 VITALS
WEIGHT: 139 LBS | SYSTOLIC BLOOD PRESSURE: 122 MMHG | DIASTOLIC BLOOD PRESSURE: 68 MMHG | OXYGEN SATURATION: 99 % | RESPIRATION RATE: 18 BRPM | BODY MASS INDEX: 21.82 KG/M2 | HEART RATE: 70 BPM | HEIGHT: 67 IN | TEMPERATURE: 97.5 F

## 2024-05-29 DIAGNOSIS — R78.81 BACTEREMIA: Primary | ICD-10-CM

## 2024-05-29 DIAGNOSIS — Z86.19 HISTORY OF SEPSIS: ICD-10-CM

## 2024-05-29 DIAGNOSIS — Z86.73 HISTORY OF TIA (TRANSIENT ISCHEMIC ATTACK) AND STROKE: ICD-10-CM

## 2024-05-29 PROCEDURE — 99213 OFFICE O/P EST LOW 20 MIN: CPT | Performed by: FAMILY MEDICINE

## 2024-05-29 RX ORDER — RESPIRATORY SYNCYTIAL VIRUS VACCINE 120MCG/0.5
0.5 KIT INTRAMUSCULAR ONCE
Qty: 1 EACH | Refills: 0 | Status: CANCELLED | OUTPATIENT
Start: 2024-05-29 | End: 2024-05-29

## 2024-05-29 NOTE — PROGRESS NOTES
"  Assessment & Plan       ICD-10-CM    1. Bacteremia  R78.81       2. History of sepsis  Z86.19       3. History of TIA (transient ischemic attack) and stroke  Z86.73          Bacteremia.  Resolved.  Due for follow-up with infectious disease.  Planning a total hip arthroplasty in a few months.  She reported any return of illness.  Or changes left hip pain.  History of TIA.  On aspirin and  statin.    Return in about 2 months (around 7/29/2024) for recheck.    Thony Gongora MD  [facility] BRIANNE Rivera is a 68 year old, presenting for the following health issues:  ER F/U        5/29/2024     9:59 AM   Additional Questions   Roomed by Nilam CORTÉS MA     [facility]       ED/UC Followup:    Facility:  [facility]  Date of visit: 4/27 4/28  Reason for visit: Bacteremia   Current Status: good  Admitted earlier this spring with illness.  Found to have bacteremia there is suspicion for endocarditis, but was ruled out by transesophageal echo.  He has been steadily improving.  Found the hip to not be infected.  But he continues to have arthritis present that is quite severe and life limiting.  He has a follow-up with orthopedic surgery.  No discitis seen on his lumbar MRI.  No further back pain for him.  He continues to work as a .  Has been afebrile      Review of Systems  Constitutional, HEENT, cardiovascular, pulmonary, gi and gu systems are negative, except as otherwise noted.      Objective    /68   Pulse 70   Temp 97.5  F (36.4  C) (Temporal)   Resp 18   Ht 1.702 m (5' 7\")   Wt 63 kg (139 lb)   SpO2 99%   BMI 21.77 kg/m    Body mass index is 21.77 kg/m .  Physical Exam   GENERAL: alert and no distress  NECK: no adenopathy, no asymmetry, masses, or scars  RESP: lungs clear to auscultation - no rales, rhonchi or wheezes  CV: regular rate and rhythm, normal S1 S2, no S3 or S4, no murmur, click or rub, no peripheral edema  MS: no gross musculoskeletal " defects noted, no edema            Signed Electronically by: Thony Gongora MD

## 2024-06-01 NOTE — PROGRESS NOTES
"Virtual Visit Details    Type of service:  Video Visit   Video Start Time: 1102  Video End Time:1137    Originating Location (pt. Location): Home,  Distant Location (provider location):  On-site  Platform used for Video Visit: Russell    __________________________________________________________      [facility] [place]   [phone]  __________________________________________________________           History of Present Illness   Chief Complaint: Stroke follow-up    Miguel Pappas is a 68 year old male presenting for follow-up for stroke.     He was hospitalized initially at [facility] 3/14/2024 then transferred to [facility]. Prior to the hospital stay, he had a past medical history of HLD, hyponatremia, mitral stenosis, mitral and aortic valve stenosis s/p bioprosthetic AVR and had been treated with warfarin until 4/2023 did have post-operative Afib with RVR during hospitalization following AVR without evidence of recurrence.  On PTA ASA 81 mg daily.    He presented to the hospital with confusion, nausea, 20-30-minute episode of memory loss with repetitive questioning.  MRI showed punctate focus of diffusion abnormality left hippocampal region.    He was initially started on DAPT but given elevated CRP/leukocytosis/positive blood cultures there was concern for potential endocarditis so transition to ASA monotherapy.  AC was without any evidence of vegetation or thrombus.     Since being discharged, he denies any recurrent or residual symptoms.  He describes that he does not have memory of the event.  He was going to orthopedic appointment.  Last thing he remembers is going to the door. His wife reports that he was speaking full sentences clearly but kept repeating \"where are we?\" and \"why are we here?\".  He denies remembering any provoking factors such as exposure to extreme cold/heat or physical exertion that morning.  3 weeks prior to this he was having night " sweats and fevers.  Shortly after he began having severe left hip pain.  He does endorse that he had some mild left hip pain over the past year but nothing that severe and came on rather suddenly.  He denies any associated warmth or erythema.  He does endorse that there is some mild swelling and was told that he had bursitis.  He has ongoing follow-up scheduled with orthopedics and they are planning for attempting cortisone injection and then potential future RADHA in October.  He does endorse that his left pain has significantly improved since his hospitalization.  He had a root canal performed in January and a filling fell out approximately 3 weeks prior to this event so he had another dental procedure performed.    He denies any bleeding issues or black/tarry stools. He occasionally checks his blood pressure.  Reports that has been well-controlled.  Recently 126/82 at PCP visit last week.  He was recommended heart monitor at discharge however does not appear that this was performed.  He is open to completing it now.  He does endorse occasional palpitations.    -Sleep Apnea screen: He endorses snoring and his wife reports that he has significant apneic periods at night.  Recommend referral to sleep medicine clinic to evaluate for sleep apnea.  -Depression screen: Denies feeling down/depressed/hopeless  -Smoking screen: Denies any history of smoking  -Alcohol screen: Will have an occasional beer approximately 2-3 times per month at most  -Drug screen: Denies nonprescription medication/herbal supplements/illicit drug use  -B symptom screen: No more recent fever/night sweats, denies any unintentional weight loss    Modified Moca Scale  Score: 1-No significant disability despite symptoms; able to carry out all usual duties and activities-some limitations from left hip occasionally requires help with placing sock on the left foot.  Occasional use of cane    Stroke Evaluation Summarized:  New results resulted and  reviewed by me today are in BOLD below.  I personally reviewed the following neuroimaging studies today and the comments above reflect my own personal interpretation of the images: images: MRI brain    MRI/Head CT MRI: punctate focus of diffusion abnormality in L hippocampal region, no hemorrhage, no significant chronic small vessel disease    Intracranial Vasculature CTA: No LVO or significant stenosis   Cervical Vasculature CTA: No LVO, significant stenosis or dissection; no aneurysm       Echocardiogram TTE: LVEF 60-65%,atrial not well visualized, valves not well visualized      AC: Left ventricular systolic function is normal.  EF is 60-65%.  No thrombus is detected in the left atrial appendage.  Intact atrial septum.  No valvular vegetations demonstrated.  Negative bubble study.  Left atrium is borderline dilated.  Right atrial size is normal.   EKG/Telemetry Sinus rhythm    Other Testing Blood cultures: positive x4 (streptococcus gordoni/gram positive cocci in pairs and chains)   Procalcitonin: 0.99  CRP: 128.47   WBC 19.2-->15.1  Sodium 120-->133      UA unremarkable   Chest Xray unremarkable        Labs Lab Results   Component Value Date     (H) 03/14/2024    A1C 5.8 (H) 03/14/2024    CTROPT 12 03/14/2024    INR 1.08 03/14/2024    INR 2.4 (H) 03/14/2023                 Home Medications     Current Outpatient Medications   Medication Sig Dispense Refill    acetaminophen (TYLENOL) 325 MG tablet Take 2 tablets (650 mg) by mouth every 4 hours as needed for mild pain or other (and adjunct with moderate or severe pain or per patient request)      ascorbic acid (VITAMIN C) 250 MG CHEW chewable tablet Take 250 mg by mouth daily      aspirin 81 MG tablet Take 81 mg by mouth at bedtime      atorvastatin (LIPITOR) 40 MG tablet Take 1 tablet (40 mg) by mouth every evening 60 tablet 1    ferrous sulfate (FEROSUL) 325 (65 Fe) MG tablet Take 325 mg by mouth daily (with breakfast)      GLUCOSAMINE CHONDROITIN OR  TABS Take 1 tablet by mouth at bedtime  0    Multiple Vitamins-Minerals (CENTRUM SILVER PO) Take 1 tablet by mouth at bedtime 30 0    Vitamin D3 (CHOLECALCIFEROL) 25 mcg (1000 units) tablet Take 25 mcg by mouth daily      cyclobenzaprine (FLEXERIL) 5 MG tablet Take 1 tablet (5 mg) by mouth 3 times daily (Patient not taking: Reported on 6/4/2024) 60 tablet 0    gabapentin (NEURONTIN) 100 MG capsule Take 1 capsule (100 mg) by mouth 3 times daily (Patient not taking: Reported on 6/4/2024) 30 capsule 0    ibuprofen (ADVIL/MOTRIN) 200 MG tablet Take 1 tablet (200 mg) by mouth every 6 hours as needed for inflammatory pain (Patient not taking: Reported on 6/4/2024)      oxyCODONE (ROXICODONE) 5 MG tablet Take 1 tablet (5 mg) by mouth every 6 hours as needed for moderate pain (Patient not taking: Reported on 6/4/2024) 20 tablet 0     No current facility-administered medications for this visit.            Physical Examination     Vitals:            BMI Readings from Last 1 Encounters:   06/04/24 21.77 kg/m        Neurologic: Completed via telemedicine video call  Mental Status:  alert, oriented x 3, speech clear and fluent  Cranial Nerves:  EOMI with normal smooth pursuit, mild left facial asymmetry hearing not formally tested but based on conversation does appear to have hearing deficit, no dysarthria, tongue protrusion midline  Motor:  no abnormal movements, able to move limbs antigravity spontaneously with no signs of hemiparesis observed, no pronator drift, negative able to visualize his legs over video camera but he denies any feelings of weakness to bilateral legs.  Does endorse some ongoing left hip pain but nothing as significant as at hospital presentation  Coordination:  normal finger-to-nose bilaterally without dysmetria, symmetric arm roll         Screenings and Questionnaires:     Tobacco:    Tobacco Use      Smoking status: Never        Passive exposure: Never      Smokeless tobacco: Never      Sleep Apnea:        Depression:      6/4/2024    10:54 AM 2/20/2024     2:42 PM   PHQ-2 ( 1999 [employer])   Q1: Little interest or pleasure in doing things 0 0   Q2: Feeling down, depressed or hopeless 0 0   PHQ-2 Score 0 0       Stroke Recovery and Risk Factors:      6/3/2024    11:46 AM   Stroke Questionnaire   Residual effects: Any residual effects from stroke? I have some symptoms, but I'm not sure if they're residual effects of the stroke   Residual effects: Describe Occasional confusion possibly pertaining to hearing loss.   Level of independence: Could you live alone? Yes   Quality of Life: Rating (0-100%) 87   Quality of Life: What work now or before stroke Occupation   Quality of Life: Describe work [employer]   Quality of Life: Current work status Currently working   Quality of Life: How do you get around Drive myself   Quality of Life: Living situation before stroke With family or significant other   Quality of Life: Living situation now With family or significant other   Medication: How do you take your meds Myself, from a pillbox that someone else sets up   Medication: Do you ever miss or forget meds Rarely   Risk Factor: Checking blood pressure at home Yes   Risk Factor: Usual blood pressure numbers 130/80   Risk Factor: Checking blood sugar at home No   Risk Factor: Second-hand smoke at home No   Risk Factor: How much caffeine per day 5%   Who completed this questionnaire? The patient independently           Assessment and Plan       1. Episode of memory loss/amnesia in setting of small L hippocampal diffusion restriction, unclear etiology.  Suspected etiology includes transient global amnesia versus acute stroke (etiology potentially embolifc stroke of undetermined source (ESUS) versus potentially infectious endocarditis (Leukocytosis, elevated CRP, procalcitonin, positive blood cultures (Streptococcus gordonii-common oral bacteria-had root canal and dental infection month prior), but AC negative for vegetations      Left hip pain, has degenerative arthritis left hip, sudden severe worsening followed by improvement concerning for potential infection, ortho did not feel consistent with septic arthritis, aspiration cultures negative, neurosurgery felt possibly due to moderate lumbar foraminal stenosis    Medications:  -ASA 81 mg daily   -Lipitor 40 mg daily     Antiplatelet (ASA) precautions:  -Contact provider immediately with any signs of bleeding or black/tarry stools  -if any future providers request that you hold or stop taking this medication then please reach out to our stroke team to be included in the discussion.    Diagnostic testing:  -Labs: repeat Lipid panel in 3 months (prior to next follow-up)   - recommend 14 day zio patch heart monitoring to look for atrial fibrillation    Follow-up:  -Ongoing follow-up with PCP for management of vascular risk factors: BP goal <130/80, prediabetes (A1c 5.8%, long term goal <7%) LDL goal 40-70 (<40 increases risk of intracranial hemorrhage)  -Follow-up again with stroke team in 3 months   -Follow-up with sleep medicine clinic evaluate for sleep apnea  -Recommend contacting cardiology given the possibility of infectious endocarditis in setting of prior AVR    *All or a portion of this note was generated using voice recognition software and may contain transcription errors.     Stroke Education provided.  He will call us with any questions.  For any acute neurologic deficits he was advised to  go directly to the hospital rather than call the clinic.      Shanna Cabrera PA-C  Neurology  06/04/2024         ____________________________________________________________________    Billing:    I spent a total of 60 minutes on the day of the visit.   Time spent by me doing chart review, history and exam, documentation and further activities per the note

## 2024-06-03 ENCOUNTER — TELEPHONE (OUTPATIENT)
Dept: NEUROLOGY | Facility: CLINIC | Age: 69
End: 2024-06-03
Payer: COMMERCIAL

## 2024-06-03 NOTE — TELEPHONE ENCOUNTER
Attempted to reach patient to remind them about video appointment scheduled with Shanna Cabrera PA-C on 6/4/24.   A voicemail was left with a call back number if the patient has questions or would like to reschedule.

## 2024-06-04 ENCOUNTER — VIRTUAL VISIT (OUTPATIENT)
Dept: NEUROLOGY | Facility: CLINIC | Age: 69
End: 2024-06-04
Payer: COMMERCIAL

## 2024-06-04 ENCOUNTER — ORDERS ONLY (AUTO-RELEASED) (OUTPATIENT)
Dept: NEUROLOGY | Facility: CLINIC | Age: 69
End: 2024-06-04
Payer: COMMERCIAL

## 2024-06-04 VITALS
SYSTOLIC BLOOD PRESSURE: 126 MMHG | BODY MASS INDEX: 21.82 KG/M2 | DIASTOLIC BLOOD PRESSURE: 80 MMHG | WEIGHT: 139 LBS | HEIGHT: 67 IN

## 2024-06-04 DIAGNOSIS — I63.10 CEREBROVASCULAR ACCIDENT (CVA) DUE TO EMBOLISM OF PRECEREBRAL ARTERY (H): ICD-10-CM

## 2024-06-04 DIAGNOSIS — Z95.2 S/P AVR: ICD-10-CM

## 2024-06-04 DIAGNOSIS — I63.10 CEREBROVASCULAR ACCIDENT (CVA) DUE TO EMBOLISM OF PRECEREBRAL ARTERY (H): Primary | ICD-10-CM

## 2024-06-04 PROCEDURE — 99215 OFFICE O/P EST HI 40 MIN: CPT | Mod: 95 | Performed by: PHYSICIAN ASSISTANT

## 2024-06-04 PROCEDURE — 99417 PROLNG OP E/M EACH 15 MIN: CPT | Mod: 95 | Performed by: PHYSICIAN ASSISTANT

## 2024-06-04 RX ORDER — ATORVASTATIN CALCIUM 40 MG/1
40 TABLET, FILM COATED ORAL EVERY EVENING
Qty: 60 TABLET | Refills: 1 | Status: SHIPPED | OUTPATIENT
Start: 2024-06-04

## 2024-06-04 NOTE — LETTER
"6/4/2024      Miguel Pappas  [address] [address]      Dear Colleague,    Thank you for referring your patient, Miguel Pappas, to the [facility]. Please see a copy of my visit note below.    Virtual Visit Details    Type of service:  Video Visit   Video Start Time: 1102  Video End Time:1137    Originating Location (pt. Location): Home,  Distant Location (provider location):  On-site  Platform used for Video Visit: LogoneX    __________________________________________________________      [facility]   [phone]  __________________________________________________________           History of Present Illness   Chief Complaint: Stroke follow-up    Miguel Pappas is a 68 year old male presenting for follow-up for stroke.     He was hospitalized initially at [facility] 3/14/2024 then transferred to [facility]. Prior to the hospital stay, he had a past medical history of HLD, hyponatremia, mitral stenosis, mitral and aortic valve stenosis s/p bioprosthetic AVR.  On PTA ASA 81 mg daily.  .    He presented to the hospital with confusion, nausea, 20-30-minute episode of memory loss with repetitive questioning.  MRI showed punctate focus of diffusion abnormality left hippocampal region.    He was initially started on DAPT but given elevated CRP/leukocytosis/positive blood cultures there was concern for potential endocarditis so transition to ASA monotherapy.  AC was without any evidence of vegetation or thrombus.     Since being discharged, he denies any recurrent or residual symptoms.  He describes that he does not have memory of the event.  He was going to orthopedic appointment.  Last thing he remembers is going to the door. His wife reports that he was speaking full sentences clearly but kept repeating \"where are we?\" and \"why are we here?\".  He denies remembering any provoking factors such as exposure " to extreme cold/heat or physical exertion that morning.  3 weeks prior to this he was having night sweats and fevers.  Shortly after he began having severe left hip pain.  He does endorse that he had some mild left hip pain over the past year but nothing that severe and came on rather suddenly.  He denies any associated warmth or erythema.  He does endorse that there is some mild swelling and was told that he had bursitis.  He has ongoing follow-up scheduled with orthopedics and they are planning for attempting cortisone injection and then potential future RADHA in October.  He does endorse that his left pain has significantly improved since his hospitalization.  He had a root canal performed in January and a filling fell out approximately 3 weeks prior to this event so he had another dental procedure performed.    He denies any bleeding issues or black/tarry stools. He occasionally checks his blood pressure.  Reports that has been well-controlled.  Recently 126/82 at PCP visit last week.  He was recommended heart monitor at discharge however does not appear that this was performed.  He is open to completing it now.  He does endorse occasional palpitations.    -Sleep Apnea screen: He endorses snoring and his wife reports that he has significant apneic periods at night.  Recommend referral to sleep medicine clinic to evaluate for sleep apnea.  -Depression screen: Denies feeling down/depressed/hopeless  -Smoking screen: Denies any history of smoking  -Alcohol screen: Will have an occasional beer approximately 2-3 times per month at most  -Drug screen: Denies nonprescription medication/herbal supplements/illicit drug use  -B symptom screen: No more recent fever/night sweats, denies any unintentional weight loss    Modified [place]air Scale  Score: 1-No significant disability despite symptoms; able to carry out all usual duties and activities-some limitations from left hip occasionally requires help with placing sock on the  left foot.  Occasional use of cane    Stroke Evaluation Summarized:  New results resulted and reviewed by me today are in BOLD below.  I personally reviewed the following neuroimaging studies today and the comments above reflect my own personal interpretation of the images: images: MRI brain    MRI/Head CT MRI: punctate focus of diffusion abnormality in L hippocampal region, no hemorrhage, no significant chronic small vessel disease    Intracranial Vasculature CTA: No LVO or significant stenosis   Cervical Vasculature CTA: No LVO, significant stenosis or dissection; no aneurysm       Echocardiogram TTE: LVEF 60-65%,atrial not well visualized, valves not well visualized      AC: Left ventricular systolic function is normal.  EF is 60-65%.  No thrombus is detected in the left atrial appendage.  Intact atrial septum.  No valvular vegetations demonstrated.  Negative bubble study.  Left atrium is borderline dilated.  Right atrial size is normal.   EKG/Telemetry Sinus rhythm    Other Testing Blood cultures: positive x4 (streptococcus gordoni/gram positive cocci in pairs and chains)   Procalcitonin: 0.99  CRP: 128.47   WBC 19.2-->15.1  Sodium 120-->133      UA unremarkable   Chest Xray unremarkable        Labs Lab Results   Component Value Date     (H) 03/14/2024    A1C 5.8 (H) 03/14/2024    CTROPT 12 03/14/2024    INR 1.08 03/14/2024    INR 2.4 (H) 03/14/2023                 Home Medications     Current Outpatient Medications   Medication Sig Dispense Refill     acetaminophen (TYLENOL) 325 MG tablet Take 2 tablets (650 mg) by mouth every 4 hours as needed for mild pain or other (and adjunct with moderate or severe pain or per patient request)       ascorbic acid (VITAMIN C) 250 MG CHEW chewable tablet Take 250 mg by mouth daily       aspirin 81 MG tablet Take 81 mg by mouth at bedtime       atorvastatin (LIPITOR) 40 MG tablet Take 1 tablet (40 mg) by mouth every evening 60 tablet 1     ferrous sulfate (FEROSUL)  325 (65 Fe) MG tablet Take 325 mg by mouth daily (with breakfast)       GLUCOSAMINE CHONDROITIN OR TABS Take 1 tablet by mouth at bedtime  0     Multiple Vitamins-Minerals (CENTRUM SILVER PO) Take 1 tablet by mouth at bedtime 30 0     Vitamin D3 (CHOLECALCIFEROL) 25 mcg (1000 units) tablet Take 25 mcg by mouth daily       cyclobenzaprine (FLEXERIL) 5 MG tablet Take 1 tablet (5 mg) by mouth 3 times daily (Patient not taking: Reported on 6/4/2024) 60 tablet 0     gabapentin (NEURONTIN) 100 MG capsule Take 1 capsule (100 mg) by mouth 3 times daily (Patient not taking: Reported on 6/4/2024) 30 capsule 0     ibuprofen (ADVIL/MOTRIN) 200 MG tablet Take 1 tablet (200 mg) by mouth every 6 hours as needed for inflammatory pain (Patient not taking: Reported on 6/4/2024)       oxyCODONE (ROXICODONE) 5 MG tablet Take 1 tablet (5 mg) by mouth every 6 hours as needed for moderate pain (Patient not taking: Reported on 6/4/2024) 20 tablet 0     No current facility-administered medications for this visit.            Physical Examination     Vitals:            BMI Readings from Last 1 Encounters:   06/04/24 21.77 kg/m        Neurologic: Completed via telemedicine video call  Mental Status:  alert, oriented x 3, speech clear and fluent  Cranial Nerves:  EOMI with normal smooth pursuit, mild left facial asymmetry hearing not formally tested but based on conversation does appear to have hearing deficit, no dysarthria, tongue protrusion midline  Motor:  no abnormal movements, able to move limbs antigravity spontaneously with no signs of hemiparesis observed, no pronator drift, negative able to visualize his legs over video camera but he denies any feelings of weakness to bilateral legs.  Does endorse some ongoing left hip pain but nothing as significant as at hospital presentation  Coordination:  normal finger-to-nose bilaterally without dysmetria, symmetric arm roll         Screenings and Questionnaires:     Tobacco:    Tobacco Use       Smoking status: Never        Passive exposure: Never      Smokeless tobacco: Never      Sleep Apnea:       Depression:      6/4/2024    10:54 AM 2/20/2024     2:42 PM   PHQ-2 ( 1999 [employer])   Q1: Little interest or pleasure in doing things 0 0   Q2: Feeling down, depressed or hopeless 0 0   PHQ-2 Score 0 0       Stroke Recovery and Risk Factors:      6/3/2024    11:46 AM   Stroke Questionnaire   Residual effects: Any residual effects from stroke? I have some symptoms, but I'm not sure if they're residual effects of the stroke   Residual effects: Describe Occasional confusion possibly pertaining to hearing loss.   Level of independence: Could you live alone? Yes   Quality of Life: Rating (0-100%) 87   Quality of Life: What work now or before stroke Occupation   Quality of Life: Describe work [employer]   Quality of Life: Current work status Currently working   Quality of Life: How do you get around Drive myself   Quality of Life: Living situation before stroke With family or significant other   Quality of Life: Living situation now With family or significant other   Medication: How do you take your meds Myself, from a pillbox that someone else sets up   Medication: Do you ever miss or forget meds Rarely   Risk Factor: Checking blood pressure at home Yes   Risk Factor: Usual blood pressure numbers 130/80   Risk Factor: Checking blood sugar at home No   Risk Factor: Second-hand smoke at home No   Risk Factor: How much caffeine per day 5%   Who completed this questionnaire? The patient independently           Assessment and Plan       1. Episode of memory loss/amnesia in setting of small L hippocampal diffusion restriction, unclear etiology.  Suspected etiology includes transient global amnesia versus acute stroke (etiology potentially embolifc stroke of undetermined source (ESUS) versus potentially infectious endocarditis (Leukocytosis, elevated CRP, procalcitonin, positive blood cultures (Streptococcus  gordonii-common oral bacteria-had root canal and dental infection month prior), but AC negative for vegetations     Left hip pain, has degenerative arthritis left hip, sudden severe worsening followed by improvement concerning for potential infection, ortho did not feel consistent with septic arthritis, aspiration cultures negative, neurosurgery felt possibly due to moderate lumbar foraminal stenosis    Medications:  -ASA 81 mg daily   -Lipitor 40 mg daily     Antiplatelet (ASA) precautions:  -Contact provider immediately with any signs of bleeding or black/tarry stools  -if any future providers request that you hold or stop taking this medication then please reach out to our stroke team to be included in the discussion.    Diagnostic testing:  -Labs: repeat Lipid panel in 3 months (prior to next follow-up)   - recommend 14 day zio patch heart monitoring to look for atrial fibrillation    Follow-up:  -Ongoing follow-up with PCP for management of vascular risk factors: BP goal <130/80, prediabetes (A1c 5.8%, long term goal <7%) LDL goal 40-70 (<40 increases risk of intracranial hemorrhage)  -Follow-up again with stroke team in 3 months   -Follow-up with sleep medicine clinic evaluate for sleep apnea  -Recommend contacting cardiology given the possibility of infectious endocarditis in setting of prior AVR    *All or a portion of this note was generated using voice recognition software and may contain transcription errors.     Stroke Education provided.  He will call us with any questions.  For any acute neurologic deficits he was advised to  go directly to the hospital rather than call the clinic.      Shanna Cabrera PA-C  Neurology  06/04/2024         ____________________________________________________________________    Billing:    I spent a total of 60 minutes on the day of the visit.   Time spent by me doing chart review, history and exam, documentation and further activities per the note        Again, thank  you for allowing me to participate in the care of your patient.        Sincerely,        Shanna Cabrera PA-C

## 2024-06-04 NOTE — PATIENT INSTRUCTIONS
CHEST ONE VIEW PORTABLE



HISTORY:  78 years-old Female htn  acute hypertension



COMPARISON: Chest radiograph 8/11/2017



TECHNIQUE: Portable AP view of the chest



FINDINGS: 

Cardiomediastinal and hilar silhouettes are within normal limits. Calcification

of the aorta. Linear subsegmental left basilar opacities suggest atelectasis.

There is no pneumothorax, pleural effusion or overt pulmonary edema.

Periarticular calcifications about the bilateral shoulders suggests calcific

tendinosis or calcific bursitis. Degenerative changes of the shoulders and

spine.



IMPRESSION: No acute process. 







The above report was generated using voice recognition software. It may contain

grammatical, syntax or spelling errors.







Electronically signed by:  Francisco Charles M.D.

7/25/2018 7:03 PM



Dictated Date/Time:  7/25/2018 7:02 PM Medications:  -ASA 81 mg daily   -Lipitor 40 mg daily     Antiplatelet (ASA) precautions:  -Contact provider immediately with any signs of bleeding or black/tarry stools  -if any future providers request that you hold or stop taking this medication then please reach out to our stroke team to be included in the discussion.    Diagnostic testing:  -Labs: repeat Lipid panel in 3 months (prior to next follow-up)   - recommend 14 day zio patch heart monitoring to look for atrial fibrillation    Follow-up:  -Ongoing follow-up with PCP for management of vascular risk factors: BP goal <130/80, prediabetes (A1c 5.8%, long term goal <7%) LDL goal 40-70 (<40 increases risk of intracranial hemorrhage)  -Follow-up again with stroke team in 3 months   -Follow-up with sleep medicine clinic evaluate for sleep apnea  -Recommend contacting cardiology given the possibility of infectious endocarditis in setting of prior AVR    Daily activities:  -home blood pressure monitoring twice daily AM and PM, keep log and bring to medical visits  -Mediterranean diet can be beneficial for overall decreased cardiovascular risk, moderate aerobic exercise at least 30 minutes/day x 5 days/week    Precautions:  - Call our stroke clinic with any questions or concerns at [phone], or send a Isabella Oliver medical advice message  - Call 911 with any new stroke symptoms    B - Balance problems  E - Eyes (vision loss)  F - Facial weakness or droop  A - Arm weakness  S - Speech/language  T - Time is brain!

## 2024-06-04 NOTE — NURSING NOTE
Is the patient currently in the state of [place]? YES    Visit mode:VIDEO    If the visit is dropped, the patient can be reconnected by: VIDEO VISIT: Text to cell phone:   Telephone Information:   Mobile [phone]       Will anyone else be joining the visit? NO  (If patient encounters technical issues they should call [phone])    How would you like to obtain your AVS? MyChart    Are changes needed to the allergy or medication list? No    Are refills needed on medications prescribed by this physician? NO    Reason for visit: Stroke    Nathaly Faulkner MA

## 2024-07-02 PROCEDURE — 93248 EXT ECG>7D<15D REV&INTERPJ: CPT | Performed by: INTERNAL MEDICINE

## 2024-07-05 ENCOUNTER — MYC MEDICAL ADVICE (OUTPATIENT)
Dept: NEUROLOGY | Facility: CLINIC | Age: 69
End: 2024-07-05
Payer: COMMERCIAL

## 2024-07-05 DIAGNOSIS — I47.10 SVT (SUPRAVENTRICULAR TACHYCARDIA) (H): Primary | ICD-10-CM

## 2024-07-23 ENCOUNTER — OFFICE VISIT (OUTPATIENT)
Dept: CARDIOLOGY | Facility: CLINIC | Age: 69
End: 2024-07-23
Payer: COMMERCIAL

## 2024-07-23 VITALS
HEART RATE: 70 BPM | HEIGHT: 67 IN | RESPIRATION RATE: 16 BRPM | OXYGEN SATURATION: 97 % | BODY MASS INDEX: 21.66 KG/M2 | SYSTOLIC BLOOD PRESSURE: 128 MMHG | DIASTOLIC BLOOD PRESSURE: 74 MMHG | WEIGHT: 138 LBS

## 2024-07-23 DIAGNOSIS — Z95.2 S/P AVR: Primary | ICD-10-CM

## 2024-07-23 DIAGNOSIS — I47.10 SVT (SUPRAVENTRICULAR TACHYCARDIA) (H): ICD-10-CM

## 2024-07-23 PROCEDURE — 99214 OFFICE O/P EST MOD 30 MIN: CPT | Performed by: PHYSICIAN ASSISTANT

## 2024-07-23 ASSESSMENT — PAIN SCALES - GENERAL: PAINLEVEL: NO PAIN (0)

## 2024-07-23 NOTE — PATIENT INSTRUCTIONS
Thanks for coming into [facility] today.    We discussed: your monitor shows some short episodes, less than 15 seconds of fast heart beats from the top and bottom of your heart.  We can watch these since they are short and your heart has been well evaluated.      If the palpitations bother you we can add a medication, otherwise we'll just watch things for now.      Follow up: with Dr. Sinclair next spring with an echocardiogram before that visit.        Please call the clinic at  [phone] with any questions or concerns and my our nurses will be happy to help.    Please call [phone] for scheduling.      Reminder: Please bring in all current medications, over the counter supplements and vitamin bottles to your next appointment.

## 2024-07-23 NOTE — PROGRESS NOTES
Cardiology Clinic Progress Note    Service Date: 2024    Primary Cardiologist: Dr. Sinclair      Reason for Visit: Holter monitor follow-up    HPI:   Miguel Pappas is a delightful 69-year-old gentleman with past medical history significant for the followin.  Severe/critical aortic stenosis with surgical aortic valve replacement   2.  Normal coronary arteries  3.  Sepsis and bacteremia 3/24 with associated possible TIA with AC negative for endocarditis.    It is my great pleasure to meet Miguel today for the first time.  He had an episode of repetitive speech and not knowing where he was in March was admitted to the hospital and transferred to [facility].  MRI of his brain showed a small hippocampal diffusion restriction unclear if this was TIA versus infectious emboli.  As part of that workup he underwent monitor.  This showed an average heart rate of 45 maximum of 231 (he was in nonsustained VT from just 4 beats at that time) and average heart rate of 80.  He had 3 runs of VT with longest being 4 beats and 16 runs of SVT with longest being 15 beats.  Any activations were associated with sinus rhythm.    He is here today to follow-up with those results.  Other than his hip pain he feels fine.  He denies chest pain, shortness of breath, orthopnea, or PND.  He bailed 600 radha of hay yesterday and although also notes that he did not similar levels work prior to his aortic valve replacement.  He does note is hard doing flip-flops sometimes when he lays down at night to bed but he does not bother him particularly.    Social History:  He comes in today with his wife Chayito.  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  He works hard daily.  Lifelong non-smoker maybe 2 alcoholic drinks months.    Physical Exam:  /74 (BP Location: Right arm, Patient Position: Sitting, Cuff Size: Adult Regular)   Pulse 70   Resp 16   Ht 1.702 m  "(5' 7\")   Wt 62.6 kg (138 lb)   SpO2 97%   BMI 21.61 kg/m     Well-developed well-nourished fit gentleman in no acute distress.  Normocephalic atraumatic.  Heart is regular in rate and rhythm with 2 out of 6 systolic murmur heard best at right sternal border.  Lungs are clear without wheezes rales or rhonchi.  Extremities without peripheral edema.  Skin is warm and dry.    Data reviewed:  Zio patch  Echocardiogram dated 3/24 shows a normal EF with aortic valve with mean gradient of 27 and a peak of 47  Transesophageal echocardiogram 3/18/2024 shows no thrombus in left atrial appendage, no vegetation with a mean aortic gradient noted at that time of 9.9 trace AI.    Assessment and Plan:  1.  History of critical aortic stenosis status post bioprosthetic surgical aortic valve replacement 12/22 by Dr. Azar.  There was an initial concern for endocarditis but AC did not demonstrate any endocarditis.  He is appropriately on aspirin.  The gradients on his initial echocardiogram while hospitalized were fairly elevated with a mean of 27 and a peak of 47 but on AC the mean gradient was lower at 9.9.  He is completely asymptomatic and we will follow this.  Repeat echocardiogram in about 8 months.  He does require antibiotic prophylaxis for dental procedure this would be amoxicillin 2000 mg x 1 1 hour before the dentist.    2.  Recent TIA versus amnesic event.  No A-fib or flutter noted on Zio patch that would require change in anticoagulation.    3.  Brief runs of SVT and nonsustained VT noted on 14-day Zio patch.  These that were asymptomatic and the longest lasting is 15 seconds for SVT and just 4 beats for VT.  These can all be managed symptomatically.  He is completely asymptomatic, has a normal ejection fraction, normal coronary arteries so sustained VT is unlikely.  He does have some nocturnal palpitations of these are bothersome him we can start a medication today he declined that medication.  Will manage " expectantly.  He will call syncope presyncope or worsening palpitations.    4.  Discussion of possible upcoming hip replacement this apparently has been debated between infectious disease and Ortho.  From a cardiac standpoint it is reasonable to proceed with out further cardiac workup if they determine he is an appropriate candidate for hip replacement.    Thank you for allowing me to participate in this delightful patient's care.  He can follow-up with Dr. Sinclair in March 2025 with an echocardiogram before that visit.  Sooner with concerns.    Nikky Cruz PA-C  [facility] Cardiology     This note was written using Dragon voice recognition system, please excuse any misspelled names, or nonsensical words and call with any questions.        Orders this visit:  Orders Placed This Encounter   Procedures    Follow-Up with Cardiology    Echocardiogram Complete     No orders of the defined types were placed in this encounter.    There are no discontinued medications.  Encounter Diagnoses   Name Primary?    SVT (supraventricular tachycardia) (H24)     S/P AVR Yes       Current Medications:  Current Outpatient Medications   Medication Sig Dispense Refill    ascorbic acid (VITAMIN C) 250 MG CHEW chewable tablet Take 250 mg by mouth daily      aspirin 81 MG tablet Take 81 mg by mouth at bedtime      atorvastatin (LIPITOR) 40 MG tablet Take 1 tablet (40 mg) by mouth every evening 60 tablet 1    ferrous sulfate (FEROSUL) 325 (65 Fe) MG tablet Take 325 mg by mouth daily (with breakfast)      GLUCOSAMINE CHONDROITIN OR TABS Take 1 tablet by mouth at bedtime  0    Multiple Vitamins-Minerals (CENTRUM SILVER PO) Take 1 tablet by mouth at bedtime 30 0    Vitamin D3 (CHOLECALCIFEROL) 25 mcg (1000 units) tablet Take 25 mcg by mouth daily      acetaminophen (TYLENOL) 325 MG tablet Take 2 tablets (650 mg) by mouth every 4 hours as needed for mild pain or other (and adjunct with moderate or severe pain or per patient request)  (Patient not taking: Reported on 7/23/2024)         Allergies Reviewed and Updated:  Allergies   Allergen Reactions    Ceftriaxone Cough     Lump in throat, coughing, starts midway through infusion. Started after being on ceftriaxone for 4 weeks    No Known Drug Allergy        Review of Systems:  Skin:        Eyes:       ENT:       Respiratory:  Negative shortness of breath;cough;wheezing  Cardiovascular:  Negative;chest pain;lightheadedness;dizziness;syncope or near-syncope Positive for;palpitations;edema  Gastroenterology:      Genitourinary:       Musculoskeletal:       Neurologic:  Negative numbness or tingling of hands;numbness or tingling of feet  Psychiatric:       Heme/Lymph/Imm:  Positive for allergies  Endocrine:          Pertinent Past Medical, Surgical and Family History Reviewed and noted above.     CC  Nikky Cruz PA-C  [id number]  [place] [id number]

## 2024-07-23 NOTE — LETTER
2024    Thony Gongora MD  [address] [place] [address]    RE: Miguel Pappas       Dear Colleague,     I had the pleasure of seeing Miguel Pappas in the [facility].    Cardiology Clinic Progress Note    Service Date: 2024    Primary Cardiologist: Dr. Sinclair      Reason for Visit: Holter monitor follow-up    HPI:   Miguel Pappas is a delightful 69-year-old gentleman with past medical history significant for the followin.  Severe/critical aortic stenosis with surgical aortic valve replacement   2.  Normal coronary arteries  3.  Sepsis and bacteremia 3/24 with associated possible TIA with AC negative for endocarditis.    It is my great pleasure to meet Miguel today for the first time.  He had an episode of repetitive speech and not knowing where he was in March was admitted to the hospital and transferred to [facility].  MRI of his brain showed a small hippocampal diffusion restriction unclear if this was TIA versus infectious emboli.  As part of that workup he underwent monitor.  This showed an average heart rate of 45 maximum of 231 (he was in nonsustained VT from just 4 beats at that time) and average heart rate of 80.  He had 3 runs of VT with longest being 4 beats and 16 runs of SVT with longest being 15 beats.  Any activations were associated with sinus rhythm.    He is here today to follow-up with those results.  Other than his hip pain he feels fine.  He denies chest pain, shortness of breath, orthopnea, or PND.  He bailed 600 radha of hay yesterday and although also notes that he did not similar levels work prior to his aortic valve replacement.  He does note is hard doing flip-flops sometimes when he lays down at night to bed but he does not bother him particularly.    Social History:  He comes in today with his wife Chayito.  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  He works  "hard daily.  Lifelong non-smoker maybe 2 alcoholic drinks months.    Physical Exam:  /74 (BP Location: Right arm, Patient Position: Sitting, Cuff Size: Adult Regular)   Pulse 70   Resp 16   Ht 1.702 m (5' 7\")   Wt 62.6 kg (138 lb)   SpO2 97%   BMI 21.61 kg/m     Well-developed well-nourished fit gentleman in no acute distress.  Normocephalic atraumatic.  Heart is regular in rate and rhythm with 2 out of 6 systolic murmur heard best at right sternal border.  Lungs are clear without wheezes rales or rhonchi.  Extremities without peripheral edema.  Skin is warm and dry.    Data reviewed:  Zio patch  Echocardiogram dated 3/24 shows a normal EF with aortic valve with mean gradient of 27 and a peak of 47  Transesophageal echocardiogram 3/18/2024 shows no thrombus in left atrial appendage, no vegetation with a mean aortic gradient noted at that time of 9.9 trace AI.    Assessment and Plan:  1.  History of critical aortic stenosis status post bioprosthetic surgical aortic valve replacement 12/22 by Dr. Azar.  There was an initial concern for endocarditis but AC did not demonstrate any endocarditis.  He is appropriately on aspirin.  The gradients on his initial echocardiogram while hospitalized were fairly elevated with a mean of 27 and a peak of 47 but on AC the mean gradient was lower at 9.9.  He is completely asymptomatic and we will follow this.  Repeat echocardiogram in about 8 months.  He does require antibiotic prophylaxis for dental procedure this would be amoxicillin 2000 mg x 1 1 hour before the dentist.    2.  Recent TIA versus amnesic event.  No A-fib or flutter noted on Zio patch that would require change in anticoagulation.    3.  Brief runs of SVT and nonsustained VT noted on 14-day Zio patch.  These that were asymptomatic and the longest lasting is 15 seconds for SVT and just 4 beats for VT.  These can all be managed symptomatically.  He is completely asymptomatic, has a normal ejection " fraction, normal coronary arteries so sustained VT is unlikely.  He does have some nocturnal palpitations of these are bothersome him we can start a medication today he declined that medication.  Will manage expectantly.  He will call syncope presyncope or worsening palpitations.    4.  Discussion of possible upcoming hip replacement this apparently has been debated between infectious disease and Ortho.  From a cardiac standpoint it is reasonable to proceed with out further cardiac workup if they determine he is an appropriate candidate for hip replacement.    Thank you for allowing me to participate in this delightful patient's care.  He can follow-up with Dr. Sinclair in March 2025 with an echocardiogram before that visit.  Sooner with concerns.    Nikky Cruz PA-C  [facility]     This note was written using Dragon voice recognition system, please excuse any misspelled names, or nonsensical words and call with any questions.        Orders this visit:  Orders Placed This Encounter   Procedures    Follow-Up with Cardiology    Echocardiogram Complete     No orders of the defined types were placed in this encounter.    There are no discontinued medications.  Encounter Diagnoses   Name Primary?    SVT (supraventricular tachycardia) (H24)     S/P AVR Yes       Current Medications:  Current Outpatient Medications   Medication Sig Dispense Refill    ascorbic acid (VITAMIN C) 250 MG CHEW chewable tablet Take 250 mg by mouth daily      aspirin 81 MG tablet Take 81 mg by mouth at bedtime      atorvastatin (LIPITOR) 40 MG tablet Take 1 tablet (40 mg) by mouth every evening 60 tablet 1    ferrous sulfate (FEROSUL) 325 (65 Fe) MG tablet Take 325 mg by mouth daily (with breakfast)      GLUCOSAMINE CHONDROITIN OR TABS Take 1 tablet by mouth at bedtime  0    Multiple Vitamins-Minerals (CENTRUM SILVER PO) Take 1 tablet by mouth at bedtime 30 0    Vitamin D3 (CHOLECALCIFEROL) 25 mcg (1000 units) tablet Take 25 mcg by  mouth daily      acetaminophen (TYLENOL) 325 MG tablet Take 2 tablets (650 mg) by mouth every 4 hours as needed for mild pain or other (and adjunct with moderate or severe pain or per patient request) (Patient not taking: Reported on 7/23/2024)         Allergies Reviewed and Updated:  Allergies   Allergen Reactions    Ceftriaxone Cough     Lump in throat, coughing, starts midway through infusion. Started after being on ceftriaxone for 4 weeks    No Known Drug Allergy        Review of Systems:  Skin:        Eyes:       ENT:       Respiratory:  Negative shortness of breath;cough;wheezing  Cardiovascular:  Negative;chest pain;lightheadedness;dizziness;syncope or near-syncope Positive for;palpitations;edema  Gastroenterology:      Genitourinary:       Musculoskeletal:       Neurologic:  Negative numbness or tingling of hands;numbness or tingling of feet  Psychiatric:       Heme/Lymph/Imm:  Positive for allergies  Endocrine:          Pertinent Past Medical, Surgical and Family History Reviewed and noted above.     CC  Nikky Cruz PA-C  [address]  [place] [address]    Thank you for allowing me to participate in the care of your patient.      Sincerely,     Nikky Cruz PA-C     [facility]

## 2024-10-02 ENCOUNTER — OFFICE VISIT (OUTPATIENT)
Dept: FAMILY MEDICINE | Facility: CLINIC | Age: 69
End: 2024-10-02
Payer: COMMERCIAL

## 2024-10-02 VITALS
TEMPERATURE: 97.7 F | WEIGHT: 135.6 LBS | DIASTOLIC BLOOD PRESSURE: 68 MMHG | OXYGEN SATURATION: 97 % | BODY MASS INDEX: 20.55 KG/M2 | HEIGHT: 68 IN | HEART RATE: 66 BPM | RESPIRATION RATE: 16 BRPM | SYSTOLIC BLOOD PRESSURE: 128 MMHG

## 2024-10-02 DIAGNOSIS — Z23 NEED FOR VIRAL IMMUNIZATION: ICD-10-CM

## 2024-10-02 DIAGNOSIS — Z23 NEED FOR PROPHYLACTIC VACCINATION AND INOCULATION AGAINST INFLUENZA: ICD-10-CM

## 2024-10-02 DIAGNOSIS — Z23 NEED FOR SHINGLES VACCINE: ICD-10-CM

## 2024-10-02 DIAGNOSIS — I63.10 CEREBROVASCULAR ACCIDENT (CVA) DUE TO EMBOLISM OF PRECEREBRAL ARTERY (H): ICD-10-CM

## 2024-10-02 DIAGNOSIS — Z29.11 NEED FOR VACCINATION AGAINST RESPIRATORY SYNCYTIAL VIRUS: ICD-10-CM

## 2024-10-02 DIAGNOSIS — M25.552 HIP PAIN, LEFT: Primary | ICD-10-CM

## 2024-10-02 DIAGNOSIS — R78.81 BACTEREMIA: ICD-10-CM

## 2024-10-02 PROBLEM — E87.1 HYPONATREMIA: Status: RESOLVED | Noted: 2024-03-04 | Resolved: 2024-10-02

## 2024-10-02 PROBLEM — R41.0 CONFUSION: Status: RESOLVED | Noted: 2024-03-14 | Resolved: 2024-10-02

## 2024-10-02 PROBLEM — E87.8 HYPOCHLOREMIA: Status: RESOLVED | Noted: 2024-03-04 | Resolved: 2024-10-02

## 2024-10-02 PROBLEM — M85.80 OSTEOPENIA, UNSPECIFIED LOCATION: Status: RESOLVED | Noted: 2024-03-07 | Resolved: 2024-10-02

## 2024-10-02 PROBLEM — R79.89 ELEVATED BRAIN NATRIURETIC PEPTIDE (BNP) LEVEL: Status: RESOLVED | Noted: 2024-03-07 | Resolved: 2024-10-02

## 2024-10-02 PROBLEM — R06.02 SOB (SHORTNESS OF BREATH): Status: RESOLVED | Noted: 2024-03-07 | Resolved: 2024-10-02

## 2024-10-02 PROBLEM — R47.89 WORD FINDING DIFFICULTY: Status: RESOLVED | Noted: 2024-03-14 | Resolved: 2024-10-02

## 2024-10-02 PROBLEM — D72.829 LEUKOCYTOSIS, UNSPECIFIED TYPE: Status: RESOLVED | Noted: 2024-03-14 | Resolved: 2024-10-02

## 2024-10-02 LAB
BASOPHILS # BLD AUTO: 0 10E3/UL (ref 0–0.2)
BASOPHILS NFR BLD AUTO: 1 %
CHOLEST SERPL-MCNC: 170 MG/DL
CRP SERPL-MCNC: <3 MG/L
EOSINOPHIL # BLD AUTO: 0.6 10E3/UL (ref 0–0.7)
EOSINOPHIL NFR BLD AUTO: 7 %
ERYTHROCYTE [DISTWIDTH] IN BLOOD BY AUTOMATED COUNT: 13.4 % (ref 10–15)
FASTING STATUS PATIENT QL REPORTED: YES
HCT VFR BLD AUTO: 38.2 % (ref 40–53)
HDLC SERPL-MCNC: 71 MG/DL
HGB BLD-MCNC: 12.9 G/DL (ref 13.3–17.7)
IMM GRANULOCYTES # BLD: 0.1 10E3/UL
IMM GRANULOCYTES NFR BLD: 1 %
LDLC SERPL CALC-MCNC: 90 MG/DL
LYMPHOCYTES # BLD AUTO: 1.8 10E3/UL (ref 0.8–5.3)
LYMPHOCYTES NFR BLD AUTO: 23 %
MCH RBC QN AUTO: 29.7 PG (ref 26.5–33)
MCHC RBC AUTO-ENTMCNC: 33.8 G/DL (ref 31.5–36.5)
MCV RBC AUTO: 88 FL (ref 78–100)
MONOCYTES # BLD AUTO: 0.8 10E3/UL (ref 0–1.3)
MONOCYTES NFR BLD AUTO: 10 %
NEUTROPHILS # BLD AUTO: 4.5 10E3/UL (ref 1.6–8.3)
NEUTROPHILS NFR BLD AUTO: 58 %
NONHDLC SERPL-MCNC: 99 MG/DL
NRBC # BLD AUTO: 0 10E3/UL
NRBC BLD AUTO-RTO: 0 /100
PLATELET # BLD AUTO: 166 10E3/UL (ref 150–450)
RBC # BLD AUTO: 4.35 10E6/UL (ref 4.4–5.9)
TRIGL SERPL-MCNC: 47 MG/DL
WBC # BLD AUTO: 7.9 10E3/UL (ref 4–11)

## 2024-10-02 PROCEDURE — 90662 IIV NO PRSV INCREASED AG IM: CPT | Performed by: FAMILY MEDICINE

## 2024-10-02 PROCEDURE — G0008 ADMIN INFLUENZA VIRUS VAC: HCPCS | Performed by: FAMILY MEDICINE

## 2024-10-02 PROCEDURE — 99214 OFFICE O/P EST MOD 30 MIN: CPT | Mod: 25 | Performed by: FAMILY MEDICINE

## 2024-10-02 PROCEDURE — 85025 COMPLETE CBC W/AUTO DIFF WBC: CPT | Performed by: FAMILY MEDICINE

## 2024-10-02 PROCEDURE — 80061 LIPID PANEL: CPT | Performed by: FAMILY MEDICINE

## 2024-10-02 PROCEDURE — 86140 C-REACTIVE PROTEIN: CPT | Performed by: FAMILY MEDICINE

## 2024-10-02 PROCEDURE — 36415 COLL VENOUS BLD VENIPUNCTURE: CPT | Performed by: FAMILY MEDICINE

## 2024-10-02 ASSESSMENT — PAIN SCALES - GENERAL: PAINLEVEL: NO PAIN (0)

## 2024-10-02 NOTE — PROGRESS NOTES
Assessment & Plan       ICD-10-CM    1. Hip pain, left  M25.552       2. Need for shingles vaccine  Z23       3. Need for vaccination against respiratory syncytial virus  Z29.11       4. Bacteremia  R78.81 CBC with platelets and differential     CRP, inflammation     CRP, inflammation     CBC with platelets and differential      5. Need for viral immunization  Z23 DISCONTINUED: influenza trivalent vaccine high-dose for ages 65 years and greater (FLUZONE-HD) injection 0.5 mL      6. Need for prophylactic vaccination and inoculation against influenza  Z23       7. Cerebrovascular accident (CVA) due to embolism of precerebral artery (H)  I63.10 Lipid panel reflex to direct LDL Fasting         History of bacteremia.  Recheck labs today  Left hip arthritis.  He feels he is relatively asymptomatic.  He plans to hold off on surgery for now.  We could get a cortisone injection if his symptoms worsen in the future or he has a flare of his arthritis.  He can simply contact me to help set that up.  Continue with as tolerated activities, occasional use of Tylenol and ibuprofen acceptable.  History of CVA.  Recheck cholesterol panel reviewed neurology notes.  Continue aspirin and statin for secondary prevention.    No follow-ups on file.    Thony Gongora MD  [facility]     Leann Rivera is a 69 year old, presenting for the following health issues:  Pre-Op Exam and Imm/Inj (Flu Shot)      10/2/2024     9:59 AM   Additional Questions   Roomed by Salima FISHER     Comes to clinic to discuss potential upcoming hip surgery.  Reports feeling well with some minor stiffness.  Occasional pain when he rolls over in bed or tries to cross a fence.  Quite active is a .  Does not feel limited by mobility or pain.  Occasionally uses a cane for stabilization.  History complicated by bacteremia of unknown source.  Had embolic strokes.  Negative AC.  Left hip pain, but negative hip  "aspirations.      Review of Systems  Constitutional, HEENT, cardiovascular, pulmonary, gi and gu systems are negative, except as otherwise noted.      Objective    /68   Pulse 66   Temp 97.7  F (36.5  C) (Temporal)   Resp 16   Ht 1.715 m (5' 7.52\")   Wt 61.5 kg (135 lb 9.6 oz)   SpO2 97%   BMI 20.91 kg/m    Body mass index is 20.91 kg/m .  Physical Exam               Signed Electronically by: Thony Gongora MD    "

## 2024-10-03 NOTE — TELEPHONE ENCOUNTER
Patient spouse left a voicemail for the OR to cancel 10/28 surgery.    Left message for patient to confirm 10/28 surgery has been cancelled.  PCP notes does clearly state he is going to hold off on surgery and did not complete the preop.

## 2024-10-11 ENCOUNTER — TELEPHONE (OUTPATIENT)
Dept: ORTHOPEDICS | Facility: CLINIC | Age: 69
End: 2024-10-11
Payer: COMMERCIAL

## 2024-10-11 NOTE — TELEPHONE ENCOUNTER
AILYN for patient to call orthopedic clinic back.     Patient is scheduled for surgery with Dr. Campbell on 10/28, this procedure needs to be verified with patient that we will need to cancel this surgery as he needs to have a visit with Dr. Campbell prior to scheduling this procedure.     If patient would like to proceed with procedure, a follow up with Dr. Campbell needs to be completed along with a pre-op check list.     Leatha Herron RN   [facility]

## 2024-10-14 NOTE — TELEPHONE ENCOUNTER
10/28 Surgery cancelled per OR preop RN.    Nila Camacho: cancel surgery for 10/28 left total hip. patient recently septic and family and PCP would like patient to recover a bit longer before having surgery.

## 2024-11-05 ENCOUNTER — PATIENT OUTREACH (OUTPATIENT)
Dept: CARE COORDINATION | Facility: CLINIC | Age: 69
End: 2024-11-05
Payer: COMMERCIAL

## 2024-12-07 SDOH — HEALTH STABILITY: PHYSICAL HEALTH: ON AVERAGE, HOW MANY DAYS PER WEEK DO YOU ENGAGE IN MODERATE TO STRENUOUS EXERCISE (LIKE A BRISK WALK)?: 6 DAYS

## 2024-12-07 SDOH — HEALTH STABILITY: PHYSICAL HEALTH: ON AVERAGE, HOW MANY MINUTES DO YOU ENGAGE IN EXERCISE AT THIS LEVEL?: 30 MIN

## 2024-12-07 ASSESSMENT — SOCIAL DETERMINANTS OF HEALTH (SDOH): HOW OFTEN DO YOU GET TOGETHER WITH FRIENDS OR RELATIVES?: ONCE A WEEK

## 2024-12-10 ENCOUNTER — OFFICE VISIT (OUTPATIENT)
Dept: FAMILY MEDICINE | Facility: CLINIC | Age: 69
End: 2024-12-10
Attending: FAMILY MEDICINE
Payer: COMMERCIAL

## 2024-12-10 VITALS
TEMPERATURE: 98 F | DIASTOLIC BLOOD PRESSURE: 88 MMHG | BODY MASS INDEX: 21.07 KG/M2 | SYSTOLIC BLOOD PRESSURE: 153 MMHG | HEART RATE: 73 BPM | HEIGHT: 68 IN | RESPIRATION RATE: 18 BRPM | WEIGHT: 139 LBS | OXYGEN SATURATION: 97 %

## 2024-12-10 DIAGNOSIS — M16.12 PRIMARY OSTEOARTHRITIS OF LEFT HIP: ICD-10-CM

## 2024-12-10 DIAGNOSIS — Z00.00 ANNUAL PHYSICAL EXAM: Primary | ICD-10-CM

## 2024-12-10 DIAGNOSIS — Z29.11 NEED FOR VACCINATION AGAINST RESPIRATORY SYNCYTIAL VIRUS: ICD-10-CM

## 2024-12-10 DIAGNOSIS — Z95.2 S/P AVR: ICD-10-CM

## 2024-12-10 DIAGNOSIS — Z12.11 SCREEN FOR COLON CANCER: ICD-10-CM

## 2024-12-10 DIAGNOSIS — Z23 NEED FOR SHINGLES VACCINE: ICD-10-CM

## 2024-12-10 LAB
ANION GAP SERPL CALCULATED.3IONS-SCNC: 8 MMOL/L (ref 7–15)
BUN SERPL-MCNC: 20.4 MG/DL (ref 8–23)
CALCIUM SERPL-MCNC: 9.6 MG/DL (ref 8.8–10.4)
CHLORIDE SERPL-SCNC: 99 MMOL/L (ref 98–107)
CREAT SERPL-MCNC: 0.88 MG/DL (ref 0.67–1.17)
EGFRCR SERPLBLD CKD-EPI 2021: >90 ML/MIN/1.73M2
GLUCOSE SERPL-MCNC: 89 MG/DL (ref 70–99)
HCO3 SERPL-SCNC: 31 MMOL/L (ref 22–29)
POTASSIUM SERPL-SCNC: 4.5 MMOL/L (ref 3.4–5.3)
SODIUM SERPL-SCNC: 138 MMOL/L (ref 135–145)

## 2024-12-10 PROCEDURE — G0009 ADMIN PNEUMOCOCCAL VACCINE: HCPCS | Performed by: FAMILY MEDICINE

## 2024-12-10 PROCEDURE — 36415 COLL VENOUS BLD VENIPUNCTURE: CPT | Performed by: FAMILY MEDICINE

## 2024-12-10 PROCEDURE — 91320 SARSCV2 VAC 30MCG TRS-SUC IM: CPT | Performed by: FAMILY MEDICINE

## 2024-12-10 PROCEDURE — 80048 BASIC METABOLIC PNL TOTAL CA: CPT | Performed by: FAMILY MEDICINE

## 2024-12-10 PROCEDURE — G0439 PPPS, SUBSEQ VISIT: HCPCS | Performed by: FAMILY MEDICINE

## 2024-12-10 PROCEDURE — 90480 ADMN SARSCOV2 VAC 1/ONLY CMP: CPT | Performed by: FAMILY MEDICINE

## 2024-12-10 PROCEDURE — 99213 OFFICE O/P EST LOW 20 MIN: CPT | Mod: 25 | Performed by: FAMILY MEDICINE

## 2024-12-10 PROCEDURE — 90677 PCV20 VACCINE IM: CPT | Performed by: FAMILY MEDICINE

## 2024-12-10 RX ORDER — ATORVASTATIN CALCIUM 40 MG/1
40 TABLET, FILM COATED ORAL EVERY EVENING
Qty: 90 TABLET | Refills: 3 | Status: SHIPPED | OUTPATIENT
Start: 2024-12-10

## 2024-12-10 ASSESSMENT — PAIN SCALES - GENERAL: PAINLEVEL_OUTOF10: MILD PAIN (2)

## 2024-12-10 NOTE — PROGRESS NOTES
Preventive Care Visit  [facility]  Thony Gongora MD, Family Medicine  Dec 10, 2024      Assessment & Plan       ICD-10-CM    1. Annual physical exam  Z00.00 Basic metabolic panel  (Ca, Cl, CO2, Creat, Gluc, K, Na, BUN)     Basic metabolic panel  (Ca, Cl, CO2, Creat, Gluc, K, Na, BUN)      2. Need for shingles vaccine  Z23       3. Need for vaccination against respiratory syncytial virus  Z29.11       4. Screen for colon cancer  Z12.11 Colonoscopy Screening  Referral      5. S/P AVR- bioprosthetic  Z95.2 atorvastatin (LIPITOR) 40 MG tablet      6. Primary osteoarthritis of left hip  M16.12 Orthopedic  Referral         Annual topics covered and updated  Hyperlipidemia continue Lipitor, no changes  Due for colon cancer screening  Severe left hip osteoarthritis.  See orthopedics for consideration of replacement    Return in about 2 weeks (around 12/24/2024).    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      12/7/2024   General Health   How would you rate your overall physical health? Good   Feel stress (tense, anxious, or unable to sleep) To some extent      (!) STRESS CONCERN      12/7/2024   Nutrition   Diet: Regular (no restrictions)            12/7/2024   Exercise   Days per week of moderate/strenous exercise 6 days   Average minutes spent exercising at this level 30 min            12/7/2024   Social Factors   Frequency of gathering with friends or relatives Once a week   Worry food won't last until get money to buy more Patient declined   Food not last or  not have enough money for food? Patient declined   Do you have housing? (Housing is defined as stable permanent housing and does not include staying ouside in a car, in a tent, in an abandoned building, in an overnight shelter, or couch-surfing.) Yes   Are you worried about losing your housing? No   Lack of transportation? No   Unable to get utilities (heat,electricity)? No            12/10/2024   Fall Risk   Fallen 2 or more times in the past year? Yes    Trouble with walking or balance? Yes    Gait Speed Test (Document in seconds) 3.94   Gait Speed Test Interpretation Less than or equal to 5.00 seconds - PASS       Patient-reported          12/7/2024   Activities of Daily Living- Home Safety   Needs help with the following daily activites None of the above   Safety concerns in the home Poor lighting            12/7/2024   Dental   Dentist two times every year? Yes            12/7/2024   Hearing Screening   Hearing concerns? (!) I FEEL THAT PEOPLE ARE MUMBLING OR NOT SPEAKING CLEARLY.    (!) TROUBLE UNDESTANDING A SPEAKER IN A PUBLIC MEETING OR [employer] SERVICE.    (!) TROUBLE UNDERSTANDING SOFT OR WHISPERED SPEECH.    (!) TROUBLE UNDERSTANDING SPEECH ON THE TELEPHONE       Multiple values from one day are sorted in reverse-chronological order         12/7/2024   Driving Risk Screening   Patient/family members have concerns about driving (!) YES              12/7/2024   General Alertness/Fatigue Screening   Have you been more tired than usual lately? (!) YES            12/7/2024   Urinary Incontinence Screening   Bothered by leaking urine in past 6 months No            12/7/2024   TB Screening   Were you born outside of the [place]? No            Today's PHQ-2 Score:       12/10/2024     1:28 PM   PHQ-2 ( 1999 Pf[employer])   Q1: Little interest or pleasure in doing things 0    Q2: Feeling down, depressed or hopeless 0    PHQ-2 Score 0    Q1: Little interest or pleasure in doing things Not at all   Q2: Feeling down,  depressed or hopeless Not at all   PHQ-2 Score 0       Patient-reported           12/7/2024   Substance Use   Alcohol more than 3/day or more than 7/wk No   Do you have a current opioid prescription? No   How severe/bad is pain from 1 to 10? 7/10   Do you use any other substances recreationally? No        Social History     Tobacco Use    Smoking status: Never     Passive exposure: Never    Smokeless tobacco: Never   Vaping Use    Vaping status: Never Used   Substance Use Topics    Alcohol use: Yes     Comment: 1-2 beer monthly  maybe    Drug use: No           12/7/2024   AAA Screening   Family history of Abdominal Aortic Aneurysm (AAA)? No      ASCVD Risk   The ASCVD Risk score (Bella FAIRCHILD, et al., 2019) failed to calculate for the following reasons:    Risk score cannot be calculated because patient has a medical history suggesting prior/existing ASCVD            Reviewed and updated as needed this visit by Provider                      Current providers sharing in care for this patient include:  Patient Care Team:  Thony Gongora MD as PCP - General (Family Medicine)  Thony Gongora MD as Assigned PCP  Luz Elena Villaseñor AuD as Audiologist (Audiology)  Levi Campbell MD as Assigned Musculoskeletal Provider  Luz Elena Villaseñor AuD as Audiologist (Audiology)  Bran Geller MD as MD (Otolaryngology)  Shanna Cabrera PA-C as Assigned Surgical Provider  Nikky Cruz PA-C as Physician Assistant (Cardiovascular Disease)  Nikky Cruz PA-C as Assigned Heart and Vascular Provider    The following health maintenance items are reviewed in Epic and correct as of today:  Health Maintenance   Topic Date Due    ZOSTER IMMUNIZATION (1 of 2) Never done    RSV VACCINE (1 - Risk 60-74 years 1-dose series) Never done    MEDICARE ANNUAL WELLNESS VISIT  11/30/2024    COLORECTAL CANCER SCREENING  12/05/2024    LIPID  10/02/2025    FALL RISK ASSESSMENT  12/10/2025     "GLUCOSE  12/10/2027    DTAP/TDAP/TD IMMUNIZATION (2 - Td or Tdap) 01/09/2028    ADVANCE CARE PLANNING  12/10/2029    HEPATITIS C SCREENING  Completed    PHQ-2 (once per calendar year)  Completed    INFLUENZA VACCINE  Completed    Pneumococcal Vaccine: 65+ Years  Completed    COVID-19 Vaccine  Completed    HPV IMMUNIZATION  Aged Out    MENINGITIS IMMUNIZATION  Aged Out    RSV MONOCLONAL ANTIBODY  Aged Out         Review of Systems  Constitutional, HEENT, cardiovascular, pulmonary, gi and gu systems are negative, except as otherwise noted.     Objective    Exam  BP (!) 153/88   Pulse 73   Temp 98  F (36.7  C) (Temporal)   Resp 18   Ht 1.715 m (5' 7.52\")   Wt 63 kg (139 lb)   SpO2 97%   BMI 21.44 kg/m     Estimated body mass index is 21.44 kg/m  as calculated from the following:    Height as of this encounter: 1.715 m (5' 7.52\").    Weight as of this encounter: 63 kg (139 lb).    Physical Exam  GENERAL: alert and no distress  NECK: no adenopathy, no asymmetry, masses, or scars  RESP: lungs clear to auscultation - no rales, rhonchi or wheezes  CV: regular rate and rhythm, normal S1 S2, no S3 or S4, no murmur, click or rub, no peripheral edema  ABDOMEN: soft, nontender, no hepatosplenomegaly, no masses and bowel sounds normal  MS: no gross musculoskeletal defects noted, no edema. Marked L hip reduced ROM, slight pain         12/10/2024   Mini Cog   Clock Draw Score 2 Normal   3 Item Recall 1 object recalled   Mini Cog Total Score 3                 Signed Electronically by: Thony Gongora MD    "

## 2024-12-11 ENCOUNTER — PATIENT OUTREACH (OUTPATIENT)
Dept: CARE COORDINATION | Facility: CLINIC | Age: 69
End: 2024-12-11
Payer: COMMERCIAL

## 2024-12-12 ENCOUNTER — TELEPHONE (OUTPATIENT)
Dept: GASTROENTEROLOGY | Facility: CLINIC | Age: 69
End: 2024-12-12
Payer: COMMERCIAL

## 2024-12-12 NOTE — TELEPHONE ENCOUNTER
"Endoscopy Scheduling Screen    Have you had any respiratory illness or flu-like symptoms in the last 10 days?  No    What is your communication preference for Instructions and/or Bowel Prep?   MyChart    What insurance is in the chart?  Other:  [employer]    Ordering/Referring Provider: GEO MANUEL   (If ordering provider performs procedure, schedule with ordering provider unless otherwise instructed. )    BMI: Estimated body mass index is 21.44 kg/m  as calculated from the following:    Height as of 12/10/24: 1.715 m (5' 7.52\").    Weight as of 12/10/24: 63 kg (139 lb).     Sedation Ordered  moderate sedation.   If patient BMI > 50 do not schedule in [facility].    If patient BMI > 45 do not schedule at [facility].    Are you taking methadone or Suboxone?  NO, No RN review required.    Have you been diagnosed and are being treated for severe PTSD or severe anxiety?  NO, No RN review required.    Are you taking any prescription medications for pain 3 or more times per week?   NO, No RN review required.    Do you have a history of malignant hyperthermia?  No    (Females) Are you currently pregnant?   No     Have you been diagnosed or told you have pulmonary hypertension?   No    Do you have an LVAD?  No    Have you been told you have moderate to severe sleep apnea?  No.    Have you been told you have COPD, asthma, or any other lung disease?  No    Do you have any heart conditions?  Yes     In the past year, have you had any hospitalizations for heart related issues including cardiomyopathy, heart attack, or stent placement?  No    Do you have any implantable devices in your body (pacemaker, ICD)?  No    Do you take nitroglycerine?  No    Have you ever had or are you waiting for an organ transplant?  No. Continue scheduling, no site restrictions.    Have you had a stroke or transient ischemic attack (TIA aka \"mini stroke\" in the last 6 months?   No    Have you been diagnosed with or been told you have cirrhosis of " "the liver?   No.    Are you currently on dialysis?   No    Do you need assistance transferring?   No    BMI: Estimated body mass index is 21.44 kg/m  as calculated from the following:    Height as of 12/10/24: 1.715 m (5' 7.52\").    Weight as of 12/10/24: 63 kg (139 lb).     Is patients BMI > 40 and scheduling location [facility]?  No    Do you take an injectable or oral medication for weight loss or diabetes (excluding insulin)?  No    Do you take the medication Naltrexone?  No    Do you take blood thinners?  No       Prep   Are you currently on dialysis or do you have chronic kidney disease?  No    Do you have a diagnosis of diabetes?  No    Do you have a diagnosis of cystic fibrosis (CF)?  No    On a regular basis do you go 3 -5 days between bowel movements?  No    BMI > 40?  No    Preferred Pharmacy:    [facility] [address] [place] [address] [place] [address]  Phone: [phone] Fax: [phone]    Final Scheduling Details     Procedure scheduled  Colonoscopy    Surgeon:  MAR     Date of procedure:  3/3/25     Pre-OP / PAC:   No - Not required for this site.    Location  [facility] - Per order.    Sedation   MAC/Deep Sedation  Per location.      Patient Reminders:   You will receive a call from a Nurse to review instructions and health history.  This assessment must be completed prior to your procedure.  Failure to complete the Nurse assessment may result in the procedure being cancelled.      On the day of your procedure, please designate an adult(s) who can drive you home stay with you for the next 24 hours. The medicines used in the exam will make you sleepy. You will not be able to drive.      You cannot take public transportation, ride share services, or non-medical taxi service without a responsible caregiver.  Medical transport services are allowed with the requirement that a responsible caregiver will receive you at your destination.  We require that drivers and " caregivers are confirmed prior to your procedure.

## 2024-12-18 ASSESSMENT — HOOS JR
BENDING TO THE FLOOR TO PICK UP OBJECT: SEVERE
GOING UP OR DOWN STAIRS: MODERATE
RISING FROM SITTING: MODERATE
SITTING: MODERATE
LYING IN BED (TURNING OVER, MAINTAINING HIP POSITION): MODERATE
HOOS JR TOTAL INTERVAL SCORE: 49.86
WALKING ON UNEVEN SURFACE: MODERATE

## 2024-12-23 ENCOUNTER — OFFICE VISIT (OUTPATIENT)
Dept: ORTHOPEDICS | Facility: CLINIC | Age: 69
End: 2024-12-23
Payer: COMMERCIAL

## 2024-12-23 ENCOUNTER — ANCILLARY PROCEDURE (OUTPATIENT)
Dept: GENERAL RADIOLOGY | Facility: CLINIC | Age: 69
End: 2024-12-23
Attending: ORTHOPAEDIC SURGERY
Payer: COMMERCIAL

## 2024-12-23 VITALS
TEMPERATURE: 97.9 F | HEART RATE: 70 BPM | BODY MASS INDEX: 21.44 KG/M2 | DIASTOLIC BLOOD PRESSURE: 66 MMHG | SYSTOLIC BLOOD PRESSURE: 132 MMHG | WEIGHT: 139 LBS

## 2024-12-23 DIAGNOSIS — M16.12 PRIMARY OSTEOARTHRITIS OF LEFT HIP: Primary | ICD-10-CM

## 2024-12-23 DIAGNOSIS — M81.8 OTHER OSTEOPOROSIS WITHOUT CURRENT PATHOLOGICAL FRACTURE: ICD-10-CM

## 2024-12-23 DIAGNOSIS — E55.9 INADEQUATE INTAKE OF CALCIUM AND VITAMIN D: ICD-10-CM

## 2024-12-23 DIAGNOSIS — M16.12 PRIMARY OSTEOARTHRITIS OF LEFT HIP: ICD-10-CM

## 2024-12-23 DIAGNOSIS — R79.89 OTHER SPECIFIED ABNORMAL FINDINGS OF BLOOD CHEMISTRY: ICD-10-CM

## 2024-12-23 DIAGNOSIS — E58 INADEQUATE INTAKE OF CALCIUM AND VITAMIN D: ICD-10-CM

## 2024-12-23 PROCEDURE — 73502 X-RAY EXAM HIP UNI 2-3 VIEWS: CPT | Mod: TC | Performed by: RADIOLOGY

## 2024-12-23 PROCEDURE — G2211 COMPLEX E/M VISIT ADD ON: HCPCS | Performed by: ORTHOPAEDIC SURGERY

## 2024-12-23 PROCEDURE — 99214 OFFICE O/P EST MOD 30 MIN: CPT | Performed by: ORTHOPAEDIC SURGERY

## 2024-12-23 ASSESSMENT — PAIN SCALES - GENERAL: PAINLEVEL_OUTOF10: MILD PAIN (2)

## 2024-12-23 NOTE — LETTER
12/23/2024      Miguel Pappas  [address] [facility] [address]      Dear Colleague,    Thank you for referring your patient, Miguel Pappas, to the [facility]. Please see a copy of my visit note below.    S:Return of left hip pain         Patient Active Problem List   Diagnosis     S/P AVR- bioprosthetic     Prophylactic antibiotic- 2gm Amox with Dental     Primary osteoarthritis of left hip     Endocarditis     Bacteremia            Past Medical History:   Diagnosis Date     * * * SBE PROPHYLAXIS * * *     no longer indicated - 2007 AHA guidelnies     Mitral valve stenosis and aortic valve stenosis     2/6 murmur     Pure hypercholesterolemia 1/26/2007            Past Surgical History:   Procedure Laterality Date     COLONOSCOPY N/A 12/5/2014    Procedure: COLONOSCOPY;  Surgeon: Omar Chisholm MD;  Location:  GI     CV CORONARY ANGIOGRAM N/A 11/15/2022    Procedure: Coronary Angiogram;  Surgeon: Edson Nava MD;  Location:  HEART CARDIAC CATH LAB     HC REMOVAL OF TONSILS,<11 Y/O      Tonsils <10y.o.     REPLACE VALVE AORTIC N/A 12/16/2022    Procedure: AORTIC VALVE REPLACEMENT WITH 21MM INSPIRIS RESILIA VALVE, PLACEMENT OF TEMPORARY VENTRICULAR AND ATRIAL PACING WIRES AND TRANSESPHOGEAL ECHOCARDIOGRAM;  Surgeon: Kaila Abbott MD;  Location:  OR     [facility] COLONOSCOPY W/WO BRUSH/WASH  2/8/2005             Social History     Tobacco Use     Smoking status: Never     Passive exposure: Never     Smokeless tobacco: Never   Substance Use Topics     Alcohol use: Yes     Comment: 1-2 beer monthly  maybe            Family History   Problem Relation Age of Onset     Cancer Father         Spleen cancer     Hypertension Father      Allergies Father         PCN     Gastrointestinal Disease Father         ulcer     Heart Disease Father         Hx: meds and angioplasty     Hypertension Brother      Cerebrovascular Disease Paternal Grandfather      Arthritis  Paternal Grandfather      Arthritis Mother      Hypertension Mother      Allergies Son         Dust , Mold     Depression Sister         X 2               Allergies   Allergen Reactions     Ceftriaxone Cough     Lump in throat, coughing, starts midway through infusion. Started after being on ceftriaxone for 4 weeks     No Known Drug Allergy             Current Outpatient Medications   Medication Sig Dispense Refill     acetaminophen (TYLENOL) 325 MG tablet Take 2 tablets (650 mg) by mouth every 4 hours as needed for mild pain or other (and adjunct with moderate or severe pain or per patient request)       ascorbic acid (VITAMIN C) 250 MG CHEW chewable tablet Take 250 mg by mouth daily       aspirin 81 MG tablet Take 81 mg by mouth at bedtime       atorvastatin (LIPITOR) 40 MG tablet Take 1 tablet (40 mg) by mouth every evening. 90 tablet 3     ferrous sulfate (FEROSUL) 325 (65 Fe) MG tablet Take 325 mg by mouth daily (with breakfast)       GLUCOSAMINE CHONDROITIN OR TABS Take 1 tablet by mouth at bedtime  0     Multiple Vitamins-Minerals (CENTRUM SILVER PO) Take 1 tablet by mouth at bedtime 30 0     Vitamin D3 (CHOLECALCIFEROL) 25 mcg (1000 units) tablet Take 25 mcg by mouth daily            Review Of Systems  Skin: negative  Eyes: negative  Ears/Nose/Throat: negative  Respiratory: No shortness of breath, dyspnea on exertion, cough, or hemoptysis    O: Physical Exam:Limited IR and abduction L hip, reproduction groin pain.  CMS intact.    Lab:Cr 0.88    Images:EXAM: XR PELVIS AND HIP LEFT 2 VIEWS  DATE/TIME: 12/23/2024 12:08 PM     INDICATION: Primary osteoarthritis of left hip  COMPARISON: 3/7/2024                                                                      IMPRESSION: Advanced degenerative arthrosis of the left hip with  bone-on-bone articulation. Mild degenerative arthrosis of the right  hip. No acute fracture.     Degenerative changes of both SI joints. Lower lumbar facet arthropathy  and degenerative  space narrowing.         A:  endstage arthropathy L hip      P:  proceed with L RADHA when patient decides to proceed  Start preop checklist  See back as preop             In addition to the above assessment and plan each active problem on Miguel's problem list was evaluated today. This included the questioning of Miguel for any medication problems. We will continue the current treatment plan for these active problems except as noted.        Again, thank you for allowing me to participate in the care of your patient.        Sincerely,        Levi Campbell MD    Electronically signed

## 2024-12-31 NOTE — PROGRESS NOTES
S:Return of left hip pain         Patient Active Problem List   Diagnosis    S/P AVR- bioprosthetic    Prophylactic antibiotic- 2gm Amox with Dental    Primary osteoarthritis of left hip    Endocarditis    Bacteremia            Past Medical History:   Diagnosis Date    * * * SBE PROPHYLAXIS * * *     no longer indicated - 2007 AHA guidelnies    Mitral valve stenosis and aortic valve stenosis     2/6 murmur    Pure hypercholesterolemia 1/26/2007            Past Surgical History:   Procedure Laterality Date    COLONOSCOPY N/A 12/5/2014    Procedure: COLONOSCOPY;  Surgeon: Omar Chisholm MD;  Location:  GI    CV CORONARY ANGIOGRAM N/A 11/15/2022    Procedure: Coronary Angiogram;  Surgeon: Edson Nava MD;  Location:  HEART CARDIAC CATH LAB    HC REMOVAL OF TONSILS,<11 Y/O      Tonsils <10y.o.    REPLACE VALVE AORTIC N/A 12/16/2022    Procedure: AORTIC VALVE REPLACEMENT WITH 21MM INSPIRIS RESILIA VALVE, PLACEMENT OF TEMPORARY VENTRICULAR AND ATRIAL PACING WIRES AND TRANSESPHOGEAL ECHOCARDIOGRAM;  Surgeon: Kaila Abbott MD;  Location:  OR    [facility] COLONOSCOPY W/WO BRUSH/WASH  2/8/2005             Social History     Tobacco Use    Smoking status: Never     Passive exposure: Never    Smokeless tobacco: Never   Substance Use Topics    Alcohol use: Yes     Comment: 1-2 beer monthly  maybe            Family History   Problem Relation Age of Onset    Cancer Father         Spleen cancer    Hypertension Father     Allergies Father         PCN    Gastrointestinal Disease Father         ulcer    Heart Disease Father         Hx: meds and angioplasty    Hypertension Brother     Cerebrovascular Disease Paternal Grandfather     Arthritis Paternal Grandfather     Arthritis Mother     Hypertension Mother     Allergies Son         Dust , Mold    Depression Sister         X 2               Allergies   Allergen Reactions    Ceftriaxone Cough     Lump in throat, coughing, starts midway through infusion. Started  after being on ceftriaxone for 4 weeks    No Known Drug Allergy             Current Outpatient Medications   Medication Sig Dispense Refill    acetaminophen (TYLENOL) 325 MG tablet Take 2 tablets (650 mg) by mouth every 4 hours as needed for mild pain or other (and adjunct with moderate or severe pain or per patient request)      ascorbic acid (VITAMIN C) 250 MG CHEW chewable tablet Take 250 mg by mouth daily      aspirin 81 MG tablet Take 81 mg by mouth at bedtime      atorvastatin (LIPITOR) 40 MG tablet Take 1 tablet (40 mg) by mouth every evening. 90 tablet 3    ferrous sulfate (FEROSUL) 325 (65 Fe) MG tablet Take 325 mg by mouth daily (with breakfast)      GLUCOSAMINE CHONDROITIN OR TABS Take 1 tablet by mouth at bedtime  0    Multiple Vitamins-Minerals (CENTRUM SILVER PO) Take 1 tablet by mouth at bedtime 30 0    Vitamin D3 (CHOLECALCIFEROL) 25 mcg (1000 units) tablet Take 25 mcg by mouth daily            Review Of Systems  Skin: negative  Eyes: negative  Ears/Nose/Throat: negative  Respiratory: No shortness of breath, dyspnea on exertion, cough, or hemoptysis    O: Physical Exam:Limited IR and abduction L hip, reproduction groin pain.  CMS intact.    Lab:Cr 0.88    Images:EXAM: XR PELVIS AND HIP LEFT 2 VIEWS  DATE/TIME: 12/23/2024 12:08 PM     INDICATION: Primary osteoarthritis of left hip  COMPARISON: 3/7/2024                                                                      IMPRESSION: Advanced degenerative arthrosis of the left hip with  bone-on-bone articulation. Mild degenerative arthrosis of the right  hip. No acute fracture.     Degenerative changes of both SI joints. Lower lumbar facet arthropathy  and degenerative space narrowing.         A:  endstage arthropathy L hip      P:  proceed with L RADHA when patient decides to proceed  Start preop checklist  See back as preop             In addition to the above assessment and plan each active problem on Miguel's problem list was evaluated today. This  included the questioning of Miguel for any medication problems. We will continue the current treatment plan for these active problems except as noted.

## 2025-01-01 RX ORDER — TRANEXAMIC ACID 650 MG/1
1950 TABLET ORAL ONCE
OUTPATIENT
Start: 2025-01-01 | End: 2025-01-01

## 2025-01-02 ENCOUNTER — TELEPHONE (OUTPATIENT)
Dept: ORTHOPEDICS | Facility: CLINIC | Age: 70
End: 2025-01-02
Payer: COMMERCIAL

## 2025-01-02 NOTE — TELEPHONE ENCOUNTER
Type of surgery: ARTHROPLASTY, HIP, TOTAL (Left)   Location of surgery: [facility]  Date and time of surgery: 3/18  Surgeon: Shannan  Pre-Op Appt Date: 2/26  Post-Op Appt Date: 3/31   Packet sent out: Yes inna cisneros per patient and wife  Pre-cert/Authorization completed:  Not Applicable  Date: na

## 2025-02-17 ENCOUNTER — TELEPHONE (OUTPATIENT)
Dept: GASTROENTEROLOGY | Facility: CLINIC | Age: 70
End: 2025-02-17
Payer: COMMERCIAL

## 2025-02-17 NOTE — TELEPHONE ENCOUNTER
Attempted to contact patient in order to complete pre assessment questions.     Patient's wife, Chayito answered the phone however states patient is not available and she will let him know to return call to [phone] option 3.    Callback communication sent via [employer].      Procedure details:    Patient scheduled for Colonoscopy on 3.3.25.     Arrival time: 0715. Procedure time 0815    Facility location: [facility], [address] [place] [address] PRASHANT Newton    Sedation type: MAC    Pre op exam needed? No.    Indication for procedure: screening      Chart review:     Electronic implanted devices? No    Recent diagnosis of diverticulitis within the last 6 weeks? No      Medication review:    Diabetic? No    Anticoagulants? No    Weight loss medication/injectable? No.    Other medication HOLDING recommendations:  Ferrous sulfate (iron supplements): HOLD 7 days before procedure.      Prep for procedure:     Bowel prep recommendation: Standard Miralax.   Due to: standard bowel prep    Procedure information and instructions sent via [employer].       Mahogany Astorga RN  Endoscopy Procedure Pre Assessment

## 2025-02-20 NOTE — TELEPHONE ENCOUNTER
Attempted to contact patient in order to complete pre assessment questions.     No answer. Unable to leave message, phone just kept ringing    Callback communication sent via [employer].    Adeline Morgan LPN

## 2025-02-26 ENCOUNTER — OFFICE VISIT (OUTPATIENT)
Dept: FAMILY MEDICINE | Facility: CLINIC | Age: 70
End: 2025-02-26
Payer: COMMERCIAL

## 2025-02-26 VITALS
DIASTOLIC BLOOD PRESSURE: 84 MMHG | OXYGEN SATURATION: 99 % | TEMPERATURE: 97.4 F | SYSTOLIC BLOOD PRESSURE: 144 MMHG | RESPIRATION RATE: 18 BRPM | HEIGHT: 67 IN | BODY MASS INDEX: 22.57 KG/M2 | WEIGHT: 143.8 LBS | HEART RATE: 68 BPM

## 2025-02-26 DIAGNOSIS — Z95.2 S/P AVR: ICD-10-CM

## 2025-02-26 DIAGNOSIS — Z86.79 HISTORY OF PAROXYSMAL SUPRAVENTRICULAR TACHYCARDIA: ICD-10-CM

## 2025-02-26 DIAGNOSIS — Z29.11 NEED FOR VACCINATION AGAINST RESPIRATORY SYNCYTIAL VIRUS: ICD-10-CM

## 2025-02-26 DIAGNOSIS — R90.89 ABNORMAL BRAIN MRI: ICD-10-CM

## 2025-02-26 DIAGNOSIS — Z23 NEED FOR SHINGLES VACCINE: ICD-10-CM

## 2025-02-26 DIAGNOSIS — Z01.818 PREOP EXAMINATION: Primary | ICD-10-CM

## 2025-02-26 PROBLEM — I38 ENDOCARDITIS: Status: ACTIVE | Noted: 2024-03-16

## 2025-02-26 LAB
ANION GAP SERPL CALCULATED.3IONS-SCNC: 10 MMOL/L (ref 7–15)
BUN SERPL-MCNC: 25.7 MG/DL (ref 8–23)
CALCIUM SERPL-MCNC: 9.6 MG/DL (ref 8.8–10.4)
CHLORIDE SERPL-SCNC: 105 MMOL/L (ref 98–107)
CREAT SERPL-MCNC: 0.86 MG/DL (ref 0.67–1.17)
EGFRCR SERPLBLD CKD-EPI 2021: >90 ML/MIN/1.73M2
ERYTHROCYTE [DISTWIDTH] IN BLOOD BY AUTOMATED COUNT: 12.9 % (ref 10–15)
GLUCOSE SERPL-MCNC: 87 MG/DL (ref 70–99)
HCO3 SERPL-SCNC: 27 MMOL/L (ref 22–29)
HCT VFR BLD AUTO: 38.2 % (ref 40–53)
HGB BLD-MCNC: 13.3 G/DL (ref 13.3–17.7)
MCH RBC QN AUTO: 30.3 PG (ref 26.5–33)
MCHC RBC AUTO-ENTMCNC: 34.8 G/DL (ref 31.5–36.5)
MCV RBC AUTO: 87 FL (ref 78–100)
PLATELET # BLD AUTO: 184 10E3/UL (ref 150–450)
POTASSIUM SERPL-SCNC: 4.2 MMOL/L (ref 3.4–5.3)
RBC # BLD AUTO: 4.39 10E6/UL (ref 4.4–5.9)
SODIUM SERPL-SCNC: 142 MMOL/L (ref 135–145)
WBC # BLD AUTO: 8 10E3/UL (ref 4–11)

## 2025-02-26 PROCEDURE — 99214 OFFICE O/P EST MOD 30 MIN: CPT | Performed by: FAMILY MEDICINE

## 2025-02-26 PROCEDURE — 1125F AMNT PAIN NOTED PAIN PRSNT: CPT | Performed by: FAMILY MEDICINE

## 2025-02-26 PROCEDURE — 3079F DIAST BP 80-89 MM HG: CPT | Performed by: FAMILY MEDICINE

## 2025-02-26 PROCEDURE — 85027 COMPLETE CBC AUTOMATED: CPT | Performed by: FAMILY MEDICINE

## 2025-02-26 PROCEDURE — 36415 COLL VENOUS BLD VENIPUNCTURE: CPT | Performed by: FAMILY MEDICINE

## 2025-02-26 PROCEDURE — 80048 BASIC METABOLIC PNL TOTAL CA: CPT | Performed by: FAMILY MEDICINE

## 2025-02-26 PROCEDURE — 3077F SYST BP >= 140 MM HG: CPT | Performed by: FAMILY MEDICINE

## 2025-02-26 ASSESSMENT — PAIN SCALES - GENERAL: PAINLEVEL_OUTOF10: MILD PAIN (3)

## 2025-02-26 NOTE — PROGRESS NOTES
Preoperative Evaluation  [address] [address]  Phone: [phone]  Fax: [phone]  Primary Provider: Thony Gongora MD  Pre-op Performing Provider: Thony Gongora MD  Feb 26, 2025 2/21/2025   Surgical Information   What procedure is being done? Arthroplasty, Hip, Total   Facility or Hospital where procedure/surgery will be performed: [facility]   Who is doing the procedure / surgery? Dr Campbell   Date of surgery / procedure: 3/18/2025   Time of surgery / procedure: 10:00 am   Where do you plan to recover after surgery? at home with family     Fax number for surgical facility: Note does not need to be faxed, will be available electronically in Epic.    Assessment & Plan     The proposed surgical procedure is considered INTERMEDIATE risk.      ICD-10-CM    1. Preop examination  Z01.818       2. Need for shingles vaccine  Z23       3. Need for vaccination against respiratory syncytial virus  Z29.11       4. Abnormal brain MRI- old lacunar infarct  R90.89       5. S/P AVR- bioprosthetic  Z95.2       6. History of paroxysmal supraventricular tachycardia- asymptomatic see cards notes  Z86.79          Scheduled for hip replacement. Activity limited by arthritis, but had nl angiogram during work up for valve replacement a few years ago.     Possible Sleep Apnea:         Implanted Device   - Type of device: bioprosthetic aortic valve Patient advised to bring device information on day of surgery.       - No identified additional risk factors other than previously addressed        Recommendation  Approval given to proceed with proposed procedure, without further diagnostic evaluation.    Leann Rivera is a 69 year old, presenting for the following:  Pre-Op Exam          2/26/2025    10:18 AM   Additional Questions   Roomed by Angie GRANGER LPN     HPI related to upcoming procedure:     Having hip done, lots  OA  Disabled by OA, but normal angiogram 0% disease        2/21/2025   Pre-Op Questionnaire   Have you ever had a heart attack or stroke? (!) YES ? Old stroke on MRI   Have you ever had surgery on your heart or blood vessels, such as a stent placement, a coronary artery bypass, or surgery on an artery in your head, neck, heart, or legs? (!) YES valve    Do you have chest pain with activity? No   Do you have a history of heart failure? No   Do you currently have a cold, bronchitis or symptoms of other infection? No   Do you have a cough, shortness of breath, or wheezing? No   Do you or anyone in your family have previous history of blood clots? No   Do you or does anyone in your family have a serious bleeding problem such as prolonged bleeding following surgeries or cuts? No   Have you ever had problems with anemia or been told to take iron pills? (!) YES    Have you had any abnormal blood loss such as black, tarry or bloody stools? No   Have you ever had a blood transfusion? (!) YES   Have you ever had a transfusion reaction? No   Are you willing to have a blood transfusion if it is medically needed before, during, or after your surgery? Yes   Have you or any of your relatives ever had problems with anesthesia? No   Do you have sleep apnea, excessive snoring or daytime drowsiness? (!) UNKNOWN   Do you have any artifical heart valves or other implanted medical devices like a pacemaker, defibrillator, or continuous glucose monitor? (!) YES   What type of device do you have? Tissue aortic valve   Name of the clinic that manages your device [facility]   Do you have artificial joints? No   Are you allergic to latex? No     Health Care Directive  Patient has a Health Care Directive on file      Preoperative Review of             Patient Active Problem List    Diagnosis Date Noted    Abnormal brain MRI- old lacunar infarct 02/26/2025     Priority: Medium    History of paroxysmal supraventricular tachycardia-  asymptomatic see cards notes 02/26/2025     Priority: Medium    Bacteremia 03/25/2024     Priority: Medium    Endocarditis- suspected 2024 03/16/2024     Priority: Medium    Primary osteoarthritis of left hip 03/07/2024     Priority: Medium    Prophylactic antibiotic- 2gm Amox with Dental 04/05/2023     Priority: Medium    S/P AVR- bioprosthetic 12/16/2022     Priority: Medium      Past Medical History:   Diagnosis Date    * * * SBE PROPHYLAXIS * * *     no longer indicated - 2007 AHA guidelnies    Mitral valve stenosis and aortic valve stenosis     2/6 murmur    Pure hypercholesterolemia 1/26/2007     Past Surgical History:   Procedure Laterality Date    COLONOSCOPY N/A 12/5/2014    Procedure: COLONOSCOPY;  Surgeon: Omar Chisholm MD;  Location:  GI    CV CORONARY ANGIOGRAM N/A 11/15/2022    Procedure: Coronary Angiogram;  Surgeon: Edson Nava MD;  Location:  HEART CARDIAC CATH LAB    HC REMOVAL OF TONSILS,<9 Y/O      Tonsils <10y.o.    REPLACE VALVE AORTIC N/A 12/16/2022    Procedure: AORTIC VALVE REPLACEMENT WITH 21MM INSPIRIS RESILIA VALVE, PLACEMENT OF TEMPORARY VENTRICULAR AND ATRIAL PACING WIRES AND TRANSESPHOGEAL ECHOCARDIOGRAM;  Surgeon: Kaila Abbott MD;  Location:  OR    [facility] COLONOSCOPY W/WO BRUSH/WASH  2/8/2005      Current Outpatient Medications   Medication Sig Dispense Refill    acetaminophen (TYLENOL) 325 MG tablet Take 2 tablets (650 mg) by mouth every 4 hours as needed for mild pain or other (and adjunct with moderate or severe pain or per patient request)      ascorbic acid (VITAMIN C) 250 MG CHEW chewable tablet Take 250 mg by mouth daily      aspirin 81 MG tablet Take 81 mg by mouth at bedtime      atorvastatin (LIPITOR) 40 MG tablet Take 1 tablet (40 mg) by mouth every evening. 90 tablet 3    ferrous sulfate (FEROSUL) 325 (65 Fe) MG tablet Take 325 mg by mouth daily (with breakfast)      GLUCOSAMINE CHONDROITIN OR TABS Take 1 tablet by mouth at bedtime  0     "Multiple Vitamins-Minerals (CENTRUM SILVER PO) Take 1 tablet by mouth at bedtime 30 0    Vitamin D3 (CHOLECALCIFEROL) 25 mcg (1000 units) tablet Take 25 mcg by mouth daily         Allergies   Allergen Reactions    Ceftriaxone Cough     Lump in throat, coughing, starts midway through infusion. Started after being on ceftriaxone for 4 weeks    No Known Drug Allergy         Social History     Tobacco Use    Smoking status: Never     Passive exposure: Never    Smokeless tobacco: Never   Substance Use Topics    Alcohol use: Yes     Comment: 1-2 beer monthly  maybe       History   Drug Use No             Review of Systems  Constitutional, HEENT, cardiovascular, pulmonary, gi and gu systems are negative, except as otherwise noted.    Objective    BP (!) 144/84   Pulse 68   Temp 97.4  F (36.3  C) (Temporal)   Resp 18   Ht 1.695 m (5' 6.75\")   Wt 65.2 kg (143 lb 12.8 oz)   SpO2 99%   BMI 22.69 kg/m     Estimated body mass index is 22.69 kg/m  as calculated from the following:    Height as of this encounter: 1.695 m (5' 6.75\").    Weight as of this encounter: 65.2 kg (143 lb 12.8 oz).  Physical Exam  GENERAL: alert and no distress  NECK: no adenopathy, no asymmetry, masses, or scars  RESP: lungs clear to auscultation - no rales, rhonchi or wheezes  CV: regular rate and rhythm, normal S1 S2, no S3 or S4, no murmur, click or rub, no peripheral edema  ABDOMEN: soft, nontender, no hepatosplenomegaly, no masses and bowel sounds normal  MS: no gross musculoskeletal defects noted, no edema    Recent Labs   Lab Test 12/10/24  1537 10/02/24  1134 05/01/24  1440 04/10/24  1523 04/04/24  1026 03/15/24  0547 03/14/24  1640   HGB  --  12.9* 10.9*   < >  --    < > 11.8*   PLT  --  166 286   < >  --    < > 227   INR  --   --   --   --   --   --  1.08     --   --   --  138   < > 129*   POTASSIUM 4.5  --   --   --  4.7   < > 4.5   CR 0.88  --  0.78   < > 0.86   < > 0.90   A1C  --   --   --   --   --   --  5.8*    < > = values in " this interval not displayed.        Diagnostics  No labs were ordered during this visit.        Revised Cardiac Risk Index (RCRI)  The patient has the following serious cardiovascular risks for perioperative complications:   - No serious cardiac risks = 0 points     RCRI Interpretation: 0 points: Class I (very low risk - 0.4% complication rate)         Signed Electronically by: Thony Gongora MD  A copy of this evaluation report is provided to the requesting physician.

## 2025-03-03 ENCOUNTER — ANESTHESIA EVENT (OUTPATIENT)
Dept: GASTROENTEROLOGY | Facility: CLINIC | Age: 70
End: 2025-03-03
Payer: COMMERCIAL

## 2025-03-03 ENCOUNTER — HOSPITAL ENCOUNTER (OUTPATIENT)
Facility: CLINIC | Age: 70
Discharge: HOME | End: 2025-03-03
Attending: FAMILY MEDICINE | Admitting: FAMILY MEDICINE
Payer: COMMERCIAL

## 2025-03-03 ENCOUNTER — ANESTHESIA (OUTPATIENT)
Dept: GASTROENTEROLOGY | Facility: CLINIC | Age: 70
End: 2025-03-03
Payer: COMMERCIAL

## 2025-03-03 VITALS
RESPIRATION RATE: 16 BRPM | SYSTOLIC BLOOD PRESSURE: 154 MMHG | OXYGEN SATURATION: 99 % | TEMPERATURE: 97.6 F | HEART RATE: 63 BPM | DIASTOLIC BLOOD PRESSURE: 90 MMHG

## 2025-03-03 LAB — COLONOSCOPY: NORMAL

## 2025-03-03 PROCEDURE — 45378 DIAGNOSTIC COLONOSCOPY: CPT | Performed by: FAMILY MEDICINE

## 2025-03-03 PROCEDURE — 250N000011 HC RX IP 250 OP 636: Performed by: NURSE ANESTHETIST, CERTIFIED REGISTERED

## 2025-03-03 PROCEDURE — G0121 COLON CA SCRN NOT HI RSK IND: HCPCS | Performed by: FAMILY MEDICINE

## 2025-03-03 PROCEDURE — 258N000003 HC RX IP 258 OP 636: Performed by: NURSE ANESTHETIST, CERTIFIED REGISTERED

## 2025-03-03 PROCEDURE — 250N000009 HC RX 250: Performed by: NURSE ANESTHETIST, CERTIFIED REGISTERED

## 2025-03-03 PROCEDURE — 370N000017 HC ANESTHESIA TECHNICAL FEE, PER MIN: Performed by: FAMILY MEDICINE

## 2025-03-03 RX ORDER — SODIUM CHLORIDE, SODIUM LACTATE, POTASSIUM CHLORIDE, CALCIUM CHLORIDE 600; 310; 30; 20 MG/100ML; MG/100ML; MG/100ML; MG/100ML
INJECTION, SOLUTION INTRAVENOUS CONTINUOUS
Status: DISCONTINUED | OUTPATIENT
Start: 2025-03-03 | End: 2025-03-03 | Stop reason: HOSPADM

## 2025-03-03 RX ORDER — FLUMAZENIL 0.1 MG/ML
0.2 INJECTION, SOLUTION INTRAVENOUS
Status: DISCONTINUED | OUTPATIENT
Start: 2025-03-03 | End: 2025-03-03 | Stop reason: HOSPADM

## 2025-03-03 RX ORDER — LIDOCAINE 40 MG/G
CREAM TOPICAL
Status: DISCONTINUED | OUTPATIENT
Start: 2025-03-03 | End: 2025-03-03 | Stop reason: HOSPADM

## 2025-03-03 RX ORDER — ONDANSETRON 2 MG/ML
4 INJECTION INTRAMUSCULAR; INTRAVENOUS
Status: DISCONTINUED | OUTPATIENT
Start: 2025-03-03 | End: 2025-03-03 | Stop reason: HOSPADM

## 2025-03-03 RX ORDER — NALOXONE HYDROCHLORIDE 0.4 MG/ML
0.2 INJECTION, SOLUTION INTRAMUSCULAR; INTRAVENOUS; SUBCUTANEOUS
Status: DISCONTINUED | OUTPATIENT
Start: 2025-03-03 | End: 2025-03-03 | Stop reason: HOSPADM

## 2025-03-03 RX ORDER — NALOXONE HYDROCHLORIDE 0.4 MG/ML
0.4 INJECTION, SOLUTION INTRAMUSCULAR; INTRAVENOUS; SUBCUTANEOUS
Status: DISCONTINUED | OUTPATIENT
Start: 2025-03-03 | End: 2025-03-03 | Stop reason: HOSPADM

## 2025-03-03 RX ORDER — PROCHLORPERAZINE MALEATE 5 MG/1
5 TABLET ORAL EVERY 6 HOURS PRN
Status: DISCONTINUED | OUTPATIENT
Start: 2025-03-03 | End: 2025-03-03 | Stop reason: HOSPADM

## 2025-03-03 RX ORDER — LIDOCAINE HYDROCHLORIDE 20 MG/ML
INJECTION, SOLUTION INFILTRATION; PERINEURAL PRN
Status: DISCONTINUED | OUTPATIENT
Start: 2025-03-03 | End: 2025-03-03

## 2025-03-03 RX ORDER — PROPOFOL 10 MG/ML
INJECTION, EMULSION INTRAVENOUS PRN
Status: DISCONTINUED | OUTPATIENT
Start: 2025-03-03 | End: 2025-03-03

## 2025-03-03 RX ORDER — PROPOFOL 10 MG/ML
INJECTION, EMULSION INTRAVENOUS CONTINUOUS PRN
Status: DISCONTINUED | OUTPATIENT
Start: 2025-03-03 | End: 2025-03-03

## 2025-03-03 RX ORDER — ONDANSETRON 2 MG/ML
4 INJECTION INTRAMUSCULAR; INTRAVENOUS EVERY 6 HOURS PRN
Status: DISCONTINUED | OUTPATIENT
Start: 2025-03-03 | End: 2025-03-03 | Stop reason: HOSPADM

## 2025-03-03 RX ORDER — ONDANSETRON 4 MG/1
4 TABLET, ORALLY DISINTEGRATING ORAL EVERY 6 HOURS PRN
Status: DISCONTINUED | OUTPATIENT
Start: 2025-03-03 | End: 2025-03-03 | Stop reason: HOSPADM

## 2025-03-03 RX ADMIN — PROPOFOL 150 MCG/KG/MIN: 10 INJECTION, EMULSION INTRAVENOUS at 08:21

## 2025-03-03 RX ADMIN — PROPOFOL 80 MG: 10 INJECTION, EMULSION INTRAVENOUS at 08:21

## 2025-03-03 RX ADMIN — SODIUM CHLORIDE, SODIUM LACTATE, POTASSIUM CHLORIDE, AND CALCIUM CHLORIDE: .6; .31; .03; .02 INJECTION, SOLUTION INTRAVENOUS at 07:37

## 2025-03-03 RX ADMIN — LIDOCAINE HYDROCHLORIDE 50 MG: 20 INJECTION, SOLUTION INFILTRATION; PERINEURAL at 08:21

## 2025-03-03 ASSESSMENT — ACTIVITIES OF DAILY LIVING (ADL)
ADLS_ACUITY_SCORE: 58

## 2025-03-03 NOTE — ANESTHESIA PREPROCEDURE EVALUATION
Anesthesia Pre-Procedure Evaluation    Patient: Miguel Pappas   MRN: [id number] : 1955        Procedure : Procedure(s):  Colonoscopy          Past Medical History:   Diagnosis Date     * * * SBE PROPHYLAXIS * * *     no longer indicated -  AHA guidelnies     Mitral valve stenosis and aortic valve stenosis     2/6 murmur     Pure hypercholesterolemia 2007      Past Surgical History:   Procedure Laterality Date     COLONOSCOPY N/A 2014    Procedure: COLONOSCOPY;  Surgeon: Omar Chisholm MD;  Location:  GI     CV CORONARY ANGIOGRAM N/A 11/15/2022    Procedure: Coronary Angiogram;  Surgeon: Edson Nava MD;  Location:  [facility]ATH LAB     HC REMOVAL OF TONSILS,<13 Y/O      Tonsils <10y.o.     REPLACE VALVE AORTIC N/A 2022    Procedure: AORTIC VALVE REPLACEMENT WITH 21MM INSPIRIS RESILIA VALVE, PLACEMENT OF TEMPORARY VENTRICULAR AND ATRIAL PACING WIRES AND TRANSESPHOGEAL ECHOCARDIOGRAM;  Surgeon: Kaila Abbott MD;  Location:  OR     [facility] COLONOSCOPY W/WO BRUSH/WASH  2005       Allergies   Allergen Reactions     Ceftriaxone Cough     Lump in throat, coughing, starts midway through infusion. Started after being on ceftriaxone for 4 weeks     No Known Drug Allergy       Social History     Tobacco Use     Smoking status: Never     Passive exposure: Never     Smokeless tobacco: Never   Substance Use Topics     Alcohol use: Yes     Comment: 1-2 beer monthly  maybe      Wt Readings from Last 1 Encounters:   25 65.2 kg (143 lb 12.8 oz)        Anesthesia Evaluation   Pt has had prior anesthetic. Type: General.    No history of anesthetic complications       ROS/MED HX  ENT/Pulmonary:  - neg pulmonary ROS     Neurologic:  - neg neurologic ROS     Cardiovascular: Comment: Mitral valve stenosis and aortic valve stenosis - Aortic valve replacement     (+)  - -   -  - -                                 Previous cardiac testing   Echo: Date: 24  Results:  Interpretation Summary     Left ventricular systolic function is normal.  The visual ejection fraction is 60-65%.  The right ventricle is normal in structure, function and size.  No thrombus is detected in the left atrial appendage.  Intact atrial septum.  No valvular vegetations demonstrated.  s/p 21mm Resilia TAVR 12/2022, well seated with normal Doppler interrogation.  Trace valvular AI.  Normal size ascending aorta.  There is no pericardial effusion.  A contrast injection (Bubble Study) was performed that was negative for flow  across the interatrial septum.    Stress Test:  Date: Results:    ECG Reviewed:  Date: Results:    Cath:  Date: Results:      METS/Exercise Tolerance:     Hematologic:  - neg hematologic  ROS     Musculoskeletal:  - neg musculoskeletal ROS     GI/Hepatic:     (+)        bowel prep,            Renal/Genitourinary:  - neg Renal ROS     Endo:  - neg endo ROS     Psychiatric/Substance Use:  - neg psychiatric ROS     Infectious Disease:  - neg infectious disease ROS     Malignancy:  - neg malignancy ROS     Other:  - neg other ROS          Physical Exam    Airway        Mallampati: II   TM distance: > 3 FB   Neck ROM: full   Mouth opening: > 3 cm    Respiratory Devices and Support         Dental       (+) Minor Abnormalities - some fillings, tiny chips      Cardiovascular             Pulmonary               OUTSIDE LABS:  CBC:   Lab Results   Component Value Date    WBC 8.0 02/26/2025    WBC 7.9 10/02/2024    HGB 13.3 02/26/2025    HGB 12.9 (L) 10/02/2024    HCT 38.2 (L) 02/26/2025    HCT 38.2 (L) 10/02/2024     02/26/2025     10/02/2024     BMP:   Lab Results   Component Value Date     02/26/2025     12/10/2024    POTASSIUM 4.2 02/26/2025    POTASSIUM 4.5 12/10/2024    CHLORIDE 105 02/26/2025    CHLORIDE 99 12/10/2024    CO2 27 02/26/2025    CO2 31 (H) 12/10/2024    BUN 25.7 (H) 02/26/2025    BUN 20.4 12/10/2024    CR 0.86 02/26/2025    CR 0.88 12/10/2024  "   GLC 87 02/26/2025    GLC 89 12/10/2024     COAGS:   Lab Results   Component Value Date    PTT 31 03/14/2024    INR 1.08 03/14/2024    FIBR 193 12/16/2022     POC: No results found for: \"BGM\", \"HCG\", \"HCGS\"  HEPATIC:   Lab Results   Component Value Date    ALBUMIN 3.7 04/04/2024    PROTTOTAL 7.6 04/04/2024    ALT 37 05/01/2024    AST 36 05/01/2024    ALKPHOS 119 04/04/2024    BILITOTAL 0.2 04/04/2024    BILIDIRECT 0 01/08/2003     OTHER:   Lab Results   Component Value Date    PH 7.39 12/17/2022    LACT 1.5 03/16/2024    A1C 5.8 (H) 03/14/2024    RAMONA 9.6 02/26/2025    PHOS 3.2 12/21/2022    MAG 2.2 12/21/2022    TSH 2.84 03/14/2024       Anesthesia Plan    ASA Status:  2    NPO Status:  NPO Appropriate    Anesthesia Type: MAC.     - Reason for MAC: immobility needed   Induction: Propofol, Intravenous.   Maintenance: TIVA.        Consents    Anesthesia Plan(s) and associated risks, benefits, and realistic alternatives discussed. Questions answered and patient/representative(s) expressed understanding.     - Discussed: Risks, Benefits and Alternatives for BOTH SEDATION and the PROCEDURE were discussed     - Discussed with:  Patient      - Extended Intubation/Ventilatory Support Discussed: No.      - Patient is DNR/DNI Status: No     Use of blood products discussed: No .     Postoperative Care            Comments:    Other Comments: The risks and benefits of anesthesia, and the alternatives where applicable, have been discussed with the patient, and they wish to proceed.            BROOKE Kramer CRNA    I have reviewed the pertinent notes and labs in the chart from the past 30 days and (re)examined the patient.  Any updates or changes from those notes are reflected in this note.    Clinically Significant Risk Factors Present on Admission                                 # Financial/Environmental Concerns:             "

## 2025-03-03 NOTE — DISCHARGE INSTRUCTIONS
[facility]    Home Care Following Endoscopy          Activity:  You have just undergone an endoscopic procedure usually performed with conscious sedation.  Do not work or operate machinery (including a car) for at least 12 hours.    I encourage you to walk and attempt to pass this air as soon as possible.    Diet:  Return to the diet you were on before your procedure but eat lightly for the first 12-24 hours.  Drink plenty of water.  Resume any regular medications unless otherwise advised by your physician.  Please begin any new medication prescribed as a result of your procedure as directed by your physician.   Pain:  You may take Tylenol as needed for pain.  Expected Recovery:  You can expect some mild abdominal fullness and/or discomfort due to the air used to inflate your intestinal tract.    Call Your Physician if You Have:  After Colonoscopy:  Worsening persisting abdominal pain which is worse with activity.  Fevers (>101 degrees F), chills or shakes.  Passage of continued blood with bowel movements.     Any questions or concerns about your recovery, please call [phone] or after hours [phone] ([phone]) Nurse Advice Line.

## 2025-03-03 NOTE — H&P
"Pre-Endoscopy History and Physical     Miguel Pappas MRN# [id number]   YOB: 1955 Age: 69 year old     Date of Procedure: 3/3/2025  Primary care provider: Thony Gongora  Type of Endoscopy: colonoscopy  Type of Anesthesia Anticipated: MAC     HPI:    Miguel is a 69 year old male who was referred to me for colonoscopy.    Miguel is feeling well today. We discussed today's colonoscopy in the pre-op area.    Patient Active Problem List   Diagnosis    S/P AVR- bioprosthetic    Prophylactic antibiotic- 2gm Amox with Dental    Primary osteoarthritis of left hip    Endocarditis- suspected 2024    Bacteremia    Abnormal brain MRI- old lacunar infarct    History of paroxysmal supraventricular tachycardia- asymptomatic see cards notes          BP (!) 153/96   Pulse 68   Temp 97.6  F (36.4  C) (Temporal)   Resp 18   SpO2 96%    Estimated body mass index is 22.69 kg/m  as calculated from the following:    Height as of 2/26/25: 1.695 m (5' 6.75\").    Weight as of 2/26/25: 65.2 kg (143 lb 12.8 oz).    GENERAL APPEARANCE: alert and oriented and NAD  See anesthesia exam      Assessment/Plan   ASA Class 2 - Mild systemic disease    Plan for colonoscopy. No medical contraindications to proceed, or further work up needed. The risks and benefits of the procedure and the sedation options and risks were discussed with the patient. These include infection, bleeding, and small risk of colon perforation (1/1000 to 1/24135 depending on patient characteristics and type of procedure). Miguel was also explained to alternatives for colo-rectal screening. All questions were answered and informed consent was obtained.      Thank you kindly for the referral and opportunity to provide CRC screening      Signed Electronically by: Thony Gongora MD  March 3, 2025     "

## 2025-03-03 NOTE — ANESTHESIA POSTPROCEDURE EVALUATION
Patient: Miguel Pappas    Procedure: Procedure(s):  Colonoscopy       Anesthesia Type:  MAC    Note:  Disposition: Outpatient   Postop Pain Control: Uneventful            Sign Out: Well controlled pain   PONV: No   Neuro/Psych: Uneventful            Sign Out: Acceptable/Baseline neuro status   Airway/Respiratory: Uneventful            Sign Out: Acceptable/Baseline resp. status   CV/Hemodynamics: Uneventful            Sign Out: Acceptable CV status   Other NRE: NONE   DID A NON-ROUTINE EVENT OCCUR? No    Event details/Postop Comments:  Pt was happy with anesthesia care.  No complications.  I will follow up with the pt if needed.       Last vitals:  Vitals Value Taken Time   /90 03/03/25 0915   Temp     Pulse 56 03/03/25 0909   Resp 16 03/03/25 0900   SpO2 99 % 03/03/25 0850   Vitals shown include unfiled device data.    Electronically Signed By: BROOKE Kramer CRNA  March 3, 2025  9:33 AM

## 2025-03-03 NOTE — ANESTHESIA CARE TRANSFER NOTE
Patient: Miguel Pappas    Procedure: Procedure(s):  Colonoscopy       Diagnosis: Screen for colon cancer [Z12.11]  Diagnosis Additional Information: No value filed.    Anesthesia Type:   MAC     Note:    Oropharynx: oropharynx clear of all foreign objects and spontaneously breathing  Level of Consciousness: awake  Oxygen Supplementation: room air    Independent Airway: airway patency satisfactory and stable  Dentition: dentition unchanged  Vital Signs Stable: post-procedure vital signs reviewed and stable  Report to RN Given: handoff report given  Patient transferred to: Phase II    Handoff Report: Identifed the Patient, Identified the Reponsible Provider, Reviewed the pertinent medical history, Discussed the surgical course, Reviewed Intra-OP anesthesia mangement and issues during anesthesia, Set expectations for post-procedure period and Allowed opportunity for questions and acknowledgement of understanding      Vitals:  Vitals Value Taken Time   /90 03/03/25 0915   Temp     Pulse 56 03/03/25 0909   Resp 16 03/03/25 0900   SpO2 99 % 03/03/25 0850   Vitals shown include unfiled device data.    Electronically Signed By: BROOKE Kramer CRNA  March 3, 2025  9:32 AM

## 2025-03-05 NOTE — PROGRESS NOTES
Ortho Navigator Note      Pre-op Date 2/26/25     Medical Clearance  CLEARED    Cleared by Cardiology 7/23/24     Labs WNL     COVID Test Date No longer indicated      Skin  Intact      Activity: Ambulates independently      Equipment Need Patient will NOT likely need a walker for discharge. Defer to PT/OT for recs.       Meds to Hold Held all supplements 14 days prior to surgery  Hold Aspirin 7 days prior to surgery       * Medication recommendations are not intended to be exhaustive; they are limited to common medications that are potentially dangerous if incorrectly managed   NPO Instructions  Instructed to stop solids 8 hr prior to arrival and clears 2 hr prior to arrival.       Pre-op Joint Education Complete? Refused, has joint education book    Discharge Plan Patient has plan to discharge home on morning of POD 1.   Spouse Chayito and/or daughter Sarah will arrive at hospital at 0800 to participate in therapy and discharge education. They will then transport patient home    /Transportation Spouse Chayito will be  and transportation.  is physically able to care for patient.    Adult children will also be helping out.      Barriers to Discharge No barriers to discharge.      Additional Info/   Special Needs : -Implanted device - bioprosthetic aortic valve, managed by [facility], patient will bring information to hospital    -ZAHIRA - unknown     -No recent dental appts. Education provided on waiting 4-6 months for dental appts post op and will need an antibiotic.       Post Op PT TBD            03/05/25 1320   Discharge Planning   Patient/Family Anticipates Transition to home   Concerns to be Addressed denies needs/concerns at this time   Living Arrangements   People in Home spouse   Type of Residence Private Residence   Is your private residence a single family home or apartment? Single family home   Number of Stairs, Within Home, Primary three  (Also has a ramp to get into the house)   Stair  Railings, Within Home, Primary railings safe and in good condition   Once home, are you able to live on one level? No   Which level? Main Level   Bathroom Shower/Tub Walk-in shower   Support System   Support Systems Spouse/Significant Other   Do you have someone available to stay with you one or two nights once you are home? Yes   Medical Clearance   Date of Physical 02/26/25   [facility]   It is recommended that you call and check with any specialty providers before surgery to see if you need surgical clearance.  Do you see any specialty providers outside of your primary care provider? No   Blood   Known Bleeding Disorder or Coagulopathy No   Does the patient have any Gnosticism/cultural preferences related to blood products? No   Education   Has the patient scheduled or completed pre-op total joint education, either in class or online, in the last 12 months? No   Patient attended total joint pre-op class/received pre-op teaching  email/phone call   Falls Risk   Has the patient been identified as a high falls risk before surgery? (fallen in the last 6 months, history of falls, unsteady gait, or balance issues) No   Did an order get placed to attend outpatient pre-surgery balance evaluation? No   Relationship/Environment   Name(s) of People in Home Spouse Chayito

## 2025-03-06 ENCOUNTER — HOSPITAL ENCOUNTER (OUTPATIENT)
Dept: BONE DENSITY | Facility: CLINIC | Age: 70
Discharge: HOME | End: 2025-03-06
Attending: ORTHOPAEDIC SURGERY
Payer: COMMERCIAL

## 2025-03-06 DIAGNOSIS — M16.12 PRIMARY OSTEOARTHRITIS OF LEFT HIP: ICD-10-CM

## 2025-03-06 DIAGNOSIS — M81.8 OTHER OSTEOPOROSIS WITHOUT CURRENT PATHOLOGICAL FRACTURE: ICD-10-CM

## 2025-03-06 PROCEDURE — 77080 DXA BONE DENSITY AXIAL: CPT

## 2025-03-06 ASSESSMENT — HOOS JR
RISING FROM SITTING: MODERATE
BENDING TO THE FLOOR TO PICK UP OBJECT: MODERATE
WALKING ON UNEVEN SURFACE: MODERATE
LYING IN BED (TURNING OVER, MAINTAINING HIP POSITION): MODERATE
SITTING: MODERATE
HOOS JR TOTAL INTERVAL SCORE: 52.97
GOING UP OR DOWN STAIRS: MODERATE

## 2025-03-10 ENCOUNTER — HOSPITAL ENCOUNTER (OUTPATIENT)
Dept: CARDIOLOGY | Facility: CLINIC | Age: 70
Discharge: HOME | End: 2025-03-10
Attending: PHYSICIAN ASSISTANT | Admitting: PHYSICIAN ASSISTANT
Payer: COMMERCIAL

## 2025-03-10 DIAGNOSIS — Z95.2 S/P AVR: ICD-10-CM

## 2025-03-10 LAB — LVEF ECHO: NORMAL

## 2025-03-10 PROCEDURE — 93306 TTE W/DOPPLER COMPLETE: CPT | Mod: 26 | Performed by: INTERNAL MEDICINE

## 2025-03-10 PROCEDURE — 93306 TTE W/DOPPLER COMPLETE: CPT

## 2025-03-11 ENCOUNTER — OFFICE VISIT (OUTPATIENT)
Dept: ORTHOPEDICS | Facility: CLINIC | Age: 70
End: 2025-03-11
Payer: COMMERCIAL

## 2025-03-11 VITALS — BODY MASS INDEX: 22.44 KG/M2 | HEIGHT: 67 IN | WEIGHT: 143 LBS | TEMPERATURE: 96.9 F

## 2025-03-11 DIAGNOSIS — M16.12 PRIMARY OSTEOARTHRITIS OF LEFT HIP: Primary | ICD-10-CM

## 2025-03-11 DIAGNOSIS — R79.89 OTHER SPECIFIED ABNORMAL FINDINGS OF BLOOD CHEMISTRY: ICD-10-CM

## 2025-03-11 LAB
EST. AVERAGE GLUCOSE BLD GHB EST-MCNC: 114 MG/DL
HBA1C MFR BLD: 5.6 %

## 2025-03-11 PROCEDURE — 36415 COLL VENOUS BLD VENIPUNCTURE: CPT | Performed by: PHYSICIAN ASSISTANT

## 2025-03-11 PROCEDURE — 83036 HEMOGLOBIN GLYCOSYLATED A1C: CPT | Performed by: PHYSICIAN ASSISTANT

## 2025-03-11 PROCEDURE — 1125F AMNT PAIN NOTED PAIN PRSNT: CPT | Performed by: PHYSICIAN ASSISTANT

## 2025-03-11 PROCEDURE — 99207 PR PREOP VISIT IN GLOBAL PKG: CPT | Performed by: PHYSICIAN ASSISTANT

## 2025-03-11 ASSESSMENT — PAIN SCALES - GENERAL: PAINLEVEL_OUTOF10: MILD PAIN (3)

## 2025-03-11 NOTE — PATIENT INSTRUCTIONS
Encounter Diagnosis   Name Primary?    Primary osteoarthritis of left hip Yes     Rest, ice and elevate above heart level as needed for pain control  Surgery: Left total hip arthroplasty on 3/18/2025 with Dr. Campbell  Labs: Within normal limits  H&P: Cleared by Dr. Gongora on 2/26/2025.  Also cleared by cardiology 7/23/2024.  Echocardiogram done yesterday also which was normal and was just a routine check of the aortic valve.  Physical Therapy: Outpatient physical therapy is set up to start on 3/24/2025  DVT prophylaxis: Asprin 81mg bid x 6 weeks.   Medication: We will plan on using Tylenol, oxycodone, Celebrex, Lyrica  Dispo: Plan will be to discharge to home postop day 1 with outpatient physical therapy.   is spouse Chayito.  Sarah daughter will possibly help also.  Follow up: Follow-up with Levi Rios PA-C on 3/31/2025.    [employer] and [employer] may offer reliable information regarding your diagnosis and treatment plan.    THANK YOU for coming in today. If you receive a survey via [employer] or mail please let us know if there was anything you especially appreciated today or if there is any way we can improve our clinic. We appreciate your input.    GENERAL INFORMATION:  Bone and Joint Service Line for any issues or concerns: [phone]      I am not in the office Thursdays. Therefore non- urgent calls and medical messages received on Thursday will be addressed when we are back in the office on Wednesday. Urgent matters will be reviewed and addressed by one of our partners in the office as needed.    If lab work was done today as part of your evaluation you will generally be contacted via [employer], mail, or phone with the results within 1-5 days. If there is an alarming result we will contact you by phone. Lab results come back at varying times, I generally wait until all labs are resulted before making comments on results. Please note labs are automatically released to [employer] (if you have signed up  for it) once available-at times you may see these prior to my having a chance to review them as well.    If you need refills please contact your pharmacist. They will send a refill request to me to review. Please allow 3 business days for us to process all refill requests. All narcotic refills should be handled in the clinic at the time of your visit.

## 2025-03-11 NOTE — LETTER
3/11/2025      Miguel Pappas  [address] [facility] [id number]      Dear Colleague,    Thank you for referring your patient, Miguel Pappas, to the [facility]. Please see a copy of my visit note below.    One Week Prior to Total Joint    Surgery: Left total hip arthroplasty on 3/18/2025 with Dr. Campbell  Labs: Within normal limits  H&P: Cleared by Dr. Gongora on 2/26/2025.  Also cleared by cardiology 7/23/2024.  Echocardiogram done yesterday also which was normal and was just a routine check of the aortic valve.  Physical Therapy: Outpatient physical therapy is set up to start on 3/24/2025  DVT prophylaxis: Asprin 81mg bid x 6 weeks.   Medication: We will plan on using Tylenol, oxycodone, Celebrex, Lyrica  Dispo: Plan will be to discharge to home postop day 1 with outpatient physical therapy.   is spouse Chayito.  Sarah daughter will possibly help also.  Follow up: Follow-up with Levi Rios PA-C on 3/31/2025.      This note was dictated with [employer].    Levi Rios PA-C        Again, thank you for allowing me to participate in the care of your patient.        Sincerely,        Levi Rios PA-C    Electronically signed

## 2025-03-11 NOTE — PROGRESS NOTES
One Week Prior to Total Joint    Surgery: Left total hip arthroplasty on 3/18/2025 with Dr. Campbell  Labs: Within normal limits  H&P: Cleared by Dr. Gongora on 2/26/2025.  Also cleared by cardiology 7/23/2024.  Echocardiogram done yesterday also which was normal and was just a routine check of the aortic valve.  Physical Therapy: Outpatient physical therapy is set up to start on 3/24/2025  DVT prophylaxis: Asprin 81mg bid x 6 weeks.   Medication: We will plan on using Tylenol, oxycodone, Celebrex, Lyrica  Dispo: Plan will be to discharge to home postop day 1 with outpatient physical therapy.   is spouse Chayito.  Sarah daughter will possibly help also.  Follow up: Follow-up with Levi Rios PA-C on 3/31/2025.      This note was dictated with [employer].    Levi Rios PA-C

## 2025-03-18 ENCOUNTER — ANESTHESIA EVENT (OUTPATIENT)
Dept: SURGERY | Facility: CLINIC | Age: 70
End: 2025-03-18
Payer: COMMERCIAL

## 2025-03-18 ENCOUNTER — HOSPITAL ENCOUNTER (OUTPATIENT)
Facility: CLINIC | Age: 70
Discharge: HOME | End: 2025-03-19
Attending: ORTHOPAEDIC SURGERY | Admitting: ORTHOPAEDIC SURGERY
Payer: COMMERCIAL

## 2025-03-18 ENCOUNTER — APPOINTMENT (OUTPATIENT)
Dept: GENERAL RADIOLOGY | Facility: CLINIC | Age: 70
End: 2025-03-18
Attending: NURSE PRACTITIONER
Payer: COMMERCIAL

## 2025-03-18 ENCOUNTER — APPOINTMENT (OUTPATIENT)
Dept: GENERAL RADIOLOGY | Facility: CLINIC | Age: 70
End: 2025-03-18
Attending: ORTHOPAEDIC SURGERY
Payer: COMMERCIAL

## 2025-03-18 ENCOUNTER — ANESTHESIA (OUTPATIENT)
Dept: SURGERY | Facility: CLINIC | Age: 70
End: 2025-03-18
Payer: COMMERCIAL

## 2025-03-18 DIAGNOSIS — M16.12 PRIMARY OSTEOARTHRITIS OF LEFT HIP: ICD-10-CM

## 2025-03-18 DIAGNOSIS — Z00.00 ROUTINE GENERAL MEDICAL EXAMINATION AT A HEALTH CARE FACILITY: ICD-10-CM

## 2025-03-18 DIAGNOSIS — Z96.642 S/P TOTAL LEFT HIP ARTHROPLASTY: Primary | ICD-10-CM

## 2025-03-18 PROBLEM — I95.9 HYPOTENSION, UNSPECIFIED HYPOTENSION TYPE: Status: ACTIVE | Noted: 2025-03-18

## 2025-03-18 PROBLEM — Z87.898 HISTORY OF BACTEREMIA: Status: ACTIVE | Noted: 2025-03-18

## 2025-03-18 PROBLEM — Z86.73 HISTORY OF CVA (CEREBROVASCULAR ACCIDENT): Status: ACTIVE | Noted: 2025-03-18

## 2025-03-18 PROBLEM — Z95.2 S/P AVR: Status: ACTIVE | Noted: 2022-12-16

## 2025-03-18 LAB — GLUCOSE BLDC GLUCOMTR-MCNC: 80 MG/DL (ref 70–99)

## 2025-03-18 PROCEDURE — 250N000009 HC RX 250: Performed by: ORTHOPAEDIC SURGERY

## 2025-03-18 PROCEDURE — 258N000001 HC RX 258: Performed by: ORTHOPAEDIC SURGERY

## 2025-03-18 PROCEDURE — 710N000010 HC RECOVERY PHASE 1, LEVEL 2, PER MIN: Performed by: ORTHOPAEDIC SURGERY

## 2025-03-18 PROCEDURE — C1713 ANCHOR/SCREW BN/BN,TIS/BN: HCPCS | Performed by: ORTHOPAEDIC SURGERY

## 2025-03-18 PROCEDURE — 250N000013 HC RX MED GY IP 250 OP 250 PS 637: Performed by: ORTHOPAEDIC SURGERY

## 2025-03-18 PROCEDURE — 97162 PT EVAL MOD COMPLEX 30 MIN: CPT | Mod: GP | Performed by: PHYSICAL THERAPIST

## 2025-03-18 PROCEDURE — 250N000011 HC RX IP 250 OP 636: Performed by: NURSE ANESTHETIST, CERTIFIED REGISTERED

## 2025-03-18 PROCEDURE — 370N000017 HC ANESTHESIA TECHNICAL FEE, PER MIN: Performed by: ORTHOPAEDIC SURGERY

## 2025-03-18 PROCEDURE — C1776 JOINT DEVICE (IMPLANTABLE): HCPCS | Performed by: ORTHOPAEDIC SURGERY

## 2025-03-18 PROCEDURE — 999N000179 XR SURGERY CARM FLUORO LESS THAN 5 MIN W STILLS

## 2025-03-18 PROCEDURE — 27130 TOTAL HIP ARTHROPLASTY: CPT | Mod: LT | Performed by: ORTHOPAEDIC SURGERY

## 2025-03-18 PROCEDURE — 99222 1ST HOSP IP/OBS MODERATE 55: CPT | Performed by: FAMILY MEDICINE

## 2025-03-18 PROCEDURE — 272N000001 HC OR GENERAL SUPPLY STERILE: Performed by: ORTHOPAEDIC SURGERY

## 2025-03-18 PROCEDURE — 250N000011 HC RX IP 250 OP 636: Performed by: ORTHOPAEDIC SURGERY

## 2025-03-18 PROCEDURE — 250N000009 HC RX 250: Performed by: NURSE ANESTHETIST, CERTIFIED REGISTERED

## 2025-03-18 PROCEDURE — 999N000063 XR PELVIS AND HIP PORTABLE LEFT 1 VIEW

## 2025-03-18 PROCEDURE — 360N000084 HC SURGERY LEVEL 4 W/ FLUORO, PER MIN: Performed by: ORTHOPAEDIC SURGERY

## 2025-03-18 PROCEDURE — 258N000003 HC RX IP 258 OP 636: Performed by: NURSE ANESTHETIST, CERTIFIED REGISTERED

## 2025-03-18 PROCEDURE — 250N000011 HC RX IP 250 OP 636: Performed by: NURSE PRACTITIONER

## 2025-03-18 PROCEDURE — 97110 THERAPEUTIC EXERCISES: CPT | Mod: GP | Performed by: PHYSICAL THERAPIST

## 2025-03-18 PROCEDURE — 250N000013 HC RX MED GY IP 250 OP 250 PS 637: Performed by: NURSE PRACTITIONER

## 2025-03-18 PROCEDURE — 999N000141 HC STATISTIC PRE-PROCEDURE NURSING ASSESSMENT: Performed by: ORTHOPAEDIC SURGERY

## 2025-03-18 PROCEDURE — 97530 THERAPEUTIC ACTIVITIES: CPT | Mod: GP | Performed by: PHYSICAL THERAPIST

## 2025-03-18 PROCEDURE — 258N000003 HC RX IP 258 OP 636: Performed by: FAMILY MEDICINE

## 2025-03-18 DEVICE — IMP HEAD FEMORAL DEPUY 36MM +1.5 1365-51-000: Type: IMPLANTABLE DEVICE | Site: HIP | Status: FUNCTIONAL

## 2025-03-18 DEVICE — LINER ACETABULAR ALTRX NEUTRAL 36X56MM: Type: IMPLANTABLE DEVICE | Site: HIP | Status: FUNCTIONAL

## 2025-03-18 DEVICE — IMP STEM FEM HIP DEPUY CORAIL TPR SZ 12 STD OFFSET 3L92502: Type: IMPLANTABLE DEVICE | Site: HIP | Status: FUNCTIONAL

## 2025-03-18 DEVICE — IMP CUP ACE PINNACLE 56MM 1217-22-056: Type: IMPLANTABLE DEVICE | Site: HIP | Status: FUNCTIONAL

## 2025-03-18 DEVICE — IMP APEX HOLE ELIMINATOR HIP DEPUY DURALOC 1246-03-000: Type: IMPLANTABLE DEVICE | Site: HIP | Status: FUNCTIONAL

## 2025-03-18 RX ORDER — ONDANSETRON 2 MG/ML
4 INJECTION INTRAMUSCULAR; INTRAVENOUS EVERY 30 MIN PRN
Status: DISCONTINUED | OUTPATIENT
Start: 2025-03-18 | End: 2025-03-18 | Stop reason: HOSPADM

## 2025-03-18 RX ORDER — POLYETHYLENE GLYCOL 3350 17 G/17G
17 POWDER, FOR SOLUTION ORAL DAILY
Status: DISCONTINUED | OUTPATIENT
Start: 2025-03-19 | End: 2025-03-19 | Stop reason: HOSPADM

## 2025-03-18 RX ORDER — AMOXICILLIN 250 MG
1-2 CAPSULE ORAL 2 TIMES DAILY
Qty: 30 TABLET | Refills: 0 | Status: SHIPPED | OUTPATIENT
Start: 2025-03-18

## 2025-03-18 RX ORDER — PREGABALIN 75 MG/1
75 CAPSULE ORAL 2 TIMES DAILY
Qty: 28 CAPSULE | Refills: 0 | Status: SHIPPED | OUTPATIENT
Start: 2025-03-18 | End: 2025-04-01

## 2025-03-18 RX ORDER — CALCIUM CARBONATE 500 MG/1
500 TABLET, CHEWABLE ORAL 4 TIMES DAILY PRN
Status: DISCONTINUED | OUTPATIENT
Start: 2025-03-18 | End: 2025-03-19 | Stop reason: HOSPADM

## 2025-03-18 RX ORDER — CEFAZOLIN SODIUM/WATER 2 G/20 ML
2 SYRINGE (ML) INTRAVENOUS SEE ADMIN INSTRUCTIONS
Status: DISCONTINUED | OUTPATIENT
Start: 2025-03-18 | End: 2025-03-18

## 2025-03-18 RX ORDER — ACETAMINOPHEN 325 MG/1
975 TABLET ORAL EVERY 8 HOURS PRN
Qty: 100 TABLET | Refills: 0 | Status: SHIPPED | OUTPATIENT
Start: 2025-03-18

## 2025-03-18 RX ORDER — PREGABALIN 25 MG/1
75 CAPSULE ORAL 2 TIMES DAILY
Status: DISCONTINUED | OUTPATIENT
Start: 2025-03-18 | End: 2025-03-19 | Stop reason: HOSPADM

## 2025-03-18 RX ORDER — LIDOCAINE HYDROCHLORIDE 20 MG/ML
INJECTION, SOLUTION INFILTRATION; PERINEURAL PRN
Status: DISCONTINUED | OUTPATIENT
Start: 2025-03-18 | End: 2025-03-18

## 2025-03-18 RX ORDER — SODIUM CHLORIDE, SODIUM LACTATE, POTASSIUM CHLORIDE, CALCIUM CHLORIDE 600; 310; 30; 20 MG/100ML; MG/100ML; MG/100ML; MG/100ML
INJECTION, SOLUTION INTRAVENOUS CONTINUOUS
Status: DISCONTINUED | OUTPATIENT
Start: 2025-03-18 | End: 2025-03-18 | Stop reason: HOSPADM

## 2025-03-18 RX ORDER — HYDROXYZINE HYDROCHLORIDE 10 MG/1
10 TABLET, FILM COATED ORAL EVERY 6 HOURS PRN
Status: DISCONTINUED | OUTPATIENT
Start: 2025-03-18 | End: 2025-03-18 | Stop reason: HOSPADM

## 2025-03-18 RX ORDER — FAMOTIDINE 20 MG/1
20 TABLET, FILM COATED ORAL 2 TIMES DAILY
Status: DISCONTINUED | OUTPATIENT
Start: 2025-03-18 | End: 2025-03-19 | Stop reason: HOSPADM

## 2025-03-18 RX ORDER — METOPROLOL TARTRATE 1 MG/ML
1-2 INJECTION, SOLUTION INTRAVENOUS EVERY 5 MIN PRN
Status: DISCONTINUED | OUTPATIENT
Start: 2025-03-18 | End: 2025-03-18 | Stop reason: HOSPADM

## 2025-03-18 RX ORDER — ONDANSETRON 2 MG/ML
INJECTION INTRAMUSCULAR; INTRAVENOUS PRN
Status: DISCONTINUED | OUTPATIENT
Start: 2025-03-18 | End: 2025-03-18

## 2025-03-18 RX ORDER — OXYCODONE HYDROCHLORIDE 5 MG/1
10 TABLET ORAL EVERY 4 HOURS PRN
Status: DISCONTINUED | OUTPATIENT
Start: 2025-03-18 | End: 2025-03-19 | Stop reason: HOSPADM

## 2025-03-18 RX ORDER — BUPIVACAINE HYDROCHLORIDE 2.5 MG/ML
INJECTION, SOLUTION INFILTRATION; PERINEURAL PRN
Status: DISCONTINUED | OUTPATIENT
Start: 2025-03-18 | End: 2025-03-18 | Stop reason: HOSPADM

## 2025-03-18 RX ORDER — HYDROMORPHONE HCL IN WATER/PF 6 MG/30 ML
0.2 PATIENT CONTROLLED ANALGESIA SYRINGE INTRAVENOUS
Status: DISCONTINUED | OUTPATIENT
Start: 2025-03-18 | End: 2025-03-19 | Stop reason: HOSPADM

## 2025-03-18 RX ORDER — LIDOCAINE 40 MG/G
CREAM TOPICAL
Status: DISCONTINUED | OUTPATIENT
Start: 2025-03-18 | End: 2025-03-19 | Stop reason: HOSPADM

## 2025-03-18 RX ORDER — HYDROMORPHONE HYDROCHLORIDE 1 MG/ML
0.4 INJECTION, SOLUTION INTRAMUSCULAR; INTRAVENOUS; SUBCUTANEOUS EVERY 5 MIN PRN
Status: DISCONTINUED | OUTPATIENT
Start: 2025-03-18 | End: 2025-03-18 | Stop reason: HOSPADM

## 2025-03-18 RX ORDER — PROCHLORPERAZINE MALEATE 5 MG/1
5 TABLET ORAL EVERY 6 HOURS PRN
Status: DISCONTINUED | OUTPATIENT
Start: 2025-03-18 | End: 2025-03-19 | Stop reason: HOSPADM

## 2025-03-18 RX ORDER — MEPERIDINE HYDROCHLORIDE 25 MG/ML
12.5 INJECTION INTRAMUSCULAR; INTRAVENOUS; SUBCUTANEOUS EVERY 5 MIN PRN
Status: DISCONTINUED | OUTPATIENT
Start: 2025-03-18 | End: 2025-03-18 | Stop reason: HOSPADM

## 2025-03-18 RX ORDER — AMOXICILLIN 250 MG
1 CAPSULE ORAL 2 TIMES DAILY
Status: DISCONTINUED | OUTPATIENT
Start: 2025-03-18 | End: 2025-03-19 | Stop reason: HOSPADM

## 2025-03-18 RX ORDER — FENTANYL CITRATE 50 UG/ML
INJECTION, SOLUTION INTRAMUSCULAR; INTRAVENOUS
Status: COMPLETED | OUTPATIENT
Start: 2025-03-18 | End: 2025-03-18

## 2025-03-18 RX ORDER — NALOXONE HYDROCHLORIDE 0.4 MG/ML
0.1 INJECTION, SOLUTION INTRAMUSCULAR; INTRAVENOUS; SUBCUTANEOUS
Status: DISCONTINUED | OUTPATIENT
Start: 2025-03-18 | End: 2025-03-18 | Stop reason: HOSPADM

## 2025-03-18 RX ORDER — OXYCODONE HYDROCHLORIDE 5 MG/1
5-10 TABLET ORAL EVERY 4 HOURS PRN
Qty: 30 TABLET | Refills: 0 | Status: SHIPPED | OUTPATIENT
Start: 2025-03-18

## 2025-03-18 RX ORDER — SODIUM CHLORIDE, SODIUM LACTATE, POTASSIUM CHLORIDE, CALCIUM CHLORIDE 600; 310; 30; 20 MG/100ML; MG/100ML; MG/100ML; MG/100ML
INJECTION, SOLUTION INTRAVENOUS CONTINUOUS
Status: DISCONTINUED | OUTPATIENT
Start: 2025-03-18 | End: 2025-03-19 | Stop reason: HOSPADM

## 2025-03-18 RX ORDER — NALOXONE HYDROCHLORIDE 0.4 MG/ML
0.2 INJECTION, SOLUTION INTRAMUSCULAR; INTRAVENOUS; SUBCUTANEOUS
Status: DISCONTINUED | OUTPATIENT
Start: 2025-03-18 | End: 2025-03-19 | Stop reason: HOSPADM

## 2025-03-18 RX ORDER — CEFAZOLIN SODIUM 1 G/3ML
1 INJECTION, POWDER, FOR SOLUTION INTRAMUSCULAR; INTRAVENOUS EVERY 8 HOURS
Status: COMPLETED | OUTPATIENT
Start: 2025-03-18 | End: 2025-03-19

## 2025-03-18 RX ORDER — CEFAZOLIN SODIUM/WATER 2 G/20 ML
2 SYRINGE (ML) INTRAVENOUS
Status: COMPLETED | OUTPATIENT
Start: 2025-03-18 | End: 2025-03-18

## 2025-03-18 RX ORDER — FENTANYL CITRATE 50 UG/ML
50 INJECTION, SOLUTION INTRAMUSCULAR; INTRAVENOUS EVERY 5 MIN PRN
Status: DISCONTINUED | OUTPATIENT
Start: 2025-03-18 | End: 2025-03-18 | Stop reason: HOSPADM

## 2025-03-18 RX ORDER — ALBUTEROL SULFATE 0.83 MG/ML
2.5 SOLUTION RESPIRATORY (INHALATION) EVERY 4 HOURS PRN
Status: DISCONTINUED | OUTPATIENT
Start: 2025-03-18 | End: 2025-03-18 | Stop reason: HOSPADM

## 2025-03-18 RX ORDER — ONDANSETRON 4 MG/1
4 TABLET, ORALLY DISINTEGRATING ORAL EVERY 30 MIN PRN
Status: DISCONTINUED | OUTPATIENT
Start: 2025-03-18 | End: 2025-03-18 | Stop reason: HOSPADM

## 2025-03-18 RX ORDER — CELECOXIB 100 MG/1
200 CAPSULE ORAL DAILY
Status: DISCONTINUED | OUTPATIENT
Start: 2025-03-19 | End: 2025-03-18 | Stop reason: SINTOL

## 2025-03-18 RX ORDER — SODIUM CHLORIDE, SODIUM LACTATE, POTASSIUM CHLORIDE, CALCIUM CHLORIDE 600; 310; 30; 20 MG/100ML; MG/100ML; MG/100ML; MG/100ML
INJECTION, SOLUTION INTRAVENOUS CONTINUOUS
Status: DISCONTINUED | OUTPATIENT
Start: 2025-03-18 | End: 2025-03-18

## 2025-03-18 RX ORDER — TRANEXAMIC ACID 650 MG/1
1950 TABLET ORAL ONCE
Status: COMPLETED | OUTPATIENT
Start: 2025-03-18 | End: 2025-03-18

## 2025-03-18 RX ORDER — FENTANYL CITRATE-0.9 % NACL/PF 10 MCG/ML
PLASTIC BAG, INJECTION (ML) INTRAVENOUS CONTINUOUS PRN
Status: DISCONTINUED | OUTPATIENT
Start: 2025-03-18 | End: 2025-03-18

## 2025-03-18 RX ORDER — DEXAMETHASONE SODIUM PHOSPHATE 10 MG/ML
4 INJECTION, SOLUTION INTRAMUSCULAR; INTRAVENOUS
Status: DISCONTINUED | OUTPATIENT
Start: 2025-03-18 | End: 2025-03-18 | Stop reason: HOSPADM

## 2025-03-18 RX ORDER — NALOXONE HYDROCHLORIDE 0.4 MG/ML
0.4 INJECTION, SOLUTION INTRAMUSCULAR; INTRAVENOUS; SUBCUTANEOUS
Status: DISCONTINUED | OUTPATIENT
Start: 2025-03-18 | End: 2025-03-19 | Stop reason: HOSPADM

## 2025-03-18 RX ORDER — BISACODYL 10 MG
10 SUPPOSITORY, RECTAL RECTAL DAILY PRN
Status: DISCONTINUED | OUTPATIENT
Start: 2025-03-18 | End: 2025-03-19 | Stop reason: HOSPADM

## 2025-03-18 RX ORDER — OXYCODONE HYDROCHLORIDE 5 MG/1
5 TABLET ORAL EVERY 4 HOURS PRN
Status: DISCONTINUED | OUTPATIENT
Start: 2025-03-18 | End: 2025-03-19 | Stop reason: HOSPADM

## 2025-03-18 RX ORDER — ONDANSETRON 4 MG/1
4 TABLET, ORALLY DISINTEGRATING ORAL EVERY 6 HOURS PRN
Status: DISCONTINUED | OUTPATIENT
Start: 2025-03-18 | End: 2025-03-19 | Stop reason: HOSPADM

## 2025-03-18 RX ORDER — ACETAMINOPHEN 325 MG/1
975 TABLET ORAL EVERY 8 HOURS
Status: DISCONTINUED | OUTPATIENT
Start: 2025-03-18 | End: 2025-03-19 | Stop reason: HOSPADM

## 2025-03-18 RX ORDER — BUPIVACAINE HYDROCHLORIDE 7.5 MG/ML
INJECTION, SOLUTION INTRASPINAL
Status: COMPLETED | OUTPATIENT
Start: 2025-03-18 | End: 2025-03-18

## 2025-03-18 RX ORDER — HYDRALAZINE HYDROCHLORIDE 20 MG/ML
2.5-5 INJECTION INTRAMUSCULAR; INTRAVENOUS EVERY 10 MIN PRN
Status: DISCONTINUED | OUTPATIENT
Start: 2025-03-18 | End: 2025-03-18 | Stop reason: HOSPADM

## 2025-03-18 RX ORDER — DEXAMETHASONE SODIUM PHOSPHATE 10 MG/ML
INJECTION, SOLUTION INTRAMUSCULAR; INTRAVENOUS PRN
Status: DISCONTINUED | OUTPATIENT
Start: 2025-03-18 | End: 2025-03-18

## 2025-03-18 RX ORDER — HYDROMORPHONE HCL IN WATER/PF 6 MG/30 ML
0.4 PATIENT CONTROLLED ANALGESIA SYRINGE INTRAVENOUS
Status: DISCONTINUED | OUTPATIENT
Start: 2025-03-18 | End: 2025-03-19 | Stop reason: HOSPADM

## 2025-03-18 RX ORDER — FERROUS SULFATE 325(65) MG
325 TABLET ORAL
Status: DISCONTINUED | OUTPATIENT
Start: 2025-03-19 | End: 2025-03-19 | Stop reason: HOSPADM

## 2025-03-18 RX ORDER — PROPOFOL 10 MG/ML
INJECTION, EMULSION INTRAVENOUS CONTINUOUS PRN
Status: DISCONTINUED | OUTPATIENT
Start: 2025-03-18 | End: 2025-03-18

## 2025-03-18 RX ORDER — PROPOFOL 10 MG/ML
INJECTION, EMULSION INTRAVENOUS PRN
Status: DISCONTINUED | OUTPATIENT
Start: 2025-03-18 | End: 2025-03-18

## 2025-03-18 RX ORDER — ONDANSETRON 2 MG/ML
4 INJECTION INTRAMUSCULAR; INTRAVENOUS EVERY 6 HOURS PRN
Status: DISCONTINUED | OUTPATIENT
Start: 2025-03-18 | End: 2025-03-19 | Stop reason: HOSPADM

## 2025-03-18 RX ORDER — ASPIRIN 81 MG/1
81 TABLET ORAL DAILY
COMMUNITY
Start: 2025-04-15

## 2025-03-18 RX ADMIN — Medication 50 MCG/MIN: at 08:00

## 2025-03-18 RX ADMIN — BUPIVACAINE HYDROCHLORIDE IN DEXTROSE 2 ML: 7.5 INJECTION, SOLUTION SUBARACHNOID at 07:59

## 2025-03-18 RX ADMIN — PREGABALIN 75 MG: 25 CAPSULE ORAL at 21:14

## 2025-03-18 RX ADMIN — Medication 2 G: at 10:03

## 2025-03-18 RX ADMIN — ACETAMINOPHEN 975 MG: 325 TABLET ORAL at 14:30

## 2025-03-18 RX ADMIN — SODIUM CHLORIDE, SODIUM LACTATE, POTASSIUM CHLORIDE, AND CALCIUM CHLORIDE: .6; .31; .03; .02 INJECTION, SOLUTION INTRAVENOUS at 13:56

## 2025-03-18 RX ADMIN — PROPOFOL 30 MG: 10 INJECTION, EMULSION INTRAVENOUS at 07:52

## 2025-03-18 RX ADMIN — ACETAMINOPHEN 975 MG: 325 TABLET ORAL at 21:14

## 2025-03-18 RX ADMIN — SODIUM CHLORIDE, SODIUM LACTATE, POTASSIUM CHLORIDE, AND CALCIUM CHLORIDE: .6; .31; .03; .02 INJECTION, SOLUTION INTRAVENOUS at 06:58

## 2025-03-18 RX ADMIN — SODIUM CHLORIDE, SODIUM LACTATE, POTASSIUM CHLORIDE, AND CALCIUM CHLORIDE 250 ML: .6; .31; .03; .02 INJECTION, SOLUTION INTRAVENOUS at 13:59

## 2025-03-18 RX ADMIN — FENTANYL CITRATE 15 MCG: 50 INJECTION INTRAMUSCULAR; INTRAVENOUS at 07:59

## 2025-03-18 RX ADMIN — TRANEXAMIC ACID 1950 MG: 650 TABLET ORAL at 06:24

## 2025-03-18 RX ADMIN — DEXAMETHASONE SODIUM PHOSPHATE 4 MG: 10 INJECTION, SOLUTION INTRAMUSCULAR; INTRAVENOUS at 08:15

## 2025-03-18 RX ADMIN — Medication 2 G: at 07:47

## 2025-03-18 RX ADMIN — ONDANSETRON 4 MG: 2 INJECTION INTRAMUSCULAR; INTRAVENOUS at 10:53

## 2025-03-18 RX ADMIN — MIDAZOLAM 1 MG: 1 INJECTION INTRAMUSCULAR; INTRAVENOUS at 07:44

## 2025-03-18 RX ADMIN — FAMOTIDINE 20 MG: 20 TABLET, FILM COATED ORAL at 21:15

## 2025-03-18 RX ADMIN — SODIUM CHLORIDE, SODIUM LACTATE, POTASSIUM CHLORIDE, AND CALCIUM CHLORIDE: .6; .31; .03; .02 INJECTION, SOLUTION INTRAVENOUS at 22:39

## 2025-03-18 RX ADMIN — PROPOFOL 150 MCG/KG/MIN: 10 INJECTION, EMULSION INTRAVENOUS at 07:52

## 2025-03-18 RX ADMIN — CEFAZOLIN 1 G: 1 INJECTION, POWDER, FOR SOLUTION INTRAMUSCULAR; INTRAVENOUS at 17:49

## 2025-03-18 RX ADMIN — OXYCODONE HYDROCHLORIDE 5 MG: 5 TABLET ORAL at 17:48

## 2025-03-18 RX ADMIN — LIDOCAINE HYDROCHLORIDE 50 MG: 20 INJECTION, SOLUTION INFILTRATION; PERINEURAL at 07:49

## 2025-03-18 ASSESSMENT — ACTIVITIES OF DAILY LIVING (ADL)
ADLS_ACUITY_SCORE: 37
ADLS_ACUITY_SCORE: 38
ADLS_ACUITY_SCORE: 38
ADLS_ACUITY_SCORE: 39
ADLS_ACUITY_SCORE: 39
ADLS_ACUITY_SCORE: 38
ADLS_ACUITY_SCORE: 37
ADLS_ACUITY_SCORE: 37
ADLS_ACUITY_SCORE: 38
ADLS_ACUITY_SCORE: 37
ADLS_ACUITY_SCORE: 39
ADLS_ACUITY_SCORE: 37
ADLS_ACUITY_SCORE: 37
ADLS_ACUITY_SCORE: 39
ADLS_ACUITY_SCORE: 39

## 2025-03-18 ASSESSMENT — LIFESTYLE VARIABLES: TOBACCO_USE: 0

## 2025-03-18 ASSESSMENT — ENCOUNTER SYMPTOMS: DYSRHYTHMIAS: 1

## 2025-03-18 NOTE — OR NURSING
Transfer from  PACU to Room M/S  Transferred to bed via glyder mat    S: 68 y/o Male  S/P left hip arthroplasty       Anesthesia Type:  spinal       Surgeon:  Dr. Campbell       Allergies:  See Medication Reconciliation Record       DNR: no    B:  Pertinent Medical History: aortic valve replacement             Surgical History:      A:  EBL: 150        IVF:  800        UOP:  600        NPO:  ___Yes _x__No         Vomiting:  ___Yes __x_No         Drainage: WHITNEY 55        Skin Integrity: WDL (Normal; Pressure Ulcer (Location)        RFO: __x_Yes___No WHITNEY        SSI Patient?  __x_Yes___No       Brace/sling/equipment: x___Yes___No adduction pillow       See PACU record for ongoing assessment, vital signs and pain assessment.    R: Post-Op vitals and assessments as ordered/indicated per patient's condition.       Follow Post-Op orders and notify Physician prn.       Continue to involve patient/family in plan of care and discharge planning.       Reinforce Pre-Operative education.       Implement skin safety interventions as appropriate.    Name: Report given to Jaime Pipkin, RN

## 2025-03-18 NOTE — CONSULTS
[facility]  Consult Note - Hospitalist Service  Date of Admission:  3/18/2025  Consult Requested by: Dr. Campbell  Reason for Consult: Medical management of chronic medical conditions, hypotension     Assessment & Plan   Miguel Pappas is a 69 year old male with PMHx bioprosthetic AVR, history of paroxymal SVT, history of asymptomatic CVA incidentally found, and history of bacteremia thought secondary to subacute endocarditis now resolved who was hospitalized following left hip replacement and hospitalist team has been consulted for medical management of chronic medical conditions.  On arrival to the floor the patient was found to have persistent hypotension and hospitalist team was asked to more urgently see.  Currently blood pressures are 82/45 but patient without symptoms     Principal Problem:    Primary osteoarthritis of left hip    Assessment: patient is s/p left hip replacement for osteoarthritis today.  Minimal blood loss in surgery.  Blocking working well and no significant pain.  Plan is for patient to discharge home tomorrow after working with physical therapy and stable overnight.     Plan:   -ongoing management of operative issues as per ortho team (pain management, anticoagulation, disposition).       Active Problems:    Hypotension, unspecified hypotension type    Assessment: patient's last two blood pressures have been hypotensive (84/49 and 82/45), not on any blood pressure medications at baseline.  EBL was only 150 mL and blood pressures were normal in PACU.  Patient asymptomatic     Plan:   -will give  mL bolus over the next 30 minutes now and nursing to inform if systolic remains below 90 or MAPs below 65.  If blood pressures improve will start  mL/hr as previously ordered otherwise will re-evaluate.        S/P AVR- bioprosthetic    Assessment: performed in 2022 secondary to severe aortic stenosis and follows with cardiology with next appointment in April  with plans for repeat Echo for ongoing monitoring      Plan:   -continue with fluid bolus as above and monitor for tolerance  -consideration of repeat echo during this time if patient has recurrent hypotension or signs of pulmonary overload following bolus       History of paroxysmal supraventricular tachycardia- asymptomatic see cards notes    Assessment: previous history several years ago but has been followed by cardiology with intermittent Zio patches.  Patient is overall asymptomatic but can sometimes feel the episodes at night and treatment with medication has been offered by cardiology but declined by patient and he reports he hasn't had any episodes in months     Plan:   -will monitor for any recurrent episodes following surgical intervention   -ongoing outpatient follow up with cardiology       History of CVA (cerebrovascular accident)    Assessment: patient was found to have old stroke on MRI and has been on ASA 81 mg daily and atorvastatin 40 mg daily for secondary prevention.  ASA is currently on hold secondary to surgery    Plan:   -continue to hold ASA for now but restart when approved by orthopedic team   -restart atorvastatin at time of discharge tomorrow.       History of bacteremia with suspected subclinical endocarditis in February 2024 - completed 6 week course of IV antibiotics and cleared by ID    Assessment: patient was found to have streptococcus gordonii bacteremia of unclear etiology but concern for subacute bacterial endocarditis and patient received 6 weeks of IV antibiotics as per ID recommendations and completed the course in May 2025 and patient has not had complications since.      Plan:   -no acute intervention needed  -no further ID follow up is needed per patient and chart review        Clinically Significant Risk Factors Present on Admission                 # Drug Induced Platelet Defect: home medication list includes an antiplatelet medication                 #  Financial/Environmental Concerns:           Pilar Bellamy MD  Hospitalist Service  Securely message with [employer] (more info)  Text page via [facility] Paging/Directory   ______________________________________________________________________    Chief Complaint   Left hip pain for arthritis     History is obtained from the patient and his wife    History of Present Illness   Miguel Pappas is a 69 year old male with PMHx bioprosthetic AVR, history of paroxymal SVT, history of asymptomatic CVA incidentally found, and history of bacteremia thought secondary to subacute endocarditis now resolved who was hospitalized following left hip replacement.  Patient tolerated surgery well with only 150 mL blood loss documented.  Patient has awoken well from surgery with blood pressures normal but trending down in the PACU and on arrival to the med/surg floor are now hypertensive x2 but patient asymptomatic.  He denies any new symptoms and reports he is feeling better than he expected.  No nursing concerns.       Past Medical History    Past Medical History:   Diagnosis Date    * * * SBE PROPHYLAXIS * * *     no longer indicated - 2007 AHA guidelnies    Mitral valve stenosis and aortic valve stenosis     2/6 murmur    Pure hypercholesterolemia 1/26/2007       Past Surgical History   Past Surgical History:   Procedure Laterality Date    COLONOSCOPY N/A 12/5/2014    Procedure: COLONOSCOPY;  Surgeon: Omar Chisholm MD;  Location:  GI    COLONOSCOPY N/A 3/3/2025    Procedure: Colonoscopy;  Surgeon: Thony Gongora MD;  Location:  GI    CV CORONARY ANGIOGRAM N/A 11/15/2022    Procedure: Coronary Angiogram;  Surgeon: Edson Nava MD;  Location:  HEART CARDIAC CATH LAB    HC REMOVAL OF TONSILS,<13 Y/O      Tonsils <12y.o.    REPLACE VALVE AORTIC N/A 12/16/2022    Procedure: AORTIC VALVE REPLACEMENT WITH 21MM INSPIRIS RESILIA VALVE, PLACEMENT OF TEMPORARY VENTRICULAR AND ATRIAL PACING WIRES AND  TRANSESPHOGEAL ECHOCARDIOGRAM;  Surgeon: Kaila Abbott MD;  Location:  OR    [facility] COLONOSCOPY W/WO BRUSH/WASH  2/8/2005        Medications   Medications Prior to Admission   Medication Sig Dispense Refill Last Dose/Taking    ascorbic acid (VITAMIN C) 250 MG CHEW chewable tablet Take 250 mg by mouth daily.   Past Week    atorvastatin (LIPITOR) 40 MG tablet Take 1 tablet (40 mg) by mouth every evening. (Patient taking differently: Take 40 mg by mouth daily.) 90 tablet 3 3/16/2025 Morning    ferrous sulfate (FEROSUL) 325 (65 Fe) MG tablet Take 325 mg by mouth daily (with breakfast).   Past Week    GLUCOSAMINE CHONDROITIN OR TABS Take 1 tablet by mouth at bedtime.  0 Past Week    Multiple Vitamins-Minerals (CENTRUM SILVER PO) Take 1 tablet by mouth at bedtime. 30 0 Past Week    Vitamin D3 (CHOLECALCIFEROL) 25 mcg (1000 units) tablet Take 25 mcg by mouth daily.   Past Week    [DISCONTINUED] acetaminophen (TYLENOL) 325 MG tablet Take 2 tablets (650 mg) by mouth every 4 hours as needed for mild pain or other (and adjunct with moderate or severe pain or per patient request)   Past Week    [DISCONTINUED] aspirin 81 MG tablet Take 81 mg by mouth at bedtime.   Past Week        Physical Exam   Vital Signs: Temp: 97.4  F (36.3  C) Temp src: Oral BP: 107/60 Pulse: 67   Resp: 16 SpO2: 97 % O2 Device: None (Room air) Oxygen Delivery: 2 LPM  Weight: 142 lbs 15.99 oz    Constitutional: awake, alert, cooperative, no apparent distress, and appears stated age  Respiratory: No increased work of breathing, good air exchange, clear to auscultation bilaterally, no crackles or wheezing  Cardiovascular: RRR   GI: bowel sounds present, abdomen soft and non-tender   Musculoskeletal: no lower extremity pitting edema present  Neurologic: Awake, alert, oriented to name, place and situation     Medical Decision Making       62 MINUTES SPENT BY ME on the date of service doing chart review, history, exam, documentation & further activities  Island Pedicle Flap With Canthal Suspension Text: The defect edges were debeveled with a #15 scalpel blade.  Given the location of the defect, shape of the defect and the proximity to free margins an island pedicle advancement flap was deemed most appropriate.  Using a sterile surgical marker, an appropriate advancement flap was drawn incorporating the defect, outlining the appropriate donor tissue and placing the expected incisions within the relaxed skin tension lines where possible. The area thus outlined was incised deep to adipose tissue with a #15 scalpel blade.  The skin margins were undermined to an appropriate distance in all directions around the primary defect and laterally outward around the island pedicle utilizing iris scissors.  There was minimal undermining beneath the pedicle flap. A suspension suture was placed in the canthal tendon to prevent tension and prevent ectropion. per the note.      Data       Imaging results reviewed over the past 24 hrs:   Recent Results (from the past 24 hours)   XR Surgery VINCENT L/T 5 Min Fluoro w Stills    Narrative    This exam was marked as non-reportable because it will not be read by a   radiologist or a [facility] non-radiologist provider.         XR Pelvis w Hip Port Left  1 View    Narrative    EXAM: XR PELVIS AND HIP PORTABLE LEFT 1 VIEW  LOCATION: [facility]  DATE: 3/18/2025    INDICATION: Status post Hip surgery  COMPARISON: None.      Impression    IMPRESSION: Postoperative changes left total hip arthroplasty. Components appear well seated. Surgical drain in position. Post procedural air about the left hip joint. Right hip negative for fracture and mild degenerative change. Pelvis negative for   fracture are seen. Diffuse demineralization.

## 2025-03-18 NOTE — ANESTHESIA CARE TRANSFER NOTE
Patient: Miguel Pappas    Procedure: Procedure(s):  ARTHROPLASTY, HIP, TOTAL       Diagnosis: Primary osteoarthritis of left hip [M16.12]  Diagnosis Additional Information: No value filed.    Anesthesia Type:   Spinal     Note:    Oropharynx: oral airway in place  Level of Consciousness: drowsy  Oxygen Supplementation: face mask  Level of Supplemental Oxygen (L/min / FiO2): 6  Independent Airway: airway patency satisfactory and stable  Dentition: dentition unchanged  Vital Signs Stable: post-procedure vital signs reviewed and stable  Report to RN Given: handoff report given  Patient transferred to: PACU  Comments: Oral airway removed in PACU  Handoff Report: Identified the Reponsible Provider, Reviewed the pertinent medical history, Discussed the surgical course, Reviewed Intra-OP anesthesia mangement and issues during anesthesia, Set expectations for post-procedure period, Allowed opportunity for questions and acknowledgement of understanding and Identifed the Patient      Vitals:  Vitals Value Taken Time   /81 03/18/25 1110   Temp 97.88  F (36.6  C) 03/18/25 1114   Pulse 78 03/18/25 1114   Resp 13 03/18/25 1114   SpO2 99 % 03/18/25 1114   Vitals shown include unfiled device data.    Electronically Signed By: BROOKE Anderson CRNA  March 18, 2025  11:15 AM

## 2025-03-18 NOTE — PROGRESS NOTES
"S-(situation): Patient arrives to room 271 via cart from PACU    B-(background): RADHA    A-(assessment): pt is alert and oriented. Pain a \"2\". CMS intact. Drsg CDI. Ice to hip. Oriented pt to room, call light, and POC.     R-(recommendations): Orders reviewed with pt. Will monitor patient per MD orders.     Inpatient nursing criteria listed below were met:    Health care directives status obtained and documented: Yes  VTE ordered/documented: Yes  Skin issues/needs documented:Yes  Isolation addressed and Signage used: NA  Fall Prevention: Care plan updated Yes Education given and documented Yes  Care Plan initiated and Co-Morbidities added: Yes  Education Assessment documented:Yes  Admission Education Documented: Yes  If present CAUTI/CLABI Education done: NA  New medication patient education completed and documented (Possible Side Effects of Common Medications handout): Yes  Allergies Reviewed: Yes  Admission Medication Reconciliation completed: Yes  Home medications if not able to send immediately home with family stored here: NA  Reminder note placed in discharge instructions regarding home meds: NA  Individualized care needs/preferences addressed and charted: Yes  Provider Notified that patient has arrived to the unit: Yes        "

## 2025-03-18 NOTE — PROGRESS NOTES
03/18/25 4967   Appointment Info   Signing Clinician's Name / Credentials (PT) Yareli Cordova PT, DPT, ATC, LAT   Rehab Comments (PT) PT eval and treat post op hip. Activity order: ambulate with assist, up with assist, up in chair, IS, ice, no ER, no active abduction, no adduction past mildline, no hyperextension, no pivot or twist, WBAT, hip abd pillow.   Quick Adds   Quick Adds Certification       Present no   Living Environment   People in Home spouse   Current Living Arrangements house   Home Accessibility no concerns;stairs to enter home;stairs within home  (ramp entry)   Number of Stairs, Main Entrance 3   Stair Railings, Main Entrance none   Number of Stairs, Within Home, Primary greater than 10 stairs  (14)   Stair Railings, Within Home, Primary railings on both sides of stairs   Transportation Anticipated family or friend will provide   Living Environment Comments walk in shower. main level living. loft with bedroom- preferred resting.   Self-Care   Usual Activity Tolerance good   Current Activity Tolerance fair   Regular Exercise No   Equipment Currently Used at Home cane, straight;grab bar, toilet;grab bar, tub/shower;shower chair   Fall history within last six months yes   Number of times patient has fallen within last six months 1   Activity/Exercise/Self-Care Comment 2WW available at home for use, already fit to patient   General Information   Onset of Illness/Injury or Date of Surgery 03/18/25   Referring Physician BROOKE Lopez CNP   Patient/Family Therapy Goals Statement (PT) home with wife, OP PT 3/24/25   Pertinent History of Current Problem (include personal factors and/or comorbidities that impact the POC) Patient is a 69 year old male, day 0 status post left total hip arthroplasty by Dr. Campbell, spinal anesthesia with 150mL EBL. Patient with a previous medical history of lacunar CVA, endocarditis, s/p AVR, paroxysmal SVT.   Existing Precautions/Restrictions  fall;no active hip ABD;no hip ADD past midline;no hip hyperextension;no pivoting or twisting;no hip ER   Weight-Bearing Status - LUE full weight-bearing   Weight-Bearing Status - RUE full weight-bearing   Weight-Bearing Status - LLE full weight-bearing   Weight-Bearing Status - RLE weight-bearing as tolerated   General Observations on room air, IV infusing, wife present   Cognition   Affect/Mental Status (Cognition) WFL   Orientation Status (Cognition) oriented x 4   Follows Commands (Cognition) WFL   Pain Assessment   Patient Currently in Pain Yes, see Vital Sign flowsheet   Integumentary/Edema   Integumentary/Edema Comments post op dressing CDI   Posture    Posture Forward head position   Range of Motion (ROM)   Range of Motion ROM deficits secondary to surgical procedure   ROM Comment left hip 0-50* with AAROM   Strength (Manual Muscle Testing)   Strength Comments LLE no instability, good quad control   Bed Mobility   Bed Mobility supine-sit   Supine-Sit Mason (Bed Mobility) minimum assist (75% patient effort);verbal cues   Bed Mobility Limitations decreased ability to use legs for bridging/pushing   Impairments Contributing to Impaired Bed Mobility decreased strength;pain   Transfers   Transfers sit-stand transfer;bed-chair transfer   Maintains Weight-bearing Status (Transfers) able to maintain   Bed-Chair Transfer   Assistive Device (Bed-Chair Transfers) walker, front-wheeled   Bed-Chair Mason (Transfers) contact guard;verbal cues   Sit-Stand Transfer   Sit-Stand Mason (Transfers) contact guard   Assistive Device (Sit-Stand Transfers) walker, front-wheeled   Gait/Stairs (Locomotion)   Comment, (Gait/Stairs) did not progress due to recent hypotension in order to gradually progress mobility   Balance   Balance Comments IND sitting balance. good transitional balance with walker support. standing balance with no safety concerns.   Sensory Examination   Sensory Perception Comments impaired LLE  post operatively   Coordination   Coordination no deficits were identified   Muscle Tone   Muscle Tone no deficits were identified   Clinical Impression   Criteria for Skilled Therapeutic Intervention Yes, treatment indicated   PT Diagnosis (PT) impaired mobility, muscle weakness, joint stiffness   Influenced by the following impairments day 0 status post L RADHA   Functional limitations due to impairments use of walker, functional hip precautions, pain, swelling   Clinical Presentation (PT Evaluation Complexity) evolving   Clinical Presentation Rationale post op acuity, medical status, clinical judgement   Clinical Decision Making (Complexity) moderate complexity   Planned Therapy Interventions (PT) balance training;bed mobility training;cryotherapy;gait training;home exercise program;patient/family education;ROM (range of motion);stair training;strengthening;transfer training;progressive activity/exercise;home program guidelines   Risk & Benefits of therapy have been explained evaluation/treatment results reviewed;participants voiced agreement with care plan;participants included;patient   Clinical Impression Comments Patient presents wtih functional mobility impairments consistent with post op RADHA. 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   PT Total Evaluation Time   PT Sandra Moderate Complexity Minutes (42128) 15   Therapy Certification   Start of care date 03/18/25   Certification date from 03/18/25   Certification date to 03/19/25   Medical Diagnosis s/p L RADHA   Physical Therapy Goals   PT Frequency Daily   PT Predicted Duration/Target Date for Goal Attainment 03/19/25   PT Goals Bed Mobility;Transfers;Gait;Stairs   PT: Bed Mobility Independent;Supine to/from sit;Within precautions   PT: Transfers Modified  independent;Sit to/from stand;Assistive device;Within precautions   PT: Gait Modified independent;Rolling walker;150 feet;Within precautions   PT: Stairs Supervision/stand-by assist;3 stairs;Assistive device;Within precautions   PT Discharge Planning   PT Plan Daily. ambulate, HEP, stairs, bed mobility   PT Discharge Recommendation (DC Rec) home with assist;home with outpatient physical therapy   PT Rationale for DC Rec Patient from home with wife, IND at baseline, ramp to enter, main level living. Patient presents wtih functional mobility impairments consistent with post op RADHA. 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   PT Brief overview of current status HEP with increased pain. VC supine to sitting. CGA sit to/from stand with 2WW. CGA bed to chair wtih 2WW.   Physical Therapy Time and Intention   Total Session Time (sum of timed and untimed services) 15                                                                                 [facility]      OUTPATIENT PHYSICAL THERAPY EVALUATION  PLAN OF TREATMENT FOR OUTPATIENT REHABILITATION  (COMPLETE FOR INITIAL CLAIMS ONLY)  Patient's Last Name, First Name, M.I.  YOB: 1955  Miguel Pappas                        Provider's Name  [facility] Medical Record No.  [id number]                               Onset Date:  03/18/25   Start of Care Date:  03/18/25      Type:     _X_PT   ___OT   ___SLP Medical Diagnosis:  s/p L RADHA                        PT Diagnosis:  impaired mobility, muscle weakness, joint stiffness   Visits from SOC:  1   _________________________________________________________________________________  Plan of Treatment/Functional Goals    Planned Interventions: balance  training, bed mobility training, cryotherapy, gait training, home exercise program, patient/family education, ROM (range of motion), stair training, strengthening, transfer training, progressive activity/exercise, home program guidelines     Goals: See Physical Therapy Goals on Care Plan in [facility] electronic health record.    Therapy Frequency: Daily  Predicted Duration of Therapy Intervention: 03/19/25  _________________________________________________________________________________    I CERTIFY THE NEED FOR THESE SERVICES FURNISHED UNDER        THIS PLAN OF TREATMENT AND WHILE UNDER MY CARE     (Physician attestation of this document indicates review and certification of the therapy plan).              Certification date from: 03/18/25, Certification date to: 03/19/25    Referring Physician: BROOKE Lopez CNP            Initial Assessment        See Physical Therapy evaluation dated 03/18/25 in Epic electronic health record.  Thank you for your referral.  Yareli Cordova, PT, DPT, ATC, [facility]  O: [phone]  E: [email or web address]

## 2025-03-18 NOTE — MEDICATION SCRIBE - ADMISSION MEDICATION HISTORY
Medication Scribe Admission Medication History    Admission medication history is complete. The information provided in this note is only as accurate as the sources available at the time of the update.    Information Source(s): Patient, Family member, and CareEverywhere/SureScripts via in-person    Pertinent Information: Spouse Sandra present and helpful. Verified no use of pain pump, inhalers or prescription eye drops currently.      Changes made to PTA medication list:  Added: None  Deleted: None  Changed: atorvastatin to morning dose now    Allergies reviewed with patient and updates made in EHR: yes    Medication History Completed By: ALECIA KELLY 3/18/2025 2:03 PM    PTA Med List   Medication Sig Last Dose/Taking    acetaminophen (TYLENOL) 325 MG tablet Take 3 tablets (975 mg) by mouth every 8 hours as needed for other (mild pain). Taking As Needed    acetaminophen (TYLENOL) 325 MG tablet Take 2 tablets (650 mg) by mouth every 4 hours as needed for mild pain or other (and adjunct with moderate or severe pain or per patient request) Past Week    apixaban ANTICOAGULANT (ELIQUIS) 2.5 MG tablet Take 1 tablet (2.5 mg) by mouth 2 times daily for 28 days. Taking    ascorbic acid (VITAMIN C) 250 MG CHEW chewable tablet Take 250 mg by mouth daily. Past Week    aspirin 81 MG tablet Take 81 mg by mouth at bedtime. Past Week    atorvastatin (LIPITOR) 40 MG tablet Take 1 tablet (40 mg) by mouth every evening. (Patient taking differently: Take 40 mg by mouth daily.) 3/16/2025 Morning    ferrous sulfate (FEROSUL) 325 (65 Fe) MG tablet Take 325 mg by mouth daily (with breakfast). Past Week    GLUCOSAMINE CHONDROITIN OR TABS Take 1 tablet by mouth at bedtime. Past Week    Multiple Vitamins-Minerals (CENTRUM SILVER PO) Take 1 tablet by mouth at bedtime. Past Week    oxyCODONE (ROXICODONE) 5 MG tablet Take 1-2 tablets (5-10 mg) by mouth every 4 hours as needed for moderate to severe pain. Taking As Needed    senna-docusate  (SENOKOT-S/PERICOLACE) 8.6-50 MG tablet Take 1-2 tablets by mouth 2 times daily. Take while on oral narcotics to prevent or treat constipation. Taking    Vitamin D3 (CHOLECALCIFEROL) 25 mcg (1000 units) tablet Take 25 mcg by mouth daily. Past Week

## 2025-03-18 NOTE — ANESTHESIA PROCEDURE NOTES
"Intrathecal injection Procedure Note    Pre-Procedure   Staff -        CRNA: Drake Mcknight APRN CRNA       Other Anesthesia Staff: Kourtney Helm       Performed By: BRANDI       Location: OR       Procedure Start/Stop Times: 3/18/2025 7:52 AM and 3/18/2025 8:00 AM       Pre-Anesthestic Checklist: patient identified, IV checked, risks and benefits discussed, informed consent, monitors and equipment checked, pre-op evaluation and at physician/surgeon's request  Timeout:       Correct Patient: Yes        Correct Procedure: Yes        Correct Site: Yes        Correct Position: Yes   Procedure Documentation  Procedure: intrathecal injection         Patient Position: sitting       Patient Prep/Sterile Barriers: sterile gloves, mask, patient draped       Skin prep: Betadine       Insertion Site: L4-5. (midline approach).       Needle Gauge: 24.        Needle Length (Inches): 3        Spinal Needle Type: Pencan       Introducer used       Introducer: 20 G       # of attempts: 1 and  # of redirects:  1    Assessment/Narrative         Paresthesias: No.       CSF fluid: clear.       Opening pressure was cmH2O while  Sitting.      Medication(s) Administered   0.75% Hyperbaric Bupivacaine (Intrathecal) - Intrathecal   2 mL - 3/18/2025 7:59:00 AM  Fentanyl PF (Intrathecal) - Intrathecal   15 mcg - 3/18/2025 7:59:00 AM  Medication Administration Time: 3/18/2025 7:52 AM     Comments:  Pt tolerated the procedure well as sedated.  Epi swirl prior to meds drawn up to prolong the spinal.  Placed by the SRNA under the supervision of the CRNA.  No complications and good swirl before and after injection.  Good sympathectomy      FOR [facility] ([facility] - [place], [place]) ONLY:   Pain Team Contact information: please page the Pain Team [address]. Search \"Pain\". During daytime hours, please page the attending first. At night please page the resident first.      "

## 2025-03-18 NOTE — PROGRESS NOTES
Patient vital signs are at baseline: Yes  Patient able to ambulate as they were prior to admission or with assist devices provided by therapies during their stay:  No,  Reason:  Up in chair  Patient MUST void prior to discharge:  No,  Reason:  Due to void  Patient able to tolerate oral intake:  Yes  Pain has adequate pain control using Oral analgesics:  Yes  Does patient have an identified :  Yes  Has goal D/C date and time been discussed with patient:  Yes

## 2025-03-18 NOTE — DISCHARGE INSTRUCTIONS
Total Hip Replacement Discharge Instructions                                      [phone]  Bone and Joint Service Line for issues or concerns      General Care:  After surgery you may feel tired/sleepy. This is normal. If you have any question along the way please contact the office. If you feel it is an issue cannot wait for normal office hours, contact the on-call physician. You should not drive or operate machines/equipment until released by your physician to do so.     Bandages:   You have a clean dressing on your surgical wound. Dressing change instructions as follows: Keep the Aquacel (surgical) dressing on until follow-up appointment.  You can shower with this. Do not soak or submerge.     Drain Site Dressing:  With the drain site you can do daily dressing changes with gauze and tape or Band-Aid until 2 days with no drainage and then no dressing needed. Contact your Surgeon Team if you have increased redness, warmth around the surgical wound, and/or drainage from the surgical wound.      Swelling (edema) control:  Preventing swelling (edema) in your legs after surgery is very important. It is helpful for achieving optimal range of motion as well as preventing blood clots.  It starts with simple things such as elevation of your legs and icing. Elevate your legs above your heart.    Do this for 20 minutes every couple hours the first few days after surgery. We also recommend JOSE hose (compression hose) to be worn. Wear on both legs during the day. You may remove at night. Wear these until directed to stop.    Bathing:  Do not submerge your incision in water such as a bath or pool. It is ok to shower when you get home but keep your incision covered with a sealed bandage. You may find in comfortable to get a shower chair for the first month while you continue to heal and get stronger.     Follow up:  Your follow up appointment should already be scheduled. If it s not, please call the office to verify an  appointment 14 days after surgery. If there are no issues in your recovery you will have an appointment at 14 days, 6 weeks, 3 months, 6 months, and 1 year from your surgery date.     Diet:  You may not have a full appetite at discharge this should get better. Progress to bland foods such as crackers and bread and finally to your normal diet if you have no problems.  Avoid alcohol when taking narcotic pain medications.      Pain control:  Take your pain medications as prescribed. These medications may make you sleepy. Do not drive, operate equipment, or drink alcohol when taking these.  You may take Tylenol (Generic name is acetaminophen) as directed on the bottle for additional relief or in place of the prescribed pain medications as your pain gets better.  If the medications cause a reaction such as nausea or skin rash, stop taking them and contact your doctor. Please plan accordingly, pain medications will not be re-filled on the weekends or at night. Call the office during the day if you need more medications.    Blood thinner:  It is very important to take the prescribed medication as directed to prevent blood clots. No medication is perfect, so if you notice a sudden onset of pain/swelling in your calf area call your doctor. If you notice a sudden onset of troubles breathing and/or chest pain call 911.     Icing:  It is common for some swelling, aching and stiffness to occur for awhile after a hip replacement.  Apply for 20 minutes at a time. For the first 1-2 weeks apply ice 2-3 x a day or more after therapy.    Walker/crutches:  Use a walker/crutches when you go home. You will transition to the use of a cane and finally to no additional support.     Physical Therapy:  The success of your hip replacement is based on doing physical therapy. You will have some pain and discomfort along the way. If you feel your pain is limiting your progress make sure to take some pain medication prior to your therapy session.  If your pain medications is not working, talk to your surgeon.   The initial goal is to work on walking and feeling steady then will advance as per therapy.    Activity:  Unless otherwise instructed, you can weight bear as tolerated with a walker/cane. Follow these precautions until cleared by your surgeons:  No active abduction of the hip   Do not cross the midline of your body with the operated leg.              No hyperextension of the hip.              No external rotation      Abductor pillow:  Use in bed only, place mid-thigh to mid-calf. Strap tightness: one hand width between leg and strap. Follow flexion restrictions, yet float heels off mattress by using bed pillow under calves when needed.  Assess heels in side-lying position. Use foot pumps for VTE prevention until pillow is discontinued.  If patient refuses, use bed pillow between legs and notify Provider. Turn patient to unaffected side every two hours with Hip Abduction pillow between legs and pillows behind back and legs.      Normal findings after surgery:  Numbness and tenderness around the incisions.   You may have bruising around the incisions and down the thigh.   Low grade fevers less than 100.5 degrees Fahrenheit are normal.   You will have some increased pain after your therapy sessions.     When to call the Office:  Temperature greater than 101.5 degrees Fahreheit.  Fever, chills, and increasing pain in the hip.  Excessive drainage from the incisions that include bright red blood.  Drainage from the incisions sites that appear yellow, pus-like, or foul smelling.  Increasing pain the hip not relieved by the prescribed pain medications or ice.  Persistent nausea or vomiting   Increased pain or swelling in your calf area (in back above your ankle) that wasn t there when in the hospital.  Any other effects you feel are significant.  Call 911 if you experience any chest pain and/or shortness or breath.

## 2025-03-18 NOTE — ANESTHESIA PREPROCEDURE EVALUATION
Anesthesia Pre-Procedure Evaluation    Patient: Miguel Pappas   MRN: [id number] : 1955        Procedure : Procedure(s):  ARTHROPLASTY, HIP, TOTAL          Past Medical History:   Diagnosis Date    * * * SBE PROPHYLAXIS * * *     no longer indicated -  AHA guidelnies    Mitral valve stenosis and aortic valve stenosis     2/6 murmur    Pure hypercholesterolemia 2007      Past Surgical History:   Procedure Laterality Date    COLONOSCOPY N/A 2014    Procedure: COLONOSCOPY;  Surgeon: Omar Chisholm MD;  Location: [facility]    COLONOSCOPY N/A 3/3/2025    Procedure: Colonoscopy;  Surgeon: Thony Gongora MD;  Location:  GI    CV CORONARY ANGIOGRAM N/A 11/15/2022    Procedure: Coronary Angiogram;  Surgeon: Edson Nava MD;  Location:  [facility]    HC REMOVAL OF TONSILS,<11 Y/O      Tonsils <10y.o.    REPLACE VALVE AORTIC N/A 2022    Procedure: AORTIC VALVE REPLACEMENT WITH 21MM INSPIRIS RESILIA VALVE, PLACEMENT OF TEMPORARY VENTRICULAR AND ATRIAL PACING WIRES AND TRANSESPHOGEAL ECHOCARDIOGRAM;  Surgeon: Kaila Abbott MD;  Location:  OR    [facility] COLONOSCOPY W/WO BRUSH/WASH  2005       Allergies   Allergen Reactions    Ceftriaxone Cough     Lump in throat, coughing, starts midway through infusion. Started after being on ceftriaxone for 4 weeks    No Known Drug Allergy       Social History     Tobacco Use    Smoking status: Never     Passive exposure: Never    Smokeless tobacco: Never   Substance Use Topics    Alcohol use: Yes     Comment: 1-2 beer monthly  maybe      Wt Readings from Last 1 Encounters:   25 64.9 kg (143 lb)        Anesthesia Evaluation   Pt has had prior anesthetic. Type: General.    No history of anesthetic complications       ROS/MED HX  ENT/Pulmonary:  - neg pulmonary ROS  (-) tobacco use   Neurologic:  - neg neurologic ROS   (+)          CVA, date: unknown - suspected based on MRI results,                      Cardiovascular: Comment: Mitral valve stenosis and aortic valve stenosis - Aortic valve replacement     (+)  - -   -  - -   Taking blood thinners                     dysrhythmias, Other and PVCs,  valvular problems/murmurs type: AS TAVR '22.    Previous cardiac testing (03/10/2025)   Echo: Date: 03/16/24 Results:  Interpretation Summary     1. The left ventricle is normal in structure, function and size. The visual  ejection fraction is estimated at 65%.  2. The right ventricle is normal in structure, function and size.  3. s/p 21mm Resilia TAVR. Mean 17mmHg, DI 0.37. Mild AI.    Stress Test:  Date: Results:    ECG Reviewed:  Date: 03/14/2024 Results:  Sinus Rhythm   WITHIN NORMAL LIMITS  Cath:  Date: Results:      METS/Exercise Tolerance: >4 METS    Hematologic:  - neg hematologic  ROS   (+)       history of blood transfusion,         Musculoskeletal:  - neg musculoskeletal ROS (+)  arthritis,             GI/Hepatic:  - neg GI/hepatic ROS  (-) GERD   Renal/Genitourinary:  - neg Renal ROS     Endo:  - neg endo ROS     Psychiatric/Substance Use:  - neg psychiatric ROS     Infectious Disease:  - neg infectious disease ROS     Malignancy:  - neg malignancy ROS     Other:  - neg other ROS          Physical Exam    Airway        Mallampati: III   TM distance: > 3 FB   Neck ROM: full   Mouth opening: > 3 cm    Respiratory Devices and Support         Dental       (+) Minor Abnormalities - some fillings, tiny chips      Cardiovascular   cardiovascular exam normal       Rhythm and rate: regular and normal     Pulmonary   pulmonary exam normal        breath sounds clear to auscultation           OUTSIDE LABS:  CBC:   Lab Results   Component Value Date    WBC 8.0 02/26/2025    WBC 7.9 10/02/2024    HGB 13.3 02/26/2025    HGB 12.9 (L) 10/02/2024    HCT 38.2 (L) 02/26/2025    HCT 38.2 (L) 10/02/2024     02/26/2025     10/02/2024     BMP:   Lab Results   Component Value Date     02/26/2025      "12/10/2024    POTASSIUM 4.2 02/26/2025    POTASSIUM 4.5 12/10/2024    CHLORIDE 105 02/26/2025    CHLORIDE 99 12/10/2024    CO2 27 02/26/2025    CO2 31 (H) 12/10/2024    BUN 25.7 (H) 02/26/2025    BUN 20.4 12/10/2024    CR 0.86 02/26/2025    CR 0.88 12/10/2024    GLC 87 02/26/2025    GLC 89 12/10/2024     COAGS:   Lab Results   Component Value Date    PTT 31 03/14/2024    INR 1.08 03/14/2024    FIBR 193 12/16/2022     POC: No results found for: \"BGM\", \"HCG\", \"HCGS\"  HEPATIC:   Lab Results   Component Value Date    ALBUMIN 3.7 04/04/2024    PROTTOTAL 7.6 04/04/2024    ALT 37 05/01/2024    AST 36 05/01/2024    ALKPHOS 119 04/04/2024    BILITOTAL 0.2 04/04/2024    BILIDIRECT 0 01/08/2003     OTHER:   Lab Results   Component Value Date    PH 7.39 12/17/2022    LACT 1.5 03/16/2024    A1C 5.6 03/11/2025    RAMONA 9.6 02/26/2025    PHOS 3.2 12/21/2022    MAG 2.2 12/21/2022    TSH 2.84 03/14/2024       Anesthesia Plan    ASA Status:  3    NPO Status:  NPO Appropriate    Anesthesia Type: Spinal.   Induction: Intravenous, Propofol.   Maintenance: TIVA.        Consents    Anesthesia Plan(s) and associated risks, benefits, and realistic alternatives discussed. Questions answered and patient/representative(s) expressed understanding.     - Discussed:     - Discussed with:  Patient      - Extended Intubation/Ventilatory Support Discussed: No.      - Patient is DNR/DNI Status: No     Use of blood products discussed: Yes.     - Discussed with: Patient.     - Consented: consented to blood products     Postoperative Care    Pain management: IV analgesics, Oral pain medications, Multi-modal analgesia.     - Plan for long acting post-op opioid use   PONV prophylaxis: Ondansetron (or other 5HT-3), Background Propofol Infusion, Meclizine or Dimenhydrinate     Comments:    Other Comments: The risks and benefits of anesthesia, and the alternatives where applicable, have been discussed with the patient, and they wish to proceed.       Talked " with the pt about extended schedule time for this procedure and as a result may need to switch to GA toward the end of the case if the spinal wears off.           Kourtney Helm    I have reviewed the pertinent notes and labs in the chart from the past 30 days and (re)examined the patient.  Any updates or changes from those notes are reflected in this note.    Clinically Significant Risk Factors Present on Admission                 # Drug Induced Platelet Defect: home medication list includes an antiplatelet medication                 # Financial/Environmental Concerns:

## 2025-03-19 VITALS
BODY MASS INDEX: 22.44 KG/M2 | DIASTOLIC BLOOD PRESSURE: 76 MMHG | WEIGHT: 143 LBS | RESPIRATION RATE: 15 BRPM | HEIGHT: 67 IN | TEMPERATURE: 98.3 F | HEART RATE: 66 BPM | OXYGEN SATURATION: 99 % | SYSTOLIC BLOOD PRESSURE: 133 MMHG

## 2025-03-19 LAB
HGB BLD-MCNC: 10.8 G/DL (ref 13.3–17.7)
HOLD SPECIMEN: NORMAL

## 2025-03-19 PROCEDURE — 97110 THERAPEUTIC EXERCISES: CPT | Mod: GP | Performed by: PHYSICAL THERAPIST

## 2025-03-19 PROCEDURE — 85018 HEMOGLOBIN: CPT | Performed by: NURSE PRACTITIONER

## 2025-03-19 PROCEDURE — 250N000013 HC RX MED GY IP 250 OP 250 PS 637: Performed by: NURSE PRACTITIONER

## 2025-03-19 PROCEDURE — 36415 COLL VENOUS BLD VENIPUNCTURE: CPT | Performed by: NURSE PRACTITIONER

## 2025-03-19 PROCEDURE — 250N000011 HC RX IP 250 OP 636: Performed by: NURSE PRACTITIONER

## 2025-03-19 PROCEDURE — 97530 THERAPEUTIC ACTIVITIES: CPT | Mod: GP | Performed by: PHYSICAL THERAPIST

## 2025-03-19 PROCEDURE — 97116 GAIT TRAINING THERAPY: CPT | Mod: GP | Performed by: PHYSICAL THERAPIST

## 2025-03-19 RX ADMIN — PREGABALIN 75 MG: 25 CAPSULE ORAL at 08:27

## 2025-03-19 RX ADMIN — CEFAZOLIN 1 G: 1 INJECTION, POWDER, FOR SOLUTION INTRAMUSCULAR; INTRAVENOUS at 01:57

## 2025-03-19 RX ADMIN — APIXABAN 2.5 MG: 2.5 TABLET, FILM COATED ORAL at 08:27

## 2025-03-19 RX ADMIN — FAMOTIDINE 20 MG: 20 TABLET, FILM COATED ORAL at 08:27

## 2025-03-19 RX ADMIN — FERROUS SULFATE TAB 325 MG (65 MG ELEMENTAL FE) 325 MG: 325 (65 FE) TAB at 08:27

## 2025-03-19 RX ADMIN — ACETAMINOPHEN 975 MG: 325 TABLET ORAL at 05:21

## 2025-03-19 ASSESSMENT — ACTIVITIES OF DAILY LIVING (ADL)
ADL_HIGHEST_POTENTIAL_SCORE: 68
WALKING_DOWN_STEEP_HILLS: UNABLE TO DO
WALKING_INITIALLY: EXTREME DIFFICULTY
PLEASE_INDICATE_YOR_PRIMARY_REASON_FOR_REFERRAL_TO_THERAPY:: HIP
STEPPING_UP_AND_DOWN_CURBS: EXTREME DIFFICULTY
ADLS_ACUITY_SCORE: 38
RECREATIONAL ACTIVITIES: UNABLE TO DO
ADLS_ACUITY_SCORE: 42
STANDING FOR 15 MINUTES: EXTREME DIFFICULTY
LANDING: UNABLE TO DO
HOS_ADL_SCORE(%): 13.24
SPORTS_TOTAL_ITEM_SCORE: 0
ADL_COUNT: 17
GETTING_INTO_AND_OUT_OF_AN_AVERAGE_CAR: EXTREME DIFFICULTY
ABILITY_TO_PERFORM_ACTIVITY_WITH_YOUR_NORMAL_TECHNIQUE: EXTREME DIFFICULTY
WALKING_15_MINUTES_OR_GREATER: EXTREME DIFFICULTY
WALKING_FOR_APPROXIMATELY_10_MINUTES: EXTREME DIFFICULTY
SPORTS_HIGHEST_POTENTIAL_SCORE: 36
ROLLING_OVER_IN_BED: UNABLE TO DO
ADLS_ACUITY_SCORE: 38
RUNNING_ONE_MILE: UNABLE TO DO
ADL_SCORE(%): 0
ADLS_ACUITY_SCORE: 38
ADL_TOTAL_ITEM_SCORE: 0
TWISTING/PIVOTING_ON_INVOLVED_LEG: UNABLE TO DO
SITTING FOR 15 MINUTES: MODERATE DIFFICULTY
HOS_ADL_ITEM_SCORE_TOTAL: 9
GOING UP 1 FLIGHT OF STAIRS: EXTREME DIFFICULTY
GETTING_INTO_AND_OUT_OF_A_BATHTUB: UNABLE TO DO
SPORTS_COUNT: 9
HOS_ADL_HIGHEST_POTENTIAL_SCORE: 68
SWINGING_OBJECTS_LIKE_A_GOLF_CLUB: UNABLE TO DO
HEAVY_WORK: UNABLE TO DO
RECREATIONAL_ACTIVITIES: UNABLE TO DO
PUTTING ON SOCKS AND SHOES: EXTREME DIFFICULTY
PUTTING_ON_SOCKS_AND_SHOES: EXTREME DIFFICULTY
TWISTING/PIVOTING ON INVOLVED LEG: UNABLE TO DO
DEEP_SQUATTING: UNABLE TO DO
STARTING_AND_STOPPING_QUICKLY: EXTREME DIFFICULTY
ADLS_ACUITY_SCORE: 38
GETTING INTO AND OUT OF AN AVERAGE CAR: EXTREME DIFFICULTY
STANDING_FOR_15_MINUTES: EXTREME DIFFICULTY
GETTING_INTO_AND_OUT_OF_A_BATHTUB: UNABLE TO DO
HEAVY_WORK: UNABLE TO DO
STEPPING UP AND DOWN CURBS: EXTREME DIFFICULTY
WALKING_DOWN_STEEP_HILLS: UNABLE TO DO
GOING_UP_1_FLIGHT_OF_STAIRS: EXTREME DIFFICULTY
WALKING_UP_STEEP_HILLS: EXTREME DIFFICULTY
GOING_DOWN_1_FLIGHT_OF_STAIRS: EXTREME DIFFICULTY
WALKING_UP_STEEP_HILLS: EXTREME DIFFICULTY
WALKING_APPROXIMATELY_10_MINUTES: EXTREME DIFFICULTY
ADLS_ACUITY_SCORE: 38
ABILITY_TO_PARTICIPATE_IN_YOUR_DESIRED_SPORT_AS_LONG_AS_YOU_WOULD_LIKE: UNABLE TO DO
LOW_IMPACT_ACTIVITIES_LIKE_FAST_WALKING: EXTREME DIFFICULTY
DEEP SQUATTING: UNABLE TO DO
WALKING_INITIALLY: EXTREME DIFFICULTY
WALKING_15_MINUTES_OR_GREATER: EXTREME DIFFICULTY
ROLLING OVER IN BED: UNABLE TO DO
ADLS_ACUITY_SCORE: 42
LIGHT_TO_MODERATE_WORK: UNABLE TO DO
ADLS_ACUITY_SCORE: 38
JUMPING: UNABLE TO DO
GOING DOWN 1 FLIGHT OF STAIRS: EXTREME DIFFICULTY
LIGHT_TO_MODERATE_WORK: UNABLE TO DO
ADLS_ACUITY_SCORE: 42
ADLS_ACUITY_SCORE: 38
SITTING_FOR_15_MINUTES: MODERATE DIFFICULTY
HOW_WOULD_YOU_RATE_YOUR_CURRENT_LEVEL_OF_FUNCTION?: SEVERELY ABNORMAL
ADLS_ACUITY_SCORE: 38
HOW_WOULD_YOU_RATE_YOUR_CURRENT_LEVEL_OF_FUNCTION_DURING_YOUR_SPORTS_RELATED_ACTIVITIES_FROM_0_TO_100_WITH_100_BEING_YOUR_LEVEL_OF_FUNCTION_PRIOR_TO_YOUR_HIP_PROBLEM_AND_0_BEING_THE_INABILITY_TO_PERFORM_ANY_OF_YOUR_USUAL_DAILY_ACTIVITIES?: 2
CUTTING/LATERAL_MOVEMENTS: EXTREME DIFFICULTY
SPORTS_SCORE(%): 0

## 2025-03-19 NOTE — PROGRESS NOTES
S-(situation): Patient discharged to home via wheelchair with wife    B-(background): Observation goals met     A-(assessment): Alert and oriented x 4. Vitals stable on RA. Ambulating SBA with walked. Pain to left hip, ice applied.    R-(recommendations): Discharge instructions reviewed with patient and wife. Listed belongings gathered and returned to patient.  Patient Education resolved: Yes  New medications-Pt. Has been educated about reason of use and side effects Yes  Home medications returned to patient Yes  Medication Bin checked and emptied on discharge Yes

## 2025-03-19 NOTE — PROGRESS NOTES
Patient vital signs are at baseline: Yes  Patient able to ambulate as they were prior to admission or with assist devices provided by therapies during their stay:  Yes  Patient MUST void prior to discharge:  Yes  Patient able to tolerate oral intake:  Yes  Pain has adequate pain control using Oral analgesics:  Yes  Does patient have an identified :  Yes  Has goal D/C date and time been discussed with patient:  Yes    Temp: 97.8  F (36.6  C) Temp src: Oral BP: 129/72 Pulse: 70   Resp: 16 SpO2: 96 % O2 Device: None (Room air) Oxygen Delivery: 2 LPM

## 2025-03-19 NOTE — PROGRESS NOTES
"[facility]  Orthopedics Progress Note           Assessment and Plan:    Assessment:   Post-operative day #1 Procedure(s):  ARTHROPLASTY, HIP, TOTAL         Plan:   Encourage IS  Start or continue physical therapy  Activity as tolerated  Weight-bear as tolerated with precautions  Discharge from hospital today.  Pain control measures  Advance diet as tolerated  Routine wound care  DVT Prophylaxis: Eliquis, JOSE stockings  Hospitalist following and assisting in medical issues.            Interval History:   Initially hypotensive after surgery. That was resolved overnight.  Overall he feels he is Doing well.  Continues to improve.  Pain is well-controlled.  No fevers.  Ambulated and worked with therapy. Required straight cath x 1 AM of POD0, but since then has been voiding adequate amounts. Drain has output 175 since surgery.  Minimum in drain presently. Has help at home.   Physical therapy scheduled to start on 24th.                Review of Systems:    The patient denies any chest pain, shortness of breath, excessive pain, fever, chills, purulent drainage from the wound, nausea or vomiting.               Physical Exam:   General: awake, alert, appropriate and in no acute distress  Blood pressure 129/72, pulse 70, temperature 97.8  F (36.6  C), temperature source Oral, resp. rate 16, height 1.702 m (5' 7\"), weight 64.9 kg (143 lb), SpO2 96%.  Left hip:  Aquacell Dressing clean, dry and intact. No bleeding from drain site. Dressing is CDI. Surrounding skin intact, no breakdown. Calf is soft, nontender with no significant swelling. Compartments soft and non-tender. Distal neurovascular grossly intact.  2+ distal pulse, sensation intact to foot, able to df/pf against resistance. Brisk cap refill.              Data:     Results for orders placed or performed during the hospital encounter of 03/18/25 (from the past 24 hours)   Glucose by meter   Result Value Ref Range    GLUCOSE BY METER POCT 80 70 - 99 " mg/dL   XR Surgery VINCENT L/T 5 Min Fluoro w Stills    Narrative    This exam was marked as non-reportable because it will not be read by a   radiologist or a [place] non-radiologist provider.         XR Pelvis w Hip Port Left  1 View    Narrative    EXAM: XR PELVIS AND HIP PORTABLE LEFT 1 VIEW  LOCATION: [facility]  DATE: 3/18/2025    INDICATION: Status post Hip surgery  COMPARISON: None.      Impression    IMPRESSION: Postoperative changes left total hip arthroplasty. Components appear well seated. Surgical drain in position. Post procedural air about the left hip joint. Right hip negative for fracture and mild degenerative change. Pelvis negative for   fracture are seen. Diffuse demineralization.   Hemoglobin   Result Value Ref Range    Hemoglobin 10.8 (L) 13.3 - 17.7 g/dL   Extra Tube    Narrative    The following orders were created for panel order Extra Tube.  Procedure                               Abnormality         Status                     ---------                               -----------         ------                     Extra Green Top (Lithiu...[[id number]]                      In process                   Please view results for these tests on the individual orders.     Discussed with Dr. Campbell.     BROOKE Calderon, CNP  Orthopedic Surgery    I have reviewed records and images, have evaluated patient and discussed with CNP and concur with evaluation and treatment plan.  Levi Campbell MD

## 2025-03-19 NOTE — OP NOTE
Summary: L  RADHA Depuy [place] Gription 56/apex hole eliminator/neutral 56 altrx/ Corail 12 std collared/ femoral stem 36 +1.5 metal head                                                      Date of Surgery:   03/18/2025     Preop Dx:   L hip acetabular OA     Postop Dx:   L  hip acetabular OA     Procedure:  L RADHA     Attending Surgeon:  Levi Campbell MD     Assistants:  Jose Ramos CNP who helped with position/ retraction/closure     Anesthesia:   Spinal and block     Complications:   None     EBL:   150 cc     IV fluids:   See anesthesia records     Components used:  [place] Gription 56 cup  12 std collared corail stem  36mm neutral Altrx/ apex hole eliminator  36mm +1.5 cobalt Chrome head       Findings:  Femoral head with appearance  OA, Acetabular osteophytes predominantly medial about acetabular fossa and posterolateral osteophyte.  Femoral head not sent to pathology.  Bone adequate but femoral metaphyseal bone less sufficient. Depuy 56 [place] with Gription, neutral component, apex hole eliminator,  12 Corail high std stem with collar, 36 +1.5 metal  femoral head.  Hip stable through range of motion all four quadrants with final implants. Images suggested equal leg length we were able to fully seat collar as was good diaphyseal / metaphyseal fit. Overall Leg length equal when examined postop and under Angeline.  Postop images show anatomic orientation of components and equal leg length.            Description of Procedure:  Following verbal and written consent, patient taken to OR 3  Placed supine with bump under L hip after spinal anesthesia.  Sterile prep and drape L lower extremity.  Curvilinear incision over anterior greater trochanter about 12 cm.  Dissection carried down sharply to interval between tensor fascia suraj and gluteus medius after incision through IT band/gluteus franklin fascia.  We did have to T the gluteus medius as we could retract laterally and did release the more  deep fibers, mostly about the insertion site of femoral component.  Capsule was identified and a trapezoidal flap with a medial base was developed.  Simpulse lavage irrigation was used throughout the case.  Electrocautery for hemostasis.  The capsule was somewhat matted down to the femoral head/neck and there was no other significant synovitis.  We elevated in a sub periosteal fashion and subcapsular.  Dissection was carried to superiorlateral acetabular margin cephalad and lesser trochanter caudad.  Capsule was retracted medially.  Head was dislocated from acetabulum.   We left the neck as long as possible.Femoral neck transected with recip saw.  Head removed and saved if needed. We did not send femoral head to pathology.   We used acetabulum as template and excised redundant capsule and labrum.  We used currette to remove debris and bone tissue down to medial wall used gauge in a conical fashion .   We reamed from 47 sequentially to 55. Bone adequate and medial inner cortex preserved. 55 [place] sector with gription placed carefully under fluoro. This appeared to be well seated. Acetabulum was sufficient.  We then placed a neutral trial liner.  We then removed and replaced zelpis with leg placed in figure of 4 position to approach proximal femur.  We accepted the neck cut and prepared for broaching.  We started with the chili pepper, then 8 broach and sequentially up to 12 which seemed quite stable/ no microtorsion motion.  We placed the 12 corail broach with good fit and were able to fully seat with adequate diaphyseal fit as well and then placed the trial 36 +1.5 head.    There appeared to be good fixation of the cup.  Leg length appeared to be equal physically and under fluoro.  Hip was stable in all four quadrants with motion.  All trial components were removed.  We checked for micromotion of stem and found this to be torsionally stable. We placed 12 corail broach, with good fit and stability with torsion and  anterior posterior testing.  We then placed final components using apex hole eliminator, 56 neutral altrx liner with good fit, we then placed 12 Corail standard collared stem.  36 +1.5 metal head impacted on stem and hip reduced.  Leg length equal physically and under fluoro.  Stable through entire range of motion.  We then placed sterile betadine solution for 3 minutes.  This was then irrigated and capsule closed with #2 fiber wire.  Over medium hemovac drain we closed gluteus medius tensor fascia suraj interval with #1 vicryl in a running fashion.  IT band/gluteal fascia interval closed with running #1 vicryl followed by #1 stratofix for water tight seal.  Deep subcutaneous fat layer, intermediate, superficial closed sequentially with #1 vicryl, 0 vicryl, 2-0 vicryl in interrupted fashion.  Epithelium closed with running suibcuticular 3-0 monocryl.  Wound sealed with dermabond.  Xeroform followed by Aquacel placed.  Drain taped in place.  Marcaine also injected in and about wound.  Patient placed in abduction pillow.  Leg length equal post op on table.     The patient was then awoken from anesthesia without any complications. The patient was then transferred onto the hospital bed and taken to the PACU without any complications.  Multiple sponge and needle counts were performed and were correct at the end of the case.  Dr. Campbell was present and participated throughout the all portions of the procedure.     Postoperative plan:  WBAT   Anterior hip precautions   L hip Postop XR in PACU   Postop abx   Pain control   F/U drain output   DVT ppx - ASA  PT/OT, OOB   Discharge planning        I was present entire procedure.  We placed Depuy [place] with Gription 56 neutral altrx poly 36 +1.5 head metal and apex hole eliminator, Corail std collared stem 12 well seated with good metaphyseal/ diaphyseal fit.  Leg length equal postop and no complications identified.  I did find family postop in waiting area.  Levi  MD Shannan

## 2025-03-19 NOTE — PLAN OF CARE
Physical Therapy Discharge Summary    Reason for therapy discharge:    Discharged to home with outpatient therapy.    Progress towards therapy goal(s). See goals on Care Plan in [facility] electronic health record for goal details.  Goals met    Therapy recommendation(s):    Continued therapy is recommended.  Rationale/Recommendations:   .Patient would benefit from continued skilled therapeutic intervention in order to progress them towards a higher level of function in accordance with their surgeon's protocol.        Thank you for your referral.  Yareli Cordova, PT, DPT, ATC, [facility]  O: [phone]  E: [email or web address]

## 2025-03-19 NOTE — ANESTHESIA POSTPROCEDURE EVALUATION
Patient: Miguel Pappas    Procedure: Procedure(s):  ARTHROPLASTY, HIP, TOTAL       Anesthesia Type:  Spinal    Note:  Disposition: Inpatient   Postop Pain Control: Uneventful            Sign Out: Well controlled pain   PONV: No   Neuro/Psych: Uneventful            Sign Out: Acceptable/Baseline neuro status   Airway/Respiratory: Uneventful            Sign Out: Acceptable/Baseline resp. status   CV/Hemodynamics: Uneventful            Sign Out: Acceptable CV status   Other NRE: NONE   DID A NON-ROUTINE EVENT OCCUR? No    Event details/Postop Comments:  Pt was happy with anesthesia care.  Pt had some urine retention and needed to be straight catheterized.  This is better today.  I will follow up with the pt if needed.           Last vitals:  Vitals Value Taken Time   BP 96/68 03/18/25 1200   Temp 97.34  F (36.3  C) 03/18/25 1213   Pulse 63 03/18/25 1212   Resp 15 03/18/25 1212   SpO2 87 % 03/18/25 1212   Vitals shown include unfiled device data.    Electronically Signed By: BROOKE Anderson CRNA  March 19, 2025  9:58 AM

## 2025-03-24 ENCOUNTER — THERAPY VISIT (OUTPATIENT)
Dept: PHYSICAL THERAPY | Facility: CLINIC | Age: 70
End: 2025-03-24
Attending: ORTHOPAEDIC SURGERY
Payer: COMMERCIAL

## 2025-03-24 DIAGNOSIS — M16.12 PRIMARY OSTEOARTHRITIS OF LEFT HIP: ICD-10-CM

## 2025-03-24 PROCEDURE — 97110 THERAPEUTIC EXERCISES: CPT | Mod: GP | Performed by: PHYSICAL THERAPIST

## 2025-03-24 PROCEDURE — 97161 PT EVAL LOW COMPLEX 20 MIN: CPT | Mod: GP | Performed by: PHYSICAL THERAPIST

## 2025-03-24 NOTE — PROGRESS NOTES
PHYSICAL THERAPY EVALUATION  Type of Visit: Evaluation              Subjective         Presenting condition or subjective complaint: Hip replacement therapy  Pt is a 68 yo male who presents to PT for rehab of planned L RADHA performed on 3/18/25 with anterior approach. Pt reports difficulty in walking, using 2ww for gait over all level and uneven ground.  Date of onset: 03/18/25    Relevant medical history:     Past Medical History:   Diagnosis Date    * * * SBE PROPHYLAXIS * * *     no longer indicated - 2007 AHA guidelnies    Mitral valve stenosis and aortic valve stenosis     2/6 murmur    Pure hypercholesterolemia 1/26/2007       Dates & types of surgery: 12/15/2023 aortic valve replacement   Past Surgical History:   Procedure Laterality Date    ARTHROPLASTY HIP Left 3/18/2025    Procedure: ARTHROPLASTY, HIP, TOTAL, LEFT;  Surgeon: Levi Campbell MD;  Location: [facility] OR    COLONOSCOPY N/A 12/5/2014    Procedure: COLONOSCOPY;  Surgeon: Omar Chisholm MD;  Location:  GI    COLONOSCOPY N/A 3/3/2025    Procedure: Colonoscopy;  Surgeon: Thony Gongora MD;  Location:  GI    CV CORONARY ANGIOGRAM N/A 11/15/2022    Procedure: Coronary Angiogram;  Surgeon: Edson Nava MD;  Location:  HEART CARDIAC CATH LAB    HC REMOVAL OF TONSILS,<11 Y/O      Tonsils <12y.o.    REPLACE VALVE AORTIC N/A 12/16/2022    Procedure: AORTIC VALVE REPLACEMENT WITH 21MM INSPIRIS RESILIA VALVE, PLACEMENT OF TEMPORARY VENTRICULAR AND ATRIAL PACING WIRES AND TRANSESPHOGEAL ECHOCARDIOGRAM;  Surgeon: Kaila Abbott MD;  Location:  OR    [facility] COLONOSCOPY W/WO BRUSH/WASH  2/8/2005          Prior diagnostic imaging/testing results: MRI; X-ray; Bone scan     Prior therapy history for the same diagnosis, illness or injury: No      Prior Level of Function  Transfers:   Ambulation:   ADL:   IADL:     Living Environment  Social support: With a significant other or spouse   Type of home: House; 2-story   Stairs to enter the  home: Yes 1 Is there a railing: No     Ramp: No   Stairs inside the home: Yes 1 Is there a railing: Yes     Help at home: None  Equipment owned: Straight Cane; Walker; Standard wheelchair; Grab bars; Commode; Bath bench     Employment: Not Applicable    Hobbies/Interests:      Patient goals for therapy: Walk more normaly, function again    Pain assessment: Pain present     Objective   HIP EVALUATION  PAIN: Pain Level at Rest: 2/10  Pain Level with Use: 3/10  INTEGUMENTARY (edema, incisions):   POSTURE:   GAIT:   Weightbearing Status: WBAT  Assistive Device(s): Walker (front wheeled)  Gait Deviations: Antalgic  Stance time decreased  Stride length decreased  Brittany decreased  BALANCE/PROPRIOCEPTION:   WEIGHTBEARING ALIGNMENT:   NON-WEIGHTBEARING ALIGNMENT:    ROM:     PELVIC/SI SCREEN:   STRENGTH:   LE FLEXIBILITY:   SPECIAL TESTS:   FUNCTIONAL TESTS:   PALPATION:   JOINT MOBILITY:     Assessment & Plan   CLINICAL IMPRESSIONS  Medical Diagnosis: L hip OA (M16.12)    Treatment Diagnosis: L RADHA, elevated pain, weakness, impaired gait and balance   Impression/Assessment:  Pt presents to PT with significant weakness and impaired activation/isolation of L LE muscles for transfers, gait, shows impaired gait and balance with increased falls risk secondary to his weakness. Pt would benefit from skilled PT interventions in order to address his strength deficits, impaired functional mobility and balance impairments so he may increase his safety and independence around the home and community for return to PLOF and greatest QOL.    Clinical Decision Making (Complexity):  Clinical Presentation: Stable/Uncomplicated  Clinical Presentation Rationale: based on medical and personal factors listed in PT evaluation  Clinical Decision Making (Complexity): Low complexity    PLAN OF CARE  Treatment Interventions:  Interventions: Gait Training, Manual Therapy, Neuromuscular Re-education, Therapeutic Activity, Therapeutic Exercise    Long  Term Goals     PT Goal 1  Goal Identifier: HEP  Goal Description: Pt will demo independence in performance and progression of strengthening and balance HEP in order to improve CLOF.  Target Date:  (Ongoing, updates as needed)  PT Goal 2  Goal Identifier: HOS  Goal Description: Pt will demo improved hip function and pain as shown by increased HOS score of at least 15 points in order to show significant improvement and greater return to previous function.  Goal Progress: 9/68 (eval)  Target Date: 04/21/25  PT Goal 3  Goal Identifier: Gait  Goal Description: Pt will demo improved gait with normalized gait pattern and LRD over all household and community distances without pain reports for greater independence and community access.  Goal Progress: Pt walking short distances with 2ww (eval)  Target Date: 06/02/25      Frequency of Treatment: 1-2x/week, decrease as able  Duration of Treatment: 10 weeks    Recommended Referrals to Other Professionals:   Education Assessment:   Learner/Method: Patient;Listening;Demonstration    Risks and benefits of evaluation/treatment have been explained.   Patient/Family/caregiver agrees with Plan of Care.     Evaluation Time:     PT Sandra Low Complexity Minutes (04664): 10       Signing Clinician: Carlos Grayson PT        [facility]                                                                                   OUTPATIENT PHYSICAL THERAPY      PLAN OF TREATMENT FOR OUTPATIENT REHABILITATION   Patient's Last Name, First Name, GAMALMiguel Humphreys YOB: 1955   Provider's Name   [facility]   Medical Record No.  [id number]     Onset Date: 03/18/25  Start of Care Date: 03/24/25     Medical Diagnosis:  L hip OA (M16.12)      PT Treatment Diagnosis:  L RADHA, elevated pain, weakness, impaired gait and balance Plan of Treatment  Frequency/Duration: 1-2x/week, decrease as able/ 10 weeks    Certification date from  03/24/25 to 06/02/25         See note for plan of treatment details and functional goals     Carlos Grayson, PT                         I CERTIFY THE NEED FOR THESE SERVICES FURNISHED UNDER        THIS PLAN OF TREATMENT AND WHILE UNDER MY CARE     (Physician attestation of this document indicates review and certification of the therapy plan).            I have reviewed records and images and concur with evaluation and treatment plans.    Levi Campbell MD  Referring Provider:  Levi Campbell MD    Initial Assessment  See Epic Evaluation- Start of Care Date: 03/24/25

## 2025-03-25 LAB — GLUCOSE BLDC GLUCOMTR-MCNC: 102 MG/DL (ref 70–99)

## 2025-03-26 ENCOUNTER — THERAPY VISIT (OUTPATIENT)
Dept: PHYSICAL THERAPY | Facility: CLINIC | Age: 70
End: 2025-03-26
Attending: ORTHOPAEDIC SURGERY
Payer: COMMERCIAL

## 2025-03-26 DIAGNOSIS — M16.12 PRIMARY OSTEOARTHRITIS OF LEFT HIP: Primary | ICD-10-CM

## 2025-03-26 PROCEDURE — 97110 THERAPEUTIC EXERCISES: CPT | Mod: GP | Performed by: PHYSICAL THERAPIST

## 2025-03-26 ASSESSMENT — HOOS JR
WALKING ON UNEVEN SURFACE: SEVERE
SITTING: SEVERE
RISING FROM SITTING: SEVERE
BENDING TO THE FLOOR TO PICK UP OBJECT: SEVERE
HOOS JR TOTAL INTERVAL SCORE: 32.74
GOING UP OR DOWN STAIRS: SEVERE
LYING IN BED (TURNING OVER, MAINTAINING HIP POSITION): SEVERE

## 2025-03-31 ENCOUNTER — OFFICE VISIT (OUTPATIENT)
Dept: ORTHOPEDICS | Facility: CLINIC | Age: 70
End: 2025-03-31
Payer: COMMERCIAL

## 2025-03-31 VITALS — BODY MASS INDEX: 22.91 KG/M2 | TEMPERATURE: 97.9 F | HEIGHT: 67 IN | WEIGHT: 146 LBS

## 2025-03-31 DIAGNOSIS — Z96.642 S/P TOTAL LEFT HIP ARTHROPLASTY: Primary | ICD-10-CM

## 2025-03-31 PROCEDURE — 99024 POSTOP FOLLOW-UP VISIT: CPT | Performed by: PHYSICIAN ASSISTANT

## 2025-03-31 PROCEDURE — 1126F AMNT PAIN NOTED NONE PRSNT: CPT | Performed by: PHYSICIAN ASSISTANT

## 2025-03-31 ASSESSMENT — PAIN SCALES - GENERAL: PAINLEVEL_OUTOF10: NO PAIN (0)

## 2025-03-31 NOTE — PATIENT INSTRUCTIONS
Encounter Diagnosis   Name Primary?    S/P total left hip arthroplasty Yes     Rest, ice and elevate above heart level as needed for pain control  1. Incision: Looks excellent.  No bandages necessary now.  Start gently washing the incision and incisional area with mix of half Betadine and half soapy water once a day until healed so about  another week.  Then you can start massaging the incision with vitamin E lotion which helps break down scar tissue and desensitize it.    2. Anticoagulation: Continue Eliquis 2.5 mg twice a day until it has been a month after surgery    4. JOSE stockings: Wear these when up for a full 6 weeks; they help prevent clots.    5. Medication: You are only taking Tylenol at night right now and your Lyrica.  When your Lyrica is done you can be done with that.  You are off the oxycodone which is excellent.    6. Therapy: Continue doing your formal therapy, they will help you walk without a limp.  Continue doing home exercises you learned from the hospital this helps you get your range of motion and strength back.    7. Follow-up: Follow-up with Dr. Campbell on 4/25/2025.  He will get x-rays at that time.      [employer] and [employer] may offer reliable information regarding your diagnosis and treatment plan.    THANK YOU for coming in today. If you receive a survey via [employer] or mail please let us know if there was anything you especially appreciated today or if there is any way we can improve our clinic. We appreciate your input.    GENERAL INFORMATION:  My hours are:  Monday: Clinic 720am-440pm  Tuesday: Clinic 8am-440pm  Wednesday: Surgery  Thursday: Admin  Friday: Surgery      [facility] for any issues or concerns: [phone]    I am not in the office Thursdays. Therefore non- urgent calls and medical messages received on Thursday will be addressed when we are back in the office on Wednesday. Urgent matters will be reviewed and addressed by one of our partners in  the office as needed.    If lab work was done today as part of your evaluation you will generally be contacted via 7write, mail, or phone with the results within 1-5 days. If there is an alarming result we will contact you by phone. Lab results come back at varying times, I generally wait until all labs are resulted before making comments on results. Please note labs are automatically released to 7write (if you have signed up for it) once available-at times you may see these prior to my having a chance to review them as well.    If you need refills please contact your pharmacist. They will send a refill request to me to review. Please allow 3 business days for us to process all refill requests. All narcotic refills should be handled in the clinic at the time of your visit.

## 2025-03-31 NOTE — PROGRESS NOTES
"Orthopedic Clinic Post-Operative Note    CHIEF COMPLAINT:   Chief Complaint   Patient presents with    Surgical Followup      L  RADHA  DOS 3-18-25       HISTORY OF PRESENT ILLNESS  Location: Left hip  Rating of Pain: 0 out of 10  Pain is better with: Rest  Pain improving overall: Yes  Associated Features: Denies numbness or tingling shooting burning or electric pain.  Denies any problems with the incision.  Denies any fevers chills nausea or vomiting.  Patient concerns: None from the patient however the wife is wondering if the should be using the skid steer.  Additional History: Patient underwent surgery on 3/18/2025 by Dr. Campbell and was able to discharge the next day.  Patient was put on Eliquis 2.5 mg twice a day for 1 month.  Patient was also weightbearing as tolerated and had anterior hip precautions x 12 weeks.  Patient was also given oxycodone 5 to 10 mg every 4 hours for pain on discharge 30 tablets.  Also Lyrica 75 mg twice daily x 14 days.  Patient is only taking Tylenol at night and has not taken any oxycodone since the first day or so.  Patient is taking his Lyrica and also taking Eliquis 2.5 mg twice a day.  Patient is here with his wife Chayito    Patient's past medical, surgical, social and family histories reviewed.     REVIEW OF SYSTEMS  Review of systems negative other than positive findings in HPI.    Physical Exam:  Vitals: Temp 97.9  F (36.6  C) (Temporal)   Ht 1.702 m (5' 7\")   Wt 66.2 kg (146 lb)   BMI 22.87 kg/m    BMI= Body mass index is 22.87 kg/m .  Constitutional: healthy, alert and no acute distress   Psychiatric: mentation appears normal and affect normal/bright  NEURO: no focal deficits, CMS intact  left lower extremity   RESP: Normal with easy respirations and no use of accessory muscles to breathe, no audible wheezing or retractions  CV: Calf soft and nontender to palpation, leg warm   SKIN:  Incision healing well with slight swelling and No erythema and no ecchymosis and does not " open with gentle pressure.  No drainage.  The rest of the hip with no erythema, rashes, excoriation, or breakdown. No evidence of infection.   MUSCULOSKELETAL:  INSPECTION of left hip: No gross deformities, erythema, edema, ecchymosis, atrophy or fasciculations.   PALPATION: no tenderness over the lateral hip and greater trochanteric region, thigh or lower leg.   ROM: Passive: Flexion to 130 degrees, internal rotation to 10 degrees, external rotation to 50 degrees.  All range of motion without catching locking or pain.     STRENGTH: 5 out of 5 hip flexion, quad and hamstring without pain.   SPECIAL TEST: none  GAIT: Not observed today.  Patient does present with a cane.  Lymph: no palpable lymph nodes    Diagnostic Modalities:  No radiographs necessary today.       Impression: 1.  14 days status post Left total hip arthroplasty, anterolateral approach by Dr. Campbell    FOCUSED PLAN:   Incision looks good as well as range of motion and strength.  Patient educated in washing the wound in 50% betadine and 50% soapy water daily for the next week/until wound is healed.  Can start massaging with vitamin E lotion at 3 weeks status post surgery.  Anticoagulation: Eliquis 2.5 mg twice a day until the patient is 1 month out from surgery.  Educated on precautions x 12 weeks, we went over this in detail today.  Patient can continue home exercise program and also doing formal physical therapy.  Patient continue weightbearing as tolerated.  After visit summary with all information given to the patient and his wife today.  Follow-up with Dr. Campbell on 4/25/2025.    Re-x-ray on return: AP pelvis, AP left hip to include the entire prosthesis and Mick lateral (this is a crosstable lateral slightly inclined to the ceiling for true lateral of the neck to check version and acetabular component to check flexion and extension)      This note was dictated with Nekst.    Levi Rios PA-C

## 2025-03-31 NOTE — LETTER
"3/31/2025      Miguel [facility]appas  [address] [facility] [id number]      Dear Colleague,    Thank you for referring your patient, Miguel Pappas, to the [facility]. Please see a copy of my visit note below.    Orthopedic Clinic Post-Operative Note    CHIEF COMPLAINT:   Chief Complaint   Patient presents with     Surgical Followup      L  RADHA  DOS 3-18-25       HISTORY OF PRESENT ILLNESS  Location: Left hip  Rating of Pain: 0 out of 10  Pain is better with: Rest  Pain improving overall: Yes  Associated Features: Denies numbness or tingling shooting burning or electric pain.  Denies any problems with the incision.  Denies any fevers chills nausea or vomiting.  Patient concerns: None from the patient however the wife is wondering if the should be using the skid steer.  Additional History: Patient underwent surgery on 3/18/2025 by Dr. Campbell and was able to discharge the next day.  Patient was put on Eliquis 2.5 mg twice a day for 1 month.  Patient was also weightbearing as tolerated and had anterior hip precautions x 12 weeks.  Patient was also given oxycodone 5 to 10 mg every 4 hours for pain on discharge 30 tablets.  Also Lyrica 75 mg twice daily x 14 days.  Patient is only taking Tylenol at night and has not taken any oxycodone since the first day or so.  Patient is taking his Lyrica and also taking Eliquis 2.5 mg twice a day.  Patient is here with his wife Chayito    Patient's past medical, surgical, social and family histories reviewed.     REVIEW OF SYSTEMS  Review of systems negative other than positive findings in HPI.    Physical Exam:  Vitals: Temp 97.9  F (36.6  C) (Temporal)   Ht 1.702 m (5' 7\")   Wt 66.2 kg (146 lb)   BMI 22.87 kg/m    BMI= Body mass index is 22.87 kg/m .  Constitutional: healthy, alert and no acute distress   Psychiatric: mentation appears normal and affect normal/bright  NEURO: no focal deficits, CMS intact  left lower extremity   RESP: Normal with easy " respirations and no use of accessory muscles to breathe, no audible wheezing or retractions  CV: Calf soft and nontender to palpation, leg warm   SKIN:  Incision healing well with slight swelling and No erythema and no ecchymosis and does not open with gentle pressure.  No drainage.  The rest of the hip with no erythema, rashes, excoriation, or breakdown. No evidence of infection.   MUSCULOSKELETAL:  INSPECTION of left hip: No gross deformities, erythema, edema, ecchymosis, atrophy or fasciculations.   PALPATION: no tenderness over the lateral hip and greater trochanteric region, thigh or lower leg.   ROM: Passive: Flexion to 130 degrees, internal rotation to 10 degrees, external rotation to 50 degrees.  All range of motion without catching locking or pain.     STRENGTH: 5 out of 5 hip flexion, quad and hamstring without pain.   SPECIAL TEST: none  GAIT: Not observed today.  Patient does present with a cane.  Lymph: no palpable lymph nodes    Diagnostic Modalities:  No radiographs necessary today.       Impression: 1.  14 days status post Left total hip arthroplasty, anterolateral approach by Dr. Campbell    FOCUSED PLAN:   Incision looks good as well as range of motion and strength.  Patient educated in washing the wound in 50% betadine and 50% soapy water daily for the next week/until wound is healed.  Can start massaging with vitamin E lotion at 3 weeks status post surgery.  Anticoagulation: Eliquis 2.5 mg twice a day until the patient is 1 month out from surgery.  Educated on precautions x 12 weeks, we went over this in detail today.  Patient can continue home exercise program and also doing formal physical therapy.  Patient continue weightbearing as tolerated.  After visit summary with all information given to the patient and his wife today.  Follow-up with Dr. Campbell on 4/25/2025.    Re-x-ray on return: AP pelvis, AP left hip to include the entire prosthesis and Mick lateral (this is a crosstable lateral slightly  inclined to the ceiling for true lateral of the neck to check version and acetabular component to check flexion and extension)      This note was dictated with [employer].    Levi Rios PA-C        Again, thank you for allowing me to participate in the care of your patient.        Sincerely,        Levi Rios PA-C    Electronically signed

## 2025-04-01 ENCOUNTER — THERAPY VISIT (OUTPATIENT)
Dept: PHYSICAL THERAPY | Facility: CLINIC | Age: 70
End: 2025-04-01
Attending: ORTHOPAEDIC SURGERY
Payer: COMMERCIAL

## 2025-04-01 DIAGNOSIS — M16.12 PRIMARY OSTEOARTHRITIS OF LEFT HIP: Primary | ICD-10-CM

## 2025-04-01 PROCEDURE — 97112 NEUROMUSCULAR REEDUCATION: CPT | Mod: GP | Performed by: PHYSICAL THERAPIST

## 2025-04-01 PROCEDURE — 97110 THERAPEUTIC EXERCISES: CPT | Mod: GP | Performed by: PHYSICAL THERAPIST

## 2025-04-03 ENCOUNTER — THERAPY VISIT (OUTPATIENT)
Dept: PHYSICAL THERAPY | Facility: CLINIC | Age: 70
End: 2025-04-03
Attending: ORTHOPAEDIC SURGERY
Payer: COMMERCIAL

## 2025-04-03 DIAGNOSIS — M16.12 PRIMARY OSTEOARTHRITIS OF LEFT HIP: Primary | ICD-10-CM

## 2025-04-03 PROCEDURE — 97112 NEUROMUSCULAR REEDUCATION: CPT | Mod: GP | Performed by: PHYSICAL THERAPIST

## 2025-04-03 PROCEDURE — 97110 THERAPEUTIC EXERCISES: CPT | Mod: GP | Performed by: PHYSICAL THERAPIST

## 2025-04-07 ENCOUNTER — OFFICE VISIT (OUTPATIENT)
Dept: CARDIOLOGY | Facility: CLINIC | Age: 70
End: 2025-04-07
Payer: COMMERCIAL

## 2025-04-07 VITALS
SYSTOLIC BLOOD PRESSURE: 144 MMHG | DIASTOLIC BLOOD PRESSURE: 72 MMHG | HEART RATE: 63 BPM | BODY MASS INDEX: 22.44 KG/M2 | OXYGEN SATURATION: 97 % | RESPIRATION RATE: 18 BRPM | WEIGHT: 143 LBS | HEIGHT: 67 IN

## 2025-04-07 DIAGNOSIS — Z95.2 S/P AVR: ICD-10-CM

## 2025-04-07 PROCEDURE — 3077F SYST BP >= 140 MM HG: CPT | Performed by: INTERNAL MEDICINE

## 2025-04-07 PROCEDURE — 99213 OFFICE O/P EST LOW 20 MIN: CPT | Performed by: INTERNAL MEDICINE

## 2025-04-07 PROCEDURE — 3078F DIAST BP <80 MM HG: CPT | Performed by: INTERNAL MEDICINE

## 2025-04-07 PROCEDURE — 1125F AMNT PAIN NOTED PAIN PRSNT: CPT | Performed by: INTERNAL MEDICINE

## 2025-04-07 ASSESSMENT — PAIN SCALES - GENERAL: PAINLEVEL_OUTOF10: MILD PAIN (2)

## 2025-04-07 NOTE — PROGRESS NOTES
HISTORY OF PRESENT ILLNESS:  Miguel Pappas a 69 year old male with a history of aortic stenosis  (  sp  bioprosthetic SAVR 2022) was seen today at the request of his primary care physician for follow-up    Since last seen 7/2024 the patient underwent successful, uncomplicated left total hip replacement on 3/18/2025.  There were no perioperative complications.  He is noted that in the doctor's office, his blood pressure has been mildly elevated in the 140 systolic range.  He is not on a blood pressure occasion at this time.    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  He has a blood pressure cuff at home has not been recording his blood pressures.    1)  Osteoarthritis sp  LTHR  2) Aortic stenosis. SP SAVR 12/16/2022 with 21 mm bioprosthetic Resilia valve  TTE 3/10/202 EOA 1.7cm2/MSG 17 mmHg/DI 0.37    Orders this Visit:  No orders of the defined types were placed in this encounter.    No orders of the defined types were placed in this encounter.    There are no discontinued medications.    Encounter Diagnosis   Name Primary?    S/P AVR        CURRENT MEDICATIONS:  Current Outpatient Medications   Medication Sig Dispense Refill    acetaminophen (TYLENOL) 325 MG tablet Take 3 tablets (975 mg) by mouth every 8 hours as needed for other (mild pain). 100 tablet 0    apixaban ANTICOAGULANT (ELIQUIS) 2.5 MG tablet Take 1 tablet (2.5 mg) by mouth 2 times daily for 28 days. 56 tablet 0    ascorbic acid (VITAMIN C) 250 MG CHEW chewable tablet Take 250 mg by mouth daily. (Patient not taking: Reported on 4/7/2025)      [START ON 4/15/2025] aspirin 81 MG EC tablet Take 1 tablet (81 mg) by mouth daily. (Patient not taking: Reported on 4/7/2025)      atorvastatin (LIPITOR) 40 MG tablet Take 1 tablet (40 mg) by mouth every evening. (Patient not taking: Reported on 4/7/2025) 90 tablet 3    ferrous sulfate (FEROSUL) 325 (65 Fe) MG tablet Take 325 mg by  "mouth daily (with breakfast). (Patient not taking: Reported on 4/7/2025)      GLUCOSAMINE CHONDROITIN OR TABS Take 1 tablet by mouth at bedtime. (Patient not taking: Reported on 3/31/2025)  0    Multiple Vitamins-Minerals (CENTRUM SILVER PO) Take 1 tablet by mouth at bedtime. (Patient not taking: Reported on 3/31/2025) 30 0    oxyCODONE (ROXICODONE) 5 MG tablet Take 1-2 tablets (5-10 mg) by mouth every 4 hours as needed for moderate to severe pain. (Patient not taking: Reported on 4/7/2025) 30 tablet 0    pregabalin (LYRICA) 75 MG capsule Take 1 capsule (75 mg) by mouth 2 times daily for 14 days. 28 capsule 0    senna-docusate (SENOKOT-S/PERICOLACE) 8.6-50 MG tablet Take 1-2 tablets by mouth 2 times daily. Take while on oral narcotics to prevent or treat constipation. (Patient not taking: Reported on 4/7/2025) 30 tablet 0    Vitamin D3 (CHOLECALCIFEROL) 25 mcg (1000 units) tablet Take 25 mcg by mouth daily. (Patient not taking: Reported on 4/7/2025)         ALLERGIES     Allergies   Allergen Reactions    Ceftriaxone Cough     Lump in throat, coughing, starts midway through infusion. Started after being on ceftriaxone for 4 weeks       PAST MEDICAL, SURGICAL, FAMILY, SOCIAL HISTORY:  History was reviewed and updated as needed, see medical record.        Review of Systems:  A 12-point review of systems was completed, see medical record for detailed review of systems information.    Physical Exam:  Vitals: BP (!) 144/72 (BP Location: Left arm, Patient Position: Sitting, Cuff Size: Adult Regular)   Pulse 63   Resp 18   Ht 1.702 m (5' 7\")   Wt 64.9 kg (143 lb)   SpO2 97%   BMI 22.40 kg/m      Constitutional: No apparent distress    HEENT: pupils equal round reactive to light, thyroid normal size, JVP is normal    Chest: Clear to percussion and auscultation    Cardiac: Normal S1, normal prosthetic S2, soft 1/6 systolic mumur or         ASSESSMENT: The patient is asymptomatic with no change in his normally " functioning aortic bioprosthesis on recent TTE.  In addition to a Mediterranean-style limited salt diet and activity as tolerated, I have advised the patient to record his blood pressures at home and bring them into his primary care physician to determine whether he will need pharmacologic therapy to maintain a blood pressure of 130 over 80 mmHg or less per ACC guidelines.  We will continue to follow his aortic valve on regular basis in cardiology clinic.    RECOMMENDATIONS:   1) Mediterranean style limited salt diet  2) check blood pressures at home and record  3) follow up in one year with TTE        Recent Lab Results:  LIPID RESULTS:  Lab Results   Component Value Date    CHOL 170 10/02/2024    CHOL 209 (H) 10/26/2018    HDL 71 10/02/2024    HDL 85 10/26/2018    LDL 90 10/02/2024     (H) 10/26/2018    TRIG 47 10/02/2024    TRIG 46 10/26/2018    CHOLHDLRATIO 2.4 11/14/2014       LIVER ENZYME RESULTS:  Lab Results   Component Value Date    AST 36 05/01/2024    AST 36 04/09/2009    ALT 37 05/01/2024    ALT 18 04/09/2009       CBC RESULTS:  Lab Results   Component Value Date    WBC 8.0 02/26/2025    WBC 5.9 10/26/2018    RBC 4.39 (L) 02/26/2025    RBC 4.27 (L) 10/26/2018    HGB 10.8 (L) 03/19/2025    HGB 12.8 (L) 10/26/2018    HCT 38.2 (L) 02/26/2025    HCT 37.9 (L) 10/26/2018    MCV 87 02/26/2025    MCV 89 10/26/2018    MCH 30.3 02/26/2025    MCH 30.0 10/26/2018    MCHC 34.8 02/26/2025    MCHC 33.8 10/26/2018    RDW 12.9 02/26/2025    RDW 12.8 10/26/2018     02/26/2025     10/26/2018       BMP RESULTS:  Lab Results   Component Value Date     02/26/2025     10/26/2018    POTASSIUM 4.2 02/26/2025    POTASSIUM 3.9 12/21/2022    POTASSIUM 4.2 10/26/2018    CHLORIDE 105 02/26/2025    CHLORIDE 99 12/21/2022    CHLORIDE 105 10/26/2018    CO2 27 02/26/2025    CO2 29 12/21/2022    CO2 31 10/26/2018    ANIONGAP 10 02/26/2025    ANIONGAP 5 12/21/2022    ANIONGAP 6 10/26/2018     (H)  03/18/2025     (H) 12/21/2022    GLC 96 10/26/2018    BUN 25.7 (H) 02/26/2025    BUN 25 12/21/2022    BUN 24 10/26/2018    CR 0.86 02/26/2025    CR 1.00 10/26/2018    GFRESTIMATED >90 02/26/2025    GFRESTIMATED 75 10/26/2018    GFRESTBLACK >90 10/26/2018    RAMONA 9.6 02/26/2025    RAMONA 8.9 10/26/2018        A1C RESULTS:  Lab Results   Component Value Date    A1C 5.6 03/11/2025       INR RESULTS:  Lab Results   Component Value Date    INR 1.08 03/14/2024    INR 2.4 (H) 03/14/2023    INR 1.5 (H) 03/07/2023    INR 2.04 (H) 12/21/2022       We greatly appreciate the opportunity to be involved in the care of your patient, Miguel Pappas.    Sincerely,  Levi Rivas MD        Thony Gongora MD  [address] [place] [address]

## 2025-04-07 NOTE — LETTER
4/7/2025    Thony Gongora MD  [address] [place] [address]    RE: Miguel Pappas       Dear Colleague,     I had the pleasure of seeing Miguel Pappas in the [facility].  HISTORY OF PRESENT ILLNESS:  Miguel Pappas a 69 year old male with     Orders this Visit:  No orders of the defined types were placed in this encounter.    No orders of the defined types were placed in this encounter.    There are no discontinued medications.    Encounter Diagnosis   Name Primary?     S/P AVR        CURRENT MEDICATIONS:  Current Outpatient Medications   Medication Sig Dispense Refill     acetaminophen (TYLENOL) 325 MG tablet Take 3 tablets (975 mg) by mouth every 8 hours as needed for other (mild pain). 100 tablet 0     apixaban ANTICOAGULANT (ELIQUIS) 2.5 MG tablet Take 1 tablet (2.5 mg) by mouth 2 times daily for 28 days. 56 tablet 0     ascorbic acid (VITAMIN C) 250 MG CHEW chewable tablet Take 250 mg by mouth daily. (Patient not taking: Reported on 4/7/2025)       [START ON 4/15/2025] aspirin 81 MG EC tablet Take 1 tablet (81 mg) by mouth daily. (Patient not taking: Reported on 4/7/2025)       atorvastatin (LIPITOR) 40 MG tablet Take 1 tablet (40 mg) by mouth every evening. (Patient not taking: Reported on 4/7/2025) 90 tablet 3     ferrous sulfate (FEROSUL) 325 (65 Fe) MG tablet Take 325 mg by mouth daily (with breakfast). (Patient not taking: Reported on 4/7/2025)       GLUCOSAMINE CHONDROITIN OR TABS Take 1 tablet by mouth at bedtime. (Patient not taking: Reported on 3/31/2025)  0     Multiple Vitamins-Minerals (CENTRUM SILVER PO) Take 1 tablet by mouth at bedtime. (Patient not taking: Reported on 3/31/2025) 30 0     oxyCODONE (ROXICODONE) 5 MG tablet Take 1-2 tablets (5-10 mg) by mouth every 4 hours as needed for moderate to severe pain. (Patient not taking: Reported on 4/7/2025) 30 tablet 0     pregabalin (LYRICA) 75 MG capsule Take 1 capsule (75 mg) by mouth 2 times daily for 14 days.  "28 capsule 0     senna-docusate (SENOKOT-S/PERICOLACE) 8.6-50 MG tablet Take 1-2 tablets by mouth 2 times daily. Take while on oral narcotics to prevent or treat constipation. (Patient not taking: Reported on 4/7/2025) 30 tablet 0     Vitamin D3 (CHOLECALCIFEROL) 25 mcg (1000 units) tablet Take 25 mcg by mouth daily. (Patient not taking: Reported on 4/7/2025)         ALLERGIES     Allergies   Allergen Reactions     Ceftriaxone Cough     Lump in throat, coughing, starts midway through infusion. Started after being on ceftriaxone for 4 weeks       PAST MEDICAL, SURGICAL, FAMILY, SOCIAL HISTORY:  History was reviewed and updated as needed, see medical record.        Review of Systems:  A 12-point review of systems was completed, see medical record for detailed review of systems information.    Physical Exam:  Vitals: BP (!) 144/72 (BP Location: Left arm, Patient Position: Sitting, Cuff Size: Adult Regular)   Pulse 63   Resp 18   Ht 1.702 m (5' 7\")   Wt 64.9 kg (143 lb)   SpO2 97%   BMI 22.40 kg/m      Constitutional: No apparent distress    HEENT: pupils equal round reactive to light, thyroid normal size, JVP is normal    Chest: Clear to percussion and auscultation    Cardiac: Normal S1, normal S2 no S3, no murmur or click    Abdomen: Scaphoid.  Liver percusses to 6 cm spleen is not palpable aorta is not tender or enlarged    Extremitiies: no edema.    Neurological:  Cranial nerves II through XII are intact, strength equal and symmetrical, displays normal insight and judgment        ASSESSMENT: Miguel Pappas is a 69 year old male with          We reviewed the benefits of a regular exercise program, a Mediterranean-style weight loss diet, and contacting us for any changes in between now and the time of her next visit.     RECOMMENDATIONS:   1) Mediterranean style limited salt diet  2) check blood pressures at home and record  3) follow up in one year with TTE        Recent Lab Results:  LIPID RESULTS:  Lab " Results   Component Value Date    CHOL 170 10/02/2024    CHOL 209 (H) 10/26/2018    HDL 71 10/02/2024    HDL 85 10/26/2018    LDL 90 10/02/2024     (H) 10/26/2018    TRIG 47 10/02/2024    TRIG 46 10/26/2018    CHOLHDLRATIO 2.4 11/14/2014       LIVER ENZYME RESULTS:  Lab Results   Component Value Date    AST 36 05/01/2024    AST 36 04/09/2009    ALT 37 05/01/2024    ALT 18 04/09/2009       CBC RESULTS:  Lab Results   Component Value Date    WBC 8.0 02/26/2025    WBC 5.9 10/26/2018    RBC 4.39 (L) 02/26/2025    RBC 4.27 (L) 10/26/2018    HGB 10.8 (L) 03/19/2025    HGB 12.8 (L) 10/26/2018    HCT 38.2 (L) 02/26/2025    HCT 37.9 (L) 10/26/2018    MCV 87 02/26/2025    MCV 89 10/26/2018    MCH 30.3 02/26/2025    MCH 30.0 10/26/2018    MCHC 34.8 02/26/2025    MCHC 33.8 10/26/2018    RDW 12.9 02/26/2025    RDW 12.8 10/26/2018     02/26/2025     10/26/2018       BMP RESULTS:  Lab Results   Component Value Date     02/26/2025     10/26/2018    POTASSIUM 4.2 02/26/2025    POTASSIUM 3.9 12/21/2022    POTASSIUM 4.2 10/26/2018    CHLORIDE 105 02/26/2025    CHLORIDE 99 12/21/2022    CHLORIDE 105 10/26/2018    CO2 27 02/26/2025    CO2 29 12/21/2022    CO2 31 10/26/2018    ANIONGAP 10 02/26/2025    ANIONGAP 5 12/21/2022    ANIONGAP 6 10/26/2018     (H) 03/18/2025     (H) 12/21/2022    GLC 96 10/26/2018    BUN 25.7 (H) 02/26/2025    BUN 25 12/21/2022    BUN 24 10/26/2018    CR 0.86 02/26/2025    CR 1.00 10/26/2018    GFRESTIMATED >90 02/26/2025    GFRESTIMATED 75 10/26/2018    GFRESTBLACK >90 10/26/2018    RAMONA 9.6 02/26/2025    RAMONA 8.9 10/26/2018        A1C RESULTS:  Lab Results   Component Value Date    A1C 5.6 03/11/2025       INR RESULTS:  Lab Results   Component Value Date    INR 1.08 03/14/2024    INR 2.4 (H) 03/14/2023    INR 1.5 (H) 03/07/2023    INR 2.04 (H) 12/21/2022       We greatly appreciate the opportunity to be involved in the care of your patient, Miguel GREER  [place].    Sincerely,  Levi Rivas MD      CC  Thony Gongora MD  [address] [place] [address]                                                                       Thank you for allowing me to participate in the care of your patient.      Sincerely,     Levi Rivas MD     [facility]  cc:   Thony Gongora MD  [address] [place] [address]

## 2025-04-10 ENCOUNTER — THERAPY VISIT (OUTPATIENT)
Dept: PHYSICAL THERAPY | Facility: CLINIC | Age: 70
End: 2025-04-10
Attending: ORTHOPAEDIC SURGERY
Payer: COMMERCIAL

## 2025-04-10 DIAGNOSIS — M16.12 PRIMARY OSTEOARTHRITIS OF LEFT HIP: Primary | ICD-10-CM

## 2025-04-10 PROCEDURE — 97116 GAIT TRAINING THERAPY: CPT | Mod: GP | Performed by: PHYSICAL THERAPIST

## 2025-04-10 PROCEDURE — 97110 THERAPEUTIC EXERCISES: CPT | Mod: GP | Performed by: PHYSICAL THERAPIST

## 2025-04-17 ENCOUNTER — THERAPY VISIT (OUTPATIENT)
Dept: PHYSICAL THERAPY | Facility: CLINIC | Age: 70
End: 2025-04-17
Attending: ORTHOPAEDIC SURGERY
Payer: COMMERCIAL

## 2025-04-17 DIAGNOSIS — M16.12 PRIMARY OSTEOARTHRITIS OF LEFT HIP: Primary | ICD-10-CM

## 2025-04-17 PROCEDURE — 97112 NEUROMUSCULAR REEDUCATION: CPT | Mod: GP | Performed by: PHYSICAL THERAPIST

## 2025-04-25 ENCOUNTER — ANCILLARY PROCEDURE (OUTPATIENT)
Dept: GENERAL RADIOLOGY | Facility: CLINIC | Age: 70
End: 2025-04-25
Attending: ORTHOPAEDIC SURGERY
Payer: COMMERCIAL

## 2025-04-25 DIAGNOSIS — Z96.642 S/P TOTAL LEFT HIP ARTHROPLASTY: ICD-10-CM

## 2025-04-25 PROCEDURE — 73502 X-RAY EXAM HIP UNI 2-3 VIEWS: CPT | Mod: TC | Performed by: RADIOLOGY

## 2025-08-30 ENCOUNTER — HOSPITAL ENCOUNTER (EMERGENCY)
Facility: CLINIC | Age: 70
Discharge: HOME | End: 2025-08-30
Attending: PHYSICIAN ASSISTANT | Admitting: PHYSICIAN ASSISTANT
Payer: COMMERCIAL

## 2025-08-30 ENCOUNTER — APPOINTMENT (OUTPATIENT)
Dept: GENERAL RADIOLOGY | Facility: CLINIC | Age: 70
End: 2025-08-30
Attending: PHYSICIAN ASSISTANT
Payer: COMMERCIAL

## 2025-08-30 VITALS
BODY MASS INDEX: 21.74 KG/M2 | HEIGHT: 68 IN | TEMPERATURE: 97.7 F | HEART RATE: 57 BPM | SYSTOLIC BLOOD PRESSURE: 111 MMHG | OXYGEN SATURATION: 96 % | RESPIRATION RATE: 20 BRPM | DIASTOLIC BLOOD PRESSURE: 69 MMHG

## 2025-08-30 DIAGNOSIS — S51.812A LACERATION OF LEFT FOREARM: Primary | ICD-10-CM

## 2025-08-30 PROCEDURE — 12034 INTMD RPR S/TR/EXT 7.6-12.5: CPT | Performed by: PHYSICIAN ASSISTANT

## 2025-08-30 PROCEDURE — 250N000011 HC RX IP 250 OP 636: Performed by: PHYSICIAN ASSISTANT

## 2025-08-30 PROCEDURE — 99283 EMERGENCY DEPT VISIT LOW MDM: CPT | Mod: 25 | Performed by: PHYSICIAN ASSISTANT

## 2025-08-30 PROCEDURE — 90715 TDAP VACCINE 7 YRS/> IM: CPT | Performed by: PHYSICIAN ASSISTANT

## 2025-08-30 PROCEDURE — 90471 IMMUNIZATION ADMIN: CPT | Performed by: PHYSICIAN ASSISTANT

## 2025-08-30 PROCEDURE — 73090 X-RAY EXAM OF FOREARM: CPT | Mod: LT

## 2025-08-30 RX ORDER — CEPHALEXIN 500 MG/1
500 CAPSULE ORAL 3 TIMES DAILY
Qty: 30 CAPSULE | Refills: 0 | Status: SHIPPED | OUTPATIENT
Start: 2025-08-30 | End: 2025-09-06

## 2025-08-30 RX ORDER — BUPIVACAINE HYDROCHLORIDE 5 MG/ML
10 INJECTION, SOLUTION PERINEURAL ONCE
Status: COMPLETED | OUTPATIENT
Start: 2025-08-30 | End: 2025-08-30

## 2025-08-30 RX ADMIN — CLOSTRIDIUM TETANI TOXOID ANTIGEN (FORMALDEHYDE INACTIVATED), CORYNEBACTERIUM DIPHTHERIAE TOXOID ANTIGEN (FORMALDEHYDE INACTIVATED), BORDETELLA PERTUSSIS TOXOID ANTIGEN (GLUTARALDEHYDE INACTIVATED), BORDETELLA PERTUSSIS FILAMENTOUS HEMAGGLUTININ ANTIGEN (FORMALDEHYDE INACTIVATED), BORDETELLA PERTUSSIS PERTACTIN ANTIGEN, AND BORDETELLA PERTUSSIS FIMBRIAE 2/3 ANTIGEN 0.5 ML: 5; 2; 2.5; 5; 3; 5 INJECTION, SUSPENSION INTRAMUSCULAR at 18:29

## 2025-08-30 RX ADMIN — BUPIVACAINE HYDROCHLORIDE 50 MG: 5 INJECTION, SOLUTION EPIDURAL; INTRACAUDAL; PERINEURAL at 18:31

## 2025-08-30 ASSESSMENT — ACTIVITIES OF DAILY LIVING (ADL)
ADLS_ACUITY_SCORE: 60

## 2025-09-04 ENCOUNTER — OFFICE VISIT (OUTPATIENT)
Dept: FAMILY MEDICINE | Facility: CLINIC | Age: 70
End: 2025-09-04
Payer: COMMERCIAL

## 2025-09-04 VITALS
TEMPERATURE: 98.2 F | RESPIRATION RATE: 16 BRPM | OXYGEN SATURATION: 96 % | BODY MASS INDEX: 21.93 KG/M2 | SYSTOLIC BLOOD PRESSURE: 138 MMHG | WEIGHT: 144.2 LBS | DIASTOLIC BLOOD PRESSURE: 70 MMHG | HEART RATE: 81 BPM

## 2025-09-04 DIAGNOSIS — R41.3 MEMORY CHANGES: Primary | ICD-10-CM

## 2025-09-04 DIAGNOSIS — R09.81 NASAL CONGESTION: ICD-10-CM

## 2025-09-04 DIAGNOSIS — R05.1 ACUTE COUGH: ICD-10-CM

## 2025-09-04 ASSESSMENT — PAIN SCALES - GENERAL: PAINLEVEL_OUTOF10: MILD PAIN (2)

## (undated) DEVICE — CANNULA PERFUSION ARTERIAL EOPA 18FR 12" 77418

## (undated) DEVICE — TIP ASPIRATOR SONOPET IQ LARGE 5500-25S-308

## (undated) DEVICE — SU ETHIBOND 0 CT-1 CR 8X18" CX21D

## (undated) DEVICE — SU ETHIBOND 2-0 SHDA 30" X563H

## (undated) DEVICE — BRUSH SURGICAL SCRUB W/4% CHG SOL 25ML 371073

## (undated) DEVICE — Device

## (undated) DEVICE — CATH ANGIO JUDKINS JL4 6FRX100CM INFINITI 534620T

## (undated) DEVICE — DRAIN SUMP INTRACARDIAC 20FR 12" POOL TIP 12112

## (undated) DEVICE — LEAD ELEC MYOCARDIO PACING TEMPORARY MEDTRONIC

## (undated) DEVICE — TUBING PERFUSION 1/4X1/16X8FT

## (undated) DEVICE — KIT HAND CONTROL ANGIOTOUCH ACIST 65CM AT-P65

## (undated) DEVICE — SU VICRYL 3-0 FS-1 27" J442H

## (undated) DEVICE — GLOVE BIOGEL PI SZ 7.5 40875

## (undated) DEVICE — SU VICRYL 1 CT-1 27" J341H

## (undated) DEVICE — PREP CHLORAPREP W/ORANGE TINT 10.5ML 930715

## (undated) DEVICE — COVER TABLE POLY 65X90" 8186

## (undated) DEVICE — TOTE ANGIO CORP PC15AT SAN32CC83O

## (undated) DEVICE — SU SILK 1 TIE 10X30" SA87G

## (undated) DEVICE — INTRO SHEATH 6FRX10CM PINNACLE RSS602

## (undated) DEVICE — SU VICRYL 2-0 CT-1 27" UND J259H

## (undated) DEVICE — POSITIONER ABDUCTION PILLOW FOAM MED FP-ABDUCTM

## (undated) DEVICE — DRAIN CHEST TUBE RIGHT ANGLED 28FR 8128

## (undated) DEVICE — TUBING SUCTION 6"X3/16" N56A

## (undated) DEVICE — ESU GROUND PAD UNIVERSAL W/O CORD

## (undated) DEVICE — SOL NACL 0.9% IRRIG 1000ML BOTTLE 2F7124

## (undated) DEVICE — HOOD SURG T7PLUS PEEL AWAY FACE SHIELD STRL LF 0416-801-100

## (undated) DEVICE — DRAPE C-ARM 60X42" 1013

## (undated) DEVICE — GOWN IMPERVIOUS BREATHABLE 2XL/XLONG

## (undated) DEVICE — DRSG KERLIX 4 1/2"X4YDS ROLL 6730

## (undated) DEVICE — BONE CLEANING TIP INTERPULSE  0210-010-000

## (undated) DEVICE — DEVICE SUT STRATAFIX PDS + 1 CT1 SPRL 45CM ABS SXPP1B430

## (undated) DEVICE — ESU ELEC BLADE 6" COATED E1450-6

## (undated) DEVICE — SPONGE LAP 18X18" X8435

## (undated) DEVICE — MANIFOLD KIT ANGIO AUTOMATED 014613

## (undated) DEVICE — PACK TOTAL JOINT STD LATEX

## (undated) DEVICE — CASSETTE SONOPET IRRIG SUCTION STRL 5500-573-000

## (undated) DEVICE — CANNULA PERFUSION 14FRX16" LEFT 12016

## (undated) DEVICE — SU VICRYL 0 CT-1 36" J346H

## (undated) DEVICE — SYR ANGIOGRAPHY MULTIUSE KIT ACIST 014612

## (undated) DEVICE — PACK OPEN HEART PV12OH524

## (undated) DEVICE — CATH ANGIO INFINITI 3DRC 6FRX100CM 534676T

## (undated) DEVICE — CABLE MYO/LEAD PACING BLUE DISP 019-535

## (undated) DEVICE — GOWN XLG DISP 9545

## (undated) DEVICE — DRAPE SLUSH/WARMER 66X44" ORS-320

## (undated) DEVICE — KIT ENDO TURNOVER/PROCEDURE CARRY-ON 101822

## (undated) DEVICE — SOL WATER IRRIG 1000ML BOTTLE 2F7114

## (undated) DEVICE — DEVICE ASSEMBLY SUCTION/ANTI COAG BTC93

## (undated) DEVICE — DEFIB PRO-PADZ LVP LQD GEL ADULT 8900-2105-01

## (undated) DEVICE — ELECTRODE MEDITRACE MULT FUNC AED ADULT 20770

## (undated) DEVICE — CONNECTOR DRAIN CHEST Y EXTENSION SET 19909

## (undated) DEVICE — GLOVE BIOGEL PI INDICATOR 8.0 LF 41680

## (undated) DEVICE — PACK TUBING MINI VAC CUSTOM 3/8X3/8T BB7V94R1

## (undated) DEVICE — SOMASENSOR CEREBRAL OXIMETER ADULT SAFB-SM

## (undated) DEVICE — PACK MINI VAC CUSTOM CARDOPULMONARY BB5Z97R15

## (undated) DEVICE — DRAPE MAYO STAND 23X54 8337

## (undated) DEVICE — KIT WASH CELL SAVING ATL2001

## (undated) DEVICE — SUCTION CANISTER MEDIVAC LINER 3000ML W/LID 65651-530

## (undated) DEVICE — SU DEVICE COR KNOT KIT STD SHORT 2/KIT 030884

## (undated) DEVICE — SU ETHIBOND 3-0 BBDA 36" X588H

## (undated) DEVICE — CANNULA PERFUSION AORTIC ROOT 22FR 20012

## (undated) DEVICE — CATH ANGIO INFINITI JL3.5 4FRX100CM 538418

## (undated) DEVICE — SU PROLENE 3-0 SHDA 36" 8522H

## (undated) DEVICE — SU PROLENE 5-0 C-1DA 36" 8720H

## (undated) DEVICE — PREP SKIN SCRUB TRAY 4461A

## (undated) DEVICE — PREP CHLORAPREP 26ML TINTED HI-LITE ORANGE 930815

## (undated) DEVICE — CONNECTOR PREFUSION REDUCER 3/8X1/2" W/O LL 6013

## (undated) DEVICE — DRAIN ROUND W/RESERV KIT JACKSON PRATT 10FR 400ML SU130-402D

## (undated) DEVICE — CANNULA VENOUS 2 STAGE 32-40FR

## (undated) DEVICE — SU MONOCRYL 3-0 PS-2 27" Y427H

## (undated) DEVICE — TUBING SUCTION 12"X1/4" N612

## (undated) DEVICE — DRAPE SHEET REV FOLD 3/4 9349

## (undated) DEVICE — STRAP STIRRUP W/SLIP 30187-030

## (undated) DEVICE — SU VICRYL 0 CTX 36" J370H

## (undated) DEVICE — CABLE MYO/LEAD PACING WHITE DISP 019-530

## (undated) DEVICE — DRAPE IOBAN INCISE 23X17" 6650EZ

## (undated) DEVICE — SUCTION IRR SYSTEM W/O TIP INTERPULSE HANDPIECE 0210-100-000

## (undated) DEVICE — BONE WAX 2.5GM W31G

## (undated) DEVICE — LINEN TOWEL PACK X5 5464

## (undated) DEVICE — INTRODUCER CATH VASC 5FRX10CM  MPIS-501-NT-U-SST

## (undated) DEVICE — LINEN LEG ROLL 5489

## (undated) DEVICE — TOWEL SURG 17INW X 26INL CTTN BLUE STRL 8324B

## (undated) DEVICE — BLADE SAW RECIP STRK 77.5X11.18X0.76MM 0277-096-325

## (undated) DEVICE — DRAIN CHEST TUBE 32FR STR 8032

## (undated) DEVICE — LINEN TOWEL PACK X6 WHITE 5487

## (undated) DEVICE — LINEN TOWEL PACK X30 5481

## (undated) DEVICE — SU STEEL 6 CCS 4X18" M654G

## (undated) DEVICE — GLOVE BIOGEL PI ULTRATOUCH G SZ 8.5 42185

## (undated) DEVICE — SU DERMABOND ADVANCED .7ML DNX12

## (undated) DEVICE — TOURNIQUET VASCULAR

## (undated) DEVICE — RESERVOIR CELL SAVING BLOOD COLLECTION EL2120

## (undated) DEVICE — DRAPE IOBAN INCISE 36X23" 6651EZ

## (undated) DEVICE — SU PLEDGET SOFT TFE 3/8"X3/26"X1/16" PCP40

## (undated) DEVICE — SU PROLENE 4-0 RB-1DA 36" 8557H

## (undated) DEVICE — DRAPE STERI TOWEL SM 1000

## (undated) DEVICE — DRAPE POUCH IRR 1016

## (undated) DEVICE — DRAPE CONVERTORS U-DRAPE 60X72" 8476

## (undated) DEVICE — SUCTION TIP YANKAUER ORTHO SUPER SUCKER EFS-111

## (undated) DEVICE — SUCTION DRY CHEST DRAIN OASIS 3600-100

## (undated) DEVICE — SUCTION CATH AIRLIFE TRI-FLO W/CONTROL PORT 14FR  T60C

## (undated) DEVICE — SURGICEL HEMOSTAT 2X14" 1951

## (undated) DEVICE — CATH ANGIO JUDKINS JL3.5 6FRX100CM INFINITI 534618T

## (undated) DEVICE — BONE WAX OSTENE 3.5GM CT410

## (undated) DEVICE — SU PROLENE 4-0 SHDA 36" 8521H

## (undated) DEVICE — SUCTION MANIFOLD NEPTUNE 2 SYS 4 PORT 0702-020-000

## (undated) DEVICE — PROTECTOR ARM ONE-STEP TRENDELENBURG 40418

## (undated) DEVICE — VALVE VRV 9156R2

## (undated) DEVICE — SU FIBERWIRE 2 38" T-8 NDL  AR-7206

## (undated) DEVICE — DRAPE U SPLIT 74X120" 29440

## (undated) DEVICE — ESU PENCIL SMOKE EVAC W/ROCKER SWITCH 0703-047-000

## (undated) DEVICE — SU ETHIBOND 2-0 V-5SA 10X30" SXP52

## (undated) RX ORDER — FAMOTIDINE 20 MG/1
TABLET, FILM COATED ORAL
Status: DISPENSED
Start: 2022-12-16

## (undated) RX ORDER — PROPOFOL 10 MG/ML
INJECTION, EMULSION INTRAVENOUS
Status: DISPENSED
Start: 2025-03-18

## (undated) RX ORDER — FENTANYL CITRATE 50 UG/ML
INJECTION, SOLUTION INTRAMUSCULAR; INTRAVENOUS
Status: DISPENSED
Start: 2024-03-18

## (undated) RX ORDER — HEPARIN SODIUM 1000 [USP'U]/ML
INJECTION, SOLUTION INTRAVENOUS; SUBCUTANEOUS
Status: DISPENSED
Start: 2022-12-16

## (undated) RX ORDER — EPHEDRINE SULFATE 50 MG/ML
INJECTION, SOLUTION INTRAMUSCULAR; INTRAVENOUS; SUBCUTANEOUS
Status: DISPENSED
Start: 2022-12-16

## (undated) RX ORDER — PROTAMINE SULFATE 10 MG/ML
INJECTION, SOLUTION INTRAVENOUS
Status: DISPENSED
Start: 2022-12-16

## (undated) RX ORDER — HEPARIN SODIUM 200 [USP'U]/100ML
INJECTION, SOLUTION INTRAVENOUS
Status: DISPENSED
Start: 2022-11-15

## (undated) RX ORDER — FENTANYL CITRATE 0.05 MG/ML
INJECTION, SOLUTION INTRAMUSCULAR; INTRAVENOUS
Status: DISPENSED
Start: 2022-12-16

## (undated) RX ORDER — VANCOMYCIN HYDROCHLORIDE 1 G/200ML
INJECTION, SOLUTION INTRAVENOUS
Status: DISPENSED
Start: 2022-12-16

## (undated) RX ORDER — NEOSTIGMINE METHYLSULFATE 1 MG/ML
VIAL (ML) INJECTION
Status: DISPENSED
Start: 2022-12-16

## (undated) RX ORDER — BUPIVACAINE HYDROCHLORIDE 2.5 MG/ML
INJECTION, SOLUTION EPIDURAL; INFILTRATION; INTRACAUDAL; PERINEURAL
Status: DISPENSED
Start: 2025-03-18

## (undated) RX ORDER — LIDOCAINE HYDROCHLORIDE 10 MG/ML
INJECTION, SOLUTION EPIDURAL; INFILTRATION; INTRACAUDAL; PERINEURAL
Status: DISPENSED
Start: 2024-03-18

## (undated) RX ORDER — CEFAZOLIN SODIUM 1 G/3ML
INJECTION, POWDER, FOR SOLUTION INTRAMUSCULAR; INTRAVENOUS
Status: DISPENSED
Start: 2022-12-16

## (undated) RX ORDER — ONDANSETRON 2 MG/ML
INJECTION INTRAMUSCULAR; INTRAVENOUS
Status: DISPENSED
Start: 2022-12-16

## (undated) RX ORDER — CEFAZOLIN SODIUM 1 G/3ML
INJECTION, POWDER, FOR SOLUTION INTRAMUSCULAR; INTRAVENOUS
Status: DISPENSED
Start: 2025-03-18

## (undated) RX ORDER — FLUMAZENIL 0.1 MG/ML
INJECTION, SOLUTION INTRAVENOUS
Status: DISPENSED
Start: 2024-03-18

## (undated) RX ORDER — LIDOCAINE HYDROCHLORIDE 10 MG/ML
INJECTION, SOLUTION EPIDURAL; INFILTRATION; INTRACAUDAL; PERINEURAL
Status: DISPENSED
Start: 2022-11-15

## (undated) RX ORDER — NALOXONE HYDROCHLORIDE 0.4 MG/ML
INJECTION, SOLUTION INTRAMUSCULAR; INTRAVENOUS; SUBCUTANEOUS
Status: DISPENSED
Start: 2024-03-18

## (undated) RX ORDER — FENTANYL CITRATE 50 UG/ML
INJECTION, SOLUTION INTRAMUSCULAR; INTRAVENOUS
Status: DISPENSED
Start: 2025-03-18

## (undated) RX ORDER — VECURONIUM BROMIDE 1 MG/ML
INJECTION, POWDER, LYOPHILIZED, FOR SOLUTION INTRAVENOUS
Status: DISPENSED
Start: 2022-12-16

## (undated) RX ORDER — DEXAMETHASONE SODIUM PHOSPHATE 10 MG/ML
INJECTION, SOLUTION INTRAMUSCULAR; INTRAVENOUS
Status: DISPENSED
Start: 2025-03-18

## (undated) RX ORDER — GLYCOPYRROLATE 0.2 MG/ML
INJECTION, SOLUTION INTRAMUSCULAR; INTRAVENOUS
Status: DISPENSED
Start: 2022-12-16

## (undated) RX ORDER — HEPARIN SODIUM 1000 [USP'U]/ML
INJECTION, SOLUTION INTRAVENOUS; SUBCUTANEOUS
Status: DISPENSED
Start: 2022-11-15

## (undated) RX ORDER — CHLORHEXIDINE GLUCONATE ORAL RINSE 1.2 MG/ML
SOLUTION DENTAL
Status: DISPENSED
Start: 2022-12-16

## (undated) RX ORDER — PHENYLEPHRINE HYDROCHLORIDE 10 MG/ML
INJECTION INTRAVENOUS
Status: DISPENSED
Start: 2025-03-18

## (undated) RX ORDER — GLYCOPYRROLATE 0.2 MG/ML
INJECTION, SOLUTION INTRAMUSCULAR; INTRAVENOUS
Status: DISPENSED
Start: 2024-03-18

## (undated) RX ORDER — ROPIVACAINE HYDROCHLORIDE 5 MG/ML
INJECTION, SOLUTION EPIDURAL; INFILTRATION; PERINEURAL
Status: DISPENSED
Start: 2024-03-18

## (undated) RX ORDER — CEFAZOLIN SODIUM/WATER 2 G/20 ML
SYRINGE (ML) INTRAVENOUS
Status: DISPENSED
Start: 2022-12-16

## (undated) RX ORDER — EPINEPHRINE 1 MG/ML
INJECTION, SOLUTION INTRAMUSCULAR; SUBCUTANEOUS
Status: DISPENSED
Start: 2025-03-18

## (undated) RX ORDER — LIDOCAINE HYDROCHLORIDE 40 MG/ML
SOLUTION TOPICAL
Status: DISPENSED
Start: 2024-03-18

## (undated) RX ORDER — BUPIVACAINE HYDROCHLORIDE 2.5 MG/ML
INJECTION, SOLUTION EPIDURAL; INFILTRATION; INTRACAUDAL
Status: DISPENSED
Start: 2022-12-16

## (undated) RX ORDER — ONDANSETRON 2 MG/ML
INJECTION INTRAMUSCULAR; INTRAVENOUS
Status: DISPENSED
Start: 2025-03-18

## (undated) RX ORDER — PROPOFOL 10 MG/ML
INJECTION, EMULSION INTRAVENOUS
Status: DISPENSED
Start: 2022-12-16

## (undated) RX ORDER — LIDOCAINE HYDROCHLORIDE 10 MG/ML
INJECTION, SOLUTION EPIDURAL; INFILTRATION; INTRACAUDAL; PERINEURAL
Status: DISPENSED
Start: 2025-03-18

## (undated) RX ORDER — FENTANYL CITRATE 50 UG/ML
INJECTION, SOLUTION INTRAMUSCULAR; INTRAVENOUS
Status: DISPENSED
Start: 2022-11-15